# Patient Record
Sex: MALE | Race: BLACK OR AFRICAN AMERICAN | NOT HISPANIC OR LATINO | Employment: OTHER | URBAN - NONMETROPOLITAN AREA
[De-identification: names, ages, dates, MRNs, and addresses within clinical notes are randomized per-mention and may not be internally consistent; named-entity substitution may affect disease eponyms.]

---

## 2017-03-30 ENCOUNTER — OFFICE VISIT (OUTPATIENT)
Dept: ORTHOPEDIC SURGERY | Facility: CLINIC | Age: 77
End: 2017-03-30

## 2017-03-30 DIAGNOSIS — M25.561 RIGHT KNEE PAIN, UNSPECIFIED CHRONICITY: Primary | ICD-10-CM

## 2017-03-30 DIAGNOSIS — M17.11 PRIMARY OSTEOARTHRITIS OF RIGHT KNEE: ICD-10-CM

## 2017-03-30 PROCEDURE — 99204 OFFICE O/P NEW MOD 45 MIN: CPT | Performed by: ORTHOPAEDIC SURGERY

## 2017-03-30 PROCEDURE — 73560 X-RAY EXAM OF KNEE 1 OR 2: CPT | Performed by: ORTHOPAEDIC SURGERY

## 2017-03-30 RX ORDER — LISINOPRIL 10 MG/1
10 TABLET ORAL DAILY
COMMUNITY
End: 2022-02-25

## 2017-03-30 RX ORDER — LORAZEPAM 0.5 MG/1
0.5 TABLET ORAL EVERY 8 HOURS PRN
COMMUNITY
End: 2022-02-25

## 2017-03-30 RX ORDER — MEMANTINE HYDROCHLORIDE 28 MG/1
28 CAPSULE, EXTENDED RELEASE ORAL DAILY
COMMUNITY
End: 2022-02-25

## 2017-03-30 RX ORDER — FUROSEMIDE 80 MG
80 TABLET ORAL 2 TIMES DAILY
COMMUNITY
End: 2022-03-04 | Stop reason: HOSPADM

## 2017-03-30 RX ORDER — METOPROLOL TARTRATE 50 MG/1
50 TABLET, FILM COATED ORAL 2 TIMES DAILY
Status: ON HOLD | COMMUNITY
End: 2022-03-03 | Stop reason: SDUPTHER

## 2017-03-30 RX ORDER — GABAPENTIN 100 MG/1
100 CAPSULE ORAL 3 TIMES DAILY
COMMUNITY
End: 2022-02-25

## 2017-03-30 RX ORDER — OMEPRAZOLE 20 MG/1
20 CAPSULE, DELAYED RELEASE ORAL DAILY
COMMUNITY
End: 2022-02-25

## 2017-03-30 RX ORDER — OXYCODONE AND ACETAMINOPHEN 7.5; 325 MG/1; MG/1
1 TABLET ORAL EVERY 6 HOURS PRN
COMMUNITY
End: 2022-02-25

## 2017-03-30 RX ORDER — LANOLIN ALCOHOL/MO/W.PET/CERES
1000 CREAM (GRAM) TOPICAL DAILY
COMMUNITY
End: 2022-02-25

## 2017-03-30 RX ORDER — POLYETHYLENE GLYCOL 3350 17 G/17G
17 POWDER, FOR SOLUTION ORAL DAILY
COMMUNITY
End: 2022-07-02

## 2017-03-30 RX ORDER — PRAVASTATIN SODIUM 10 MG
10 TABLET ORAL NIGHTLY
COMMUNITY
End: 2022-04-18 | Stop reason: HOSPADM

## 2017-04-07 PROBLEM — M17.11 PRIMARY OSTEOARTHRITIS OF RIGHT KNEE: Status: ACTIVE | Noted: 2017-04-07

## 2017-04-20 ENCOUNTER — TELEPHONE (OUTPATIENT)
Dept: ORTHOPEDIC SURGERY | Facility: CLINIC | Age: 77
End: 2017-04-20

## 2017-04-20 NOTE — TELEPHONE ENCOUNTER
I'VE TRIED TO CALL ADELINA AT THE NURSING HOME TWICE. PATIENT DOES NOT MEET CRITERIA TO SCHEDULE AT [facility]. I'VE HELD OVER 10  MINUTES 2 TIMES TRYING TO REQUEST MEDICAL RECORDS FROM HIS PREVIOUS APPOINTMENTS.  Message left by surgery scheduler.

## 2017-05-17 ENCOUNTER — TELEPHONE (OUTPATIENT)
Dept: ORTHOPEDIC SURGERY | Facility: CLINIC | Age: 77
End: 2017-05-17

## 2017-06-20 ENCOUNTER — TELEPHONE (OUTPATIENT)
Dept: ORTHOPEDIC SURGERY | Facility: CLINIC | Age: 77
End: 2017-06-20

## 2017-06-20 NOTE — TELEPHONE ENCOUNTER
PATIENT IS HAVING NUMEROUS TEETH PULLED 7/12/17.  WE WILL POSTPONE HIS SURGERY 6-8 WEEKS... PATIENT IS AWARE./AW

## 2017-09-15 ENCOUNTER — TELEPHONE (OUTPATIENT)
Dept: ORTHOPEDIC SURGERY | Facility: CLINIC | Age: 77
End: 2017-09-15

## 2017-10-23 ENCOUNTER — OUTSIDE FACILITY SERVICE (OUTPATIENT)
Dept: ORTHOPEDIC SURGERY | Facility: CLINIC | Age: 77
End: 2017-10-23

## 2017-10-23 PROCEDURE — 27447 TOTAL KNEE ARTHROPLASTY: CPT | Performed by: ORTHOPAEDIC SURGERY

## 2017-10-23 PROCEDURE — 20985 CPTR-ASST DIR MS PX: CPT | Performed by: ORTHOPAEDIC SURGERY

## 2017-11-10 ENCOUNTER — OFFICE VISIT (OUTPATIENT)
Dept: ORTHOPEDIC SURGERY | Facility: CLINIC | Age: 77
End: 2017-11-10

## 2017-11-10 DIAGNOSIS — Z96.651 STATUS POST TOTAL RIGHT KNEE REPLACEMENT: Primary | ICD-10-CM

## 2017-11-10 DIAGNOSIS — M17.11 PRIMARY OSTEOARTHRITIS OF RIGHT KNEE: ICD-10-CM

## 2017-11-10 PROCEDURE — 73560 X-RAY EXAM OF KNEE 1 OR 2: CPT | Performed by: ORTHOPAEDIC SURGERY

## 2017-11-10 PROCEDURE — 99024 POSTOP FOLLOW-UP VISIT: CPT | Performed by: ORTHOPAEDIC SURGERY

## 2017-11-10 NOTE — PROGRESS NOTES
Chief Complaint   Patient presents with   • Right Knee - Follow-up   • Wound Check         HPI  Patient is here today for a follow up of his right total knee replacement.  He is doing very well postoperatively.  His range of motion is improving with physical therapy.  He has completed his course of xarelto for DVT prophylaxis.  There are no fevers, rigors or chills.  His pain is declined considerably and he is not using any pain medication at this point.  His mobility is improved after the total knee arthroplasty and the patient is glad that the surgery is over especially given the fact that he did not sustain any complications from the surgical intervention.        There were no vitals filed for this visit.        Joint/Body Part Specific Exam:  right knee.The patient is status post total knee arthroplasty postoperative 4 week(s). Incision is clean. Calf is soft and nontender. Homans sign is negative. There is no clicking, popping or catching. Anterior and posterior drawer signs are negative.  There is no instability of the components. Appropriate amounts of swelling and bruising are noted. Dorsalis pedis and posterior tibial artery pulses are palpable. Common peroneal nerve function is well preserved. Range of motion is from 0- 90° degrees of flexion. Gait is cautious but otherwise fairly normal. There is no evidence of a deep seated joint infection.      X-RAY REPORT:  right Knee X-Ray  Indication: Evaluation of implant position after total knee arthroplasty  AP, Lateral views  Findings: Well placed implants with a good cement mantle without any subsidence  no bony lesion  Soft tissues within normal limits  within normal limits joint spaces  Hardware appropriately positioned yes      yes prior studies available for comparison.    X-RAY was ordered and reviewed by Elias Lara MD    Jordin was seen today for wound check and follow-up.    Diagnoses and all orders for this visit:    Status post total right knee  replacement  -     XR Knee 1 or 2 View Right    Primary osteoarthritis of right knee          Procedures        Instructions:      Plan:Incision care.    DC the staples.    Ace wrap from toes to groin to help decrease the swelling and minimize the possibility of a DVT.    Tablet aspirin 325 mg tab 1 by mouth daily for DVT prophylaxis.    Tablet ibuprofen 600 mg orally twice a day for pain and discomfort.    Continue with aggressive physical therapy on an outpatient basis.    Falls precaution.    Follow-up in my office in 6 weeks for reevaluation.

## 2017-11-16 PROBLEM — Z96.651 STATUS POST TOTAL RIGHT KNEE REPLACEMENT: Status: ACTIVE | Noted: 2017-11-16

## 2017-12-20 ENCOUNTER — OFFICE VISIT (OUTPATIENT)
Dept: ORTHOPEDIC SURGERY | Facility: CLINIC | Age: 77
End: 2017-12-20

## 2017-12-20 DIAGNOSIS — Z96.651 STATUS POST TOTAL RIGHT KNEE REPLACEMENT: Primary | ICD-10-CM

## 2017-12-20 PROCEDURE — 99024 POSTOP FOLLOW-UP VISIT: CPT | Performed by: ORTHOPAEDIC SURGERY

## 2017-12-20 NOTE — PROGRESS NOTES
Chief Complaint   Patient presents with   • Right Knee - Follow-up   • Wound Check         HPI  Patient is here today for a follow up of his right total knee replacement.Patient had a surgical procedure performed by me on 23 October 2017.  He is doing extremely well with his postoperative physical therapy.  He states that his pain has fully settled down and he is not taking any narcotics for control of his pain at this point.  He has completed his course of xarelto.  He is very pleased with the resolution of his pain.  He states that everything is going absolutely great in terms of his mobility and his recovery from a relatively major surgery.        There were no vitals filed for this visit.        Joint/Body Part Specific Exam:  right knee.The patient is status post total knee arthroplasty postoperative 8 week(s). Incision is clean. Calf is soft and nontender. Homans sign is negative. There is no clicking, popping or catching. Anterior and posterior drawer signs are negative.  There is no instability of the components. Appropriate amounts of swelling and bruising are noted. Dorsalis pedis and posterior tibial artery pulses are palpable. Common peroneal nerve function is well preserved. Range of motion is from 0- 125° degrees of flexion. Gait is cautious but otherwise fairly normal. There is no evidence of a deep seated joint infection.      X-RAY REPORT:        Jordin was seen today for wound check and follow-up.    Diagnoses and all orders for this visit:    Status post total right knee replacement          Procedures        Instructions:      Plan:Incision care.    Stretching and strengthening exercises of the quads and the hamstrings.    Falls precautions.    , GI and dental procedure prophylaxis with antibiotics to prevent a metastatic infection to the knee arthroplasty implants.    Tablet ibuprofen 600 mg tab 1 by mouth twice a day for pain and discomfort.    Weightbearing as tolerated in walking with the  assistance of a walker.    Follow-up in my office in 3 months for reevaluation.

## 2018-03-22 ENCOUNTER — OFFICE VISIT CONVERTED (OUTPATIENT)
Dept: CARDIOLOGY | Facility: CLINIC | Age: 78
End: 2018-03-22
Attending: INTERNAL MEDICINE

## 2018-09-21 ENCOUNTER — OFFICE VISIT CONVERTED (OUTPATIENT)
Dept: PODIATRY | Facility: CLINIC | Age: 78
End: 2018-09-21
Attending: PODIATRIST

## 2018-09-27 ENCOUNTER — OFFICE VISIT CONVERTED (OUTPATIENT)
Dept: CARDIOLOGY | Facility: CLINIC | Age: 78
End: 2018-09-27
Attending: INTERNAL MEDICINE

## 2019-03-05 ENCOUNTER — OFFICE VISIT (OUTPATIENT)
Dept: ORTHOPEDIC SURGERY | Facility: CLINIC | Age: 79
End: 2019-03-05

## 2019-03-05 VITALS — WEIGHT: 173 LBS | HEIGHT: 60 IN | TEMPERATURE: 97.1 F | BODY MASS INDEX: 33.96 KG/M2

## 2019-03-05 DIAGNOSIS — Z96.651 STATUS POST TOTAL RIGHT KNEE REPLACEMENT: Primary | ICD-10-CM

## 2019-03-05 PROCEDURE — 73560 X-RAY EXAM OF KNEE 1 OR 2: CPT | Performed by: ORTHOPAEDIC SURGERY

## 2019-03-05 PROCEDURE — 99213 OFFICE O/P EST LOW 20 MIN: CPT | Performed by: ORTHOPAEDIC SURGERY

## 2019-03-05 NOTE — PROGRESS NOTES
NEW/FOLLOW UP VISIT    Jordin Menon:  ?  1940:  ?  Chief Complaint   Patient presents with   • Right Knee - Follow-up      ?  HPI: The patient is following up with me regarding his right total knee arthroplasty.  I performed the surgery on 23 October 2017.  He is now 1.5 years plus out from the knee arthroplasty.  The knee surgery improved his quality of life quite a bit.  He does not take any narcotics for pain control.  Unfortunately his dementia has slowly and progressively gotten worse.  He does walk with the assistance of a walker to prevent falls.  He has a lot of swelling and edema distally over the pretibial region.  He also has a chronic ulcer on the lateral aspect of the right ankle which needs to be treated with a wound clinic.  The patient does not recall if his PCP or the wound clinic is treating this ulcer.  He does not have any fevers, rigors or chills.  There does not appear to be any evidence of an active infection related to this ulcer.  He states that he does have a dull aching sensation on the medial aspect of the tibia.  His knee however does not bother him at all and he does not recall taking any medication for his knee symptoms.      This patient is an established patient.  This problem is not new to this examiner.    Review of Systems   Constitutional: Negative.    HENT: Negative.    Eyes: Negative.    Respiratory: Negative.    Cardiovascular: Negative.    Gastrointestinal: Negative.    Endocrine: Negative.    Genitourinary: Negative.    Musculoskeletal: Positive for gait problem and joint swelling.   Skin: Negative.    Allergic/Immunologic: Negative.    Hematological: Negative.    Psychiatric/Behavioral: Positive for behavioral problems and confusion. Negative for suicidal ideas.           Physical Exam   Constitutional: Patient is oriented to person, place, and time. Appears well-developed and well-nourished.   HENT:   Head: Normocephalic and atraumatic.   Eyes: Conjunctivae and EOM are  normal. Pupils are equal, round, and reactive to light.   Cardiovascular: Normal rate, regular rhythm, normal heart sounds and intact distal pulses.   Pulmonary/Chest: Effort normal and breath sounds normal.   Musculoskeletal:   See detailed exam below   Neurological: Alert and oriented to person, place, and time. No sensory deficit. Coordination normal.   Skin: Skin is warm and dry. Capillary refill takes less than 2 seconds. No rash noted. No erythema.   Psychiatric: Patient has a normal mood and affect. Her behavior is normal. Judgment and thought content normal.   Nursing note and vitals reviewed.    Ortho Exam:     Right knee.The patient is status post total knee arthroplasty postoperative 1.5 year(s). Incision is clean. Calf is soft and nontender. Homans sign is negative. There is no clicking, popping or catching. Anterior and posterior drawer signs are negative.  There is no instability of the components. Appropriate amounts of swelling and bruising are noted. Dorsalis pedis and posterior tibial artery pulses are palpable. Common peroneal nerve function is well preserved. Range of motion is from 0- 130° degrees of flexion. Gait is cautious but otherwise fairly normal. There is no evidence of a deep seated joint infection.      Diagnostics:right Knee X-Ray  Indication: Evaluation of implant position after total knee arthroplasty  AP, Lateral views  Findings: Anatomical position of the implants with a good cement mantle without any subsidence of the implants  no bony lesion  Soft tissues within normal limits  within normal limits joint spaces  Hardware appropriately positioned yes      yes prior studies available for comparison.      X-RAY was ordered and reviewed by Elias Lara MD       Assessment:  Jordin was seen today for follow-up.    Diagnoses and all orders for this visit:    Status post total right knee replacement  -     XR Knee 1 or 2 View Right          Procedures  ?  ?  Plan  stretching and  strengthening exercises of the quads and the hamstrings.    Falls precautions.    Use a walker for assistance with ambulation.    Ace wrap from toes to groin to help decrease the swelling and the pitting pretibial edema.    He has a chronic ulcer on the distal aspect of the right ankle laterally and I have discussed with the patient that he needs to follow-up at the wound clinic because I am not comfortable treating the ulcer on this leg with chronic and long-standing duration.  The possibility of underlying osteomyelitis of the distal fibula cannot be ruled out and I have discussed that with him at length.    , GI and dental procedure prophylaxis with antibiotics to prevent a metastatic infection of the knee arthroplasty implants.    · Compression/elastic brace if applicable  · Rest, ice, compression, and elevation (RICE) therapy  · OTC Tylenol 1000mg by mouth every 4-6 hours as needed for pain   · Follow up in 1 year(s)      Elias Lara MD  3/6/2019

## 2019-06-09 ENCOUNTER — LAB REQUISITION (OUTPATIENT)
Dept: LAB | Facility: HOSPITAL | Age: 79
End: 2019-06-09

## 2019-06-09 DIAGNOSIS — Z00.00 ROUTINE GENERAL MEDICAL EXAMINATION AT A HEALTH CARE FACILITY: ICD-10-CM

## 2019-06-09 LAB
ALBUMIN SERPL-MCNC: 4.6 G/DL (ref 3.5–5.2)
ALBUMIN/GLOB SERPL: 1.4 G/DL
ALP SERPL-CCNC: 80 U/L (ref 39–117)
ALT SERPL W P-5'-P-CCNC: 13 U/L (ref 1–41)
ANION GAP SERPL CALCULATED.3IONS-SCNC: 15.9 MMOL/L
AST SERPL-CCNC: 21 U/L (ref 1–40)
BASOPHILS # BLD AUTO: 0.04 10*3/MM3 (ref 0–0.2)
BASOPHILS NFR BLD AUTO: 0.6 % (ref 0–1.5)
BILIRUB SERPL-MCNC: 0.8 MG/DL (ref 0.2–1.2)
BUN BLD-MCNC: 60 MG/DL (ref 8–23)
BUN/CREAT SERPL: 28 (ref 7–25)
CALCIUM SPEC-SCNC: 9.8 MG/DL (ref 8.6–10.5)
CHLORIDE SERPL-SCNC: 100 MMOL/L (ref 98–107)
CO2 SERPL-SCNC: 22.1 MMOL/L (ref 22–29)
CREAT BLD-MCNC: 2.14 MG/DL (ref 0.76–1.27)
DEPRECATED RDW RBC AUTO: 39.8 FL (ref 37–54)
EOSINOPHIL # BLD AUTO: 0.11 10*3/MM3 (ref 0–0.4)
EOSINOPHIL NFR BLD AUTO: 1.6 % (ref 0.3–6.2)
ERYTHROCYTE [DISTWIDTH] IN BLOOD BY AUTOMATED COUNT: 12.5 % (ref 12.3–15.4)
GFR SERPL CREATININE-BSD FRML MDRD: 36 ML/MIN/1.73
GLOBULIN UR ELPH-MCNC: 3.2 GM/DL
GLUCOSE BLD-MCNC: 99 MG/DL (ref 65–99)
HCT VFR BLD AUTO: 33.9 % (ref 37.5–51)
HGB BLD-MCNC: 11.4 G/DL (ref 13–17.7)
IMM GRANULOCYTES # BLD AUTO: 0.03 10*3/MM3 (ref 0–0.05)
IMM GRANULOCYTES NFR BLD AUTO: 0.4 % (ref 0–0.5)
LYMPHOCYTES # BLD AUTO: 1.73 10*3/MM3 (ref 0.7–3.1)
LYMPHOCYTES NFR BLD AUTO: 24.7 % (ref 19.6–45.3)
MCH RBC QN AUTO: 29.4 PG (ref 26.6–33)
MCHC RBC AUTO-ENTMCNC: 33.6 G/DL (ref 31.5–35.7)
MCV RBC AUTO: 87.4 FL (ref 79–97)
MONOCYTES # BLD AUTO: 0.72 10*3/MM3 (ref 0.1–0.9)
MONOCYTES NFR BLD AUTO: 10.3 % (ref 5–12)
NEUTROPHILS # BLD AUTO: 4.37 10*3/MM3 (ref 1.7–7)
NEUTROPHILS NFR BLD AUTO: 62.4 % (ref 42.7–76)
NRBC BLD AUTO-RTO: 0 /100 WBC (ref 0–0.2)
PLATELET # BLD AUTO: 248 10*3/MM3 (ref 140–450)
PMV BLD AUTO: 10.7 FL (ref 6–12)
POTASSIUM BLD-SCNC: 4.8 MMOL/L (ref 3.5–5.2)
PROT SERPL-MCNC: 7.8 G/DL (ref 6–8.5)
RBC # BLD AUTO: 3.88 10*6/MM3 (ref 4.14–5.8)
SODIUM BLD-SCNC: 138 MMOL/L (ref 136–145)
WBC NRBC COR # BLD: 7 10*3/MM3 (ref 3.4–10.8)

## 2019-06-09 PROCEDURE — 85025 COMPLETE CBC W/AUTO DIFF WBC: CPT

## 2019-06-09 PROCEDURE — 80053 COMPREHEN METABOLIC PANEL: CPT

## 2019-06-10 ENCOUNTER — LAB REQUISITION (OUTPATIENT)
Dept: LAB | Facility: HOSPITAL | Age: 79
End: 2019-06-10

## 2019-06-10 DIAGNOSIS — Z00.00 ROUTINE GENERAL MEDICAL EXAMINATION AT A HEALTH CARE FACILITY: ICD-10-CM

## 2019-06-10 LAB
BILIRUB UR QL STRIP: NEGATIVE
CLARITY UR: CLEAR
COLOR UR: YELLOW
GLUCOSE UR STRIP-MCNC: NEGATIVE MG/DL
HGB UR QL STRIP.AUTO: NEGATIVE
KETONES UR QL STRIP: NEGATIVE
LEUKOCYTE ESTERASE UR QL STRIP.AUTO: NEGATIVE
NITRITE UR QL STRIP: NEGATIVE
PH UR STRIP.AUTO: <=5 [PH] (ref 5–8)
PROT UR QL STRIP: NEGATIVE
SP GR UR STRIP: 1.02 (ref 1–1.03)
UROBILINOGEN UR QL STRIP: NORMAL

## 2019-06-10 PROCEDURE — 81003 URINALYSIS AUTO W/O SCOPE: CPT

## 2019-06-13 ENCOUNTER — LAB REQUISITION (OUTPATIENT)
Dept: LAB | Facility: HOSPITAL | Age: 79
End: 2019-06-13

## 2019-06-13 DIAGNOSIS — Z00.00 ROUTINE GENERAL MEDICAL EXAMINATION AT A HEALTH CARE FACILITY: ICD-10-CM

## 2019-06-13 LAB — NT-PROBNP SERPL-MCNC: 167.9 PG/ML (ref 5–1800)

## 2019-06-13 PROCEDURE — 83880 ASSAY OF NATRIURETIC PEPTIDE: CPT

## 2019-06-14 ENCOUNTER — LAB REQUISITION (OUTPATIENT)
Dept: LAB | Facility: HOSPITAL | Age: 79
End: 2019-06-14

## 2019-06-14 DIAGNOSIS — Z00.00 ROUTINE GENERAL MEDICAL EXAMINATION AT A HEALTH CARE FACILITY: ICD-10-CM

## 2019-06-14 LAB
BILIRUB UR QL STRIP: NEGATIVE
CLARITY UR: CLEAR
COLOR UR: ABNORMAL
GLUCOSE UR STRIP-MCNC: NEGATIVE MG/DL
HGB UR QL STRIP.AUTO: NEGATIVE
KETONES UR QL STRIP: ABNORMAL
LEUKOCYTE ESTERASE UR QL STRIP.AUTO: NEGATIVE
NITRITE UR QL STRIP: NEGATIVE
PH UR STRIP.AUTO: <=5 [PH] (ref 5–8)
PROT UR QL STRIP: ABNORMAL
SP GR UR STRIP: 1.02 (ref 1–1.03)
UROBILINOGEN UR QL STRIP: ABNORMAL

## 2019-06-14 PROCEDURE — 81003 URINALYSIS AUTO W/O SCOPE: CPT

## 2020-03-02 ENCOUNTER — LAB REQUISITION (OUTPATIENT)
Dept: LAB | Facility: HOSPITAL | Age: 80
End: 2020-03-02

## 2020-03-02 DIAGNOSIS — Z00.00 ROUTINE GENERAL MEDICAL EXAMINATION AT A HEALTH CARE FACILITY: ICD-10-CM

## 2020-03-02 LAB
BASOPHILS # BLD AUTO: 0.04 10*3/MM3 (ref 0–0.2)
BASOPHILS NFR BLD AUTO: 0.7 % (ref 0–1.5)
DEPRECATED RDW RBC AUTO: 41.8 FL (ref 37–54)
EOSINOPHIL # BLD AUTO: 0.15 10*3/MM3 (ref 0–0.4)
EOSINOPHIL NFR BLD AUTO: 2.5 % (ref 0.3–6.2)
ERYTHROCYTE [DISTWIDTH] IN BLOOD BY AUTOMATED COUNT: 13 % (ref 12.3–15.4)
HCT VFR BLD AUTO: 34.6 % (ref 37.5–51)
HGB BLD-MCNC: 11.2 G/DL (ref 13–17.7)
IMM GRANULOCYTES # BLD AUTO: 0.02 10*3/MM3 (ref 0–0.05)
IMM GRANULOCYTES NFR BLD AUTO: 0.3 % (ref 0–0.5)
LYMPHOCYTES # BLD AUTO: 1.62 10*3/MM3 (ref 0.7–3.1)
LYMPHOCYTES NFR BLD AUTO: 26.7 % (ref 19.6–45.3)
MCH RBC QN AUTO: 28.8 PG (ref 26.6–33)
MCHC RBC AUTO-ENTMCNC: 32.4 G/DL (ref 31.5–35.7)
MCV RBC AUTO: 88.9 FL (ref 79–97)
MONOCYTES # BLD AUTO: 1.03 10*3/MM3 (ref 0.1–0.9)
MONOCYTES NFR BLD AUTO: 17 % (ref 5–12)
NEUTROPHILS # BLD AUTO: 3.2 10*3/MM3 (ref 1.7–7)
NEUTROPHILS NFR BLD AUTO: 52.8 % (ref 42.7–76)
NRBC BLD AUTO-RTO: 0 /100 WBC (ref 0–0.2)
PLATELET # BLD AUTO: 225 10*3/MM3 (ref 140–450)
PMV BLD AUTO: 10.7 FL (ref 6–12)
RBC # BLD AUTO: 3.89 10*6/MM3 (ref 4.14–5.8)
WBC NRBC COR # BLD: 6.06 10*3/MM3 (ref 3.4–10.8)

## 2020-03-02 PROCEDURE — 85025 COMPLETE CBC W/AUTO DIFF WBC: CPT

## 2020-10-19 ENCOUNTER — LAB REQUISITION (OUTPATIENT)
Dept: LAB | Facility: HOSPITAL | Age: 80
End: 2020-10-19

## 2020-10-19 DIAGNOSIS — Z00.00 ROUTINE GENERAL MEDICAL EXAMINATION AT A HEALTH CARE FACILITY: ICD-10-CM

## 2020-10-19 LAB
ANION GAP SERPL CALCULATED.3IONS-SCNC: 9.5 MMOL/L (ref 5–15)
BUN SERPL-MCNC: 14 MG/DL (ref 8–23)
BUN/CREAT SERPL: 12.5 (ref 7–25)
CALCIUM SPEC-SCNC: 9.9 MG/DL (ref 8.6–10.5)
CHLORIDE SERPL-SCNC: 97 MMOL/L (ref 98–107)
CO2 SERPL-SCNC: 28.5 MMOL/L (ref 22–29)
CREAT SERPL-MCNC: 1.12 MG/DL (ref 0.76–1.27)
GFR SERPL CREATININE-BSD FRML MDRD: 77 ML/MIN/1.73
GLUCOSE SERPL-MCNC: 84 MG/DL (ref 65–99)
POTASSIUM SERPL-SCNC: 5.1 MMOL/L (ref 3.5–5.2)
SODIUM SERPL-SCNC: 135 MMOL/L (ref 136–145)

## 2020-10-19 PROCEDURE — 80048 BASIC METABOLIC PNL TOTAL CA: CPT

## 2021-05-16 VITALS
DIASTOLIC BLOOD PRESSURE: 82 MMHG | BODY MASS INDEX: 35.88 KG/M2 | WEIGHT: 195 LBS | HEIGHT: 62 IN | HEART RATE: 56 BPM | SYSTOLIC BLOOD PRESSURE: 174 MMHG

## 2021-05-16 VITALS
HEART RATE: 52 BPM | WEIGHT: 200 LBS | SYSTOLIC BLOOD PRESSURE: 140 MMHG | DIASTOLIC BLOOD PRESSURE: 60 MMHG | BODY MASS INDEX: 39.27 KG/M2 | HEIGHT: 60 IN | OXYGEN SATURATION: 95 %

## 2021-05-16 VITALS
BODY MASS INDEX: 33.49 KG/M2 | DIASTOLIC BLOOD PRESSURE: 84 MMHG | HEIGHT: 62 IN | WEIGHT: 182 LBS | HEART RATE: 64 BPM | SYSTOLIC BLOOD PRESSURE: 166 MMHG

## 2021-07-28 ENCOUNTER — OFFICE VISIT (OUTPATIENT)
Dept: CARDIOLOGY | Facility: CLINIC | Age: 81
End: 2021-07-28

## 2021-07-28 VITALS
DIASTOLIC BLOOD PRESSURE: 60 MMHG | HEART RATE: 54 BPM | HEIGHT: 62 IN | SYSTOLIC BLOOD PRESSURE: 126 MMHG | BODY MASS INDEX: 34.23 KG/M2 | WEIGHT: 186 LBS

## 2021-07-28 DIAGNOSIS — I10 ESSENTIAL HYPERTENSION: Primary | ICD-10-CM

## 2021-07-28 DIAGNOSIS — R06.02 SHORTNESS OF BREATH: ICD-10-CM

## 2021-07-28 PROCEDURE — 99212 OFFICE O/P EST SF 10 MIN: CPT | Performed by: INTERNAL MEDICINE

## 2021-07-28 RX ORDER — ACETAMINOPHEN 325 MG/1
650 TABLET ORAL EVERY 6 HOURS PRN
COMMUNITY
End: 2022-03-04 | Stop reason: HOSPADM

## 2021-07-28 RX ORDER — FAMOTIDINE 40 MG/1
40 TABLET, FILM COATED ORAL NIGHTLY
COMMUNITY

## 2021-07-28 RX ORDER — AMLODIPINE BESYLATE 10 MG/1
10 TABLET ORAL EVERY EVENING
COMMUNITY
End: 2022-03-09 | Stop reason: HOSPADM

## 2021-07-28 RX ORDER — HYDRALAZINE HYDROCHLORIDE 25 MG/1
25 TABLET, FILM COATED ORAL 3 TIMES DAILY
COMMUNITY
End: 2022-03-04 | Stop reason: HOSPADM

## 2021-07-28 NOTE — PROGRESS NOTES
Chief Complaint  Follow-up (no complaints)    Subjective    Patient currently resides in a nursing home he has had stable symptoms of shortness of breath no significant weight gain problems and no issues with his blood pressure    Past Medical History:   Diagnosis Date   • Alzheimer's dementia with behavioral disturbance (CMS/HCC)    • Anemia, vitamin B12 deficiency    • Anxiety disorder due to known physiological condition    • Arthritis    • Constipation    • Gastroesophageal reflux disease without esophagitis    • High blood pressure    • HTN (hypertension)    • Hyperlipidemia    • Ingrowing toenail 09/21/2018   • Left foot pain 09/21/2018   • Osteoarthritis of right knee 08/22/2016   • Right foot pain 09/21/2018   • Sleep apnea    • Tinea unguium 09/21/2018         Current Outpatient Medications:   •  acetaminophen (TYLENOL) 325 MG tablet, Take 650 mg by mouth Every 6 (Six) Hours As Needed for Mild Pain ., Disp: , Rfl:   •  amLODIPine (NORVASC) 10 MG tablet, Take 10 mg by mouth Daily., Disp: , Rfl:   •  famotidine (PEPCID) 20 MG tablet, Take 20 mg by mouth At Night As Needed for Heartburn., Disp: , Rfl:   •  furosemide (LASIX) 20 MG tablet, Take 20 mg by mouth 2 (Two) Times a Day., Disp: , Rfl:   •  hydrALAZINE (APRESOLINE) 25 MG tablet, Take 25 mg by mouth 3 (Three) Times a Day., Disp: , Rfl:   •  memantine (Namenda XR) 28 MG capsule sustained-release 24 hr extended release capsule, Take 28 mg by mouth Daily., Disp: , Rfl:   •  metoprolol tartrate (LOPRESSOR) 25 MG tablet, Take 50 mg by mouth 2 (Two) Times a Day., Disp: , Rfl:   •  pravastatin (PRAVACHOL) 20 MG tablet, Take 20 mg by mouth Daily., Disp: , Rfl:   •  diclofenac (VOLTAREN) 50 MG EC tablet, Take 50 mg by mouth 2 (Two) Times a Day As Needed., Disp: , Rfl:   •  gabapentin (NEURONTIN) 100 MG capsule, Take 100 mg by mouth 3 (Three) Times a Day., Disp: , Rfl:   •  Linaclotide (LINZESS) 145 MCG capsule, Take  by mouth., Disp: , Rfl:   •  lisinopril  "(PRINIVIL,ZESTRIL) 10 MG tablet, Take 10 mg by mouth Daily., Disp: , Rfl:   •  LORazepam (ATIVAN) 0.5 MG tablet, Take 0.5 mg by mouth Every 8 (Eight) Hours As Needed for Anxiety., Disp: , Rfl:   •  Menthol, Topical Analgesic, (BIOFREEZE) 4 % gel, Apply  topically., Disp: , Rfl:   •  omeprazole (priLOSEC) 20 MG capsule, Take 20 mg by mouth Daily., Disp: , Rfl:   •  oxyCODONE-acetaminophen (PERCOCET) 7.5-325 MG per tablet, Take 1 tablet by mouth Every 6 (Six) Hours As Needed., Disp: , Rfl:   •  polyethylene glycol (MIRALAX) packet, Take 17 g by mouth Daily., Disp: , Rfl:   •  tuberculin (APLISOL) 5 UNIT/0.1ML injection, Inject  into the skin 1 (One) Time., Disp: , Rfl:   •  vitamin B-12 (CYANOCOBALAMIN) 1000 MCG tablet, Take 1,000 mcg by mouth Daily., Disp: , Rfl:     There are no discontinued medications.  No Known Allergies     Social History     Tobacco Use   • Smoking status: Never Smoker   • Smokeless tobacco: Never Used   Vaping Use   • Vaping Use: Never used   Substance Use Topics   • Alcohol use: No   • Drug use: Never       Family History   Family history unknown: Yes        Objective     /60 (Patient Position: Sitting)   Pulse 54   Ht 157.5 cm (62\")   Wt 84.4 kg (186 lb)   BMI 34.02 kg/m²       Physical Exam    General Appearance:   · no acute distress  · Alert and oriented x3  HENT:   · lips not cyanotic  · Atraumatic  Neck:  · No jvd   · supple  Respiratory:  · no respiratory distress  · normal breath sounds  · no rales  Cardiovascular:  · Regular rate and rhythm  · no S3, no S4   · no murmur  · no rub  Extremities  · No cyanosis  · lower extremity edema: Less to bilateral  Skin:   · warm, dry  · No rashes      Result Review :     proBNP   Date Value Ref Range Status   06/13/2019 167.9 5.0-1,800.0 pg/mL Final     CMP    CMP 10/19/20 1/9/21 1/18/21   Glucose 84 94    Glucose   93   BUN 14 14 12   Creatinine 1.12 0.92 1.10   eGFR African Am 77 96    Sodium 135 (A) 140 136   Potassium 5.1 5.1 see " below   Chloride 97 (A) 103 99   Calcium 9.9 9.5 9.3   Albumin  4.40 3.9   Total Bilirubin  0.6 0.42   Alkaline Phosphatase  107 111   AST (SGOT)  18 see below   ALT (SGPT)  6 see below   (A) Abnormal value       Comments are available for some flowsheets but are not being displayed.           CBC w/diff    CBC w/Diff 1/9/21 1/18/21   WBC 9.52 8.08   RBC 4.02 (A) 3.96 (A)   Hemoglobin 11.8 (A) 11.2 (A)   Hematocrit 36.1 (A) 31.9 (A)   MCV 89.8 80.6   MCH 29.4 28.3   MCHC 32.7 35.1   RDW 13.5 13.0   Platelets 192 357   Neutrophil Rel % 68.2 59.9   Immature Granulocyte Rel % 0.3    Lymphocyte Rel % 16.6 (A) 23.0   Monocyte Rel % 12.6 (A) 14.6 (A)   Eosinophil Rel % 1.9 1.7   Basophil Rel % 0.4 0.4   (A) Abnormal value             No results found for: TSH   No results found for: FREET4   No results found for: DDIMERQUANT  Magnesium   Date Value Ref Range Status   01/18/2021 2.02 1.60 - 2.30 mg/dL Final      No results found for: DIGOXIN   Lab Results   Component Value Date    TROPONINT 0.02 07/07/2019             Lab Results   Component Value Date    POCTROP 0.00 01/18/2021                      There are no diagnoses linked to this encounter.        Follow Up     Return if symptoms worsen or fail to improve.    Patient was given instructions and counseling regarding his condition or for health maintenance advice. Please see specific information pulled into the AVS if appropriate.

## 2022-02-01 ENCOUNTER — TRANSCRIBE ORDERS (OUTPATIENT)
Dept: ADMINISTRATIVE | Facility: HOSPITAL | Age: 82
End: 2022-02-01

## 2022-02-01 DIAGNOSIS — E86.0 DEHYDRATION: Primary | ICD-10-CM

## 2022-02-02 ENCOUNTER — HOSPITAL ENCOUNTER (OUTPATIENT)
Dept: INFUSION THERAPY | Facility: HOSPITAL | Age: 82
Discharge: HOME | End: 2022-02-02
Admitting: INTERNAL MEDICINE

## 2022-02-02 DIAGNOSIS — E86.0 DEHYDRATION: ICD-10-CM

## 2022-02-02 PROCEDURE — 36410 VNPNXR 3YR/> PHY/QHP DX/THER: CPT

## 2022-02-02 PROCEDURE — C1751 CATH, INF, PER/CENT/MIDLINE: HCPCS

## 2022-02-02 NOTE — CONSULTS
Midline Line Insertion Procedure Note    Procedure: Insertion of #20G/10CM PowerGlide    Indications:  Poor Access; IV fluids    Procedure Details   Sterile technique was used including antiseptics, cap, gloves, gown, hand hygiene, mask and sheet.    #20G/10CM PowerGlide inserted to the R Basilic vein per hospital protocol.   Blood return:  yes    Findings:  Catheter inserted to 10 cm, with 0 cm. Exposed. There were no changes to vital signs. Catheter was flushed with 20 cc NS. Patient did tolerate procedure well.    LOT: [id number]  Expiration date: 2022-12-31    Recommendations:  Midline Brochure given to patient with teaching instruction.

## 2022-02-02 NOTE — CODE DOCUMENTATION
Patient transfer to  [facility] by ems.  Confused, unable to answer questions.  Report not called by [facility].   Patient kept safe with sitter at bedside by [facility] staff and IV nurse.   Report called after placement of line by RN.  Taken back by EMS.  Bright rn

## 2022-02-04 ENCOUNTER — HOSPITAL ENCOUNTER (EMERGENCY)
Facility: HOSPITAL | Age: 82
Discharge: NURSING, REHAB OR LONG-TERM CARE | End: 2022-02-04
Attending: EMERGENCY MEDICINE | Admitting: EMERGENCY MEDICINE

## 2022-02-04 VITALS
WEIGHT: 162.26 LBS | OXYGEN SATURATION: 93 % | TEMPERATURE: 97.9 F | BODY MASS INDEX: 24.59 KG/M2 | HEIGHT: 68 IN | RESPIRATION RATE: 18 BRPM | DIASTOLIC BLOOD PRESSURE: 74 MMHG | SYSTOLIC BLOOD PRESSURE: 120 MMHG | HEART RATE: 76 BPM

## 2022-02-04 DIAGNOSIS — N28.9 RENAL INSUFFICIENCY: Primary | ICD-10-CM

## 2022-02-04 DIAGNOSIS — E87.6 HYPOKALEMIA: ICD-10-CM

## 2022-02-04 PROBLEM — E87.0 HYPERNATREMIA: Status: ACTIVE | Noted: 2022-02-04

## 2022-02-04 PROBLEM — N17.9 ARF (ACUTE RENAL FAILURE): Status: ACTIVE | Noted: 2022-02-04

## 2022-02-04 LAB
ALBUMIN SERPL-MCNC: 3.6 G/DL (ref 3.5–5.2)
ALBUMIN/GLOB SERPL: 0.8 G/DL
ALP SERPL-CCNC: 132 U/L (ref 39–117)
ALT SERPL W P-5'-P-CCNC: 8 U/L (ref 1–41)
ANION GAP SERPL CALCULATED.3IONS-SCNC: 18.7 MMOL/L (ref 5–15)
AST SERPL-CCNC: 13 U/L (ref 1–40)
BASOPHILS # BLD AUTO: 0.02 10*3/MM3 (ref 0–0.2)
BASOPHILS NFR BLD AUTO: 0.2 % (ref 0–1.5)
BILIRUB SERPL-MCNC: 0.9 MG/DL (ref 0–1.2)
BUN SERPL-MCNC: 95 MG/DL (ref 8–23)
BUN/CREAT SERPL: 45.5 (ref 7–25)
CALCIUM SPEC-SCNC: 10.5 MG/DL (ref 8.6–10.5)
CHLORIDE SERPL-SCNC: 101 MMOL/L (ref 98–107)
CO2 SERPL-SCNC: 29.3 MMOL/L (ref 22–29)
CREAT SERPL-MCNC: 2.09 MG/DL (ref 0.76–1.27)
DEPRECATED RDW RBC AUTO: 39.3 FL (ref 37–54)
EOSINOPHIL # BLD AUTO: 0 10*3/MM3 (ref 0–0.4)
EOSINOPHIL NFR BLD AUTO: 0 % (ref 0.3–6.2)
ERYTHROCYTE [DISTWIDTH] IN BLOOD BY AUTOMATED COUNT: 13.3 % (ref 12.3–15.4)
GFR SERPL CREATININE-BSD FRML MDRD: 37 ML/MIN/1.73
GLOBULIN UR ELPH-MCNC: 4.7 GM/DL
GLUCOSE SERPL-MCNC: 108 MG/DL (ref 65–99)
HCT VFR BLD AUTO: 34.7 % (ref 37.5–51)
HGB BLD-MCNC: 12.2 G/DL (ref 13–17.7)
IMM GRANULOCYTES # BLD AUTO: 0.04 10*3/MM3 (ref 0–0.05)
IMM GRANULOCYTES NFR BLD AUTO: 0.3 % (ref 0–0.5)
LYMPHOCYTES # BLD AUTO: 1.76 10*3/MM3 (ref 0.7–3.1)
LYMPHOCYTES NFR BLD AUTO: 13.5 % (ref 19.6–45.3)
MAGNESIUM SERPL-MCNC: 2.7 MG/DL (ref 1.6–2.4)
MCH RBC QN AUTO: 28.8 PG (ref 26.6–33)
MCHC RBC AUTO-ENTMCNC: 35.2 G/DL (ref 31.5–35.7)
MCV RBC AUTO: 81.8 FL (ref 79–97)
MONOCYTES # BLD AUTO: 1.3 10*3/MM3 (ref 0.1–0.9)
MONOCYTES NFR BLD AUTO: 10 % (ref 5–12)
NEUTROPHILS NFR BLD AUTO: 76 % (ref 42.7–76)
NEUTROPHILS NFR BLD AUTO: 9.93 10*3/MM3 (ref 1.7–7)
NRBC BLD AUTO-RTO: 0.2 /100 WBC (ref 0–0.2)
PLATELET # BLD AUTO: 263 10*3/MM3 (ref 140–450)
PMV BLD AUTO: 9.5 FL (ref 6–12)
POTASSIUM SERPL-SCNC: 2.9 MMOL/L (ref 3.5–5.2)
PROT SERPL-MCNC: 8.3 G/DL (ref 6–8.5)
RBC # BLD AUTO: 4.24 10*6/MM3 (ref 4.14–5.8)
SODIUM SERPL-SCNC: 149 MMOL/L (ref 136–145)
WBC NRBC COR # BLD: 13.05 10*3/MM3 (ref 3.4–10.8)

## 2022-02-04 PROCEDURE — 80053 COMPREHEN METABOLIC PANEL: CPT | Performed by: EMERGENCY MEDICINE

## 2022-02-04 PROCEDURE — 36415 COLL VENOUS BLD VENIPUNCTURE: CPT

## 2022-02-04 PROCEDURE — 85025 COMPLETE CBC W/AUTO DIFF WBC: CPT | Performed by: EMERGENCY MEDICINE

## 2022-02-04 PROCEDURE — 83735 ASSAY OF MAGNESIUM: CPT | Performed by: EMERGENCY MEDICINE

## 2022-02-04 PROCEDURE — 99284 EMERGENCY DEPT VISIT MOD MDM: CPT

## 2022-02-04 RX ORDER — POTASSIUM CHLORIDE 750 MG/1
40 CAPSULE, EXTENDED RELEASE ORAL ONCE
Status: COMPLETED | OUTPATIENT
Start: 2022-02-04 | End: 2022-02-04

## 2022-02-04 RX ORDER — SODIUM CHLORIDE 0.9 % (FLUSH) 0.9 %
10 SYRINGE (ML) INJECTION AS NEEDED
Status: DISCONTINUED | OUTPATIENT
Start: 2022-02-04 | End: 2022-02-05 | Stop reason: HOSPADM

## 2022-02-04 RX ADMIN — SODIUM CHLORIDE 1000 ML: 9 INJECTION, SOLUTION INTRAVENOUS at 22:38

## 2022-02-04 RX ADMIN — POTASSIUM CHLORIDE 40 MEQ: 10 CAPSULE, COATED, EXTENDED RELEASE ORAL at 22:38

## 2022-02-05 NOTE — CONSULTS
[facility]   Consult Note    Patient Name: Jordin Menon  : 1940  MRN: [id number]  Primary Care Physician:  Frank Llanes MD  Referring Physician: No ref. provider found  Date of admission: 2022    Subjective   Subjective     Reason for Consult/ Chief Complaint: ARF    HPI:  Jordin Menon is a 82 y.o. male that presents to the emergency department with PICC LINE COMPLAINT. Per ED nurse, pt was transferred to the ED from nursing home with a PICC line with an end torn off. Labs showed Cr 0f 21 and BUN 95,Na149 and K 2.9 and that is reason for consult  Pt is very confused.  Review of Systems  Constitutional:        Weakness tiredness fatigue  Eyes:                       No blurry vision, eye discharge, eye irritation, eye pain  HEENT:                   No acute hair loss, earache and discharge, nasal congestion or discharge, sore throat, postnasal drip  Respiratory:           No shortness of breath coughing sputum production wheezing hemoptysis pleuritic chest pain  Cardiovascular:     No chest pain, orthopnea, PND, dizziness, palpitation, lower extremity edema  Gastrointestinal:   No nausea vomiting diarrhea abdominal pain constipation  Genitourinary:       No urinary incontinence, hesitancy, frequency, urgency, dysuria  Neurological:        No confusion, headache, focal weakness, numbness, dysphasia  Hematologic:         No bruising, bleeding, pallor, lymphadenopathy  Endocrine:            No coldness, hot flashes, polyuria, abnormal hair growth  Musculoskeletal:  No body pains, aches, arthritic pains, muscle pain ,muscle wasting  Psychiatric:          No low or high mood, anxiety, hallucinations, delusions  Skin.                      No rash, ulcers, bruising, itching    Personal History     Past Medical History:   Diagnosis Date   • Alzheimer's dementia with behavioral disturbance (CMS/HCC)    • Anemia, vitamin B12 deficiency    • Anxiety disorder due to known physiological condition    •  Arthritis    • Constipation    • Gastroesophageal reflux disease without esophagitis    • High blood pressure    • HTN (hypertension)    • Hyperlipidemia    • Ingrowing toenail 09/21/2018   • Left foot pain 09/21/2018   • Osteoarthritis of right knee 08/22/2016   • Right foot pain 09/21/2018   • Sleep apnea    • Tinea unguium 09/21/2018       No past surgical history on file.    Family History: Family history is unknown by patient. Otherwise pertinent FHx was reviewed and not pertinent to current issue.    Social History:  reports that he has never smoked. He has never used smokeless tobacco. He reports that he does not drink alcohol and does not use drugs.    Home Medications:  LORazepam, Menthol (Topical Analgesic), acetaminophen, amLODIPine, diclofenac, famotidine, furosemide, gabapentin, hydrALAZINE, linaclotide, lisinopril, memantine, metoprolol tartrate, omeprazole, oxyCODONE-acetaminophen, polyethylene glycol, pravastatin, tuberculin, and vitamin B-12    Allergies:  No Known Allergies    Objective    Objective     Vitals:   Temp:  [97.9 °F (36.6 °C)] 97.9 °F (36.6 °C)  Heart Rate:  [62-84] 75  Resp:  [18] 18  BP: ()/(58-78) 117/58    Physical Exam:             Constitutional:         Awake, alert responsive, conversant, no obvious distress   Eyes:                       PERRLA, sclerae anicteric, no conjunctival injection   HEENT:                   Moist mucous membranes, no nasal or eye discharge, no throat congestion   Neck:                      Supple, no thyromegaly, no lymphadenopathy, trachea midline, no elevated JVD   Respiratory:           Clear to auscultation bilaterally, nonlabored respirations    Cardiovascular:     RRR, no murmurs, rubs, or gallops, palpable pedal pulses bilaterally, No bilateral ankle edema   Gastrointestinal:   Positive bowel sounds, soft, nontender, non-distended, no organomegaly   Musculoskeletal:  No clubbing or cyanosis to extremities, muscle wasting, joint swelling,  muscle weakness   Psychiatric:              Appropriate affect, cooperative   Neurologic:            Awake alert, oriented x 3, strength symmetric in all extremities, Cranial Nerves grossly intact to confrontation, speech clear   Skin:                      No rashes, bruising, skin ulcers, petechiae or ecchymosis    Result Review    Result Review:  I have personally reviewed the results from the time of this admission to 2/4/2022 22:34 EST and agree with these findings:  []  Laboratory  []  Microbiology  []  Radiology  []  EKG/Telemetry   []  Cardiology/Vascular   []  Pathology  []  Old records  []  Other:      Assessment/Plan   Assessment / Plan     Active Hospital Problems:  Active Hospital Problems    Diagnosis    • ARF (acute renal failure) (HCC)    • Hypokalemia    • Hypernatremia        Plan:   Give 1/2 NS  Replace K    Electronically signed by Nasrin Bunch MD, 02/04/22, 10:29 PM EST.

## 2022-02-05 NOTE — ED PROVIDER NOTES
Time: 8:18 PM EST  Arrived by: ambulance  Chief Complaint: PICC LINE COMPLAINT   History provided by: ED nurse   History is limited by: dementia     History of Present Illness:  Patient is a 82 y.o. male that presents to the emergency department with PICC LINE COMPLAINT. Per ED nurse, pt was transferred to the ED from nursing home with a PICC line with an end torn off. ED nurse adds that pt's kidney function has been elevated for the past 1.5 weeks with a BUN that has trended upwards. Otherwise, pt is unable to give any hx due to his dementia.     History provided by: ED nurse.  History limited by:  Dementia    Recently seen: not recently seen in this ED     Patient Care Team  Primary Care Provider: Frank Llanes MD    Past Medical History:     No Known Allergies  Past Medical History:   Diagnosis Date   • Alzheimer's dementia with behavioral disturbance (CMS/HCC)    • Anemia, vitamin B12 deficiency    • Anxiety disorder due to known physiological condition    • Arthritis    • Constipation    • Gastroesophageal reflux disease without esophagitis    • High blood pressure    • HTN (hypertension)    • Hyperlipidemia    • Ingrowing toenail 09/21/2018   • Left foot pain 09/21/2018   • Osteoarthritis of right knee 08/22/2016   • Right foot pain 09/21/2018   • Sleep apnea    • Tinea unguium 09/21/2018     No past surgical history on file.  Family History   Family history unknown: Yes       Home Medications:  Prior to Admission medications    Medication Sig Start Date End Date Taking? Authorizing Provider   acetaminophen (TYLENOL) 325 MG tablet Take 650 mg by mouth Every 6 (Six) Hours As Needed for Mild Pain .    ProviderNanci MD   amLODIPine (NORVASC) 10 MG tablet Take 10 mg by mouth Daily.    Nanci Shafer MD   diclofenac (VOLTAREN) 50 MG EC tablet Take 50 mg by mouth 2 (Two) Times a Day As Needed.    Nanci Shafer MD   famotidine (PEPCID) 20 MG tablet Take 20 mg by mouth At Night As Needed  for Heartburn.    Nanci Shafer MD   furosemide (LASIX) 20 MG tablet Take 20 mg by mouth 2 (Two) Times a Day.    Nanci Shafer MD   gabapentin (NEURONTIN) 100 MG capsule Take 100 mg by mouth 3 (Three) Times a Day.    Nanci Shafer MD   hydrALAZINE (APRESOLINE) 25 MG tablet Take 25 mg by mouth 3 (Three) Times a Day.    Nanci Shafer MD   Linaclotide (LINZESS) 145 MCG capsule Take  by mouth.    Nanci Shafer MD   lisinopril (PRINIVIL,ZESTRIL) 10 MG tablet Take 10 mg by mouth Daily.    Nanci Shafer MD   LORazepam (ATIVAN) 0.5 MG tablet Take 0.5 mg by mouth Every 8 (Eight) Hours As Needed for Anxiety.    Nanci Shafer MD   memantine (Namenda XR) 28 MG capsule sustained-release 24 hr extended release capsule Take 28 mg by mouth Daily.    Nanci Shafer MD   Menthol, Topical Analgesic, (BIOFREEZE) 4 % gel Apply  topically.    Nanci Shafer MD   metoprolol tartrate (LOPRESSOR) 25 MG tablet Take 50 mg by mouth 2 (Two) Times a Day.    Nanci Shafer MD   omeprazole (priLOSEC) 20 MG capsule Take 20 mg by mouth Daily.    Nanci Shafer MD   oxyCODONE-acetaminophen (PERCOCET) 7.5-325 MG per tablet Take 1 tablet by mouth Every 6 (Six) Hours As Needed.    Nanci Shafer MD   polyethylene glycol (MIRALAX) packet Take 17 g by mouth Daily.    Nanci Shafer MD   pravastatin (PRAVACHOL) 20 MG tablet Take 20 mg by mouth Daily.    Nanci Shafer MD   tuberculin (APLISOL) 5 UNIT/0.1ML injection Inject  into the skin 1 (One) Time.    Nanci Shafer MD   vitamin B-12 (CYANOCOBALAMIN) 1000 MCG tablet Take 1,000 mcg by mouth Daily.    Nanci Shafer MD        Social History:   Social History     Tobacco Use   • Smoking status: Never Smoker   • Smokeless tobacco: Never Used   Vaping Use   • Vaping Use: Never used   Substance Use Topics   • Alcohol use: No   • Drug use: Never       Review of Systems:  Review of Systems  "  Unable to perform ROS: Dementia        Physical Exam:  /74   Pulse 76   Temp 97.9 °F (36.6 °C) (Oral)   Resp 18   Ht 172.7 cm (68\")   Wt 73.6 kg (162 lb 4.1 oz)   SpO2 93%   BMI 24.67 kg/m²     Physical Exam  Vitals and nursing note reviewed.   Constitutional:       General: He is not in acute distress.     Appearance: Normal appearance.   HENT:      Head: Normocephalic and atraumatic.      Nose: Nose normal.   Eyes:      General: No scleral icterus.  Cardiovascular:      Rate and Rhythm: Normal rate and regular rhythm.      Heart sounds: Normal heart sounds.   Pulmonary:      Effort: Pulmonary effort is normal. No respiratory distress.      Breath sounds: Normal breath sounds.   Abdominal:      Palpations: Abdomen is soft.      Tenderness: There is no abdominal tenderness.   Musculoskeletal:         General: Normal range of motion.      Cervical back: Neck supple.      Right lower leg: No edema.      Left lower leg: No edema.      Comments: Right AC PICC line. The end of the line appears to be broke off.    Skin:     General: Skin is warm and dry.   Neurological:      General: No focal deficit present.      Mental Status: He is alert.              Medications in the Emergency Department:  Medications   potassium chloride (MICRO-K) CR capsule 40 mEq (40 mEq Oral Given 2/4/22 2238)   sodium chloride 0.9 % bolus 1,000 mL (0 mL Intravenous Stopped 2/4/22 5569)        Labs  Lab Results (last 24 hours)     ** No results found for the last 24 hours. **           Imaging:  No Radiology Exams Resulted Within Past 24 Hours    Procedures:  Procedures    Progress                            Medical Decision Making:  MDM   82-year-old male patient presented after the hub lock on his PICC line was accidentally pulled off.  Nursing staff was able to replace the hub.  The patient was found to be hypokalemic was given additional potassium.  The patient was discussed with  and the patient can be discharged " back to the nursing home.  Final diagnoses:   Renal insufficiency   Hypokalemia        Disposition:  ED Disposition     ED Disposition Condition Comment    Discharge Stable Pt stable and d/c'ed back to [facility]. No acute distress.           Documentation assistance provided by Romi Reeder acting as scribe for Brodie Herring DO. Information recorded by the scribe was done at my direction and has been verified and validated by me.         Romi Reeder  02/04/22 2025       Brodie Herring DO  02/07/22 1955

## 2022-02-05 NOTE — ED NOTES
Report given to Jocelin KAUFMAN at [facility]     Hernandez Chu RN  02/04/22 1043

## 2022-02-25 ENCOUNTER — APPOINTMENT (OUTPATIENT)
Dept: GENERAL RADIOLOGY | Facility: HOSPITAL | Age: 82
End: 2022-02-25

## 2022-02-25 ENCOUNTER — HOSPITAL ENCOUNTER (INPATIENT)
Facility: HOSPITAL | Age: 82
LOS: 7 days | Discharge: NURSING, REHAB OR LONG-TERM CARE | End: 2022-03-04
Attending: EMERGENCY MEDICINE | Admitting: INTERNAL MEDICINE

## 2022-02-25 DIAGNOSIS — R26.2 DIFFICULTY WALKING: ICD-10-CM

## 2022-02-25 DIAGNOSIS — T68.XXXA HYPOTHERMIA, INITIAL ENCOUNTER: ICD-10-CM

## 2022-02-25 DIAGNOSIS — R00.1 SYMPTOMATIC BRADYCARDIA: Primary | ICD-10-CM

## 2022-02-25 DIAGNOSIS — E87.6 HYPOKALEMIA: ICD-10-CM

## 2022-02-25 DIAGNOSIS — N28.9 ACUTE ON CHRONIC RENAL INSUFFICIENCY: ICD-10-CM

## 2022-02-25 DIAGNOSIS — Z78.9 DECREASED ACTIVITIES OF DAILY LIVING (ADL): ICD-10-CM

## 2022-02-25 DIAGNOSIS — E86.0 SEVERE DEHYDRATION: ICD-10-CM

## 2022-02-25 DIAGNOSIS — N18.9 ACUTE ON CHRONIC RENAL INSUFFICIENCY: ICD-10-CM

## 2022-02-25 LAB
ALBUMIN SERPL-MCNC: 3.2 G/DL (ref 3.5–5.2)
ALBUMIN/GLOB SERPL: 0.7 G/DL
ALP SERPL-CCNC: 142 U/L (ref 39–117)
ALT SERPL W P-5'-P-CCNC: 9 U/L (ref 1–41)
ANION GAP SERPL CALCULATED.3IONS-SCNC: 16.7 MMOL/L (ref 5–15)
APTT PPP: 25.9 SECONDS (ref 22.2–34.2)
AST SERPL-CCNC: 13 U/L (ref 1–40)
BACTERIA UR QL AUTO: ABNORMAL /HPF
BASOPHILS # BLD AUTO: 0.02 10*3/MM3 (ref 0–0.2)
BASOPHILS NFR BLD AUTO: 0.2 % (ref 0–1.5)
BILIRUB SERPL-MCNC: 0.4 MG/DL (ref 0–1.2)
BILIRUB UR QL STRIP: NEGATIVE
BUN SERPL-MCNC: 97 MG/DL (ref 8–23)
BUN/CREAT SERPL: 33.9 (ref 7–25)
CALCIUM SPEC-SCNC: 9.9 MG/DL (ref 8.6–10.5)
CHLORIDE SERPL-SCNC: 92 MMOL/L (ref 98–107)
CK SERPL-CCNC: 34 U/L (ref 20–200)
CLARITY UR: ABNORMAL
CO2 SERPL-SCNC: 31.3 MMOL/L (ref 22–29)
COLOR UR: YELLOW
CREAT SERPL-MCNC: 2.86 MG/DL (ref 0.76–1.27)
D-LACTATE SERPL-SCNC: 1.1 MMOL/L (ref 0.5–2)
DEPRECATED RDW RBC AUTO: 38.2 FL (ref 37–54)
EOSINOPHIL # BLD AUTO: 0.06 10*3/MM3 (ref 0–0.4)
EOSINOPHIL NFR BLD AUTO: 0.5 % (ref 0.3–6.2)
ERYTHROCYTE [DISTWIDTH] IN BLOOD BY AUTOMATED COUNT: 13.4 % (ref 12.3–15.4)
GFR SERPL CREATININE-BSD FRML MDRD: 26 ML/MIN/1.73
GLOBULIN UR ELPH-MCNC: 4.6 GM/DL
GLUCOSE SERPL-MCNC: 111 MG/DL (ref 65–99)
GLUCOSE UR STRIP-MCNC: NEGATIVE MG/DL
HCT VFR BLD AUTO: 33.6 % (ref 37.5–51)
HGB BLD-MCNC: 11.9 G/DL (ref 13–17.7)
HGB UR QL STRIP.AUTO: NEGATIVE
HOLD SPECIMEN: NORMAL
HOLD SPECIMEN: NORMAL
HYALINE CASTS UR QL AUTO: ABNORMAL /LPF
IMM GRANULOCYTES # BLD AUTO: 0.03 10*3/MM3 (ref 0–0.05)
IMM GRANULOCYTES NFR BLD AUTO: 0.3 % (ref 0–0.5)
INR PPP: 1.07 (ref 2–3)
KETONES UR QL STRIP: NEGATIVE
LEUKOCYTE ESTERASE UR QL STRIP.AUTO: ABNORMAL
LYMPHOCYTES # BLD AUTO: 1.43 10*3/MM3 (ref 0.7–3.1)
LYMPHOCYTES NFR BLD AUTO: 13 % (ref 19.6–45.3)
MAGNESIUM SERPL-MCNC: 2.3 MG/DL (ref 1.6–2.4)
MAGNESIUM SERPL-MCNC: 2.8 MG/DL (ref 1.6–2.4)
MCH RBC QN AUTO: 28.4 PG (ref 26.6–33)
MCHC RBC AUTO-ENTMCNC: 35.4 G/DL (ref 31.5–35.7)
MCV RBC AUTO: 80.2 FL (ref 79–97)
MONOCYTES # BLD AUTO: 0.94 10*3/MM3 (ref 0.1–0.9)
MONOCYTES NFR BLD AUTO: 8.5 % (ref 5–12)
NEUTROPHILS NFR BLD AUTO: 77.5 % (ref 42.7–76)
NEUTROPHILS NFR BLD AUTO: 8.52 10*3/MM3 (ref 1.7–7)
NITRITE UR QL STRIP: NEGATIVE
NRBC BLD AUTO-RTO: 0 /100 WBC (ref 0–0.2)
NT-PROBNP SERPL-MCNC: 399.1 PG/ML (ref 0–1800)
PH UR STRIP.AUTO: 5.5 [PH] (ref 5–8)
PHOSPHATE SERPL-MCNC: 6.1 MG/DL (ref 2.5–4.5)
PLATELET # BLD AUTO: 369 10*3/MM3 (ref 140–450)
PMV BLD AUTO: 10.6 FL (ref 6–12)
POTASSIUM SERPL-SCNC: 2.6 MMOL/L (ref 3.5–5.2)
PROCALCITONIN SERPL-MCNC: 0.56 NG/ML (ref 0–0.25)
PROT SERPL-MCNC: 7.8 G/DL (ref 6–8.5)
PROT UR QL STRIP: ABNORMAL
PROTHROMBIN TIME: 11.1 SECONDS (ref 9.4–12)
RBC # BLD AUTO: 4.19 10*6/MM3 (ref 4.14–5.8)
RBC # UR STRIP: ABNORMAL /HPF
REF LAB TEST METHOD: ABNORMAL
SODIUM SERPL-SCNC: 140 MMOL/L (ref 136–145)
SP GR UR STRIP: 1.01 (ref 1–1.03)
SQUAMOUS #/AREA URNS HPF: ABNORMAL /HPF
T4 FREE SERPL-MCNC: 1.71 NG/DL (ref 0.93–1.7)
TROPONIN T SERPL-MCNC: 0.03 NG/ML (ref 0–0.03)
TSH SERPL DL<=0.05 MIU/L-ACNC: 1.22 UIU/ML (ref 0.27–4.2)
UROBILINOGEN UR QL STRIP: ABNORMAL
WBC # UR STRIP: ABNORMAL /HPF
WBC NRBC COR # BLD: 11 10*3/MM3 (ref 3.4–10.8)
WHOLE BLOOD HOLD SPECIMEN: NORMAL
WHOLE BLOOD HOLD SPECIMEN: NORMAL

## 2022-02-25 PROCEDURE — 87040 BLOOD CULTURE FOR BACTERIA: CPT | Performed by: EMERGENCY MEDICINE

## 2022-02-25 PROCEDURE — 99285 EMERGENCY DEPT VISIT HI MDM: CPT

## 2022-02-25 PROCEDURE — 83735 ASSAY OF MAGNESIUM: CPT

## 2022-02-25 PROCEDURE — 85730 THROMBOPLASTIN TIME PARTIAL: CPT | Performed by: EMERGENCY MEDICINE

## 2022-02-25 PROCEDURE — 87899 AGENT NOS ASSAY W/OPTIC: CPT | Performed by: INTERNAL MEDICINE

## 2022-02-25 PROCEDURE — 0 POTASSIUM CHLORIDE 10 MEQ/100ML SOLUTION: Performed by: EMERGENCY MEDICINE

## 2022-02-25 PROCEDURE — 25010000002 CEFTRIAXONE PER 250 MG: Performed by: INTERNAL MEDICINE

## 2022-02-25 PROCEDURE — 81001 URINALYSIS AUTO W/SCOPE: CPT | Performed by: EMERGENCY MEDICINE

## 2022-02-25 PROCEDURE — 87086 URINE CULTURE/COLONY COUNT: CPT | Performed by: EMERGENCY MEDICINE

## 2022-02-25 PROCEDURE — 25010000002 CALCIUM GLUCONATE-NACL 1-0.675 GM/50ML-% SOLUTION: Performed by: EMERGENCY MEDICINE

## 2022-02-25 PROCEDURE — 84484 ASSAY OF TROPONIN QUANT: CPT

## 2022-02-25 PROCEDURE — 93005 ELECTROCARDIOGRAM TRACING: CPT

## 2022-02-25 PROCEDURE — 85025 COMPLETE CBC W/AUTO DIFF WBC: CPT

## 2022-02-25 PROCEDURE — 80053 COMPREHEN METABOLIC PANEL: CPT

## 2022-02-25 PROCEDURE — 93010 ELECTROCARDIOGRAM REPORT: CPT | Performed by: INTERNAL MEDICINE

## 2022-02-25 PROCEDURE — 87040 BLOOD CULTURE FOR BACTERIA: CPT

## 2022-02-25 PROCEDURE — 93005 ELECTROCARDIOGRAM TRACING: CPT | Performed by: EMERGENCY MEDICINE

## 2022-02-25 PROCEDURE — 71045 X-RAY EXAM CHEST 1 VIEW: CPT

## 2022-02-25 PROCEDURE — 83605 ASSAY OF LACTIC ACID: CPT

## 2022-02-25 PROCEDURE — 84443 ASSAY THYROID STIM HORMONE: CPT | Performed by: EMERGENCY MEDICINE

## 2022-02-25 PROCEDURE — 84439 ASSAY OF FREE THYROXINE: CPT | Performed by: EMERGENCY MEDICINE

## 2022-02-25 PROCEDURE — 84100 ASSAY OF PHOSPHORUS: CPT | Performed by: INTERNAL MEDICINE

## 2022-02-25 PROCEDURE — 25010000002 AZITHROMYCIN PER 500 MG: Performed by: INTERNAL MEDICINE

## 2022-02-25 PROCEDURE — 85610 PROTHROMBIN TIME: CPT | Performed by: EMERGENCY MEDICINE

## 2022-02-25 PROCEDURE — 99223 1ST HOSP IP/OBS HIGH 75: CPT | Performed by: INTERNAL MEDICINE

## 2022-02-25 PROCEDURE — 83880 ASSAY OF NATRIURETIC PEPTIDE: CPT | Performed by: EMERGENCY MEDICINE

## 2022-02-25 PROCEDURE — 0 ATROPINE SULFATE: Performed by: EMERGENCY MEDICINE

## 2022-02-25 PROCEDURE — 84145 PROCALCITONIN (PCT): CPT | Performed by: INTERNAL MEDICINE

## 2022-02-25 PROCEDURE — 83735 ASSAY OF MAGNESIUM: CPT | Performed by: INTERNAL MEDICINE

## 2022-02-25 PROCEDURE — 82550 ASSAY OF CK (CPK): CPT | Performed by: INTERNAL MEDICINE

## 2022-02-25 RX ORDER — BUSPIRONE HYDROCHLORIDE 10 MG/1
10 TABLET ORAL 2 TIMES DAILY
COMMUNITY

## 2022-02-25 RX ORDER — HYDROXYCHLOROQUINE SULFATE 200 MG/1
200 TABLET, FILM COATED ORAL 2 TIMES DAILY
COMMUNITY
Start: 2022-02-23 | End: 2022-03-04 | Stop reason: HOSPADM

## 2022-02-25 RX ORDER — AZITHROMYCIN 500 MG/1
500 TABLET, FILM COATED ORAL DAILY
COMMUNITY
Start: 2022-02-22 | End: 2022-03-04 | Stop reason: HOSPADM

## 2022-02-25 RX ORDER — HEPARIN SODIUM 5000 [USP'U]/ML
5000 INJECTION, SOLUTION INTRAVENOUS; SUBCUTANEOUS EVERY 12 HOURS SCHEDULED
Status: DISCONTINUED | OUTPATIENT
Start: 2022-02-25 | End: 2022-03-02

## 2022-02-25 RX ORDER — ASPIRIN 81 MG
200 TABLET, DELAYED RELEASE (ENTERIC COATED) ORAL DAILY
COMMUNITY
End: 2022-07-02

## 2022-02-25 RX ORDER — POTASSIUM CHLORIDE 750 MG/1
40 CAPSULE, EXTENDED RELEASE ORAL ONCE
Status: COMPLETED | OUTPATIENT
Start: 2022-02-25 | End: 2022-02-25

## 2022-02-25 RX ORDER — HYDROXYZINE HYDROCHLORIDE 25 MG/1
25 TABLET, FILM COATED ORAL EVERY 6 HOURS PRN
COMMUNITY
End: 2022-03-07

## 2022-02-25 RX ORDER — CHOLECALCIFEROL (VITAMIN D3) 125 MCG
5 CAPSULE ORAL
COMMUNITY

## 2022-02-25 RX ORDER — SODIUM CHLORIDE 0.9 % (FLUSH) 0.9 %
10 SYRINGE (ML) INJECTION AS NEEDED
Status: DISCONTINUED | OUTPATIENT
Start: 2022-02-25 | End: 2022-03-04 | Stop reason: HOSPADM

## 2022-02-25 RX ORDER — POTASSIUM CHLORIDE 7.45 MG/ML
10 INJECTION INTRAVENOUS
Status: COMPLETED | OUTPATIENT
Start: 2022-02-25 | End: 2022-02-25

## 2022-02-25 RX ORDER — CALCIUM GLUCONATE 20 MG/ML
1 INJECTION, SOLUTION INTRAVENOUS ONCE
Status: COMPLETED | OUTPATIENT
Start: 2022-02-25 | End: 2022-02-25

## 2022-02-25 RX ORDER — ASCORBIC ACID 500 MG
500 TABLET ORAL DAILY
COMMUNITY
Start: 2022-02-21 | End: 2022-03-04 | Stop reason: HOSPADM

## 2022-02-25 RX ORDER — MEMANTINE HYDROCHLORIDE 10 MG/1
10 TABLET ORAL DAILY
COMMUNITY
End: 2022-05-29

## 2022-02-25 RX ORDER — ACETAMINOPHEN 325 MG/1
650 TABLET ORAL EVERY 4 HOURS PRN
Status: DISCONTINUED | OUTPATIENT
Start: 2022-02-25 | End: 2022-03-04 | Stop reason: HOSPADM

## 2022-02-25 RX ORDER — SODIUM CHLORIDE 9 MG/ML
100 INJECTION, SOLUTION INTRAVENOUS CONTINUOUS
Status: DISCONTINUED | OUTPATIENT
Start: 2022-02-25 | End: 2022-02-28

## 2022-02-25 RX ORDER — ZINC GLUCONATE 50 MG
50 TABLET ORAL DAILY
COMMUNITY
Start: 2022-02-21 | End: 2022-03-04 | Stop reason: HOSPADM

## 2022-02-25 RX ORDER — IPRATROPIUM BROMIDE AND ALBUTEROL SULFATE 2.5; .5 MG/3ML; MG/3ML
3 SOLUTION RESPIRATORY (INHALATION) EVERY 4 HOURS PRN
COMMUNITY

## 2022-02-25 RX ORDER — METOLAZONE 2.5 MG/1
2.5 TABLET ORAL TAKE AS DIRECTED
COMMUNITY
End: 2022-03-04 | Stop reason: HOSPADM

## 2022-02-25 RX ORDER — SODIUM CHLORIDE 0.9 % (FLUSH) 0.9 %
10 SYRINGE (ML) INJECTION EVERY 12 HOURS SCHEDULED
Status: DISCONTINUED | OUTPATIENT
Start: 2022-02-25 | End: 2022-03-04 | Stop reason: HOSPADM

## 2022-02-25 RX ADMIN — AZITHROMYCIN 500 MG: 500 INJECTION, POWDER, LYOPHILIZED, FOR SOLUTION INTRAVENOUS at 23:54

## 2022-02-25 RX ADMIN — SODIUM CHLORIDE 100 ML/HR: 9 INJECTION, SOLUTION INTRAVENOUS at 23:28

## 2022-02-25 RX ADMIN — ATROPINE SULFATE 0.5 MG: 0.1 INJECTION, SOLUTION INTRAVENOUS at 18:36

## 2022-02-25 RX ADMIN — Medication 10 ML: at 23:29

## 2022-02-25 RX ADMIN — ATROPINE SULFATE 0.5 MG: 0.1 INJECTION, SOLUTION INTRAVENOUS at 18:07

## 2022-02-25 RX ADMIN — SODIUM CHLORIDE 500 ML: 9 INJECTION, SOLUTION INTRAVENOUS at 21:26

## 2022-02-25 RX ADMIN — POTASSIUM CHLORIDE 10 MEQ: 7.46 INJECTION, SOLUTION INTRAVENOUS at 18:07

## 2022-02-25 RX ADMIN — POTASSIUM CHLORIDE 40 MEQ: 750 CAPSULE, EXTENDED RELEASE ORAL at 18:30

## 2022-02-25 RX ADMIN — SODIUM CHLORIDE 500 ML: 9 INJECTION, SOLUTION INTRAVENOUS at 18:06

## 2022-02-25 RX ADMIN — POTASSIUM CHLORIDE 10 MEQ: 7.46 INJECTION, SOLUTION INTRAVENOUS at 19:42

## 2022-02-25 RX ADMIN — CALCIUM GLUCONATE 1 G: 20 INJECTION, SOLUTION INTRAVENOUS at 18:13

## 2022-02-25 RX ADMIN — SODIUM CHLORIDE 1 G: 900 INJECTION INTRAVENOUS at 21:32

## 2022-02-26 LAB
ALBUMIN SERPL-MCNC: 2.9 G/DL (ref 3.5–5.2)
ALBUMIN SERPL-MCNC: 3 G/DL (ref 3.5–5.2)
ALBUMIN/GLOB SERPL: 0.7 G/DL
ALP SERPL-CCNC: 129 U/L (ref 39–117)
ALT SERPL W P-5'-P-CCNC: 8 U/L (ref 1–41)
ANION GAP SERPL CALCULATED.3IONS-SCNC: 12.8 MMOL/L (ref 5–15)
ANION GAP SERPL CALCULATED.3IONS-SCNC: 14.2 MMOL/L (ref 5–15)
AST SERPL-CCNC: 12 U/L (ref 1–40)
BASOPHILS # BLD AUTO: 0.02 10*3/MM3 (ref 0–0.2)
BASOPHILS NFR BLD AUTO: 0.2 % (ref 0–1.5)
BILIRUB SERPL-MCNC: 0.3 MG/DL (ref 0–1.2)
BUN SERPL-MCNC: 78 MG/DL (ref 8–23)
BUN SERPL-MCNC: 92 MG/DL (ref 8–23)
BUN/CREAT SERPL: 34.7 (ref 7–25)
BUN/CREAT SERPL: 35.7 (ref 7–25)
CALCIUM SPEC-SCNC: 8.9 MG/DL (ref 8.6–10.5)
CALCIUM SPEC-SCNC: 9.2 MG/DL (ref 8.6–10.5)
CHLORIDE SERPL-SCNC: 104 MMOL/L (ref 98–107)
CHLORIDE SERPL-SCNC: 99 MMOL/L (ref 98–107)
CO2 SERPL-SCNC: 24.8 MMOL/L (ref 22–29)
CO2 SERPL-SCNC: 27.2 MMOL/L (ref 22–29)
CREAT SERPL-MCNC: 2.25 MG/DL (ref 0.76–1.27)
CREAT SERPL-MCNC: 2.58 MG/DL (ref 0.76–1.27)
DEPRECATED RDW RBC AUTO: 37.9 FL (ref 37–54)
EOSINOPHIL # BLD AUTO: 0.04 10*3/MM3 (ref 0–0.4)
EOSINOPHIL NFR BLD AUTO: 0.4 % (ref 0.3–6.2)
ERYTHROCYTE [DISTWIDTH] IN BLOOD BY AUTOMATED COUNT: 13.4 % (ref 12.3–15.4)
FLUAV AG NPH QL: NEGATIVE
FLUBV AG NPH QL IA: NEGATIVE
GFR SERPL CREATININE-BSD FRML MDRD: 29 ML/MIN/1.73
GFR SERPL CREATININE-BSD FRML MDRD: 34 ML/MIN/1.73
GLOBULIN UR ELPH-MCNC: 4.4 GM/DL
GLUCOSE SERPL-MCNC: 79 MG/DL (ref 65–99)
GLUCOSE SERPL-MCNC: 89 MG/DL (ref 65–99)
HCT VFR BLD AUTO: 29.5 % (ref 37.5–51)
HGB BLD-MCNC: 10.6 G/DL (ref 13–17.7)
IMM GRANULOCYTES # BLD AUTO: 0.03 10*3/MM3 (ref 0–0.05)
IMM GRANULOCYTES NFR BLD AUTO: 0.3 % (ref 0–0.5)
L PNEUMO1 AG UR QL IA: NEGATIVE
LYMPHOCYTES # BLD AUTO: 1.38 10*3/MM3 (ref 0.7–3.1)
LYMPHOCYTES NFR BLD AUTO: 15.2 % (ref 19.6–45.3)
MAGNESIUM SERPL-MCNC: 2.3 MG/DL (ref 1.6–2.4)
MCH RBC QN AUTO: 28.8 PG (ref 26.6–33)
MCHC RBC AUTO-ENTMCNC: 35.9 G/DL (ref 31.5–35.7)
MCV RBC AUTO: 80.2 FL (ref 79–97)
MONOCYTES # BLD AUTO: 0.83 10*3/MM3 (ref 0.1–0.9)
MONOCYTES NFR BLD AUTO: 9.2 % (ref 5–12)
NEUTROPHILS NFR BLD AUTO: 6.76 10*3/MM3 (ref 1.7–7)
NEUTROPHILS NFR BLD AUTO: 74.7 % (ref 42.7–76)
NRBC BLD AUTO-RTO: 0.3 /100 WBC (ref 0–0.2)
PHOSPHATE SERPL-MCNC: 4.4 MG/DL (ref 2.5–4.5)
PHOSPHATE SERPL-MCNC: 5.5 MG/DL (ref 2.5–4.5)
PLATELET # BLD AUTO: 306 10*3/MM3 (ref 140–450)
PMV BLD AUTO: 9.6 FL (ref 6–12)
POTASSIUM SERPL-SCNC: 3.3 MMOL/L (ref 3.5–5.2)
POTASSIUM SERPL-SCNC: 3.5 MMOL/L (ref 3.5–5.2)
PROT SERPL-MCNC: 7.4 G/DL (ref 6–8.5)
QT INTERVAL: 479 MS
RBC # BLD AUTO: 3.68 10*6/MM3 (ref 4.14–5.8)
S PNEUM AG SPEC QL LA: NEGATIVE
SARS-COV-2 RNA PNL SPEC NAA+PROBE: DETECTED
SODIUM SERPL-SCNC: 139 MMOL/L (ref 136–145)
SODIUM SERPL-SCNC: 143 MMOL/L (ref 136–145)
WBC NRBC COR # BLD: 9.06 10*3/MM3 (ref 3.4–10.8)

## 2022-02-26 PROCEDURE — 25010000002 HEPARIN (PORCINE) PER 1000 UNITS: Performed by: INTERNAL MEDICINE

## 2022-02-26 PROCEDURE — 84100 ASSAY OF PHOSPHORUS: CPT | Performed by: INTERNAL MEDICINE

## 2022-02-26 PROCEDURE — U0004 COV-19 TEST NON-CDC HGH THRU: HCPCS | Performed by: INTERNAL MEDICINE

## 2022-02-26 PROCEDURE — 80053 COMPREHEN METABOLIC PANEL: CPT | Performed by: INTERNAL MEDICINE

## 2022-02-26 PROCEDURE — 36415 COLL VENOUS BLD VENIPUNCTURE: CPT | Performed by: INTERNAL MEDICINE

## 2022-02-26 PROCEDURE — 83735 ASSAY OF MAGNESIUM: CPT | Performed by: INTERNAL MEDICINE

## 2022-02-26 PROCEDURE — 87804 INFLUENZA ASSAY W/OPTIC: CPT | Performed by: INTERNAL MEDICINE

## 2022-02-26 PROCEDURE — 87070 CULTURE OTHR SPECIMN AEROBIC: CPT | Performed by: PHYSICIAN ASSISTANT

## 2022-02-26 PROCEDURE — 25010000002 CEFTRIAXONE PER 250 MG: Performed by: INTERNAL MEDICINE

## 2022-02-26 PROCEDURE — U0005 INFEC AGEN DETEC AMPLI PROBE: HCPCS | Performed by: INTERNAL MEDICINE

## 2022-02-26 PROCEDURE — 85025 COMPLETE CBC W/AUTO DIFF WBC: CPT | Performed by: INTERNAL MEDICINE

## 2022-02-26 PROCEDURE — 99233 SBSQ HOSP IP/OBS HIGH 50: CPT | Performed by: INTERNAL MEDICINE

## 2022-02-26 PROCEDURE — 99291 CRITICAL CARE FIRST HOUR: CPT | Performed by: INTERNAL MEDICINE

## 2022-02-26 PROCEDURE — 25010000002 AZITHROMYCIN PER 500 MG: Performed by: INTERNAL MEDICINE

## 2022-02-26 RX ORDER — NOREPINEPHRINE BIT/0.9 % NACL 16MG/250ML
.02-.3 INFUSION BOTTLE (ML) INTRAVENOUS
Status: DISCONTINUED | OUTPATIENT
Start: 2022-02-26 | End: 2022-02-27

## 2022-02-26 RX ADMIN — HEPARIN SODIUM 5000 UNITS: 5000 INJECTION, SOLUTION INTRAVENOUS; SUBCUTANEOUS at 08:00

## 2022-02-26 RX ADMIN — SODIUM CHLORIDE 100 ML/HR: 9 INJECTION, SOLUTION INTRAVENOUS at 05:00

## 2022-02-26 RX ADMIN — AZITHROMYCIN 500 MG: 500 INJECTION, POWDER, LYOPHILIZED, FOR SOLUTION INTRAVENOUS at 21:12

## 2022-02-26 RX ADMIN — SODIUM CHLORIDE 1 G: 900 INJECTION INTRAVENOUS at 20:31

## 2022-02-26 RX ADMIN — Medication 10 ML: at 08:01

## 2022-02-26 RX ADMIN — Medication 0.02 MCG/KG/MIN: at 03:33

## 2022-02-26 RX ADMIN — Medication 10 ML: at 20:38

## 2022-02-26 RX ADMIN — HEPARIN SODIUM 5000 UNITS: 5000 INJECTION, SOLUTION INTRAVENOUS; SUBCUTANEOUS at 20:31

## 2022-02-26 RX ADMIN — SODIUM CHLORIDE 100 ML/HR: 9 INJECTION, SOLUTION INTRAVENOUS at 15:10

## 2022-02-27 ENCOUNTER — APPOINTMENT (OUTPATIENT)
Dept: GENERAL RADIOLOGY | Facility: HOSPITAL | Age: 82
End: 2022-02-27

## 2022-02-27 LAB
ALBUMIN SERPL-MCNC: 2.7 G/DL (ref 3.5–5.2)
ALBUMIN/GLOB SERPL: 0.7 G/DL
ALP SERPL-CCNC: 112 U/L (ref 39–117)
ALT SERPL W P-5'-P-CCNC: 8 U/L (ref 1–41)
ANION GAP SERPL CALCULATED.3IONS-SCNC: 12.7 MMOL/L (ref 5–15)
AST SERPL-CCNC: 11 U/L (ref 1–40)
BACTERIA SPEC AEROBE CULT: NORMAL
BASOPHILS # BLD AUTO: 0.02 10*3/MM3 (ref 0–0.2)
BASOPHILS NFR BLD AUTO: 0.2 % (ref 0–1.5)
BILIRUB SERPL-MCNC: 0.4 MG/DL (ref 0–1.2)
BUN SERPL-MCNC: 57 MG/DL (ref 8–23)
BUN/CREAT SERPL: 32.2 (ref 7–25)
CALCIUM SPEC-SCNC: 8.9 MG/DL (ref 8.6–10.5)
CHLORIDE SERPL-SCNC: 108 MMOL/L (ref 98–107)
CO2 SERPL-SCNC: 25.3 MMOL/L (ref 22–29)
CREAT SERPL-MCNC: 1.77 MG/DL (ref 0.76–1.27)
DEPRECATED RDW RBC AUTO: 40.2 FL (ref 37–54)
EOSINOPHIL # BLD AUTO: 0.03 10*3/MM3 (ref 0–0.4)
EOSINOPHIL NFR BLD AUTO: 0.4 % (ref 0.3–6.2)
ERYTHROCYTE [DISTWIDTH] IN BLOOD BY AUTOMATED COUNT: 13.7 % (ref 12.3–15.4)
GFR SERPL CREATININE-BSD FRML MDRD: 45 ML/MIN/1.73
GLOBULIN UR ELPH-MCNC: 3.8 GM/DL
GLUCOSE SERPL-MCNC: 87 MG/DL (ref 65–99)
HCT VFR BLD AUTO: 29 % (ref 37.5–51)
HGB BLD-MCNC: 10.2 G/DL (ref 13–17.7)
IMM GRANULOCYTES # BLD AUTO: 0.03 10*3/MM3 (ref 0–0.05)
IMM GRANULOCYTES NFR BLD AUTO: 0.4 % (ref 0–0.5)
LYMPHOCYTES # BLD AUTO: 2.02 10*3/MM3 (ref 0.7–3.1)
LYMPHOCYTES NFR BLD AUTO: 24.2 % (ref 19.6–45.3)
MAGNESIUM SERPL-MCNC: 2.4 MG/DL (ref 1.6–2.4)
MCH RBC QN AUTO: 28.7 PG (ref 26.6–33)
MCHC RBC AUTO-ENTMCNC: 35.2 G/DL (ref 31.5–35.7)
MCV RBC AUTO: 81.5 FL (ref 79–97)
MONOCYTES # BLD AUTO: 1.03 10*3/MM3 (ref 0.1–0.9)
MONOCYTES NFR BLD AUTO: 12.3 % (ref 5–12)
NEUTROPHILS NFR BLD AUTO: 5.22 10*3/MM3 (ref 1.7–7)
NEUTROPHILS NFR BLD AUTO: 62.5 % (ref 42.7–76)
NRBC BLD AUTO-RTO: 0 /100 WBC (ref 0–0.2)
PHOSPHATE SERPL-MCNC: 2.8 MG/DL (ref 2.5–4.5)
PLATELET # BLD AUTO: 280 10*3/MM3 (ref 140–450)
PMV BLD AUTO: 10 FL (ref 6–12)
POTASSIUM SERPL-SCNC: 2.9 MMOL/L (ref 3.5–5.2)
PROT SERPL-MCNC: 6.5 G/DL (ref 6–8.5)
RBC # BLD AUTO: 3.56 10*6/MM3 (ref 4.14–5.8)
SODIUM SERPL-SCNC: 146 MMOL/L (ref 136–145)
WBC NRBC COR # BLD: 8.35 10*3/MM3 (ref 3.4–10.8)

## 2022-02-27 PROCEDURE — 85025 COMPLETE CBC W/AUTO DIFF WBC: CPT | Performed by: PHYSICIAN ASSISTANT

## 2022-02-27 PROCEDURE — 71045 X-RAY EXAM CHEST 1 VIEW: CPT

## 2022-02-27 PROCEDURE — 99233 SBSQ HOSP IP/OBS HIGH 50: CPT | Performed by: INTERNAL MEDICINE

## 2022-02-27 PROCEDURE — 0 POTASSIUM CHLORIDE 10 MEQ/100ML SOLUTION: Performed by: PHYSICIAN ASSISTANT

## 2022-02-27 PROCEDURE — 84100 ASSAY OF PHOSPHORUS: CPT | Performed by: PHYSICIAN ASSISTANT

## 2022-02-27 PROCEDURE — 25010000002 HEPARIN (PORCINE) PER 1000 UNITS: Performed by: INTERNAL MEDICINE

## 2022-02-27 PROCEDURE — 25010000002 CEFTRIAXONE PER 250 MG: Performed by: INTERNAL MEDICINE

## 2022-02-27 PROCEDURE — 83735 ASSAY OF MAGNESIUM: CPT | Performed by: PHYSICIAN ASSISTANT

## 2022-02-27 PROCEDURE — 25010000002 AZITHROMYCIN PER 500 MG: Performed by: INTERNAL MEDICINE

## 2022-02-27 PROCEDURE — 80053 COMPREHEN METABOLIC PANEL: CPT | Performed by: PHYSICIAN ASSISTANT

## 2022-02-27 RX ORDER — POTASSIUM CHLORIDE 750 MG/1
40 CAPSULE, EXTENDED RELEASE ORAL ONCE
Status: COMPLETED | OUTPATIENT
Start: 2022-02-27 | End: 2022-02-27

## 2022-02-27 RX ORDER — POTASSIUM CHLORIDE 7.45 MG/ML
10 INJECTION INTRAVENOUS
Status: COMPLETED | OUTPATIENT
Start: 2022-02-27 | End: 2022-02-27

## 2022-02-27 RX ADMIN — Medication 10 ML: at 08:00

## 2022-02-27 RX ADMIN — SODIUM CHLORIDE 1 G: 900 INJECTION INTRAVENOUS at 20:56

## 2022-02-27 RX ADMIN — POTASSIUM CHLORIDE 40 MEQ: 10 CAPSULE, COATED, EXTENDED RELEASE ORAL at 09:20

## 2022-02-27 RX ADMIN — POTASSIUM CHLORIDE 10 MEQ: 7.46 INJECTION, SOLUTION INTRAVENOUS at 10:16

## 2022-02-27 RX ADMIN — SODIUM CHLORIDE 100 ML/HR: 9 INJECTION, SOLUTION INTRAVENOUS at 00:24

## 2022-02-27 RX ADMIN — SODIUM CHLORIDE 100 ML/HR: 9 INJECTION, SOLUTION INTRAVENOUS at 12:27

## 2022-02-27 RX ADMIN — HEPARIN SODIUM 5000 UNITS: 5000 INJECTION, SOLUTION INTRAVENOUS; SUBCUTANEOUS at 20:08

## 2022-02-27 RX ADMIN — AZITHROMYCIN 500 MG: 500 INJECTION, POWDER, LYOPHILIZED, FOR SOLUTION INTRAVENOUS at 20:56

## 2022-02-27 RX ADMIN — POTASSIUM CHLORIDE 10 MEQ: 7.46 INJECTION, SOLUTION INTRAVENOUS at 09:20

## 2022-02-27 RX ADMIN — POTASSIUM CHLORIDE 10 MEQ: 7.46 INJECTION, SOLUTION INTRAVENOUS at 11:31

## 2022-02-27 RX ADMIN — Medication 10 ML: at 20:08

## 2022-02-27 RX ADMIN — HEPARIN SODIUM 5000 UNITS: 5000 INJECTION, SOLUTION INTRAVENOUS; SUBCUTANEOUS at 08:00

## 2022-02-27 RX ADMIN — POTASSIUM CHLORIDE 10 MEQ: 7.46 INJECTION, SOLUTION INTRAVENOUS at 12:27

## 2022-02-28 LAB
ALBUMIN SERPL-MCNC: 2.5 G/DL (ref 3.5–5.2)
ANION GAP SERPL CALCULATED.3IONS-SCNC: 7.4 MMOL/L (ref 5–15)
BASOPHILS # BLD AUTO: 0.02 10*3/MM3 (ref 0–0.2)
BASOPHILS NFR BLD AUTO: 0.2 % (ref 0–1.5)
BUN SERPL-MCNC: 31 MG/DL (ref 8–23)
BUN/CREAT SERPL: 24.4 (ref 7–25)
CALCIUM SPEC-SCNC: 9 MG/DL (ref 8.6–10.5)
CATHETER CULTURE: NO GROWTH
CHLORIDE SERPL-SCNC: 114 MMOL/L (ref 98–107)
CO2 SERPL-SCNC: 23.6 MMOL/L (ref 22–29)
CREAT SERPL-MCNC: 1.27 MG/DL (ref 0.76–1.27)
DEPRECATED RDW RBC AUTO: 40.7 FL (ref 37–54)
EOSINOPHIL # BLD AUTO: 0.09 10*3/MM3 (ref 0–0.4)
EOSINOPHIL NFR BLD AUTO: 1 % (ref 0.3–6.2)
ERYTHROCYTE [DISTWIDTH] IN BLOOD BY AUTOMATED COUNT: 13.9 % (ref 12.3–15.4)
GFR SERPL CREATININE-BSD FRML MDRD: 66 ML/MIN/1.73
GLUCOSE SERPL-MCNC: 92 MG/DL (ref 65–99)
HCT VFR BLD AUTO: 28.6 % (ref 37.5–51)
HGB BLD-MCNC: 9.8 G/DL (ref 13–17.7)
IMM GRANULOCYTES # BLD AUTO: 0.07 10*3/MM3 (ref 0–0.05)
IMM GRANULOCYTES NFR BLD AUTO: 0.8 % (ref 0–0.5)
LYMPHOCYTES # BLD AUTO: 2.4 10*3/MM3 (ref 0.7–3.1)
LYMPHOCYTES NFR BLD AUTO: 26.2 % (ref 19.6–45.3)
MAGNESIUM SERPL-MCNC: 1.9 MG/DL (ref 1.6–2.4)
MCH RBC QN AUTO: 28 PG (ref 26.6–33)
MCHC RBC AUTO-ENTMCNC: 34.3 G/DL (ref 31.5–35.7)
MCV RBC AUTO: 81.7 FL (ref 79–97)
MONOCYTES # BLD AUTO: 1 10*3/MM3 (ref 0.1–0.9)
MONOCYTES NFR BLD AUTO: 10.9 % (ref 5–12)
NEUTROPHILS NFR BLD AUTO: 5.57 10*3/MM3 (ref 1.7–7)
NEUTROPHILS NFR BLD AUTO: 60.9 % (ref 42.7–76)
NRBC BLD AUTO-RTO: 0 /100 WBC (ref 0–0.2)
PHOSPHATE SERPL-MCNC: 1.7 MG/DL (ref 2.5–4.5)
PLATELET # BLD AUTO: 244 10*3/MM3 (ref 140–450)
PMV BLD AUTO: 10.2 FL (ref 6–12)
POTASSIUM SERPL-SCNC: 4.2 MMOL/L (ref 3.5–5.2)
RBC # BLD AUTO: 3.5 10*6/MM3 (ref 4.14–5.8)
SODIUM SERPL-SCNC: 145 MMOL/L (ref 136–145)
WBC NRBC COR # BLD: 9.15 10*3/MM3 (ref 3.4–10.8)

## 2022-02-28 PROCEDURE — 83735 ASSAY OF MAGNESIUM: CPT | Performed by: PHYSICIAN ASSISTANT

## 2022-02-28 PROCEDURE — 25010000002 CEFTRIAXONE PER 250 MG: Performed by: INTERNAL MEDICINE

## 2022-02-28 PROCEDURE — 25010000002 MAGNESIUM SULFATE 2 GM/50ML SOLUTION: Performed by: PHYSICIAN ASSISTANT

## 2022-02-28 PROCEDURE — 99233 SBSQ HOSP IP/OBS HIGH 50: CPT | Performed by: INTERNAL MEDICINE

## 2022-02-28 PROCEDURE — 99232 SBSQ HOSP IP/OBS MODERATE 35: CPT | Performed by: INTERNAL MEDICINE

## 2022-02-28 PROCEDURE — 85025 COMPLETE CBC W/AUTO DIFF WBC: CPT | Performed by: INTERNAL MEDICINE

## 2022-02-28 PROCEDURE — 80069 RENAL FUNCTION PANEL: CPT | Performed by: INTERNAL MEDICINE

## 2022-02-28 PROCEDURE — 25010000002 HEPARIN (PORCINE) PER 1000 UNITS: Performed by: INTERNAL MEDICINE

## 2022-02-28 RX ORDER — BISACODYL 10 MG
10 SUPPOSITORY, RECTAL RECTAL DAILY PRN
Status: DISCONTINUED | OUTPATIENT
Start: 2022-02-28 | End: 2022-03-04 | Stop reason: HOSPADM

## 2022-02-28 RX ORDER — MAGNESIUM SULFATE HEPTAHYDRATE 40 MG/ML
2 INJECTION, SOLUTION INTRAVENOUS ONCE
Status: COMPLETED | OUTPATIENT
Start: 2022-02-28 | End: 2022-02-28

## 2022-02-28 RX ORDER — POLYETHYLENE GLYCOL 3350 17 G/17G
17 POWDER, FOR SOLUTION ORAL DAILY PRN
Status: DISCONTINUED | OUTPATIENT
Start: 2022-02-28 | End: 2022-03-04 | Stop reason: HOSPADM

## 2022-02-28 RX ORDER — AMOXICILLIN 250 MG
2 CAPSULE ORAL 2 TIMES DAILY
Status: DISCONTINUED | OUTPATIENT
Start: 2022-02-28 | End: 2022-03-04 | Stop reason: HOSPADM

## 2022-02-28 RX ORDER — BISACODYL 5 MG/1
5 TABLET, DELAYED RELEASE ORAL DAILY PRN
Status: DISCONTINUED | OUTPATIENT
Start: 2022-02-28 | End: 2022-03-04 | Stop reason: HOSPADM

## 2022-02-28 RX ORDER — SODIUM PHOSPHATE IN D5W 15MMOL/250
15 PLASTIC BAG, INJECTION (ML) INTRAVENOUS ONCE
Status: COMPLETED | OUTPATIENT
Start: 2022-02-28 | End: 2022-02-28

## 2022-02-28 RX ADMIN — SENNOSIDES AND DOCUSATE SODIUM 2 TABLET: 50; 8.6 TABLET ORAL at 21:23

## 2022-02-28 RX ADMIN — SODIUM CHLORIDE 1 G: 900 INJECTION INTRAVENOUS at 21:23

## 2022-02-28 RX ADMIN — SODIUM PHOSPHATE, MONOBASIC, MONOHYDRATE 15 MMOL: 276; 142 INJECTION, SOLUTION INTRAVENOUS at 08:03

## 2022-02-28 RX ADMIN — Medication 10 ML: at 08:03

## 2022-02-28 RX ADMIN — HEPARIN SODIUM 5000 UNITS: 5000 INJECTION, SOLUTION INTRAVENOUS; SUBCUTANEOUS at 08:02

## 2022-02-28 RX ADMIN — MAGNESIUM SULFATE 2 G: 2 INJECTION INTRAVENOUS at 08:55

## 2022-02-28 RX ADMIN — HEPARIN SODIUM 5000 UNITS: 5000 INJECTION, SOLUTION INTRAVENOUS; SUBCUTANEOUS at 21:23

## 2022-02-28 RX ADMIN — Medication 10 ML: at 21:24

## 2022-03-01 LAB
ALBUMIN SERPL-MCNC: 2.8 G/DL (ref 3.5–5.2)
ANION GAP SERPL CALCULATED.3IONS-SCNC: 11 MMOL/L (ref 5–15)
BASOPHILS # BLD AUTO: 0.02 10*3/MM3 (ref 0–0.2)
BASOPHILS NFR BLD AUTO: 0.2 % (ref 0–1.5)
BUN SERPL-MCNC: 17 MG/DL (ref 8–23)
BUN/CREAT SERPL: 17.3 (ref 7–25)
CALCIUM SPEC-SCNC: 9.8 MG/DL (ref 8.6–10.5)
CHLORIDE SERPL-SCNC: 111 MMOL/L (ref 98–107)
CO2 SERPL-SCNC: 23 MMOL/L (ref 22–29)
CREAT SERPL-MCNC: 0.98 MG/DL (ref 0.76–1.27)
DEPRECATED RDW RBC AUTO: 42.3 FL (ref 37–54)
EGFRCR SERPLBLD CKD-EPI 2021: 77.5 ML/MIN/1.73
EOSINOPHIL # BLD AUTO: 0.11 10*3/MM3 (ref 0–0.4)
EOSINOPHIL NFR BLD AUTO: 0.9 % (ref 0.3–6.2)
ERYTHROCYTE [DISTWIDTH] IN BLOOD BY AUTOMATED COUNT: 14.3 % (ref 12.3–15.4)
GLUCOSE BLDC GLUCOMTR-MCNC: 79 MG/DL (ref 70–99)
GLUCOSE SERPL-MCNC: 86 MG/DL (ref 65–99)
HCT VFR BLD AUTO: 35 % (ref 37.5–51)
HGB BLD-MCNC: 12.2 G/DL (ref 13–17.7)
IMM GRANULOCYTES # BLD AUTO: 0.06 10*3/MM3 (ref 0–0.05)
IMM GRANULOCYTES NFR BLD AUTO: 0.5 % (ref 0–0.5)
LYMPHOCYTES # BLD AUTO: 1.79 10*3/MM3 (ref 0.7–3.1)
LYMPHOCYTES NFR BLD AUTO: 14.9 % (ref 19.6–45.3)
MCH RBC QN AUTO: 28.7 PG (ref 26.6–33)
MCHC RBC AUTO-ENTMCNC: 34.9 G/DL (ref 31.5–35.7)
MCV RBC AUTO: 82.4 FL (ref 79–97)
MONOCYTES # BLD AUTO: 1.04 10*3/MM3 (ref 0.1–0.9)
MONOCYTES NFR BLD AUTO: 8.7 % (ref 5–12)
NEUTROPHILS NFR BLD AUTO: 74.8 % (ref 42.7–76)
NEUTROPHILS NFR BLD AUTO: 8.97 10*3/MM3 (ref 1.7–7)
NRBC BLD AUTO-RTO: 0 /100 WBC (ref 0–0.2)
PHOSPHATE SERPL-MCNC: 2 MG/DL (ref 2.5–4.5)
PLATELET # BLD AUTO: 224 10*3/MM3 (ref 140–450)
PMV BLD AUTO: 9.4 FL (ref 6–12)
POTASSIUM SERPL-SCNC: 3.9 MMOL/L (ref 3.5–5.2)
RBC # BLD AUTO: 4.25 10*6/MM3 (ref 4.14–5.8)
SODIUM SERPL-SCNC: 145 MMOL/L (ref 136–145)
WBC NRBC COR # BLD: 11.99 10*3/MM3 (ref 3.4–10.8)

## 2022-03-01 PROCEDURE — 99233 SBSQ HOSP IP/OBS HIGH 50: CPT | Performed by: INTERNAL MEDICINE

## 2022-03-01 PROCEDURE — 99232 SBSQ HOSP IP/OBS MODERATE 35: CPT | Performed by: INTERNAL MEDICINE

## 2022-03-01 PROCEDURE — 85025 COMPLETE CBC W/AUTO DIFF WBC: CPT | Performed by: INTERNAL MEDICINE

## 2022-03-01 PROCEDURE — 82962 GLUCOSE BLOOD TEST: CPT

## 2022-03-01 PROCEDURE — 25010000002 CEFTRIAXONE PER 250 MG: Performed by: INTERNAL MEDICINE

## 2022-03-01 PROCEDURE — 80069 RENAL FUNCTION PANEL: CPT | Performed by: INTERNAL MEDICINE

## 2022-03-01 PROCEDURE — 25010000002 HEPARIN (PORCINE) PER 1000 UNITS: Performed by: INTERNAL MEDICINE

## 2022-03-01 RX ORDER — DEXTROSE MONOHYDRATE 50 MG/ML
50 INJECTION, SOLUTION INTRAVENOUS CONTINUOUS
Status: DISCONTINUED | OUTPATIENT
Start: 2022-03-02 | End: 2022-03-03

## 2022-03-01 RX ADMIN — Medication 10 ML: at 20:39

## 2022-03-01 RX ADMIN — HEPARIN SODIUM 5000 UNITS: 5000 INJECTION, SOLUTION INTRAVENOUS; SUBCUTANEOUS at 08:05

## 2022-03-01 RX ADMIN — SENNOSIDES AND DOCUSATE SODIUM 2 TABLET: 50; 8.6 TABLET ORAL at 08:05

## 2022-03-01 RX ADMIN — Medication 10 ML: at 08:06

## 2022-03-01 RX ADMIN — SODIUM CHLORIDE 1 G: 900 INJECTION INTRAVENOUS at 20:33

## 2022-03-01 RX ADMIN — SENNOSIDES AND DOCUSATE SODIUM 2 TABLET: 50; 8.6 TABLET ORAL at 20:34

## 2022-03-01 RX ADMIN — DEXTROSE MONOHYDRATE 50 ML/HR: 50 INJECTION, SOLUTION INTRAVENOUS at 23:59

## 2022-03-01 RX ADMIN — HEPARIN SODIUM 5000 UNITS: 5000 INJECTION, SOLUTION INTRAVENOUS; SUBCUTANEOUS at 20:34

## 2022-03-01 NOTE — PROGRESS NOTES
Pulmonary / Critical Care Progress Note      Patient Name: Jordin Menon  : 1940  MRN: [id number]  Primary Care Physician:  Frank Llanes MD  Date of admission: 2022    Subjective   Subjective   Follow-up for 82-year-old male with septic shock, altered mental status, symptomatic bradycardia, COVID-19 infection.    Over last 24 hours,  Continued broad-spectrum antibiotics with IV ceftriaxone.  Remained on heparin 5000 units subcu twice daily.  Transferred out of the ICU.    No acute episodes overnight    This morning,  Patient remains confused, does not know that he is in hospital.    Says he is a nursing home.  Complains of his shoes.  Remains on room air, bedridden.  Has no increased work of breathing.  Remains in enhanced airborne isolation due to Covid.  No fever noted.    Review of Systems  Unable to obtain from patient as he is with baseline dementia        Objective   Objective     Vitals:   Temp:  [98 °F (36.7 °C)-98.6 °F (37 °C)] 98.2 °F (36.8 °C)  Heart Rate:  [] 64  Resp:  [14-18] 18  BP: (107-150)/(60-72) 150/62     Physical Exam  Vital Signs Reviewed   General: Confused elderly male in no acute distress   HEENT:  PERRL, EOMI.  OP, nares clear, MMM  Neck:  Supple, no JVD, no thyromegaly  Chest:  good aeration, rhonchi bilaterally, tympanic to percussion bilaterally, no work of breathing noted  CV: RRR, no MGR, pulses 2+, equal.  Abd:  Soft, NT, ND, + BS, no HSM  EXT:  no clubbing, no cyanosis, no edema  Neuro: Alert but oriented x0, has generalized weakness, cranial nerves II to XII are intact  Skin: No rashes or lesions noted      Result Review    Result Review:  I have personally reviewed the results from the time of this admission to 3/1/2022 07:29 EST and agree with these findings:  [x]  Laboratory  [x]  Microbiology  [x]  Radiology  [x]  EKG/Telemetry   [x]  Cardiology/Vascular   []  Pathology  []  Old records  []  Other:  Most notable findings include:   Serum potassium 4.2.   Serum sodium 145.  Serum Phos 1.7.  Serum mag 1.9.    Blood cultures NGTD  Midline catheter tip culture NGTD  Urine culture with greater than 100,000 mixed normal gilmar    Assessment/Plan   Assessment / Plan     Active Hospital Problems:  Active Hospital Problems    Diagnosis    • Symptomatic bradycardia          Impression:  Septic shock  Bradycardia  Hypothermia  Dementia  Acute kidney injury  Bradycardia  Hypokalemia  Hypophosphatemia  Diastolic heart failure  COVID-19    Plan:    Septic shock has improved.  Patient is off pressors now.    No indication to treat COVID-19 as the patient is on room air  Completed azithromycin.  Finish 7 days of Rocephin.  Can change to Augmentin at discharge to complete therapy  Trend renal function electrolytes.  Replace IV sodium Phos today.  Repeat renal function in the morning.  Encourage activity.  Continue bowel regimen.  Monitor renal function closely.  Heparin SQ twice daily 5000 units.    DVT prophylaxis:  Medical DVT prophylaxis orders are present.    CODE STATUS:   Level Of Support Discussed With: Patient  Code Status (Patient has no pulse and is not breathing): CPR (Attempt to Resuscitate)  Medical Interventions (Patient has pulse or is breathing): Full Support  Comments: per [facility] home records    Labs, imaging, microbiology, notes and medications personally reviewed  Discussed on multidisciplinary critical care rounds.  Discussed with hospitalist.    Electronically signed by David Cantu MD, 03/01/22, 7:29 AM EST.

## 2022-03-01 NOTE — PLAN OF CARE
Goal Outcome Evaluation:  Plan of Care Reviewed With: patient        Progress: no change   Pt is confused but easily reoriented. Vital signs have been stable on room air.

## 2022-03-01 NOTE — SIGNIFICANT NOTE
03/01/22 0751   Plan   Plan Comments Pt is a longterm care resident at [facility]. Pt does not have a bedhold but can return once medically ready. SW will continue to follow.

## 2022-03-02 ENCOUNTER — APPOINTMENT (OUTPATIENT)
Dept: CT IMAGING | Facility: HOSPITAL | Age: 82
End: 2022-03-02

## 2022-03-02 ENCOUNTER — APPOINTMENT (OUTPATIENT)
Dept: CARDIOLOGY | Facility: HOSPITAL | Age: 82
End: 2022-03-02

## 2022-03-02 LAB
ALBUMIN SERPL-MCNC: 3.1 G/DL (ref 3.5–5.2)
ANION GAP SERPL CALCULATED.3IONS-SCNC: 11 MMOL/L (ref 5–15)
BACTERIA SPEC AEROBE CULT: NORMAL
BACTERIA SPEC AEROBE CULT: NORMAL
BASOPHILS # BLD AUTO: 0.02 10*3/MM3 (ref 0–0.2)
BASOPHILS NFR BLD AUTO: 0.2 % (ref 0–1.5)
BUN SERPL-MCNC: 15 MG/DL (ref 8–23)
BUN/CREAT SERPL: 16 (ref 7–25)
CALCIUM SPEC-SCNC: 9.7 MG/DL (ref 8.6–10.5)
CHLORIDE SERPL-SCNC: 102 MMOL/L (ref 98–107)
CO2 SERPL-SCNC: 24 MMOL/L (ref 22–29)
CREAT SERPL-MCNC: 0.94 MG/DL (ref 0.76–1.27)
CRP SERPL-MCNC: 3.67 MG/DL (ref 0–0.5)
D DIMER PPP FEU-MCNC: 4.3 MG/L (FEU) (ref 0–0.59)
DEPRECATED RDW RBC AUTO: 43.1 FL (ref 37–54)
EGFRCR SERPLBLD CKD-EPI 2021: 81.4 ML/MIN/1.73
EOSINOPHIL # BLD AUTO: 0.14 10*3/MM3 (ref 0–0.4)
EOSINOPHIL NFR BLD AUTO: 1.5 % (ref 0.3–6.2)
ERYTHROCYTE [DISTWIDTH] IN BLOOD BY AUTOMATED COUNT: 14.3 % (ref 12.3–15.4)
GLUCOSE SERPL-MCNC: 90 MG/DL (ref 65–99)
HCT VFR BLD AUTO: 33.2 % (ref 37.5–51)
HGB BLD-MCNC: 11.5 G/DL (ref 13–17.7)
IMM GRANULOCYTES # BLD AUTO: 0.05 10*3/MM3 (ref 0–0.05)
IMM GRANULOCYTES NFR BLD AUTO: 0.5 % (ref 0–0.5)
LYMPHOCYTES # BLD AUTO: 2.15 10*3/MM3 (ref 0.7–3.1)
LYMPHOCYTES NFR BLD AUTO: 22.9 % (ref 19.6–45.3)
MAGNESIUM SERPL-MCNC: 2 MG/DL (ref 1.6–2.4)
MCH RBC QN AUTO: 28.7 PG (ref 26.6–33)
MCHC RBC AUTO-ENTMCNC: 34.6 G/DL (ref 31.5–35.7)
MCV RBC AUTO: 82.8 FL (ref 79–97)
MONOCYTES # BLD AUTO: 0.68 10*3/MM3 (ref 0.1–0.9)
MONOCYTES NFR BLD AUTO: 7.2 % (ref 5–12)
NEUTROPHILS NFR BLD AUTO: 6.36 10*3/MM3 (ref 1.7–7)
NEUTROPHILS NFR BLD AUTO: 67.7 % (ref 42.7–76)
NRBC BLD AUTO-RTO: 0 /100 WBC (ref 0–0.2)
PHOSPHATE SERPL-MCNC: 1.9 MG/DL (ref 2.5–4.5)
PLATELET # BLD AUTO: 260 10*3/MM3 (ref 140–450)
PMV BLD AUTO: 9.8 FL (ref 6–12)
POTASSIUM SERPL-SCNC: 3.9 MMOL/L (ref 3.5–5.2)
PROCALCITONIN SERPL-MCNC: 0.11 NG/ML (ref 0–0.25)
RBC # BLD AUTO: 4.01 10*6/MM3 (ref 4.14–5.8)
SODIUM SERPL-SCNC: 137 MMOL/L (ref 136–145)
WBC NRBC COR # BLD: 9.4 10*3/MM3 (ref 3.4–10.8)

## 2022-03-02 PROCEDURE — 25010000002 CEFTRIAXONE PER 250 MG: Performed by: INTERNAL MEDICINE

## 2022-03-02 PROCEDURE — 99233 SBSQ HOSP IP/OBS HIGH 50: CPT | Performed by: INTERNAL MEDICINE

## 2022-03-02 PROCEDURE — 83735 ASSAY OF MAGNESIUM: CPT | Performed by: INTERNAL MEDICINE

## 2022-03-02 PROCEDURE — 85025 COMPLETE CBC W/AUTO DIFF WBC: CPT | Performed by: INTERNAL MEDICINE

## 2022-03-02 PROCEDURE — 0 IOPAMIDOL PER 1 ML: Performed by: INTERNAL MEDICINE

## 2022-03-02 PROCEDURE — 97161 PT EVAL LOW COMPLEX 20 MIN: CPT

## 2022-03-02 PROCEDURE — 85379 FIBRIN DEGRADATION QUANT: CPT | Performed by: INTERNAL MEDICINE

## 2022-03-02 PROCEDURE — 97530 THERAPEUTIC ACTIVITIES: CPT

## 2022-03-02 PROCEDURE — 84145 PROCALCITONIN (PCT): CPT | Performed by: INTERNAL MEDICINE

## 2022-03-02 PROCEDURE — 86140 C-REACTIVE PROTEIN: CPT | Performed by: INTERNAL MEDICINE

## 2022-03-02 PROCEDURE — 93970 EXTREMITY STUDY: CPT

## 2022-03-02 PROCEDURE — 80069 RENAL FUNCTION PANEL: CPT | Performed by: INTERNAL MEDICINE

## 2022-03-02 PROCEDURE — 97165 OT EVAL LOW COMPLEX 30 MIN: CPT

## 2022-03-02 PROCEDURE — 71260 CT THORAX DX C+: CPT

## 2022-03-02 PROCEDURE — 99232 SBSQ HOSP IP/OBS MODERATE 35: CPT | Performed by: INTERNAL MEDICINE

## 2022-03-02 PROCEDURE — 25010000002 HEPARIN (PORCINE) PER 1000 UNITS: Performed by: INTERNAL MEDICINE

## 2022-03-02 PROCEDURE — 93970 EXTREMITY STUDY: CPT | Performed by: SURGERY

## 2022-03-02 RX ORDER — HEPARIN SODIUM 10000 [USP'U]/100ML
18 INJECTION, SOLUTION INTRAVENOUS
Status: DISCONTINUED | OUTPATIENT
Start: 2022-03-02 | End: 2022-03-03 | Stop reason: ALTCHOICE

## 2022-03-02 RX ORDER — SODIUM PHOSPHATE IN D5W 15MMOL/250
15 PLASTIC BAG, INJECTION (ML) INTRAVENOUS
Status: COMPLETED | OUTPATIENT
Start: 2022-03-02 | End: 2022-03-02

## 2022-03-02 RX ORDER — SODIUM PHOSPHATE IN D5W 15MMOL/250
30 PLASTIC BAG, INJECTION (ML) INTRAVENOUS ONCE
Status: DISCONTINUED | OUTPATIENT
Start: 2022-03-02 | End: 2022-03-02

## 2022-03-02 RX ADMIN — SODIUM PHOSPHATE, MONOBASIC, MONOHYDRATE 15 MMOL: 276; 142 INJECTION, SOLUTION INTRAVENOUS at 12:02

## 2022-03-02 RX ADMIN — SODIUM PHOSPHATE, MONOBASIC, MONOHYDRATE 15 MMOL: 276; 142 INJECTION, SOLUTION INTRAVENOUS at 18:29

## 2022-03-02 RX ADMIN — Medication 10 ML: at 10:06

## 2022-03-02 RX ADMIN — HEPARIN SODIUM 5000 UNITS: 5000 INJECTION, SOLUTION INTRAVENOUS; SUBCUTANEOUS at 10:05

## 2022-03-02 RX ADMIN — IOPAMIDOL 100 ML: 755 INJECTION, SOLUTION INTRAVENOUS at 15:52

## 2022-03-02 RX ADMIN — SENNOSIDES AND DOCUSATE SODIUM 2 TABLET: 50; 8.6 TABLET ORAL at 10:06

## 2022-03-02 RX ADMIN — SODIUM PHOSPHATE, MONOBASIC, MONOHYDRATE 15 MMOL: 276; 142 INJECTION, SOLUTION INTRAVENOUS at 15:59

## 2022-03-02 RX ADMIN — HEPARIN SODIUM 5000 UNITS: 5000 INJECTION, SOLUTION INTRAVENOUS; SUBCUTANEOUS at 20:28

## 2022-03-02 RX ADMIN — Medication 10 ML: at 20:31

## 2022-03-02 RX ADMIN — SODIUM CHLORIDE 1 G: 900 INJECTION INTRAVENOUS at 20:31

## 2022-03-02 NOTE — PLAN OF CARE
Goal Outcome Evaluation:   Confusion continues, alert to self. Rectal temperatures were between 94.1-94.6 and throughout the whole day of 3/1, blood glucose was lower, new orders put in for bear warmer blanket and D5 dextrose. Patient temp improved since. Pt did not want to eat or drink throughout shift.       Problem: Skin Injury Risk Increased  Goal: Skin Health and Integrity  Intervention: Optimize Skin Protection  Recent Flowsheet Documentation  Taken 3/1/2022 2005 by Princess Infante RN  Pressure Reduction Techniques:   frequent weight shift encouraged   heels elevated off bed   pressure points protected   weight shift assistance provided  Head of Bed (HOB): HOB elevated  Pressure Reduction Devices: positioning supports utilized  Skin Protection:   adhesive use limited   incontinence pads utilized   transparent dressing maintained     Problem: Adult Inpatient Plan of Care  Goal: Optimal Comfort and Wellbeing  Intervention: Provide Person-Centered Care  Recent Flowsheet Documentation  Taken 3/1/2022 2005 by Princess Infante RN  Trust Relationship/Rapport:   care explained   choices provided   questions answered   questions encouraged   reassurance provided   thoughts/feelings acknowledged

## 2022-03-02 NOTE — PROGRESS NOTES
[facility]   Hospitalist Progress Note  Date: 3/1/2022  Patient Name: Jordin Menon  : 1940  MRN: [id number]  Date of admission: 2022      Subjective   Subjective     Chief Complaint: Weakness, confusion    Summary: 82-year-old male with dementia, hypertension, other medical problems have been feeling poorly for 3 to 5 days, weak, confused.  He was brought to the ED where he was hypothermic, bradycardic, and hypotensive.  He was admitted to the ICU for sepsis.  He was started on IV antibiotics.  He has also tested positive for COVID.    Interval Followup: Seems to be doing well without complaints today    Review of Systems  Unable to get full ROS due to some dementia but he denies complaints    Objective   Objective     Vitals:   Temp:  [94.4 °F (34.7 °C)-98.2 °F (36.8 °C)] 94.4 °F (34.7 °C)  Heart Rate:  [57-90] 57  Resp:  [16-18] 16  BP: (104-150)/(52-81) 106/73  Physical Exam    Constitutional: Awake, alert, no acute distress   Eyes: Pupils equal, sclerae anicteric   HENT: NCAT, dry mm   Neck: Supple, no thyromegaly, no lymphadenopathy, trachea midline   Respiratory: Clear to auscultation bilaterally, nonlabored respirations    Cardiovascular: RRR,no jvd   Gastrointestinal: Positive bowel sounds, soft, nontender, nondistended   Musculoskeletal: No bilateral ankle edema, no clubbing or cyanosis to extremities   Psychiatric: Appropriate affect, cooperative   Neurologic: awake and alert, moves all 4   Skin: No rashes     Result Review    Result Review:  I have personally reviewed the results from the time of this admission to 3/1/2022 20:34 EST and agree with these findings:  [x]  Laboratory  [x]  Microbiology  []  Radiology  []  EKG/Telemetry   []  Cardiology/Vascular   []  Pathology  []  Old records  []  Other:    Assessment/Plan   Assessment / Plan     Assessment:  • Sepsis  • Community-acquired pneumonia  • BRYANT  • COVID-19  • Bradycardia, resolved   • Hypophosphatemia    Plan:  · Admitted to  ICU  · Continue antibiotics, can complete course of Augmentin upon discharge  · Heart rate better with better temperature  · Patient apparently had a midline placed on 2-22, that has been removed and replaced with peripheral IV   · Patient has no hypoxia to warrant steroids, and has renal failure which would be a contraindication to remdesivir  · Replace electrolytes as needed  · PT/OT    Discussed with RN and intesivist  DVT prophylaxis:  Medical DVT prophylaxis orders are present.    CODE STATUS:   Level Of Support Discussed With: Patient  Code Status (Patient has no pulse and is not breathing): CPR (Attempt to Resuscitate)  Medical Interventions (Patient has pulse or is breathing): Full Support  Comments: per [facility]    Electronically signed by Eric Rivas MD, 03/01/22, 8:35 PM EST.

## 2022-03-02 NOTE — THERAPY EVALUATION
Acute Care - Physical Therapy Initial Evaluation   Ryan     Patient Name: Jordin Menon  : 1940  MRN: [id number]  Today's Date: 3/2/2022      Visit Dx:     ICD-10-CM ICD-9-CM   1. Symptomatic bradycardia  R00.1 427.89   2. Hypothermia, initial encounter  T68.XXXA 991.6   3. Severe dehydration  E86.0 276.51   4. Acute on chronic renal insufficiency  N28.9 593.9    N18.9 585.9   5. Hypokalemia  E87.6 276.8   6. Decreased activities of daily living (ADL)  Z78.9 V49.89   7. Difficulty walking  R26.2 719.7     Patient Active Problem List   Diagnosis   • Primary osteoarthritis of right knee   • Status post total right knee replacement   • Essential hypertension   • Shortness of breath   • ARF (acute renal failure) (HCC)   • Hypokalemia   • Hypernatremia   • Symptomatic bradycardia     Past Medical History:   Diagnosis Date   • Alzheimer's dementia with behavioral disturbance (HCC)    • Anemia, vitamin B12 deficiency    • Anxiety disorder due to known physiological condition    • Arthritis    • Constipation    • Gastroesophageal reflux disease without esophagitis    • High blood pressure    • HTN (hypertension)    • Hyperlipidemia    • Ingrowing toenail 2018   • Left foot pain 2018   • Osteoarthritis of right knee 2016   • Right foot pain 2018   • Sleep apnea    • Tinea unguium 2018     History reviewed. No pertinent surgical history.  PT Assessment (last 12 hours)     PT Evaluation and Treatment     Row Name 22 1500          Physical Therapy Time and Intention    Document Type evaluation  -AV     Mode of Treatment individual therapy; physical therapy  -AV     Row Name 22 1500          General Information    Patient Profile Reviewed yes  -AV     Prior Level of Function --  Patient confused during intial evaluation. Reports ambulating with rolling walker. Assist with ADLs.  -AV     Equipment Currently Used at Home walker, rolling  -AV     Existing Precautions/Restrictions  fall  -AV     Row Name 03/02/22 1500          Living Environment    Lives With facility resident  -AV     Row Name 03/02/22 1500          Range of Motion (ROM)    Range of Motion bilateral lower extremities; ROM is WFL  -AV     Row Name 03/02/22 1500          Strength (Manual Muscle Testing)    Strength (Manual Muscle Testing) bilateral lower extremities; strength is [facility]  -AV     Row Name 03/02/22 1500          Bed Mobility    Comment (Bed Mobility) Patient kept BUE crossed during initial evaluation and declined to participate in tfs this date. Per OT, patient completed supine-sit with moderate assistance.  -AV     Row Name 03/02/22 1500          Safety Issues, Functional Mobility    Safety Issues Affecting Function (Mobility) ability to follow commands; insight into deficits/self-awareness; problem-solving  -AV     Impairments Affecting Function (Mobility) balance; endurance/activity tolerance; cognition; strength  -AV     Cognitive Impairments, Mobility Safety/Performance awareness, need for assistance; insight into deficits/self-awareness; judgment; problem-solving/reasoning  -AV     Row Name 03/02/22 1500          Balance    Comment, Balance Not tested as patient declined all transfers.  -AV     Row Name             Wound 02/26/22 2200 Left upper arm Other (comment)    Wound - Properties Group Placement Date: 02/26/22  -CH Placement Time: 2200  -CH Present on Hospital Admission: N  -CH Side: Left  -CH Orientation: upper  -CH Location: arm  -CH Primary Wound Type: Other  -CH, IV infiltration      Retired Wound - Properties Group Date first assessed: 02/26/22  -CH Time first assessed: 2200  -CH Present on Hospital Admission: N  -CH Side: Left  -CH Location: arm  -CH Primary Wound Type: Other  -CH, IV infiltration      Row Name 03/02/22 1500          Plan of Care Review    Plan of Care Reviewed With patient  -AV     Progress no change  -AV     Outcome Summary Patient presents with deficits likely in balance,  transfers, and ambulation. Patient will benefit from skilled PT services to address these mobility deficits and decrease risk of falls.  -AV     Row Name 03/02/22 1500          Physical Therapy Goals    Bed Mobility Goal Selection (PT) bed mobility, PT goal 1  -AV     Transfer Goal Selection (PT) transfer, PT goal 1  -AV     Gait Training Goal Selection (PT) gait training, PT goal 1  -AV     Row Name 03/02/22 1500          Bed Mobility Goal 1 (PT)    Activity/Assistive Device (Bed Mobility Goal 1, PT) bed mobility activities, all  -AV     [place] Level/Cues Needed (Bed Mobility Goal 1, PT) supervision required  -AV     Time Frame (Bed Mobility Goal 1, PT) 10 days  -AV     Row Name 03/02/22 1500          Transfer Goal 1 (PT)    Activity/Assistive Device (Transfer Goal 1, PT) transfers, all; walker, rolling  -AV     [place] Level/Cues Needed (Transfer Goal 1, PT) supervision required  -AV     Time Frame (Transfer Goal 1, PT) 10 days  -AV     Row Name 03/02/22 1500          Gait Training Goal 1 (PT)    Activity/Assistive Device (Gait Training Goal 1, PT) gait (walking locomotion); assistive device use; walker, rolling  -AV     [place] Level (Gait Training Goal 1, PT) supervision required  -AV     Distance (Gait Training Goal 1, PT) 100  -AV     Time Frame (Gait Training Goal 1, PT) 10 days  -AV     Row Name 03/02/22 1500          Therapy Assessment/Plan (PT)    Rehab Potential (PT) good, to achieve stated therapy goals  -AV     Criteria for Skilled Interventions Met (PT) yes; meets criteria  -AV     Problem List (PT) problems related to; balance; mobility; cognition; strength  -AV     Activity Limitations Related to Problem List (PT) unable to ambulate safely; unable to transfer safely  -AV     Row Name 03/02/22 1500          PT Evaluation Complexity    History, PT Evaluation Complexity 1-2 personal factors and/or comorbidities  -AV     Examination of Body Systems (PT Eval Complexity) total of 4 or more  elements  -AV     Clinical Presentation (PT Evaluation Complexity) stable  -AV     Clinical Decision Making (PT Evaluation Complexity) low complexity  -AV     Overall Complexity (PT Evaluation Complexity) low complexity  -AV     Row Name 03/02/22 1500          Therapy Plan Review/Discharge Plan (PT)    Therapy Plan Review (PT) evaluation/treatment results reviewed; patient  -AV           User Key  (r) = Recorded By, (t) = Taken By, (c) = Cosigned By    Initials Name Provider Type     Ana Luna, RN Registered Nurse    AV Guille Rosales, PT Physical Therapist                Physical Therapy Education                 Title: PT OT SLP Therapies (In Progress)     Topic: Physical Therapy (In Progress)     Point: Mobility training (Done)     Learning Progress Summary           Patient Acceptance, E,TB, VU,NR by AV at 3/2/2022 1523                   Point: Home exercise program (Not Started)     Learner Progress:  Not documented in this visit.          Point: Body mechanics (Done)     Learning Progress Summary           Patient Acceptance, E,TB, VU,NR by AV at 3/2/2022 1523                   Point: Precautions (Done)     Learning Progress Summary           Patient Acceptance, E,TB, VU,NR by AV at 3/2/2022 1523                               User Key     Initials Effective Dates Name Provider Type Discipline    AV 06/11/21 -  Guille Rosales, YESSENIA Physical Therapist PT              PT Recommendation and Plan  Anticipated Discharge Disposition (PT): sub acute care setting  Planned Therapy Interventions (PT): balance training, bed mobility training, gait training, neuromuscular re-education, strengthening, transfer training  Therapy Frequency (PT): daily  Plan of Care Reviewed With: patient  Progress: no change  Outcome Summary: Patient presents with deficits likely in balance, transfers, and ambulation. Patient will benefit from skilled PT services to address these mobility deficits and decrease risk of falls.    Outcome Measures     Row Name 03/02/22 1500             How much help from another person do you currently need...    Turning from your back to your side while in flat bed without using bedrails? 2  -AV      Moving from lying on back to sitting on the side of a flat bed without bedrails? 2  -AV      Moving to and from a bed to a chair (including a wheelchair)? 2  -AV      Standing up from a chair using your arms (e.g., wheelchair, bedside chair)? 2  -AV      Climbing 3-5 steps with a railing? 1  -AV      To walk in hospital room? 2  -AV      AM-PAC 6 Clicks Score (PT) 11  -AV              Functional Assessment    Outcome Measure Options AM-PAC 6 Clicks Basic Mobility (PT)  -AV            User Key  (r) = Recorded By, (t) = Taken By, (c) = Cosigned By    Initials Name Provider Type    AV Guille Rosales, PT Physical Therapist                 Time Calculation:    PT Charges     Row Name 03/02/22 1522             Time Calculation    PT Received On 03/02/22  -AV      PT Goal Re-Cert Due Date 03/11/22  -AV              Untimed Charges    PT Eval/Re-eval Minutes 30  -AV              Total Minutes    Untimed Charges Total Minutes 30  -AV       Total Minutes 30  -AV            User Key  (r) = Recorded By, (t) = Taken By, (c) = Cosigned By    Initials Name Provider Type    Guille Hernandez, YESSENIA Physical Therapist              Therapy Charges for Today     Code Description Service Date Service Provider Modifiers Qty    74275945112 HC PT EVAL LOW COMPLEXITY 2 3/2/2022 Guille Rosales, PT GP 1          PT G-Codes  Outcome Measure Options: AM-PAC 6 Clicks Basic Mobility (PT)  AM-PAC 6 Clicks Score (PT): 11  AM-PAC 6 Clicks Score (OT): 12    Guille Rosales PT  3/2/2022

## 2022-03-02 NOTE — PROGRESS NOTES
Pulmonary / Critical Care Progress Note      Patient Name: Jordin Menon  : 1940  MRN: [id number]  Primary Care Physician:  Frank Llanes MD  Date of admission: 2022    Subjective   Subjective   Follow-up for 82-year-old male with septic shock, altered mental status, symptomatic bradycardia, COVID-19 infection.    Over last 24 hours,  Remained on IV ceftriaxone.  Remained on heparin 5000 units subcu twice daily.  Has remained confused.    No acute episodes overnight    This morning,  He is out of bed to chair.  Patient remains confused, does not know that he is in hospital.    Still thinks that he is in nursing home.    Remains on room air.  Has no significant cough or phlegm.  Has no increased work of breathing.  Remains in enhanced airborne isolation due to Covid.  No fever noted.    Review of Systems  Unable to obtain from patient as he is with baseline dementia        Objective   Objective     Vitals:   Temp:  [94.1 °F (34.5 °C)-97.7 °F (36.5 °C)] 97.7 °F (36.5 °C)  Heart Rate:  [57-74] 57  Resp:  [16-18] 16  BP: (104-134)/(52-79) 110/59     Physical Exam  Vital Signs Reviewed   General: Confused elderly male in no acute distress   HEENT:  PERRL, EOMI.  OP, nares clear, MMM  Neck:  Supple, no JVD, no thyromegaly  Chest:  good aeration, no significant rhonchi bilaterally, tympanic to percussion bilaterally, no work of breathing noted  CV: RRR, no MGR, pulses 2+, equal.  Abd:  Soft, NT, ND, + BS, no HSM  EXT:  no clubbing, no cyanosis, no edema  Neuro: Alert but oriented x0, has generalized weakness, cranial nerves II to XII are intact  Skin: No rashes or lesions noted      Result Review    Result Review:  I have personally reviewed the results from the time of this admission to 3/2/2022 07:21 EST and agree with these findings:  [x]  Laboratory  [x]  Microbiology  [x]  Radiology  [x]  EKG/Telemetry   [x]  Cardiology/Vascular   []  Pathology  []  Old records  []  Other:  Most notable findings  include:   D-dimer is 4.3.  Serum potassium 3.9.  Serum sodium 137.  CRP 3.67.  Procalcitonin 0.11.    Blood cultures NGTD  Midline catheter tip culture NGTD  Urine culture with greater than 100,000 mixed normal gilmar    Assessment/Plan   Assessment / Plan     Active Hospital Problems:  Active Hospital Problems    Diagnosis    • Symptomatic bradycardia          Impression:  Septic shock  Bradycardia  Hypothermia  Dementia  Acute kidney injury  Bradycardia  Hypokalemia  Hypophosphatemia  Diastolic heart failure  COVID-19    Plan:    No indication to treat COVID-19 as the patient is on room air  Completed azithromycin.  Finish 7 days of Rocephin.  Can change to Augmentin at discharge to complete therapy  As significantly high D-dimer.  Check CT scan of the chest and lower extremity ultrasound.  Trend renal function electrolytes.  Replace IV sodium Phos today again.  Repeat renal function in the morning.  Encourage activity.  Continue bowel regimen.  Monitor renal function closely.  Heparin SQ twice daily 5000 units.    DVT prophylaxis:  Medical DVT prophylaxis orders are present.    CODE STATUS:   Level Of Support Discussed With: Patient  Code Status (Patient has no pulse and is not breathing): CPR (Attempt to Resuscitate)  Medical Interventions (Patient has pulse or is breathing): Full Support  Comments: per [facility] home records    Labs, imaging, microbiology, notes and medications personally reviewed  Discussed on multidisciplinary critical care rounds.  Discussed with hospitalist.    Electronically signed by David Cantu MD, 03/02/22, 7:21 AM EST.

## 2022-03-02 NOTE — PLAN OF CARE
Goal Outcome Evaluation:  Plan of Care Reviewed With: patient        Progress: no change  Outcome Summary: Patient presents with deficits likely in balance, transfers, and ambulation. Patient will benefit from skilled PT services to address these mobility deficits and decrease risk of falls.

## 2022-03-02 NOTE — PLAN OF CARE
Goal Outcome Evaluation:  Plan of Care Reviewed With: patient, daughter        Progress: no change  Outcome Summary: Patient is alert to self, has not been eating any of his meals. Food was heated before bringing it into the room. Educated patient on importance of nutrition, offered different food. Discussed this with daughter, she will try to have the son come here to visit and encourage patient to eat. Patient VSS, no complaints of discomforts or pain. Remains on room air. No respiratory distress noted.

## 2022-03-02 NOTE — PLAN OF CARE
Goal Outcome Evaluation:  Plan of Care Reviewed With: patient   Pt phosphorus replaced this shift. Continues to be alert to self.      Progress: no change

## 2022-03-02 NOTE — THERAPY EVALUATION
Patient Name: Jordin Menon  : 1940    MRN: [id number]                              Today's Date: 3/2/2022       Admit Date: 2022    Visit Dx:     ICD-10-CM ICD-9-CM   1. Symptomatic bradycardia  R00.1 427.89   2. Hypothermia, initial encounter  T68.XXXA 991.6   3. Severe dehydration  E86.0 276.51   4. Acute on chronic renal insufficiency  N28.9 593.9    N18.9 585.9   5. Hypokalemia  E87.6 276.8   6. Decreased activities of daily living (ADL)  Z78.9 V49.89     Patient Active Problem List   Diagnosis   • Primary osteoarthritis of right knee   • Status post total right knee replacement   • Essential hypertension   • Shortness of breath   • ARF (acute renal failure) (HCC)   • Hypokalemia   • Hypernatremia   • Symptomatic bradycardia     Past Medical History:   Diagnosis Date   • Alzheimer's dementia with behavioral disturbance (HCC)    • Anemia, vitamin B12 deficiency    • Anxiety disorder due to known physiological condition    • Arthritis    • Constipation    • Gastroesophageal reflux disease without esophagitis    • High blood pressure    • HTN (hypertension)    • Hyperlipidemia    • Ingrowing toenail 2018   • Left foot pain 2018   • Osteoarthritis of right knee 2016   • Right foot pain 2018   • Sleep apnea    • Tinea unguium 2018     History reviewed. No pertinent surgical history.   General Information     Row Name 22 0917 22 0910       OT Time and Intention    Document Type therapy note (daily note)  -PG evaluation  -PG    Mode of Treatment individual therapy; occupational therapy  -PG occupational therapy; individual therapy  -PG    Row Name 22 0910          General Information    Patient Profile Reviewed yes  -PG     Prior Level of Function mod assist:; ADL's  Pleasantly confused and poor historian.  Patient does report that he was able to walk with a walker and likes to wear his shoes  -PG     Existing Precautions/Restrictions fall  -PG     Barriers to  Rehab cognitive status  -PG     Row Name 03/02/22 0910          Occupational Profile    Reason for Services/Referral (Occupational Profile) Patient is a pleasantly confused 82-year-old male admitted for Covid.  Previous OT services unknown.  Patient is being evaluated to determine ADL status and discharge needs  -PG     Row Name 03/02/22 0910          Living Environment    Lives With facility resident  -PG     Row Name 03/02/22 0910          Cognition    Orientation Status (Cognition) oriented to; person; verbal cues/prompts needed for orientation  -PG     Row Name 03/02/22 0910          Safety Issues, Functional Mobility    Safety Issues Affecting Function (Mobility) ability to follow commands; problem-solving; insight into deficits/self-awareness  -PG     Impairments Affecting Function (Mobility) balance; cognition; endurance/activity tolerance; strength  -PG     Cognitive Impairments, Mobility Safety/Performance awareness, need for assistance; insight into deficits/self-awareness; judgment; sequencing abilities; problem-solving/reasoning  -PG           User Key  (r) = Recorded By, (t) = Taken By, (c) = Cosigned By    Initials Name Provider Type    PG Lalito Ray, OT Occupational Therapist                 Mobility/ADL's     Row Name 03/02/22 0917 03/02/22 0911       Bed Mobility    Bed Mobility supine-sit  -PG supine-sit  -PG    Supine-Sit [place] (Bed Mobility) moderate assist (50% patient effort)  -PG moderate assist (50% patient effort)  -PG    Row Name 03/02/22 0917 03/02/22 0911       Transfers    Transfers bed-chair transfer  -PG bed-chair transfer  -PG    Bed-Chair [place] (Transfers) minimum assist (75% patient effort); verbal cues  -PG minimum assist (75% patient effort); verbal cues  -PG    Assistive Device (Bed-Chair Transfers) walker, front-wheeled  -PG walker, front-wheeled  -PG    Row Name 03/02/22 0911          Activities of Daily Living    BADL Assessment/Intervention --  Maximal  assist/dependent with all bathing, dressing and toileting activities  -PG           User Key  (r) = Recorded By, (t) = Taken By, (c) = Cosigned By    Initials Name Provider Type    PG Lalito Ray OT Occupational Therapist               Obj/Interventions     Row Name 03/02/22 0912          Sensory Assessment (Somatosensory)    Sensory Assessment (Somatosensory) unable/difficult to assess  -PG     Row Name 03/02/22 0912          Vision Assessment/Intervention    Visual Impairment/Limitations unable/difficult to assess  -PG     Row Name 03/02/22 0912          Range of Motion Comprehensive    General Range of Motion no range of motion deficits identified  -PG     Row Name 03/02/22 0912          Strength Comprehensive (MMT)    Comment, General Manual Muscle Testing (MMT) Assessment Strength grossly 4 -/5  -PG     [facility] Name 03/02/22 0912          Motor Skills    Motor Skills coordination; functional endurance  -PG     Coordination WFL; minimal impairment  -PG     Functional Endurance Fair minus  -PG           User Key  (r) = Recorded By, (t) = Taken By, (c) = Cosigned By    Initials Name Provider Type    PG Lalito Ray OT Occupational Therapist               Goals/Plan     [facility] Name 03/02/22 0914          Transfer Goal 1 (OT)    Activity/Assistive Device (Transfer Goal 1, OT) transfers, all  -PG     [place] Level/Cues Needed (Transfer Goal 1, OT) standby assist  -PG     Time Frame (Transfer Goal 1, OT) long term goal (LTG); 10 days  -PG     [facility] Name 03/02/22 0914          Bathing Goal 1 (OT)    Activity/Device (Bathing Goal 1, OT) bathing skills, all  -PG     [place] Level/Cues Needed (Bathing Goal 1, OT) minimum assist (75% or more patient effort)  -PG     Time Frame (Bathing Goal 1, OT) long term goal (LTG); 10 days  -PG     [facility] Name 03/02/22 0914          Dressing Goal 1 (OT)    Activity/Device (Dressing Goal 1, OT) dressing skills, all  -PG     [place]/Cues Needed (Dressing Goal 1, OT) minimum  assist (75% or more patient effort)  -PG     Time Frame (Dressing Goal 1, OT) long term goal (LTG); 10 days  -PG     Row Name 03/02/22 0914          Toileting Goal 1 (OT)    Activity/Device (Toileting Goal 1, OT) toileting skills, all  -PG     [place] Level/Cues Needed (Toileting Goal 1, OT) minimum assist (75% or more patient effort)  -PG     Time Frame (Toileting Goal 1, OT) long term goal (LTG); 10 days  -PG     Row Name 03/02/22 0914          Grooming Goal 1 (OT)    Activity/Device (Grooming Goal 1, OT) grooming skills, all  -PG     [place] (Grooming Goal 1, OT) minimum assist (75% or more patient effort)  -PG     Time Frame (Grooming Goal 1, OT) long term goal (LTG); 10 days  -PG     Row Name 03/02/22 0914          Strength Goal 1 (OT)    Strength Goal 1 (OT) Patient will improve bilateral upper extremity strength to 4+/5 to support independence and engagement in ADL activities  -PG     Time Frame (Strength Goal 1, OT) long term goal (LTG); 10 days  -PG     Row Name 03/02/22 0914          Therapy Assessment/Plan (OT)    Planned Therapy Interventions (OT) activity tolerance training; BADL retraining; transfer/mobility retraining; strengthening exercise; patient/caregiver education/training; occupation/activity based interventions  -PG           User Key  (r) = Recorded By, (t) = Taken By, (c) = Cosigned By    Initials Name Provider Type    PG Lalito Ray OT Occupational Therapist               Clinical Impression     Row Name 03/02/22 0913          Pain Assessment    Additional Documentation Pain Scale: Numbers Pre/Post-Treatment (Group)  -PG     Row Name 03/02/22 0913          Pain Scale: Numbers Pre/Post-Treatment    Pretreatment Pain Rating 0/10 - no pain  -PG     Posttreatment Pain Rating 0/10 - no pain  -PG     Row Name 03/02/22 0913          Plan of Care Review    Plan of Care Reviewed With patient  -PG     Progress no change  -PG     Outcome Summary Patient presents with limitations affecting  strength, activity tolerance, and balance impacting patient's ability to return home safely and independently.  The skills of a therapist will be required to safely and effectively implement the following treatment plan to restore maximal level of function  -PG     Row Name 03/02/22 0913          Therapy Assessment/Plan (OT)    Patient/Family Therapy Goal Statement (OT) Patient would like to be able to walk better  -PG     Rehab Potential (OT) good, to achieve stated therapy goals  -PG     Criteria for Skilled Therapeutic Interventions Met (OT) yes; meets criteria; skilled treatment is necessary  -PG     Therapy Frequency (OT) 5 times/wk  -PG     Row Name 03/02/22 0913          Therapy Plan Review/Discharge Plan (OT)    Anticipated Discharge Disposition (OT) extended care facility  -PG           User Key  (r) = Recorded By, (t) = Taken By, (c) = Cosigned By    Initials Name Provider Type    PG Llaito Ray, OT Occupational Therapist               Outcome Measures     Row Name 03/02/22 0916          How much help from another is currently needed...    Putting on and taking off regular lower body clothing? 1  -PG     Bathing (including washing, rinsing, and drying) 1  -PG     Toileting (which includes using toilet bed pan or urinal) 2  -PG     Putting on and taking off regular upper body clothing 2  -PG     Taking care of personal grooming (such as brushing teeth) 3  -PG     Eating meals 3  -PG     AM-PAC 6 Clicks Score (OT) 12  -PG     Row Name 03/02/22 0916          Functional Assessment    Outcome Measure Options AM-PAC 6 Clicks Daily Activity (OT); Optimal Instrument  -PG     Row Name 03/02/22 0916          Optimal Instrument    Optimal Instrument Optimal - 3  -PG     Bending/Stooping 4  -PG     Standing 2  -PG     Reaching 2  -PG     From the list, choose the 3 activities you would most like to be able to do without any difficulty Bending/stooping; Standing; Reaching  -PG     Total Score Optimal - 3 8  -PG            User Key  (r) = Recorded By, (t) = Taken By, (c) = Cosigned By    Initials Name Provider Type    PG Lalito Ray OT Occupational Therapist                Occupational Therapy Education                 Title: PT OT SLP Therapies (In Progress)     Topic: Occupational Therapy (In Progress)     Point: ADL training (In Progress)     Description:   Instruct learner(s) on proper safety adaptation and remediation techniques during self care or transfers.   Instruct in proper use of assistive devices.              Learning Progress Summary           Patient Acceptance, E,D, NR by PG at 3/2/2022 0916                   Point: Home exercise program (In Progress)     Description:   Instruct learner(s) on appropriate technique for monitoring, assisting and/or progressing therapeutic exercises/activities.              Learning Progress Summary           Patient Acceptance, E,D, NR by PG at 3/2/2022 0916                   Point: Precautions (In Progress)     Description:   Instruct learner(s) on prescribed precautions during self-care and functional transfers.              Learning Progress Summary           Patient Acceptance, E,D, NR by PG at 3/2/2022 0916                   Point: Body mechanics (In Progress)     Description:   Instruct learner(s) on proper positioning and spine alignment during self-care, functional mobility activities and/or exercises.              Learning Progress Summary           Patient Acceptance, E,D, NR by PG at 3/2/2022 0916                               User Key     Initials Effective Dates Name Provider Type Discipline     06/16/21 -  Lalito Ray OT Occupational Therapist OT              OT Recommendation and Plan  Planned Therapy Interventions (OT): activity tolerance training, BADL retraining, transfer/mobility retraining, strengthening exercise, patient/caregiver education/training, occupation/activity based interventions  Therapy Frequency (OT): 5 times/wk  Plan of Care Review  Plan  of Care Reviewed With: patient  Progress: no change  Outcome Summary: Patient presents with limitations affecting strength, activity tolerance, and balance impacting patient's ability to return home safely and independently.  The skills of a therapist will be required to safely and effectively implement the following treatment plan to restore maximal level of function     Time Calculation:    Time Calculation- OT     Row Name 03/02/22 0918             Time Calculation- OT    OT Received On 03/02/22  -PG      OT Goal Re-Cert Due Date 03/11/22  -PG              Timed Charges    07774 - OT Therapeutic Activity Minutes 10  -PG              Untimed Charges    OT Eval/Re-eval Minutes 35  -PG              Total Minutes    Timed Charges Total Minutes 10  -PG      Untimed Charges Total Minutes 35  -PG       Total Minutes 45  -PG            User Key  (r) = Recorded By, (t) = Taken By, (c) = Cosigned By    Initials Name Provider Type    PG Lalito Ray OT Occupational Therapist              Therapy Charges for Today     Code Description Service Date Service Provider Modifiers Qty    [id number]  OT THERAPEUTIC ACT EA 15 MIN 3/2/2022 Lalito Ray OT GO 1    96306234903 HC OT EVAL LOW COMPLEXITY 3 3/2/2022 Lalito Ray OT GO 1               Lalito Ray OT  3/2/2022   No

## 2022-03-03 LAB
BH CV LOW VAS LEFT COMMON FEMORAL SPONT: 1
BH CV LOW VAS LEFT DISTAL FEMORAL SPONT: 1
BH CV LOW VAS LEFT MID FEMORAL SPONT: 1
BH CV LOW VAS LEFT PERONEAL VESSEL: 1
BH CV LOW VAS LEFT POPLITEAL SPONT: 1
BH CV LOW VAS LEFT PROXIMAL FEMORAL SPONT: 1
BH CV LOWER VASCULAR LEFT COMMON FEMORAL AUGMENT: NORMAL
BH CV LOWER VASCULAR LEFT COMMON FEMORAL COMPETENT: NORMAL
BH CV LOWER VASCULAR LEFT COMMON FEMORAL COMPRESS: NORMAL
BH CV LOWER VASCULAR LEFT COMMON FEMORAL PHASIC: NORMAL
BH CV LOWER VASCULAR LEFT COMMON FEMORAL SPONT: NORMAL
BH CV LOWER VASCULAR LEFT COMMON FEMORAL THROMBUS: NORMAL
BH CV LOWER VASCULAR LEFT DISTAL FEMORAL COMPRESS: NORMAL
BH CV LOWER VASCULAR LEFT DISTAL FEMORAL THROMBUS: NORMAL
BH CV LOWER VASCULAR LEFT GREATER SAPH AK COMPRESS: NORMAL
BH CV LOWER VASCULAR LEFT MID FEMORAL COMPRESS: NORMAL
BH CV LOWER VASCULAR LEFT MID FEMORAL THROMBUS: NORMAL
BH CV LOWER VASCULAR LEFT PERONEAL COMPRESS: NORMAL
BH CV LOWER VASCULAR LEFT PERONEAL THROMBUS: NORMAL
BH CV LOWER VASCULAR LEFT POPLITEAL AUGMENT: NORMAL
BH CV LOWER VASCULAR LEFT POPLITEAL COMPETENT: NORMAL
BH CV LOWER VASCULAR LEFT POPLITEAL COMPRESS: NORMAL
BH CV LOWER VASCULAR LEFT POPLITEAL PHASIC: NORMAL
BH CV LOWER VASCULAR LEFT POPLITEAL SPONT: NORMAL
BH CV LOWER VASCULAR LEFT POPLITEAL THROMBUS: NORMAL
BH CV LOWER VASCULAR LEFT POSTERIOR TIBIAL COMPRESS: NORMAL
BH CV LOWER VASCULAR LEFT PROXIMAL FEMORAL COMPRESS: NORMAL
BH CV LOWER VASCULAR LEFT PROXIMAL FEMORAL THROMBUS: NORMAL
BH CV LOWER VASCULAR RIGHT COMMON FEMORAL AUGMENT: NORMAL
BH CV LOWER VASCULAR RIGHT COMMON FEMORAL COMPRESS: NORMAL
BH CV LOWER VASCULAR RIGHT COMMON FEMORAL PHASIC: NORMAL
BH CV LOWER VASCULAR RIGHT COMMON FEMORAL SPONT: NORMAL
BH CV LOWER VASCULAR RIGHT DISTAL FEMORAL COMPRESS: NORMAL
BH CV LOWER VASCULAR RIGHT GREATER SAPH AK COMPRESS: NORMAL
BH CV LOWER VASCULAR RIGHT MID FEMORAL COMPRESS: NORMAL
BH CV LOWER VASCULAR RIGHT PERONEAL COMPRESS: NORMAL
BH CV LOWER VASCULAR RIGHT POPLITEAL AUGMENT: NORMAL
BH CV LOWER VASCULAR RIGHT POPLITEAL COMPETENT: NORMAL
BH CV LOWER VASCULAR RIGHT POPLITEAL COMPRESS: NORMAL
BH CV LOWER VASCULAR RIGHT POPLITEAL PHASIC: NORMAL
BH CV LOWER VASCULAR RIGHT POPLITEAL SPONT: NORMAL
BH CV LOWER VASCULAR RIGHT POSTERIOR TIBIAL COMPRESS: NORMAL
BH CV LOWER VASCULAR RIGHT PROXIMAL FEMORAL COMPRESS: NORMAL
GLUCOSE BLDC GLUCOMTR-MCNC: 79 MG/DL (ref 70–99)
MAXIMAL PREDICTED HEART RATE: 139 BPM
STRESS TARGET HR: 118 BPM

## 2022-03-03 PROCEDURE — 97530 THERAPEUTIC ACTIVITIES: CPT

## 2022-03-03 PROCEDURE — 99239 HOSP IP/OBS DSCHRG MGMT >30: CPT | Performed by: INTERNAL MEDICINE

## 2022-03-03 PROCEDURE — 82962 GLUCOSE BLOOD TEST: CPT

## 2022-03-03 PROCEDURE — 25010000002 ENOXAPARIN PER 10 MG: Performed by: HOSPITALIST

## 2022-03-03 PROCEDURE — 99233 SBSQ HOSP IP/OBS HIGH 50: CPT | Performed by: INTERNAL MEDICINE

## 2022-03-03 RX ORDER — METOPROLOL TARTRATE 50 MG/1
25 TABLET, FILM COATED ORAL DAILY
Start: 2022-03-03 | End: 2022-03-09 | Stop reason: HOSPADM

## 2022-03-03 RX ORDER — POLYETHYLENE GLYCOL 3350 17 G/17G
17 POWDER, FOR SOLUTION ORAL ONCE
Status: DISCONTINUED | OUTPATIENT
Start: 2022-03-03 | End: 2022-03-04 | Stop reason: HOSPADM

## 2022-03-03 RX ORDER — FUROSEMIDE 20 MG/1
20 TABLET ORAL DAILY
Start: 2022-03-03

## 2022-03-03 RX ADMIN — Medication 10 ML: at 08:31

## 2022-03-03 RX ADMIN — SENNOSIDES AND DOCUSATE SODIUM 2 TABLET: 50; 8.6 TABLET ORAL at 08:31

## 2022-03-03 RX ADMIN — BISACODYL 10 MG: 10 SUPPOSITORY RECTAL at 11:50

## 2022-03-03 RX ADMIN — APIXABAN 10 MG: 5 TABLET, FILM COATED ORAL at 20:55

## 2022-03-03 RX ADMIN — APIXABAN 10 MG: 5 TABLET, FILM COATED ORAL at 11:49

## 2022-03-03 RX ADMIN — ENOXAPARIN SODIUM 70 MG: 100 INJECTION SUBCUTANEOUS at 01:28

## 2022-03-03 NOTE — THERAPY TREATMENT NOTE
Patient Name: Jordin Mneon  : 1940    MRN: [id number]                              Today's Date: 3/3/2022       Admit Date: 2022    Visit Dx:     ICD-10-CM ICD-9-CM   1. Symptomatic bradycardia  R00.1 427.89   2. Hypothermia, initial encounter  T68.XXXA 991.6   3. Severe dehydration  E86.0 276.51   4. Acute on chronic renal insufficiency  N28.9 593.9    N18.9 585.9   5. Hypokalemia  E87.6 276.8   6. Decreased activities of daily living (ADL)  Z78.9 V49.89   7. Difficulty walking  R26.2 719.7     Patient Active Problem List   Diagnosis   • Primary osteoarthritis of right knee   • Status post total right knee replacement   • Essential hypertension   • Shortness of breath   • ARF (acute renal failure) (HCC)   • Hypokalemia   • Hypernatremia   • Symptomatic bradycardia     Past Medical History:   Diagnosis Date   • Alzheimer's dementia with behavioral disturbance (HCC)    • Anemia, vitamin B12 deficiency    • Anxiety disorder due to known physiological condition    • Arthritis    • Constipation    • Gastroesophageal reflux disease without esophagitis    • High blood pressure    • HTN (hypertension)    • Hyperlipidemia    • Ingrowing toenail 2018   • Left foot pain 2018   • Osteoarthritis of right knee 2016   • Right foot pain 2018   • Sleep apnea    • Tinea unguium 2018     History reviewed. No pertinent surgical history.   General Information     Row Name 22 1002          OT Time and Intention    Document Type therapy note (daily note)  -PG     Mode of Treatment individual therapy; occupational therapy  -PG           User Key  (r) = Recorded By, (t) = Taken By, (c) = Cosigned By    Initials Name Provider Type    PG Lalito Ray OT Occupational Therapist                 Mobility/ADL's     Row Name 22 1003          Bed Mobility    Bed Mobility supine-sit  -PG     Supine-Sit Beckham (Bed Mobility) moderate assist (50% patient effort)  -PG     Row Name 22  1003          Transfers    Transfers bed-chair transfer  -PG     Bed-Chair Ford (Transfers) minimum assist (75% patient effort); verbal cues; contact guard  -PG     Assistive Device (Bed-Chair Transfers) walker, front-wheeled  -PG           User Key  (r) = Recorded By, (t) = Taken By, (c) = Cosigned By    Initials Name Provider Type    Lalito Guy OT Occupational Therapist               Obj/Interventions    No documentation.                Goals/Plan    No documentation.                Clinical Impression     Row Name 03/03/22 1004          Plan of Care Review    Progress no change  -PG           User Key  (r) = Recorded By, (t) = Taken By, (c) = Cosigned By    Initials Name Provider Type    PG Lalito Ray OT Occupational Therapist               Outcome Measures     Row Name 03/03/22 1004          How much help from another is currently needed...    Putting on and taking off regular lower body clothing? 1  -PG     Bathing (including washing, rinsing, and drying) 1  -PG     Toileting (which includes using toilet bed pan or urinal) 2  -PG     Putting on and taking off regular upper body clothing 2  -PG     Taking care of personal grooming (such as brushing teeth) 3  -PG     Eating meals 3  -PG     AM-PAC 6 Clicks Score (OT) 12  -PG     Row Name 03/03/22 1004          Functional Assessment    Outcome Measure Options Optimal Instrument; AM-PAC 6 Clicks Daily Activity (OT)  -PG     Row Name 03/03/22 1004          Optimal Instrument    Optimal Instrument Optimal - 3  -PG     Bending/Stooping 4  -PG     Standing 2  -PG     Reaching 2  -PG           User Key  (r) = Recorded By, (t) = Taken By, (c) = Cosigned By    Initials Name Provider Type    Lalito Guy OT Occupational Therapist                Occupational Therapy Education                 Title: PT OT SLP Therapies (In Progress)     Topic: Occupational Therapy (In Progress)     Point: ADL training (In Progress)     Description:   Instruct  learner(s) on proper safety adaptation and remediation techniques during self care or transfers.   Instruct in proper use of assistive devices.              Learning Progress Summary           Patient Acceptance, E,D, NR by PG at 3/2/2022 0916                   Point: Home exercise program (In Progress)     Description:   Instruct learner(s) on appropriate technique for monitoring, assisting and/or progressing therapeutic exercises/activities.              Learning Progress Summary           Patient Acceptance, E,D, NR by PG at 3/2/2022 0916                   Point: Precautions (In Progress)     Description:   Instruct learner(s) on prescribed precautions during self-care and functional transfers.              Learning Progress Summary           Patient Acceptance, E,D, NR by PG at 3/2/2022 0916                   Point: Body mechanics (In Progress)     Description:   Instruct learner(s) on proper positioning and spine alignment during self-care, functional mobility activities and/or exercises.              Learning Progress Summary           Patient Acceptance, E,D, NR by PG at 3/2/2022 0916                               User Key     Initials Effective Dates Name Provider Type Discipline     06/16/21 -  Lalito Ray OT Occupational Therapist OT              OT Recommendation and Plan  Planned Therapy Interventions (OT): activity tolerance training, BADL retraining, transfer/mobility retraining, strengthening exercise, patient/caregiver education/training, occupation/activity based interventions  Therapy Frequency (OT): 5 times/wk  Plan of Care Review  Plan of Care Reviewed With: patient  Progress: no change  Outcome Summary: Patient presents with limitations affecting strength, activity tolerance, and balance impacting patient's ability to return home safely and independently.  The skills of a therapist will be required to safely and effectively implement the following treatment plan to restore maximal level of  function     Time Calculation:    Time Calculation- OT     Row Name 03/03/22 1004             Time Calculation- OT    OT Received On 03/03/22  -PG      OT Goal Re-Cert Due Date 03/11/22  -PG              Timed Charges    89362 - OT Therapeutic Activity Minutes 10  -PG              Total Minutes    Timed Charges Total Minutes 10  -PG       Total Minutes 10  -PG            User Key  (r) = Recorded By, (t) = Taken By, (c) = Cosigned By    Initials Name Provider Type    PG Lalito Ray OT Occupational Therapist              Therapy Charges for Today     Code Description Service Date Service Provider Modifiers Qty    13433494038 HC OT THERAPEUTIC ACT EA 15 MIN 3/2/2022 Lalito Ray OT GO 1    70892833562 HC OT EVAL LOW COMPLEXITY 3 3/2/2022 Lalito Ray OT GO 1    50075782511 HC OT THERAPEUTIC ACT EA 15 MIN 3/3/2022 Lalito Ray OT GO 1               Lalito Ray OT  3/3/2022

## 2022-03-03 NOTE — PROGRESS NOTES
Pulmonary / Critical Care Progress Note      Patient Name: Jordin Menon  : 1940  MRN: [id number]  Primary Care Physician:  Frank Llanes MD  Date of admission: 2022    Subjective   Subjective   Follow-up for 82-year-old male with septic shock, altered mental status, symptomatic bradycardia, COVID-19 infection.    Over last 24 hours,  Remained on IV ceftriaxone.  Has remained confused.  Had CT scan of the chest and lower extremity ultrasound.  Has showed acute DVT in left leg.  Was started on therapeutic Lovenox.    No acute episodes overnight    This morning,  He is on a recliner.  Patient remains confused, does not know that he is in hospital.    He is mumbling.  Not able to answer all questions.  No significant leg swelling.  Remains on room air.  Has no significant cough or phlegm.  Has no increased work of breathing.  Remains in enhanced airborne isolation due to Covid.  No fever noted.    Review of Systems  Unable to obtain from patient as he is with baseline dementia        Objective   Objective     Vitals:   Temp:  [97.5 °F (36.4 °C)-100.1 °F (37.8 °C)] 98 °F (36.7 °C)  Heart Rate:  [83-95] 83  Resp:  [16-20] 18  BP: (111-130)/(64-82) 130/64     Physical Exam  Vital Signs Reviewed   General: Confused elderly male in no acute distress   HEENT:  PERRL, EOMI.  OP, nares clear, MMM  Neck:  Supple, no JVD, no thyromegaly  Chest:  good aeration, no significant rhonchi bilaterally, tympanic to percussion bilaterally, no work of breathing noted  CV: RRR, no MGR, pulses 2+, equal.  Abd:  Soft, NT, ND, + BS, no HSM  EXT:  no clubbing, no cyanosis, no edema  Neuro: Alert but oriented x0, has generalized weakness, cranial nerves II to XII are intact  Skin: No rashes or lesions noted      Result Review    Result Review:  I have personally reviewed the results from the time of this admission to 3/3/2022 10:50 EST and agree with these findings:  [x]  Laboratory  [x]  Microbiology  [x]  Radiology  [x]   EKG/Telemetry   [x]  Cardiology/Vascular   []  Pathology  []  Old records  []  Other:  Most notable findings include:   D-dimer is 4.3.  Serum potassium 3.9.  Serum sodium 137.  CRP 3.67.  Procalcitonin 0.11.    CT chest with contrast did not show any pulmonary embolism, showed enlarged pulmonary arteries and minimal pleural effusion bilaterally.    Lower extremity ultrasound showed DVT in left lower extremity, acute.    Blood cultures NGTD  Midline catheter tip culture NGTD  Urine culture with greater than 100,000 mixed normal gilmar    Assessment/Plan   Assessment / Plan     Active Hospital Problems:  Active Hospital Problems    Diagnosis    • Symptomatic bradycardia          Impression:  Septic shock  Bradycardia  Hypothermia  Dementia  Acute kidney injury  Bradycardia  Hypokalemia  Hypophosphatemia  Diastolic heart failure  COVID-19    Plan:    No indication to treat COVID-19 as the patient is on room air  Completed azithromycin.  Finish 7 days of Rocephin.  Can change to Augmentin at discharge to complete therapy  D-dimer was high.  CT scan of the chest was done.  It did not show pulmonary embolism.  Ultrasound of the legs showed left acute DVT.    Was a started on therapeutic Lovenox.  Switch to Eliquis twice daily.  Encourage activity.  Continue bowel regimen.  Monitor renal function closely.    DVT prophylaxis:  Medical DVT prophylaxis orders are present.    CODE STATUS:   Level Of Support Discussed With: Patient  Code Status (Patient has no pulse and is not breathing): CPR (Attempt to Resuscitate)  Medical Interventions (Patient has pulse or is breathing): Full Support  Comments: per [facility] home records    Labs, imaging, microbiology, notes and medications personally reviewed  Discussed on multidisciplinary critical care rounds.  Discussed with hospitalist.    Electronically signed by David Cantu MD, 03/03/22, 10:50 AM EST.

## 2022-03-03 NOTE — SIGNIFICANT NOTE
03/03/22 1613   Plan   Plan Comments Patient to discharge back to [facility]. Family unable to transport. Pt should go by EMS if not SW did provide nurse with cab voucher just in case needed.   Final Discharge Disposition Code 04 - intermediate care facility

## 2022-03-03 NOTE — PROGRESS NOTES
[facility]   Hospitalist Progress Note  Date: 3/2/2022  Patient Name: Jordin Menon  : 1940  MRN: [id number]  Date of admission: 2022      Subjective   Subjective     Chief Complaint: Weakness, confusion    Summary: 82-year-old male with dementia, hypertension, other medical problems have been feeling poorly for 3 to 5 days, weak, confused.  He was brought to the ED where he was hypothermic, bradycardic, and hypotensive.  He was admitted to the ICU for sepsis.  He was started on IV antibiotics.  He has also tested positive for COVID.    Interval Followup:  C/o constipation    Review of Systems  Unable to get full ROS but does report constipation    Objective   Objective     Vitals:   Temp:  [94.1 °F (34.5 °C)-98.3 °F (36.8 °C)] 97.5 °F (36.4 °C)  Heart Rate:  [91-96] 91  Resp:  [16-20] 16  BP: (110-134)/(59-82) 129/82  Physical Exam    Constitutional: Awake, alert, no acute distress   Eyes: Pupils equal, sclerae anicteric   HENT: NCAT, dry mm   Neck: Supple, no thyromegaly, no lymphadenopathy, trachea midline   Respiratory: Clear to auscultation bilaterally, nonlabored respirations    Cardiovascular: RRR,no jvd   Gastrointestinal: nontender, nondistended   Musculoskeletal: No bilateral ankle edema, no clubbing or cyanosis to extremities   Psychiatric: Appropriate affect, cooperative   Neurologic: awake and alert, moves all 4   Skin: No rashes     Result Review    Result Review:  I have personally reviewed the results from the time of this admission to 3/2/2022 20:55 EST and agree with these findings:  [x]  Laboratory  [x]  Microbiology  []  Radiology  []  EKG/Telemetry   []  Cardiology/Vascular   []  Pathology  []  Old records  []  Other:    Assessment/Plan   Assessment / Plan     Assessment:  • Sepsis  • Community-acquired pneumonia  • BRYANT  • COVID-19  • Bradycardia, resolved   • Hypophosphatemia    Plan:  · Admitted to ICU, transferred to floor  · Continue antibiotics, can complete course of  Augmentin upon discharge  · Bowel regimen  · CT today by pulm for high d-dimer --> negative for PE  · Possibly dc 24-48 hours      Discussed with pulm    DVT prophylaxis:  Medical DVT prophylaxis orders are present.    CODE STATUS:   Level Of Support Discussed With: Patient  Code Status (Patient has no pulse and is not breathing): CPR (Attempt to Resuscitate)  Medical Interventions (Patient has pulse or is breathing): Full Support  Comments: per nuing home records  Electronically signed by Eric Rivas MD, 03/02/22, 8:56 PM EST.

## 2022-03-03 NOTE — DISCHARGE SUMMARY
[facility]         HOSPITALIST  DISCHARGE SUMMARY    Patient Name: Jordin Menon  : 1940  MRN: [id number]    Date of Admission: 2022  Date of Discharge:  3/3/2022  Primary Care Physician: Frank Llanes MD  Admitting Service: Hospital Medicine  Consultants:  Pulm/CC    Final Diagnoses:  Sepsis  Community-acquired pneumonia  BRYANT, resolved  COVID-19  Bradycardia, resolved   Hypophosphatemia  Acute DVT      Hospital Course     Hospital Course:  82-year-old male with dementia, hypertension, other medical problems have been feeling poorly for 3 to 5 days, weak, confused.  He was brought to the ED where he was hypothermic, bradycardic, and hypotensive.  He was admitted to the ICU for sepsis.  He was started on IV antibiotics.  He has also tested positive for COVID.  He was treated with Rocephin/Zihtromax, and has completed an adequate course of antibiotics.  COVID was positive but did not meet treatment criteria, has done well from that standpoint.  His D-dimer was elevated, CT chest neg for PE but was found to have Acute left lower extremity deep vein thrombosis noted in the common femoral, proximal femoral, mid femoral, distal femoral, popliteal and peroneal.  He was started on Eliquis.    He will need to do Eliquis 10mg bid for a week, then change to 5mg bid.    His beta-blocker has been decreased due to bradycardia on admission.  His diuretics have been decreased due to BRYANT on presentation, though that is better.      DISCHARGE Follow Up Recommendations for labs and diagnostics: f/u with pcp in a week      Day of Discharge     Vital Signs:  Temp:  [97.5 °F (36.4 °C)-100.1 °F (37.8 °C)] 97.5 °F (36.4 °C)  Heart Rate:  [] 92  Resp:  [16-20] 18  BP: (111-130)/(64-83) 122/71  Physical Exam: NAD, s1s2, chest clear, abd s/nd/nt      Discharge Details        Discharge Medications      New Medications      Instructions Start Date   apixaban 5 MG tablet tablet  Commonly known as:  ELIQUIS   10 mg, Oral, Every 12 Hours Scheduled      apixaban 5 MG tablet tablet  Commonly known as: ELIQUIS   5 mg, Oral, Every 12 Hours Scheduled   Start Date: March 10, 2022        Changes to Medications      Instructions Start Date   furosemide 20 MG tablet  Commonly known as: Lasix  What changed:   medication strength  how much to take  when to take this   20 mg, Oral, Daily      metoprolol tartrate 50 MG tablet  Commonly known as: LOPRESSOR  What changed:   how much to take  when to take this   25 mg, Oral, Daily         Continue These Medications      Instructions Start Date   amLODIPine 10 MG tablet  Commonly known as: NORVASC   10 mg, Oral, Every Evening      Biofreeze 4 % gel  Generic drug: Menthol (Topical Analgesic)   Apply externally      busPIRone 7.5 MG tablet  Commonly known as: BUSPAR   7.5 mg, Oral, 2 Times Daily      docusate sodium 100 MG capsule  Commonly known as: COLACE   200 mg, Oral, Daily      famotidine 20 MG tablet  Commonly known as: PEPCID   20 mg, Oral, Nightly PRN      hydrOXYzine 25 MG tablet  Commonly known as: ATARAX   25 mg, Oral, Every 6 Hours PRN      ipratropium-albuterol 0.5-2.5 mg/3 ml nebulizer  Commonly known as: DUO-NEB   3 mL, Nebulization, Every 4 Hours PRN      melatonin 5 MG tablet tablet   5 mg, Oral, Nightly      memantine 10 MG tablet  Commonly known as: NAMENDA   10 mg, Oral, Daily      polyethylene glycol packet  Commonly known as: MIRALAX   17 g, Oral, Daily      pravastatin 10 MG tablet  Commonly known as: PRAVACHOL   10 mg, Oral, Nightly         Stop These Medications    hydrALAZINE 25 MG tablet  Commonly known as: APRESOLINE     hydroxychloroquine 200 MG tablet  Commonly known as: PLAQUENIL     metOLazone 2.5 MG tablet  Commonly known as: ZAROXOLYN     thiamine 100 MG tablet  tablet  Commonly known as: VITAMIN B-1     vitamin C 500 MG tablet  Commonly known as: ASCORBIC ACID     Zinc 50 MG tablet     Zithromax 500 MG tablet  Generic drug: azithromycin         ASK your doctor about these medications      Instructions Start Date   acetaminophen 325 MG tablet  Commonly known as: TYLENOL   650 mg, Oral, Every 6 Hours PRN             No Known Allergies    Discharge Disposition:  Skilled Nursing Facility (DC - External)    Diet:  Hospital:  Diet Order   Procedures   • Diet Regular       Discharge Activity:       CODE STATUS:  Code Status and Medical Interventions:   Ordered at: 02/25/22 2119     Level Of Support Discussed With:    Patient     Code Status (Patient has no pulse and is not breathing):    CPR (Attempt to Resuscitate)     Medical Interventions (Patient has pulse or is breathing):    Full Support     Comments:    per [facility] home records         No future appointments.        Pertinent  and/or Most Recent Results     PROCEDURES:   none    LAB RESULTS:      Lab 03/02/22  0743 03/01/22  1221 02/28/22  0853 02/27/22  0335 02/26/22  0327 02/25/22  1605 02/25/22  1604 02/25/22  1604   WBC 9.40 11.99* 9.15 8.35 9.06  --    < > 11.00*   HEMOGLOBIN 11.5* 12.2* 9.8* 10.2* 10.6*  --    < > 11.9*   HEMATOCRIT 33.2* 35.0* 28.6* 29.0* 29.5*  --    < > 33.6*   PLATELETS 260 224 244 280 306  --    < > 369   NEUTROS ABS 6.36 8.97* 5.57 5.22 6.76  --    < > 8.52*   IMMATURE GRANS (ABS) 0.05 0.06* 0.07* 0.03 0.03  --    < > 0.03   LYMPHS ABS 2.15 1.79 2.40 2.02 1.38  --    < > 1.43   MONOS ABS 0.68 1.04* 1.00* 1.03* 0.83  --    < > 0.94*   EOS ABS 0.14 0.11 0.09 0.03 0.04  --    < > 0.06   MCV 82.8 82.4 81.7 81.5 80.2  --    < > 80.2   CRP 3.67*  --   --   --   --   --   --   --    PROCALCITONIN 0.11  --   --   --   --   --   --  0.56*   LACTATE  --   --   --   --   --  1.1  --   --    PROTIME  --   --   --   --   --   --   --  11.1   APTT  --   --   --   --   --   --   --  25.9    < > = values in this interval not displayed.         Lab 03/02/22  0743 03/01/22  1221 02/28/22  0355 02/27/22  0335 02/26/22  1228 02/26/22  0327 02/26/22  0031 02/25/22  2326 02/25/22  1604  02/25/22  1604   SODIUM 137 145 145 146* 143  --    < >  --   --  140   POTASSIUM 3.9 3.9 4.2 2.9* 3.3*  --    < >  --   --  2.6*   CHLORIDE 102 111* 114* 108* 104  --    < >  --   --  92*   CO2 24.0 23.0 23.6 25.3 24.8  --    < >  --   --  31.3*   ANION GAP 11.0 11.0 7.4 12.7 14.2  --    < >  --   --  16.7*   BUN 15 17 31* 57* 78*  --    < >  --   --  97*   CREATININE 0.94 0.98 1.27 1.77* 2.25*  --    < >  --   --  2.86*   EGFR 81.4 77.5  --   --   --   --   --   --   --   --    GLUCOSE 90 86 92 87 79  --    < >  --   --  111*   CALCIUM 9.7 9.8 9.0 8.9 8.9  --    < >  --   --  9.9   MAGNESIUM 2.0  --  1.9 2.4  --  2.3  --  2.3   < > 2.8*   PHOSPHORUS 1.9* 2.0* 1.7* 2.8 4.4 5.5*   < > 6.1*  --   --    TSH  --   --   --   --   --   --   --   --   --  1.220    < > = values in this interval not displayed.         Lab 03/02/22  0743 03/01/22  1221 02/28/22  0355 02/27/22  0335 02/26/22  1228 02/26/22  0031 02/26/22  0031 02/25/22  1604 02/25/22  1604   TOTAL PROTEIN  --   --   --  6.5  --   --  7.4  --  7.8   ALBUMIN 3.10* 2.80* 2.50* 2.70* 2.90*   < > 3.00*   < > 3.20*   GLOBULIN  --   --   --  3.8  --   --  4.4  --  4.6   ALT (SGPT)  --   --   --  8  --   --  8  --  9   AST (SGOT)  --   --   --  11  --   --  12  --  13   BILIRUBIN  --   --   --  0.4  --   --  0.3  --  0.4   ALK PHOS  --   --   --  112  --   --  129*  --  142*    < > = values in this interval not displayed.         Lab 02/25/22  1604   PROBNP 399.1   TROPONIN T 0.028   PROTIME 11.1   INR 1.07*                 Brief Urine Lab Results  (Last result in the past 365 days)      Color   Clarity   Blood   Leuk Est   Nitrite   Protein   CREAT   Urine HCG        02/25/22 2106 Yellow   Cloudy   Negative   Small (1+)   Negative   Trace               Microbiology Results (last 10 days)     Procedure Component Value - Date/Time    Catheter Culture - Cath Tip, Hand, Left [[id number]] Collected: 02/26/22 1303    Lab Status: Final result Specimen: Cath Tip from Hand,  Left Updated: 02/28/22 0735     CATHETER CULTURE No growth    Influenza Antigen, Rapid - Swab, Nasopharynx [[id number]]  (Normal) Collected: 02/26/22 0946    Lab Status: Final result Specimen: Swab from Nasopharynx Updated: 02/26/22 1109     Influenza A Ag, EIA Negative     Influenza B Ag, EIA Negative    COVID-19,APTIMA PANTHER(DWAINE),[facility], NP/OP SWAB IN UTM/VTM/[facility] TRANSPORT MEDIA,24 HR TAT - Swab, Nasopharynx [[id number]]  (Abnormal) Collected: 02/26/22 0946    Lab Status: Final result Specimen: Swab from Nasopharynx Updated: 02/26/22 1855     COVID19 Detected    Narrative:      Fact sheet for providers: https://www.fda.gov/media/428829/download     Fact sheet for patients: https://www.fda.gov/media/968458/download    Test performed by RT PCR.    S. Pneumo Ag Urine or CSF - Urine, Urine, Clean Catch [[id number]]  (Normal) Collected: 02/25/22 2106    Lab Status: Final result Specimen: Urine, Clean Catch Updated: 02/26/22 1158     Strep Pneumo Ag Negative    Legionella Antigen, Urine - Urine, Urine, Clean Catch [[id number]]  (Normal) Collected: 02/25/22 2106    Lab Status: Final result Specimen: Urine, Clean Catch Updated: 02/26/22 1158     LEGIONELLA ANTIGEN, URINE Negative    Urine Culture - Urine, Urine, Clean Catch [[id number]] Collected: 02/25/22 2106    Lab Status: Final result Specimen: Urine, Clean Catch Updated: 02/27/22 1018     Urine Culture >100,000 CFU/mL Mixed Gilmar Isolated    Narrative:      Specimen contains mixed organisms of questionable pathogenicity which indicates contamination with commensal gilmar.  Further identification is unlikely to provide clinically useful information.  Suggest recollection.    Blood Culture - Blood, Arm, Left [[id number]]  (Normal) Collected: 02/25/22 1942    Lab Status: Final result Specimen: Blood from Arm, Left Updated: 03/02/22 2000     Blood Culture No growth at 5 days    Blood Culture - Blood, Arm, Right [[id number]]  (Normal) Collected: 02/25/22 1605    Lab  Status: Final result Specimen: Blood from Arm, Right Updated: 03/02/22 1616     Blood Culture No growth at 5 days          XR Chest 1 View    Result Date: 2/27/2022  Impression:   No acute cardiopulmonary process.  Stable cardiomegaly.         LENA LEWIS MD       Electronically Signed and Approved By: LENA LEWIS MD on 2/27/2022 at 9:03             XR Chest 1 View    Result Date: 2/25/2022  Impression:   1. Mild left basilar opacities which could represent atelectasis, infiltrate, and/or small pleural effusion. 2. Cardiomegaly.       WANDA FUENTES MD       Electronically Signed and Approved By: WANDA FUENTES MD on 2/25/2022 at 17:30               Results for orders placed during the hospital encounter of 02/25/22    Duplex Venous Lower Extremity - Bilateral CV-READ    Interpretation Summary  · Acute left lower extremity deep vein thrombosis noted in the common femoral, proximal femoral, mid femoral, distal femoral, popliteal and peroneal.  · All other veins appeared normal bilaterally.      Results for orders placed during the hospital encounter of 02/25/22    Duplex Venous Lower Extremity - Bilateral CV-READ    Interpretation Summary  · Acute left lower extremity deep vein thrombosis noted in the common femoral, proximal femoral, mid femoral, distal femoral, popliteal and peroneal.  · All other veins appeared normal bilaterally.          Labs Pending at Discharge:    Condition at dc: stable for DC    Time spent on Discharge including face to face service:  40 minutes    Electronically signed by Eric Rivas MD, 03/03/22, 3:08 PM EST.

## 2022-03-03 NOTE — PLAN OF CARE
Goal Outcome Evaluation:  Plan of Care Reviewed With: patient   Pt stable this shift, had BM. Orders to DC back to [facility].      Progress: improving

## 2022-03-04 VITALS
OXYGEN SATURATION: 96 % | WEIGHT: 145.5 LBS | SYSTOLIC BLOOD PRESSURE: 124 MMHG | BODY MASS INDEX: 21.55 KG/M2 | HEIGHT: 69 IN | HEART RATE: 82 BPM | RESPIRATION RATE: 18 BRPM | DIASTOLIC BLOOD PRESSURE: 65 MMHG | TEMPERATURE: 98.1 F

## 2022-03-04 PROCEDURE — 99232 SBSQ HOSP IP/OBS MODERATE 35: CPT | Performed by: INTERNAL MEDICINE

## 2022-03-04 PROCEDURE — 94799 UNLISTED PULMONARY SVC/PX: CPT

## 2022-03-04 RX ADMIN — APIXABAN 10 MG: 5 TABLET, FILM COATED ORAL at 08:00

## 2022-03-04 NOTE — PLAN OF CARE
Goal Outcome Evaluation:            discharge complete awaiting EMS to transport pt to [facility] nursing [facility]. purewick and iv removed.

## 2022-03-06 ENCOUNTER — HOSPITAL ENCOUNTER (INPATIENT)
Facility: HOSPITAL | Age: 82
LOS: 2 days | Discharge: NURSING, REHAB OR LONG-TERM CARE | End: 2022-03-09
Attending: EMERGENCY MEDICINE | Admitting: GENERAL PRACTICE

## 2022-03-06 DIAGNOSIS — R41.82 ALTERED MENTAL STATUS, UNSPECIFIED ALTERED MENTAL STATUS TYPE: ICD-10-CM

## 2022-03-06 DIAGNOSIS — Z78.9 DECREASED ACTIVITIES OF DAILY LIVING (ADL): ICD-10-CM

## 2022-03-06 DIAGNOSIS — R26.2 DIFFICULTY WALKING: ICD-10-CM

## 2022-03-06 DIAGNOSIS — T68.XXXA HYPOTHERMIA, INITIAL ENCOUNTER: Primary | ICD-10-CM

## 2022-03-06 PROCEDURE — P9612 CATHETERIZE FOR URINE SPEC: HCPCS

## 2022-03-06 PROCEDURE — 93005 ELECTROCARDIOGRAM TRACING: CPT | Performed by: EMERGENCY MEDICINE

## 2022-03-06 PROCEDURE — 99285 EMERGENCY DEPT VISIT HI MDM: CPT

## 2022-03-06 RX ORDER — SODIUM CHLORIDE 0.9 % (FLUSH) 0.9 %
10 SYRINGE (ML) INJECTION AS NEEDED
Status: DISCONTINUED | OUTPATIENT
Start: 2022-03-06 | End: 2022-03-09 | Stop reason: HOSPADM

## 2022-03-07 ENCOUNTER — APPOINTMENT (OUTPATIENT)
Dept: CT IMAGING | Facility: HOSPITAL | Age: 82
End: 2022-03-07

## 2022-03-07 ENCOUNTER — APPOINTMENT (OUTPATIENT)
Dept: GENERAL RADIOLOGY | Facility: HOSPITAL | Age: 82
End: 2022-03-07

## 2022-03-07 PROBLEM — T68.XXXA HYPOTHERMIA, INITIAL ENCOUNTER: Status: ACTIVE | Noted: 2022-03-07

## 2022-03-07 LAB
ALBUMIN SERPL-MCNC: 3.2 G/DL (ref 3.5–5.2)
ALBUMIN/GLOB SERPL: 0.8 G/DL
ALP SERPL-CCNC: 108 U/L (ref 39–117)
ALT SERPL W P-5'-P-CCNC: 8 U/L (ref 1–41)
ANION GAP SERPL CALCULATED.3IONS-SCNC: 10.2 MMOL/L (ref 5–15)
ANION GAP SERPL CALCULATED.3IONS-SCNC: 10.3 MMOL/L (ref 5–15)
APTT PPP: 25.6 SECONDS (ref 22.2–34.2)
AST SERPL-CCNC: 13 U/L (ref 1–40)
BACTERIA UR QL AUTO: ABNORMAL /HPF
BASOPHILS # BLD AUTO: 0.03 10*3/MM3 (ref 0–0.2)
BASOPHILS # BLD AUTO: 0.04 10*3/MM3 (ref 0–0.2)
BASOPHILS NFR BLD AUTO: 0.5 % (ref 0–1.5)
BASOPHILS NFR BLD AUTO: 0.5 % (ref 0–1.5)
BILIRUB SERPL-MCNC: 0.4 MG/DL (ref 0–1.2)
BILIRUB UR QL STRIP: NEGATIVE
BUN SERPL-MCNC: 18 MG/DL (ref 8–23)
BUN SERPL-MCNC: 21 MG/DL (ref 8–23)
BUN/CREAT SERPL: 15 (ref 7–25)
BUN/CREAT SERPL: 15.7 (ref 7–25)
CALCIUM SPEC-SCNC: 8.9 MG/DL (ref 8.6–10.5)
CALCIUM SPEC-SCNC: 9.7 MG/DL (ref 8.6–10.5)
CHLORIDE SERPL-SCNC: 106 MMOL/L (ref 98–107)
CHLORIDE SERPL-SCNC: 108 MMOL/L (ref 98–107)
CLARITY UR: CLEAR
CO2 SERPL-SCNC: 22.7 MMOL/L (ref 22–29)
CO2 SERPL-SCNC: 27.8 MMOL/L (ref 22–29)
COLOR UR: YELLOW
CORTIS SERPL-MCNC: 12.44 MCG/DL
CREAT SERPL-MCNC: 1.15 MG/DL (ref 0.76–1.27)
CREAT SERPL-MCNC: 1.4 MG/DL (ref 0.76–1.27)
CRP SERPL-MCNC: 1.11 MG/DL (ref 0–0.5)
D-LACTATE SERPL-SCNC: 1.1 MMOL/L (ref 0.5–2)
DEPRECATED RDW RBC AUTO: 42.6 FL (ref 37–54)
DEPRECATED RDW RBC AUTO: 43.1 FL (ref 37–54)
EGFRCR SERPLBLD CKD-EPI 2021: 50.5 ML/MIN/1.73
EGFRCR SERPLBLD CKD-EPI 2021: 63.9 ML/MIN/1.73
EOSINOPHIL # BLD AUTO: 0.06 10*3/MM3 (ref 0–0.4)
EOSINOPHIL # BLD AUTO: 0.07 10*3/MM3 (ref 0–0.4)
EOSINOPHIL NFR BLD AUTO: 0.7 % (ref 0.3–6.2)
EOSINOPHIL NFR BLD AUTO: 1.1 % (ref 0.3–6.2)
ERYTHROCYTE [DISTWIDTH] IN BLOOD BY AUTOMATED COUNT: 14.7 % (ref 12.3–15.4)
ERYTHROCYTE [DISTWIDTH] IN BLOOD BY AUTOMATED COUNT: 14.7 % (ref 12.3–15.4)
GLOBULIN UR ELPH-MCNC: 3.8 GM/DL
GLUCOSE SERPL-MCNC: 102 MG/DL (ref 65–99)
GLUCOSE SERPL-MCNC: 79 MG/DL (ref 65–99)
GLUCOSE UR STRIP-MCNC: NEGATIVE MG/DL
HCT VFR BLD AUTO: 30 % (ref 37.5–51)
HCT VFR BLD AUTO: 31.2 % (ref 37.5–51)
HGB BLD-MCNC: 10.5 G/DL (ref 13–17.7)
HGB BLD-MCNC: 11 G/DL (ref 13–17.7)
HGB UR QL STRIP.AUTO: NEGATIVE
HOLD SPECIMEN: NORMAL
HOLD SPECIMEN: NORMAL
HYALINE CASTS UR QL AUTO: ABNORMAL /LPF
IMM GRANULOCYTES # BLD AUTO: 0.02 10*3/MM3 (ref 0–0.05)
IMM GRANULOCYTES # BLD AUTO: 0.03 10*3/MM3 (ref 0–0.05)
IMM GRANULOCYTES NFR BLD AUTO: 0.3 % (ref 0–0.5)
IMM GRANULOCYTES NFR BLD AUTO: 0.3 % (ref 0–0.5)
INR PPP: 1.11 (ref 2–3)
INR PPP: 1.42 (ref 2–3)
KETONES UR QL STRIP: NEGATIVE
L PNEUMO1 AG UR QL IA: NEGATIVE
LEUKOCYTE ESTERASE UR QL STRIP.AUTO: NEGATIVE
LYMPHOCYTES # BLD AUTO: 1.56 10*3/MM3 (ref 0.7–3.1)
LYMPHOCYTES # BLD AUTO: 1.79 10*3/MM3 (ref 0.7–3.1)
LYMPHOCYTES NFR BLD AUTO: 17.9 % (ref 19.6–45.3)
LYMPHOCYTES NFR BLD AUTO: 27.8 % (ref 19.6–45.3)
MAGNESIUM SERPL-MCNC: 2 MG/DL (ref 1.6–2.4)
MAGNESIUM SERPL-MCNC: 2.1 MG/DL (ref 1.6–2.4)
MCH RBC QN AUTO: 28.9 PG (ref 26.6–33)
MCH RBC QN AUTO: 29 PG (ref 26.6–33)
MCHC RBC AUTO-ENTMCNC: 35 G/DL (ref 31.5–35.7)
MCHC RBC AUTO-ENTMCNC: 35.3 G/DL (ref 31.5–35.7)
MCV RBC AUTO: 82.3 FL (ref 79–97)
MCV RBC AUTO: 82.6 FL (ref 79–97)
MONOCYTES # BLD AUTO: 0.62 10*3/MM3 (ref 0.1–0.9)
MONOCYTES # BLD AUTO: 0.77 10*3/MM3 (ref 0.1–0.9)
MONOCYTES NFR BLD AUTO: 8.8 % (ref 5–12)
MONOCYTES NFR BLD AUTO: 9.6 % (ref 5–12)
NEUTROPHILS NFR BLD AUTO: 3.9 10*3/MM3 (ref 1.7–7)
NEUTROPHILS NFR BLD AUTO: 6.27 10*3/MM3 (ref 1.7–7)
NEUTROPHILS NFR BLD AUTO: 60.7 % (ref 42.7–76)
NEUTROPHILS NFR BLD AUTO: 71.8 % (ref 42.7–76)
NITRITE UR QL STRIP: NEGATIVE
NRBC BLD AUTO-RTO: 0 /100 WBC (ref 0–0.2)
NRBC BLD AUTO-RTO: 0 /100 WBC (ref 0–0.2)
PH UR STRIP.AUTO: <=5 [PH] (ref 5–8)
PHOSPHATE SERPL-MCNC: 2.5 MG/DL (ref 2.5–4.5)
PHOSPHATE SERPL-MCNC: 3.2 MG/DL (ref 2.5–4.5)
PLATELET # BLD AUTO: 204 10*3/MM3 (ref 140–450)
PLATELET # BLD AUTO: 217 10*3/MM3 (ref 140–450)
PMV BLD AUTO: 9.7 FL (ref 6–12)
PMV BLD AUTO: 9.7 FL (ref 6–12)
POTASSIUM SERPL-SCNC: 3.6 MMOL/L (ref 3.5–5.2)
POTASSIUM SERPL-SCNC: 3.7 MMOL/L (ref 3.5–5.2)
PROCALCITONIN SERPL-MCNC: 0.13 NG/ML (ref 0–0.25)
PROT SERPL-MCNC: 7 G/DL (ref 6–8.5)
PROT UR QL STRIP: ABNORMAL
PROTHROMBIN TIME: 11.4 SECONDS (ref 9.4–12)
PROTHROMBIN TIME: 14.2 SECONDS (ref 9.4–12)
RBC # BLD AUTO: 3.63 10*6/MM3 (ref 4.14–5.8)
RBC # BLD AUTO: 3.79 10*6/MM3 (ref 4.14–5.8)
RBC # UR STRIP: ABNORMAL /HPF
REF LAB TEST METHOD: ABNORMAL
S PNEUM AG SPEC QL LA: NEGATIVE
SODIUM SERPL-SCNC: 141 MMOL/L (ref 136–145)
SODIUM SERPL-SCNC: 144 MMOL/L (ref 136–145)
SP GR UR STRIP: 1.02 (ref 1–1.03)
SQUAMOUS #/AREA URNS HPF: ABNORMAL /HPF
TSH SERPL DL<=0.05 MIU/L-ACNC: 2.07 UIU/ML (ref 0.27–4.2)
UROBILINOGEN UR QL STRIP: ABNORMAL
VIT B12 BLD-MCNC: 637 PG/ML (ref 211–946)
WBC # UR STRIP: ABNORMAL /HPF
WBC NRBC COR # BLD: 6.43 10*3/MM3 (ref 3.4–10.8)
WBC NRBC COR # BLD: 8.73 10*3/MM3 (ref 3.4–10.8)
WHOLE BLOOD HOLD SPECIMEN: NORMAL
WHOLE BLOOD HOLD SPECIMEN: NORMAL

## 2022-03-07 PROCEDURE — 84443 ASSAY THYROID STIM HORMONE: CPT | Performed by: EMERGENCY MEDICINE

## 2022-03-07 PROCEDURE — 83605 ASSAY OF LACTIC ACID: CPT | Performed by: EMERGENCY MEDICINE

## 2022-03-07 PROCEDURE — 80053 COMPREHEN METABOLIC PANEL: CPT | Performed by: EMERGENCY MEDICINE

## 2022-03-07 PROCEDURE — 86140 C-REACTIVE PROTEIN: CPT | Performed by: EMERGENCY MEDICINE

## 2022-03-07 PROCEDURE — 85025 COMPLETE CBC W/AUTO DIFF WBC: CPT | Performed by: GENERAL PRACTICE

## 2022-03-07 PROCEDURE — 87899 AGENT NOS ASSAY W/OPTIC: CPT | Performed by: INTERNAL MEDICINE

## 2022-03-07 PROCEDURE — 70450 CT HEAD/BRAIN W/O DYE: CPT

## 2022-03-07 PROCEDURE — 81001 URINALYSIS AUTO W/SCOPE: CPT | Performed by: EMERGENCY MEDICINE

## 2022-03-07 PROCEDURE — 87040 BLOOD CULTURE FOR BACTERIA: CPT | Performed by: EMERGENCY MEDICINE

## 2022-03-07 PROCEDURE — 71045 X-RAY EXAM CHEST 1 VIEW: CPT

## 2022-03-07 PROCEDURE — 84100 ASSAY OF PHOSPHORUS: CPT | Performed by: EMERGENCY MEDICINE

## 2022-03-07 PROCEDURE — 83735 ASSAY OF MAGNESIUM: CPT | Performed by: GENERAL PRACTICE

## 2022-03-07 PROCEDURE — 84100 ASSAY OF PHOSPHORUS: CPT | Performed by: GENERAL PRACTICE

## 2022-03-07 PROCEDURE — 85610 PROTHROMBIN TIME: CPT | Performed by: GENERAL PRACTICE

## 2022-03-07 PROCEDURE — 36415 COLL VENOUS BLD VENIPUNCTURE: CPT | Performed by: INTERNAL MEDICINE

## 2022-03-07 PROCEDURE — 85025 COMPLETE CBC W/AUTO DIFF WBC: CPT | Performed by: EMERGENCY MEDICINE

## 2022-03-07 PROCEDURE — 83735 ASSAY OF MAGNESIUM: CPT | Performed by: EMERGENCY MEDICINE

## 2022-03-07 PROCEDURE — 84145 PROCALCITONIN (PCT): CPT | Performed by: INTERNAL MEDICINE

## 2022-03-07 PROCEDURE — 82607 VITAMIN B-12: CPT | Performed by: INTERNAL MEDICINE

## 2022-03-07 PROCEDURE — 85730 THROMBOPLASTIN TIME PARTIAL: CPT | Performed by: EMERGENCY MEDICINE

## 2022-03-07 PROCEDURE — 99223 1ST HOSP IP/OBS HIGH 75: CPT | Performed by: GENERAL PRACTICE

## 2022-03-07 PROCEDURE — 85610 PROTHROMBIN TIME: CPT | Performed by: EMERGENCY MEDICINE

## 2022-03-07 PROCEDURE — 82533 TOTAL CORTISOL: CPT | Performed by: INTERNAL MEDICINE

## 2022-03-07 PROCEDURE — 82746 ASSAY OF FOLIC ACID SERUM: CPT | Performed by: INTERNAL MEDICINE

## 2022-03-07 RX ORDER — ONDANSETRON 4 MG/1
4 TABLET, FILM COATED ORAL EVERY 6 HOURS PRN
Status: DISCONTINUED | OUTPATIENT
Start: 2022-03-07 | End: 2022-03-09 | Stop reason: HOSPADM

## 2022-03-07 RX ORDER — FUROSEMIDE 20 MG/1
20 TABLET ORAL DAILY
Status: DISCONTINUED | OUTPATIENT
Start: 2022-03-07 | End: 2022-03-07

## 2022-03-07 RX ORDER — FAMOTIDINE 20 MG/1
20 TABLET, FILM COATED ORAL NIGHTLY PRN
Status: DISCONTINUED | OUTPATIENT
Start: 2022-03-07 | End: 2022-03-09 | Stop reason: HOSPADM

## 2022-03-07 RX ORDER — SODIUM CHLORIDE 0.9 % (FLUSH) 0.9 %
10 SYRINGE (ML) INJECTION EVERY 12 HOURS SCHEDULED
Status: DISCONTINUED | OUTPATIENT
Start: 2022-03-07 | End: 2022-03-09 | Stop reason: HOSPADM

## 2022-03-07 RX ORDER — PRAVASTATIN SODIUM 10 MG
10 TABLET ORAL NIGHTLY
Status: DISCONTINUED | OUTPATIENT
Start: 2022-03-07 | End: 2022-03-09 | Stop reason: HOSPADM

## 2022-03-07 RX ORDER — SODIUM CHLORIDE 0.9 % (FLUSH) 0.9 %
10 SYRINGE (ML) INJECTION AS NEEDED
Status: DISCONTINUED | OUTPATIENT
Start: 2022-03-07 | End: 2022-03-09 | Stop reason: HOSPADM

## 2022-03-07 RX ORDER — BISACODYL 10 MG
10 SUPPOSITORY, RECTAL RECTAL DAILY PRN
Status: DISCONTINUED | OUTPATIENT
Start: 2022-03-07 | End: 2022-03-09 | Stop reason: HOSPADM

## 2022-03-07 RX ORDER — MEMANTINE HYDROCHLORIDE 10 MG/1
10 TABLET ORAL DAILY
Status: DISCONTINUED | OUTPATIENT
Start: 2022-03-07 | End: 2022-03-09 | Stop reason: HOSPADM

## 2022-03-07 RX ORDER — METHION/INOS/CHOL BT/B COM/LIV 110MG-86MG
100 CAPSULE ORAL DAILY
Status: DISCONTINUED | OUTPATIENT
Start: 2022-03-07 | End: 2022-03-09 | Stop reason: HOSPADM

## 2022-03-07 RX ORDER — CHOLECALCIFEROL (VITAMIN D3) 125 MCG
5 CAPSULE ORAL NIGHTLY
Status: DISCONTINUED | OUTPATIENT
Start: 2022-03-07 | End: 2022-03-09 | Stop reason: HOSPADM

## 2022-03-07 RX ORDER — AMOXICILLIN 250 MG
2 CAPSULE ORAL 2 TIMES DAILY
Status: DISCONTINUED | OUTPATIENT
Start: 2022-03-07 | End: 2022-03-09 | Stop reason: HOSPADM

## 2022-03-07 RX ORDER — IPRATROPIUM BROMIDE AND ALBUTEROL SULFATE 2.5; .5 MG/3ML; MG/3ML
3 SOLUTION RESPIRATORY (INHALATION) EVERY 4 HOURS PRN
Status: DISCONTINUED | OUTPATIENT
Start: 2022-03-07 | End: 2022-03-09 | Stop reason: HOSPADM

## 2022-03-07 RX ORDER — POLYETHYLENE GLYCOL 3350 17 G/17G
17 POWDER, FOR SOLUTION ORAL DAILY
Status: DISCONTINUED | OUTPATIENT
Start: 2022-03-07 | End: 2022-03-09 | Stop reason: HOSPADM

## 2022-03-07 RX ORDER — DOCUSATE SODIUM 100 MG/1
200 CAPSULE, LIQUID FILLED ORAL DAILY
Status: DISCONTINUED | OUTPATIENT
Start: 2022-03-07 | End: 2022-03-09 | Stop reason: HOSPADM

## 2022-03-07 RX ORDER — POTASSIUM CHLORIDE 750 MG/1
40 CAPSULE, EXTENDED RELEASE ORAL ONCE
Status: COMPLETED | OUTPATIENT
Start: 2022-03-07 | End: 2022-03-07

## 2022-03-07 RX ORDER — LIDOCAINE HCL 4% 4 G/100G
1 CREAM TOPICAL 2 TIMES DAILY PRN
COMMUNITY
End: 2022-06-23

## 2022-03-07 RX ORDER — HYDROXYZINE HYDROCHLORIDE 25 MG/1
25 TABLET, FILM COATED ORAL EVERY 6 HOURS PRN
COMMUNITY
End: 2022-06-14 | Stop reason: HOSPADM

## 2022-03-07 RX ORDER — BUSPIRONE HYDROCHLORIDE 7.5 MG/1
7.5 TABLET ORAL 2 TIMES DAILY
Status: DISCONTINUED | OUTPATIENT
Start: 2022-03-07 | End: 2022-03-09 | Stop reason: HOSPADM

## 2022-03-07 RX ORDER — BISACODYL 5 MG/1
5 TABLET, DELAYED RELEASE ORAL DAILY PRN
Status: DISCONTINUED | OUTPATIENT
Start: 2022-03-07 | End: 2022-03-09 | Stop reason: HOSPADM

## 2022-03-07 RX ORDER — POLYETHYLENE GLYCOL 3350 17 G/17G
17 POWDER, FOR SOLUTION ORAL DAILY PRN
Status: DISCONTINUED | OUTPATIENT
Start: 2022-03-07 | End: 2022-03-09 | Stop reason: HOSPADM

## 2022-03-07 RX ORDER — LACTULOSE 10 G/15ML
20 SOLUTION ORAL ONCE
Status: COMPLETED | OUTPATIENT
Start: 2022-03-07 | End: 2022-03-07

## 2022-03-07 RX ORDER — ACETAMINOPHEN 500 MG
1000 TABLET ORAL 3 TIMES DAILY
Status: DISCONTINUED | OUTPATIENT
Start: 2022-03-07 | End: 2022-03-09 | Stop reason: HOSPADM

## 2022-03-07 RX ORDER — HYDROXYZINE HYDROCHLORIDE 25 MG/1
25 TABLET, FILM COATED ORAL EVERY 6 HOURS PRN
Status: DISCONTINUED | OUTPATIENT
Start: 2022-03-07 | End: 2022-03-09 | Stop reason: HOSPADM

## 2022-03-07 RX ORDER — ACETAMINOPHEN 325 MG/1
650 TABLET ORAL 3 TIMES DAILY
COMMUNITY

## 2022-03-07 RX ADMIN — BUSPIRONE HYDROCHLORIDE 7.5 MG: 7.5 TABLET ORAL at 20:12

## 2022-03-07 RX ADMIN — MEMANTINE 10 MG: 10 TABLET ORAL at 11:14

## 2022-03-07 RX ADMIN — APIXABAN 10 MG: 5 TABLET, FILM COATED ORAL at 20:13

## 2022-03-07 RX ADMIN — POLYETHYLENE GLYCOL 3350 17 G: 17 POWDER, FOR SOLUTION ORAL at 13:02

## 2022-03-07 RX ADMIN — ACETAMINOPHEN 1000 MG: 500 TABLET ORAL at 16:42

## 2022-03-07 RX ADMIN — Medication 10 ML: at 20:47

## 2022-03-07 RX ADMIN — BISACODYL 10 MG: 10 SUPPOSITORY RECTAL at 12:46

## 2022-03-07 RX ADMIN — ACETAMINOPHEN 1000 MG: 500 TABLET ORAL at 20:13

## 2022-03-07 RX ADMIN — POTASSIUM CHLORIDE 40 MEQ: 750 CAPSULE, EXTENDED RELEASE ORAL at 09:43

## 2022-03-07 RX ADMIN — BUSPIRONE HYDROCHLORIDE 7.5 MG: 7.5 TABLET ORAL at 11:14

## 2022-03-07 RX ADMIN — SODIUM CHLORIDE 1000 ML: 9 INJECTION, SOLUTION INTRAVENOUS at 01:55

## 2022-03-07 RX ADMIN — APIXABAN 10 MG: 5 TABLET, FILM COATED ORAL at 09:44

## 2022-03-07 RX ADMIN — SENNOSIDES AND DOCUSATE SODIUM 2 TABLET: 50; 8.6 TABLET ORAL at 20:13

## 2022-03-07 RX ADMIN — Medication 5 MG: at 20:13

## 2022-03-07 RX ADMIN — DOCUSATE SODIUM 200 MG: 100 CAPSULE, LIQUID FILLED ORAL at 09:44

## 2022-03-07 RX ADMIN — PRAVASTATIN SODIUM 10 MG: 10 TABLET ORAL at 20:13

## 2022-03-07 RX ADMIN — Medication 100 MG: at 11:14

## 2022-03-07 RX ADMIN — LACTULOSE 20 G: 20 SOLUTION ORAL at 13:02

## 2022-03-07 RX ADMIN — Medication 10 ML: at 11:14

## 2022-03-07 RX ADMIN — SODIUM CHLORIDE 1000 ML: 9 INJECTION, SOLUTION INTRAVENOUS at 00:12

## 2022-03-07 RX ADMIN — SENNOSIDES AND DOCUSATE SODIUM 2 TABLET: 50; 8.6 TABLET ORAL at 09:44

## 2022-03-07 NOTE — ED PROVIDER NOTES
Time: 12:44 AM EST  Arrived by: ambulance  Chief Complaint: AMS  History provided by: EMS  History is limited by: N/A     History of Present Illness:  Patient is a 82 y.o. year old male that presents to the emergency department from nursing facility with change in mental status.  Per report they checked his temperature and he is temperature was 94 °F.  Patient was less responsive than baseline.  Patient unable to give much history.  At this time he denies pain.      Altered Mental Status  Presenting symptoms: confusion and partial responsiveness    Severity:  Moderate  Most recent episode:  Today  Episode history:  Unable to specify  Duration: unknown.  Timing:  Constant  Progression:  Worsening  Chronicity:  New  Context: dementia and nursing home resident    Associated symptoms: no abdominal pain, no difficulty breathing, no fever, no headaches, no light-headedness, no palpitations, no slurred speech and no vomiting        Similar Symptoms Previously: yes  Recently seen: no      Patient Care Team  Primary Care Provider: Frank Llanes MD    Past Medical History:   No Known Allergies  Past Medical History:   Diagnosis Date   • Alzheimer's dementia with behavioral disturbance (HCC)    • Anemia, vitamin B12 deficiency    • Anxiety disorder due to known physiological condition    • Arthritis    • Constipation    • Gastroesophageal reflux disease without esophagitis    • High blood pressure    • HTN (hypertension)    • Hyperlipidemia    • Ingrowing toenail 09/21/2018   • Left foot pain 09/21/2018   • Osteoarthritis of right knee 08/22/2016   • Right foot pain 09/21/2018   • Sleep apnea    • Tinea unguium 09/21/2018     History reviewed. No pertinent surgical history.  Family History   Family history unknown: Yes       Home Medications:  Prior to Admission medications    Medication Sig Start Date End Date Taking? Authorizing Provider   amLODIPine (NORVASC) 10 MG tablet Take 10 mg by mouth Every Evening.     Nanci Shafer MD   apixaban (ELIQUIS) 5 MG tablet tablet Take 1 tablet by mouth Every 12 (Twelve) Hours. Indications: DVT/PE (active thrombosis) 3/10/22   Eric Rivas MD   apixaban (ELIQUIS) 5 MG tablet tablet Take 2 tablets by mouth Every 12 (Twelve) Hours for 13 doses. Indications: DVT/PE (active thrombosis) 3/3/22 3/10/22  Eric Rivas MD   busPIRone (BUSPAR) 7.5 MG tablet Take 7.5 mg by mouth 2 (Two) Times a Day.    Nanci Shafer MD   docusate sodium (COLACE) 100 MG capsule Take 200 mg by mouth Daily.    Nanci Shafer MD   famotidine (PEPCID) 20 MG tablet Take 20 mg by mouth At Night As Needed for Heartburn.    Nanci Shafer MD   furosemide (Lasix) 20 MG tablet Take 1 tablet by mouth Daily. 3/3/22   Eric Rivas MD   hydrOXYzine (ATARAX) 25 MG tablet Take 25 mg by mouth Every 6 (Six) Hours As Needed for Itching.    Nanci Shafer MD   ipratropium-albuterol (DUO-NEB) 0.5-2.5 mg/3 ml nebulizer Take 3 mL by nebulization Every 4 (Four) Hours As Needed for Wheezing.    Nanci Shafer MD   melatonin 5 MG tablet tablet Take 5 mg by mouth Every Night.    Nanci Shafer MD   memantine (NAMENDA) 10 MG tablet Take 10 mg by mouth Daily.    Nanci Shafer MD   Menthol, Topical Analgesic, (BIOFREEZE) 4 % gel Apply  topically.    Nanci Shafer MD   metoprolol tartrate (LOPRESSOR) 50 MG tablet Take 0.5 tablets by mouth Daily. 3/3/22   Eric Rivas MD   polyethylene glycol (MIRALAX) packet Take 17 g by mouth Daily.    Nanci Shafer MD   pravastatin (PRAVACHOL) 10 MG tablet Take 10 mg by mouth Every Night.    Nanci Shafer MD        Social History:   Social History     Tobacco Use   • Smoking status: Never Smoker   • Smokeless tobacco: Never Used   Vaping Use   • Vaping Use: Never used   Substance Use Topics   • Alcohol use: No   • Drug use: Never       Review of Systems:  Review of Systems   Unable to perform ROS: Mental status change  "  Constitutional: Negative for fever.   Cardiovascular: Negative for palpitations.   Gastrointestinal: Negative for abdominal pain and vomiting.   Neurological: Negative for light-headedness and headaches.   Psychiatric/Behavioral: Positive for confusion.        Physical Exam:   BP 93/65   Pulse 65   Temp 94 °F (34.4 °C) (Rectal)   Resp 18   Ht 172.7 cm (68\")   Wt 69.2 kg (152 lb 8.9 oz)   SpO2 94%   BMI 23.20 kg/m²     Physical Exam  Constitutional:       General: He is not in acute distress.     Appearance: He is ill-appearing.   HENT:      Head: Normocephalic and atraumatic.      Mouth/Throat:      Mouth: Mucous membranes are moist.   Eyes:      Extraocular Movements: Extraocular movements intact.      Pupils: Pupils are equal, round, and reactive to light.   Cardiovascular:      Rate and Rhythm: Normal rate and regular rhythm.      Heart sounds: No murmur heard.  Pulmonary:      Effort: Pulmonary effort is normal.   Abdominal:      Palpations: Abdomen is soft.   Musculoskeletal:         General: Normal range of motion.      Cervical back: Normal range of motion and neck supple.   Skin:     General: Skin is warm.   Neurological:      Mental Status: He is confused.      Cranial Nerves: No cranial nerve deficit.      Sensory: No sensory deficit.      Comments: Neuro exam limited due to AMS.                 Medications in the Emergency Department:  Medications   sodium chloride 0.9 % flush 10 mL (has no administration in time range)   sodium chloride 0.9 % bolus 1,000 mL (0 mL Intravenous Stopped 3/7/22 0137)   sodium chloride 0.9 % bolus 1,000 mL (0 mL Intravenous Stopped 3/7/22 0319)        Labs  Lab Results (last 24 hours)     Procedure Component Value Units Date/Time    Blood Culture - Blood, Arm, Left [[id number]] Collected: 03/07/22 0018    Specimen: Blood from Arm, Left Updated: 03/07/22 0019    Lactic Acid, Plasma [[id number]]  (Normal) Collected: 03/07/22 0019    Specimen: Blood Updated: 03/07/22 0040 "     Lactate 1.1 mmol/L     Blood Culture - Blood, Arm, Left [[id number]] Collected: 03/07/22 0019    Specimen: Blood from Arm, Left Updated: 03/07/22 0238    CBC & Differential [[id number]]  (Abnormal) Collected: 03/07/22 0022    Specimen: Blood Updated: 03/07/22 0027    Narrative:      The following orders were created for panel order CBC & Differential.  Procedure                               Abnormality         Status                     ---------                               -----------         ------                     CBC Auto Differential[[id number]]        Abnormal            Final result                 Please view results for these tests on the individual orders.    Comprehensive Metabolic Panel [[id number]]  (Abnormal) Collected: 03/07/22 0022    Specimen: Blood Updated: 03/07/22 0044     Glucose 102 mg/dL      BUN 21 mg/dL      Creatinine 1.40 mg/dL      Sodium 144 mmol/L      Potassium 3.6 mmol/L      Chloride 106 mmol/L      CO2 27.8 mmol/L      Calcium 9.7 mg/dL      Total Protein 7.0 g/dL      Albumin 3.20 g/dL      ALT (SGPT) 8 U/L      AST (SGOT) 13 U/L      Alkaline Phosphatase 108 U/L      Total Bilirubin 0.4 mg/dL      Globulin 3.8 gm/dL      A/G Ratio 0.8 g/dL      BUN/Creatinine Ratio 15.0     Anion Gap 10.2 mmol/L      eGFR 50.5 mL/min/1.73      Comment: National Kidney Foundation and American Society of Nephrology (ASN) Task Force recommended calculation based on the Chronic Kidney Disease Epidemiology Collaboration (CKD-EPI) equation refit without adjustment for race.       Narrative:      GFR Normal >60  Chronic Kidney Disease <60  Kidney Failure <15      Magnesium [[id number]]  (Normal) Collected: 03/07/22 0022    Specimen: Blood Updated: 03/07/22 0044     Magnesium 2.1 mg/dL     Phosphorus [[id number]]  (Normal) Collected: 03/07/22 0022    Specimen: Blood Updated: 03/07/22 0045     Phosphorus 3.2 mg/dL     C-reactive Protein [[id number]]  (Abnormal) Collected: 03/07/22 0022    Specimen:  Blood Updated: 03/07/22 0044     C-Reactive Protein 1.11 mg/dL     CBC Auto Differential [[id number]]  (Abnormal) Collected: 03/07/22 0022    Specimen: Blood Updated: 03/07/22 0027     WBC 6.43 10*3/mm3      RBC 3.79 10*6/mm3      Hemoglobin 11.0 g/dL      Hematocrit 31.2 %      MCV 82.3 fL      MCH 29.0 pg      MCHC 35.3 g/dL      RDW 14.7 %      RDW-SD 42.6 fl      MPV 9.7 fL      Platelets 217 10*3/mm3      Neutrophil % 60.7 %      Lymphocyte % 27.8 %      Monocyte % 9.6 %      Eosinophil % 1.1 %      Basophil % 0.5 %      Immature Grans % 0.3 %      Neutrophils, Absolute 3.90 10*3/mm3      Lymphocytes, Absolute 1.79 10*3/mm3      Monocytes, Absolute 0.62 10*3/mm3      Eosinophils, Absolute 0.07 10*3/mm3      Basophils, Absolute 0.03 10*3/mm3      Immature Grans, Absolute 0.02 10*3/mm3      nRBC 0.0 /100 WBC     TSH Rfx On Abnormal To Free T4 [[id number]]  (Normal) Collected: 03/07/22 0022    Specimen: Blood Updated: 03/07/22 0245     TSH 2.070 uIU/mL     Protime-INR [[id number]]  (Abnormal) Collected: 03/07/22 0023    Specimen: Blood Updated: 03/07/22 0037     Protime 11.4 Seconds      INR 1.11    Narrative:      Suggested Therapeutic Ranges For Oral Anticoagulant Therapy:  Level of Therapy                      INR Target Range  Standard Dose                            2.0-3.0  High Dose                                2.5-3.5  Patients not receiving anticoagulant  Therapy Normal Range                     0.6-1.2    aPTT [[id number]]  (Normal) Collected: 03/07/22 0023    Specimen: Blood Updated: 03/07/22 0037     PTT 25.6 seconds     Urinalysis With Microscopic If Indicated (No Culture) - Urine, Catheter [[id number]]  (Abnormal) Collected: 03/07/22 0136    Specimen: Urine, Catheter Updated: 03/07/22 0149     Color, UA Yellow     Appearance, UA Clear     pH, UA <=5.0     Specific Gravity, UA 1.019     Glucose, UA Negative     Ketones, UA Negative     Bilirubin, UA Negative     Blood, UA Negative     Protein, UA 30  mg/dL (1+)     Leuk Esterase, UA Negative     Nitrite, UA Negative     Urobilinogen, UA 0.2 E.U./dL    Urinalysis, Microscopic Only - Urine, Catheter [[id number]]  (Abnormal) Collected: 03/07/22 0136    Specimen: Urine, Catheter Updated: 03/07/22 0149     RBC, UA 0-2 /HPF      WBC, UA 0-2 /HPF      Bacteria, UA None Seen /HPF      Squamous Epithelial Cells, UA 0-2 /HPF      Hyaline Casts, UA 7-12 /LPF      Methodology Automated Microscopy           Imaging:  CT Head Without Contrast    Result Date: 3/7/2022  PROCEDURE: CT HEAD WO CONTRAST  COMPARISON:  [facility], CT, HEAD W/O CONTRAST, 1/02/2015, 13:33. INDICATIONS: ALETERED MENTAL STATUS  PROTOCOL:   Standard imaging protocol performed    RADIATION:   DLP: 955.1 mGy*cm   MA and/or KV was adjusted to minimize radiation dose.     TECHNIQUE: After obtaining the patient's consent, CT images were obtained without non-ionic intravenous contrast material.  FINDINGS:  Superficial soft tissues appear unremarkable.  There is postoperative change in the frontal calvarium.  The remainder of the calvarium appears intact.  The paranasal sinuses and the mastoid air cells appear well-aerated.  There is generalized parenchymal volume loss.  There is encephalomalacia in the bilateral orbital frontal region.  There is patchy periventricular white matter hypoattenuation.  No acute intracranial hemorrhage.  No mass effect or midline shift.  No abnormal extra-axial collections.  The orbits appear unremarkable.        1. No acute intracranial abnormality. 2. 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 3. Generalized parenchymal volume loss and mild chronic small vessel ischemic change.     RAJAN NIETO MD       Electronically Signed and Approved By: RAJAN NIETO MD on 3/07/2022 at 2:35             XR Chest 1 View    Result Date: 3/7/2022  PROCEDURE: XR CHEST 1 VW  COMPARISON: [facility], CR, XR CHEST 1 VW, 2/27/2022,  8:36.  INDICATIONS: SEVERE SEPSIS  FINDINGS:  There are severe degenerative changes of both shoulders.  The thoracic aorta is tortuous.  There is aortic calcification.  Lungs appear clear without focal consolidation.  No pneumothorax or pleural effusion.        1. No acute cardiopulmonary abnormality. 2. Severe degenerative changes of both shoulders.       RAJAN NIETO MD       Electronically Signed and Approved By: RAJAN NIETO MD on 3/07/2022 at 0:26               Procedures:  Procedures    Progress  ED Course as of 03/07/22 0612   Mon Mar 07, 2022   0030 Normal sinus rhythm with rate of 56. QRS normal. ME interval normal. QTc interval is normal. No ST elevation or depression. Non specific T wave abnormalities. This EKG was interpreted by me.  [LD]      ED Course User Index  [LD] Anna Caldera MD                            Medical Decision Making:  MDM  Number of Diagnoses or Management Options  Diagnosis management comments: On arrival patient is a temp of 93.9.  He currently denies any pain.  He does appear somewhat confused.  Patient is also bradycardic.  Labs were obtained that showed no significant abnormality.  Chest x-ray showed no acute finding.  CT of his head showed no acute finding.  You enoxacin with urinary tract infection.  Patient was recently diagnosed with COVID-19 per report.  With hypothermia discussed patient with hospitalist and he will be admitted for further care.       Amount and/or Complexity of Data Reviewed  Clinical lab tests: ordered and reviewed  Tests in the radiology section of CPT®: ordered and reviewed  Review and summarize past medical records: yes  Independent visualization of images, tracings, or specimens: yes    Risk of Complications, Morbidity, and/or Mortality  Presenting problems: moderate  Management options: moderate         Final diagnoses:   Hypothermia, initial encounter   Altered mental status, unspecified altered mental status type        Disposition:  ED  Disposition     ED Disposition   Decision to Admit    Condition   --    Comment   --                      Anna Caldera MD  03/07/22 0612

## 2022-03-07 NOTE — PROGRESS NOTES
Patient seen and examined.  Mentation seems to be improving.  He complains of pain in his hands bilaterally as well as constipation.  Now euthermic, heart rate improved.  Continue to hold beta-blocker and diuretics now and possibly at discharge.  Check a.m. cortisol, TSH, folic acid and B12.  Start oral thiamine replacement.  Agree with holding antibiotics as no evidence of bacterial infection.  Will check procalcitonin, strep and Legionella antigens, sputum culture if patient is able to produce sputum.  Start scheduled Tylenol for osteoarthritic pain and bowel regimen.  Otherwise agree with assessment and plan per HPI dated 3/7.

## 2022-03-07 NOTE — H&P
[facility] HISTORY AND PHYSICAL  Date: 3/7/2022   Patient Name: Jordin Menon  : 1940  MRN: [id number]  Primary Care Physician:  Frank Llanes MD  Date of admission: 3/6/2022      Subjective     Chief Complaint: AMS    HPI: Jordin Menon is a 82 y.o. male. Patient is a 82 y.o. year old male that presents to the emergency department from nursing facility complaining of AMS.  Per report they checked his temperature and he is temperature was 94 °F.  Patient was less responsive than baseline.  During my examination patient is alerted to person and place he denies any major medical problems.  Reviewing records patient was discharged from the hospital on 3/3/2022 during that admission he was brought to the emergency department secondary to hypothermia bradycardia and hypotension.  He is admitted to the ICU for sexes he was treated with IV antibiotics and tested positive for Covid.  He was admitted to our hospital from 2022 and discharged on 3/3/2022.  During that admission patient was found to have acute left lower extremity DVT in the common femoral proximal femoral mild femoral distal femoral popliteal and peroneal.  He was started on Eliquis.  Today patient's presentation is very similar with hypothermia and again bradycardia arrival vital signs were temperature of 92.7 heart rate of 47 blood pressure of 90/56.  Patient also had developing renal failure with a creatinine of 1.40 his CRP was mildly elevated at 1.11 but improved since last admission and discharge at 3.67.  Urinalysis today was negative he was Covid positive on 2022.  X-ray of the chest was negative    Personal History   ROS     Constitutional: No fever, unintentional weight loss, malaise  HEENT: No vision changes, loss of vision, double vision, difficulty swallowing, painful swallowing, or tinnitus  Respiratory: No cough, shortness of breath, sputum production, or hemoptysis  Cardiovascular: No chest pain,  palpitations, orthopnea, or paroxysmal nocturnal dyspnea  Gastrointestinal: No nausea, vomiting, diarrhea, hematochezia, bright red blood per rectum, dark tarry stools, bowel incontinence or constipation  Genitourinary: No dysuria, hematuria, bladder incontinence, frequency, nocturia, or hesitancy  Musculoskeletal: No arthritis, joint swelling, deformities or joint pain  Endocrine: No fatigue, heat or cold intolerance  Hematologic: No excessive bruising or bleeding  Psychiatric: No Anxiety or depression  Neurologic: No Confusion, numbness, or weakness  Skin: No rash or open wounds         PMH  Past Medical History:   Diagnosis Date   • Alzheimer's dementia with behavioral disturbance (HCC)    • Anemia, vitamin B12 deficiency    • Anxiety disorder due to known physiological condition    • Arthritis    • Constipation    • Gastroesophageal reflux disease without esophagitis    • High blood pressure    • HTN (hypertension)    • Hyperlipidemia    • Ingrowing toenail 09/21/2018   • Left foot pain 09/21/2018   • Osteoarthritis of right knee 08/22/2016   • Right foot pain 09/21/2018   • Sleep apnea    • Tinea unguium 09/21/2018         PSH  History reviewed. No pertinent surgical history.    FH  Family History   Family history unknown: Yes       SOCIAL HISTORY   reports that he has never smoked. He has never used smokeless tobacco. He reports that he does not drink alcohol and does not use drugs.     ALLERGIES   No Known Allergies    HOME MEDICINES  Prior to Admission Medications   Prescriptions Last Dose Informant Patient Reported? Taking?   Menthol, Topical Analgesic, (BIOFREEZE) 4 % gel   Yes No   Sig: Apply  topically.   amLODIPine (NORVASC) 10 MG tablet   Yes No   Sig: Take 10 mg by mouth Every Evening.   apixaban (ELIQUIS) 5 MG tablet tablet   No No   Sig: Take 1 tablet by mouth Every 12 (Twelve) Hours. Indications: DVT/PE (active thrombosis)   apixaban (ELIQUIS) 5 MG tablet tablet   No No   Sig: Take 2 tablets by  mouth Every 12 (Twelve) Hours for 13 doses. Indications: DVT/PE (active thrombosis)   busPIRone (BUSPAR) 7.5 MG tablet   Yes No   Sig: Take 7.5 mg by mouth 2 (Two) Times a Day.   docusate sodium (COLACE) 100 MG capsule   Yes No   Sig: Take 200 mg by mouth Daily.   famotidine (PEPCID) 20 MG tablet   Yes No   Sig: Take 20 mg by mouth At Night As Needed for Heartburn.   furosemide (Lasix) 20 MG tablet   No No   Sig: Take 1 tablet by mouth Daily.   hydrOXYzine (ATARAX) 25 MG tablet   Yes No   Sig: Take 25 mg by mouth Every 6 (Six) Hours As Needed for Itching.   ipratropium-albuterol (DUO-NEB) 0.5-2.5 mg/3 ml nebulizer   Yes No   Sig: Take 3 mL by nebulization Every 4 (Four) Hours As Needed for Wheezing.   melatonin 5 MG tablet tablet   Yes No   Sig: Take 5 mg by mouth Every Night.   memantine (NAMENDA) 10 MG tablet   Yes No   Sig: Take 10 mg by mouth Daily.   metoprolol tartrate (LOPRESSOR) 50 MG tablet   No No   Sig: Take 0.5 tablets by mouth Daily.   polyethylene glycol (MIRALAX) packet   Yes No   Sig: Take 17 g by mouth Daily.   pravastatin (PRAVACHOL) 10 MG tablet   Yes No   Sig: Take 10 mg by mouth Every Night.      Facility-Administered Medications: None                     Objective     Vitals:   Temp:  [92.7 °F (33.7 °C)-94 °F (34.4 °C)] 94 °F (34.4 °C)  Heart Rate:  [47-67] 65  Resp:  [18] 18  BP: ()/(49-75) 93/65    Physical Exam  Vital signs were reviewed.  General appearance - Patient appears well-developed and well-nourished.    Head - Normocephalic, atraumatic.  Pupils - Equal, round, reactive to light.  Extraocular muscles are intact.  Conjunctiva is clear  Oral mucosa - Pink and moist without lesions or erythema.  Uvula is midline.  Neck - Supple.  Trachea was midline.  There is no palpable lymphadenopathy or thyromegaly.  There are no meningeal signs  Lungs -Breath sounds equal bilateral.  No crackles no rales.  Heart -Regular rate and rhythm no murmurs no gallops.  Abdomen -     There is no  rebound, guarding, or rigidity.  There are no palpable masses.  There are no pulsatile masses.  Back - Spine is straight and midline.  There is no CVA tenderness.  Extremities - Intact x4 with full range of motion.  There is no palpable edema.  Neurologic - Cranial nerves II through XII are grossly intact.    Integument - There are no rashes.  There are no petechia or purpura lesions noted.  There are no vesicular lesions noted.           Assessment / Plan     Assessment/Plan:   Hypothermia  --Patient was started on a bear hugger.    --No clear source of infection other than Covid.    --Urinalysis obtained and negative  --Chest x-ray negative  --Blood culture ordered and pending.  --Influenza negative.    Acute kidney injury  --We will use very gentle IV hydration with normal saline at 75.    Bradycardia  --Hold beta-blocker    History of diastolic heart failure  --Monitor volume status carefully.    COVID-19  --Not requiring supplemental oxygen.    Dementia  --Complicating all aspects of care.    Sub-Acute DVT  --Patient is supposed to receive Eliquis 5 mg twice daily for the next 3 days until 3/11/2022  --After 3/11/2022 continue 5 mg once a day.      [x]  Laboratory  [x]  Microbiology  [x]  Radiology  [x]  EKG/Telemetry   []  Cardiology/Vascular   []  Pathology  []  Old records  []  Other:    Admission Status:  I believe this patient meets inpatient status.    Electronically signed by Vilma Lezama MD, 03/07/22, 6:29 AM EST.

## 2022-03-07 NOTE — PLAN OF CARE
Goal Outcome Evaluation:              Outcome Evaluation: Patient is confused but is able to verbalize needs with some difficulty as his speech is hard to understand.  Can be agitated at times when he doesn't get his point across fast enough.  Was treated for constipation today with good results. Irby remains patent to bedside bag with cloudy yellow urine to bedside bag.  No hypothermia since arrival to floor.  [facility] and requested a copy of his health care serrogate and living will

## 2022-03-08 LAB
ALBUMIN SERPL-MCNC: 2.8 G/DL (ref 3.5–5.2)
ALBUMIN/GLOB SERPL: 0.8 G/DL
ALP SERPL-CCNC: 103 U/L (ref 39–117)
ALT SERPL W P-5'-P-CCNC: 6 U/L (ref 1–41)
ANION GAP SERPL CALCULATED.3IONS-SCNC: 9.6 MMOL/L (ref 5–15)
AST SERPL-CCNC: 10 U/L (ref 1–40)
BASOPHILS # BLD AUTO: 0.02 10*3/MM3 (ref 0–0.2)
BASOPHILS NFR BLD AUTO: 0.3 % (ref 0–1.5)
BILIRUB SERPL-MCNC: 0.4 MG/DL (ref 0–1.2)
BUN SERPL-MCNC: 14 MG/DL (ref 8–23)
BUN/CREAT SERPL: 13.9 (ref 7–25)
CALCIUM SPEC-SCNC: 9.1 MG/DL (ref 8.6–10.5)
CHLORIDE SERPL-SCNC: 112 MMOL/L (ref 98–107)
CHOLEST SERPL-MCNC: 96 MG/DL (ref 0–200)
CO2 SERPL-SCNC: 22.4 MMOL/L (ref 22–29)
CREAT SERPL-MCNC: 1.01 MG/DL (ref 0.76–1.27)
DEPRECATED RDW RBC AUTO: 43.5 FL (ref 37–54)
EGFRCR SERPLBLD CKD-EPI 2021: 74.7 ML/MIN/1.73
EOSINOPHIL # BLD AUTO: 0.06 10*3/MM3 (ref 0–0.4)
EOSINOPHIL NFR BLD AUTO: 1 % (ref 0.3–6.2)
ERYTHROCYTE [DISTWIDTH] IN BLOOD BY AUTOMATED COUNT: 14.7 % (ref 12.3–15.4)
FOLATE SERPL-MCNC: 3.11 NG/ML (ref 4.78–24.2)
GLOBULIN UR ELPH-MCNC: 3.4 GM/DL
GLUCOSE SERPL-MCNC: 71 MG/DL (ref 65–99)
HBA1C MFR BLD: 5.2 % (ref 4.8–5.6)
HCT VFR BLD AUTO: 29 % (ref 37.5–51)
HDLC SERPL-MCNC: 54 MG/DL (ref 40–60)
HGB BLD-MCNC: 9.9 G/DL (ref 13–17.7)
IMM GRANULOCYTES # BLD AUTO: 0.03 10*3/MM3 (ref 0–0.05)
IMM GRANULOCYTES NFR BLD AUTO: 0.5 % (ref 0–0.5)
INR PPP: 1.15 (ref 2–3)
LDLC SERPL CALC-MCNC: 28 MG/DL (ref 0–100)
LDLC/HDLC SERPL: 0.55 {RATIO}
LYMPHOCYTES # BLD AUTO: 1.41 10*3/MM3 (ref 0.7–3.1)
LYMPHOCYTES NFR BLD AUTO: 24.6 % (ref 19.6–45.3)
MAGNESIUM SERPL-MCNC: 2 MG/DL (ref 1.6–2.4)
MCH RBC QN AUTO: 28 PG (ref 26.6–33)
MCHC RBC AUTO-ENTMCNC: 34.1 G/DL (ref 31.5–35.7)
MCV RBC AUTO: 81.9 FL (ref 79–97)
MONOCYTES # BLD AUTO: 0.59 10*3/MM3 (ref 0.1–0.9)
MONOCYTES NFR BLD AUTO: 10.3 % (ref 5–12)
NEUTROPHILS NFR BLD AUTO: 3.62 10*3/MM3 (ref 1.7–7)
NEUTROPHILS NFR BLD AUTO: 63.3 % (ref 42.7–76)
NRBC BLD AUTO-RTO: 0 /100 WBC (ref 0–0.2)
PHOSPHATE SERPL-MCNC: 2.2 MG/DL (ref 2.5–4.5)
PLATELET # BLD AUTO: 193 10*3/MM3 (ref 140–450)
PMV BLD AUTO: 9.6 FL (ref 6–12)
POTASSIUM SERPL-SCNC: 3.7 MMOL/L (ref 3.5–5.2)
PROT SERPL-MCNC: 6.2 G/DL (ref 6–8.5)
PROTHROMBIN TIME: 11.8 SECONDS (ref 9.4–12)
RBC # BLD AUTO: 3.54 10*6/MM3 (ref 4.14–5.8)
SODIUM SERPL-SCNC: 144 MMOL/L (ref 136–145)
TRIGL SERPL-MCNC: 61 MG/DL (ref 0–150)
VLDLC SERPL-MCNC: 14 MG/DL (ref 5–40)
WBC NRBC COR # BLD: 5.73 10*3/MM3 (ref 3.4–10.8)

## 2022-03-08 PROCEDURE — 83036 HEMOGLOBIN GLYCOSYLATED A1C: CPT | Performed by: GENERAL PRACTICE

## 2022-03-08 PROCEDURE — 97161 PT EVAL LOW COMPLEX 20 MIN: CPT

## 2022-03-08 PROCEDURE — 84100 ASSAY OF PHOSPHORUS: CPT | Performed by: GENERAL PRACTICE

## 2022-03-08 PROCEDURE — 80061 LIPID PANEL: CPT | Performed by: GENERAL PRACTICE

## 2022-03-08 PROCEDURE — 97530 THERAPEUTIC ACTIVITIES: CPT

## 2022-03-08 PROCEDURE — 36415 COLL VENOUS BLD VENIPUNCTURE: CPT | Performed by: GENERAL PRACTICE

## 2022-03-08 PROCEDURE — 99232 SBSQ HOSP IP/OBS MODERATE 35: CPT | Performed by: INTERNAL MEDICINE

## 2022-03-08 PROCEDURE — 83735 ASSAY OF MAGNESIUM: CPT | Performed by: GENERAL PRACTICE

## 2022-03-08 PROCEDURE — 97165 OT EVAL LOW COMPLEX 30 MIN: CPT

## 2022-03-08 PROCEDURE — 80053 COMPREHEN METABOLIC PANEL: CPT | Performed by: GENERAL PRACTICE

## 2022-03-08 PROCEDURE — 85025 COMPLETE CBC W/AUTO DIFF WBC: CPT | Performed by: GENERAL PRACTICE

## 2022-03-08 PROCEDURE — 85610 PROTHROMBIN TIME: CPT | Performed by: GENERAL PRACTICE

## 2022-03-08 RX ORDER — TAMSULOSIN HYDROCHLORIDE 0.4 MG/1
0.4 CAPSULE ORAL DAILY
Status: DISCONTINUED | OUTPATIENT
Start: 2022-03-08 | End: 2022-03-09 | Stop reason: HOSPADM

## 2022-03-08 RX ADMIN — Medication 10 ML: at 20:49

## 2022-03-08 RX ADMIN — ACETAMINOPHEN 1000 MG: 500 TABLET ORAL at 08:17

## 2022-03-08 RX ADMIN — APIXABAN 10 MG: 5 TABLET, FILM COATED ORAL at 08:17

## 2022-03-08 RX ADMIN — Medication 5 MG: at 20:49

## 2022-03-08 RX ADMIN — DOCUSATE SODIUM 200 MG: 100 CAPSULE, LIQUID FILLED ORAL at 08:17

## 2022-03-08 RX ADMIN — ACETAMINOPHEN 1000 MG: 500 TABLET ORAL at 16:03

## 2022-03-08 RX ADMIN — TAMSULOSIN HYDROCHLORIDE 0.4 MG: 0.4 CAPSULE ORAL at 12:30

## 2022-03-08 RX ADMIN — Medication 100 MG: at 08:17

## 2022-03-08 RX ADMIN — BUSPIRONE HYDROCHLORIDE 7.5 MG: 7.5 TABLET ORAL at 20:49

## 2022-03-08 RX ADMIN — SENNOSIDES AND DOCUSATE SODIUM 2 TABLET: 50; 8.6 TABLET ORAL at 08:17

## 2022-03-08 RX ADMIN — SENNOSIDES AND DOCUSATE SODIUM 2 TABLET: 50; 8.6 TABLET ORAL at 20:49

## 2022-03-08 RX ADMIN — POLYETHYLENE GLYCOL 3350 17 G: 17 POWDER, FOR SOLUTION ORAL at 08:16

## 2022-03-08 RX ADMIN — Medication 10 ML: at 08:18

## 2022-03-08 RX ADMIN — PRAVASTATIN SODIUM 10 MG: 10 TABLET ORAL at 20:49

## 2022-03-08 RX ADMIN — APIXABAN 10 MG: 5 TABLET, FILM COATED ORAL at 20:49

## 2022-03-08 RX ADMIN — BUSPIRONE HYDROCHLORIDE 7.5 MG: 7.5 TABLET ORAL at 08:17

## 2022-03-08 RX ADMIN — ACETAMINOPHEN 1000 MG: 500 TABLET ORAL at 20:49

## 2022-03-08 RX ADMIN — MEMANTINE 10 MG: 10 TABLET ORAL at 08:17

## 2022-03-08 NOTE — THERAPY EVALUATION
Patient Name: Jordin Menon  : 1940    MRN: [id number]                              Today's Date: 3/8/2022       Admit Date: 3/6/2022    Visit Dx:     ICD-10-CM ICD-9-CM   1. Hypothermia, initial encounter  T68.XXXA 991.6   2. Altered mental status, unspecified altered mental status type  R41.82 780.97   3. Decreased activities of daily living (ADL)  Z78.9 V49.89     Patient Active Problem List   Diagnosis   • Primary osteoarthritis of right knee   • Status post total right knee replacement   • Essential hypertension   • Shortness of breath   • ARF (acute renal failure) ([facility])   • Hypokalemia   • Hypernatremia   • Symptomatic bradycardia   • Hypothermia, initial encounter     Past Medical History:   Diagnosis Date   • Alzheimer's dementia with behavioral disturbance (HCC)    • Anemia, vitamin B12 deficiency    • Anxiety disorder due to known physiological condition    • Arthritis    • Constipation    • Gastroesophageal reflux disease without esophagitis    • High blood pressure    • HTN (hypertension)    • Hyperlipidemia    • Ingrowing toenail 2018   • Left foot pain 2018   • Osteoarthritis of right knee 2016   • Right foot pain 2018   • Sleep apnea    • Tinea unguium 2018     History reviewed. No pertinent surgical history.   General Information     Row Name 22 0948 22       OT Time and Intention    Document Type therapy note (daily note)  -PG evaluation  -PG    Mode of Treatment occupational therapy;individual therapy  -PG individual therapy;occupational therapy  -PG    Row Name 22          General Information    Patient Profile Reviewed yes  -PG     Prior Level of Function mod assist:;ADL's  -PG     Existing Precautions/Restrictions fall  -PG     Barriers to Rehab none identified  -PG     Row Name 22          Occupational Profile    Reason for Services/Referral (Occupational Profile) Decreased ADL performance  -PG     Row Name 22           Living Environment    People in Home facility resident  -PG     Row Name 03/08/22 0938          Cognition    Orientation Status (Cognition) verbal cues/prompts needed for orientation  -PG     Row Name 03/08/22 0938          Safety Issues, Functional Mobility    Safety Issues Affecting Function (Mobility) ability to follow commands;insight into deficits/self-awareness  -PG     Impairments Affecting Function (Mobility) balance;cognition;endurance/activity tolerance;strength  -PG     Cognitive Impairments, Mobility Safety/Performance judgment;problem-solving/reasoning;sequencing abilities;insight into deficits/self-awareness  -PG           User Key  (r) = Recorded By, (t) = Taken By, (c) = Cosigned By    Initials Name Provider Type    PG Lalito Ray OT Occupational Therapist                 Mobility/ADL's     Row Name 03/08/22 0948 03/08/22 0939       Bed Mobility    Bed Mobility supine-sit  -PG supine-sit  -PG    Supine-Sit War (Bed Mobility) moderate assist (50% patient effort);maximum assist (25% patient effort)  -PG moderate assist (50% patient effort);maximum assist (25% patient effort)  -PG    Row Name 03/08/22 0948 03/08/22 0939       Transfers    Transfers bed-chair transfer  -PG bed-chair transfer  -PG    Bed-Chair War (Transfers) minimum assist (75% patient effort);verbal cues  -PG minimum assist (75% patient effort);verbal cues  -PG    Row Name 03/08/22 0939          Activities of Daily Living    BADL Assessment/Intervention --  Dependent with all upper body and lower body bathing and dressing activities as well as toileting  -PG           User Key  (r) = Recorded By, (t) = Taken By, (c) = Cosigned By    Initials Name Provider Type    PG Lalito Ray OT Occupational Therapist               Obj/Interventions     Row Name 03/08/22 0939          Sensory Assessment (Somatosensory)    Sensory Assessment (Somatosensory) unable/difficult to assess  -PG     Row Name 03/08/22 0939           Vision Assessment/Intervention    Visual Impairment/Limitations unable/difficult to assess  -PG     Row Name 03/08/22 0939          Range of Motion Comprehensive    General Range of Motion upper extremity range of motion deficits identified  -PG     Row Name 03/08/22 0939          Strength Comprehensive (MMT)    Comment, General Manual Muscle Testing (MMT) Assessment Bilateral shoulder strength 3 -/5, elbows 4 -  -PG     Row Name 03/08/22 0939          Motor Skills    Motor Skills coordination;functional endurance  -PG     Coordination WFL  -PG     Functional Endurance Fair minus  -PG           User Key  (r) = Recorded By, (t) = Taken By, (c) = Cosigned By    Initials Name Provider Type    PG Lalito Ray, OT Occupational Therapist               Goals/Plan     Row Name 03/08/22 0941          Transfer Goal 1 (OT)    Activity/Assistive Device (Transfer Goal 1, OT) transfers, all  -PG     [place] Level/Cues Needed (Transfer Goal 1, OT) standby assist  -PG     Time Frame (Transfer Goal 1, OT) long term goal (LTG);10 days  -PG     Row Name 03/08/22 0941          Bathing Goal 1 (OT)    Activity/Device (Bathing Goal 1, OT) bathing skills, all;upper body bathing  -PG     [place] Level/Cues Needed (Bathing Goal 1, OT) minimum assist (75% or more patient effort)  -PG     Time Frame (Bathing Goal 1, OT) 10 days;long term goal (LTG)  -PG     Row Name 03/08/22 0941          Dressing Goal 1 (OT)    Activity/Device (Dressing Goal 1, OT) dressing skills, all;upper body dressing  -PG     [place]/Cues Needed (Dressing Goal 1, OT) minimum assist (75% or more patient effort)  -PG     Time Frame (Dressing Goal 1, OT) 10 days;long term goal (LTG)  -PG     Row Name 03/08/22 0941          Toileting Goal 1 (OT)    Activity/Device (Toileting Goal 1, OT) toileting skills, all  -PG     [place] Level/Cues Needed (Toileting Goal 1, OT) minimum assist (75% or more patient effort)  -PG     Time Frame (Toileting Goal 1,  OT) long term goal (LTG);10 days  -PG     Row Name 03/08/22 0941          Grooming Goal 1 (OT)    Activity/Device (Grooming Goal 1, OT) grooming skills, all  -PG     [place] (Grooming Goal 1, OT) minimum assist (75% or more patient effort)  -PG     Time Frame (Grooming Goal 1, OT) long term goal (LTG);10 days  -PG     Row Name 03/08/22 0941          Therapy Assessment/Plan (OT)    Planned Therapy Interventions (OT) activity tolerance training;BADL retraining;transfer/mobility retraining;strengthening exercise;patient/caregiver education/training;occupation/activity based interventions  -PG           User Key  (r) = Recorded By, (t) = Taken By, (c) = Cosigned By    Initials Name Provider Type    PG Lalito Ray, ANANYA Occupational Therapist               Clinical Impression     Row Name 03/08/22 0940          Pain Assessment    Pretreatment Pain Rating 0/10 - no pain  -PG     Posttreatment Pain Rating 0/10 - no pain  -PG     Row Name 03/08/22 0940          Plan of Care Review    Plan of Care Reviewed With patient  -PG     Progress no change  -PG     Outcome Evaluation Patient presents with limitations affecting strength, activity tolerance, and balance impacting patient's ability to return home safely and independently.  The skills of a therapist will be required to safely and effectively implement the following treatment plan to restore maximal level of function  -PG     Row Name 03/08/22 0940          Therapy Assessment/Plan (OT)    Patient/Family Therapy Goal Statement (OT) No specific goals stated  -PG     Rehab Potential (OT) good, to achieve stated therapy goals  -PG     Criteria for Skilled Therapeutic Interventions Met (OT) yes;meets criteria;skilled treatment is necessary  -PG     Therapy Frequency (OT) 5 times/wk  -PG     Row Name 03/08/22 0940          Therapy Plan Review/Discharge Plan (OT)    Anticipated Discharge Disposition (OT) extended care facility  -PG           User Key  (r) = Recorded By, (t)  = Taken By, (c) = Cosigned By    Initials Name Provider Type    PG Lalito Ray OT Occupational Therapist               Outcome Measures     Row Name 03/08/22 0943          How much help from another is currently needed...    Putting on and taking off regular lower body clothing? 1  -PG     Bathing (including washing, rinsing, and drying) 1  -PG     Toileting (which includes using toilet bed pan or urinal) 1  -PG     Putting on and taking off regular upper body clothing 1  -PG     Taking care of personal grooming (such as brushing teeth) 2  -PG     Eating meals 3  -PG     AM-PAC 6 Clicks Score (OT) 9  -PG     Row Name 03/08/22 0943          Functional Assessment    Outcome Measure Options AM-PAC 6 Clicks Daily Activity (OT);Optimal Instrument  -PG     Row Name 03/08/22 0943          Optimal Instrument    Optimal Instrument Optimal - 3  -PG     Bending/Stooping 4  -PG     Standing 2  -PG     Reaching 2  -PG     From the list, choose the 3 activities you would most like to be able to do without any difficulty Bending/stooping;Standing;Reaching  -PG     Total Score Optimal - 3 8  -PG           User Key  (r) = Recorded By, (t) = Taken By, (c) = Cosigned By    Initials Name Provider Type    PG Lalito Ray OT Occupational Therapist                Occupational Therapy Education                 Title: PT OT SLP Therapies (In Progress)     Topic: Occupational Therapy (In Progress)     Point: ADL training (In Progress)     Description:   Instruct learner(s) on proper safety adaptation and remediation techniques during self care or transfers.   Instruct in proper use of assistive devices.              Learning Progress Summary           Patient Acceptance, D,E, NR by PG at 3/8/2022 0944                   Point: Home exercise program (In Progress)     Description:   Instruct learner(s) on appropriate technique for monitoring, assisting and/or progressing therapeutic exercises/activities.              Learning Progress  Summary           Patient Acceptance, D,E, NR by PG at 3/8/2022 0944                   Point: Precautions (In Progress)     Description:   Instruct learner(s) on prescribed precautions during self-care and functional transfers.              Learning Progress Summary           Patient Acceptance, D,E, NR by PG at 3/8/2022 0944                   Point: Body mechanics (In Progress)     Description:   Instruct learner(s) on proper positioning and spine alignment during self-care, functional mobility activities and/or exercises.              Learning Progress Summary           Patient Acceptance, D,E, NR by PG at 3/8/2022 0944                               User Key     Initials Effective Dates Name Provider Type Discipline    PG 06/16/21 -  Lalito Ray, OT Occupational Therapist OT              OT Recommendation and Plan  Planned Therapy Interventions (OT): activity tolerance training, BADL retraining, transfer/mobility retraining, strengthening exercise, patient/caregiver education/training, occupation/activity based interventions  Therapy Frequency (OT): 5 times/wk  Plan of Care Review  Plan of Care Reviewed With: patient  Progress: no change  Outcome Evaluation: Patient presents with limitations affecting strength, activity tolerance, and balance impacting patient's ability to return home safely and independently.  The skills of a therapist will be required to safely and effectively implement the following treatment plan to restore maximal level of function     Time Calculation:    Time Calculation- OT     Row Name 03/08/22 0949             Time Calculation- OT    OT Received On 03/08/22  -PG      OT Goal Re-Cert Due Date 03/17/22  -PG              Timed Charges    32589 - OT Therapeutic Activity Minutes 10  -PG              Untimed Charges    OT Eval/Re-eval Minutes 35  -PG              Total Minutes    Timed Charges Total Minutes 10  -PG      Untimed Charges Total Minutes 35  -PG       Total Minutes 45  -PG             User Key  (r) = Recorded By, (t) = Taken By, (c) = Cosigned By    Initials Name Provider Type     Lalito Ray OT Occupational Therapist              Therapy Charges for Today     Code Description Service Date Service Provider Modifiers Qty    [id number]  OT THERAPEUTIC ACT EA 15 MIN 3/8/2022 Lalito Ray OT GO 1    [id number]  OT EVAL LOW COMPLEXITY 3 3/8/2022 Lalito Ray OT GO 1               Lalito Ray OT  3/8/2022

## 2022-03-08 NOTE — PLAN OF CARE
Goal Outcome Evaluation:  Plan of Care Reviewed With: patient        Progress: no change  Outcome Evaluation: Patient presents with deficits in balance, transfers, and ambulation. Patient will benefit from skilled PT services to address these mobility deficits and decrease risk of falls.

## 2022-03-08 NOTE — CASE MANAGEMENT/SOCIAL WORK
Discharge Planning Assessment   [facility]     Patient Name: Jordin Menon  MRN: [id number]  Today's Date: 3/8/2022    Admit Date: 3/6/2022     Discharge Needs Assessment     Row Name 03/08/22 0839       Living Environment    People in Home facility resident    Current Living Arrangements residential facility    Primary Care Provided by other (see comments)    Provides Primary Care For no one, unable/limited ability to care for self    Able to Return to Prior Arrangements yes       Resource/Environmental Concerns    Resource/Environmental Concerns none       Transition Planning    Patient/Family Anticipates Transition to long-term care facility    Patient/Family Anticipated Services at Transition none    Transportation Anticipated health plan transportation       Discharge Needs Assessment    Readmission Within the Last 30 Days current reason for admission unrelated to previous admission    Concerns to be Addressed discharge planning    Anticipated Changes Related to Illness inability to care for self    Equipment Needed After Discharge none    Discharge Facility/Level of Care Needs nursing facility, intermediate    Current Discharge Risk dependent with mobility/activities of daily living               Discharge Plan     Row Name 03/08/22 0844       Plan    Plan Updated pharmacy to [facility] due to patient being a LTR at [facility].    Row Name 03/08/22 0841       Plan    Plan Patient is confused at this time, spoke with patient's health surrogate for assessment. Introduced self and explained role of CM RN. Patient is a LTR at [facility]. Plan is for patient to return when medically ready. Daughter denies having any concerns at this time. Wanted an update on patient when primary nurse is able. Made primary nurse aware. Verified emergency contact number and PCP.              Continued Care and Services - Admitted Since 3/6/2022    Coordination has not been started for this encounter.           Demographic Summary     Row Name 03/08/22 0830       General Information    Admission Type inpatient    Arrived From emergency department    Reason for Consult discharge planning    Preferred Language English       Contact Information    Permission Granted to Share Info With ;family/designee    Contact Information Comments Spoke with patient's daughter/health surrogate due to patient confusion at this time.               Functional Status     Row Name 03/08/22 0839       Functional Status    Usual Activity Tolerance moderate    Current Activity Tolerance fair       Functional Status, IADL    Medications assistive person    Meal Preparation completely dependent    Housekeeping completely dependent    Laundry completely dependent    Shopping completely dependent       Mental Status    General Appearance WDL WDL       Mental Status Summary    Recent Changes in Mental Status/Cognitive Functioning mental status               Psychosocial    No documentation.                Abuse/Neglect    No documentation.                Legal    No documentation.                Substance Abuse    No documentation.                Patient Forms    No documentation.                   Abhijeet Rodriguez RN

## 2022-03-08 NOTE — PLAN OF CARE
Goal Outcome Evaluation:  Plan of Care Reviewed With: patient        Progress: no change  Outcome Evaluation: Patient presents with limitations affecting strength, activity tolerance, and balance impacting patient's ability to return home safely and independently.  The skills of a therapist will be required to safely and effectively implement the following treatment plan to restore maximal level of function

## 2022-03-08 NOTE — PLAN OF CARE
Goal Outcome Evaluation:              Outcome Evaluation: Patient confused at times. Difficult to understand at times related to speech being hard to understand. Gets agitated easily with staff if they are unable to understand what he is saying.

## 2022-03-08 NOTE — SIGNIFICANT NOTE
03/08/22 0849   Plan   Plan Comments Pt is a longterm care resident at [facility]. Pt does not have a bedhold but can return once medically ready. SW will continue to follow and provide updates to facility. HENNA updated MD.

## 2022-03-08 NOTE — PLAN OF CARE
Goal Outcome Evaluation:  Plan of Care Reviewed With: patient, son   Pt stable this shift, méndez cath removed this morning. Pt had several loose stools. Pt stating desire to return to his nursing home. Son visited and was updated by RN and MD.      Progress: no change

## 2022-03-08 NOTE — PROGRESS NOTES
[facility]   Hospitalist Progress Note  Date: 3/8/2022  Patient Name: Jordin Menon  : 1940  MRN: [id number]  Date of admission: 3/6/2022      Subjective   Subjective     Chief Complaint:   Altered mental status    Summary:   Jordin Menon is a 82 y.o. male with past medical history of Alzheimer's, vitamin B12 deficiency, anxiety, GERD, hypertension, hyperlipidemia.  Patient presented emergency department from nursing facility with complaints of altered mental status.  Facility checked his temperature finding a core temperature of 94 degrees.  Review of records indicates patient was discharged on 3/3/2022, similar admission for hypothermia, bradycardia and hypotension.  Patient tested positive for Covid during that admission.  Patient discharged to SNF for continued care and management.  Patient found to have acute DVT to left lower extremity on that admission, started on Eliquis.  In the emergency department patient was a very similar presentation of bradycardia core temperature of 92.7, heart rate 47 and a blood pressure of 90/56.  Patient's creatinine was elevated along with CRP.    Interval Followup:   Patient's heart rate appropriate today.  We will discontinue Irby today, starting patient on tamsulosin.  Patient's micro thus far has returned negative, holding antibiotics at this time.  Son was at bedside indicating mentation was at baseline for patient.  Plan to monitor again overnight, hopefully will be able to discharge back to facility tomorrow    Review of Systems   All systems were reviewed and negative except for: Chronic osteoarthritis pain    Objective   Objective     Vitals:   Temp:  [97.2 °F (36.2 °C)-97.4 °F (36.3 °C)] 97.3 °F (36.3 °C)  Heart Rate:  [61-65] 65  Resp:  [18] 18  BP: (106-136)/(53-71) 119/66  Physical Exam    Constitutional: Awake, alert, hard of hearing, pleasantly confused   Eyes: Pupils equal, sclerae anicteric, no conjunctival injection   HENT: NCAT, mucous membranes  moist   Neck: Supple, no thyromegaly, no lymphadenopathy, trachea midline   Respiratory: Clear to auscultation bilaterally, nonlabored respirations    Cardiovascular: RRR, no murmurs, rubs, or gallops, palpable pedal pulses bilaterally   Gastrointestinal: Positive bowel sounds, soft, nontender, nondistended   Musculoskeletal: Left lower extremity edema, trace pitting   Neurologic: Oriented x 2, strength symmetric, however weekend, cranial nerves grossly intact   Skin: No rashes     Result Review    Result Review:  I have personally reviewed the results from the time of this admission to 3/8/2022 15:46 EST and agree with these findings:  [x]  Laboratory  [x]  Microbiology  [x]  Radiology  [x]  EKG/Telemetry   []  Cardiology/Vascular   []  Pathology  [x]  Old records  []  Other:    Assessment/Plan   Assessment / Plan     Assessment/Plan:  Encephalopathy, metabolic  Hypothermia  Recent diagnosis of Covid, asymptomatic  BRYANT  Bradycardia  History of diastolic heart failure  Dementia  Subacute DVT    Plan:  Patient remained in the hospital for further care and management  Continue patient off all antibiotics, following cultures  Patient's BRYANT resolved with IV hydration, holding further IV fluids  History of diastolic heart failure therefore we will watch her oxygenation status given fluids via  Discontinue Irby today, bladder scan and straight cath as needed  Started on tamsulosin today  Continue to hold beta-blocker  Continue patient on Eliquis  PT OT consulted    Discussed plan with RN, , son at bedside    DVT prophylaxis:  Medical and mechanical DVT prophylaxis orders are present.    CODE STATUS:   Level Of Support Discussed With: Patient  Code Status (Patient has no pulse and is not breathing): CPR (Attempt to Resuscitate)  Medical Interventions (Patient has pulse or is breathing): Full Support        Electronically signed by Mj Butler MD, 03/08/22, 3:46 PM EST.        CBC    CBC 3/2/22 3/7/22 3/7/22  3/8/22     0022 1056    WBC 9.40 6.43 8.73 5.73   RBC 4.01 (A) 3.79 (A) 3.63 (A) 3.54 (A)   Hemoglobin 11.5 (A) 11.0 (A) 10.5 (A) 9.9 (A)   Hematocrit 33.2 (A) 31.2 (A) 30.0 (A) 29.0 (A)   MCV 82.8 82.3 82.6 81.9   MCH 28.7 29.0 28.9 28.0   MCHC 34.6 35.3 35.0 34.1   RDW 14.3 14.7 14.7 14.7   Platelets 260 217 204 193   (A) Abnormal value              CMP    CMP 3/2/22 3/7/22 3/7/22 3/8/22     0022 0931    Glucose 90 102 (A) 79 71   BUN 15 21 18 14   Creatinine 0.94 1.40 (A) 1.15 1.01   Sodium 137 144 141 144   Potassium 3.9 3.6 3.7 3.7   Chloride 102 106 108 (A) 112 (A)   Calcium 9.7 9.7 8.9 9.1   Albumin 3.10 (A) 3.20 (A)  2.80 (A)   Total Bilirubin  0.4  0.4   Alkaline Phosphatase  108  103   AST (SGOT)  13  10   ALT (SGPT)  8  6   (A) Abnormal value

## 2022-03-08 NOTE — THERAPY EVALUATION
Acute Care - Physical Therapy Initial Evaluation   Ryan     Patient Name: Jordin Menon  : 1940  MRN: [id number]  Today's Date: 3/8/2022      Visit Dx:     ICD-10-CM ICD-9-CM   1. Hypothermia, initial encounter  T68.XXXA 991.6   2. Altered mental status, unspecified altered mental status type  R41.82 780.97   3. Decreased activities of daily living (ADL)  Z78.9 V49.89   4. Difficulty walking  R26.2 719.7     Patient Active Problem List   Diagnosis   • Primary osteoarthritis of right knee   • Status post total right knee replacement   • Essential hypertension   • Shortness of breath   • ARF (acute renal failure) (HCC)   • Hypokalemia   • Hypernatremia   • Symptomatic bradycardia   • Hypothermia, initial encounter     Past Medical History:   Diagnosis Date   • Alzheimer's dementia with behavioral disturbance (HCC)    • Anemia, vitamin B12 deficiency    • Anxiety disorder due to known physiological condition    • Arthritis    • Constipation    • Gastroesophageal reflux disease without esophagitis    • High blood pressure    • HTN (hypertension)    • Hyperlipidemia    • Ingrowing toenail 2018   • Left foot pain 2018   • Osteoarthritis of right knee 2016   • Right foot pain 2018   • Sleep apnea    • Tinea unguium 2018     History reviewed. No pertinent surgical history.  PT Assessment (last 12 hours)     PT Evaluation and Treatment     Row Name 22 1300          Physical Therapy Time and Intention    Document Type evaluation  -AV     Mode of Treatment individual therapy;physical therapy  -AV     Row Name 22 1300          General Information    Patient Profile Reviewed yes  -AV     Patient Observations alert;cooperative;agree to therapy  -AV     Prior Level of Function --  Patient LTC resident of [facility].  Primarily independent but requiring ocassional assist. Ambulated with rolling walker. No home O2.  -AV     Equipment Currently Used at Home walker,  rolling  -AV     Existing Precautions/Restrictions fall  -AV     Row Name 03/08/22 1300          Living Environment    Current Living Arrangements extended care facility  -AV     People in Home facility resident  -AV     Row Name 03/08/22 1300          Range of Motion (ROM)    Range of Motion bilateral lower extremities;ROM is WFL  -AV     Row Name 03/08/22 1300          Strength (Manual Muscle Testing)    Strength (Manual Muscle Testing) bilateral lower extremities;strength is WFL  -AV     Row Name 03/08/22 1300          Bed Mobility    Comment, (Bed Mobility) Patient seated upright in recliner upon therapist entry.  -AV     Row Name 03/08/22 1300          Transfers    Transfers sit-stand transfer  -AV     Sit-Stand [place] (Transfers) contact guard;minimum assist (75% patient effort)  -AV     Row Name 03/08/22 1300          Sit-Stand Transfer    Assistive Device (Sit-Stand Transfers) walker, front-wheeled  -AV     Row Name 03/08/22 1300          Gait/Stairs (Locomotion)    Gait/Stairs Locomotion gait/ambulation independence;gait/ambulation assistive device;distance ambulated  -AV     [place] Level (Gait) contact guard  Patient requiring minimal assist to keep rolling walker in close proximity during ambulation.  -AV     Assistive Device (Gait) walker, front-wheeled  -AV     Distance in Feet (Gait) 40  -AV     Pattern (Gait) step-to  -AV     Deviations/Abnormal Patterns (Gait) festinating/shuffling  -AV     Bilateral Gait Deviations forward flexed posture  -AV     Row Name 03/08/22 1300          Safety Issues, Functional Mobility    Safety Issues Affecting Function (Mobility) judgment;positioning of assistive device;safety precaution awareness  -AV     Impairments Affecting Function (Mobility) balance;endurance/activity tolerance;cognition  -AV     Cognitive Impairments, Mobility Safety/Performance judgment;safety precaution awareness  -AV     Row Name 03/08/22 1300          Balance    Balance Assessment  standing dynamic balance  -AV     Dynamic Standing Balance contact guard;1-person assist  -AV     Position/Device Used, Standing Balance walker, front-wheeled  -AV     Row Name             [REMOVED] Wound 02/26/22 2200 Left upper arm Other (comment)    Wound - Properties Group Placement Date: 02/26/22  -CH Placement Time: 2200  -CH Present on Hospital Admission: N  -CH Side: Left  -CH Orientation: upper  -CH Location: arm  -CH Primary Wound Type: Other  -CH, IV infiltration  Removal Date: 03/08/22  -SB Removal Time: 0700  -SB Wound Outcome: Healed  -SB     Retired Wound - Properties Group Placement Date: 02/26/22  -CH Placement Time: 2200  -CH Present on Hospital Admission: N  -CH Side: Left  -CH Orientation: upper  -CH Location: arm  -CH Primary Wound Type: Other  -CH, IV infiltration  Removal Date: 03/08/22  -SB Removal Time: 0700  -SB Wound Outcome: Healed  -SB     Retired Wound - Properties Group Date first assessed: 02/26/22  -CH Time first assessed: 2200  -CH Present on Hospital Admission: N  -CH Side: Left  -CH Location: arm  -CH Primary Wound Type: Other  -CH, IV infiltration  Resolution Date: 03/08/22  -SB Resolution Time: 0700  -SB Wound Outcome: Healed  -SB     Row Name 03/08/22 1300          Plan of Care Review    Plan of Care Reviewed With patient  -AV     Progress no change  -AV     Outcome Evaluation Patient presents with deficits in balance, transfers, and ambulation. Patient will benefit from skilled PT services to address these mobility deficits and decrease risk of falls.  -AV     Row Name 03/08/22 1300          Therapy Assessment/Plan (PT)    Rehab Potential (PT) good, to achieve stated therapy goals  -AV     Criteria for Skilled Interventions Met (PT) yes;meets criteria  -AV     Problem List (PT) problems related to;mobility;balance;cognition  -AV     Activity Limitations Related to Problem List (PT) unable to ambulate safely;unable to transfer safely  -AV     Row Name 03/08/22 1300          PT  Evaluation Complexity    History, PT Evaluation Complexity 1-2 personal factors and/or comorbidities  -AV     Examination of Body Systems (PT Eval Complexity) total of 4 or more elements  -AV     Clinical Presentation (PT Evaluation Complexity) stable  -AV     Clinical Decision Making (PT Evaluation Complexity) low complexity  -AV     Overall Complexity (PT Evaluation Complexity) low complexity  -AV     Row Name 03/08/22 1300          Therapy Plan Review/Discharge Plan (PT)    Therapy Plan Review (PT) evaluation/treatment results reviewed;patient  -AV     Row Name 03/08/22 1300          Physical Therapy Goals    Bed Mobility Goal Selection (PT) bed mobility, PT goal 1  -AV     Transfer Goal Selection (PT) transfer, PT goal 1  -AV     Gait Training Goal Selection (PT) gait training, PT goal 1  -AV     Row Name 03/08/22 1300          Bed Mobility Goal 1 (PT)    Activity/Assistive Device (Bed Mobility Goal 1, PT) bed mobility activities, all  -AV     [place] Level/Cues Needed (Bed Mobility Goal 1, PT) supervision required  -AV     Time Frame (Bed Mobility Goal 1, PT) 10 days  -AV     Row Name 03/08/22 1300          Transfer Goal 1 (PT)    Activity/Assistive Device (Transfer Goal 1, PT) transfers, all;walker, rolling  -AV     [place] Level/Cues Needed (Transfer Goal 1, PT) supervision required  -AV     Time Frame (Transfer Goal 1, PT) 10 days  -AV     Row Name 03/08/22 1300          Gait Training Goal 1 (PT)    Activity/Assistive Device (Gait Training Goal 1, PT) gait (walking locomotion);assistive device use;walker, rolling  -AV     [place] Level (Gait Training Goal 1, PT) supervision required  -AV     Distance (Gait Training Goal 1, PT) 200  -AV     Time Frame (Gait Training Goal 1, PT) 10 days  -AV           User Key  (r) = Recorded By, (t) = Taken By, (c) = Cosigned By    Initials Name Provider Type    Ana Fernandez, RN Registered Nurse    Lisa Boyer RNA Registered Nurse    ENDER Rosales,  Guille PT Physical Therapist                Physical Therapy Education                 Title: PT OT SLP Therapies (In Progress)     Topic: Physical Therapy (In Progress)     Point: Mobility training (Done)     Learning Progress Summary           Patient Acceptance, E,TB, VU by AV at 3/8/2022 1334                   Point: Home exercise program (Not Started)     Learner Progress:  Not documented in this visit.          Point: Body mechanics (Done)     Learning Progress Summary           Patient Acceptance, E,TB, VU by AV at 3/8/2022 1334                   Point: Precautions (Done)     Learning Progress Summary           Patient Acceptance, E,TB, VU by AV at 3/8/2022 1334                               User Key     Initials Effective Dates Name Provider Type Discipline    AV 06/11/21 -  Guille Rosales PT Physical Therapist PT              PT Recommendation and Plan  Anticipated Discharge Disposition (PT): sub acute care setting  Planned Therapy Interventions (PT): balance training, bed mobility training, neuromuscular re-education, strengthening, transfer training, gait training  Therapy Frequency (PT): daily  Plan of Care Reviewed With: patient  Progress: no change  Outcome Evaluation: Patient presents with deficits in balance, transfers, and ambulation. Patient will benefit from skilled PT services to address these mobility deficits and decrease risk of falls.   Outcome Measures     Row Name 03/08/22 1300             How much help from another person do you currently need...    Turning from your back to your side while in flat bed without using bedrails? 3  -AV      Moving from lying on back to sitting on the side of a flat bed without bedrails? 3  -AV      Moving to and from a bed to a chair (including a wheelchair)? 3  -AV      Standing up from a chair using your arms (e.g., wheelchair, bedside chair)? 3  -AV      Climbing 3-5 steps with a railing? 2  -AV      To walk in hospital room? 3  -AV      AM-PAC 6  Clicks Score (PT) 17  -AV              Functional Assessment    Outcome Measure Options AM-PAC 6 Clicks Basic Mobility (PT)  -AV            User Key  (r) = Recorded By, (t) = Taken By, (c) = Cosigned By    Initials Name Provider Type    Guille Hernandez, PT Physical Therapist                 Time Calculation:    PT Charges     Row Name 03/08/22 1332             Time Calculation    PT Received On 03/08/22  -AV      PT Goal Re-Cert Due Date 03/17/22  -AV              Untimed Charges    PT Eval/Re-eval Minutes 30  -AV              Total Minutes    Untimed Charges Total Minutes 30  -AV       Total Minutes 30  -AV            User Key  (r) = Recorded By, (t) = Taken By, (c) = Cosigned By    Initials Name Provider Type    Guille Hernandez, PT Physical Therapist              Therapy Charges for Today     Code Description Service Date Service Provider Modifiers Qty    [id number] HC PT EVAL LOW COMPLEXITY 2 3/8/2022 Guille Rosales, PT GP 1          PT G-Codes  Outcome Measure Options: AM-PAC 6 Clicks Basic Mobility (PT)  AM-PAC 6 Clicks Score (PT): 17  AM-PAC 6 Clicks Score (OT): 9    Guille Rosales PT  3/8/2022

## 2022-03-09 VITALS
SYSTOLIC BLOOD PRESSURE: 127 MMHG | TEMPERATURE: 98.2 F | RESPIRATION RATE: 18 BRPM | HEIGHT: 68 IN | OXYGEN SATURATION: 99 % | WEIGHT: 154.1 LBS | HEART RATE: 69 BPM | BODY MASS INDEX: 23.36 KG/M2 | DIASTOLIC BLOOD PRESSURE: 70 MMHG

## 2022-03-09 LAB
ANION GAP SERPL CALCULATED.3IONS-SCNC: 15.1 MMOL/L (ref 5–15)
BASOPHILS # BLD AUTO: 0.02 10*3/MM3 (ref 0–0.2)
BASOPHILS NFR BLD AUTO: 0.4 % (ref 0–1.5)
BUN SERPL-MCNC: 15 MG/DL (ref 8–23)
BUN/CREAT SERPL: 16 (ref 7–25)
CALCIUM SPEC-SCNC: 9.5 MG/DL (ref 8.6–10.5)
CHLORIDE SERPL-SCNC: 108 MMOL/L (ref 98–107)
CO2 SERPL-SCNC: 20.9 MMOL/L (ref 22–29)
CREAT SERPL-MCNC: 0.94 MG/DL (ref 0.76–1.27)
DEPRECATED RDW RBC AUTO: 45.1 FL (ref 37–54)
EGFRCR SERPLBLD CKD-EPI 2021: 81.4 ML/MIN/1.73
EOSINOPHIL # BLD AUTO: 0.07 10*3/MM3 (ref 0–0.4)
EOSINOPHIL NFR BLD AUTO: 1.4 % (ref 0.3–6.2)
ERYTHROCYTE [DISTWIDTH] IN BLOOD BY AUTOMATED COUNT: 15.3 % (ref 12.3–15.4)
GLUCOSE SERPL-MCNC: 60 MG/DL (ref 65–99)
HCT VFR BLD AUTO: 31.8 % (ref 37.5–51)
HGB BLD-MCNC: 10.9 G/DL (ref 13–17.7)
IMM GRANULOCYTES # BLD AUTO: 0.01 10*3/MM3 (ref 0–0.05)
IMM GRANULOCYTES NFR BLD AUTO: 0.2 % (ref 0–0.5)
INR PPP: 1.12 (ref 2–3)
LYMPHOCYTES # BLD AUTO: 1.61 10*3/MM3 (ref 0.7–3.1)
LYMPHOCYTES NFR BLD AUTO: 33.1 % (ref 19.6–45.3)
MAGNESIUM SERPL-MCNC: 2 MG/DL (ref 1.6–2.4)
MCH RBC QN AUTO: 28.1 PG (ref 26.6–33)
MCHC RBC AUTO-ENTMCNC: 34.3 G/DL (ref 31.5–35.7)
MCV RBC AUTO: 82 FL (ref 79–97)
MONOCYTES # BLD AUTO: 0.36 10*3/MM3 (ref 0.1–0.9)
MONOCYTES NFR BLD AUTO: 7.4 % (ref 5–12)
NEUTROPHILS NFR BLD AUTO: 2.8 10*3/MM3 (ref 1.7–7)
NEUTROPHILS NFR BLD AUTO: 57.5 % (ref 42.7–76)
NRBC BLD AUTO-RTO: 0 /100 WBC (ref 0–0.2)
PHOSPHATE SERPL-MCNC: 2 MG/DL (ref 2.5–4.5)
PLATELET # BLD AUTO: 222 10*3/MM3 (ref 140–450)
PMV BLD AUTO: 9.4 FL (ref 6–12)
POTASSIUM SERPL-SCNC: 4 MMOL/L (ref 3.5–5.2)
PROTHROMBIN TIME: 11.6 SECONDS (ref 9.4–12)
RBC # BLD AUTO: 3.88 10*6/MM3 (ref 4.14–5.8)
SODIUM SERPL-SCNC: 144 MMOL/L (ref 136–145)
WBC NRBC COR # BLD: 4.87 10*3/MM3 (ref 3.4–10.8)

## 2022-03-09 PROCEDURE — 36415 COLL VENOUS BLD VENIPUNCTURE: CPT | Performed by: GENERAL PRACTICE

## 2022-03-09 PROCEDURE — 80048 BASIC METABOLIC PNL TOTAL CA: CPT | Performed by: GENERAL PRACTICE

## 2022-03-09 PROCEDURE — 85025 COMPLETE CBC W/AUTO DIFF WBC: CPT | Performed by: GENERAL PRACTICE

## 2022-03-09 PROCEDURE — 85610 PROTHROMBIN TIME: CPT | Performed by: GENERAL PRACTICE

## 2022-03-09 PROCEDURE — 99239 HOSP IP/OBS DSCHRG MGMT >30: CPT | Performed by: INTERNAL MEDICINE

## 2022-03-09 PROCEDURE — 83735 ASSAY OF MAGNESIUM: CPT | Performed by: GENERAL PRACTICE

## 2022-03-09 PROCEDURE — 84100 ASSAY OF PHOSPHORUS: CPT | Performed by: GENERAL PRACTICE

## 2022-03-09 RX ORDER — TAMSULOSIN HYDROCHLORIDE 0.4 MG/1
0.4 CAPSULE ORAL DAILY
Qty: 30 CAPSULE | Refills: 0 | Status: SHIPPED | OUTPATIENT
Start: 2022-03-10 | End: 2022-04-09

## 2022-03-09 RX ORDER — METHION/INOS/CHOL BT/B COM/LIV 110MG-86MG
100 CAPSULE ORAL DAILY
Qty: 30 TABLET | Refills: 0 | Status: SHIPPED | OUTPATIENT
Start: 2022-03-10 | End: 2022-05-29

## 2022-03-09 RX ADMIN — BUSPIRONE HYDROCHLORIDE 7.5 MG: 7.5 TABLET ORAL at 20:30

## 2022-03-09 RX ADMIN — APIXABAN 10 MG: 5 TABLET, FILM COATED ORAL at 20:30

## 2022-03-09 RX ADMIN — POLYETHYLENE GLYCOL 3350 17 G: 17 POWDER, FOR SOLUTION ORAL at 08:40

## 2022-03-09 RX ADMIN — TAMSULOSIN HYDROCHLORIDE 0.4 MG: 0.4 CAPSULE ORAL at 08:40

## 2022-03-09 RX ADMIN — PRAVASTATIN SODIUM 10 MG: 10 TABLET ORAL at 20:30

## 2022-03-09 RX ADMIN — SENNOSIDES AND DOCUSATE SODIUM 2 TABLET: 50; 8.6 TABLET ORAL at 20:30

## 2022-03-09 RX ADMIN — ACETAMINOPHEN 1000 MG: 500 TABLET ORAL at 20:30

## 2022-03-09 RX ADMIN — Medication 5 MG: at 20:30

## 2022-03-09 RX ADMIN — APIXABAN 10 MG: 5 TABLET, FILM COATED ORAL at 08:40

## 2022-03-09 RX ADMIN — DOCUSATE SODIUM 200 MG: 100 CAPSULE, LIQUID FILLED ORAL at 08:40

## 2022-03-09 RX ADMIN — Medication 100 MG: at 08:40

## 2022-03-09 RX ADMIN — BUSPIRONE HYDROCHLORIDE 7.5 MG: 7.5 TABLET ORAL at 08:41

## 2022-03-09 RX ADMIN — Medication 10 ML: at 08:43

## 2022-03-09 RX ADMIN — MEMANTINE 10 MG: 10 TABLET ORAL at 08:41

## 2022-03-09 RX ADMIN — ACETAMINOPHEN 1000 MG: 500 TABLET ORAL at 08:41

## 2022-03-09 NOTE — DISCHARGE SUMMARY
[facility]         HOSPITALIST  DISCHARGE SUMMARY    Patient Name: Jordin Menon  : 1940  MRN: [id number]    Date of Admission: 3/6/2022  Date of Discharge:  3/9/2022  Primary Care Physician: Frank Llanes MD    Consults     Date and Time Order Name Status Description    3/7/2022  4:13 AM Inpatient Hospitalist Consult            Active and Resolved Hospital Problems:  Encephalopathy, metabolic, resolved   Hypothermia, resolved   Recent diagnosis of Covid, asymptomatic  BRYANT, resolved  Bradycardia, resolved   History of diastolic heart failure  Dementia  Subacute DVT    Hospital Course     Hospital Course:  Jordin Meonn is a 82 y.o. male with past medical history of Alzheimer's, vitamin B12 deficiency, anxiety, GERD, hypertension, hyperlipidemia.  Patient presented emergency department from nursing facility with complaints of altered mental status.  Facility checked his temperature finding a core temperature of 94 degrees.  Review of records indicates patient was discharged on 3/3/2022, similar admission for hypothermia, bradycardia and hypotension.  Patient tested positive for Covid during that admission.  Patient discharged to SNF for continued care and management.  Patient found to have acute DVT to left lower extremity on that admission, started on Eliquis.  In the emergency department patient was a very similar presentation of bradycardia core temperature of 92.7, heart rate 47 and a blood pressure of 90/56.  Patient's creatinine was elevated along with CRP.  Patient's home blood pressure medications and beta-blocker were held with resolution of hypotension and bradycardia.  Patient's infectious work-up negative, patient discharging off of antibiotics.  Patient's BRYANT resolved while inpatient.  Patient's phosphate was low while inpatient therefore discharged on oral phosphate replacement, patient should have repeat phosphate within a week.  Patient seen on date of discharge,  clinically and hemodynamically stable.  Patient provided concerning signs and symptoms prompting immediate medical attention, patient understanding and agreeable     DISCHARGE Follow Up Recommendations for labs and diagnostics:   Follow-up with PCP in less than 1 week  Repeat labs including phosphate in less than 1 week  Continue to hold patient's home beta-blocker and amlodipine      Day of Discharge     Vital Signs:  Temp:  [97.5 °F (36.4 °C)-98 °F (36.7 °C)] 97.5 °F (36.4 °C)  Heart Rate:  [52-62] 60  Resp:  [16-18] 18  BP: (117-138)/(58-71) 127/69  Physical Exam:               Constitutional: Awake, alert, hard of hearing, pleasantly confused              Eyes: Pupils equal, sclerae anicteric, no conjunctival injection              HENT: NCAT, mucous membranes moist              Neck: Supple, no thyromegaly, no lymphadenopathy, trachea midline              Respiratory: Clear to auscultation bilaterally, nonlabored respirations               Cardiovascular: RRR, no murmurs, rubs, or gallops, palpable pedal pulses bilaterally              Gastrointestinal: Positive bowel sounds, soft, nontender, nondistended              Musculoskeletal: Left lower extremity edema, trace pitting              Neurologic: Oriented x 2, strength symmetric, however weekend, cranial nerves grossly intact              Skin: No rashes       Discharge Details        Discharge Medications      New Medications      Instructions Start Date   tamsulosin 0.4 MG capsule 24 hr capsule  Commonly known as: FLOMAX   0.4 mg, Oral, Daily   Start Date: March 10, 2022     thiamine 100 MG tablet tablet  Commonly known as: VITAMIN B-1   100 mg, Oral, Daily   Start Date: March 10, 2022        Changes to Medications      Instructions Start Date   docusate sodium 100 MG capsule  Commonly known as: COLACE  What changed: Another medication with the same name was removed. Continue taking this medication, and follow the directions you see here.   200 mg, Oral,  Daily, Pt takes along with 50 mg for a total of 250 mg QD         Continue These Medications      Instructions Start Date   acetaminophen 325 MG tablet  Commonly known as: TYLENOL   650 mg, Oral, 3 Times Daily      apixaban 5 MG tablet tablet  Commonly known as: ELIQUIS   10 mg, Oral, Every 12 Hours Scheduled      apixaban 5 MG tablet tablet  Commonly known as: ELIQUIS   5 mg, Oral, Every 12 Hours Scheduled   Start Date: March 10, 2022     Aspercreme Lidocaine 4 % cream  Generic drug: Lidocaine HCl   1 application, Topical, 2 Times Daily PRN      busPIRone 7.5 MG tablet  Commonly known as: BUSPAR   7.5 mg, Oral, 2 Times Daily      Cepacol Sore Throat Spray 0.1-33 % liquid  Generic drug: Dyclonine-Glycerin   1 application, Mouth/Throat, 4 Times Daily PRN      Diclofenac Sodium 1 % gel gel  Commonly known as: VOLTAREN   4 g, Topical, 2 Times Daily PRN      famotidine 20 MG tablet  Commonly known as: PEPCID   20 mg, Oral, Nightly      furosemide 20 MG tablet  Commonly known as: Lasix   20 mg, Oral, Daily      hydrOXYzine 25 MG tablet  Commonly known as: ATARAX   25 mg, Oral, Every 6 Hours PRN      ipratropium-albuterol 0.5-2.5 mg/3 ml nebulizer  Commonly known as: DUO-NEB   3 mL, Nebulization, Every 4 Hours PRN      magnesium hydroxide 400 MG/5ML suspension  Commonly known as: MILK OF MAGNESIA   30 mL, Oral, Daily PRN      melatonin 5 MG tablet tablet   5 mg, Oral, Nightly      memantine 10 MG tablet  Commonly known as: NAMENDA   10 mg, Oral, Daily      mineral oil-hydrophilic petrolatum ointment   1 application, Topical, 2 Times Daily PRN      polyethylene glycol packet  Commonly known as: MIRALAX   17 g, Oral, Daily      pravastatin 10 MG tablet  Commonly known as: PRAVACHOL   10 mg, Oral, Nightly      witch hazel-glycerin pad  Commonly known as: TUCKS   1 pad, Topical, As Needed         Stop These Medications    amLODIPine 10 MG tablet  Commonly known as: NORVASC     metoprolol tartrate 50 MG tablet  Commonly known  as: LOPRESSOR            No Known Allergies    Discharge Disposition:  Skilled Nursing Facility (DC - External)    Diet:  Hospital:  Diet Order   Procedures   • Diet Regular; Consistent Carbohydrate       Discharge Activity:   Activity Instructions     Activity as Tolerated            CODE STATUS:  Code Status and Medical Interventions:   Ordered at: 03/07/22 0643     Level Of Support Discussed With:    Patient     Code Status (Patient has no pulse and is not breathing):    CPR (Attempt to Resuscitate)     Medical Interventions (Patient has pulse or is breathing):    Full Support         No future appointments.    Additional Instructions for the Follow-ups that You Need to Schedule     Discharge Follow-up with PCP   As directed       Currently Documented PCP:    Frank Llanes MD    PCP Phone Number:    [phone]     Follow Up Details: In less than one week               Pertinent  and/or Most Recent Results     PROCEDURES:       LAB RESULTS:      Lab 03/09/22  0847 03/08/22  0644 03/07/22  1056 03/07/22  0931 03/07/22  0023 03/07/22  0022 03/07/22  0019   WBC 4.87 5.73 8.73  --   --  6.43  --    HEMOGLOBIN 10.9* 9.9* 10.5*  --   --  11.0*  --    HEMATOCRIT 31.8* 29.0* 30.0*  --   --  31.2*  --    PLATELETS 222 193 204  --   --  217  --    NEUTROS ABS 2.80 3.62 6.27  --   --  3.90  --    IMMATURE GRANS (ABS) 0.01 0.03 0.03  --   --  0.02  --    LYMPHS ABS 1.61 1.41 1.56  --   --  1.79  --    MONOS ABS 0.36 0.59 0.77  --   --  0.62  --    EOS ABS 0.07 0.06 0.06  --   --  0.07  --    MCV 82.0 81.9 82.6  --   --  82.3  --    CRP  --   --   --   --   --  1.11*  --    PROCALCITONIN  --   --   --  0.13  --   --   --    LACTATE  --   --   --   --   --   --  1.1   PROTIME 11.6 11.8  --  14.2* 11.4  --   --    APTT  --   --   --   --  25.6  --   --          Lab 03/09/22  0847 03/08/22  0644 03/07/22  0931 03/07/22  0022   SODIUM 144 144 141 144   POTASSIUM 4.0 3.7 3.7 3.6   CHLORIDE 108* 112* 108* 106   CO2 20.9*  22.4 22.7 27.8   ANION GAP 15.1* 9.6 10.3 10.2   BUN 15 14 18 21   CREATININE 0.94 1.01 1.15 1.40*   EGFR 81.4 74.7 63.9 50.5*   GLUCOSE 60* 71 79 102*   CALCIUM 9.5 9.1 8.9 9.7   MAGNESIUM 2.0 2.0 2.0 2.1   PHOSPHORUS 2.0* 2.2* 2.5 3.2   HEMOGLOBIN A1C  --  5.20  --   --    TSH  --   --   --  2.070         Lab 03/08/22  0644 03/07/22  0022   TOTAL PROTEIN 6.2 7.0   ALBUMIN 2.80* 3.20*   GLOBULIN 3.4 3.8   ALT (SGPT) 6 8   AST (SGOT) 10 13   BILIRUBIN 0.4 0.4   ALK PHOS 103 108         Lab 03/09/22  0847 03/08/22  0644 03/07/22  0931 03/07/22  0023   PROTIME 11.6 11.8 14.2* 11.4   INR 1.12* 1.15* 1.42* 1.11*         Lab 03/08/22  0644   CHOLESTEROL 96   LDL CHOL 28   HDL CHOL 54   TRIGLYCERIDES 61         Lab 03/07/22  0931 03/07/22  0022   FOLATE  --  3.11*   VITAMIN B 12 637  --          Brief Urine Lab Results  (Last result in the past 365 days)      Color   Clarity   Blood   Leuk Est   Nitrite   Protein   CREAT   Urine HCG        03/07/22 0136 Yellow   Clear   Negative   Negative   Negative   30 mg/dL (1+)               Microbiology Results (last 10 days)     Procedure Component Value - Date/Time    Legionella Antigen, Urine - Urine, Urine, Clean Catch [[id number]]  (Normal) Collected: 03/07/22 1033    Lab Status: Final result Specimen: Urine, Clean Catch Updated: 03/07/22 1111     LEGIONELLA ANTIGEN, URINE Negative    S. Pneumo Ag Urine or CSF - Urine, Urine, Clean Catch [[id number]]  (Normal) Collected: 03/07/22 1033    Lab Status: Final result Specimen: Urine, Clean Catch Updated: 03/07/22 1111     Strep Pneumo Ag Negative    Blood Culture - Blood, Arm, Left [[id number]]  (Normal) Collected: 03/07/22 0019    Lab Status: Preliminary result Specimen: Blood from Arm, Left Updated: 03/09/22 0245     Blood Culture No growth at 2 days    Blood Culture - Blood, Arm, Left [[id number]]  (Normal) Collected: 03/07/22 0018    Lab Status: Preliminary result Specimen: Blood from Arm, Left Updated: 03/09/22 0030     Blood  Culture No growth at 2 days          CT Head Without Contrast    Result Date: 3/7/2022  Impression:   1. No acute intracranial abnormality. 2. 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 3. Generalized parenchymal volume loss and mild chronic small vessel ischemic change.     RAJAN NIETO MD       Electronically Signed and Approved By: RAJAN NIETO MD on 3/07/2022 at 2:35             XR Chest 1 View    Result Date: 3/7/2022  Impression:   1. No acute cardiopulmonary abnormality. 2. Severe degenerative changes of both shoulders.       RAJAN NIETO MD       Electronically Signed and Approved By: RAJAN NIETO MD on 3/07/2022 at 0:26             XR Chest 1 View    Result Date: 2/27/2022  Impression:   No acute cardiopulmonary process.  Stable cardiomegaly.         LENA LEWIS MD       Electronically Signed and Approved By: LENA LEWIS MD on 2/27/2022 at 9:03             XR Chest 1 View    Result Date: 2/25/2022  Impression:   1. Mild left basilar opacities which could represent atelectasis, infiltrate, and/or small pleural effusion. 2. Cardiomegaly.       WANDA FUENTES MD       Electronically Signed and Approved By: WANDA FUENTES MD on 2/25/2022 at 17:30               Results for orders placed during the hospital encounter of 02/25/22    Duplex Venous Lower Extremity - Bilateral CV-READ    Interpretation Summary  · Acute left lower extremity deep vein thrombosis noted in the common femoral, proximal femoral, mid femoral, distal femoral, popliteal and peroneal.  · All other veins appeared normal bilaterally.      Results for orders placed during the hospital encounter of 02/25/22    Duplex Venous Lower Extremity - Bilateral CV-READ    Interpretation Summary  · Acute left lower extremity deep vein thrombosis noted in the common femoral, proximal femoral, mid femoral, distal femoral, popliteal and peroneal.  · All other veins appeared normal bilaterally.          Labs Pending at  Discharge:  Pending Labs     Order Current Status    Blood Culture - Blood, Arm, Left Preliminary result    Blood Culture - Blood, Arm, Left Preliminary result            Time spent on Discharge including face to face service:  35 minutes    Electronically signed by Mj Butler MD, 03/09/22, 10:51 AM EST.

## 2022-03-09 NOTE — SIGNIFICANT NOTE
03/09/22 1049   Plan   Plan Comments Per MD patient will return to [facility] today. SW notified facility liaison.   Final Discharge Disposition Code 04 - intermediate care facility

## 2022-03-09 NOTE — PLAN OF CARE
Goal Outcome Evaluation:              Outcome Evaluation: Pt being discharged back to nursing home today

## 2022-03-12 LAB
BACTERIA SPEC AEROBE CULT: NORMAL
BACTERIA SPEC AEROBE CULT: NORMAL
QT INTERVAL: 484 MS

## 2022-04-14 ENCOUNTER — APPOINTMENT (OUTPATIENT)
Dept: CT IMAGING | Facility: HOSPITAL | Age: 82
End: 2022-04-14

## 2022-04-14 ENCOUNTER — APPOINTMENT (OUTPATIENT)
Dept: GENERAL RADIOLOGY | Facility: HOSPITAL | Age: 82
End: 2022-04-14

## 2022-04-14 ENCOUNTER — HOSPITAL ENCOUNTER (INPATIENT)
Facility: HOSPITAL | Age: 82
LOS: 4 days | Discharge: NURSING, REHAB OR LONG-TERM CARE | End: 2022-04-18
Attending: EMERGENCY MEDICINE | Admitting: INTERNAL MEDICINE

## 2022-04-14 ENCOUNTER — APPOINTMENT (OUTPATIENT)
Dept: MRI IMAGING | Facility: HOSPITAL | Age: 82
End: 2022-04-14

## 2022-04-14 DIAGNOSIS — R13.12 OROPHARYNGEAL DYSPHAGIA: ICD-10-CM

## 2022-04-14 DIAGNOSIS — R26.2 DIFFICULTY WALKING: ICD-10-CM

## 2022-04-14 DIAGNOSIS — E87.6 HYPOKALEMIA: ICD-10-CM

## 2022-04-14 DIAGNOSIS — I63.9 ACUTE CVA (CEREBROVASCULAR ACCIDENT): Primary | ICD-10-CM

## 2022-04-14 DIAGNOSIS — Z78.9 DECREASED ACTIVITIES OF DAILY LIVING (ADL): ICD-10-CM

## 2022-04-14 LAB
ABO GROUP BLD: NORMAL
ABO GROUP BLD: NORMAL
ALBUMIN SERPL-MCNC: 3.2 G/DL (ref 3.5–5.2)
ALBUMIN SERPL-MCNC: 3.8 G/DL (ref 3.5–5.2)
ALBUMIN/GLOB SERPL: 1.1 G/DL
ALBUMIN/GLOB SERPL: 1.3 G/DL
ALP SERPL-CCNC: 103 U/L (ref 39–117)
ALP SERPL-CCNC: 88 U/L (ref 39–117)
ALT SERPL W P-5'-P-CCNC: 10 U/L (ref 1–41)
ALT SERPL W P-5'-P-CCNC: 11 U/L (ref 1–41)
ANION GAP SERPL CALCULATED.3IONS-SCNC: 11.3 MMOL/L (ref 5–15)
ANION GAP SERPL CALCULATED.3IONS-SCNC: 13.5 MMOL/L (ref 5–15)
APTT PPP: 25.2 SECONDS (ref 22.2–34.2)
AST SERPL-CCNC: 12 U/L (ref 1–40)
AST SERPL-CCNC: 14 U/L (ref 1–40)
BASOPHILS # BLD AUTO: 0.02 10*3/MM3 (ref 0–0.2)
BASOPHILS NFR BLD AUTO: 0.3 % (ref 0–1.5)
BILIRUB SERPL-MCNC: 1.4 MG/DL (ref 0–1.2)
BILIRUB SERPL-MCNC: 1.8 MG/DL (ref 0–1.2)
BLD GP AB SCN SERPL QL: NEGATIVE
BUN SERPL-MCNC: 19 MG/DL (ref 8–23)
BUN SERPL-MCNC: 21 MG/DL (ref 8–23)
BUN/CREAT SERPL: 14.5 (ref 7–25)
BUN/CREAT SERPL: 15.4 (ref 7–25)
CALCIUM SPEC-SCNC: 8.9 MG/DL (ref 8.6–10.5)
CALCIUM SPEC-SCNC: 9.6 MG/DL (ref 8.6–10.5)
CHLORIDE SERPL-SCNC: 100 MMOL/L (ref 98–107)
CHLORIDE SERPL-SCNC: 98 MMOL/L (ref 98–107)
CO2 SERPL-SCNC: 24.5 MMOL/L (ref 22–29)
CO2 SERPL-SCNC: 24.7 MMOL/L (ref 22–29)
CREAT BLDA-MCNC: 1.4 MG/DL
CREAT SERPL-MCNC: 1.23 MG/DL (ref 0.76–1.27)
CREAT SERPL-MCNC: 1.45 MG/DL (ref 0.76–1.27)
DEPRECATED RDW RBC AUTO: 43.2 FL (ref 37–54)
EGFRCR SERPLBLD CKD-EPI 2021: 48.4 ML/MIN/1.73
EGFRCR SERPLBLD CKD-EPI 2021: 50.5 ML/MIN/1.73
EGFRCR SERPLBLD CKD-EPI 2021: 59 ML/MIN/1.73
EOSINOPHIL # BLD AUTO: 0.04 10*3/MM3 (ref 0–0.4)
EOSINOPHIL NFR BLD AUTO: 0.7 % (ref 0.3–6.2)
ERYTHROCYTE [DISTWIDTH] IN BLOOD BY AUTOMATED COUNT: 14.6 % (ref 12.3–15.4)
GLOBULIN UR ELPH-MCNC: 2.8 GM/DL
GLOBULIN UR ELPH-MCNC: 3 GM/DL
GLUCOSE BLDC GLUCOMTR-MCNC: 115 MG/DL (ref 70–99)
GLUCOSE BLDC GLUCOMTR-MCNC: 76 MG/DL (ref 70–99)
GLUCOSE BLDC GLUCOMTR-MCNC: 87 MG/DL (ref 70–99)
GLUCOSE SERPL-MCNC: 88 MG/DL (ref 65–99)
GLUCOSE SERPL-MCNC: 95 MG/DL (ref 65–99)
HCT VFR BLD AUTO: 33.2 % (ref 37.5–51)
HGB BLD-MCNC: 12 G/DL (ref 13–17.7)
HOLD SPECIMEN: NORMAL
IMM GRANULOCYTES # BLD AUTO: 0.01 10*3/MM3 (ref 0–0.05)
IMM GRANULOCYTES NFR BLD AUTO: 0.2 % (ref 0–0.5)
INR PPP: 1.05 (ref 2–3)
LYMPHOCYTES # BLD AUTO: 1.85 10*3/MM3 (ref 0.7–3.1)
LYMPHOCYTES NFR BLD AUTO: 30.5 % (ref 19.6–45.3)
MAGNESIUM SERPL-MCNC: 1.8 MG/DL (ref 1.6–2.4)
MCH RBC QN AUTO: 29.6 PG (ref 26.6–33)
MCHC RBC AUTO-ENTMCNC: 36.1 G/DL (ref 31.5–35.7)
MCV RBC AUTO: 81.8 FL (ref 79–97)
MONOCYTES # BLD AUTO: 0.73 10*3/MM3 (ref 0.1–0.9)
MONOCYTES NFR BLD AUTO: 12 % (ref 5–12)
NEUTROPHILS NFR BLD AUTO: 3.41 10*3/MM3 (ref 1.7–7)
NEUTROPHILS NFR BLD AUTO: 56.3 % (ref 42.7–76)
NRBC BLD AUTO-RTO: 0 /100 WBC (ref 0–0.2)
PLATELET # BLD AUTO: 207 10*3/MM3 (ref 140–450)
PMV BLD AUTO: 9.9 FL (ref 6–12)
POTASSIUM SERPL-SCNC: 3 MMOL/L (ref 3.5–5.2)
POTASSIUM SERPL-SCNC: 3.2 MMOL/L (ref 3.5–5.2)
PROT SERPL-MCNC: 6 G/DL (ref 6–8.5)
PROT SERPL-MCNC: 6.8 G/DL (ref 6–8.5)
PROTHROMBIN TIME: 10.9 SECONDS (ref 9.4–12)
RBC # BLD AUTO: 4.06 10*6/MM3 (ref 4.14–5.8)
RH BLD: POSITIVE
RH BLD: POSITIVE
SODIUM SERPL-SCNC: 136 MMOL/L (ref 136–145)
SODIUM SERPL-SCNC: 136 MMOL/L (ref 136–145)
T&S EXPIRATION DATE: NORMAL
TROPONIN T SERPL-MCNC: 0.03 NG/ML (ref 0–0.03)
TROPONIN T SERPL-MCNC: 0.04 NG/ML (ref 0–0.03)
WBC NRBC COR # BLD: 6.06 10*3/MM3 (ref 3.4–10.8)
WHOLE BLOOD HOLD SPECIMEN: NORMAL
WHOLE BLOOD HOLD SPECIMEN: NORMAL

## 2022-04-14 PROCEDURE — 82565 ASSAY OF CREATININE: CPT

## 2022-04-14 PROCEDURE — 85610 PROTHROMBIN TIME: CPT | Performed by: EMERGENCY MEDICINE

## 2022-04-14 PROCEDURE — 84484 ASSAY OF TROPONIN QUANT: CPT | Performed by: INTERNAL MEDICINE

## 2022-04-14 PROCEDURE — 82962 GLUCOSE BLOOD TEST: CPT

## 2022-04-14 PROCEDURE — 80053 COMPREHEN METABOLIC PANEL: CPT | Performed by: INTERNAL MEDICINE

## 2022-04-14 PROCEDURE — 86900 BLOOD TYPING SEROLOGIC ABO: CPT

## 2022-04-14 PROCEDURE — 71045 X-RAY EXAM CHEST 1 VIEW: CPT

## 2022-04-14 PROCEDURE — 85025 COMPLETE CBC W/AUTO DIFF WBC: CPT | Performed by: EMERGENCY MEDICINE

## 2022-04-14 PROCEDURE — 86901 BLOOD TYPING SEROLOGIC RH(D): CPT | Performed by: EMERGENCY MEDICINE

## 2022-04-14 PROCEDURE — 86900 BLOOD TYPING SEROLOGIC ABO: CPT | Performed by: EMERGENCY MEDICINE

## 2022-04-14 PROCEDURE — 70496 CT ANGIOGRAPHY HEAD: CPT

## 2022-04-14 PROCEDURE — 99223 1ST HOSP IP/OBS HIGH 75: CPT | Performed by: INTERNAL MEDICINE

## 2022-04-14 PROCEDURE — 0042T HC CT CEREBRAL PERFUSION W/WO CONTRAST: CPT

## 2022-04-14 PROCEDURE — 99285 EMERGENCY DEPT VISIT HI MDM: CPT

## 2022-04-14 PROCEDURE — 86850 RBC ANTIBODY SCREEN: CPT | Performed by: EMERGENCY MEDICINE

## 2022-04-14 PROCEDURE — 84484 ASSAY OF TROPONIN QUANT: CPT | Performed by: EMERGENCY MEDICINE

## 2022-04-14 PROCEDURE — 25010000002 HALOPERIDOL LACTATE PER 5 MG: Performed by: PHYSICIAN ASSISTANT

## 2022-04-14 PROCEDURE — 83735 ASSAY OF MAGNESIUM: CPT | Performed by: EMERGENCY MEDICINE

## 2022-04-14 PROCEDURE — 74018 RADEX ABDOMEN 1 VIEW: CPT

## 2022-04-14 PROCEDURE — 70450 CT HEAD/BRAIN W/O DYE: CPT

## 2022-04-14 PROCEDURE — 70498 CT ANGIOGRAPHY NECK: CPT

## 2022-04-14 PROCEDURE — 80053 COMPREHEN METABOLIC PANEL: CPT | Performed by: EMERGENCY MEDICINE

## 2022-04-14 PROCEDURE — 85730 THROMBOPLASTIN TIME PARTIAL: CPT | Performed by: EMERGENCY MEDICINE

## 2022-04-14 PROCEDURE — 0 IOPAMIDOL PER 1 ML: Performed by: EMERGENCY MEDICINE

## 2022-04-14 PROCEDURE — 93005 ELECTROCARDIOGRAM TRACING: CPT | Performed by: EMERGENCY MEDICINE

## 2022-04-14 PROCEDURE — 0 POTASSIUM CHLORIDE 10 MEQ/100ML SOLUTION: Performed by: EMERGENCY MEDICINE

## 2022-04-14 PROCEDURE — 4A03X5D MEASUREMENT OF ARTERIAL FLOW, INTRACRANIAL, EXTERNAL APPROACH: ICD-10-PCS | Performed by: RADIOLOGY

## 2022-04-14 PROCEDURE — 86901 BLOOD TYPING SEROLOGIC RH(D): CPT

## 2022-04-14 PROCEDURE — 70551 MRI BRAIN STEM W/O DYE: CPT

## 2022-04-14 RX ORDER — SODIUM CHLORIDE 0.9 % (FLUSH) 0.9 %
10 SYRINGE (ML) INJECTION EVERY 12 HOURS SCHEDULED
Status: DISCONTINUED | OUTPATIENT
Start: 2022-04-14 | End: 2022-04-18 | Stop reason: HOSPADM

## 2022-04-14 RX ORDER — POTASSIUM CHLORIDE 7.45 MG/ML
10 INJECTION INTRAVENOUS ONCE
Status: COMPLETED | OUTPATIENT
Start: 2022-04-14 | End: 2022-04-14

## 2022-04-14 RX ORDER — ASPIRIN 81 MG/1
81 TABLET, CHEWABLE ORAL DAILY
Status: DISCONTINUED | OUTPATIENT
Start: 2022-04-15 | End: 2022-04-18 | Stop reason: HOSPADM

## 2022-04-14 RX ORDER — METHION/INOS/CHOL BT/B COM/LIV 110MG-86MG
100 CAPSULE ORAL DAILY
Status: DISCONTINUED | OUTPATIENT
Start: 2022-04-14 | End: 2022-04-18 | Stop reason: HOSPADM

## 2022-04-14 RX ORDER — HALOPERIDOL 5 MG/ML
2 INJECTION INTRAMUSCULAR ONCE
Status: COMPLETED | OUTPATIENT
Start: 2022-04-14 | End: 2022-04-14

## 2022-04-14 RX ORDER — FAMOTIDINE 20 MG/1
20 TABLET, FILM COATED ORAL NIGHTLY
Status: DISCONTINUED | OUTPATIENT
Start: 2022-04-14 | End: 2022-04-18 | Stop reason: HOSPADM

## 2022-04-14 RX ORDER — ASPIRIN 300 MG/1
300 SUPPOSITORY RECTAL ONCE
Status: COMPLETED | OUTPATIENT
Start: 2022-04-14 | End: 2022-04-14

## 2022-04-14 RX ORDER — POTASSIUM CHLORIDE 7.45 MG/ML
10 INJECTION INTRAVENOUS
Status: DISCONTINUED | OUTPATIENT
Start: 2022-04-14 | End: 2022-04-14

## 2022-04-14 RX ORDER — FUROSEMIDE 20 MG/1
20 TABLET ORAL DAILY
Status: DISCONTINUED | OUTPATIENT
Start: 2022-04-14 | End: 2022-04-18 | Stop reason: HOSPADM

## 2022-04-14 RX ORDER — POLYETHYLENE GLYCOL 3350 17 G/17G
17 POWDER, FOR SOLUTION ORAL DAILY
Status: DISCONTINUED | OUTPATIENT
Start: 2022-04-14 | End: 2022-04-18 | Stop reason: HOSPADM

## 2022-04-14 RX ORDER — SODIUM CHLORIDE 0.9 % (FLUSH) 0.9 %
10 SYRINGE (ML) INJECTION AS NEEDED
Status: DISCONTINUED | OUTPATIENT
Start: 2022-04-14 | End: 2022-04-18 | Stop reason: HOSPADM

## 2022-04-14 RX ORDER — MEMANTINE HYDROCHLORIDE 10 MG/1
10 TABLET ORAL DAILY
Status: DISCONTINUED | OUTPATIENT
Start: 2022-04-14 | End: 2022-04-18 | Stop reason: HOSPADM

## 2022-04-14 RX ORDER — BUSPIRONE HYDROCHLORIDE 7.5 MG/1
7.5 TABLET ORAL 2 TIMES DAILY
Status: DISCONTINUED | OUTPATIENT
Start: 2022-04-14 | End: 2022-04-15

## 2022-04-14 RX ORDER — ATORVASTATIN CALCIUM 40 MG/1
80 TABLET, FILM COATED ORAL NIGHTLY
Status: DISCONTINUED | OUTPATIENT
Start: 2022-04-14 | End: 2022-04-18 | Stop reason: HOSPADM

## 2022-04-14 RX ORDER — ACETAMINOPHEN 325 MG/1
650 TABLET ORAL EVERY 4 HOURS PRN
Status: DISCONTINUED | OUTPATIENT
Start: 2022-04-14 | End: 2022-04-18 | Stop reason: HOSPADM

## 2022-04-14 RX ORDER — ACETAMINOPHEN 650 MG/1
650 SUPPOSITORY RECTAL EVERY 4 HOURS PRN
Status: DISCONTINUED | OUTPATIENT
Start: 2022-04-14 | End: 2022-04-18 | Stop reason: HOSPADM

## 2022-04-14 RX ORDER — ASPIRIN 325 MG
325 TABLET ORAL ONCE
Status: DISCONTINUED | OUTPATIENT
Start: 2022-04-14 | End: 2022-04-14

## 2022-04-14 RX ADMIN — POTASSIUM CHLORIDE 10 MEQ: 7.46 INJECTION, SOLUTION INTRAVENOUS at 17:58

## 2022-04-14 RX ADMIN — POTASSIUM CHLORIDE 10 MEQ: 7.46 INJECTION, SOLUTION INTRAVENOUS at 16:09

## 2022-04-14 RX ADMIN — HALOPERIDOL LACTATE 2 MG: 5 INJECTION, SOLUTION INTRAMUSCULAR at 22:06

## 2022-04-14 RX ADMIN — ASPIRIN 300 MG: 300 SUPPOSITORY RECTAL at 19:27

## 2022-04-14 RX ADMIN — IOPAMIDOL 70 ML: 755 INJECTION, SOLUTION INTRAVENOUS at 14:45

## 2022-04-14 RX ADMIN — IOPAMIDOL 80 ML: 755 INJECTION, SOLUTION INTRAVENOUS at 14:36

## 2022-04-14 RX ADMIN — POTASSIUM CHLORIDE 10 MEQ: 7.46 INJECTION, SOLUTION INTRAVENOUS at 19:26

## 2022-04-14 NOTE — ED PROVIDER NOTES
Subjective   Jordin Menon is a 82 y.o. male that presents to the ED via EMS from nursing home with complaints of acute onset of moderate WEAKNESS to the RUE that started about 40 minutes PTA. Nursing home staff reported pt's last known well as 40 minutes PTA. Per ED nurse, pt has arthritis in the RUE but was able to move it before symptoms started.      Pt c/o blurry vision.     Pt is on Eliquis. He was started on it at the beginning of March due to DVT.       History provided by:  Patient  History limited by:  Dementia      Review of Systems   Unable to perform ROS: Dementia       Past Medical History:   Diagnosis Date   • Alzheimer's dementia with behavioral disturbance (HCC)    • Anemia, vitamin B12 deficiency    • Anxiety disorder due to known physiological condition    • Arthritis    • Constipation    • Gastroesophageal reflux disease without esophagitis    • High blood pressure    • HTN (hypertension)    • Hyperlipidemia    • Ingrowing toenail 09/21/2018   • Left foot pain 09/21/2018   • Osteoarthritis of right knee 08/22/2016   • Right foot pain 09/21/2018   • Sleep apnea    • Tinea unguium 09/21/2018       No Known Allergies    History reviewed. No pertinent surgical history.    Family History   Family history unknown: Yes       Social History     Socioeconomic History   • Marital status:    Tobacco Use   • Smoking status: Never Smoker   • Smokeless tobacco: Never Used   Vaping Use   • Vaping Use: Never used   Substance and Sexual Activity   • Alcohol use: No   • Drug use: Never   • Sexual activity: Defer         Objective   Physical Exam  Vitals and nursing note reviewed.   Constitutional:       General: He is not in acute distress.     Appearance: Normal appearance. He is well-developed.      Comments: Patient appears well-nourished.     HENT:      Head: Normocephalic and atraumatic.      Right Ear: Tympanic membrane normal. Tympanic membrane is not erythematous.      Left Ear: Tympanic membrane  normal. Tympanic membrane is not erythematous.      Nose: Nose normal.      Right Nostril: No epistaxis.      Left Nostril: No epistaxis.      Comments: No evidence of trauma.      Mouth/Throat:      Mouth: Mucous membranes are moist.      Pharynx: Oropharynx is clear. Uvula midline. No posterior oropharyngeal erythema.   Eyes:      Extraocular Movements: Extraocular movements intact.      Conjunctiva/sclera: Conjunctivae normal.      Pupils: Pupils are equal, round, and reactive to light.   Neck:      Comments: Trachea midline. No meningeal signs.   Cardiovascular:      Rate and Rhythm: Normal rate and regular rhythm.      Pulses: Normal pulses.      Heart sounds: Normal heart sounds. No murmur heard.    No friction rub. No gallop.   Pulmonary:      Effort: Pulmonary effort is normal. No respiratory distress.      Breath sounds: Normal breath sounds.   Abdominal:      General: Bowel sounds are normal.      Palpations: Abdomen is soft. There is no mass.      Tenderness: There is no abdominal tenderness. There is no right CVA tenderness, left CVA tenderness, guarding or rebound.      Comments: There is no rigidity. There are no pulsatile masses.     Musculoskeletal:         General: Normal range of motion.      Cervical back: Neck supple.      Comments: Back - Spine is straight and midline.     Lymphadenopathy:      Cervical: No cervical adenopathy.   Skin:     General: Skin is warm and dry.      Findings: No petechiae or rash.      Comments: There are no purpura lesions noted.  There are no vesicular lesions noted.   Neurological:      General: No focal deficit present.      Mental Status: He is alert.      Cranial Nerves: Cranial nerves are intact.      Sensory: No sensory deficit.      Comments: Right arm ataxia. When testing visual acuity, pt is able to identify shapes. When covered each eye individually pt could not report the number of fingers being held up with either eye.  strength on right weaker than  left. Equal movement to the BLE, but pt could not lift either leg off of the bed.          Procedures         ED Course  ED Course as of 04/14/22 1615   u Apr 14, 2022   1320 Pt comes from [facility]. Medication list provided reports that pt is prescribed Eliquis. There is no notation, however, of the last dose of any medications, including Eliquis. Will attempt to identify when the pt last received a dose of Eliquis to determine if pt is a possible candidate for TPA treatment.  [KJ]   1334 Pt's last dose of Eliquis was at 0900 today, thus excluding the pt from thrombotics.  [KJ]   1417 I have discussed the patient's clinical situation and diagnostic results with Dr. Malone with neurology. Leo agrees with the current ED course and will see the pt upstairs for consultation.     [KJ]      ED Course User Index  [KJ] Romi Reeder                              The patient was seen and evaluated the ED by me.  The above history and physical examination was performed as document.  Patient was excluded from thrombolysis due to a dose of Eliquis this a.m.  Stroke work-up was performed in the ED.  I did consult Dr. Malone.  Dr. Malone agreed with admission for further evaluation and treatment.  He will see the patient in consultation.  Hospital service has been consult for primary admission.  Patient was found to be hypokalemic and was given IV potassium chloride since I do not have a dysphagia screen at this time.              MDM    Final diagnoses:   Acute CVA (cerebrovascular accident) (HCC)   Hypokalemia       Documentation assistance provided by jordon Reeder.  Information recorded by the scribe was done at my direction and has been verified and validated by me.        Romi Reeder  04/14/22 1324       Venu Oconnor DO  04/14/22 1615

## 2022-04-14 NOTE — H&P
[facility]  Date: 4/14/2022   Patient Name: Jordin Menon  Primary Care Physician:  Frank Llanes MD  Date of admission: 4/14/2022    Subjective   Subjective     Chief Complaint: Weakness    HPI:    Jordin Menon is a 82 y.o. male with history of Alzheimer's dementia, hypertension hyperlipidemia presents to the hospital from [facility] re[facility] with acute onset right arm weakness and possible left facial droop.  Symptom onset was approximately 40 minutes prior to arrival.  Patient received Eliquis this morning and therefore is not a tPA candidate.  In the emergency department difficult to assess that the patient has any neglect due to the patient's poor baseline vision.  CT and CTA obtained in the emergency department without signs of occlusion or intervenable lesion patient is admitted for further work-up of possible stroke.  During my exam the patient is oriented, is unclear if his right hand weakness is chronic or new, states several times that he usually has trouble with his hand but also was easily distracted by wanting his back scratched by the nurse and requesting a pair of pants.    Personal History     Past Medical History:  Elkin's dementia  Anemia B12 deficiency  Anxiety  GERD  Hypertension  Hyperlipidemia and osteoarthritis  Obstructive sleep apnea    Past Surgical History:  Eye surgery  Scalp surgery    Family History:   CAD    Social History:   No alcohol tobacco or illicit drug use  Lives at the nursing home    Home Medications:  Diclofenac Sodium, Dyclonine-Glycerin, Lidocaine HCl, acetaminophen, apixaban, busPIRone, docusate sodium, famotidine, furosemide, hydrOXYzine, ipratropium-albuterol, magnesium hydroxide, melatonin, memantine, mineral oil-hydrophilic petrolatum, polyethylene glycol, potassium & sodium phosphates, pravastatin, thiamine, and witch hazel-glycerin    Allergies:  No Known Allergies    Review of Systems   14 point review  of systems obtained and negative except as noted in HPI    Objective   Objective     Vitals:   Temp:  [97.3 °F (36.3 °C)-97.9 °F (36.6 °C)] 97.3 °F (36.3 °C)  Heart Rate:  [56-77] 56  Resp:  [16-18] 16  BP: ()/(55-73) 114/67    Physical Exam   General: NAD, WDWN elderly male sitting up on the side of the bed  Eyes: Clear conjunctiva, PERRL EOMI  ENMT: mmm, normal oropharynx  Resp: CTAB, no wrr, good effort no dyspnea  CV: RRR no mrg, 1+ pitting LE edema  GI: abdomen is soft, NT, ND, +bs  Neuro: Moves all extremities spontaneously, sensation intact but difficult to evaluate, no facial droop.  Right hand weaker but difficult to assess due to cooperation and dementia  Psych: AOx3, normal mood and affect      Result Review    Result Review:  I have personally reviewed the results from the time of this admission to 4/14/2022 19:29 EDT and agree with these findings:  [x]  Laboratory  Troponin 0.04  Potassium 3  Creatinine 1.4  Bilirubin 1.8  And hemoglobin 12  []  Microbiology  [x]  Radiology  CTA head and neck with no occlusion  Chest x-ray pulmonary vascular congestion   CT head: Stable encephalomalacia, no acute intracranial process  [x]  EKG/Telemetry sinus rhythm  []  Cardiology/Vascular   []  Pathology  []  Old records  []  Other:      Assessment/Plan   Assessment / Plan     Assessment:   Possible acute ischemic stroke  Possible new right hand weakness  Chronic kidney disease with possible worsening  Elevated bilirubin  Moderately elevated troponin  Hypokalemia    Plan:  -Admit to monitored bed PCU, NIH stroke scale per protocol  -Speech, PT, OT evals pending, failed bedside swallow  -TSH, A1c, Lipid panel pending   Continue telemetry.   -Asa 81 (300mg load, rectal) +atorvastatin  --resume Eliquis tomorrow--  -Permissive hypertension, allow up to 220/120 unless patient has active HF or chest pain. Resume antihypertensives in 1-2 days depending on stability of neuro symptoms  -MRI Brain, pending  Echo  pending  -Blood pressure goal <140/90 or <130/80 in pt has DM or Small Vessel Disease  -LDL <70 if atherosclerosis [ ]  -A1C goal <7% [ ]  Recheck bilirubin, troponin and electrolytes, laboratory values not consistent with prior, will investigate further if these remain abnormal   May need IV Lasix, await repeat renal function and electrolytes  Oral potassium phosphorus per home med list  Holding off on cpap at night for now, readdress tomorrow    DVT ppx: Eliquis  Diet: N.p.o. for now until after speech therapy evaluation, then heart healthy diet  Discussed with bedside RN and ER physician    Admission Status:  I believe this patient meets inpatient status.    Electronically signed by Lizzy Hermosillo MD, 04/14/22, 7:29 PM EDT.

## 2022-04-15 ENCOUNTER — APPOINTMENT (OUTPATIENT)
Dept: CARDIOLOGY | Facility: HOSPITAL | Age: 82
End: 2022-04-15

## 2022-04-15 LAB
ALBUMIN SERPL-MCNC: 2.8 G/DL (ref 3.5–5.2)
ALBUMIN/GLOB SERPL: 1.2 G/DL
ALP SERPL-CCNC: 76 U/L (ref 39–117)
ALT SERPL W P-5'-P-CCNC: 8 U/L (ref 1–41)
ANION GAP SERPL CALCULATED.3IONS-SCNC: 9.8 MMOL/L (ref 5–15)
AST SERPL-CCNC: 11 U/L (ref 1–40)
BACTERIA UR QL AUTO: ABNORMAL /HPF
BH CV ECHO MEAS - AO ROOT DIAM: 3 CM
BH CV ECHO MEAS - EDV(MOD-SP2): 48 ML
BH CV ECHO MEAS - EDV(MOD-SP4): 49 ML
BH CV ECHO MEAS - EF(MOD-BP): 54.8 %
BH CV ECHO MEAS - ESV(MOD-SP2): 23 ML
BH CV ECHO MEAS - ESV(MOD-SP4): 22 ML
BH CV ECHO MEAS - IVSD: 0.8 CM
BH CV ECHO MEAS - LA DIMENSION(2D): 3.4 CM
BH CV ECHO MEAS - LAT PEAK E' VEL: 9 CM/SEC
BH CV ECHO MEAS - LVIDD: 4.8 CM
BH CV ECHO MEAS - LVIDS: 2.4 CM
BH CV ECHO MEAS - LVOT DIAM: 2 CM
BH CV ECHO MEAS - LVPWD: 0.8 CM
BH CV ECHO MEAS - MED PEAK E' VEL: 4.57 CM/SEC
BH CV ECHO MEAS - MV A MAX VEL: 77 CM/SEC
BH CV ECHO MEAS - MV DEC TIME: 175 MSEC
BH CV ECHO MEAS - MV E MAX VEL: 54 CM/SEC
BH CV ECHO MEAS - MV E/A: 0.7
BH CV ECHO MEAS - RVDD: 2.5 CM
BH CV ECHO MEAS - TAPSE (>1.6): 2.22 CM
BH CV ECHO MEASUREMENTS AVERAGE E/E' RATIO: 7.96
BILIRUB SERPL-MCNC: 1.1 MG/DL (ref 0–1.2)
BILIRUB UR QL STRIP: NEGATIVE
BUN SERPL-MCNC: 17 MG/DL (ref 8–23)
BUN/CREAT SERPL: 16.3 (ref 7–25)
CALCIUM SPEC-SCNC: 8.3 MG/DL (ref 8.6–10.5)
CHLORIDE SERPL-SCNC: 105 MMOL/L (ref 98–107)
CHOLEST SERPL-MCNC: 127 MG/DL (ref 0–200)
CLARITY UR: CLEAR
CO2 SERPL-SCNC: 23.2 MMOL/L (ref 22–29)
COLOR UR: YELLOW
CREAT SERPL-MCNC: 1.04 MG/DL (ref 0.76–1.27)
D-LACTATE SERPL-SCNC: 0.9 MMOL/L (ref 0.5–2)
DEPRECATED RDW RBC AUTO: 42.4 FL (ref 37–54)
EGFRCR SERPLBLD CKD-EPI 2021: 72.1 ML/MIN/1.73
ERYTHROCYTE [DISTWIDTH] IN BLOOD BY AUTOMATED COUNT: 14.6 % (ref 12.3–15.4)
GLOBULIN UR ELPH-MCNC: 2.3 GM/DL
GLUCOSE BLDC GLUCOMTR-MCNC: 81 MG/DL (ref 70–99)
GLUCOSE SERPL-MCNC: 76 MG/DL (ref 65–99)
GLUCOSE UR STRIP-MCNC: NEGATIVE MG/DL
HBA1C MFR BLD: 4.3 % (ref 4.8–5.6)
HCT VFR BLD AUTO: 27 % (ref 37.5–51)
HDLC SERPL-MCNC: 55 MG/DL (ref 40–60)
HGB BLD-MCNC: 9.9 G/DL (ref 13–17.7)
HGB UR QL STRIP.AUTO: ABNORMAL
HYALINE CASTS UR QL AUTO: ABNORMAL /LPF
IVRT: 63 MSEC
KETONES UR QL STRIP: NEGATIVE
LDLC SERPL CALC-MCNC: 59 MG/DL (ref 0–100)
LDLC/HDLC SERPL: 1.09 {RATIO}
LEFT ATRIUM VOLUME INDEX: 10.5 ML/M2
LEUKOCYTE ESTERASE UR QL STRIP.AUTO: NEGATIVE
MAXIMAL PREDICTED HEART RATE: 139 BPM
MCH RBC QN AUTO: 29.4 PG (ref 26.6–33)
MCHC RBC AUTO-ENTMCNC: 36.7 G/DL (ref 31.5–35.7)
MCV RBC AUTO: 80.1 FL (ref 79–97)
NITRITE UR QL STRIP: NEGATIVE
PH UR STRIP.AUTO: <=5 [PH] (ref 5–8)
PLATELET # BLD AUTO: 183 10*3/MM3 (ref 140–450)
PMV BLD AUTO: 9.8 FL (ref 6–12)
POTASSIUM SERPL-SCNC: 3.4 MMOL/L (ref 3.5–5.2)
PROT SERPL-MCNC: 5.1 G/DL (ref 6–8.5)
PROT UR QL STRIP: NEGATIVE
QT INTERVAL: 383 MS
RBC # BLD AUTO: 3.37 10*6/MM3 (ref 4.14–5.8)
RBC # UR STRIP: ABNORMAL /HPF
REF LAB TEST METHOD: ABNORMAL
SODIUM SERPL-SCNC: 138 MMOL/L (ref 136–145)
SP GR UR STRIP: >1.03 (ref 1–1.03)
SQUAMOUS #/AREA URNS HPF: ABNORMAL /HPF
STRESS TARGET HR: 118 BPM
TRIGL SERPL-MCNC: 59 MG/DL (ref 0–150)
TSH SERPL DL<=0.05 MIU/L-ACNC: 1.3 UIU/ML (ref 0.27–4.2)
UROBILINOGEN UR QL STRIP: ABNORMAL
VLDLC SERPL-MCNC: 13 MG/DL (ref 5–40)
WBC # UR STRIP: ABNORMAL /HPF
WBC NRBC COR # BLD: 6.17 10*3/MM3 (ref 3.4–10.8)
YEAST URNS QL MICRO: ABNORMAL /HPF

## 2022-04-15 PROCEDURE — 36415 COLL VENOUS BLD VENIPUNCTURE: CPT | Performed by: INTERNAL MEDICINE

## 2022-04-15 PROCEDURE — 85027 COMPLETE CBC AUTOMATED: CPT | Performed by: INTERNAL MEDICINE

## 2022-04-15 PROCEDURE — 83605 ASSAY OF LACTIC ACID: CPT | Performed by: PHYSICIAN ASSISTANT

## 2022-04-15 PROCEDURE — 92610 EVALUATE SWALLOWING FUNCTION: CPT

## 2022-04-15 PROCEDURE — 99233 SBSQ HOSP IP/OBS HIGH 50: CPT | Performed by: INTERNAL MEDICINE

## 2022-04-15 PROCEDURE — 83036 HEMOGLOBIN GLYCOSYLATED A1C: CPT | Performed by: INTERNAL MEDICINE

## 2022-04-15 PROCEDURE — 93306 TTE W/DOPPLER COMPLETE: CPT

## 2022-04-15 PROCEDURE — 25010000002 ALBUMIN HUMAN 25% PER 50 ML: Performed by: PHYSICIAN ASSISTANT

## 2022-04-15 PROCEDURE — 81001 URINALYSIS AUTO W/SCOPE: CPT | Performed by: PHYSICIAN ASSISTANT

## 2022-04-15 PROCEDURE — P9047 ALBUMIN (HUMAN), 25%, 50ML: HCPCS | Performed by: PHYSICIAN ASSISTANT

## 2022-04-15 PROCEDURE — 82962 GLUCOSE BLOOD TEST: CPT

## 2022-04-15 PROCEDURE — 80061 LIPID PANEL: CPT | Performed by: INTERNAL MEDICINE

## 2022-04-15 PROCEDURE — 84443 ASSAY THYROID STIM HORMONE: CPT | Performed by: INTERNAL MEDICINE

## 2022-04-15 PROCEDURE — 80053 COMPREHEN METABOLIC PANEL: CPT | Performed by: INTERNAL MEDICINE

## 2022-04-15 RX ORDER — MIRTAZAPINE 15 MG/1
7.5 TABLET, FILM COATED ORAL NIGHTLY
Status: DISCONTINUED | OUTPATIENT
Start: 2022-04-15 | End: 2022-04-18 | Stop reason: HOSPADM

## 2022-04-15 RX ORDER — MIRTAZAPINE 15 MG/1
7.5 TABLET, FILM COATED ORAL NIGHTLY
COMMUNITY
End: 2022-05-21 | Stop reason: HOSPADM

## 2022-04-15 RX ORDER — ALBUMIN (HUMAN) 12.5 G/50ML
25 SOLUTION INTRAVENOUS ONCE
Status: COMPLETED | OUTPATIENT
Start: 2022-04-15 | End: 2022-04-15

## 2022-04-15 RX ORDER — TAMSULOSIN HYDROCHLORIDE 0.4 MG/1
0.4 CAPSULE ORAL DAILY
Status: DISCONTINUED | OUTPATIENT
Start: 2022-04-15 | End: 2022-04-18 | Stop reason: HOSPADM

## 2022-04-15 RX ORDER — TAMSULOSIN HYDROCHLORIDE 0.4 MG/1
1 CAPSULE ORAL DAILY
COMMUNITY
End: 2022-05-29

## 2022-04-15 RX ORDER — BUSPIRONE HYDROCHLORIDE 10 MG/1
10 TABLET ORAL 2 TIMES DAILY
Status: DISCONTINUED | OUTPATIENT
Start: 2022-04-15 | End: 2022-04-18 | Stop reason: HOSPADM

## 2022-04-15 RX ORDER — CHOLECALCIFEROL (VITAMIN D3) 125 MCG
5 CAPSULE ORAL NIGHTLY
Status: DISCONTINUED | OUTPATIENT
Start: 2022-04-15 | End: 2022-04-18 | Stop reason: HOSPADM

## 2022-04-15 RX ORDER — DOCUSATE SODIUM 250 MG
250 CAPSULE ORAL DAILY
Status: ON HOLD | COMMUNITY
End: 2022-10-23

## 2022-04-15 RX ADMIN — ALBUMIN HUMAN 25 G: 0.25 SOLUTION INTRAVENOUS at 02:45

## 2022-04-15 RX ADMIN — Medication 100 MG: at 11:02

## 2022-04-15 RX ADMIN — POTASSIUM & SODIUM PHOSPHATES POWDER PACK 280-160-250 MG 1 PACKET: 280-160-250 PACK at 11:01

## 2022-04-15 RX ADMIN — BUSPIRONE HYDROCHLORIDE 7.5 MG: 7.5 TABLET ORAL at 11:01

## 2022-04-15 RX ADMIN — APIXABAN 5 MG: 5 TABLET, FILM COATED ORAL at 11:02

## 2022-04-15 RX ADMIN — APIXABAN 5 MG: 5 TABLET, FILM COATED ORAL at 20:25

## 2022-04-15 RX ADMIN — MIRTAZAPINE 7.5 MG: 15 TABLET, FILM COATED ORAL at 20:24

## 2022-04-15 RX ADMIN — ASPIRIN 81 MG CHEWABLE TABLET 81 MG: 81 TABLET CHEWABLE at 11:01

## 2022-04-15 RX ADMIN — FAMOTIDINE 20 MG: 20 TABLET ORAL at 20:24

## 2022-04-15 RX ADMIN — BUSPIRONE HYDROCHLORIDE 10 MG: 10 TABLET ORAL at 20:24

## 2022-04-15 RX ADMIN — FUROSEMIDE 20 MG: 20 TABLET ORAL at 11:01

## 2022-04-15 RX ADMIN — TAMSULOSIN HYDROCHLORIDE 0.4 MG: 0.4 CAPSULE ORAL at 20:25

## 2022-04-15 RX ADMIN — MEMANTINE 10 MG: 10 TABLET ORAL at 11:02

## 2022-04-15 RX ADMIN — Medication 10 ML: at 20:26

## 2022-04-15 RX ADMIN — POLYETHYLENE GLYCOL 3350 17 G: 17 POWDER, FOR SOLUTION ORAL at 10:59

## 2022-04-15 RX ADMIN — Medication 10 ML: at 11:03

## 2022-04-15 RX ADMIN — SODIUM CHLORIDE 1000 ML: 9 INJECTION, SOLUTION INTRAVENOUS at 01:29

## 2022-04-15 RX ADMIN — ATORVASTATIN CALCIUM 80 MG: 40 TABLET, FILM COATED ORAL at 20:24

## 2022-04-15 RX ADMIN — Medication 5 MG: at 20:25

## 2022-04-15 NOTE — PLAN OF CARE
Goal Outcome Evaluation:      NIH 16. Pt progressively getting more confused as night went on. At 2000 rectal temp low 95.2, warming blanket ordered. At 0230 pt became normothermic at 98.6, blanket warmer turned off, continuous rectal temp probe in place and intact. Pt hypotensive 80/40; 1000mL bolus given with no change in BP. 25g albumin given, BP now soft 110/50. Pt HR bradycardiac low 50s-low 60s. Pt became more combative & verbally abusive to staff and was pulling off equipment/lines; 4 point soft wrist restraints applied at 0420. Family notified and aware stating pt has became more combative with staff at nursing home as well. Denies pain/discomfort at this time.

## 2022-04-15 NOTE — CONSULTS
Consult received per Stroke Protocol. Patient without a noted DM diagnosis, with an HbA1c of 5.2%, and an estimated average glucose of 103 mg/dL as of 3/8/2022.

## 2022-04-15 NOTE — PLAN OF CARE
Goal Outcome Evaluation:  Plan of Care Reviewed With: patient      Dysphagia evaluation completed at bedside.  Patient required consistent encouragement for participation.  Unable to assess tolerance of soft regular solids due to patient refusal.  At this time, can recommend diet of thin liquids and puréed solids.  Speech therapy to follow for risk of aspiration as well as further assessment of tolerance of solids.

## 2022-04-15 NOTE — CONSULTS
"Nutrition Services    Patient Name: Jordin Menon  YOB: 1940  MRN: [id number]  Admission date: 4/14/2022      CLINICAL NUTRITION ASSESSMENT      Reason for Assessment  MST score 2+, Physician consult     H&P:    Past Medical History:   Diagnosis Date   • Alzheimer's dementia with behavioral disturbance (HCC)    • Anemia, vitamin B12 deficiency    • Anxiety disorder due to known physiological condition    • Arthritis    • Constipation    • Gastroesophageal reflux disease without esophagitis    • High blood pressure    • HTN (hypertension)    • Hyperlipidemia    • Ingrowing toenail 09/21/2018   • Left foot pain 09/21/2018   • Osteoarthritis of right knee 08/22/2016   • Right foot pain 09/21/2018   • Sleep apnea    • Tinea unguium 09/21/2018        Current Problems:   Active Hospital Problems    Diagnosis    • Acute CVA (cerebrovascular accident) (HCC)         Nutrition/Diet History         Narrative     Consult triggered by MST of 2 for unsure of appetite. Chart review shows 10% wt loss in 2 months. SLP evaluated pt and diet advanced to Pureed. While visiting with patient, he repeatedly asked for food. When shown lunch tray, pt stated \"he wants real food\". Pt not accepting pureed diet. Does not want ensure or boost. Is ok with ice cream but would prefer a sandwich. Pt refused NFPE. Will order Magic cup tid (870kcal, 27gPro). Will continue to monitor and follow per protocol.     Anthropometrics        Current Height, Weight Height: 172.7 cm (68\")  Weight: 66.5 kg (146 lb 9.7 oz)   Current BMI Body mass index is 22.29 kg/m².       Weight Hx  Wt Readings from Last 30 Encounters:   04/14/22 1751 66.5 kg (146 lb 9.7 oz)   04/14/22 1307 66.5 kg (146 lb 9.7 oz)   03/07/22 0900 69.9 kg (154 lb 1.6 oz)   03/06/22 2348 69.2 kg (152 lb 8.9 oz)   03/04/22 0353 66 kg (145 lb 8.1 oz)   02/28/22 0608 69 kg (152 lb 1.9 oz)   02/27/22 0400 68.6 kg (151 lb 3.8 oz)   02/25/22 1540 73.6 kg (162 lb 4.1 oz)   02/04/22 1854 73.6 " kg (162 lb 4.1 oz)   07/28/21 1307 84.4 kg (186 lb)   03/05/19 1516 78.5 kg (173 lb)   09/27/18 0000 88.5 kg (195 lb)   09/21/18 0000 90.7 kg (200 lb)   03/22/18 0000 82.6 kg (182 lb)            Wt Change Observation 7.1kg wt loss (10%) in 2 months     Estimated/Assessed Needs       Energy Requirements    EST Needs (kcal/day) 1663       Protein Requirements    EST Daily Needs (g/day) 67       Fluid Requirements     Estimated Needs (mL/day) 1663     Labs/Medications         Pertinent Labs Reviewed.   Results from last 7 days   Lab Units 04/15/22  0223 04/14/22  2011 04/14/22  1431   SODIUM mmol/L 138 136 136   POTASSIUM mmol/L 3.4* 3.2* 3.0*   CHLORIDE mmol/L 105 100 98   CO2 mmol/L 23.2 24.7 24.5   BUN mg/dL 17 19 21   CREATININE mg/dL 1.04 1.23 1.45*   CALCIUM mg/dL 8.3* 8.9 9.6   BILIRUBIN mg/dL 1.1 1.4* 1.8*   ALK PHOS U/L 76 88 103   ALT (SGPT) U/L 8 10 11   AST (SGOT) U/L 11 12 14   GLUCOSE mg/dL 76 88 95     Results from last 7 days   Lab Units 04/15/22  0223 04/14/22  1431   MAGNESIUM mg/dL  --  1.8   HEMOGLOBIN g/dL 9.9* 12.0*   HEMATOCRIT % 27.0* 33.2*   TRIGLYCERIDES mg/dL 59  --      COVID19   Date Value Ref Range Status   02/26/2022 Detected (C) Not Detected - Ref. Range Final     Lab Results   Component Value Date    HGBA1C 4.30 (L) 04/15/2022         Pertinent Medications Reviewed.     Current Nutrition Orders & Evaluation of Intake       Oral Nutrition     Current PO Diet Diet Pureed   Supplement No active supplement orders       Malnutrition Severity Assessment                Nutrition Diagnosis         Nutrition Dx Problem 1 Inadequate energy Intake related to limited food acceptance as evidenced by unintended wt change. and pt report.       Nutrition Intervention         Magic Cup TID     Medical Nutrition Therapy/Nutrition Education          Learner     Readiness Patient  Non-acceptance     Method     Response Other  Other     Monitor/Evaluation        Monitor PO intake, Supplement intake, Weight,  Skin status       Nutrition Discharge Plan         To be determined       Electronically signed by:  Haritha Clarke RD  04/15/22 15:00 EDT

## 2022-04-15 NOTE — SIGNIFICANT NOTE
04/15/22 1325   Coping/Psychosocial   Observed Emotional State agitated;frustrated   Verbalized Emotional State frustration   Trust Relationship/Rapport care explained;emotional support provided   Spiritual Care   Spiritual Care Visit Type initial   Spiritual Care Source nurse referral   Receptivity to Spiritual Care visit welcomed   Spiritual Care Request mood/anxiety support   Spiritual Care Interventions supportive conversation provided   Response to Spiritual Care emotion expressed   Use of Spiritual Resources non-Alevism use of spiritual care   Spiritual Care Follow-Up follow-up, none required as presently assessed   Pt was agitated when visiting. Pt didn't want to talk. Informed pt we're here if needed.

## 2022-04-15 NOTE — CONSULTS
[facility]   Consult Note      Patient Name: Jordin Menon  : 1940  MRN: [id number]  Primary Care Physician:  Frank Llanes MD  Date of admission: 2022    Subjective   Subjective     This 82 years old gentleman was seen upon the request of the emergency room physician for evaluation    Chief Complaint:   Stroke    HPI:  The information was obtained mainly from the chart.  He could not give a history but is preoccupied with his bed being wet.  He wanted change.  The technician is getting ready to take him to the MRI facility.  He was restless and wanted to get up.  He was confused.    Patient discharged, she was noted to be weak on the right side sometime in the morning of 2022.  At the emergency room, she had a CAT scan of the brain which showed no acute changes.  He also had a CT angiogram of the neck residual vessels which showed no significant narrowing of the carotid and vertebral arterial systems.  He was not given tPA because been receiving Eliquis.    Review of Systems  There is no report of any headache, dizziness, nausea or vomiting.  No report of any chest pains or abdominal pains.      Past Medical History:   Diagnosis Date   • Alzheimer's dementia with behavioral disturbance (HCC)    • Anemia, vitamin B12 deficiency    • Anxiety disorder due to known physiological condition    • Arthritis    • Constipation    • Gastroesophageal reflux disease without esophagitis    • High blood pressure    • HTN (hypertension)    • Hyperlipidemia    • Ingrowing toenail 2018   • Left foot pain 2018   • Osteoarthritis of right knee 2016   • Right foot pain 2018   • Sleep apnea    • Tinea unguium 2018       History reviewed. No pertinent surgical history.    Family History: Family history is unknown by patient. Otherwise pertinent FHx was reviewed and not pertinent to current issue.    Social History:  reports that he has never smoked. He has never used  smokeless tobacco. He reports that he does not drink alcohol and does not use drugs.    Psychosocial History: He has anxiety    Home Medications:  Diclofenac Sodium, Dyclonine-Glycerin, Lidocaine HCl, acetaminophen, apixaban, busPIRone, docusate sodium, famotidine, furosemide, hydrOXYzine, ipratropium-albuterol, magnesium hydroxide, melatonin, memantine, mineral oil-hydrophilic petrolatum, polyethylene glycol, potassium & sodium phosphates, pravastatin, thiamine, and witch hazel-glycerin      Allergies:  No Known Allergies    Vitals:   Temp:  [95.2 °F (35.1 °C)-97.9 °F (36.6 °C)] 95.2 °F (35.1 °C)  Heart Rate:  [56-77] 56  Resp:  [16-18] 16  BP: ()/(55-73) 114/67  Physical Exam   He was awake and alert was standing from distress.  He was fully developed and fairly nourished.    His heart was regular heart rate was 60/min.  There were no murmurs.  The lungs were clear.    The carotid pulses were 1+ and equal.  There were no bruits on either side.    Neurological Examination:  's responses were not coherent and were not relevant.  He is preoccupied with his wet bed.  He was not able to understand and follow verbal commands.    I did not look into the fundus on either side because of the COVID-19 pandemic social distancing.    He was uncooperative.  I was not able to see the pupils on the right side.    Was unable to appreciate the presence of facial weakness or asymmetry because she was wearing a mask all the way just below his eyes.  Was able to hear normal conversational speech.    Is weak in both upper and lower extremities and weaker on the right side.  He has spasticity and flexion contraction of the right upper extremity.    The strength in both upper and lower extremities was normal and equal.    The deep tendon reflexes were absent on both sides.    He was not able to do finger-to-nose test on both sides.      Result Review    Result Review:  I have personally reviewed the results from the time of this  admission to 4/14/2022 20:01 EDT and agree with these findings:  []  Laboratory  []  Microbiology  []  Radiology  []  EKG/Telemetry   []  Cardiology/Vascular   []  Pathology  []  Old records  []  Other:  Most notable findings include:   I reviewed the computer images of the CAT scan of his brain was done on April 14, 2022.  There were no acute changes noted.  The study showed areas of encephalomalacia in the medial portions of the frontal lobes on both sides.  There is marked cortical atrophy which is more marked in the frontal regions on both sides, and ventricular dilatation.    I reviewed the computer images of the CT angiogram of the neck and cerebral vessels done on April 14, 2022.  The study was unremarkable.  There were no evidence of any stenosis noted in the carotid and vertebral arterial system and in the neck and there are branches in the brain.    We have the report of the cerebral perfusion study done on April 14, 2022.  This was reported to show no convincing evidence to suggest acute perfusion abnormalities.      Impression:    Altered Mental State  Left Brain Infarct, April 14, 2022  Receptive Aphasia  Dementia  Hyperlipidemia  Hypertension      Plan:   Continue Eliquis and Aspirin therapy.  We will see what the MRI of the brain show  Thank you very much for letting me see this patient                  Electronically signed by William Malone Jr., MD, 04/14/22, 8:01 PM EDT.

## 2022-04-15 NOTE — PLAN OF CARE
Goal Outcome Evaluation:  Plan of Care Reviewed With: patient        Progress: no change         New admission from the ED. NIH 6. Failed dysphagia screen, orders in. SCDs placed. Potassium IV x1 given. VSS. Continue to monitor

## 2022-04-15 NOTE — PROGRESS NOTES
[facility]   Hospitalist Progress Note  Date: 4/15/2022  Patient Name: Jordin Menon    Subjective   Subjective     Chief Complaint:   Weakness    Summary:   82 y.o. male with history of Alzheimer's dementia, hypertension hyperlipidemia presents to the hospital from [facility] and rehab with acute onset right arm weakness and possible left facial droop.    Not a tPA candidate due to Eliquis, CTA head and neck with widely patent vessels.  On exam patient has some weakness of his right hand and mild facial droop on the left but he tells me that his right hand symptoms are chronic.  On conversation with his daughter on the phone she indicates this is a new finding.    Interval Followup: Overnight the patient became confused and combative and was placed in four-point restraints.  Temperature dipped down to 95 degrees and patient had hypotension, received IV fluid boluses and albumin.  This morning the patient request to be put out of restraints and allowed to sit up on the side of the bed    Review of Systems   Patient denies nausea vomiting or chest pain    Objective   Objective     Vitals:   Temp:  [94.4 °F (34.7 °C)-98.6 °F (37 °C)] 95.6 °F (35.3 °C)  Heart Rate:  [43-87] 60  Resp:  [17-19] 19  BP: ()/(41-91) 117/58  Physical Exam   General: NAD, WDWN elderly male lying in bed in four-point restraints  Eyes: Clear conjunctiva, PERRL EOMI  ENMT: mmm, normal oropharynx  Resp: CTAB, no wrr, good effort no dyspnea  CV: RRR no mrg, 1+ pitting LE edema  GI: abdomen is soft, NT, ND, +bs  Neuro: Moves all extremities spontaneously, sensation intact but difficult to evaluate, no facial droop.  Right hand weaker but difficult to assess due to cooperation and dementia  Psych: AOx3, normal mood and affect    Result Review    Result Review:  I have personally reviewed the results from the time of this admission to 4/15/2022 18:25 EDT and agree with these findings:   [x]?  Laboratory  Troponin 0.04 down  trended  Potassium 3.4  Creatinine 1.04  Bilirubin 1.8 now 1.1  A1c 4.3%  Hemoglobin 12 down to 9.9 likely due to fluids  []?  Microbiology  [x]?  Radiology  MRI brain: No acute infarction, limited back motion artifact  [x]?  EKG/Telemetry  personally reviewed telemetry: Sinus rhythm 71  [x]?  Cardiology/Vascular  echocardiogram: Ejection fraction 55%  []?  Pathology  []?  Old records  []?  Other:      Assessment / Plan      Assessment:   · Possible acute ischemic stroke  · Possible new right hand weakness  · Chronic kidney disease stage III  · Elevated bilirubin resolved  · Moderately elevated troponin  · Hypokalemia  · Hypothermia     Plan:  · Hypothermia: Unclear source, TSH within normal limits, may be the patient's baseline  · MRI negative but exam consistent with CVA, await neurology evaluation by Dr. Malone  · PT OT speech therapy  · Puréed diet per speech therapy  · -Asa 81 (300mg load, rectal) +atorvastatin  --resume Eliquis today--  · -LDL <70 if atherosclerosis [at goal 59]  · -A1C goal <7% [at goal 4.3%]   · May need IV Lasix, await repeat renal function and electrolytes in a.m.  · Holding off on cpap at night for now, seems to irritate him at the nursing home  · Resume home HS remeron, hopefully this will help with his sundowner symptoms    DVT ppx: Eliquis  Diet:  Puréed diet  Discussed with bedside RN and neurology

## 2022-04-15 NOTE — THERAPY EVALUATION
Acute Care - Speech Language Pathology   Swallow Initial Evaluation KASSI Davis     Patient Name: Jordin Menon  : 1940  MRN: [id number]  Today's Date: 4/15/2022               Admit Date: 2022    Visit Dx:     ICD-10-CM ICD-9-CM   1. Acute CVA (cerebrovascular accident) ([facility])  I63.9 434.91   2. Hypokalemia  E87.6 276.8   3. Oropharyngeal dysphagia  R13.12 787.22     Patient Active Problem List   Diagnosis   • Primary osteoarthritis of right knee   • Status post total right knee replacement   • Essential hypertension   • Shortness of breath   • ARF (acute renal failure) ([facility])   • Hypokalemia   • Hypernatremia   • Symptomatic bradycardia   • Hypothermia, initial encounter   • Acute CVA (cerebrovascular accident) ([facility])     Past Medical History:   Diagnosis Date   • Alzheimer's dementia with behavioral disturbance ([facility])    • Anemia, vitamin B12 deficiency    • Anxiety disorder due to known physiological condition    • Arthritis    • Constipation    • Gastroesophageal reflux disease without esophagitis    • High blood pressure    • HTN (hypertension)    • Hyperlipidemia    • Ingrowing toenail 2018   • Left foot pain 2018   • Osteoarthritis of right knee 2016   • Right foot pain 2018   • Sleep apnea    • Tinea unguium 2018     History reviewed. No pertinent surgical history.      Inpatient Speech Pathology Dysphagia Evaluation        PAIN SCALE: None indicated    PRECAUTIONS/CONTRAINDICATIONS: Standard, fall    SUSPECTED ABUSE/NEGLECT/EXPLOITATION: None identified    SOCIAL/PSYCHOLOGICAL NEEDS/BARRIERS: None identified    PAST SOCIAL HISTORY: 82-year-old male, nursing home resident    PRIOR FUNCTION: On a diet at the nursing home however consistency not known at this time    PATIENT GOALS/EXPECTATIONS: Patient wishes to get dressed    HISTORY: 82-year-old male referred for speech pathology dysphagia evaluation due to suspicion of CVA as well as failing nursing dysphagia screening.   Patient with history of CVA and craniotomy.  MRI revealed bilateral frontal lobe encephalomalacia consistent with prior craniotomy.  Radiologist reports no acute infarct observed on MRI.  Chest x-ray revealed pulmonary congestion.  Patient has underwent prior speech pathology services however none currently this admit.    CURRENT DIET LEVEL: N.p.o.    OBJECTIVE:    TEST ADMINISTERED: Clinical dysphagia evaluation    COGNITION/SAFETY AWARENESS: Likely impaired    BEHAVIORAL OBSERVATIONS: Patient required significant amount of encouragement for participation in evaluation.  Initially refused.  Patient reluctant to take off his mask.  Did not follow any directions for oral motor examination.    ORAL MOTOR EXAM: Informal observation only due to patient refusing to participate in structured examination.  Patient is edentulous.  Lingual protrusion without any observed deviation.    VOICE QUALITY: Clear    REFLEX EXAM: Patient declined    POSTURE: Sitting fully upright on edge of bed    FEEDING/SWALLOWING FUNCTION: Assessed with nectar thick liquids, thin liquids and purée solids.  Patient is adamantly refusing soft and regular solids.    CLINICAL OBSERVATIONS: Nectar thick liquids with patient agreeing to cup drink only.  Swallows completed with mild delay.  Vocal quality and laryngeal sounds clear.  Thin liquids by cup with patient agreeing to cup drink only.  Swallows completed with mild delay.  Vocal quality laryngeal sounds clear.  Purée solids by spoon with moderate lingual pumping.  Swallow completed with mild delay.  Laryngeal elevation noted palpation.  No overt signs or symptoms aspiration observed with consistencies administered at bedside, cannot rule out silent aspiration at bedside.    DYSPHAGIA CRITERIA: Swallow delay, risk of aspiration    FUNCTIONAL ASSESSMENT INSTRUMENT: Patient currently scored a level 5 of 7 on Functional Communication Measures for swallowing indicating a 20-39% limitation in  function.    ASSESSMENT/ PLAN OF CARE:  Pt presents with limitations, noted below, that impede patient's ability to tolerate least restrictive diet safely and independently. The skills of a therapist will be required to safely and effectively implement the following treatment plan to restore maximal level of function.    PROBLEMS:  1.  Risk of aspiration, swallow delay, generalized decreased oral motor strength/range of motion   LTG 1: 30 days.  Patient will increase functional communication measures for swallowing to level 6 of 7, indicating 1-19% limitation in function.   STG 1a: 14 days.  Patient will tolerate least restrictive diet of puréed solids, thin liquids with minimal to no signs or symptoms of aspiration.   STG 1b: 14 days.  Patient will agree to and tolerate trials of mechanical soft solids with minimal to no signs or symptoms of aspiration.   TREATMENT: Speech therapy for dysphagia, education of strategies and tolerance of least restrictive diet.      FREQUENCY/DURATION: Daily, 5 days a week    REHAB POTENTIAL:  Pt has good/fair rehab potential.  The following limitations may influence improvement/ length of tx medical status, patient participation.    RECOMMENDATIONS:   1.   DIET: Puréed solids, thin liquids    2.  POSITION: Fully upright for all p.o. intake, 30 minutes following      Pt/responsible party agrees with plan of care and has been informed of all alternatives, risks and benefits.  SLP Recommendation and Plan                          Anticipated Discharge Disposition (SLP): skilled nursing facility (04/15/22 1101)                                           Plan of Care Reviewed With: patient          EDUCATION  The patient has been educated in the following areas:   Dysphagia (Swallowing Impairment).              Time Calculation:    Time Calculation- SLP     Row Name 04/15/22 1129             Time Calculation- SLP    SLP Start Time 0900  -SN      SLP Stop Time 1000  -SN      SLP Time  Calculation (min) 60 min  -SN      SLP Received On 04/15/22  -SN              Untimed Charges    13670-VD Eval Oral Pharyng Swallow Minutes 60  -SN              Total Minutes    Untimed Charges Total Minutes 60  -SN       Total Minutes 60  -SN            User Key  (r) = Recorded By, (t) = Taken By, (c) = Cosigned By    Initials Name Provider Type    Marleny Gonzales SLP Speech and Language Pathologist                Therapy Charges for Today     Code Description Service Date Service Provider Modifiers Qty    34011316177  ST EVAL ORAL PHARYNG SWALLOW 4 4/15/2022 Marleny Wolfe SLP GN 1               HAI Webber  4/15/2022

## 2022-04-16 ENCOUNTER — APPOINTMENT (OUTPATIENT)
Dept: CT IMAGING | Facility: HOSPITAL | Age: 82
End: 2022-04-16

## 2022-04-16 ENCOUNTER — APPOINTMENT (OUTPATIENT)
Dept: GENERAL RADIOLOGY | Facility: HOSPITAL | Age: 82
End: 2022-04-16

## 2022-04-16 LAB
ANION GAP SERPL CALCULATED.3IONS-SCNC: 10.7 MMOL/L (ref 5–15)
BUN SERPL-MCNC: 14 MG/DL (ref 8–23)
BUN/CREAT SERPL: 13.3 (ref 7–25)
CALCIUM SPEC-SCNC: 8.7 MG/DL (ref 8.6–10.5)
CHLORIDE SERPL-SCNC: 105 MMOL/L (ref 98–107)
CO2 SERPL-SCNC: 24.3 MMOL/L (ref 22–29)
CREAT SERPL-MCNC: 1.05 MG/DL (ref 0.76–1.27)
DEPRECATED RDW RBC AUTO: 42.5 FL (ref 37–54)
EGFRCR SERPLBLD CKD-EPI 2021: 71.3 ML/MIN/1.73
ERYTHROCYTE [DISTWIDTH] IN BLOOD BY AUTOMATED COUNT: 14.5 % (ref 12.3–15.4)
GLUCOSE SERPL-MCNC: 78 MG/DL (ref 65–99)
HCT VFR BLD AUTO: 28.8 % (ref 37.5–51)
HGB BLD-MCNC: 10.5 G/DL (ref 13–17.7)
MAGNESIUM SERPL-MCNC: 1.5 MG/DL (ref 1.6–2.4)
MCH RBC QN AUTO: 29.3 PG (ref 26.6–33)
MCHC RBC AUTO-ENTMCNC: 36.5 G/DL (ref 31.5–35.7)
MCV RBC AUTO: 80.4 FL (ref 79–97)
PLATELET # BLD AUTO: 208 10*3/MM3 (ref 140–450)
PMV BLD AUTO: 10.1 FL (ref 6–12)
POTASSIUM SERPL-SCNC: 3.3 MMOL/L (ref 3.5–5.2)
RBC # BLD AUTO: 3.58 10*6/MM3 (ref 4.14–5.8)
SODIUM SERPL-SCNC: 140 MMOL/L (ref 136–145)
WBC NRBC COR # BLD: 5.68 10*3/MM3 (ref 3.4–10.8)

## 2022-04-16 PROCEDURE — 85027 COMPLETE CBC AUTOMATED: CPT | Performed by: INTERNAL MEDICINE

## 2022-04-16 PROCEDURE — 80048 BASIC METABOLIC PNL TOTAL CA: CPT | Performed by: INTERNAL MEDICINE

## 2022-04-16 PROCEDURE — 97165 OT EVAL LOW COMPLEX 30 MIN: CPT

## 2022-04-16 PROCEDURE — 72125 CT NECK SPINE W/O DYE: CPT

## 2022-04-16 PROCEDURE — 83735 ASSAY OF MAGNESIUM: CPT | Performed by: INTERNAL MEDICINE

## 2022-04-16 PROCEDURE — 73030 X-RAY EXAM OF SHOULDER: CPT

## 2022-04-16 PROCEDURE — 99233 SBSQ HOSP IP/OBS HIGH 50: CPT | Performed by: FAMILY MEDICINE

## 2022-04-16 PROCEDURE — 97161 PT EVAL LOW COMPLEX 20 MIN: CPT

## 2022-04-16 RX ORDER — POTASSIUM CHLORIDE 750 MG/1
20 CAPSULE, EXTENDED RELEASE ORAL 2 TIMES DAILY WITH MEALS
Status: DISPENSED | OUTPATIENT
Start: 2022-04-16 | End: 2022-04-17

## 2022-04-16 RX ADMIN — FAMOTIDINE 20 MG: 20 TABLET ORAL at 20:19

## 2022-04-16 RX ADMIN — MAGNESIUM OXIDE TAB 400 MG (241.3 MG ELEMENTAL MG) 400 MG: 400 (241.3 MG) TAB at 16:59

## 2022-04-16 RX ADMIN — POLYETHYLENE GLYCOL 3350 17 G: 17 POWDER, FOR SOLUTION ORAL at 08:57

## 2022-04-16 RX ADMIN — Medication 10 ML: at 08:58

## 2022-04-16 RX ADMIN — ATORVASTATIN CALCIUM 80 MG: 40 TABLET, FILM COATED ORAL at 20:19

## 2022-04-16 RX ADMIN — MAGNESIUM OXIDE TAB 400 MG (241.3 MG ELEMENTAL MG) 400 MG: 400 (241.3 MG) TAB at 20:19

## 2022-04-16 RX ADMIN — ASPIRIN 81 MG CHEWABLE TABLET 81 MG: 81 TABLET CHEWABLE at 08:58

## 2022-04-16 RX ADMIN — MIRTAZAPINE 7.5 MG: 15 TABLET, FILM COATED ORAL at 20:19

## 2022-04-16 RX ADMIN — BUSPIRONE HYDROCHLORIDE 10 MG: 10 TABLET ORAL at 20:19

## 2022-04-16 RX ADMIN — BUSPIRONE HYDROCHLORIDE 10 MG: 10 TABLET ORAL at 08:57

## 2022-04-16 RX ADMIN — FUROSEMIDE 20 MG: 20 TABLET ORAL at 08:57

## 2022-04-16 RX ADMIN — Medication 10 ML: at 20:19

## 2022-04-16 RX ADMIN — APIXABAN 5 MG: 5 TABLET, FILM COATED ORAL at 20:19

## 2022-04-16 RX ADMIN — POTASSIUM CHLORIDE 20 MEQ: 750 CAPSULE, EXTENDED RELEASE ORAL at 17:01

## 2022-04-16 RX ADMIN — APIXABAN 5 MG: 5 TABLET, FILM COATED ORAL at 08:58

## 2022-04-16 RX ADMIN — MEMANTINE 10 MG: 10 TABLET ORAL at 08:57

## 2022-04-16 RX ADMIN — Medication 5 MG: at 20:19

## 2022-04-16 RX ADMIN — Medication 100 MG: at 08:58

## 2022-04-16 NOTE — THERAPY EVALUATION
Acute Care - Physical Therapy Initial Evaluation   Ryan     Patient Name: Jordin Menon  : 1940  MRN: [id number]  Today's Date: 2022      Visit Dx:     ICD-10-CM ICD-9-CM   1. Acute CVA (cerebrovascular accident) ([facility])  I63.9 434.91   2. Hypokalemia  E87.6 276.8   3. Oropharyngeal dysphagia  R13.12 787.22   4. Decreased activities of daily living (ADL)  Z78.9 V49.89   5. Difficulty walking  R26.2 719.7     Patient Active Problem List   Diagnosis   • Primary osteoarthritis of right knee   • Status post total right knee replacement   • Essential hypertension   • Shortness of breath   • ARF (acute renal failure) ([facility])   • Hypokalemia   • Hypernatremia   • Symptomatic bradycardia   • Hypothermia, initial encounter   • Acute CVA (cerebrovascular accident) ([facility])     Past Medical History:   Diagnosis Date   • Alzheimer's dementia with behavioral disturbance ([facility])    • Anemia, vitamin B12 deficiency    • Anxiety disorder due to known physiological condition    • Arthritis    • Constipation    • Gastroesophageal reflux disease without esophagitis    • High blood pressure    • HTN (hypertension)    • Hyperlipidemia    • Ingrowing toenail 2018   • Left foot pain 2018   • Osteoarthritis of right knee 2016   • Right foot pain 2018   • Sleep apnea    • Tinea unguium 2018     History reviewed. No pertinent surgical history.  PT Assessment (last 12 hours)     PT Evaluation and Treatment     Row Name 22 1200          Physical Therapy Time and Intention    Document Type evaluation  -AV     Mode of Treatment individual therapy;physical therapy  -AV     Row Name 22 1200          General Information    Patient Profile Reviewed yes  -AV     Prior Level of Function --  Assist with ADLs. Ambulated with rolling walker primarily, also used wheelchair. No home O2.  -AV     Equipment Currently Used at Home walker, rolling  -AV     Existing Precautions/Restrictions fall  -AV     Row Name  04/16/22 1200          Living Environment    Current Living Arrangements extended care facility  -AV     People in Home facility resident  -AV     Row Name 04/16/22 1200          Range of Motion (ROM)    Range of Motion bilateral lower extremities;ROM is WFL  -AV     [facility] Name 04/16/22 1200          Strength (Manual Muscle Testing)    Strength (Manual Muscle Testing) bilateral lower extremities;strength is WFL  -AV     [facility] Name 04/16/22 1200          Bed Mobility    Comment, (Bed Mobility) Patient seated upright in recliner upon therapist entry.  -AV     Row Name 04/16/22 1200          Transfers    Transfers sit-stand transfer  -AV     Sit-Stand [place] (Transfers) minimum assist (75% patient effort)  -AV     Row Name 04/16/22 1200          Sit-Stand Transfer    Assistive Device (Sit-Stand Transfers) walker, front-wheeled  -AV     Row Name 04/16/22 1200          Gait/Stairs (Locomotion)    [place] Level (Gait) contact guard  -AV     Assistive Device (Gait) walker, front-wheeled  -AV     Distance in Feet (Gait) Patient completed alternating standing marches in place x5 repetitions before sitting impulsively secondary to increased fatigue.  -AV     Row Name 04/16/22 1200          Safety Issues, Functional Mobility    Safety Issues Affecting Function (Mobility) awareness of need for assistance;insight into deficits/self-awareness;judgment;safety precaution awareness  -AV     Impairments Affecting Function (Mobility) balance;cognition;endurance/activity tolerance;strength  -AV     Row Name 04/16/22 1200          Balance    Balance Assessment standing dynamic balance  -AV     Dynamic Standing Balance minimal assist  -AV     Position/Device Used, Standing Balance supported;walker, front-wheeled  -AV     Row Name 04/16/22 1200          Plan of Care Review    Plan of Care Reviewed With patient  -AV     Progress no change  -AV     Outcome Evaluation Patient presents with deficits in balance, endurance, transfers,  and ambulation. Patient will benefit from skilled PT services to address these mobility deficits and decrease risk of falls.  -AV     Row Name 04/16/22 1200          Therapy Assessment/Plan (PT)    Rehab Potential (PT) good, to achieve stated therapy goals  -AV     Criteria for Skilled Interventions Met (PT) yes;meets criteria  -AV     Therapy Frequency (PT) daily  -AV     Problem List (PT) problems related to;balance;mobility;cognition  -AV     Activity Limitations Related to Problem List (PT) unable to transfer safely;unable to ambulate safely  -AV     Row Name 04/16/22 1200          PT Evaluation Complexity    History, PT Evaluation Complexity 1-2 personal factors and/or comorbidities  -AV     Examination of Body Systems (PT Eval Complexity) total of 4 or more elements  -AV     Clinical Presentation (PT Evaluation Complexity) stable  -AV     Clinical Decision Making (PT Evaluation Complexity) low complexity  -AV     Overall Complexity (PT Evaluation Complexity) low complexity  -AV     Row Name 04/16/22 1200          Therapy Plan Review/Discharge Plan (PT)    Therapy Plan Review (PT) evaluation/treatment results reviewed;patient  -AV     Row Name 04/16/22 1200          Physical Therapy Goals    Bed Mobility Goal Selection (PT) bed mobility, PT goal 1  -AV     Transfer Goal Selection (PT) transfer, PT goal 1  -AV     Gait Training Goal Selection (PT) gait training, PT goal 1  -AV     Row Name 04/16/22 1200          Bed Mobility Goal 1 (PT)    Activity/Assistive Device (Bed Mobility Goal 1, PT) bed mobility activities, all  -AV     [place] Level/Cues Needed (Bed Mobility Goal 1, PT) supervision required  -AV     Time Frame (Bed Mobility Goal 1, PT) 10 days  -AV     Row Name 04/16/22 1200          Transfer Goal 1 (PT)    Activity/Assistive Device (Transfer Goal 1, PT) transfers, all;walker, rolling  -AV     [place] Level/Cues Needed (Transfer Goal 1, PT) supervision required  -AV     Time Frame (Transfer  Goal 1, PT) 10 days  -AV     Row Name 04/16/22 1200          Gait Training Goal 1 (PT)    Activity/Assistive Device (Gait Training Goal 1, PT) gait (walking locomotion);assistive device use;walker, rolling  -AV     [place] Level (Gait Training Goal 1, PT) supervision required  -AV     Distance (Gait Training Goal 1, PT) 200  -AV     Time Frame (Gait Training Goal 1, PT) 10 days  -AV           User Key  (r) = Recorded By, (t) = Taken By, (c) = Cosigned By    Initials Name Provider Type    AV Guille Rosales, PT Physical Therapist                Physical Therapy Education                 Title: PT OT SLP Therapies (In Progress)     Topic: Physical Therapy (In Progress)     Point: Mobility training (Done)     Learning Progress Summary           Patient Acceptance, E,TB, VU,NR by AV at 4/16/2022 1212                   Point: Home exercise program (Not Started)     Learner Progress:  Not documented in this visit.          Point: Body mechanics (Done)     Learning Progress Summary           Patient Acceptance, E,TB, VU,NR by AV at 4/16/2022 1212                   Point: Precautions (Done)     Learning Progress Summary           Patient Acceptance, E,TB, VU,NR by AV at 4/16/2022 1212                               User Key     Initials Effective Dates Name Provider Type Discipline    AV 06/11/21 -  Guille Rosales, PT Physical Therapist PT              PT Recommendation and Plan  Anticipated Discharge Disposition (PT): sub acute care setting  Planned Therapy Interventions (PT): balance training, bed mobility training, gait training, neuromuscular re-education, strengthening, transfer training  Therapy Frequency (PT): daily  Plan of Care Reviewed With: patient  Progress: no change  Outcome Evaluation: Patient presents with deficits in balance, endurance, transfers, and ambulation. Patient will benefit from skilled PT services to address these mobility deficits and decrease risk of falls.   Outcome Measures     Row  Name 04/16/22 1200             How much help from another person do you currently need...    Turning from your back to your side while in flat bed without using bedrails? 3  -AV      Moving from lying on back to sitting on the side of a flat bed without bedrails? 2  -AV      Moving to and from a bed to a chair (including a wheelchair)? 2  -AV      Standing up from a chair using your arms (e.g., wheelchair, bedside chair)? 3  -AV      Climbing 3-5 steps with a railing? 2  -AV      To walk in hospital room? 3  -AV      AM-PAC 6 Clicks Score (PT) 15  -AV              Functional Assessment    Outcome Measure Options AM-PAC 6 Clicks Basic Mobility (PT)  -AV            User Key  (r) = Recorded By, (t) = Taken By, (c) = Cosigned By    Initials Name Provider Type    Guille Hernandez, PT Physical Therapist                 Time Calculation:    PT Charges     Row Name 04/16/22 1211             Time Calculation    PT Received On 04/16/22  -AV      PT Goal Re-Cert Due Date 04/25/22  -AV              Untimed Charges    PT Eval/Re-eval Minutes 35  -AV              Total Minutes    Untimed Charges Total Minutes 35  -AV       Total Minutes 35  -AV            User Key  (r) = Recorded By, (t) = Taken By, (c) = Cosigned By    Initials Name Provider Type    Guille Hernandez, PT Physical Therapist              Therapy Charges for Today     Code Description Service Date Service Provider Modifiers Qty    32072394543 HC PT EVAL LOW COMPLEXITY 3 4/16/2022 Guille Rosales, PT GP 1          PT G-Codes  Outcome Measure Options: AM-PAC 6 Clicks Basic Mobility (PT)  AM-PAC 6 Clicks Score (PT): 15  AM-PAC 6 Clicks Score (OT): 15    Guille Rosales PT  4/16/2022

## 2022-04-16 NOTE — PROGRESS NOTES
[facility]   Hospitalist Progress Note  Date: 2022  Patient Name: Jordin Menon  : 1940  MRN: [id number]  Date of admission: 2022      Subjective   Subjective     Chief Complaint:   Weakness     Summary:   82 y.o. male with history of Alzheimer's dementia, hypertension hyperlipidemia presents to the hospital from [facility] and rehab with acute onset right arm weakness and possible left facial droop.    Not a tPA candidate due to Eliquis, CTA head and neck with widely patent vessels.  On exam patient has some weakness of his right hand and mild facial droop on the left but he tells me that his right hand symptoms are chronic.  On conversation with his daughter on the phone she indicates this is a new finding.     Interval Followup: Patient seen and examined this morning, no acute distress, no acute major overnight events, patient out of restraints this morning sitting at the bedside upright, denies fevers, chills, sweats.  Still has right-sided weakness, left-sided facial droop, notes most of his right arm weakness is due to his chronic shoulder pain.  Has difficulty with range of motion of his right arm.  Refusing meals, refusing interventions taking medications, MRI brain reviewed, motion artifact but no signs of major stroke.  Echo reviewed, mild mitral regurgitation, EF 54.8%.  Telemetry reviewed, no acute major arrhythmic events, sinus rhythm in the 60s.  Denies chest pain or palpitations.  Confused but responds to questions.  Temperature regulation still remains an ongoing issue, urinalysis reviewed, normal white blood cell count, no signs or symptoms such as C septic.  Magnesium and potassium both low, replacement ordered orally.    Review of Systems:  All systems reviewed and negative except for generalized fatigue, refusing interventions, right shoulder pain     Objective   Objective     Vitals:   Temp:  [96.2 °F (35.7 °C)-98.5 °F (36.9 °C)] 96.2 °F (35.7 °C)  Heart Rate:   [68-92] 92  Resp:  [16-20] 16  BP: ()/(51-88) 99/61  Physical Exam    Constitutional: Awake, alert, no acute distress   Eyes: Pupils equal, sclerae anicteric, no conjunctival injection   HENT: NCAT, mucous membranes moist   Neck: Supple, no masses palpated   Respiratory: Clear to auscultation bilaterally, nonlabored respirations    Cardiovascular: RRR, no murmurs, rubs, or gallops, palpable pedal pulses bilaterally   Gastrointestinal: Positive bowel sounds, soft, nontender, nondistended   Musculoskeletal: +1 bilateral ankle edema, no clubbing or cyanosis to extremities, right shoulder range of motion limited   Psychiatric: Appropriate affect, cooperative   Neurologic: Oriented x 3, strength symmetric in all extremities except right upper extremity, weak, unable to perform range of motion or test strength due to lack of cooperation, unable to test cranial nerves due to lack of cooperation, speech clear   Skin: No rashes     Result Review    Result Review:  I have personally reviewed the results from the time of this admission to 4/16/2022 19:30 EDT and agree with these findings:  [x]  Laboratory   CBC    CBC 4/14/22 4/15/22 4/16/22   WBC 6.06 6.17 5.68   RBC 4.06 (A) 3.37 (A) 3.58 (A)   Hemoglobin 12.0 (A) 9.9 (A) 10.5 (A)   Hematocrit 33.2 (A) 27.0 (A) 28.8 (A)   MCV 81.8 80.1 80.4   MCH 29.6 29.4 29.3   MCHC 36.1 (A) 36.7 (A) 36.5 (A)   RDW 14.6 14.6 14.5   Platelets 207 183 208   (A) Abnormal value            BMP    BMP 4/14/22 4/14/22 4/14/22 4/15/22 4/16/22    1428 1431 2011     BUN  21 19 17 14   Creatinine 1.40 1.45 (A) 1.23 1.04 1.05   Sodium  136 136 138 140   Potassium  3.0 (A) 3.2 (A) 3.4 (A) 3.3 (A)   Chloride  98 100 105 105   CO2  24.5 24.7 23.2 24.3   Calcium  9.6 8.9 8.3 (A) 8.7   (A) Abnormal value       Comments are available for some flowsheets but are not being displayed.           LIVER FUNCTION TESTS:      Lab 04/15/22  0223 04/14/22 2011 04/14/22  1431   TOTAL PROTEIN 5.1* 6.0 6.8    ALBUMIN 2.80* 3.20* 3.80   GLOBULIN 2.3 2.8 3.0   ALT (SGPT) 8 10 11   AST (SGOT) 11 12 14   BILIRUBIN 1.1 1.4* 1.8*   ALK PHOS 76 88 103       [x]  Microbiology No results found for: ACANTHNAEG, AFBCX, BPERTUSSISCX, BLOODCX  No results found for: BCIDPCR, CXREFLEX, CSFCX, CULTURETIS  No results found for: CULTURES, HSVCX, URCX  No results found for: EYECULTURE, GCCX, HSVCULTURE, LABHSV  No results found for: LEGIONELLA, MRSACX, MUMPSCX, MYCOPLASCX  No results found for: NOCARDIACX, STOOLCX  No results found for: THROATCX, UNSTIMCULT, URINECX, CULTURE, VZVCULTUR  No results found for: VIRALCULTU, WOUNDCX    [x]  Radiology Adult Transthoracic Echo Complete W/ Cont if Necessary Per Protocol    Result Date: 4/15/2022  · Calculated left ventricular EF = 54.8% Estimated left ventricular EF was in agreement with the calculated left ventricular EF. Left ventricular systolic function is normal. · Mild mitral valve regurgitation is present.      CT Angiogram Neck    Result Date: 4/14/2022  PROCEDURE: CT ANGIOGRAM NECK  COMPARISON: [facility], CT, CTA NECK W/WO CONT, POST PROC, 6/23/2011, 12:13.  INDICATIONS: cva  PROTOCOL:   Standard imaging protocol performed    RADIATION:      Automated exposure control was utilized to minimize radiation dose.  TECHNIQUE: After obtaining the patient's consent, CT images of the neck were obtained without and with non-ionic intravenous contrast material. Multi-planar reformatted/3-D images were created to optimize visualization of vascular anatomy. Unless otherwise stated in this report, all vascular stenoses involving the internal carotid arteries reported for this examination are derived by dividing the lesion diameter by the diameter of the normal internal carotid artery more distally.  FINDINGS:  LEFT INTERNAL CAROTID: No hemodynamically significant stenosis or dissection.  EXTERNAL CAROTID: No hemodynamically significant stenosis or dissection.  COMMON CAROTID: No  hemodynamically significant stenosis or dissection.  VERTEBRAL: No hemodynamically significant stenosis or dissection.   RIGHT INTERNAL CAROTID: No hemodynamically significant stenosis or dissection.  EXTERNAL CAROTID: No hemodynamically significant stenosis or dissection.  COMMON CAROTID: No hemodynamically significant stenosis or dissection.  VERTEBRAL: No hemodynamically significant stenosis or dissection.  The right vertebral artery is dominant.   OTHER: The visualized soft tissues of the neck are also unremarkable.        Major arterial vasculature within neck appears widely patent, with no evidence of hemodynamically significant stenosis or dissection.     AZAM DOUGLAS MD       Electronically Signed and Approved By: AZAM DOUGLAS MD on 4/14/2022 at 16:00             MRI Brain Without Contrast    Result Date: 4/14/2022  PROCEDURE: MRI BRAIN WO CONTRAST  COMPARISONS: [facility], CT, HEAD W/WO CONTRAST, 6/23/2011, 11:53.   [facility], MR, MRI BRAIN & ORBITS W/O AND W/ CONTRAST, 9/04/2008, 11:51.    [facility], MR, BRAIN W/O CONTRAST, 1/03/2015, 15:03.   [facility], CT, CT ANGIOGRAM NECK, 4/14/2022, 14:44.   [facility], CT, CT ANGIO. HEAD W AI ANALYSIS OF LVO, 4/14/2022, 14:44.   [facility], CT, CT CEREBRAL PERFUSION W WO CONTRAST, 4/14/2022, 13:56.   [facility], CT, CT HEAD WO CONTRAST STROKE PROTOCOL, 4/14/2022, 13:23.   [facility], CT, CT HEAD WO CONTRAST, 3/07/2022, 2:15.  INDICATIONS: Possible acute ischemic infarct(s)/stroke/CVA versus TIA.  Imaging follow-up.  Altered mental status (AMS).  Unspecified weakness.  TECHNIQUE: A variety of imaging planes and parameters were utilized for visualization of suspected pathology within the brain.  Three hundred thirty-three (333) magnetic resonance (MR) images were obtained without contrast.  There is motion artifact on the exam.   FINDINGS: Chronic posttraumatic and/or postoperative changes involve the bilateral frontal regions with encephalomalacia, seen previously.  There is susceptibility artifact in these regions, as well, probably associated with prior craniotomy and/or chronic blood products, such as hemorrhagic contusions, also seen previously.  There is diffuse prominence of the extra-axial spaces and the ventricular system, suggesting central atrophy.  No restricted diffusion is appreciated to suggest an acute infarct(s).  No midline shift or acute intracranial herniation syndrome.  There is suspected moderate chronic small vessel ischemia/infarction, as well.  Similar findings were seen previously.  The hippocampi are symmetric in size, signal intensities, and internal architecture.       No acute infarct is seen.  No acute brain abnormality is appreciated.  The study is limited by motion artifact.     COMMENT:  Part of this note is an electronic transcription of spoken language to printed text. The electronic translation/transcription may permit erroneous, or at times, nonsensical (or even sensical) words or phrases to be inadvertently transcribed or omitted; this  has reviewed the note for such errors (as well as additional errors); however, some may still exist.  ADONIS OLVERA JR, MD       Electronically Signed and Approved By: ADONIS OLVERA JR, MD on 4/14/2022 at 23:06              XR Chest 1 View    Result Date: 4/14/2022  PROCEDURE: XR CHEST 1 VW  COMPARISON: [facility], , XR CHEST 1 VW, 3/07/2022, 0:20.  INDICATIONS: Acute Stroke Protocol (Onset < 12 hrs)/WEAKNESS  FINDINGS:  The lungs are clear.  The heart and mediastinal contours appear stable.  There is pulmonary vascular congestion.  There is dextroscoliosis at the thoracolumbar junction.  There are degenerative changes along the shoulders.       Pulmonary vascular congestion.       AZAM DOUGLAS MD       Electronically Signed and Approved  By: AZAM DOUGLAS MD on 4/14/2022 at 14:03             CT Head Without Contrast Stroke Protocol    Result Date: 4/14/2022  PROCEDURE: CT HEAD WO CONTRAST STROKE PROTOCOL  COMPARISON:  [facility], CT, CT HEAD WO CONTRAST, 3/07/2022, 2:15. INDICATIONS: STROKE ALERT WITH AMS CHANGES  PROTOCOL:   Standard imaging protocol performed    RADIATION:   DLP: 955.1mGy*cm   MA and/or KV was adjusted to minimize radiation dose.     TECHNIQUE: After obtaining the patient's consent, CT images were obtained without non-ionic intravenous contrast material.  FINDINGS:  No acute intracranial hemorrhage or extra-axial collection is identified.  The ventricles are stable in caliber, with no evidence of mass effect or midline shift.  The basal cisterns appear patent.  Scattered foci of periventricular and subcortical white matter hypodensities are nonspecific, but likely the sequela of mild chronic small vessel ischemic disease.  There is stable encephalomalacia within the bilateral medial inferior frontal lobes, with postsurgical changes along the anterior calvaria.  No acute calvarial fracture is identified.  There is mild mucosal disease within the right frontal sinus.  The mastoid air cells are well-aerated.        1. No acute intracranial process identified. 2. Stable encephalomalacia along bilateral inferior medial frontal lobes. 3. Findings suggestive of mild chronic small vessel ischemic disease. 4. Mild mucosal disease within right frontal sinus.     AZAM DOUGLAS MD       Electronically Signed and Approved By: AZAM DOUGLAS MD on 4/14/2022 at 14:02             XR Abdomen KUB    Result Date: 4/14/2022  PROCEDURE: XR ABDOMEN KUB  COMPARISON: [facility], CR, ABDOMEN FLAT & UPRIGHT, 6/16/2019, 10:36.  INDICATIONS: MRI clearance.  FINDINGS: Two views reveal no retained radiopaque foreign body that would be a contraindication to MRI examination.  A urinary bladder catheter is thought to be in  place with residual intravenous contrast secreted into a mildly distended urinary bladder.  There may be a generalized adynamic ileus.  A mechanical bowel obstruction is thought to be less likely.  External artifacts obscure detail.  There may be mild cardiomegaly.  Dextroscoliosis of the thoracolumbar spine is suggested.  Degenerative changes involve the imaged spine and the bilateral hip joints.       No retained radiopaque foreign body is appreciated that would be a contraindication to MRI examination.     Pertinent findings were discussed with the [facility] MRI Department at approximately 2135 hours on 4/14/2022.   COMMENT:  Part of this note is an electronic transcription of spoken language to printed text. The electronic translation/transcription may permit erroneous, or at times, nonsensical (or even sensical) words or phrases to be inadvertently transcribed or omitted; this  has reviewed the note for such errors (as well as additional errors); however, some may still exist.  ADONIS OLVERA JR, MD       Electronically Signed and Approved By: ADONIS OLVERA JR, MD on 4/14/2022 at 21:41              CT Angiogram Head w AI Analysis of LVO    Result Date: 4/14/2022  PROCEDURE: CT ANGIOGRAM HEAD W AI ANALYSIS OF LVO  COMPARISON: [facility], CT, CT HEAD WO CONTRAST STROKE PROTOCOL, 4/14/2022, 13:23.  INDICATIONS: Acute Stroke  PROTOCOL:   Standard imaging protocol performed    RADIATION:      Automated exposure control was utilized to minimize radiation dose. RAPID: CTA imaging was analyzed using RAPID AI to enable computer assisted triage notification to rapidly detect a large vessel occlusion (LVO) and shorten notification time  TECHNIQUE: After obtaining the patient's consent, CT images of the head were obtained without and with non-ionic contrast, and multi-planar/3-D imaging were created and interpreted to optimize visualization of vascular anatomy.   FINDINGS:  VASCULATURE: Normal.  No  significant stenosis.  No visible aneurysm or vascular malformation.  VENTRICLES: Unchanged from recent prior CT head. CEREBRUM: Unchanged from recent prior CT head. CEREBELLUM: Unchanged from recent prior CT head. BRAINSTEM: Unchanged from recent prior CT head. BASAL CISTERNS: Normal.  No subarachnoid hemorrhage or effacement.  SKULL: Negative.        Major intracranial arterial vasculature appears widely patent, with no evidence of thrombus or aneurysm.     AZAM DOUGLAS MD       Electronically Signed and Approved By: AZAM DOUGLAS MD on 4/14/2022 at 16:04             CT CEREBRAL PERFUSION WITH & WITHOUT CONTRAST    Result Date: 4/14/2022  PROCEDURE: CT CEREBRAL PERFUSION W WO CONTRAST  COMPARISON: None  INDICATIONS: cva  PROTOCOL:   Standard imaging protocol performed    RADIATION:   DLP: 1299.6mGy*cm   Automated exposure control was utilized to minimize radiation dose. CONTRAST: 80cc Isovue 370 I.V. LABS:   eGFR: >60ml/min/1.73m2   FINDINGS: There is no evidence to suggest core infarct on the cerebral blood flow images.  On the T-max there is some asymmetric perfusion involving the left frontal lobe and the right cerebellum, felt to be artifactual given they are in different vascular territories.       No convincing evidence to suggest acute perfusion abnormality.      AZAM DOUGLAS MD       Electronically Signed and Approved By: AZAM DOUGLAS MD on 4/14/2022 at 15:03               [x]  EKG/Telemetry   [x]  Cardiology/Vascular   []  Pathology  [x]  Old records  []  Other:    Assessment/Plan   Assessment / Plan     Assessment/Plan:  Assessment:   Possible acute ischemic stroke versus TIA  Possible new right hand weakness stemming from shoulder degeneration  Chronic kidney disease stage III  Elevated bilirubin resolved  Moderately elevated troponin  Hypokalemia  Hypothermia  Dementia  Impaired thermoregulation    Plan:  Labs and imaging reviewed  Check CT scan of neck C-spine for degenerative changes,  causing nerve impingement  X-ray right shoulder  Neurology recommendations appreciated  PT/OT/speech  Replacement potassium magnesium by oral route  Telemetry monitor  Modified diet puréed  Continue aspirin and statin  Continued on Eliquis 5 mg twice a day  Continue Namenda, Remeron, Lasix, Pepcid, BuSpar, Flomax  Continue replacement B1  LDL <70 if atherosclerosis [at goal 59]  A1C goal <7% [at goal 4.3%]   Holding off on cpap at night for now, irritates him at the nursing home  DVT ppx: Eliquis  Discussed with bedside RN and neurology      DVT prophylaxis:  Medical and mechanical DVT prophylaxis orders are present.    CODE STATUS:   Code Status (Patient has no pulse and is not breathing): CPR (Attempt to Resuscitate)  Medical Interventions (Patient has pulse or is breathing): Full Support        Electronically signed by Aranza Herzog MD, 04/16/22, 7:30 PM EDT.

## 2022-04-16 NOTE — THERAPY EVALUATION
Patient Name: Jordin Menon  : 1940    MRN: [id number]                              Today's Date: 2022       Admit Date: 2022    Visit Dx:     ICD-10-CM ICD-9-CM   1. Acute CVA (cerebrovascular accident) ([facility])  I63.9 434.91   2. Hypokalemia  E87.6 276.8   3. Oropharyngeal dysphagia  R13.12 787.22   4. Decreased activities of daily living (ADL)  Z78.9 V49.89     Patient Active Problem List   Diagnosis   • Primary osteoarthritis of right knee   • Status post total right knee replacement   • Essential hypertension   • Shortness of breath   • ARF (acute renal failure) ([facility])   • Hypokalemia   • Hypernatremia   • Symptomatic bradycardia   • Hypothermia, initial encounter   • Acute CVA (cerebrovascular accident) ([facility])     Past Medical History:   Diagnosis Date   • Alzheimer's dementia with behavioral disturbance ([facility])    • Anemia, vitamin B12 deficiency    • Anxiety disorder due to known physiological condition    • Arthritis    • Constipation    • Gastroesophageal reflux disease without esophagitis    • High blood pressure    • HTN (hypertension)    • Hyperlipidemia    • Ingrowing toenail 2018   • Left foot pain 2018   • Osteoarthritis of right knee 2016   • Right foot pain 2018   • Sleep apnea    • Tinea unguium 2018     History reviewed. No pertinent surgical history.   General Information     Row Name 22 0950          OT Time and Intention    Document Type evaluation  -AC     Mode of Treatment individual therapy;occupational therapy  -AC     Row Name 22 0950          General Information    Patient Profile Reviewed yes  -AC     Prior Level of Function --  patient is a long term care resident of local nursing home. States he used a walker and w/c with assist. Assist for adls.  -AC     Existing Precautions/Restrictions fall  -AC     Barriers to Rehab cognitive status  -AC     Row Name 22 0950          Occupational Profile    Reason for Services/Referral  (Occupational Profile) Pt. is a 82year old male admitted for the above diagnosis. Pt. referred to OT services to assess independence with ADLs and adl transfers/fx'l mobility. No previous OT services for current condition.  -     Row Name 04/16/22 0950          Living Environment    People in Home facility resident  -     Row Name 04/16/22 0950          Cognition    Orientation Status (Cognition) oriented to;person  -     Row Name 04/16/22 0950          Safety Issues, Functional Mobility    Safety Issues Affecting Function (Mobility) judgment;insight into deficits/self-awareness  -     Impairments Affecting Function (Mobility) balance;cognition;coordination;endurance/activity tolerance;strength;visual/perceptual  -           User Key  (r) = Recorded By, (t) = Taken By, (c) = Cosigned By    Initials Name Provider Type     Katy Gold, OT Occupational Therapist                 Mobility/ADL's     Row Name 04/16/22 0952          Bed Mobility    Bed Mobility supine-sit  -     Supine-Sit [place] (Bed Mobility) moderate assist (50% patient effort);1 person assist  -     Bed Mobility, Safety Issues cognitive deficits limit understanding;decreased use of arms for pushing/pulling;decreased use of legs for bridging/pushing;impaired trunk control for bed mobility  -     Assistive Device (Bed Mobility) draw sheet;head of bed elevated  -     Row Name 04/16/22 0952          Transfers    Transfers bed-chair transfer  -     Bed-Chair [place] (Transfers) minimum assist (75% patient effort);moderate assist (50% patient effort);1 person assist  -     Row Name 04/16/22 0952          Bed-Chair Transfer    Comment, (Bed-Chair Transfer) stand pivot transfer  -     Row Name 04/16/22 0952          Activities of Daily Living    BADL Assessment/Intervention --  patient is mod A upper body bathing/dressing, min A for grooming, mod/max A for lower body bathing/dressing, max toileting.  -           User Key   (r) = Recorded By, (t) = Taken By, (c) = Cosigned By    Initials Name Provider Type     Katy Gold OT Occupational Therapist               Obj/Interventions     Row Name 04/16/22 0954          Sensory Assessment (Somatosensory)    Sensory Assessment (Somatosensory) sensation intact  -AC     Row Name 04/16/22 0954          Vision Assessment/Intervention    Visual Impairment/Limitations --  impaired previously  -[facility] Name 04/16/22 0954          Range of Motion Comprehensive    General Range of Motion bilateral upper extremity ROM WFL  -AC     Row Name 04/16/22 0954          Strength Comprehensive (MMT)    Comment, General Manual Muscle Testing (MMT) Assessment RUE 3/5, LUE WNL  -[facility] Name 04/16/22 0954          Motor Skills    Motor Skills coordination;functional endurance  -AC     Coordination minimal impairment;upper extremity;right  -AC     Functional Endurance fair minus  -AC     Row Name 04/16/22 0954          Balance    Balance Assessment sitting static balance;standing dynamic balance  -AC     Static Sitting Balance standby assist  -AC     Position, Sitting Balance unsupported  -     Dynamic Standing Balance moderate assist;verbal cues;1-person assist  -AC     Position/Device Used, Standing Balance supported  -           User Key  (r) = Recorded By, (t) = Taken By, (c) = Cosigned By    Initials Name Provider Type    AC Katy Gold OT Occupational Therapist               Goals/Plan     Row Name 04/16/22 0956          Bed Mobility Goal 1 (OT)    Activity/Assistive Device (Bed Mobility Goal 1, OT) bed mobility activities, all  -AC     [place] Level/Cues Needed (Bed Mobility Goal 1, OT) minimum assist (75% or more patient effort)  -     Time Frame (Bed Mobility Goal 1, OT) long term goal (LTG);10 days  -[facility] Name 04/16/22 0956          Transfer Goal 1 (OT)    Activity/Assistive Device (Transfer Goal 1, OT) transfers, all  -AC     [place] Level/Cues Needed (Transfer Goal  1, OT) minimum assist (75% or more patient effort)  -AC     Time Frame (Transfer Goal 1, OT) long term goal (LTG);10 days  -AC     Row Name 04/16/22 0956          Bathing Goal 1 (OT)    Activity/Device (Bathing Goal 1, OT) bathing skills, all  -AC     [place] Level/Cues Needed (Bathing Goal 1, OT) minimum assist (75% or more patient effort)  -AC     Time Frame (Bathing Goal 1, OT) long term goal (LTG);10 days  -AC     Row Name 04/16/22 0956          Dressing Goal 1 (OT)    Activity/Device (Dressing Goal 1, OT) dressing skills, all  -AC     [place]/Cues Needed (Dressing Goal 1, OT) minimum assist (75% or more patient effort)  -AC     Time Frame (Dressing Goal 1, OT) long term goal (LTG);10 days  -AC     Row Name 04/16/22 0956          Toileting Goal 1 (OT)    Activity/Device (Toileting Goal 1, OT) toileting skills, all  -AC     [place] Level/Cues Needed (Toileting Goal 1, OT) minimum assist (75% or more patient effort)  -AC     Time Frame (Toileting Goal 1, OT) long term goal (LTG);10 days  -AC     Row Name 04/16/22 0956          Grooming Goal 1 (OT)    Activity/Device (Grooming Goal 1, OT) grooming skills, all  -AC     [place] (Grooming Goal 1, OT) minimum assist (75% or more patient effort)  -AC     Time Frame (Grooming Goal 1, OT) long term goal (LTG);10 days  -AC     Row Name 04/16/22 0956          Strength Goal 1 (OT)    Strength Goal 1 (OT) patient will improve RUE strength to 4-/5  -AC     Time Frame (Strength Goal 1, OT) long term goal (LTG);10 days  -AC     Row Name 04/16/22 0956          Problem Specific Goal 1 (OT)    Problem Specific Goal 1 (OT) patient will improve endurance to fair plus  -AC     Time Frame (Problem Specific Goal 1, OT) long term goal (LTG);10 days  -AC     Row Name 04/16/22 0956          Therapy Assessment/Plan (OT)    Planned Therapy Interventions (OT) activity tolerance training;functional balance retraining;neuromuscular control/coordination  retraining;occupation/activity based interventions;patient/caregiver education/training;transfer/mobility retraining;ROM/therapeutic exercise;BADL retraining  -           User Key  (r) = Recorded By, (t) = Taken By, (c) = Cosigned By    Initials Name Provider Type    Katy Sousa OT Occupational Therapist               Clinical Impression     Row Name 04/16/22 0955          Pain Assessment    Additional Documentation Pain Scale: FACES Pre/Post-Treatment (Group)  -     Row Name 04/16/22 0955          Pain Scale: FACES Pre/Post-Treatment    Pain: FACES Scale, Pretreatment 0-->no hurt  -     Posttreatment Pain Rating 0-->no hurt  -     Row Name 04/16/22 0955          Plan of Care Review    Plan of Care Reviewed With patient  -     Progress no change  -     Outcome Evaluation Patient presents with endurance, balance, right upper extremity strength limitations that impede his/her ability to perform ADLS. The skills of a therapist are necessary to maximize independence with ADLs.  -     Row Name 04/16/22 0955          Therapy Assessment/Plan (OT)    Patient/Family Therapy Goal Statement (OT) Patient would like to maximize independence with adls.  -     Rehab Potential (OT) good, to achieve stated therapy goals  -     Criteria for Skilled Therapeutic Interventions Met (OT) yes;meets criteria;skilled treatment is necessary  -     Therapy Frequency (OT) 5 times/wk  -     Row Name 04/16/22 0955          Therapy Plan Review/Discharge Plan (OT)    Anticipated Discharge Disposition (OT) extended care facility  -     Row Name 04/16/22 0955          Positioning and Restraints    Pre-Treatment Position in bed  -     Post Treatment Position chair  -     In Chair sitting;call light within reach;encouraged to call for assist;exit alarm on  -           User Key  (r) = Recorded By, (t) = Taken By, (c) = Cosigned By    Initials Name Provider Type    Katy Sousa OT Occupational Therapist                Outcome Measures     Row Name 04/16/22 0957          How much help from another is currently needed...    Putting on and taking off regular lower body clothing? 2  -AC     Bathing (including washing, rinsing, and drying) 2  -AC     Toileting (which includes using toilet bed pan or urinal) 2  -AC     Putting on and taking off regular upper body clothing 3  -AC     Taking care of personal grooming (such as brushing teeth) 3  -AC     Eating meals 3  -AC     AM-PAC 6 Clicks Score (OT) 15  -AC     Row Name 04/16/22 0957          Functional Assessment    Outcome Measure Options AM-PAC 6 Clicks Daily Activity (OT);Optimal Instrument  -AC     Row Name 04/16/22 0957          Optimal Instrument    Optimal Instrument Optimal - 3  -AC     Bending/Stooping 3  -AC     Standing 3  -AC     Reaching 2  -AC     From the list, choose the 3 activities you would most like to be able to do without any difficulty Bending/stooping;Standing;Reaching  -AC     Total Score Optimal - 3 8  -AC           User Key  (r) = Recorded By, (t) = Taken By, (c) = Cosigned By    Initials Name Provider Type    AC Katy Gold, OT Occupational Therapist                  OT Recommendation and Plan  Planned Therapy Interventions (OT): activity tolerance training, functional balance retraining, neuromuscular control/coordination retraining, occupation/activity based interventions, patient/caregiver education/training, transfer/mobility retraining, ROM/therapeutic exercise, BADL retraining  Therapy Frequency (OT): 5 times/wk  Plan of Care Review  Plan of Care Reviewed With: patient  Progress: no change  Outcome Evaluation: Patient presents with endurance, balance, right upper extremity strength limitations that impede his/her ability to perform ADLS. The skills of a therapist are necessary to maximize independence with ADLs.     Time Calculation:    Time Calculation- OT     Row Name 04/16/22 1009             Time Calculation- OT    OT Received On 04/16/22   -AC      OT Goal Re-Cert Due Date 04/25/22  -AC              Untimed Charges    OT Eval/Re-eval Minutes 31  -AC              Total Minutes    Untimed Charges Total Minutes 31  -AC       Total Minutes 31  -AC            User Key  (r) = Recorded By, (t) = Taken By, (c) = Cosigned By    Initials Name Provider Type    Katy Sousa OT Occupational Therapist              Therapy Charges for Today     Code Description Service Date Service Provider Modifiers Qty    74139280949 HC OT EVAL LOW COMPLEXITY 3 4/16/2022 Katy Gold OT GO 1               Katy Gold OT  4/16/2022

## 2022-04-16 NOTE — PLAN OF CARE
Goal Outcome Evaluation:  Plan of Care Reviewed With: patient        Progress: no change  Outcome Evaluation: Patient presents with endurance, balance, right upper extremity strength limitations that impede his/her ability to perform ADLS. The skills of a therapist are necessary to maximize independence with ADLs.

## 2022-04-16 NOTE — PLAN OF CARE
Goal Outcome Evaluation:   Temp consistently low, MD aware. Patient refused lunch and breakfast but did eat well for daughter when she brought him food. NIH 12. No other changes.

## 2022-04-17 LAB
ALBUMIN SERPL-MCNC: 3.2 G/DL (ref 3.5–5.2)
ALP SERPL-CCNC: 82 U/L (ref 39–117)
ALT SERPL W P-5'-P-CCNC: 8 U/L (ref 1–41)
ANION GAP SERPL CALCULATED.3IONS-SCNC: 13.1 MMOL/L (ref 5–15)
AST SERPL-CCNC: 12 U/L (ref 1–40)
BASOPHILS # BLD AUTO: 0.03 10*3/MM3 (ref 0–0.2)
BASOPHILS NFR BLD AUTO: 0.5 % (ref 0–1.5)
BILIRUB CONJ SERPL-MCNC: 0.3 MG/DL (ref 0–0.3)
BILIRUB INDIRECT SERPL-MCNC: 1 MG/DL
BILIRUB SERPL-MCNC: 1.3 MG/DL (ref 0–1.2)
BUN SERPL-MCNC: 12 MG/DL (ref 8–23)
BUN/CREAT SERPL: 12.4 (ref 7–25)
CALCIUM SPEC-SCNC: 8.8 MG/DL (ref 8.6–10.5)
CHLORIDE SERPL-SCNC: 104 MMOL/L (ref 98–107)
CO2 SERPL-SCNC: 22.9 MMOL/L (ref 22–29)
CREAT SERPL-MCNC: 0.97 MG/DL (ref 0.76–1.27)
DEPRECATED RDW RBC AUTO: 42.1 FL (ref 37–54)
EGFRCR SERPLBLD CKD-EPI 2021: 78.4 ML/MIN/1.73
EOSINOPHIL # BLD AUTO: 0.08 10*3/MM3 (ref 0–0.4)
EOSINOPHIL NFR BLD AUTO: 1.4 % (ref 0.3–6.2)
ERYTHROCYTE [DISTWIDTH] IN BLOOD BY AUTOMATED COUNT: 14.6 % (ref 12.3–15.4)
GLUCOSE SERPL-MCNC: 68 MG/DL (ref 65–99)
HCT VFR BLD AUTO: 29.7 % (ref 37.5–51)
HGB BLD-MCNC: 11 G/DL (ref 13–17.7)
IMM GRANULOCYTES # BLD AUTO: 0.02 10*3/MM3 (ref 0–0.05)
IMM GRANULOCYTES NFR BLD AUTO: 0.3 % (ref 0–0.5)
LYMPHOCYTES # BLD AUTO: 1.93 10*3/MM3 (ref 0.7–3.1)
LYMPHOCYTES NFR BLD AUTO: 32.6 % (ref 19.6–45.3)
MAGNESIUM SERPL-MCNC: 1.6 MG/DL (ref 1.6–2.4)
MCH RBC QN AUTO: 29.6 PG (ref 26.6–33)
MCHC RBC AUTO-ENTMCNC: 37 G/DL (ref 31.5–35.7)
MCV RBC AUTO: 80.1 FL (ref 79–97)
MONOCYTES # BLD AUTO: 0.59 10*3/MM3 (ref 0.1–0.9)
MONOCYTES NFR BLD AUTO: 10 % (ref 5–12)
NEUTROPHILS NFR BLD AUTO: 3.27 10*3/MM3 (ref 1.7–7)
NEUTROPHILS NFR BLD AUTO: 55.2 % (ref 42.7–76)
NRBC BLD AUTO-RTO: 0 /100 WBC (ref 0–0.2)
PHOSPHATE SERPL-MCNC: 2 MG/DL (ref 2.5–4.5)
PLATELET # BLD AUTO: 203 10*3/MM3 (ref 140–450)
PMV BLD AUTO: 10.3 FL (ref 6–12)
POTASSIUM SERPL-SCNC: 3.7 MMOL/L (ref 3.5–5.2)
PROT SERPL-MCNC: 5.8 G/DL (ref 6–8.5)
RBC # BLD AUTO: 3.71 10*6/MM3 (ref 4.14–5.8)
SODIUM SERPL-SCNC: 140 MMOL/L (ref 136–145)
WBC NRBC COR # BLD: 5.92 10*3/MM3 (ref 3.4–10.8)

## 2022-04-17 PROCEDURE — 99232 SBSQ HOSP IP/OBS MODERATE 35: CPT | Performed by: FAMILY MEDICINE

## 2022-04-17 PROCEDURE — 83735 ASSAY OF MAGNESIUM: CPT | Performed by: FAMILY MEDICINE

## 2022-04-17 PROCEDURE — 80048 BASIC METABOLIC PNL TOTAL CA: CPT | Performed by: FAMILY MEDICINE

## 2022-04-17 PROCEDURE — 97530 THERAPEUTIC ACTIVITIES: CPT

## 2022-04-17 PROCEDURE — 85025 COMPLETE CBC W/AUTO DIFF WBC: CPT | Performed by: FAMILY MEDICINE

## 2022-04-17 PROCEDURE — 80076 HEPATIC FUNCTION PANEL: CPT | Performed by: FAMILY MEDICINE

## 2022-04-17 PROCEDURE — 84100 ASSAY OF PHOSPHORUS: CPT | Performed by: FAMILY MEDICINE

## 2022-04-17 RX ADMIN — FAMOTIDINE 20 MG: 20 TABLET ORAL at 20:17

## 2022-04-17 RX ADMIN — Medication 10 ML: at 09:00

## 2022-04-17 RX ADMIN — Medication 100 MG: at 09:00

## 2022-04-17 RX ADMIN — BUSPIRONE HYDROCHLORIDE 10 MG: 10 TABLET ORAL at 20:17

## 2022-04-17 RX ADMIN — Medication 5 MG: at 20:17

## 2022-04-17 RX ADMIN — BUSPIRONE HYDROCHLORIDE 10 MG: 10 TABLET ORAL at 09:00

## 2022-04-17 RX ADMIN — POLYETHYLENE GLYCOL 3350 17 G: 17 POWDER, FOR SOLUTION ORAL at 09:00

## 2022-04-17 RX ADMIN — MIRTAZAPINE 7.5 MG: 15 TABLET, FILM COATED ORAL at 20:17

## 2022-04-17 RX ADMIN — TAMSULOSIN HYDROCHLORIDE 0.4 MG: 0.4 CAPSULE ORAL at 09:00

## 2022-04-17 RX ADMIN — FUROSEMIDE 20 MG: 20 TABLET ORAL at 09:00

## 2022-04-17 RX ADMIN — APIXABAN 5 MG: 5 TABLET, FILM COATED ORAL at 20:17

## 2022-04-17 RX ADMIN — APIXABAN 5 MG: 5 TABLET, FILM COATED ORAL at 09:00

## 2022-04-17 RX ADMIN — Medication 10 ML: at 20:18

## 2022-04-17 RX ADMIN — ASPIRIN 81 MG CHEWABLE TABLET 81 MG: 81 TABLET CHEWABLE at 09:00

## 2022-04-17 RX ADMIN — ATORVASTATIN CALCIUM 80 MG: 40 TABLET, FILM COATED ORAL at 20:17

## 2022-04-17 RX ADMIN — MEMANTINE 10 MG: 10 TABLET ORAL at 09:00

## 2022-04-17 NOTE — SIGNIFICANT NOTE
04/17/22 1258   Plan   Plan Comments Pt has a bedhold at [facility] rehab and can return once medically ready.

## 2022-04-17 NOTE — PROGRESS NOTES
[facility]   Hospitalist Progress Note  Date: 2022  Patient Name: Jordin Menon  : 1940  MRN: [id number]  Date of admission: 2022      Subjective   Subjective     Chief Complaint:   Weakness     Summary:   82 y.o. male with history of Alzheimer's dementia, hypertension hyperlipidemia presents to the hospital from [facility] and rehab with acute onset right arm weakness and possible left facial droop.    Not a tPA candidate due to Eliquis, CTA head and neck with widely patent vessels.  On exam patient has some weakness of his right hand and mild facial droop on the left but he tells me that his right hand symptoms are chronic.  On conversation with his daughter on the phone she indicates this is a new finding.     Interval Followup: Patient seen and examined this morning, no acute distress, no acute major overnight events, patient doing well, no significant focal neurological weakness, denies fevers, chills, sweats.  He states that he attend nursing rehab, despite this he states he wants to go home.  Denies chest pain or palpitations.  He is more interactive and talkative, he is also following nursing instructions and recommendations and is amenable to take his medications.  Denies issues with swallowing.  Still has right-sided weakness but moving his right arm more than previous day, imaging does reveal some C-spine chronic degenerative joint changes, there are no acute cervical spine fractures.  Right shoulder has degenerative changes described as severe.  Denies chest pain or palpitations.  Telemetry reviewed, no acute major arrhythmic events, few PVCs, sinus rhythm in the 70s.  No new focal neurological changes     Review of Systems:  All systems reviewed and negative except for generalized fatigue, right shoulder pain     Objective   Objective     Vitals:   Temp:  [95.3 °F (35.2 °C)-98.3 °F (36.8 °C)] 98.1 °F (36.7 °C)  Heart Rate:  [56-83] 83  Resp:  [12-15] 12  BP:  ()/(55-77) 102/68  Physical Exam    Constitutional: Awake, alert, no acute distress              Eyes: Pupils equal, sclerae anicteric, no conjunctival injection              HENT: NCAT, mucous membranes moist              Neck: Supple, no masses palpated              Respiratory: Clear to auscultation bilaterally, nonlabored respirations               Cardiovascular: RRR, no murmurs, rubs, or gallops, palpable pedal pulses bilaterally              Gastrointestinal: Positive bowel sounds, soft, nontender, nondistended              Musculoskeletal: Trace bilateral ankle edema, no clubbing or cyanosis to extremities, right shoulder range of motion limited              Psychiatric: Appropriate affect, cooperative              Neurologic: Oriented x 3, strength symmetric in all extremities except right upper extremity, weak, unable to test cranial nerves due to lack of cooperation, speech clear              Skin: No rashes       Result Review    Result Review:  I have personally reviewed the results from the time of this admission to 4/17/2022 16:51 EDT and agree with these findings:  [x]  Laboratory   CBC    CBC 4/15/22 4/16/22 4/17/22   WBC 6.17 5.68 5.92   RBC 3.37 (A) 3.58 (A) 3.71 (A)   Hemoglobin 9.9 (A) 10.5 (A) 11.0 (A)   Hematocrit 27.0 (A) 28.8 (A) 29.7 (A)   MCV 80.1 80.4 80.1   MCH 29.4 29.3 29.6   MCHC 36.7 (A) 36.5 (A) 37.0 (A)   RDW 14.6 14.5 14.6   Platelets 183 208 203   (A) Abnormal value            BMP    BMP 4/15/22 4/16/22 4/17/22   BUN 17 14 12   Creatinine 1.04 1.05 0.97   Sodium 138 140 140   Potassium 3.4 (A) 3.3 (A) 3.7   Chloride 105 105 104   CO2 23.2 24.3 22.9   Calcium 8.3 (A) 8.7 8.8   (A) Abnormal value            LIVER FUNCTION TESTS:      Lab 04/17/22  0435 04/15/22  0223 04/14/22 2011 04/14/22  1431   TOTAL PROTEIN 5.8* 5.1* 6.0 6.8   ALBUMIN 3.20* 2.80* 3.20* 3.80   GLOBULIN  --  2.3 2.8 3.0   ALT (SGPT) 8 8 10 11   AST (SGOT) 12 11 12 14   BILIRUBIN 1.3* 1.1 1.4* 1.8*    INDIRECT BILIRUBIN 1.0  --   --   --    BILIRUBIN DIRECT 0.3  --   --   --    ALK PHOS 82 76 88 103       [x]  Microbiology No results found for: ACANTHNAEG, AFBCX, BPERTUSSISCX, BLOODCX  No results found for: BCIDPCR, CXREFLEX, CSFCX, CULTURETIS  No results found for: CULTURES, HSVCX, URCX  No results found for: EYECULTURE, GCCX, HSVCULTURE, LABHSV  No results found for: LEGIONELLA, MRSACX, MUMPSCX, MYCOPLASCX  No results found for: NOCARDIACX, STOOLCX  No results found for: THROATCX, UNSTIMCULT, URINECX, CULTURE, VZVCULTUR  No results found for: VIRALCULTU, WOUNDCX    [x]  Radiology Adult Transthoracic Echo Complete W/ Cont if Necessary Per Protocol    Result Date: 4/15/2022  · Calculated left ventricular EF = 54.8% Estimated left ventricular EF was in agreement with the calculated left ventricular EF. Left ventricular systolic function is normal. · Mild mitral valve regurgitation is present.      XR Shoulder 2+ View Right    Result Date: 4/16/2022  PROCEDURE: XR SHOULDER 2+ VW RIGHT  COMPARISON: [facility], CR, SHOULDER >OR= 2V RT, 5/05/2016, 10:58.  INDICATIONS: RIGHT SHOULDER PAIN WITH LIMITED ROM.  FINDINGS: Four views were obtained.  Interval progression of degenerative changes involving the right shoulder is noted since the 5/5/2016 study.  The degenerative changes include the right acromioclavicular (AC) joint and the right glenohumeral joint.  There is narrowing of the right acromial humeral distance.  No definite acute fracture or acute malalignment is identified.  External artifacts obscure detail on the study.  If symptoms or clinical concerns persist, consider imaging follow-up.       No acute fracture or acute malalignment is seen.  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  They have progressed considerably since 5/5/2016.     COMMENT:  Part of this note is an electronic transcription of spoken language to printed text. The electronic translation/transcription may permit  erroneous, or at times, nonsensical (or even sensical) words or phrases to be inadvertently transcribed or omitted; this  has reviewed the note for such errors (as well as additional errors); however, some may still exist.  ADONIS OLVERA JR, MD       Electronically Signed and Approved By: ADONIS OLVERA JR, MD on 4/16/2022 at 20:42              CT Angiogram Neck    Result Date: 4/14/2022  PROCEDURE: CT ANGIOGRAM NECK  COMPARISON: [facility], CT, CTA NECK W/WO CONT, POST PROC, 6/23/2011, 12:13.  INDICATIONS: cva  PROTOCOL:   Standard imaging protocol performed    RADIATION:      Automated exposure control was utilized to minimize radiation dose.  TECHNIQUE: After obtaining the patient's consent, CT images of the neck were obtained without and with non-ionic intravenous contrast material. Multi-planar reformatted/3-D images were created to optimize visualization of vascular anatomy. Unless otherwise stated in this report, all vascular stenoses involving the internal carotid arteries reported for this examination are derived by dividing the lesion diameter by the diameter of the normal internal carotid artery more distally.  FINDINGS:  LEFT INTERNAL CAROTID: No hemodynamically significant stenosis or dissection.  EXTERNAL CAROTID: No hemodynamically significant stenosis or dissection.  COMMON CAROTID: No hemodynamically significant stenosis or dissection.  VERTEBRAL: No hemodynamically significant stenosis or dissection.   RIGHT INTERNAL CAROTID: No hemodynamically significant stenosis or dissection.  EXTERNAL CAROTID: No hemodynamically significant stenosis or dissection.  COMMON CAROTID: No hemodynamically significant stenosis or dissection.  VERTEBRAL: No hemodynamically significant stenosis or dissection.  The right vertebral artery is dominant.   OTHER: The visualized soft tissues of the neck are also unremarkable.        Major arterial vasculature within neck appears widely patent, with  no evidence of hemodynamically significant stenosis or dissection.     AZAM DOUGLAS MD       Electronically Signed and Approved By: AZAM DOUGLAS MD on 4/14/2022 at 16:00             CT Cervical Spine Without Contrast    Result Date: 4/16/2022  PROCEDURE: CT CERVICAL SPINE WO CONTRAST  COMPARISONS: [facility], CT, CTA NECK W/WO CONT, POST PROC, 6/23/2011, 12:13.   [facility], CT, CT ANGIOGRAM NECK, 4/14/2022, 14:44.  INDICATIONS: Osteoarthritis, cervical.  PROTOCOL:   Standard CT imaging protocol performed.    RADIATION:   MA and/or KV were/was adjusted to minimize radiation dose.    TECHNIQUE: After obtaining the patient's consent, multi-planar CT images (438 CT images) were created without contrast material.   EXAM FINDINGS: A routine nonenhanced cervical spine CT was performed. Sagittal and coronal two-dimensional reformations are provided for review. No acute cervical spine fracture or acute malalignment is identified. Small nonspecific bilateral cervical lymph nodes are seen.  Severe degenerative changes are seen throughout the cervical spine and are not significantly changed since the 4/14/2022 study.  Disc and endplate degenerative changes are seen at multiple levels, probably greatest at C4-5, C5-6, and C6-7.  The degenerative changes include the atlantoaxial joint.  There is reversal the cervical lordosis.  There is chronic anterolisthesis at C4-5, estimated at 6 mm.  Chronic anterolisthesis is seen C5-6, estimated at 5 mm.  Multiple bowl level moderate to severe neural foraminal narrowing is seen, especially bilaterally at C2-3, on the left at C3-4, , left greater than right at C4-5, bilaterally at C5-6, bilaterally at C6-7, on the left at C7-T1 and bilaterally at T1-2.  Intracranially, the extra-axial spaces are prominent.  Moderate spinal canal narrowing is seen at multiple levels.  Consider cervical spine MRI examination for further assessment if clinically warranted  and if not contraindicated.        No acute cervical spine fracture is seen.  No significant interval change is seen since the 4/14/2022 neck CTA exam or the prior neck CTA exam from 6/23/2011.     Electronically Signed and Approved By: ADONIS OLVERA JR, MD on 4/16/2022 at 21:38  COMMENT:  Part of this note is an electronic transcription of spoken language to printed text. The electronic translation/transcription may permit erroneous, or at times, nonsensical (or even sensical) words or phrases to be inadvertently transcribed or omitted; this  has reviewed the note for such errors (as well as additional errors); however, some may still exist.  ADONIS OLVERA JR, MD                      MRI Brain Without Contrast    Result Date: 4/14/2022  PROCEDURE: MRI BRAIN WO CONTRAST  COMPARISONS: [facility], CT, HEAD W/WO CONTRAST, 6/23/2011, 11:53.   [facility], MR, MRI BRAIN & ORBITS W/O AND W/ CONTRAST, 9/04/2008, 11:51.    [facility], MR, BRAIN W/O CONTRAST, 1/03/2015, 15:03.   [facility], CT, CT ANGIOGRAM NECK, 4/14/2022, 14:44.   [facility], CT, CT ANGIO. HEAD W AI ANALYSIS OF LVO, 4/14/2022, 14:44.   [facility], CT, CT CEREBRAL PERFUSION W WO CONTRAST, 4/14/2022, 13:56.   [facility], CT, CT HEAD WO CONTRAST STROKE PROTOCOL, 4/14/2022, 13:23.   [facility], CT, CT HEAD WO CONTRAST, 3/07/2022, 2:15.  INDICATIONS: Possible acute ischemic infarct(s)/stroke/CVA versus TIA.  Imaging follow-up.  Altered mental status (AMS).  Unspecified weakness.  TECHNIQUE: A variety of imaging planes and parameters were utilized for visualization of suspected pathology within the brain.  Three hundred thirty-three (333) magnetic resonance (MR) images were obtained without contrast.  There is motion artifact on the exam.  FINDINGS: Chronic posttraumatic and/or postoperative changes involve the bilateral frontal  regions with encephalomalacia, seen previously.  There is susceptibility artifact in these regions, as well, probably associated with prior craniotomy and/or chronic blood products, such as hemorrhagic contusions, also seen previously.  There is diffuse prominence of the extra-axial spaces and the ventricular system, suggesting central atrophy.  No restricted diffusion is appreciated to suggest an acute infarct(s).  No midline shift or acute intracranial herniation syndrome.  There is suspected moderate chronic small vessel ischemia/infarction, as well.  Similar findings were seen previously.  The hippocampi are symmetric in size, signal intensities, and internal architecture.       No acute infarct is seen.  No acute brain abnormality is appreciated.  The study is limited by motion artifact.     COMMENT:  Part of this note is an electronic transcription of spoken language to printed text. The electronic translation/transcription may permit erroneous, or at times, nonsensical (or even sensical) words or phrases to be inadvertently transcribed or omitted; this  has reviewed the note for such errors (as well as additional errors); however, some may still exist.  ADONIS OLVERA JR, MD       Electronically Signed and Approved By: ADONIS OLVERA JR, MD on 4/14/2022 at 23:06              XR Chest 1 View    Result Date: 4/14/2022  PROCEDURE: XR CHEST 1 VW  COMPARISON: [facility], , XR CHEST 1 VW, 3/07/2022, 0:20.  INDICATIONS: Acute Stroke Protocol (Onset < 12 hrs)/WEAKNESS  FINDINGS:  The lungs are clear.  The heart and mediastinal contours appear stable.  There is pulmonary vascular congestion.  There is dextroscoliosis at the thoracolumbar junction.  There are degenerative changes along the shoulders.       Pulmonary vascular congestion.       AZAM DOUGLAS MD       Electronically Signed and Approved By: AZAM DOUGLAS MD on 4/14/2022 at 14:03             CT Head Without Contrast Stroke  Protocol    Result Date: 4/14/2022  PROCEDURE: CT HEAD WO CONTRAST STROKE PROTOCOL  COMPARISON:  [facility], CT, CT HEAD WO CONTRAST, 3/07/2022, 2:15. INDICATIONS: STROKE ALERT WITH AMS CHANGES  PROTOCOL:   Standard imaging protocol performed    RADIATION:   DLP: 955.1mGy*cm   MA and/or KV was adjusted to minimize radiation dose.     TECHNIQUE: After obtaining the patient's consent, CT images were obtained without non-ionic intravenous contrast material.  FINDINGS:  No acute intracranial hemorrhage or extra-axial collection is identified.  The ventricles are stable in caliber, with no evidence of mass effect or midline shift.  The basal cisterns appear patent.  Scattered foci of periventricular and subcortical white matter hypodensities are nonspecific, but likely the sequela of mild chronic small vessel ischemic disease.  There is stable encephalomalacia within the bilateral medial inferior frontal lobes, with postsurgical changes along the anterior calvaria.  No acute calvarial fracture is identified.  There is mild mucosal disease within the right frontal sinus.  The mastoid air cells are well-aerated.        1. No acute intracranial process identified. 2. Stable encephalomalacia along bilateral inferior medial frontal lobes. 3. Findings suggestive of mild chronic small vessel ischemic disease. 4. Mild mucosal disease within right frontal sinus.     AZAM DOUGLAS MD       Electronically Signed and Approved By: AZAM DOUGLAS MD on 4/14/2022 at 14:02             XR Abdomen KUB    Result Date: 4/14/2022  PROCEDURE: XR ABDOMEN KUB  COMPARISON: [facility], CR, ABDOMEN FLAT & UPRIGHT, 6/16/2019, 10:36.  INDICATIONS: MRI clearance.  FINDINGS: Two views reveal no retained radiopaque foreign body that would be a contraindication to MRI examination.  A urinary bladder catheter is thought to be in place with residual intravenous contrast secreted into a mildly distended urinary bladder.   There may be a generalized adynamic ileus.  A mechanical bowel obstruction is thought to be less likely.  External artifacts obscure detail.  There may be mild cardiomegaly.  Dextroscoliosis of the thoracolumbar spine is suggested.  Degenerative changes involve the imaged spine and the bilateral hip joints.       No retained radiopaque foreign body is appreciated that would be a contraindication to MRI examination.     Pertinent findings were discussed with the [facility] MRI Department at approximately 2135 hours on 4/14/2022.   COMMENT:  Part of this note is an electronic transcription of spoken language to printed text. The electronic translation/transcription may permit erroneous, or at times, nonsensical (or even sensical) words or phrases to be inadvertently transcribed or omitted; this  has reviewed the note for such errors (as well as additional errors); however, some may still exist.  ADONIS OLVERA JR, MD       Electronically Signed and Approved By: ADONIS OLVERA JR, MD on 4/14/2022 at 21:41              CT Angiogram Head w AI Analysis of LVO    Result Date: 4/14/2022  PROCEDURE: CT ANGIOGRAM HEAD W AI ANALYSIS OF LVO  COMPARISON: [facility], CT, CT HEAD WO CONTRAST STROKE PROTOCOL, 4/14/2022, 13:23.  INDICATIONS: Acute Stroke  PROTOCOL:   Standard imaging protocol performed    RADIATION:      Automated exposure control was utilized to minimize radiation dose. RAPID: CTA imaging was analyzed using RAPID AI to enable computer assisted triage notification to rapidly detect a large vessel occlusion (LVO) and shorten notification time  TECHNIQUE: After obtaining the patient's consent, CT images of the head were obtained without and with non-ionic contrast, and multi-planar/3-D imaging were created and interpreted to optimize visualization of vascular anatomy.   FINDINGS:  VASCULATURE: Normal.  No significant stenosis.  No visible aneurysm or vascular malformation.  VENTRICLES: Unchanged from  recent prior CT head. CEREBRUM: Unchanged from recent prior CT head. CEREBELLUM: Unchanged from recent prior CT head. BRAINSTEM: Unchanged from recent prior CT head. BASAL CISTERNS: Normal.  No subarachnoid hemorrhage or effacement.  SKULL: Negative.        Major intracranial arterial vasculature appears widely patent, with no evidence of thrombus or aneurysm.     AZAM DOUGLAS MD       Electronically Signed and Approved By: AZAM DOUGLAS MD on 4/14/2022 at 16:04             CT CEREBRAL PERFUSION WITH & WITHOUT CONTRAST    Result Date: 4/14/2022  PROCEDURE: CT CEREBRAL PERFUSION W WO CONTRAST  COMPARISON: None  INDICATIONS: cva  PROTOCOL:   Standard imaging protocol performed    RADIATION:   DLP: 1299.6mGy*cm   Automated exposure control was utilized to minimize radiation dose. CONTRAST: 80cc Isovue 370 I.V. LABS:   eGFR: >60ml/min/1.73m2   FINDINGS: There is no evidence to suggest core infarct on the cerebral blood flow images.  On the T-max there is some asymmetric perfusion involving the left frontal lobe and the right cerebellum, felt to be artifactual given they are in different vascular territories.       No convincing evidence to suggest acute perfusion abnormality.      AZAM DOUGLAS MD       Electronically Signed and Approved By: AZAM DOUGLAS MD on 4/14/2022 at 15:03               [x]  EKG/Telemetry   []  Cardiology/Vascular   []  Pathology  [x]  Old records  []  Other:    Assessment/Plan   Assessment / Plan     Assessment/Plan:  Assessment:   Possible acute ischemic stroke versus TIA  Possible new right hand weakness stemming from shoulder degeneration  Chronic kidney disease stage III  Elevated bilirubin resolved  Moderately elevated troponin  Hypokalemia  Hypothermia  Dementia  Impaired thermoregulation     Plan:  Labs and imaging reviewed  Out of bed to chair  Neurology recommendations appreciated  PT/OT/speech  Telemetry monitor  Modified diet puréed  Continue aspirin and statin  Continued  on Eliquis 5 mg twice a day  Continue Namenda, Remeron, Lasix, Pepcid, BuSpar, Flomax  Continue replacement B1  LDL <70 if atherosclerosis [at goal 59]  A1C goal <7% [at goal 4.3%]   Holding off on cpap at night for now, irritates him at the nursing home  DVT ppx: Eliquis  Discussed with bedside RN and neurology  Possible disposition back to the nursing facility tomorrow if no new neurological changes occur  DVT prophylaxis:  Medical and mechanical DVT prophylaxis orders are present.    CODE STATUS:   Code Status (Patient has no pulse and is not breathing): CPR (Attempt to Resuscitate)  Medical Interventions (Patient has pulse or is breathing): Full Support        Electronically signed by Aranza Herzog MD, 04/17/22, 4:51 PM EDT.

## 2022-04-17 NOTE — THERAPY TREATMENT NOTE
Acute Care - Physical Therapy Treatment Note  KASSI Davis     Patient Name: Jordin Menon  : 1940  MRN: [id number]  Today's Date: 2022      Visit Dx:     ICD-10-CM ICD-9-CM   1. Acute CVA (cerebrovascular accident) ([facility])  I63.9 434.91   2. Hypokalemia  E87.6 276.8   3. Oropharyngeal dysphagia  R13.12 787.22   4. Decreased activities of daily living (ADL)  Z78.9 V49.89   5. Difficulty walking  R26.2 719.7     Patient Active Problem List   Diagnosis   • Primary osteoarthritis of right knee   • Status post total right knee replacement   • Essential hypertension   • Shortness of breath   • ARF (acute renal failure) ([facility])   • Hypokalemia   • Hypernatremia   • Symptomatic bradycardia   • Hypothermia, initial encounter   • Acute CVA (cerebrovascular accident) ([facility])     Past Medical History:   Diagnosis Date   • Alzheimer's dementia with behavioral disturbance ([facility])    • Anemia, vitamin B12 deficiency    • Anxiety disorder due to known physiological condition    • Arthritis    • Constipation    • Gastroesophageal reflux disease without esophagitis    • High blood pressure    • HTN (hypertension)    • Hyperlipidemia    • Ingrowing toenail 2018   • Left foot pain 2018   • Osteoarthritis of right knee 2016   • Right foot pain 2018   • Sleep apnea    • Tinea unguium 2018     History reviewed. No pertinent surgical history.  PT Assessment (last 12 hours)     PT Evaluation and Treatment     Row Name 22 1341          Physical Therapy Time and Intention    Subjective Information complains of;weakness  -RH     Document Type therapy note (daily note)  -     Mode of Treatment physical therapy;individual therapy  -RH     Patient Effort poor  -RH     Comment --  Pt exhibits confusion about his actual ability to amb.  -RH     Row Name 22 4953          Pain Scale: FACES Pre/Post-Treatment    Pain: FACES Scale, Pretreatment 0-->no hurt  -RH     Posttreatment Pain Rating 0-->no hurt  -RH      Row Name 04/17/22 1345          Cognition    Affect/Mental Status (Cognition) confused  -     Follows Commands (Cognition) follows one-step commands;25-49% accuracy  -     Row Name 04/17/22 1345          Transfers    Transfers sit-stand transfer;stand-sit transfer  -     Sit-Stand [facility] (Transfers) moderate assist (50% patient effort)  -     Stand-Sit [facility] (Transfers) moderate assist (50% patient effort)  -     Row Name 04/17/22 1345          Sit-Stand Transfer    Assistive Device (Sit-Stand Transfers) walker, front-wheeled  -     Comment, (Sit-Stand Transfer) --  Pt able to maintain standing with min assist.  -     Row Name 04/17/22 1345          Stand-Sit Transfer    Assistive Device (Stand-Sit Transfers) walker, front-wheeled  -     Comment, (Stand-Sit Transfer) --  Pt today attempted to transfer stand to sit without being properly positioned.  -     Row Name 04/17/22 1345          Gait/Stairs (Locomotion)    Gait/Stairs Locomotion gait/ambulation independence;gait/ambulation assistive device;distance ambulated;gait pattern;gait deviations  -     [facility] Level (Gait) moderate assist (50% patient effort)  -     Assistive Device (Gait) walker, front-wheeled  -     Distance in Feet (Gait) 4  -     Pattern (Gait) 3-point;step-to  -RH     Deviations/Abnormal Patterns (Gait) base of support, narrow;miguel decreased;gait speed decreased;stride length decreased  -     Gait Assessment/Intervention --  Pt initially amb forward 3 to 4 steps but then became anxious, taking steps backward while attempting to sit.  -     Row Name 04/17/22 1345          Progress Summary (PT)    Progress Toward Functional Goals (PT) progress toward functional goals is gradual  -           User Key  (r) = Recorded By, (t) = Taken By, (c) = Cosigned By    Initials Name Provider Type     Himanshu Cheema PTA Physical Therapist Assistant                Physical Therapy Education                  Title: PT OT SLP Therapies (In Progress)     Topic: Physical Therapy (In Progress)     Point: Mobility training (Done)     Learning Progress Summary           Patient Acceptance, E,TB, VU,NR by AV at 4/16/2022 1212                   Point: Home exercise program (Not Started)     Learner Progress:  Not documented in this visit.          Point: Body mechanics (Done)     Learning Progress Summary           Patient Acceptance, E,TB, VU,NR by AV at 4/16/2022 1212                   Point: Precautions (Done)     Learning Progress Summary           Patient Acceptance, E,TB, VU,NR by AV at 4/16/2022 1212                               User Key     Initials Effective Dates Name Provider Type Discipline     06/11/21 -  Guille Rosales, PT Physical Therapist PT              PT Recommendation and Plan     Progress Summary (PT)  Progress Toward Functional Goals (PT): progress toward functional goals is gradual   Outcome Measures     Row Name 04/17/22 1300 04/16/22 1200          How much help from another person do you currently need...    Turning from your back to your side while in flat bed without using bedrails? 3  - 3  -AV     Moving from lying on back to sitting on the side of a flat bed without bedrails? 2  - 2  -AV     Moving to and from a bed to a chair (including a wheelchair)? 2  - 2  -AV     Standing up from a chair using your arms (e.g., wheelchair, bedside chair)? 2  - 3  -AV     Climbing 3-5 steps with a railing? 1  - 2  -AV     To walk in hospital room? 1  - 3  -AV     AM-PAC 6 Clicks Score (PT) 11  - 15  -AV            Functional Assessment    Outcome Measure Options -- AM-PAC 6 Clicks Basic Mobility (PT)  -AV           User Key  (r) = Recorded By, (t) = Taken By, (c) = Cosigned By    Initials Name Provider Type     Himanshu Cheema PTA Physical Therapist Assistant     Guille Rosales, PT Physical Therapist                 Time Calculation:    PT Charges     Row Name 04/17/22 7062              Time Calculation    PT Received On 04/17/22  -RH              Timed Charges    62392 - Gait Training Minutes  3  -RH      04200 - PT Therapeutic Activity Minutes 8  -RH              Total Minutes    Timed Charges Total Minutes 11  -RH       Total Minutes 11  -RH            User Key  (r) = Recorded By, (t) = Taken By, (c) = Cosigned By    Initials Name Provider Type     Himanshu Cheema PTA Physical Therapist Assistant              Therapy Charges for Today     Code Description Service Date Service Provider Modifiers Qty    37751355496 HC PT THERAPEUTIC ACT EA 15 MIN 4/17/2022 Himanshu Cheema PTA GP 1          PT G-Codes  Outcome Measure Options: AM-PAC 6 Clicks Basic Mobility (PT)  AM-PAC 6 Clicks Score (PT): 11  AM-PAC 6 Clicks Score (OT): 15    Himanshu Cheema PTA  4/17/2022

## 2022-04-17 NOTE — PLAN OF CARE
Problem: Adult Inpatient Plan of Care  Goal: Plan of Care Review  Outcome: Ongoing, Progressing  Goal: Patient-Specific Goal (Individualized)  Outcome: Ongoing, Progressing  Goal: Absence of Hospital-Acquired Illness or Injury  Outcome: Ongoing, Progressing  Intervention: Identify and Manage Fall Risk  Goal: Optimal Comfort and Wellbeing  Outcome: Ongoing, Progressing  Intervention: Provide Person-Centered Care  Goal: Readiness for Transition of Care  Outcome: Ongoing, Progressing   Goal Outcome Evaluation:  Plan of Care Reviewed With: patient        Progress: no change  Outcome Evaluation: Patient has rested a majority of the day, has had family visit and bring him food. Patient has been up in chair several times during shift. Patient wanting to go back home, no other issues at this time. Will continue to monitor, call light in reach.

## 2022-04-18 ENCOUNTER — READMISSION MANAGEMENT (OUTPATIENT)
Dept: CALL CENTER | Facility: HOSPITAL | Age: 82
End: 2022-04-18

## 2022-04-18 VITALS
HEIGHT: 68 IN | DIASTOLIC BLOOD PRESSURE: 64 MMHG | WEIGHT: 146.61 LBS | HEART RATE: 92 BPM | SYSTOLIC BLOOD PRESSURE: 91 MMHG | BODY MASS INDEX: 22.22 KG/M2 | TEMPERATURE: 94.3 F | OXYGEN SATURATION: 100 % | RESPIRATION RATE: 16 BRPM

## 2022-04-18 LAB
ALBUMIN SERPL-MCNC: 3.3 G/DL (ref 3.5–5.2)
ALP SERPL-CCNC: 96 U/L (ref 39–117)
ALT SERPL W P-5'-P-CCNC: 9 U/L (ref 1–41)
ANION GAP SERPL CALCULATED.3IONS-SCNC: 13.5 MMOL/L (ref 5–15)
ANISOCYTOSIS BLD QL: NORMAL
AST SERPL-CCNC: 17 U/L (ref 1–40)
BASOPHILS # BLD AUTO: 0.03 10*3/MM3 (ref 0–0.2)
BASOPHILS NFR BLD AUTO: 0.5 % (ref 0–1.5)
BILIRUB CONJ SERPL-MCNC: 0.2 MG/DL (ref 0–0.3)
BILIRUB INDIRECT SERPL-MCNC: 1 MG/DL
BILIRUB SERPL-MCNC: 1.2 MG/DL (ref 0–1.2)
BUN SERPL-MCNC: 11 MG/DL (ref 8–23)
BUN/CREAT SERPL: 12.8 (ref 7–25)
CALCIUM SPEC-SCNC: 9.2 MG/DL (ref 8.6–10.5)
CHLORIDE SERPL-SCNC: 105 MMOL/L (ref 98–107)
CO2 SERPL-SCNC: 22.5 MMOL/L (ref 22–29)
CREAT SERPL-MCNC: 0.86 MG/DL (ref 0.76–1.27)
DEPRECATED RDW RBC AUTO: 45 FL (ref 37–54)
EGFRCR SERPLBLD CKD-EPI 2021: 87 ML/MIN/1.73
EOSINOPHIL # BLD AUTO: 0.08 10*3/MM3 (ref 0–0.4)
EOSINOPHIL NFR BLD AUTO: 1.4 % (ref 0.3–6.2)
ERYTHROCYTE [DISTWIDTH] IN BLOOD BY AUTOMATED COUNT: 15.2 % (ref 12.3–15.4)
GLUCOSE SERPL-MCNC: 80 MG/DL (ref 65–99)
HCT VFR BLD AUTO: 37.9 % (ref 37.5–51)
HGB BLD-MCNC: 13.4 G/DL (ref 13–17.7)
IMM GRANULOCYTES # BLD AUTO: 0.02 10*3/MM3 (ref 0–0.05)
IMM GRANULOCYTES NFR BLD AUTO: 0.4 % (ref 0–0.5)
LYMPHOCYTES # BLD AUTO: 1.31 10*3/MM3 (ref 0.7–3.1)
LYMPHOCYTES NFR BLD AUTO: 23.3 % (ref 19.6–45.3)
MAGNESIUM SERPL-MCNC: 1.8 MG/DL (ref 1.6–2.4)
MCH RBC QN AUTO: 29.2 PG (ref 26.6–33)
MCHC RBC AUTO-ENTMCNC: 35.4 G/DL (ref 31.5–35.7)
MCV RBC AUTO: 82.6 FL (ref 79–97)
MICROCYTES BLD QL: NORMAL
MONOCYTES # BLD AUTO: 0.44 10*3/MM3 (ref 0.1–0.9)
MONOCYTES NFR BLD AUTO: 7.8 % (ref 5–12)
NEUTROPHILS NFR BLD AUTO: 3.75 10*3/MM3 (ref 1.7–7)
NEUTROPHILS NFR BLD AUTO: 66.6 % (ref 42.7–76)
NRBC BLD AUTO-RTO: 0 /100 WBC (ref 0–0.2)
PHOSPHATE SERPL-MCNC: 2.2 MG/DL (ref 2.5–4.5)
PLATELET # BLD AUTO: 163 10*3/MM3 (ref 140–450)
PMV BLD AUTO: 11.6 FL (ref 6–12)
POTASSIUM SERPL-SCNC: 4.1 MMOL/L (ref 3.5–5.2)
PROT SERPL-MCNC: 6.4 G/DL (ref 6–8.5)
RBC # BLD AUTO: 4.59 10*6/MM3 (ref 4.14–5.8)
SMALL PLATELETS BLD QL SMEAR: ADEQUATE
SODIUM SERPL-SCNC: 141 MMOL/L (ref 136–145)
WBC MORPH BLD: NORMAL
WBC NRBC COR # BLD: 5.63 10*3/MM3 (ref 3.4–10.8)

## 2022-04-18 PROCEDURE — 99239 HOSP IP/OBS DSCHRG MGMT >30: CPT | Performed by: FAMILY MEDICINE

## 2022-04-18 PROCEDURE — 83735 ASSAY OF MAGNESIUM: CPT | Performed by: FAMILY MEDICINE

## 2022-04-18 PROCEDURE — 80076 HEPATIC FUNCTION PANEL: CPT | Performed by: FAMILY MEDICINE

## 2022-04-18 PROCEDURE — 85025 COMPLETE CBC W/AUTO DIFF WBC: CPT | Performed by: FAMILY MEDICINE

## 2022-04-18 PROCEDURE — 84100 ASSAY OF PHOSPHORUS: CPT | Performed by: FAMILY MEDICINE

## 2022-04-18 PROCEDURE — 97530 THERAPEUTIC ACTIVITIES: CPT

## 2022-04-18 PROCEDURE — 80048 BASIC METABOLIC PNL TOTAL CA: CPT | Performed by: FAMILY MEDICINE

## 2022-04-18 PROCEDURE — 85007 BL SMEAR W/DIFF WBC COUNT: CPT | Performed by: FAMILY MEDICINE

## 2022-04-18 RX ORDER — ATORVASTATIN CALCIUM 80 MG/1
80 TABLET, FILM COATED ORAL NIGHTLY
Start: 2022-04-18 | End: 2022-05-29

## 2022-04-18 RX ORDER — ASPIRIN 81 MG/1
81 TABLET, CHEWABLE ORAL DAILY
Start: 2022-04-19

## 2022-04-18 RX ADMIN — Medication 100 MG: at 08:33

## 2022-04-18 RX ADMIN — POLYETHYLENE GLYCOL 3350 17 G: 17 POWDER, FOR SOLUTION ORAL at 08:33

## 2022-04-18 RX ADMIN — BUSPIRONE HYDROCHLORIDE 10 MG: 10 TABLET ORAL at 08:33

## 2022-04-18 RX ADMIN — FUROSEMIDE 20 MG: 20 TABLET ORAL at 08:33

## 2022-04-18 RX ADMIN — APIXABAN 5 MG: 5 TABLET, FILM COATED ORAL at 08:33

## 2022-04-18 RX ADMIN — ASPIRIN 81 MG CHEWABLE TABLET 81 MG: 81 TABLET CHEWABLE at 08:33

## 2022-04-18 RX ADMIN — Medication 10 ML: at 08:33

## 2022-04-18 RX ADMIN — MEMANTINE 10 MG: 10 TABLET ORAL at 08:33

## 2022-04-18 RX ADMIN — TAMSULOSIN HYDROCHLORIDE 0.4 MG: 0.4 CAPSULE ORAL at 08:33

## 2022-04-18 NOTE — PLAN OF CARE
Goal Outcome Evaluation:           Progress: improving   VSS. Discharge to [facility].

## 2022-04-18 NOTE — PLAN OF CARE
Goal Outcome Evaluation:      NIH score 11, no change. Has had some agitation and confusion but easily redirected. He is anxious to get back to his nursing facility hopefully today. Aurelia Skinner RN

## 2022-04-18 NOTE — CASE MANAGEMENT/SOCIAL WORK
[facility]   Social Work Discharge Plan    Patient Name: Jordin Menon  : 1940  MRN: [id number]  Attending Physician:  Aranza Herzog MD  Date of admission: 2022      /Case Management Discharge Plan     Inpatient Rehab:    Pt will discharge to [facility].    [facility]  [address] [place], [place]  [phone]

## 2022-04-18 NOTE — DISCHARGE SUMMARY
[facility]         HOSPITALIST  DISCHARGE SUMMARY    Patient Name: Jordin Menon  : 1940  MRN: [id number]    Date of Admission: 2022  Date of Discharge:  2022    Primary Care Physician: Frank Llanes MD    Consults     Date and Time Order Name Status Description    2022  6:00 PM Inpatient Neurology Consult Stroke      2022  3:16 PM Hospitalist (on-call MD unless specified)            Active and Resolved Hospital Problems:  Acute CVA  Degenerative changes right shoulder  C-spine degenerative changes  Chronic kidney disease stage III  Elevated bilirubin resolved  Moderately elevated troponin  Hypokalemia  Hypothermia  Dementia  Impaired thermoregulation  Active Hospital Problems    Diagnosis POA   • Acute CVA (cerebrovascular accident) (HCC) [I63.9] Yes      Resolved Hospital Problems   No resolved problems to display.       Hospital Course     Hospital Course:   82 y.o. male with history of Alzheimer's dementia, hypertension, hyperlipidemia presents to the hospital from [facility] and rehab with acute onset right arm weakness and possible left facial droop.    Not a tPA candidate due to Eliquis, CTA head and neck with widely patent vessels.  On exam patient has some weakness of his right hand and mild facial droop on the left but he tells me that his right hand symptoms are chronic.  On conversation with his daughter on the phone she indicated this was a new finding.  Left facial droop resolved, right hand symptoms were deemed to be chronic, right shoulder with degenerative changes, CTA and CT neck revealing degenerative changes of C-spine, probably did have a CVA as described by neurology, superimposed with chronic changes, he did well with monitoring, TIA work-up completed, discharged in hemodynamically stable condition on 2022 to return to the nursing facility where he resides, with advanced Alzheimer's dementia, recommend family discussed with  nursing facility palliative care measures.  To follow-up with PCP within 1 week.  To continue aspirin and high intensity statin.      Day of Discharge     Vital Signs:  Temp:  [96.9 °F (36.1 °C)-98.3 °F (36.8 °C)] 96.9 °F (36.1 °C)  Heart Rate:  [53-83] 82  Resp:  [12-17] 17  BP: (102-132)/(51-68) 108/51  Review of Systems:  All systems reviewed and negative except for generalized fatigue, right shoulder pain      Physical Exam                         Constitutional: Awake, alert, no acute distress              Eyes: Pupils equal, sclerae anicteric, no conjunctival injection              HENT: NCAT, mucous membranes moist              Neck: Supple, no masses palpated              Respiratory: Clear to auscultation bilaterally, nonlabored respirations               Cardiovascular: RRR, no murmurs, rubs, or gallops, palpable pedal pulses bilaterally              Gastrointestinal: Positive bowel sounds, soft, nontender, nondistended              Musculoskeletal: Trace bilateral ankle edema, no clubbing or cyanosis to extremities, right shoulder range of motion limited              Psychiatric: Appropriate affect, cooperative              Neurologic: Oriented x 2, strength symmetric in all extremities except right upper extremity, weak, unable to test cranial nerves due to lack of cooperation, speech clear              Skin: No rashes         Discharge Details        Discharge Medications      New Medications      Instructions Start Date   aspirin 81 MG chewable tablet   81 mg, Oral, Daily   Start Date: April 19, 2022     atorvastatin 80 MG tablet  Commonly known as: LIPITOR   80 mg, Oral, Nightly         Continue These Medications      Instructions Start Date   acetaminophen 325 MG tablet  Commonly known as: TYLENOL   650 mg, Oral, 3 Times Daily      apixaban 5 MG tablet tablet  Commonly known as: ELIQUIS   5 mg, Oral, Every 12 Hours Scheduled      busPIRone 10 MG tablet  Commonly known as: BUSPAR   10 mg, Oral, 2  Times Daily      Cepacol Sore Throat Spray 0.1-33 % liquid  Generic drug: Dyclonine-Glycerin   2 sprays, Mouth/Throat, 4 Times Daily PRN      Diclofenac Sodium 1 % gel gel  Commonly known as: VOLTAREN   4 g, Topical, 2 Times Daily PRN      docusate sodium 100 MG capsule  Commonly known as: COLACE   200 mg, Oral, Daily, Pt takes along with 50 mg for a total of 250 mg QD      docusate sodium 50 MG capsule  Commonly known as: COLACE   Oral, Daily, Pt takes along with 2- 100 mg Capsules for a total of 250 mg      famotidine 20 MG tablet  Commonly known as: PEPCID   20 mg, Oral, Nightly      furosemide 20 MG tablet  Commonly known as: Lasix   20 mg, Oral, Daily      hydrOXYzine 25 MG tablet  Commonly known as: ATARAX   25 mg, Oral, Every 6 Hours PRN      ipratropium-albuterol 0.5-2.5 mg/3 ml nebulizer  Commonly known as: DUO-NEB   3 mL, Nebulization, Every 4 Hours PRN      Lidocaine HCl 4 % cream  Commonly known as: LMX   1 application, Topical, 2 Times Daily PRN      magnesium hydroxide 400 MG/5ML suspension  Commonly known as: MILK OF MAGNESIA   30 mL, Oral, Daily PRN      melatonin 5 MG tablet tablet   5 mg, Oral, Nightly      memantine 10 MG tablet  Commonly known as: NAMENDA   10 mg, Oral, Daily      mineral oil-hydrophilic petrolatum ointment   1 application, Topical, 2 Times Daily PRN      mirtazapine 7.5 MG half tablet  Commonly known as: REMERON   7.5 mg, Oral, Nightly      polyethylene glycol packet  Commonly known as: MIRALAX   17 g, Oral, Daily      tamsulosin 0.4 MG capsule 24 hr capsule  Commonly known as: FLOMAX   1 capsule, Oral, Daily      thiamine 100 MG tablet tablet  Commonly known as: VITAMIN B-1   100 mg, Oral, Daily      witch hazel-glycerin pad  Commonly known as: TUCKS   1 pad, Topical, As Needed         Stop These Medications    pravastatin 10 MG tablet  Commonly known as: PRAVACHOL            No Known Allergies    Discharge Disposition:  Home-Health Care [facility]    Diet:  Hospital:  Diet Order    Procedures   • Diet Pureed       Discharge Activity:       CODE STATUS:  Code Status and Medical Interventions:   Ordered at: 04/14/22 5375     Code Status (Patient has no pulse and is not breathing):    CPR (Attempt to Resuscitate)     Medical Interventions (Patient has pulse or is breathing):    Full Support         No future appointments.    Additional Instructions for the Follow-ups that You Need to Schedule     Discharge Follow-up with PCP   As directed       Currently Documented PCP:    Frank Llanes MD    PCP Phone Number:    [phone]     Follow Up Details: 3 to 7 days               Pertinent  and/or Most Recent Results     PROCEDURES:   Adult Transthoracic Echo Complete W/ Cont if Necessary Per Protocol    Result Date: 4/15/2022  · Calculated left ventricular EF = 54.8% Estimated left ventricular EF was in agreement with the calculated left ventricular EF. Left ventricular systolic function is normal. · Mild mitral valve regurgitation is present.      XR Shoulder 2+ View Right    Result Date: 4/16/2022  PROCEDURE: XR SHOULDER 2+ VW RIGHT  COMPARISON: [facility], , SHOULDER >OR= 2V RT, 5/05/2016, 10:58.  INDICATIONS: RIGHT SHOULDER PAIN WITH LIMITED ROM.  FINDINGS: Four views were obtained.  Interval progression of degenerative changes involving the right shoulder is noted since the 5/5/2016 study.  The degenerative changes include the right acromioclavicular (AC) joint and the right glenohumeral joint.  There is narrowing of the right acromial humeral distance.  No definite acute fracture or acute malalignment is identified.  External artifacts obscure detail on the study.  If symptoms or clinical concerns persist, consider imaging follow-up.       No acute fracture or acute malalignment is seen.  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  They have progressed considerably since 5/5/2016.     COMMENT:  Part of this note is an electronic transcription of spoken  language to printed text. The electronic translation/transcription may permit erroneous, or at times, nonsensical (or even sensical) words or phrases to be inadvertently transcribed or omitted; this  has reviewed the note for such errors (as well as additional errors); however, some may still exist.  ADONIS OLVERA JR, MD       Electronically Signed and Approved By: ADONIS OLVERA JR, MD on 4/16/2022 at 20:42              CT Angiogram Neck    Result Date: 4/14/2022  PROCEDURE: CT ANGIOGRAM NECK  COMPARISON: [facility], CT, CTA NECK W/WO CONT, POST PROC, 6/23/2011, 12:13.  INDICATIONS: cva  PROTOCOL:   Standard imaging protocol performed    RADIATION:      Automated exposure control was utilized to minimize radiation dose.  TECHNIQUE: After obtaining the patient's consent, CT images of the neck were obtained without and with non-ionic intravenous contrast material. Multi-planar reformatted/3-D images were created to optimize visualization of vascular anatomy. Unless otherwise stated in this report, all vascular stenoses involving the internal carotid arteries reported for this examination are derived by dividing the lesion diameter by the diameter of the normal internal carotid artery more distally.  FINDINGS:  LEFT INTERNAL CAROTID: No hemodynamically significant stenosis or dissection.  EXTERNAL CAROTID: No hemodynamically significant stenosis or dissection.  COMMON CAROTID: No hemodynamically significant stenosis or dissection.  VERTEBRAL: No hemodynamically significant stenosis or dissection.   RIGHT INTERNAL CAROTID: No hemodynamically significant stenosis or dissection.  EXTERNAL CAROTID: No hemodynamically significant stenosis or dissection.  COMMON CAROTID: No hemodynamically significant stenosis or dissection.  VERTEBRAL: No hemodynamically significant stenosis or dissection.  The right vertebral artery is dominant.   OTHER: The visualized soft tissues of the neck are also  unremarkable.        Major arterial vasculature within neck appears widely patent, with no evidence of hemodynamically significant stenosis or dissection.     AZAM DOUGLAS MD       Electronically Signed and Approved By: AZAM DOUGLAS MD on 4/14/2022 at 16:00             CT Cervical Spine Without Contrast    Result Date: 4/16/2022  PROCEDURE: CT CERVICAL SPINE WO CONTRAST  COMPARISONS: [facility], CT, CTA NECK W/WO CONT, POST PROC, 6/23/2011, 12:13.   [facility], CT, CT ANGIOGRAM NECK, 4/14/2022, 14:44.  INDICATIONS: Osteoarthritis, cervical.  PROTOCOL:   Standard CT imaging protocol performed.    RADIATION:   MA and/or KV were/was adjusted to minimize radiation dose.    TECHNIQUE: After obtaining the patient's consent, multi-planar CT images (438 CT images) were created without contrast material.   EXAM FINDINGS: A routine nonenhanced cervical spine CT was performed. Sagittal and coronal two-dimensional reformations are provided for review. No acute cervical spine fracture or acute malalignment is identified. Small nonspecific bilateral cervical lymph nodes are seen.  Severe degenerative changes are seen throughout the cervical spine and are not significantly changed since the 4/14/2022 study.  Disc and endplate degenerative changes are seen at multiple levels, probably greatest at C4-5, C5-6, and C6-7.  The degenerative changes include the atlantoaxial joint.  There is reversal the cervical lordosis.  There is chronic anterolisthesis at C4-5, estimated at 6 mm.  Chronic anterolisthesis is seen C5-6, estimated at 5 mm.  Multiple bowl level moderate to severe neural foraminal narrowing is seen, especially bilaterally at C2-3, on the left at C3-4, , left greater than right at C4-5, bilaterally at C5-6, bilaterally at C6-7, on the left at C7-T1 and bilaterally at T1-2.  Intracranially, the extra-axial spaces are prominent.  Moderate spinal canal narrowing is seen at multiple levels.   Consider cervical spine MRI examination for further assessment if clinically warranted and if not contraindicated.        No acute cervical spine fracture is seen.  No significant interval change is seen since the 4/14/2022 neck CTA exam or the prior neck CTA exam from 6/23/2011.     Electronically Signed and Approved By: ADONIS OLVERA JR, MD on 4/16/2022 at 21:38  COMMENT:  Part of this note is an electronic transcription of spoken language to printed text. The electronic translation/transcription may permit erroneous, or at times, nonsensical (or even sensical) words or phrases to be inadvertently transcribed or omitted; this  has reviewed the note for such errors (as well as additional errors); however, some may still exist.  ADONIS OLVERA JR, MD                      MRI Brain Without Contrast    Result Date: 4/14/2022  PROCEDURE: MRI BRAIN WO CONTRAST  COMPARISONS: [facility], CT, HEAD W/WO CONTRAST, 6/23/2011, 11:53.   [facility], MR, MRI BRAIN & ORBITS W/O AND W/ CONTRAST, 9/04/2008, 11:51.    [facility], MR, BRAIN W/O CONTRAST, 1/03/2015, 15:03.   [facility], CT, CT ANGIOGRAM NECK, 4/14/2022, 14:44.   [facility], CT, CT ANGIO. HEAD W AI ANALYSIS OF LVO, 4/14/2022, 14:44.   [facility], CT, CT CEREBRAL PERFUSION W WO CONTRAST, 4/14/2022, 13:56.   [facility], CT, CT HEAD WO CONTRAST STROKE PROTOCOL, 4/14/2022, 13:23.   [facility], CT, CT HEAD WO CONTRAST, 3/07/2022, 2:15.  INDICATIONS: Possible acute ischemic infarct(s)/stroke/CVA versus TIA.  Imaging follow-up.  Altered mental status (AMS).  Unspecified weakness.  TECHNIQUE: A variety of imaging planes and parameters were utilized for visualization of suspected pathology within the brain.  Three hundred thirty-three (333) magnetic resonance (MR) images were obtained without contrast.  There is motion artifact on the exam.   FINDINGS: Chronic posttraumatic and/or postoperative changes involve the bilateral frontal regions with encephalomalacia, seen previously.  There is susceptibility artifact in these regions, as well, probably associated with prior craniotomy and/or chronic blood products, such as hemorrhagic contusions, also seen previously.  There is diffuse prominence of the extra-axial spaces and the ventricular system, suggesting central atrophy.  No restricted diffusion is appreciated to suggest an acute infarct(s).  No midline shift or acute intracranial herniation syndrome.  There is suspected moderate chronic small vessel ischemia/infarction, as well.  Similar findings were seen previously.  The hippocampi are symmetric in size, signal intensities, and internal architecture.       No acute infarct is seen.  No acute brain abnormality is appreciated.  The study is limited by motion artifact.     COMMENT:  Part of this note is an electronic transcription of spoken language to printed text. The electronic translation/transcription may permit erroneous, or at times, nonsensical (or even sensical) words or phrases to be inadvertently transcribed or omitted; this  has reviewed the note for such errors (as well as additional errors); however, some may still exist.  ADONIS OLVERA JR, MD       Electronically Signed and Approved By: ADONIS OLVERA JR, MD on 4/14/2022 at 23:06              XR Chest 1 View    Result Date: 4/14/2022  PROCEDURE: XR CHEST 1 VW  COMPARISON: [facility], , XR CHEST 1 VW, 3/07/2022, 0:20.  INDICATIONS: Acute Stroke Protocol (Onset < 12 hrs)/WEAKNESS  FINDINGS:  The lungs are clear.  The heart and mediastinal contours appear stable.  There is pulmonary vascular congestion.  There is dextroscoliosis at the thoracolumbar junction.  There are degenerative changes along the shoulders.       Pulmonary vascular congestion.       AZAM DOUGLAS MD       Electronically Signed and Approved  By: AZAM DOUGLAS MD on 4/14/2022 at 14:03             CT Head Without Contrast Stroke Protocol    Result Date: 4/14/2022  PROCEDURE: CT HEAD WO CONTRAST STROKE PROTOCOL  COMPARISON:  [facility], CT, CT HEAD WO CONTRAST, 3/07/2022, 2:15. INDICATIONS: STROKE ALERT WITH AMS CHANGES  PROTOCOL:   Standard imaging protocol performed    RADIATION:   DLP: 955.1mGy*cm   MA and/or KV was adjusted to minimize radiation dose.     TECHNIQUE: After obtaining the patient's consent, CT images were obtained without non-ionic intravenous contrast material.  FINDINGS:  No acute intracranial hemorrhage or extra-axial collection is identified.  The ventricles are stable in caliber, with no evidence of mass effect or midline shift.  The basal cisterns appear patent.  Scattered foci of periventricular and subcortical white matter hypodensities are nonspecific, but likely the sequela of mild chronic small vessel ischemic disease.  There is stable encephalomalacia within the bilateral medial inferior frontal lobes, with postsurgical changes along the anterior calvaria.  No acute calvarial fracture is identified.  There is mild mucosal disease within the right frontal sinus.  The mastoid air cells are well-aerated.        1. No acute intracranial process identified. 2. Stable encephalomalacia along bilateral inferior medial frontal lobes. 3. Findings suggestive of mild chronic small vessel ischemic disease. 4. Mild mucosal disease within right frontal sinus.     AZAM DOUGLAS MD       Electronically Signed and Approved By: AZAM DOUGLAS MD on 4/14/2022 at 14:02             XR Abdomen KUB    Result Date: 4/14/2022  PROCEDURE: XR ABDOMEN KUB  COMPARISON: [facility], CR, ABDOMEN FLAT & UPRIGHT, 6/16/2019, 10:36.  INDICATIONS: MRI clearance.  FINDINGS: Two views reveal no retained radiopaque foreign body that would be a contraindication to MRI examination.  A urinary bladder catheter is thought to be in  place with residual intravenous contrast secreted into a mildly distended urinary bladder.  There may be a generalized adynamic ileus.  A mechanical bowel obstruction is thought to be less likely.  External artifacts obscure detail.  There may be mild cardiomegaly.  Dextroscoliosis of the thoracolumbar spine is suggested.  Degenerative changes involve the imaged spine and the bilateral hip joints.       No retained radiopaque foreign body is appreciated that would be a contraindication to MRI examination.     Pertinent findings were discussed with the [facility] MRI Department at approximately 2135 hours on 4/14/2022.   COMMENT:  Part of this note is an electronic transcription of spoken language to printed text. The electronic translation/transcription may permit erroneous, or at times, nonsensical (or even sensical) words or phrases to be inadvertently transcribed or omitted; this  has reviewed the note for such errors (as well as additional errors); however, some may still exist.  ADONIS OLVERA JR, MD       Electronically Signed and Approved By: ADONIS OLVERA JR, MD on 4/14/2022 at 21:41              CT Angiogram Head w AI Analysis of LVO    Result Date: 4/14/2022  PROCEDURE: CT ANGIOGRAM HEAD W AI ANALYSIS OF LVO  COMPARISON: [facility], CT, CT HEAD WO CONTRAST STROKE PROTOCOL, 4/14/2022, 13:23.  INDICATIONS: Acute Stroke  PROTOCOL:   Standard imaging protocol performed    RADIATION:      Automated exposure control was utilized to minimize radiation dose. RAPID: CTA imaging was analyzed using RAPID AI to enable computer assisted triage notification to rapidly detect a large vessel occlusion (LVO) and shorten notification time  TECHNIQUE: After obtaining the patient's consent, CT images of the head were obtained without and with non-ionic contrast, and multi-planar/3-D imaging were created and interpreted to optimize visualization of vascular anatomy.   FINDINGS:  VASCULATURE: Normal.  No  significant stenosis.  No visible aneurysm or vascular malformation.  VENTRICLES: Unchanged from recent prior CT head. CEREBRUM: Unchanged from recent prior CT head. CEREBELLUM: Unchanged from recent prior CT head. BRAINSTEM: Unchanged from recent prior CT head. BASAL CISTERNS: Normal.  No subarachnoid hemorrhage or effacement.  SKULL: Negative.        Major intracranial arterial vasculature appears widely patent, with no evidence of thrombus or aneurysm.     AZAM DOUGLAS MD       Electronically Signed and Approved By: AZAM DOUGLAS MD on 4/14/2022 at 16:04             CT CEREBRAL PERFUSION WITH & WITHOUT CONTRAST    Result Date: 4/14/2022  PROCEDURE: CT CEREBRAL PERFUSION W WO CONTRAST  COMPARISON: None  INDICATIONS: cva  PROTOCOL:   Standard imaging protocol performed    RADIATION:   DLP: 1299.6mGy*cm   Automated exposure control was utilized to minimize radiation dose. CONTRAST: 80cc Isovue 370 I.V. LABS:   eGFR: >60ml/min/1.73m2   FINDINGS: There is no evidence to suggest core infarct on the cerebral blood flow images.  On the T-max there is some asymmetric perfusion involving the left frontal lobe and the right cerebellum, felt to be artifactual given they are in different vascular territories.       No convincing evidence to suggest acute perfusion abnormality.      AZAM DOUGLAS MD       Electronically Signed and Approved By: AZAM DOUGLAS MD on 4/14/2022 at 15:03               LAB RESULTS:      Lab 04/18/22  1039 04/17/22  0435 04/16/22  0428 04/15/22  0223 04/14/22  1431   WBC 5.63 5.92 5.68 6.17 6.06   HEMOGLOBIN 13.4 11.0* 10.5* 9.9* 12.0*   HEMATOCRIT 37.9 29.7* 28.8* 27.0* 33.2*   PLATELETS 163 203 208 183 207   NEUTROS ABS 3.75 3.27  --   --  3.41   IMMATURE GRANS (ABS) 0.02 0.02  --   --  0.01   LYMPHS ABS 1.31 1.93  --   --  1.85   MONOS ABS 0.44 0.59  --   --  0.73   EOS ABS 0.08 0.08  --   --  0.04   MCV 82.6 80.1 80.4 80.1 81.8   LACTATE  --   --   --  0.9  --    PROTIME  --   --   --    --  10.9   APTT  --   --   --   --  25.2         Lab 04/18/22  1039 04/17/22  0435 04/16/22 0428 04/15/22  0223 04/14/22  2011 04/14/22  1431   SODIUM 141 140 140 138 136 136   POTASSIUM 4.1 3.7 3.3* 3.4* 3.2* 3.0*   CHLORIDE 105 104 105 105 100 98   CO2 22.5 22.9 24.3 23.2 24.7 24.5   ANION GAP 13.5 13.1 10.7 9.8 11.3 13.5   BUN 11 12 14 17 19 21   CREATININE 0.86 0.97 1.05 1.04 1.23 1.45*   EGFR 87.0 78.4 71.3 72.1 59.0* 48.4*   GLUCOSE 80 68 78 76 88 95   CALCIUM 9.2 8.8 8.7 8.3* 8.9 9.6   MAGNESIUM 1.8 1.6 1.5*  --   --  1.8   PHOSPHORUS 2.2* 2.0*  --   --   --   --    HEMOGLOBIN A1C  --   --   --  4.30*  --   --    TSH  --   --   --  1.300  --   --          Lab 04/18/22  1039 04/17/22  0435 04/15/22  0223 04/14/22  2011 04/14/22  1431   TOTAL PROTEIN 6.4 5.8* 5.1* 6.0 6.8   ALBUMIN 3.30* 3.20* 2.80* 3.20* 3.80   GLOBULIN  --   --  2.3 2.8 3.0   ALT (SGPT) 9 8 8 10 11   AST (SGOT) 17 12 11 12 14   BILIRUBIN 1.2 1.3* 1.1 1.4* 1.8*   INDIRECT BILIRUBIN 1.0 1.0  --   --   --    BILIRUBIN DIRECT 0.2 0.3  --   --   --    ALK PHOS 96 82 76 88 103         Lab 04/14/22 2011 04/14/22  1431   TROPONIN T 0.034* 0.040*   PROTIME  --  10.9   INR  --  1.05*         Lab 04/15/22  0223   CHOLESTEROL 127   LDL CHOL 59   HDL CHOL 55   TRIGLYCERIDES 59         Lab 04/14/22  1442   ABO TYPING A   RH TYPING Positive   ANTIBODY SCREEN Negative         Brief Urine Lab Results  (Last result in the past 365 days)      Color   Clarity   Blood   Leuk Est   Nitrite   Protein   CREAT   Urine HCG        04/15/22 0445 Yellow   Clear   Trace   Negative   Negative   Negative               Microbiology Results (last 10 days)     ** No results found for the last 240 hours. **          XR Shoulder 2+ View Right    Result Date: 4/16/2022  Impression:  No acute fracture or acute malalignment is seen.  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  They have progressed considerably since 5/5/2016.     COMMENT:  Part of this note is an  electronic transcription of spoken language to printed text. The electronic translation/transcription may permit erroneous, or at times, nonsensical (or even sensical) words or phrases to be inadvertently transcribed or omitted; this  has reviewed the note for such errors (as well as additional errors); however, some may still exist.  ADONIS OLVERA JR, MD       Electronically Signed and Approved By: ADONIS OLVERA JR, MD on 4/16/2022 at 20:42              CT Angiogram Neck    Result Date: 4/14/2022  Impression:  Major arterial vasculature within neck appears widely patent, with no evidence of hemodynamically significant stenosis or dissection.     AZAM DOUGLAS MD       Electronically Signed and Approved By: AAZM DOUGLAS MD on 4/14/2022 at 16:00             CT Cervical Spine Without Contrast    Result Date: 4/16/2022  Impression:   No acute cervical spine fracture is seen.  No significant interval change is seen since the 4/14/2022 neck CTA exam or the prior neck CTA exam from 6/23/2011.     Electronically Signed and Approved By: ADONIS OLVERA JR, MD on 4/16/2022 at 21:38  COMMENT:  Part of this note is an electronic transcription of spoken language to printed text. The electronic translation/transcription may permit erroneous, or at times, nonsensical (or even sensical) words or phrases to be inadvertently transcribed or omitted; this  has reviewed the note for such errors (as well as additional errors); however, some may still exist.  ADONIS OLVERA JR, MD                      MRI Brain Without Contrast    Result Date: 4/14/2022  Impression:  No acute infarct is seen.  No acute brain abnormality is appreciated.  The study is limited by motion artifact.     COMMENT:  Part of this note is an electronic transcription of spoken language to printed text. The electronic translation/transcription may permit erroneous, or at times, nonsensical (or even sensical) words or phrases to be  inadvertently transcribed or omitted; this  has reviewed the note for such errors (as well as additional errors); however, some may still exist.  ADONIS OLVERA JR, MD       Electronically Signed and Approved By: ADONIS OLVERA JR, MD on 4/14/2022 at 23:06              XR Chest 1 View    Result Date: 4/14/2022  Impression:  Pulmonary vascular congestion.       AZAM DOUGLAS MD       Electronically Signed and Approved By: AZAM DOUGLAS MD on 4/14/2022 at 14:03             CT Head Without Contrast Stroke Protocol    Result Date: 4/14/2022  Impression:   1. No acute intracranial process identified. 2. Stable encephalomalacia along bilateral inferior medial frontal lobes. 3. Findings suggestive of mild chronic small vessel ischemic disease. 4. Mild mucosal disease within right frontal sinus.     AZAM DOUGLAS MD       Electronically Signed and Approved By: AZAM DOUGLAS MD on 4/14/2022 at 14:02             XR Abdomen KUB    Result Date: 4/14/2022  Impression:  No retained radiopaque foreign body is appreciated that would be a contraindication to MRI examination.     Pertinent findings were discussed with the [facility] MRI Department at approximately 2135 hours on 4/14/2022.   COMMENT:  Part of this note is an electronic transcription of spoken language to printed text. The electronic translation/transcription may permit erroneous, or at times, nonsensical (or even sensical) words or phrases to be inadvertently transcribed or omitted; this  has reviewed the note for such errors (as well as additional errors); however, some may still exist.  ADONIS OLVERA JR, MD       Electronically Signed and Approved By: ADONIS OLVERA JR, MD on 4/14/2022 at 21:41              CT Angiogram Head w AI Analysis of LVO    Result Date: 4/14/2022  Impression:  Major intracranial arterial vasculature appears widely patent, with no evidence of thrombus or aneurysm.     AZAM DOUGLAS MD       Electronically Signed  and Approved By: AZAM DOUGLAS MD on 4/14/2022 at 16:04             CT CEREBRAL PERFUSION WITH & WITHOUT CONTRAST    Result Date: 4/14/2022  Impression:  No convincing evidence to suggest acute perfusion abnormality.      AZAM DOUGLAS MD       Electronically Signed and Approved By: AZAM DOUGLAS MD on 4/14/2022 at 15:03               Results for orders placed during the hospital encounter of 02/25/22    Duplex Venous Lower Extremity - Bilateral CV-READ    Interpretation Summary  · Acute left lower extremity deep vein thrombosis noted in the common femoral, proximal femoral, mid femoral, distal femoral, popliteal and peroneal.  · All other veins appeared normal bilaterally.      Results for orders placed during the hospital encounter of 02/25/22    Duplex Venous Lower Extremity - Bilateral CV-READ    Interpretation Summary  · Acute left lower extremity deep vein thrombosis noted in the common femoral, proximal femoral, mid femoral, distal femoral, popliteal and peroneal.  · All other veins appeared normal bilaterally.      Results for orders placed during the hospital encounter of 04/14/22    Adult Transthoracic Echo Complete W/ Cont if Necessary Per Protocol    Interpretation Summary  · Calculated left ventricular EF = 54.8% Estimated left ventricular EF was in agreement with the calculated left ventricular EF. Left ventricular systolic function is normal.  · Mild mitral valve regurgitation is present.      Labs Pending at Discharge: None      Time spent on Discharge including face to face service:  40 minutes    Electronically signed by Aranza Herzog MD, 04/18/22, 3:10 PM EDT.     diabetes

## 2022-04-18 NOTE — OUTREACH NOTE
Prep Survey    Flowsheet Row Responses   [facility] facility patient discharged from? [facility]   Is LACE score < 7 ? No   Emergency Room discharge w/ pulse ox? No   Eligibility Not Eligible   What are the reasons patient is not eligible? [facility]   Does the patient have one of the following disease processes/diagnoses(primary or secondary)? Other   Prep survey completed? Yes          JASON IBARRA - Registered Nurse

## 2022-04-18 NOTE — THERAPY TREATMENT NOTE
Acute Care - Physical Therapy Treatment Note   [facility]     Patient Name: Jordin Menon  : 1940  MRN: [id number]  Today's Date: 2022      Visit Dx:     ICD-10-CM ICD-9-CM   1. Acute CVA (cerebrovascular accident) (Formerly [facility])  I63.9 434.91   2. Hypokalemia  E87.6 276.8   3. Oropharyngeal dysphagia  R13.12 787.22   4. Decreased activities of daily living (ADL)  Z78.9 V49.89   5. Difficulty walking  R26.2 719.7     Patient Active Problem List   Diagnosis   • Primary osteoarthritis of right knee   • Status post total right knee replacement   • Essential hypertension   • Shortness of breath   • ARF (acute renal failure) (Formerly [facility])   • Hypokalemia   • Hypernatremia   • Symptomatic bradycardia   • Hypothermia, initial encounter   • Acute CVA (cerebrovascular accident) (Formerly [facility])     Past Medical History:   Diagnosis Date   • Alzheimer's dementia with behavioral disturbance (Formerly [facility])    • Anemia, vitamin B12 deficiency    • Anxiety disorder due to known physiological condition    • Arthritis    • Constipation    • Gastroesophageal reflux disease without esophagitis    • High blood pressure    • HTN (hypertension)    • Hyperlipidemia    • Ingrowing toenail 2018   • Left foot pain 2018   • Osteoarthritis of right knee 2016   • Right foot pain 2018   • Sleep apnea    • Tinea unguium 2018     History reviewed. No pertinent surgical history.  PT Assessment (last 12 hours)     PT Evaluation and Treatment     Row Name 22 1222          Physical Therapy Time and Intention    Subjective Information --  Pt concerned because he cannot find clothes he feels were in his room previously.  -RH     Document Type therapy note (daily note)  -RH     Mode of Treatment physical therapy;individual therapy  -RH     Patient Effort poor  -RH     Row Name 22 1222          Pain Scale: FACES Pre/Post-Treatment    Pain: FACES Scale, Pretreatment 0-->no hurt  -RH     Posttreatment Pain Rating 0-->no hurt  -RH     Row Name  04/18/22 1222          Cognition    Affect/Mental Status (Cognition) agitated  -     Behavioral Issues (Cognition) uncooperative  -     Row Name 04/18/22 1222          Bed Mobility    Bed Mobility supine-sit  -     Supine-Sit Kleberg (Bed Mobility) moderate assist (50% patient effort)  -     Assistive Device (Bed Mobility) bed rails  -     Comment, (Bed Mobility) --  Pt maintains sitting at side of bed with CGA.  -     Row Name 04/18/22 1222          Transfers    Transfers stand pivot/stand step transfer  -     Comment, (Transfers) --  Pt with minimal shuffling steps.  Pt does not weight shift.  -     Row Name 04/18/22 1222          Stand Pivot/Stand Step Transfer    Stand Pivot/Stand Step Kleberg (Transfers) moderate assist (50% patient effort);maximum assist (25% patient effort)  -     Assistive Device (Stand Pivot Stand Step Transfer) --  Pt transfers without AD.  -     Row Name 04/18/22 1222          Progress Summary (PT)    Progress Toward Functional Goals (PT) progress toward functional goals is gradual  -           User Key  (r) = Recorded By, (t) = Taken By, (c) = Cosigned By    Initials Name Provider Type     Himanshu Cheema PTA Physical Therapist Assistant                Physical Therapy Education                 Title: PT OT SLP Therapies (In Progress)     Topic: Physical Therapy (In Progress)     Point: Mobility training (Done)     Learning Progress Summary           Patient Acceptance, E,TB, VU,NR by AV at 4/16/2022 1212                   Point: Home exercise program (Not Started)     Learner Progress:  Not documented in this visit.          Point: Body mechanics (Done)     Learning Progress Summary           Patient Acceptance, E,TB, VU,NR by AV at 4/16/2022 1212                   Point: Precautions (Done)     Learning Progress Summary           Patient Acceptance, E,TB, VU,NR by AV at 4/16/2022 1212                               User Key     Initials Effective Dates  Name Provider Type Discipline     06/11/21 -  Guille Rosales, PT Physical Therapist PT              PT Recommendation and Plan     Progress Summary (PT)  Progress Toward Functional Goals (PT): progress toward functional goals is gradual   Outcome Measures     Row Name 04/18/22 1200 04/17/22 1300 04/16/22 1200       How much help from another person do you currently need...    Turning from your back to your side while in flat bed without using bedrails? 3  -RH 3  -RH 3  -AV    Moving from lying on back to sitting on the side of a flat bed without bedrails? 2  -RH 2  -RH 2  -AV    Moving to and from a bed to a chair (including a wheelchair)? 2  -RH 2  -RH 2  -AV    Standing up from a chair using your arms (e.g., wheelchair, bedside chair)? 2  -RH 2  -RH 3  -AV    Climbing 3-5 steps with a railing? 1  -RH 1  -RH 2  -AV    To walk in hospital room? 2  -RH 1  -RH 3  -AV    AM-PAC 6 Clicks Score (PT) 12  -RH 11  -RH 15  -AV       Functional Assessment    Outcome Measure Options -- -- AM-PAC 6 Clicks Basic Mobility (PT)  -AV          User Key  (r) = Recorded By, (t) = Taken By, (c) = Cosigned By    Initials Name Provider Type     Himanshu Cheema PTA Physical Therapist Assistant     Guille Rosales, PT Physical Therapist                 Time Calculation:    PT Charges     Row Name 04/18/22 1221             Time Calculation    PT Received On 04/18/22  -RH              Timed Charges    51143 - PT Therapeutic Activity Minutes 25  -RH              Total Minutes    Timed Charges Total Minutes 25  -RH       Total Minutes 25  -RH            User Key  (r) = Recorded By, (t) = Taken By, (c) = Cosigned By    Initials Name Provider Type     Himanshu Cheema PTA Physical Therapist Assistant              Therapy Charges for Today     Code Description Service Date Service Provider Modifiers Qty    70773885247 HC PT THERAPEUTIC ACT EA 15 MIN 4/17/2022 Himanshu Cheema PTA GP 1    40040265670 HC PT THERAPEUTIC ACT EA 15 MIN 4/18/2022  Himanshu Cheema, PTA GP 2          PT G-Codes  Outcome Measure Options: AM-PAC 6 Clicks Basic Mobility (PT)  AM-PAC 6 Clicks Score (PT): 12  AM-PAC 6 Clicks Score (OT): 15    Himanshu Cheema PTA  4/18/2022

## 2022-04-18 NOTE — NURSING NOTE
"Consulted with Dr. Herzog at discharge concerning oral temp of 94.3 and axillary temp of 93.3. Dr. Herzog states \"pt has thermodysregulation related to dementia and is ok to discharge to [facility] and rehab.  "

## 2022-05-01 ENCOUNTER — APPOINTMENT (OUTPATIENT)
Dept: CT IMAGING | Facility: HOSPITAL | Age: 82
End: 2022-05-01

## 2022-05-01 ENCOUNTER — HOSPITAL ENCOUNTER (EMERGENCY)
Facility: HOSPITAL | Age: 82
Discharge: NURSING, REHAB OR LONG-TERM CARE | End: 2022-05-01
Attending: EMERGENCY MEDICINE | Admitting: EMERGENCY MEDICINE

## 2022-05-01 VITALS
OXYGEN SATURATION: 99 % | TEMPERATURE: 98.1 F | BODY MASS INDEX: 20.47 KG/M2 | HEIGHT: 69 IN | HEART RATE: 49 BPM | SYSTOLIC BLOOD PRESSURE: 112 MMHG | DIASTOLIC BLOOD PRESSURE: 60 MMHG | RESPIRATION RATE: 18 BRPM | WEIGHT: 138.23 LBS

## 2022-05-01 DIAGNOSIS — S09.90XA INJURY OF HEAD, INITIAL ENCOUNTER: Primary | ICD-10-CM

## 2022-05-01 DIAGNOSIS — S01.01XA LACERATION OF SCALP WITHOUT FOREIGN BODY, INITIAL ENCOUNTER: ICD-10-CM

## 2022-05-01 PROCEDURE — 70450 CT HEAD/BRAIN W/O DYE: CPT

## 2022-05-01 PROCEDURE — 99283 EMERGENCY DEPT VISIT LOW MDM: CPT

## 2022-05-01 PROCEDURE — 99284 EMERGENCY DEPT VISIT MOD MDM: CPT

## 2022-05-01 RX ORDER — LIDOCAINE HYDROCHLORIDE AND EPINEPHRINE 10; 10 MG/ML; UG/ML
10 INJECTION, SOLUTION INFILTRATION; PERINEURAL ONCE
Status: DISCONTINUED | OUTPATIENT
Start: 2022-05-01 | End: 2022-05-01 | Stop reason: HOSPADM

## 2022-05-01 NOTE — DISCHARGE INSTRUCTIONS
Your wound was closed with skin glue.  Keep wound clean and dry.    Monitor for signs of worsening head injury such as confusion, vomiting, blurry vision, mental status changes.

## 2022-05-01 NOTE — ED PROVIDER NOTES
Time: 4:03 PM EDT  Arrived by: Ambulance  Chief Complaint: Head injury  History provided by: Patient, nursing home staff    History of Present Illness:  Patient is a 82 y.o. year old male that was brought to the emergency department by ambulance from the nursing home after a fall when trying to get out of bed.  Patient denies losing consciousness.  Patient denies any pain at this time.      History limited by:  Dementia   used: No    Fall  Mechanism of injury: fall    Injury location:  Head/neck  Head/neck injury location:  Head  Incident location:  Home  Time since incident:  1 hour  Arrived directly from scene: yes    Associated symptoms: no abdominal pain, no chest pain, no headaches, no nausea, no seizures and no vomiting    Laceration  Location:  Head/neck  Head/neck laceration location:  Head  Length:  1  Depth:  Cutaneous  Quality: jagged    Bleeding: controlled    Time since incident:  1 hour  Laceration mechanism:  Fall  Relieved by:  Nothing  Worsened by:  Nothing  Ineffective treatments:  None tried  Associated symptoms: no fever            Similar Symptoms Previously: No  Recently seen: No      Patient Care Team  Primary Care Provider: Dr. Llanes    Past Medical History:     No Known Allergies  Past Medical History:   Diagnosis Date   • Alzheimer's dementia with behavioral disturbance (HCC)    • Anemia, vitamin B12 deficiency    • Anxiety disorder due to known physiological condition    • Arthritis    • Constipation    • Gastroesophageal reflux disease without esophagitis    • High blood pressure    • HTN (hypertension)    • Hyperlipidemia    • Ingrowing toenail 09/21/2018   • Left foot pain 09/21/2018   • Osteoarthritis of right knee 08/22/2016   • Right foot pain 09/21/2018   • Sleep apnea    • Tinea unguium 09/21/2018     History reviewed. No pertinent surgical history.  Family History   Family history unknown: Yes       Home Medications:  Prior to Admission medications     Medication Sig Start Date End Date Taking? Authorizing Provider   acetaminophen (TYLENOL) 325 MG tablet Take 650 mg by mouth 3 (Three) Times a Day.    Nanci Shafer MD   apixaban (ELIQUIS) 5 MG tablet tablet Take 1 tablet by mouth Every 12 (Twelve) Hours. Indications: DVT/PE (active thrombosis) 3/10/22   Mj Butler MD   aspirin 81 MG chewable tablet Chew 1 tablet Daily. 4/19/22   Aranza Herzog MD   atorvastatin (LIPITOR) 80 MG tablet Take 1 tablet by mouth Every Night. 4/18/22   Aranza Herzog MD   busPIRone (BUSPAR) 10 MG tablet Take 10 mg by mouth 2 (Two) Times a Day.    Nanci Shafer MD   Diclofenac Sodium (VOLTAREN) 1 % gel gel Apply 4 g topically to the appropriate area as directed 2 (Two) Times a Day As Needed (Bilateral hands and shoulders).    Nanci Shafer MD   docusate sodium (COLACE) 100 MG capsule Take 200 mg by mouth Daily. Pt takes along with 50 mg for a total of 250 mg QD    Nanci Shafer MD   docusate sodium (COLACE) 50 MG capsule Take  by mouth Daily. Pt takes along with 2- 100 mg Capsules for a total of 250 mg    Nanci Shafer MD   Dyclonine-Glycerin (Cepacol Sore Throat Spray) 0.1-33 % liquid Apply 2 sprays to the mouth or throat 4 (Four) Times a Day As Needed (cold symptoms).    Nanci Shafer MD   famotidine (PEPCID) 20 MG tablet Take 20 mg by mouth Every Night.    Nanci Shafer MD   furosemide (Lasix) 20 MG tablet Take 1 tablet by mouth Daily. 3/3/22   Eric Rivas MD   hydrOXYzine (ATARAX) 25 MG tablet Take 25 mg by mouth Every 6 (Six) Hours As Needed for Itching.    Nanci Shafer MD   ipratropium-albuterol (DUO-NEB) 0.5-2.5 mg/3 ml nebulizer Take 3 mL by nebulization Every 4 (Four) Hours As Needed for Wheezing.    Nanci Shafer MD   Lidocaine HCl (LMX) 4 % cream Apply 1 application topically to the appropriate area as directed 2 (Two) Times a Day As Needed for Mild Pain  (Bilateral hands and shoulders).     Nanci Shafer MD   magnesium hydroxide (MILK OF MAGNESIA) 400 MG/5ML suspension Take 30 mL by mouth Daily As Needed for Constipation.    Nanci Shafer MD   melatonin 5 MG tablet tablet Take 5 mg by mouth Every Night.    Nanci Shafer MD   memantine (NAMENDA) 10 MG tablet Take 10 mg by mouth Daily.    Nanci Shafer MD   mineral oil-hydrophilic petrolatum (AQUAPHOR) ointment Apply 1 application topically to the appropriate area as directed 2 (Two) Times a Day As Needed for Dry Skin (bilateral hands and shoulders).    Nanci Shafer MD   mirtazapine (REMERON) 7.5 MG half tablet Take 7.5 mg by mouth Every Night.    Nanci Shafer MD   polyethylene glycol (MIRALAX) packet Take 17 g by mouth Daily.    Nanci Shafer MD   tamsulosin (FLOMAX) 0.4 MG capsule 24 hr capsule Take 1 capsule by mouth Daily.    Nanci Shafer MD   thiamine (VITAMIN B-1) 100 MG tablet tablet Take 1 tablet by mouth Daily. 3/10/22   Mj Butler MD   witch hazel-glycerin (TUCKS) pad Apply 1 pad topically to the appropriate area as directed As Needed for Irritation or Hemorrhoids.    Nanci Shafer MD        Social History:   PT  reports that he has never smoked. He has never used smokeless tobacco. He reports that he does not drink alcohol and does not use drugs.    Record Review:  I have reviewed the patient's records in [facility].     Review of Systems  Review of Systems   Constitutional: Negative for chills and fever.   HENT: Negative for congestion, ear pain and sore throat.    Eyes: Negative for pain.   Respiratory: Negative for cough, chest tightness and shortness of breath.    Cardiovascular: Negative for chest pain.   Gastrointestinal: Negative for abdominal pain, diarrhea, nausea and vomiting.   Genitourinary: Negative for flank pain and hematuria.   Musculoskeletal: Negative for joint swelling.   Skin: Positive for wound. Negative for pallor.   Neurological: Negative for  "seizures and headaches.   All other systems reviewed and are negative.       Physical Exam    /60 (BP Location: Right arm, Patient Position: Lying)   Pulse (!) 49   Temp 98.1 °F (36.7 °C) (Oral)   Resp 18   Ht 175.3 cm (69\")   Wt 62.7 kg (138 lb 3.7 oz)   SpO2 99%   BMI 20.41 kg/m²     Physical Exam  Vitals and nursing note reviewed.   Constitutional:       General: He is awake. He is not in acute distress.     Appearance: Normal appearance. He is not toxic-appearing.   HENT:      Head: Normocephalic and atraumatic.        Nose: Nose normal.      Mouth/Throat:      Lips: Pink.      Mouth: Mucous membranes are moist.   Eyes:      General: No scleral icterus.     Pupils: Pupils are equal, round, and reactive to light.   Cardiovascular:      Rate and Rhythm: Normal rate and regular rhythm.      Pulses:           Radial pulses are 2+ on the right side and 2+ on the left side.      Heart sounds: Normal heart sounds.   Pulmonary:      Effort: Pulmonary effort is normal. No respiratory distress.      Breath sounds: Normal breath sounds. No wheezing or rhonchi.   Abdominal:      General: Abdomen is flat. Bowel sounds are normal.      Palpations: Abdomen is soft.      Tenderness: There is no abdominal tenderness.   Musculoskeletal:         General: Normal range of motion.      Cervical back: Normal range of motion and neck supple. No pain with movement.   Skin:     General: Skin is warm and dry.      Findings: Laceration present.   Neurological:      Mental Status: He is alert and oriented to person, place, and time. Mental status is at baseline.   Psychiatric:         Behavior: Behavior is cooperative.                  ED Course  /60 (BP Location: Right arm, Patient Position: Lying)   Pulse (!) 49   Temp 98.1 °F (36.7 °C) (Oral)   Resp 18   Ht 175.3 cm (69\")   Wt 62.7 kg (138 lb 3.7 oz)   SpO2 99%   BMI 20.41 kg/m²   Results for orders placed or performed during the hospital encounter of 04/14/22 "   Comprehensive Metabolic Panel    Specimen: Blood   Result Value Ref Range    Glucose 95 65 - 99 mg/dL    BUN 21 8 - 23 mg/dL    Creatinine 1.45 (H) 0.76 - 1.27 mg/dL    Sodium 136 136 - 145 mmol/L    Potassium 3.0 (L) 3.5 - 5.2 mmol/L    Chloride 98 98 - 107 mmol/L    CO2 24.5 22.0 - 29.0 mmol/L    Calcium 9.6 8.6 - 10.5 mg/dL    Total Protein 6.8 6.0 - 8.5 g/dL    Albumin 3.80 3.50 - 5.20 g/dL    ALT (SGPT) 11 1 - 41 U/L    AST (SGOT) 14 1 - 40 U/L    Alkaline Phosphatase 103 39 - 117 U/L    Total Bilirubin 1.8 (H) 0.0 - 1.2 mg/dL    Globulin 3.0 gm/dL    A/G Ratio 1.3 g/dL    BUN/Creatinine Ratio 14.5 7.0 - 25.0    Anion Gap 13.5 5.0 - 15.0 mmol/L    eGFR 48.4 (L) >60.0 mL/min/1.73   Protime-INR    Specimen: Blood   Result Value Ref Range    Protime 10.9 9.4 - 12.0 Seconds    INR 1.05 (L) 2.00 - 3.00   aPTT    Specimen: Blood   Result Value Ref Range    PTT 25.2 22.2 - 34.2 seconds   Troponin    Specimen: Blood   Result Value Ref Range    Troponin T 0.040 (C) 0.000 - 0.030 ng/mL   CBC Auto Differential    Specimen: Blood   Result Value Ref Range    WBC 6.06 3.40 - 10.80 10*3/mm3    RBC 4.06 (L) 4.14 - 5.80 10*6/mm3    Hemoglobin 12.0 (L) 13.0 - 17.7 g/dL    Hematocrit 33.2 (L) 37.5 - 51.0 %    MCV 81.8 79.0 - 97.0 fL    MCH 29.6 26.6 - 33.0 pg    MCHC 36.1 (H) 31.5 - 35.7 g/dL    RDW 14.6 12.3 - 15.4 %    RDW-SD 43.2 37.0 - 54.0 fl    MPV 9.9 6.0 - 12.0 fL    Platelets 207 140 - 450 10*3/mm3    Neutrophil % 56.3 42.7 - 76.0 %    Lymphocyte % 30.5 19.6 - 45.3 %    Monocyte % 12.0 5.0 - 12.0 %    Eosinophil % 0.7 0.3 - 6.2 %    Basophil % 0.3 0.0 - 1.5 %    Immature Grans % 0.2 0.0 - 0.5 %    Neutrophils, Absolute 3.41 1.70 - 7.00 10*3/mm3    Lymphocytes, Absolute 1.85 0.70 - 3.10 10*3/mm3    Monocytes, Absolute 0.73 0.10 - 0.90 10*3/mm3    Eosinophils, Absolute 0.04 0.00 - 0.40 10*3/mm3    Basophils, Absolute 0.02 0.00 - 0.20 10*3/mm3    Immature Grans, Absolute 0.01 0.00 - 0.05 10*3/mm3    nRBC 0.0 0.0 - 0.2  /100 WBC   Magnesium    Specimen: Blood   Result Value Ref Range    Magnesium 1.8 1.6 - 2.4 mg/dL   Troponin    Specimen: Arm, Right; Blood   Result Value Ref Range    Troponin T 0.034 (C) 0.000 - 0.030 ng/mL   Comprehensive Metabolic Panel    Specimen: Arm, Right; Blood   Result Value Ref Range    Glucose 88 65 - 99 mg/dL    BUN 19 8 - 23 mg/dL    Creatinine 1.23 0.76 - 1.27 mg/dL    Sodium 136 136 - 145 mmol/L    Potassium 3.2 (L) 3.5 - 5.2 mmol/L    Chloride 100 98 - 107 mmol/L    CO2 24.7 22.0 - 29.0 mmol/L    Calcium 8.9 8.6 - 10.5 mg/dL    Total Protein 6.0 6.0 - 8.5 g/dL    Albumin 3.20 (L) 3.50 - 5.20 g/dL    ALT (SGPT) 10 1 - 41 U/L    AST (SGOT) 12 1 - 40 U/L    Alkaline Phosphatase 88 39 - 117 U/L    Total Bilirubin 1.4 (H) 0.0 - 1.2 mg/dL    Globulin 2.8 gm/dL    A/G Ratio 1.1 g/dL    BUN/Creatinine Ratio 15.4 7.0 - 25.0    Anion Gap 11.3 5.0 - 15.0 mmol/L    eGFR 59.0 (L) >60.0 mL/min/1.73   Hemoglobin A1c    Specimen: Blood   Result Value Ref Range    Hemoglobin A1C 4.30 (L) 4.80 - 5.60 %   Lipid Panel    Specimen: Blood   Result Value Ref Range    Total Cholesterol 127 0 - 200 mg/dL    Triglycerides 59 0 - 150 mg/dL    HDL Cholesterol 55 40 - 60 mg/dL    LDL Cholesterol  59 0 - 100 mg/dL    VLDL Cholesterol 13 5 - 40 mg/dL    LDL/HDL Ratio 1.09    CBC (No Diff)    Specimen: Blood   Result Value Ref Range    WBC 6.17 3.40 - 10.80 10*3/mm3    RBC 3.37 (L) 4.14 - 5.80 10*6/mm3    Hemoglobin 9.9 (L) 13.0 - 17.7 g/dL    Hematocrit 27.0 (L) 37.5 - 51.0 %    MCV 80.1 79.0 - 97.0 fL    MCH 29.4 26.6 - 33.0 pg    MCHC 36.7 (H) 31.5 - 35.7 g/dL    RDW 14.6 12.3 - 15.4 %    RDW-SD 42.4 37.0 - 54.0 fl    MPV 9.8 6.0 - 12.0 fL    Platelets 183 140 - 450 10*3/mm3   Comprehensive Metabolic Panel    Specimen: Blood   Result Value Ref Range    Glucose 76 65 - 99 mg/dL    BUN 17 8 - 23 mg/dL    Creatinine 1.04 0.76 - 1.27 mg/dL    Sodium 138 136 - 145 mmol/L    Potassium 3.4 (L) 3.5 - 5.2 mmol/L    Chloride 105 98 -  107 mmol/L    CO2 23.2 22.0 - 29.0 mmol/L    Calcium 8.3 (L) 8.6 - 10.5 mg/dL    Total Protein 5.1 (L) 6.0 - 8.5 g/dL    Albumin 2.80 (L) 3.50 - 5.20 g/dL    ALT (SGPT) 8 1 - 41 U/L    AST (SGOT) 11 1 - 40 U/L    Alkaline Phosphatase 76 39 - 117 U/L    Total Bilirubin 1.1 0.0 - 1.2 mg/dL    Globulin 2.3 gm/dL    A/G Ratio 1.2 g/dL    BUN/Creatinine Ratio 16.3 7.0 - 25.0    Anion Gap 9.8 5.0 - 15.0 mmol/L    eGFR 72.1 >60.0 mL/min/1.73   TSH    Specimen: Blood   Result Value Ref Range    TSH 1.300 0.270 - 4.200 uIU/mL   Lactic Acid, Plasma    Specimen: Blood   Result Value Ref Range    Lactate 0.9 0.5 - 2.0 mmol/L   Urinalysis With Culture If Indicated - Urine, Clean Catch    Specimen: Urine, Clean Catch   Result Value Ref Range    Color, UA Yellow Yellow, Straw    Appearance, UA Clear Clear    pH, UA <=5.0 5.0 - 8.0    Specific Gravity, UA >1.030 (H) 1.005 - 1.030    Glucose, UA Negative Negative    Ketones, UA Negative Negative    Bilirubin, UA Negative Negative    Blood, UA Trace (A) Negative    Protein, UA Negative Negative    Leuk Esterase, UA Negative Negative    Nitrite, UA Negative Negative    Urobilinogen, UA 0.2 E.U./dL 0.2 - 1.0 E.U./dL   Urinalysis, Microscopic Only - Urine, Clean Catch    Specimen: Urine, Clean Catch   Result Value Ref Range    RBC, UA 3-5 (A) None Seen /HPF    WBC, UA None Seen None Seen /HPF    Bacteria, UA None Seen None Seen /HPF    Squamous Epithelial Cells, UA None Seen None Seen, 0-2 /HPF    Yeast, UA Small/1+ Yeast None Seen /HPF    Hyaline Casts, UA 0-2 None Seen /LPF    Methodology Automated Microscopy    CBC (No Diff)    Specimen: Arm, Right; Blood   Result Value Ref Range    WBC 5.68 3.40 - 10.80 10*3/mm3    RBC 3.58 (L) 4.14 - 5.80 10*6/mm3    Hemoglobin 10.5 (L) 13.0 - 17.7 g/dL    Hematocrit 28.8 (L) 37.5 - 51.0 %    MCV 80.4 79.0 - 97.0 fL    MCH 29.3 26.6 - 33.0 pg    MCHC 36.5 (H) 31.5 - 35.7 g/dL    RDW 14.5 12.3 - 15.4 %    RDW-SD 42.5 37.0 - 54.0 fl    MPV 10.1 6.0 -  12.0 fL    Platelets 208 140 - 450 10*3/mm3   Basic Metabolic Panel    Specimen: Arm, Right; Blood   Result Value Ref Range    Glucose 78 65 - 99 mg/dL    BUN 14 8 - 23 mg/dL    Creatinine 1.05 0.76 - 1.27 mg/dL    Sodium 140 136 - 145 mmol/L    Potassium 3.3 (L) 3.5 - 5.2 mmol/L    Chloride 105 98 - 107 mmol/L    CO2 24.3 22.0 - 29.0 mmol/L    Calcium 8.7 8.6 - 10.5 mg/dL    BUN/Creatinine Ratio 13.3 7.0 - 25.0    Anion Gap 10.7 5.0 - 15.0 mmol/L    eGFR 71.3 >60.0 mL/min/1.73   Magnesium    Specimen: Arm, Right; Blood   Result Value Ref Range    Magnesium 1.5 (L) 1.6 - 2.4 mg/dL   Basic Metabolic Panel    Specimen: Blood   Result Value Ref Range    Glucose 68 65 - 99 mg/dL    BUN 12 8 - 23 mg/dL    Creatinine 0.97 0.76 - 1.27 mg/dL    Sodium 140 136 - 145 mmol/L    Potassium 3.7 3.5 - 5.2 mmol/L    Chloride 104 98 - 107 mmol/L    CO2 22.9 22.0 - 29.0 mmol/L    Calcium 8.8 8.6 - 10.5 mg/dL    BUN/Creatinine Ratio 12.4 7.0 - 25.0    Anion Gap 13.1 5.0 - 15.0 mmol/L    eGFR 78.4 >60.0 mL/min/1.73   Magnesium    Specimen: Blood   Result Value Ref Range    Magnesium 1.6 1.6 - 2.4 mg/dL   Phosphorus    Specimen: Blood   Result Value Ref Range    Phosphorus 2.0 (L) 2.5 - 4.5 mg/dL   Hepatic Function Panel    Specimen: Blood   Result Value Ref Range    Total Protein 5.8 (L) 6.0 - 8.5 g/dL    Albumin 3.20 (L) 3.50 - 5.20 g/dL    ALT (SGPT) 8 1 - 41 U/L    AST (SGOT) 12 1 - 40 U/L    Alkaline Phosphatase 82 39 - 117 U/L    Total Bilirubin 1.3 (H) 0.0 - 1.2 mg/dL    Bilirubin, Direct 0.3 0.0 - 0.3 mg/dL    Bilirubin, Indirect 1.0 mg/dL   CBC Auto Differential    Specimen: Blood   Result Value Ref Range    WBC 5.92 3.40 - 10.80 10*3/mm3    RBC 3.71 (L) 4.14 - 5.80 10*6/mm3    Hemoglobin 11.0 (L) 13.0 - 17.7 g/dL    Hematocrit 29.7 (L) 37.5 - 51.0 %    MCV 80.1 79.0 - 97.0 fL    MCH 29.6 26.6 - 33.0 pg    MCHC 37.0 (H) 31.5 - 35.7 g/dL    RDW 14.6 12.3 - 15.4 %    RDW-SD 42.1 37.0 - 54.0 fl    MPV 10.3 6.0 - 12.0 fL     Platelets 203 140 - 450 10*3/mm3    Neutrophil % 55.2 42.7 - 76.0 %    Lymphocyte % 32.6 19.6 - 45.3 %    Monocyte % 10.0 5.0 - 12.0 %    Eosinophil % 1.4 0.3 - 6.2 %    Basophil % 0.5 0.0 - 1.5 %    Immature Grans % 0.3 0.0 - 0.5 %    Neutrophils, Absolute 3.27 1.70 - 7.00 10*3/mm3    Lymphocytes, Absolute 1.93 0.70 - 3.10 10*3/mm3    Monocytes, Absolute 0.59 0.10 - 0.90 10*3/mm3    Eosinophils, Absolute 0.08 0.00 - 0.40 10*3/mm3    Basophils, Absolute 0.03 0.00 - 0.20 10*3/mm3    Immature Grans, Absolute 0.02 0.00 - 0.05 10*3/mm3    nRBC 0.0 0.0 - 0.2 /100 WBC   Basic Metabolic Panel    Specimen: Blood   Result Value Ref Range    Glucose 80 65 - 99 mg/dL    BUN 11 8 - 23 mg/dL    Creatinine 0.86 0.76 - 1.27 mg/dL    Sodium 141 136 - 145 mmol/L    Potassium 4.1 3.5 - 5.2 mmol/L    Chloride 105 98 - 107 mmol/L    CO2 22.5 22.0 - 29.0 mmol/L    Calcium 9.2 8.6 - 10.5 mg/dL    BUN/Creatinine Ratio 12.8 7.0 - 25.0    Anion Gap 13.5 5.0 - 15.0 mmol/L    eGFR 87.0 >60.0 mL/min/1.73   Magnesium    Specimen: Blood   Result Value Ref Range    Magnesium 1.8 1.6 - 2.4 mg/dL   Phosphorus    Specimen: Blood   Result Value Ref Range    Phosphorus 2.2 (L) 2.5 - 4.5 mg/dL   Hepatic Function Panel    Specimen: Blood   Result Value Ref Range    Total Protein 6.4 6.0 - 8.5 g/dL    Albumin 3.30 (L) 3.50 - 5.20 g/dL    ALT (SGPT) 9 1 - 41 U/L    AST (SGOT) 17 1 - 40 U/L    Alkaline Phosphatase 96 39 - 117 U/L    Total Bilirubin 1.2 0.0 - 1.2 mg/dL    Bilirubin, Direct 0.2 0.0 - 0.3 mg/dL    Bilirubin, Indirect 1.0 mg/dL   CBC Auto Differential    Specimen: Blood   Result Value Ref Range    WBC 5.63 3.40 - 10.80 10*3/mm3    RBC 4.59 4.14 - 5.80 10*6/mm3    Hemoglobin 13.4 13.0 - 17.7 g/dL    Hematocrit 37.9 37.5 - 51.0 %    MCV 82.6 79.0 - 97.0 fL    MCH 29.2 26.6 - 33.0 pg    MCHC 35.4 31.5 - 35.7 g/dL    RDW 15.2 12.3 - 15.4 %    RDW-SD 45.0 37.0 - 54.0 fl    MPV 11.6 6.0 - 12.0 fL    Platelets 163 140 - 450 10*3/mm3    Neutrophil %  66.6 42.7 - 76.0 %    Lymphocyte % 23.3 19.6 - 45.3 %    Monocyte % 7.8 5.0 - 12.0 %    Eosinophil % 1.4 0.3 - 6.2 %    Basophil % 0.5 0.0 - 1.5 %    Immature Grans % 0.4 0.0 - 0.5 %    Neutrophils, Absolute 3.75 1.70 - 7.00 10*3/mm3    Lymphocytes, Absolute 1.31 0.70 - 3.10 10*3/mm3    Monocytes, Absolute 0.44 0.10 - 0.90 10*3/mm3    Eosinophils, Absolute 0.08 0.00 - 0.40 10*3/mm3    Basophils, Absolute 0.03 0.00 - 0.20 10*3/mm3    Immature Grans, Absolute 0.02 0.00 - 0.05 10*3/mm3    nRBC 0.0 0.0 - 0.2 /100 WBC   Scan Slide    Specimen: Blood   Result Value Ref Range    Anisocytosis Slight/1+ None Seen    Microcytes Slight/1+ None Seen    WBC Morphology Normal Normal    Platelet Estimate Adequate Normal   POC Glucose Once    Specimen: Blood   Result Value Ref Range    Glucose 115 (H) 70 - 99 mg/dL   POC Creatinine    Specimen: Blood   Result Value Ref Range    Creatinine 1.40 mg/dL    eGFR 50.5 (L) >60.0 mL/min/1.73   POC Glucose Once    Specimen: Blood   Result Value Ref Range    Glucose 76 70 - 99 mg/dL   POC Glucose Once    Specimen: Blood   Result Value Ref Range    Glucose 87 70 - 99 mg/dL   POC Glucose Once    Specimen: Blood   Result Value Ref Range    Glucose 81 70 - 99 mg/dL   ECG 12 Lead   Result Value Ref Range    QT Interval 383 ms   Adult Transthoracic Echo Complete W/ Cont if Necessary Per Protocol   Result Value Ref Range    Target HR (85%) 118 bpm    Max. Pred. HR (100%) 139 bpm    IVRT 63.0 msec    LA Volume Index 10.5 mL/m2    Avg E/e' ratio 7.96     Ao root diam 3.0 cm    EDV(MOD-sp2) 48.0 ml    EDV(MOD-sp4) 49.0 ml    EF(MOD-bp) 54.8 %    ESV(MOD-sp2) 23.0 ml    ESV(MOD-sp4) 22.0 ml    Lat Peak E' Andrew 9.0 cm/sec    LVPWd 0.8 cm    Med Peak E' Andrew 4.57 cm/sec    MV dec time 175 msec    RVIDd 2.50 cm    IVSd 0.8 cm    LA dimension(2D) 3.4 cm    LVIDd 4.8 cm    LVIDs 2.4 cm    LVOT diam 2.0 cm    MV E/A 0.7     MV A max andrew 77.0 cm/sec    MV E max andrew 54.0 cm/sec    TAPSE (>1.6) 2.22 cm   Type &  Screen    Specimen: Blood   Result Value Ref Range    ABO Type A     RH type Positive     Antibody Screen Negative     T&S Expiration Date 4/17/2022 11:59:59 PM    ABO RH Specimen Verification    Specimen: Blood   Result Value Ref Range    ABO Type A     RH type Positive    Green Top (Gel)   Result Value Ref Range    Extra Tube Hold for add-ons.    Lavender Top   Result Value Ref Range    Extra Tube hold for add-on    Gold Top - SST   Result Value Ref Range    Extra Tube Hold for add-ons.    Light Blue Top   Result Value Ref Range    Extra Tube hold for add-on    Hold Creatinine Tube   Result Value Ref Range    Extra Tube Hold for add-ons.      Medications - No data to display  Adult Transthoracic Echo Complete W/ Cont if Necessary Per Protocol    Result Date: 4/15/2022  Narrative: · Calculated left ventricular EF = 54.8% Estimated left ventricular EF was in agreement with the calculated left ventricular EF. Left ventricular systolic function is normal. · Mild mitral valve regurgitation is present.      XR Shoulder 2+ View Right    Result Date: 4/16/2022  Narrative: PROCEDURE: XR SHOULDER 2+ VW RIGHT  COMPARISON: [facility], CR, SHOULDER >OR= 2V RT, 5/05/2016, 10:58.  INDICATIONS: RIGHT SHOULDER PAIN WITH LIMITED ROM.  FINDINGS: Four views were obtained.  Interval progression of degenerative changes involving the right shoulder is noted since the 5/5/2016 study.  The degenerative changes include the right acromioclavicular (AC) joint and the right glenohumeral joint.  There is narrowing of the right acromial humeral distance.  No definite acute fracture or acute malalignment is identified.  External artifacts obscure detail on the study.  If symptoms or clinical concerns persist, consider imaging follow-up.      Impression:  No acute fracture or acute malalignment is seen.  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  They have progressed considerably since 5/5/2016.     COMMENT:  Part of  this note is an electronic transcription of spoken language to printed text. The electronic translation/transcription may permit erroneous, or at times, nonsensical (or even sensical) words or phrases to be inadvertently transcribed or omitted; this  has reviewed the note for such errors (as well as additional errors); however, some may still exist.  ADONIS OLVERA JR, MD       Electronically Signed and Approved By: ADONIS OLVERA JR, MD on 4/16/2022 at 20:42              CT Head Without Contrast    Result Date: 5/1/2022  Narrative: PROCEDURE: CT HEAD WO CONTRAST  COMPARISON:  [facility], CT, CT HEAD WO CONTRAST, 3/07/2022, 2:15. INDICATIONS: fall, head injury, on eliquis  PROTOCOL:   Standard imaging protocol performed    RADIATION:   DLP: 890.4 mGy*cm   MA and/or KV was adjusted to minimize radiation dose.     TECHNIQUE: After obtaining the patient's consent, CT images were obtained without non-ionic intravenous contrast material.  FINDINGS:  Encephalomalacia changes are noted in the inferior frontal lobes bilaterally, possibly related to old infarcts versus previous trauma.  Moderate low density in the cerebral white matter is consistent with small vessel ischemic disease.  No intracranial hemorrhage is seen.  There is been a previous frontal craniotomy.  Metallic density presumably post surgical is noted along the inner table of the craniotomy.  No acute fracture is seen.      Impression:   1. No acute fracture or intracranial hemorrhage 2. Previous bilateral frontal craniotomy 3. Moderate small vessel ischemic changes in the white matter 4. Chronic appearing encephalomalacia in the inferior frontal lobes bilaterally, unchanged     Chandra Blackmon M.D.       Electronically Signed and Approved By: Chandra Blackmon M.D. on 5/01/2022 at 17:23             CT Angiogram Neck    Result Date: 4/14/2022  Narrative: PROCEDURE: CT ANGIOGRAM NECK  COMPARISON: [facility], CT, CTA NECK W/WO  CONT, POST PROC, 6/23/2011, 12:13.  INDICATIONS: cva  PROTOCOL:   Standard imaging protocol performed    RADIATION:      Automated exposure control was utilized to minimize radiation dose.  TECHNIQUE: After obtaining the patient's consent, CT images of the neck were obtained without and with non-ionic intravenous contrast material. Multi-planar reformatted/3-D images were created to optimize visualization of vascular anatomy. Unless otherwise stated in this report, all vascular stenoses involving the internal carotid arteries reported for this examination are derived by dividing the lesion diameter by the diameter of the normal internal carotid artery more distally.  FINDINGS:  LEFT INTERNAL CAROTID: No hemodynamically significant stenosis or dissection.  EXTERNAL CAROTID: No hemodynamically significant stenosis or dissection.  COMMON CAROTID: No hemodynamically significant stenosis or dissection.  VERTEBRAL: No hemodynamically significant stenosis or dissection.   RIGHT INTERNAL CAROTID: No hemodynamically significant stenosis or dissection.  EXTERNAL CAROTID: No hemodynamically significant stenosis or dissection.  COMMON CAROTID: No hemodynamically significant stenosis or dissection.  VERTEBRAL: No hemodynamically significant stenosis or dissection.  The right vertebral artery is dominant.   OTHER: The visualized soft tissues of the neck are also unremarkable.       Impression:  Major arterial vasculature within neck appears widely patent, with no evidence of hemodynamically significant stenosis or dissection.     AZAM DOUGLAS MD       Electronically Signed and Approved By: AZAM DOUGLAS MD on 4/14/2022 at 16:00             CT Cervical Spine Without Contrast    Result Date: 4/16/2022  Narrative: PROCEDURE: CT CERVICAL SPINE WO CONTRAST  COMPARISONS: [facility], CT, CTA NECK W/WO CONT, POST PROC, 6/23/2011, 12:13.   [facility], CT, CT ANGIOGRAM NECK, 4/14/2022, 14:44.   INDICATIONS: Osteoarthritis, cervical.  PROTOCOL:   Standard CT imaging protocol performed.    RADIATION:   MA and/or KV were/was adjusted to minimize radiation dose.    TECHNIQUE: After obtaining the patient's consent, multi-planar CT images (438 CT images) were created without contrast material.   EXAM FINDINGS: A routine nonenhanced cervical spine CT was performed. Sagittal and coronal two-dimensional reformations are provided for review. No acute cervical spine fracture or acute malalignment is identified. Small nonspecific bilateral cervical lymph nodes are seen.  Severe degenerative changes are seen throughout the cervical spine and are not significantly changed since the 4/14/2022 study.  Disc and endplate degenerative changes are seen at multiple levels, probably greatest at C4-5, C5-6, and C6-7.  The degenerative changes include the atlantoaxial joint.  There is reversal the cervical lordosis.  There is chronic anterolisthesis at C4-5, estimated at 6 mm.  Chronic anterolisthesis is seen C5-6, estimated at 5 mm.  Multiple bowl level moderate to severe neural foraminal narrowing is seen, especially bilaterally at C2-3, on the left at C3-4, , left greater than right at C4-5, bilaterally at C5-6, bilaterally at C6-7, on the left at C7-T1 and bilaterally at T1-2.  Intracranially, the extra-axial spaces are prominent.  Moderate spinal canal narrowing is seen at multiple levels.  Consider cervical spine MRI examination for further assessment if clinically warranted and if not contraindicated.      Impression:   No acute cervical spine fracture is seen.  No significant interval change is seen since the 4/14/2022 neck CTA exam or the prior neck CTA exam from 6/23/2011.     Electronically Signed and Approved By: ADONIS OLVERA JR, MD on 4/16/2022 at 21:38  COMMENT:  Part of this note is an electronic transcription of spoken language to printed text. The electronic translation/transcription may permit erroneous, or at  times, nonsensical (or even sensical) words or phrases to be inadvertently transcribed or omitted; this  has reviewed the note for such errors (as well as additional errors); however, some may still exist.  ADONIS OLVERA JR, MD                      MRI Brain Without Contrast    Result Date: 4/14/2022  Narrative: PROCEDURE: MRI BRAIN WO CONTRAST  COMPARISONS: [facility], CT, HEAD W/WO CONTRAST, 6/23/2011, 11:53.   [facility], MR, MRI BRAIN & ORBITS W/O AND W/ CONTRAST, 9/04/2008, 11:51.    [facility], MR, BRAIN W/O CONTRAST, 1/03/2015, 15:03.   [facility], CT, CT ANGIOGRAM NECK, 4/14/2022, 14:44.   [facility], CT, CT ANGIO. HEAD W AI ANALYSIS OF LVO, 4/14/2022, 14:44.   [facility], CT, CT CEREBRAL PERFUSION W WO CONTRAST, 4/14/2022, 13:56.   [facility], CT, CT HEAD WO CONTRAST STROKE PROTOCOL, 4/14/2022, 13:23.   [facility], CT, CT HEAD WO CONTRAST, 3/07/2022, 2:15.  INDICATIONS: Possible acute ischemic infarct(s)/stroke/CVA versus TIA.  Imaging follow-up.  Altered mental status (AMS).  Unspecified weakness.  TECHNIQUE: A variety of imaging planes and parameters were utilized for visualization of suspected pathology within the brain.  Three hundred thirty-three (333) magnetic resonance (MR) images were obtained without contrast.  There is motion artifact on the exam.  FINDINGS: Chronic posttraumatic and/or postoperative changes involve the bilateral frontal regions with encephalomalacia, seen previously.  There is susceptibility artifact in these regions, as well, probably associated with prior craniotomy and/or chronic blood products, such as hemorrhagic contusions, also seen previously.  There is diffuse prominence of the extra-axial spaces and the ventricular system, suggesting central atrophy.  No restricted diffusion is appreciated to suggest an acute infarct(s).  No midline shift  or acute intracranial herniation syndrome.  There is suspected moderate chronic small vessel ischemia/infarction, as well.  Similar findings were seen previously.  The hippocampi are symmetric in size, signal intensities, and internal architecture.      Impression:  No acute infarct is seen.  No acute brain abnormality is appreciated.  The study is limited by motion artifact.     COMMENT:  Part of this note is an electronic transcription of spoken language to printed text. The electronic translation/transcription may permit erroneous, or at times, nonsensical (or even sensical) words or phrases to be inadvertently transcribed or omitted; this  has reviewed the note for such errors (as well as additional errors); however, some may still exist.  ADONIS OLVERA JR, MD       Electronically Signed and Approved By: ADONIS OLVERA JR, MD on 4/14/2022 at 23:06              XR Chest 1 View    Result Date: 4/14/2022  Narrative: PROCEDURE: XR CHEST 1 VW  COMPARISON: [facility], CR, XR CHEST 1 VW, 3/07/2022, 0:20.  INDICATIONS: Acute Stroke Protocol (Onset < 12 hrs)/WEAKNESS  FINDINGS:  The lungs are clear.  The heart and mediastinal contours appear stable.  There is pulmonary vascular congestion.  There is dextroscoliosis at the thoracolumbar junction.  There are degenerative changes along the shoulders.      Impression:  Pulmonary vascular congestion.       AZAM DOUGLAS MD       Electronically Signed and Approved By: AZAM DOUGLAS MD on 4/14/2022 at 14:03             CT Head Without Contrast Stroke Protocol    Result Date: 4/14/2022  Narrative: PROCEDURE: CT HEAD WO CONTRAST STROKE PROTOCOL  COMPARISON:  [facility], CT, CT HEAD WO CONTRAST, 3/07/2022, 2:15. INDICATIONS: STROKE ALERT WITH AMS CHANGES  PROTOCOL:   Standard imaging protocol performed    RADIATION:   DLP: 955.1mGy*cm   MA and/or KV was adjusted to minimize radiation dose.     TECHNIQUE: After obtaining the patient's  consent, CT images were obtained without non-ionic intravenous contrast material.  FINDINGS:  No acute intracranial hemorrhage or extra-axial collection is identified.  The ventricles are stable in caliber, with no evidence of mass effect or midline shift.  The basal cisterns appear patent.  Scattered foci of periventricular and subcortical white matter hypodensities are nonspecific, but likely the sequela of mild chronic small vessel ischemic disease.  There is stable encephalomalacia within the bilateral medial inferior frontal lobes, with postsurgical changes along the anterior calvaria.  No acute calvarial fracture is identified.  There is mild mucosal disease within the right frontal sinus.  The mastoid air cells are well-aerated.      Impression:   1. No acute intracranial process identified. 2. Stable encephalomalacia along bilateral inferior medial frontal lobes. 3. Findings suggestive of mild chronic small vessel ischemic disease. 4. Mild mucosal disease within right frontal sinus.     AZAM DOUGLAS MD       Electronically Signed and Approved By: AZAM DOUGLAS MD on 4/14/2022 at 14:02             XR Abdomen KUB    Result Date: 4/14/2022  Narrative: PROCEDURE: XR ABDOMEN KUB  COMPARISON: [facility], CR, ABDOMEN FLAT & UPRIGHT, 6/16/2019, 10:36.  INDICATIONS: MRI clearance.  FINDINGS: Two views reveal no retained radiopaque foreign body that would be a contraindication to MRI examination.  A urinary bladder catheter is thought to be in place with residual intravenous contrast secreted into a mildly distended urinary bladder.  There may be a generalized adynamic ileus.  A mechanical bowel obstruction is thought to be less likely.  External artifacts obscure detail.  There may be mild cardiomegaly.  Dextroscoliosis of the thoracolumbar spine is suggested.  Degenerative changes involve the imaged spine and the bilateral hip joints.      Impression:  No retained radiopaque foreign body is  appreciated that would be a contraindication to MRI examination.     Pertinent findings were discussed with the [facility] MRI Department at approximately 2135 hours on 4/14/2022.   COMMENT:  Part of this note is an electronic transcription of spoken language to printed text. The electronic translation/transcription may permit erroneous, or at times, nonsensical (or even sensical) words or phrases to be inadvertently transcribed or omitted; this  has reviewed the note for such errors (as well as additional errors); however, some may still exist.  ADONIS OLVERA JR, MD       Electronically Signed and Approved By: ADONIS OLVERA JR, MD on 4/14/2022 at 21:41              CT Angiogram Head w AI Analysis of LVO    Result Date: 4/14/2022  Narrative: PROCEDURE: CT ANGIOGRAM HEAD W AI ANALYSIS OF LVO  COMPARISON: [facility], CT, CT HEAD WO CONTRAST STROKE PROTOCOL, 4/14/2022, 13:23.  INDICATIONS: Acute Stroke  PROTOCOL:   Standard imaging protocol performed    RADIATION:      Automated exposure control was utilized to minimize radiation dose. RAPID: CTA imaging was analyzed using RAPID AI to enable computer assisted triage notification to rapidly detect a large vessel occlusion (LVO) and shorten notification time  TECHNIQUE: After obtaining the patient's consent, CT images of the head were obtained without and with non-ionic contrast, and multi-planar/3-D imaging were created and interpreted to optimize visualization of vascular anatomy.   FINDINGS:  VASCULATURE: Normal.  No significant stenosis.  No visible aneurysm or vascular malformation.  VENTRICLES: Unchanged from recent prior CT head. CEREBRUM: Unchanged from recent prior CT head. CEREBELLUM: Unchanged from recent prior CT head. BRAINSTEM: Unchanged from recent prior CT head. BASAL CISTERNS: Normal.  No subarachnoid hemorrhage or effacement.  SKULL: Negative.       Impression:  Major intracranial arterial vasculature appears widely patent, with no  evidence of thrombus or aneurysm.     AZAM ODUGLAS MD       Electronically Signed and Approved By: AZAM DOUGLAS MD on 4/14/2022 at 16:04             CT CEREBRAL PERFUSION WITH & WITHOUT CONTRAST    Result Date: 4/14/2022  Narrative: PROCEDURE: CT CEREBRAL PERFUSION W WO CONTRAST  COMPARISON: None  INDICATIONS: cva  PROTOCOL:   Standard imaging protocol performed    RADIATION:   DLP: 1299.6mGy*cm   Automated exposure control was utilized to minimize radiation dose. CONTRAST: 80cc Isovue 370 I.V. LABS:   eGFR: >60ml/min/1.73m2   FINDINGS: There is no evidence to suggest core infarct on the cerebral blood flow images.  On the T-max there is some asymmetric perfusion involving the left frontal lobe and the right cerebellum, felt to be artifactual given they are in different vascular territories.      Impression:  No convincing evidence to suggest acute perfusion abnormality.      AZAM DOUGLAS MD       Electronically Signed and Approved By: AZAM DOUGLAS MD on 4/14/2022 at 15:03               Procedures/EKGs:  Laceration Repair    Date/Time: 5/2/2022 1:04 AM  Performed by: Kaylee Perez APRN  Authorized by: Fawad Junior MD     Consent:     Consent obtained:  Verbal    Consent given by:  Patient    Risks, benefits, and alternatives were discussed: yes      Risks discussed:  Infection, pain, need for additional repair, poor cosmetic result and poor wound healing    Alternatives discussed:  No treatment and delayed treatment  Universal protocol:     Patient identity confirmed:  Verbally with patient  Anesthesia:     Anesthesia method:  None  Laceration details:     Location:  Scalp    Scalp location:  Frontal    Length (cm):  0.8    Depth (mm):  0.5  Exploration:     Contaminated: no    Treatment:     Area cleansed with:  Povidone-iodine    Amount of cleaning:  Standard    Irrigation solution:  Sterile saline    Irrigation method:  Syringe  Skin repair:     Repair method:  Tissue  adhesive  Approximation:     Approximation:  Close  Repair type:     Repair type:  Simple  Post-procedure details:     Dressing:  Open (no dressing)    Procedure completion:  Tolerated well, no immediate complications        Medical Decision Making:                     MDM  Number of Diagnoses or Management Options  Injury of head, initial encounter  Laceration of scalp without foreign body, initial encounter  Diagnosis management comments: The patient presents with an acute closed head injury. [place] Criteria for CT scan was followed. CT of the head is required for patients with minor head injury and any one of the following (including a GCS of 15):     1.                     Headache   2.                     Vomiting   3.                     Older than 60 years   4.                     Drug or Alcohol intoxication   5.                     Persistent anterograde amnesia   6.                     Visible trauma above the clavicle   7.                     Seizure.      The CT scan of the head shows no acute intracranial abnormalities. This includes subarachnoid hemorrhage, subdural hematoma, epidural hematoma, or calvarial fractures. The patient is neurologically intact, has a normal mental status and is ambulatory in the ED. The patient has had no seizure like activity in the ED. The vital signs have been stable. The patient's condition is stable and appropriate for discharge. The patient made aware of the symptoms of post-concussive syndrome. The patient was counseled to return to the ED for motor or sensory deficit, altered mental status, uncontrollable headache, visual changes, seizures, or for any re-examination of new symptoms. The patient will pursue further outpatient evaluation with the primary care physician or other designated or consulting position as indicated in the discharge instructions as a follow up.    The patient presented with a laceration in need of repair. See laceration repair note for  details. The wound was irrigated with copious normal saline irrigation.  Tissue adhesive was used to approximate the wound edges. Tetanus was not given. The patient tolerated the procedure well. Acute bleeding has ceased and the wound was approximated in the emergency department. Patient was counseled to keep the wound clean, dry, and out of the sun. Patient was counseled to change dressings daily. Patient was advised to return to the ED for worsening erythema, pain, swelling, fever, excessive drainage or signs of infection. Patient verbalizes understanding and agrees to follow up as instructed.       Amount and/or Complexity of Data Reviewed  Tests in the radiology section of CPT®: reviewed and ordered    Risk of Complications, Morbidity, and/or Mortality  Presenting problems: low  Diagnostic procedures: minimal  Management options: minimal    Patient Progress  Patient progress: stable       Final diagnoses:   Injury of head, initial encounter   Laceration of scalp without foreign body, initial encounter        Disposition:  ED Disposition     ED Disposition   Discharge    Condition   Stable    Comment   --                 A portion or all of this chart was created using voice recognition software.  Therefore errors and/or omissions may exist.  The chart was screened for these.     Kaylee Perez, APRN  05/02/22 [id number]

## 2022-05-05 ENCOUNTER — HOSPITAL ENCOUNTER (EMERGENCY)
Facility: HOSPITAL | Age: 82
Discharge: NURSING, REHAB OR LONG-TERM CARE | End: 2022-05-05
Attending: EMERGENCY MEDICINE | Admitting: EMERGENCY MEDICINE

## 2022-05-05 ENCOUNTER — APPOINTMENT (OUTPATIENT)
Dept: CT IMAGING | Facility: HOSPITAL | Age: 82
End: 2022-05-05

## 2022-05-05 VITALS
DIASTOLIC BLOOD PRESSURE: 68 MMHG | WEIGHT: 132.72 LBS | HEIGHT: 68 IN | BODY MASS INDEX: 20.11 KG/M2 | OXYGEN SATURATION: 99 % | RESPIRATION RATE: 22 BRPM | HEART RATE: 52 BPM | SYSTOLIC BLOOD PRESSURE: 120 MMHG | TEMPERATURE: 98 F
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
## 2022-05-05 DIAGNOSIS — S09.90XA MINOR CLOSED HEAD INJURY: Primary | ICD-10-CM

## 2022-05-05 PROCEDURE — 70450 CT HEAD/BRAIN W/O DYE: CPT

## 2022-05-05 PROCEDURE — 99283 EMERGENCY DEPT VISIT LOW MDM: CPT

## 2022-05-05 RX ORDER — DIAPER,BRIEF,INFANT-TODD,DISP
1 EACH MISCELLANEOUS 2 TIMES DAILY
COMMUNITY
End: 2022-06-23

## 2022-05-05 RX ORDER — ACETAMINOPHEN 325 MG/1
650 TABLET ORAL EVERY 6 HOURS PRN
COMMUNITY
End: 2022-06-23

## 2022-05-05 RX ORDER — HYDROXYZINE HYDROCHLORIDE 25 MG/1
25 TABLET, FILM COATED ORAL 3 TIMES DAILY PRN
COMMUNITY
End: 2022-06-23

## 2022-05-05 RX ORDER — TAMSULOSIN HYDROCHLORIDE 0.4 MG/1
CAPSULE ORAL DAILY
COMMUNITY
End: 2022-06-23

## 2022-05-05 RX ORDER — IPRATROPIUM BROMIDE AND ALBUTEROL SULFATE 2.5; .5 MG/3ML; MG/3ML
3 SOLUTION RESPIRATORY (INHALATION) EVERY 4 HOURS PRN
COMMUNITY
End: 2022-06-23

## 2022-05-05 RX ORDER — MIRTAZAPINE 15 MG/1
7.5 TABLET, FILM COATED ORAL NIGHTLY
COMMUNITY
End: 2022-06-23

## 2022-05-05 RX ORDER — ASPIRIN 81 MG/1
81 TABLET, CHEWABLE ORAL DAILY
COMMUNITY
End: 2022-06-23

## 2022-05-05 RX ORDER — FAMOTIDINE 20 MG/1
20 TABLET, FILM COATED ORAL 2 TIMES DAILY
COMMUNITY
End: 2022-06-23

## 2022-05-05 RX ORDER — POLYETHYLENE GLYCOL 1000
POWDER (GRAM) MISCELLANEOUS
COMMUNITY
End: 2022-06-23

## 2022-05-05 RX ORDER — DOCUSATE SODIUM 100 MG/1
100 CAPSULE, LIQUID FILLED ORAL 2 TIMES DAILY
COMMUNITY
End: 2022-06-23

## 2022-05-05 RX ORDER — CHOLECALCIFEROL (VITAMIN D3) 125 MCG
5 CAPSULE ORAL
COMMUNITY
End: 2022-06-23

## 2022-05-05 RX ORDER — BUSPIRONE HYDROCHLORIDE 10 MG/1
10 TABLET ORAL 3 TIMES DAILY
COMMUNITY
End: 2022-06-23

## 2022-05-05 RX ORDER — MEMANTINE HYDROCHLORIDE 10 MG/1
10 TABLET ORAL 2 TIMES DAILY
COMMUNITY
End: 2022-06-23

## 2022-05-05 RX ORDER — TROLAMINE SALICYLATE 10 G/100G
1 CREAM TOPICAL 2 TIMES DAILY PRN
COMMUNITY

## 2022-05-05 RX ORDER — FUROSEMIDE 20 MG/1
20 TABLET ORAL 2 TIMES DAILY
COMMUNITY
End: 2022-06-23

## 2022-05-05 RX ORDER — ATORVASTATIN CALCIUM 80 MG/1
80 TABLET, FILM COATED ORAL DAILY
COMMUNITY
End: 2022-06-23

## 2022-05-05 NOTE — DISCHARGE INSTRUCTIONS
There is no evidence of any acute intracranial injury.  You may resume prior nursing home medications and orders.  Follow-up with your primary care provider as needed.  Return to the ER for any change in patient's mental status, intractable vomiting, or any other concerns issues that may arise.

## 2022-05-05 NOTE — ED PROVIDER NOTES
Time: 7:50 PM EDT  Arrived by: EMS  Chief Complaint: Possible head injury  History provided by: EMS report and nursing home notes  History is limited by: Dementia    History of Present Illness:  Patient is a 82 y.o.  male that presents to the emergency department after reported fall and hitting his head at the nursing home.  Patient is unable to provide any information.  It is unknown if he lost consciousness.  The patient is reported to be on Eliquis.  EMS reports that the nursing home denies any change in the patient's baseline mental state.  Patient does not appear to be in any distress.    HPI    Patient Care Team  Primary Care Provider: Frank Llanes MD    Past Medical History:     No Known Allergies  Past Medical History:   Diagnosis Date   • Acute renal failure with pathological lesion in kidney ([facility])    • Anxiety    • Benign neoplasm of prostate    • Cannot walk    • CHF (congestive heart failure) ([facility])    • Coronary atherosclerosis of native coronary artery    • CVA (cerebral vascular accident) ([facility])    • 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 (CVA)    • Edema    • Essential hypertension, benign    • GERD (gastroesophageal reflux disease)    • Hirsutism    • Hypercalcemia    • Hyperkalemia    • Hyperlipemia    • Hypokalemia    • Nutritional deficiency    • Pneumonia    • Presence of right artificial knee joint    • Primary localized osteoarthrosis    • PVD (peripheral vascular disease) ([facility])    • 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 No pertinent surgical history.  History reviewed. No pertinent family history.    Home Medications:  Prior to Admission medications    Medication Sig Start Date End Date Taking? Authorizing Provider   acetaminophen (TYLENOL) 325  MG tablet Take 650 mg by mouth Every 6 (Six) Hours As Needed for Mild Pain .    Nanci Shafer MD   apixaban (ELIQUIS) 5 MG tablet tablet Take 5 mg by mouth 2 (Two) Times a Day.    Nanci Shafer MD   aspirin 81 MG chewable tablet Chew 81 mg Daily.    Nanci Shafer MD   atorvastatin (LIPITOR) 80 MG tablet Take 80 mg by mouth Daily.    Nanci Shafer MD   benzocaine-menthol (CEPACOL) 15-3.6 MG lozenge lozenge Dissolve 1 lozenge in the mouth Every 2 (Two) Hours As Needed.    Nanci Shafer MD   busPIRone (BUSPAR) 10 MG tablet Take 10 mg by mouth 3 (Three) Times a Day.    Nanci Shafer MD   Diclofenac Sodium (VOLTAREN) 1 % gel gel Apply 4 g topically to the appropriate area as directed 4 (Four) Times a Day As Needed.    Nanci Shafer MD   docusate sodium (COLACE) 100 MG capsule Take 100 mg by mouth 2 (Two) Times a Day.    Nanci Shafer MD   famotidine (PEPCID) 20 MG tablet Take 20 mg by mouth 2 (Two) Times a Day.    Nanci Shafer MD   furosemide (LASIX) 20 MG tablet Take 20 mg by mouth 2 (Two) Times a Day.    Nanci Shafer MD   hydrocortisone 1 % ointment Apply 1 application topically to the appropriate area as directed 2 (Two) Times a Day.    Nanci Shafer MD   hydrOXYzine (ATARAX) 25 MG tablet Take 25 mg by mouth 3 (Three) Times a Day As Needed for Itching.    Nanci Shafer MD   ipratropium-albuterol (DUO-NEB) 0.5-2.5 mg/3 ml nebulizer Take 3 mL by nebulization Every 4 (Four) Hours As Needed for Wheezing.    Nanci Shafer MD   magnesium hydroxide (MILK OF MAGNESIA) 400 MG/5ML suspension Take  by mouth Daily As Needed for Constipation.    Nanci Shafer MD   melatonin 5 MG tablet tablet Take 5 mg by mouth.    Nanci Shafer MD   memantine (NAMENDA) 10 MG tablet Take 10 mg by mouth 2 (Two) Times a Day.    Nanci Shafer MD   mirtazapine (REMERON) 15 MG tablet Take 7.5 mg by mouth Every Night.     "Nanci Shafer MD   Polyethylene Glycol 1000 powder     Nanci Shafer MD   tamsulosin (FLOMAX) 0.4 MG capsule 24 hr capsule Take  by mouth Daily.    Nanci Shafer MD   thiamine (VITAMIN B-1) 100 MG tablet  tablet Take 100 mg by mouth Daily.    Nnaci Shafer MD   trolamine salicylate (ASPERCREME) 10 % cream Apply 1 application topically to the appropriate area as directed As Needed for Muscle / Joint Pain.    Nanci Shafer MD        Social History:   Social History     Vaping Use   • Vaping Use: Unknown   Substance Use Topics   • Alcohol use: Yes   • Drug use: Not Currently       Review of Systems:  Review of Systems   Unable to perform ROS: Dementia          Physical Exam:  /65   Pulse 54   Temp 98 °F (36.7 °C) (Oral)   Resp 22   Ht 172.7 cm (68\")   Wt 60.2 kg (132 lb 11.5 oz)   SpO2 98%   BMI 20.18 kg/m²     Physical Exam Vital signs were reviewed under triage note.  General appearance - Patient appears well-developed and well-nourished.  Patient is in no acute distress.  Head - Normocephalic.  There is a small abrasion to his mid upper forehead.  There is no tenderness with palpation.  Pupils - Equal, round, reactive to light.  Extraocular muscles are intact.  Conjunctive is clear.  Nasal - Normal inspection.  No evidence of trauma or epistaxis.  Tympanic membranes - Gray, intact without erythema or retractions.  Oral mucosa - Pink and moist without lesions or erythema.  Uvula is midline.  Chest wall - Atraumatic.  Chest wall is nontender.  There is no vesicular rashes noted.  Neck - Supple.  Trachea was midline.  There is no palpable lymphadenopathy or thyromegaly.  There are no meningeal signs  Lungs - Clear to auscultation and percussion bilaterally.  Heart - Regular rate and rhythm without any murmurs, clicks, or gallops.  Abdomen - Soft.  Bowel sounds are present.  There is no palpable tenderness.  There is no rebound, guarding, or rigidity.  There are no " palpable masses.  There are no pulsatile masses.  Back - Spine is straight and midline.  There is no CVA tenderness.  Extremities - Intact x4 with full range of motion.  There is no palpable edema.  Pulses are intact x4 and equal.  Neurologic - Patient is awake and alert.  Cranial nerves II through XII are grossly intact.  There are no obvious focal neurodeficits noted.   Integument - There are no rashes.  There are no petechia or purpura lesions noted.  There are no vesicular lesions noted.            Medications in the Emergency Department:  Medications - No data to display     Labs  Lab Results (last 24 hours)     ** No results found for the last 24 hours. **           Imaging:  CT Head Without Contrast    Result Date: 5/5/2022  PROCEDURE: CT HEAD WO CONTRAST  COMPARISON:  [facility], CT, CT HEAD WO CONTRAST, 5/01/2022, 16:33. INDICATIONS: Fall, on blood thinners, from nursing home  PROTOCOL:   Standard imaging protocol performed    RADIATION:   DLP: 1082.2mGy*cm   MA and/or KV was adjusted to minimize radiation dose.     TECHNIQUE: Axial images of the head without intravenous contrast.  FINDINGS:  Stable frontal craniotomy defects.  Stable ventriculomegaly.  There is generalized atrophy.  No evidence of acute intracranial hemorrhage, mass or midline shift.  No extra-axial fluid collections are identified.  There are chronic changes in the periventricular white matter.  Stable encephalomalacia in the left frontal lobe.  IMPRESSION:  No acute intracranial abnormalities are identified.  WHITLEY NUÑEZ MD       Electronically Signed and Approved By: WHITLEY NUÑEZ MD on 5/05/2022 at 18:36                EKG:      Procedures:  Procedures    Progress                      The patient was seen and evaluated the ED by me.  The above history and physical examination was performed as documented.  Diagnostic data was obtained.  Results reviewed.  Patient is stable for discharge back to nursing facility.   No evidence of any acute intracranial injury.      Medical Decision Making:  MDM     Final diagnoses:   Minor closed head injury   Fall, initial encounter        Disposition:  ED Disposition     ED Disposition   Discharge    Condition   Stable    Comment   --              Venu Oconnor DO  05/07/22 1018

## 2022-05-17 ENCOUNTER — APPOINTMENT (OUTPATIENT)
Dept: GENERAL RADIOLOGY | Facility: HOSPITAL | Age: 82
End: 2022-05-17

## 2022-05-17 ENCOUNTER — TELEPHONE (OUTPATIENT)
Dept: ORTHOPEDIC SURGERY | Facility: CLINIC | Age: 82
End: 2022-05-17

## 2022-05-17 ENCOUNTER — HOSPITAL ENCOUNTER (INPATIENT)
Facility: HOSPITAL | Age: 82
LOS: 4 days | Discharge: NURSING, REHAB OR LONG-TERM CARE | End: 2022-05-21
Attending: EMERGENCY MEDICINE | Admitting: INTERNAL MEDICINE

## 2022-05-17 DIAGNOSIS — R13.12 OROPHARYNGEAL DYSPHAGIA: ICD-10-CM

## 2022-05-17 DIAGNOSIS — Z91.199 NONCOMPLIANCE: ICD-10-CM

## 2022-05-17 DIAGNOSIS — R26.2 DIFFICULTY WALKING: ICD-10-CM

## 2022-05-17 DIAGNOSIS — T68.XXXA HYPOTHERMIA, INITIAL ENCOUNTER: ICD-10-CM

## 2022-05-17 DIAGNOSIS — E86.0 DEHYDRATION: ICD-10-CM

## 2022-05-17 DIAGNOSIS — N17.9 AKI (ACUTE KIDNEY INJURY): ICD-10-CM

## 2022-05-17 DIAGNOSIS — F03.91 DEMENTIA WITH BEHAVIORAL DISTURBANCE, UNSPECIFIED DEMENTIA TYPE: Primary | ICD-10-CM

## 2022-05-17 PROBLEM — A41.9 SEPSIS (HCC): Status: ACTIVE | Noted: 2022-05-17

## 2022-05-17 LAB
ALBUMIN SERPL-MCNC: 3.2 G/DL (ref 3.5–5.2)
ALBUMIN/GLOB SERPL: 1.1 G/DL
ALP SERPL-CCNC: 127 U/L (ref 39–117)
ALT SERPL W P-5'-P-CCNC: 17 U/L (ref 1–41)
ANION GAP SERPL CALCULATED.3IONS-SCNC: 12.9 MMOL/L (ref 5–15)
AST SERPL-CCNC: 16 U/L (ref 1–40)
BACTERIA UR QL AUTO: ABNORMAL /HPF
BASOPHILS # BLD AUTO: 0 10*3/MM3 (ref 0–0.2)
BASOPHILS NFR BLD AUTO: 0 % (ref 0–1.5)
BILIRUB SERPL-MCNC: 0.9 MG/DL (ref 0–1.2)
BILIRUB UR QL STRIP: ABNORMAL
BUN SERPL-MCNC: 41 MG/DL (ref 8–23)
BUN/CREAT SERPL: 19.3 (ref 7–25)
CALCIUM SPEC-SCNC: 9.7 MG/DL (ref 8.6–10.5)
CHLORIDE SERPL-SCNC: 109 MMOL/L (ref 98–107)
CLARITY UR: CLEAR
CO2 SERPL-SCNC: 24.1 MMOL/L (ref 22–29)
COLOR UR: ABNORMAL
CREAT SERPL-MCNC: 2.12 MG/DL (ref 0.76–1.27)
D-LACTATE SERPL-SCNC: 1 MMOL/L (ref 0.5–2)
DEPRECATED RDW RBC AUTO: 47.1 FL (ref 37–54)
EGFRCR SERPLBLD CKD-EPI 2021: 30.7 ML/MIN/1.73
EOSINOPHIL # BLD AUTO: 0 10*3/MM3 (ref 0–0.4)
EOSINOPHIL NFR BLD AUTO: 0 % (ref 0.3–6.2)
ERYTHROCYTE [DISTWIDTH] IN BLOOD BY AUTOMATED COUNT: 15.9 % (ref 12.3–15.4)
GLOBULIN UR ELPH-MCNC: 3 GM/DL
GLUCOSE SERPL-MCNC: 108 MG/DL (ref 65–99)
GLUCOSE UR STRIP-MCNC: NEGATIVE MG/DL
HCT VFR BLD AUTO: 35.1 % (ref 37.5–51)
HGB BLD-MCNC: 12.2 G/DL (ref 13–17.7)
HGB UR QL STRIP.AUTO: ABNORMAL
HYALINE CASTS UR QL AUTO: ABNORMAL /LPF
IMM GRANULOCYTES # BLD AUTO: 0.02 10*3/MM3 (ref 0–0.05)
IMM GRANULOCYTES NFR BLD AUTO: 0.2 % (ref 0–0.5)
KETONES UR QL STRIP: ABNORMAL
LEUKOCYTE ESTERASE UR QL STRIP.AUTO: ABNORMAL
LYMPHOCYTES # BLD AUTO: 1 10*3/MM3 (ref 0.7–3.1)
LYMPHOCYTES NFR BLD AUTO: 11.9 % (ref 19.6–45.3)
MAGNESIUM SERPL-MCNC: 2 MG/DL (ref 1.6–2.4)
MCH RBC QN AUTO: 28.8 PG (ref 26.6–33)
MCHC RBC AUTO-ENTMCNC: 34.8 G/DL (ref 31.5–35.7)
MCV RBC AUTO: 83 FL (ref 79–97)
MONOCYTES # BLD AUTO: 0.57 10*3/MM3 (ref 0.1–0.9)
MONOCYTES NFR BLD AUTO: 6.8 % (ref 5–12)
NEUTROPHILS NFR BLD AUTO: 6.79 10*3/MM3 (ref 1.7–7)
NEUTROPHILS NFR BLD AUTO: 81.1 % (ref 42.7–76)
NITRITE UR QL STRIP: NEGATIVE
NRBC BLD AUTO-RTO: 0.4 /100 WBC (ref 0–0.2)
PH UR STRIP.AUTO: <=5 [PH] (ref 5–8)
PLATELET # BLD AUTO: 186 10*3/MM3 (ref 140–450)
PMV BLD AUTO: 10.6 FL (ref 6–12)
POTASSIUM SERPL-SCNC: 3 MMOL/L (ref 3.5–5.2)
PROCALCITONIN SERPL-MCNC: 0.07 NG/ML (ref 0–0.25)
PROT SERPL-MCNC: 6.2 G/DL (ref 6–8.5)
PROT UR QL STRIP: ABNORMAL
RBC # BLD AUTO: 4.23 10*6/MM3 (ref 4.14–5.8)
RBC # UR STRIP: ABNORMAL /HPF
REF LAB TEST METHOD: ABNORMAL
SODIUM SERPL-SCNC: 146 MMOL/L (ref 136–145)
SP GR UR STRIP: 1.02 (ref 1–1.03)
SQUAMOUS #/AREA URNS HPF: ABNORMAL /HPF
UROBILINOGEN UR QL STRIP: ABNORMAL
WBC # UR STRIP: ABNORMAL /HPF
WBC NRBC COR # BLD: 8.38 10*3/MM3 (ref 3.4–10.8)

## 2022-05-17 PROCEDURE — 80053 COMPREHEN METABOLIC PANEL: CPT | Performed by: EMERGENCY MEDICINE

## 2022-05-17 PROCEDURE — 0 CEFTRIAXONE PER 250 MG: Performed by: EMERGENCY MEDICINE

## 2022-05-17 PROCEDURE — 99284 EMERGENCY DEPT VISIT MOD MDM: CPT

## 2022-05-17 PROCEDURE — 87899 AGENT NOS ASSAY W/OPTIC: CPT | Performed by: INTERNAL MEDICINE

## 2022-05-17 PROCEDURE — 84145 PROCALCITONIN (PCT): CPT | Performed by: INTERNAL MEDICINE

## 2022-05-17 PROCEDURE — 87040 BLOOD CULTURE FOR BACTERIA: CPT | Performed by: EMERGENCY MEDICINE

## 2022-05-17 PROCEDURE — 81001 URINALYSIS AUTO W/SCOPE: CPT | Performed by: EMERGENCY MEDICINE

## 2022-05-17 PROCEDURE — P9612 CATHETERIZE FOR URINE SPEC: HCPCS

## 2022-05-17 PROCEDURE — 83605 ASSAY OF LACTIC ACID: CPT | Performed by: EMERGENCY MEDICINE

## 2022-05-17 PROCEDURE — 99223 1ST HOSP IP/OBS HIGH 75: CPT | Performed by: INTERNAL MEDICINE

## 2022-05-17 PROCEDURE — 36415 COLL VENOUS BLD VENIPUNCTURE: CPT

## 2022-05-17 PROCEDURE — 83735 ASSAY OF MAGNESIUM: CPT | Performed by: EMERGENCY MEDICINE

## 2022-05-17 PROCEDURE — 71045 X-RAY EXAM CHEST 1 VIEW: CPT

## 2022-05-17 PROCEDURE — 85025 COMPLETE CBC W/AUTO DIFF WBC: CPT | Performed by: EMERGENCY MEDICINE

## 2022-05-17 PROCEDURE — 36415 COLL VENOUS BLD VENIPUNCTURE: CPT | Performed by: EMERGENCY MEDICINE

## 2022-05-17 RX ORDER — ACETAMINOPHEN 650 MG/1
650 SUPPOSITORY RECTAL EVERY 4 HOURS PRN
Status: DISCONTINUED | OUTPATIENT
Start: 2022-05-17 | End: 2022-05-21 | Stop reason: HOSPADM

## 2022-05-17 RX ORDER — CEFEPIME 1 G/50ML
2 INJECTION, SOLUTION INTRAVENOUS ONCE
Status: COMPLETED | OUTPATIENT
Start: 2022-05-18 | End: 2022-05-18

## 2022-05-17 RX ORDER — SODIUM CHLORIDE, SODIUM LACTATE, POTASSIUM CHLORIDE, CALCIUM CHLORIDE 600; 310; 30; 20 MG/100ML; MG/100ML; MG/100ML; MG/100ML
75 INJECTION, SOLUTION INTRAVENOUS CONTINUOUS
Status: DISCONTINUED | OUTPATIENT
Start: 2022-05-17 | End: 2022-05-21 | Stop reason: HOSPADM

## 2022-05-17 RX ORDER — CEFEPIME 1 G/50ML
2 INJECTION, SOLUTION INTRAVENOUS EVERY 24 HOURS
Status: DISCONTINUED | OUTPATIENT
Start: 2022-05-19 | End: 2022-05-20

## 2022-05-17 RX ORDER — POTASSIUM CHLORIDE 7.45 MG/ML
10 INJECTION INTRAVENOUS
Status: COMPLETED | OUTPATIENT
Start: 2022-05-18 | End: 2022-05-18

## 2022-05-17 RX ORDER — ACETAMINOPHEN 160 MG/5ML
650 SOLUTION ORAL EVERY 4 HOURS PRN
Status: DISCONTINUED | OUTPATIENT
Start: 2022-05-17 | End: 2022-05-21 | Stop reason: HOSPADM

## 2022-05-17 RX ORDER — ACETAMINOPHEN 325 MG/1
650 TABLET ORAL EVERY 4 HOURS PRN
Status: DISCONTINUED | OUTPATIENT
Start: 2022-05-17 | End: 2022-05-21 | Stop reason: HOSPADM

## 2022-05-17 RX ORDER — CEFTRIAXONE SODIUM 2 G/50ML
2 INJECTION, SOLUTION INTRAVENOUS ONCE
Status: COMPLETED | OUTPATIENT
Start: 2022-05-17 | End: 2022-05-17

## 2022-05-17 RX ORDER — POTASSIUM CHLORIDE 750 MG/1
40 CAPSULE, EXTENDED RELEASE ORAL ONCE
Status: DISCONTINUED | OUTPATIENT
Start: 2022-05-17 | End: 2022-05-21 | Stop reason: HOSPADM

## 2022-05-17 RX ORDER — SODIUM CHLORIDE 0.9 % (FLUSH) 0.9 %
10 SYRINGE (ML) INJECTION EVERY 12 HOURS SCHEDULED
Status: DISCONTINUED | OUTPATIENT
Start: 2022-05-17 | End: 2022-05-21 | Stop reason: HOSPADM

## 2022-05-17 RX ORDER — ONDANSETRON 2 MG/ML
4 INJECTION INTRAMUSCULAR; INTRAVENOUS EVERY 6 HOURS PRN
Status: DISCONTINUED | OUTPATIENT
Start: 2022-05-17 | End: 2022-05-21 | Stop reason: HOSPADM

## 2022-05-17 RX ORDER — SODIUM CHLORIDE 0.9 % (FLUSH) 0.9 %
10 SYRINGE (ML) INJECTION AS NEEDED
Status: DISCONTINUED | OUTPATIENT
Start: 2022-05-17 | End: 2022-05-21 | Stop reason: HOSPADM

## 2022-05-17 RX ADMIN — CEFTRIAXONE SODIUM 2 G: 2 INJECTION, SOLUTION INTRAVENOUS at 22:27

## 2022-05-17 RX ADMIN — SODIUM CHLORIDE, POTASSIUM CHLORIDE, SODIUM LACTATE AND CALCIUM CHLORIDE 100 ML/HR: 600; 310; 30; 20 INJECTION, SOLUTION INTRAVENOUS at 23:44

## 2022-05-17 RX ADMIN — SODIUM CHLORIDE 1000 ML: 9 INJECTION, SOLUTION INTRAVENOUS at 20:30

## 2022-05-17 RX ADMIN — SODIUM CHLORIDE 1000 ML: 9 INJECTION, SOLUTION INTRAVENOUS at 22:27

## 2022-05-17 NOTE — TELEPHONE ENCOUNTER
Patient SELF REFERAL CRONIC DISLOCATION OF BOTH SHOULDER NO HISTORY, NO SURGERIES, NO IMAGING. STROKE THAT EFFECTS HIS RIGHT ARM.

## 2022-05-18 ENCOUNTER — APPOINTMENT (OUTPATIENT)
Dept: CT IMAGING | Facility: HOSPITAL | Age: 82
End: 2022-05-18

## 2022-05-18 LAB
ALBUMIN SERPL-MCNC: 2.3 G/DL (ref 3.5–5.2)
ALBUMIN/GLOB SERPL: 1.1 G/DL
ALP SERPL-CCNC: 95 U/L (ref 39–117)
ALT SERPL W P-5'-P-CCNC: 12 U/L (ref 1–41)
ANION GAP SERPL CALCULATED.3IONS-SCNC: 11.6 MMOL/L (ref 5–15)
AST SERPL-CCNC: 11 U/L (ref 1–40)
BILIRUB SERPL-MCNC: 0.5 MG/DL (ref 0–1.2)
BUN SERPL-MCNC: 37 MG/DL (ref 8–23)
BUN/CREAT SERPL: 20.3 (ref 7–25)
CALCIUM SPEC-SCNC: 8.3 MG/DL (ref 8.6–10.5)
CHLORIDE SERPL-SCNC: 112 MMOL/L (ref 98–107)
CO2 SERPL-SCNC: 22.4 MMOL/L (ref 22–29)
CREAT SERPL-MCNC: 1.82 MG/DL (ref 0.76–1.27)
DEPRECATED RDW RBC AUTO: 40.4 FL (ref 37–54)
EGFRCR SERPLBLD CKD-EPI 2021: 36.9 ML/MIN/1.73
ERYTHROCYTE [DISTWIDTH] IN BLOOD BY AUTOMATED COUNT: 14.2 % (ref 12.3–15.4)
GLOBULIN UR ELPH-MCNC: 2.1 GM/DL
GLUCOSE SERPL-MCNC: 70 MG/DL (ref 65–99)
HCT VFR BLD AUTO: 25.5 % (ref 37.5–51)
HGB BLD-MCNC: 9.2 G/DL (ref 13–17.7)
HYPOCHROMIA BLD QL: ABNORMAL
L PNEUMO1 AG UR QL IA: NEGATIVE
LYMPHOCYTES # BLD MANUAL: 0.07 10*3/MM3 (ref 0.7–3.1)
LYMPHOCYTES NFR BLD MANUAL: 4 % (ref 5–12)
MAGNESIUM SERPL-MCNC: 1.6 MG/DL (ref 1.6–2.4)
MCH RBC QN AUTO: 28.3 PG (ref 26.6–33)
MCHC RBC AUTO-ENTMCNC: 36.1 G/DL (ref 31.5–35.7)
MCV RBC AUTO: 78.5 FL (ref 79–97)
MONOCYTES # BLD: 0.27 10*3/MM3 (ref 0.1–0.9)
MRSA DNA SPEC QL NAA+PROBE: ABNORMAL
NEUTROPHILS # BLD AUTO: 6.37 10*3/MM3 (ref 1.7–7)
NEUTROPHILS NFR BLD MANUAL: 95 % (ref 42.7–76)
PLAT MORPH BLD: NORMAL
PLATELET # BLD AUTO: 165 10*3/MM3 (ref 140–450)
PMV BLD AUTO: 10.8 FL (ref 6–12)
POIKILOCYTOSIS BLD QL SMEAR: ABNORMAL
POTASSIUM SERPL-SCNC: 3.3 MMOL/L (ref 3.5–5.2)
PROCALCITONIN SERPL-MCNC: 0.07 NG/ML (ref 0–0.25)
PROT SERPL-MCNC: 4.4 G/DL (ref 6–8.5)
RBC # BLD AUTO: 3.25 10*6/MM3 (ref 4.14–5.8)
S PNEUM AG SPEC QL LA: NEGATIVE
SODIUM SERPL-SCNC: 146 MMOL/L (ref 136–145)
TARGETS BLD QL SMEAR: ABNORMAL
VARIANT LYMPHS NFR BLD MANUAL: 1 % (ref 19.6–45.3)
WBC MORPH BLD: NORMAL
WBC NRBC COR # BLD: 6.71 10*3/MM3 (ref 3.4–10.8)

## 2022-05-18 PROCEDURE — 25010000002 CEFEPIME PER 500 MG: Performed by: INTERNAL MEDICINE

## 2022-05-18 PROCEDURE — 25010000002 VANCOMYCIN 5 G RECONSTITUTED SOLUTION: Performed by: INTERNAL MEDICINE

## 2022-05-18 PROCEDURE — 0 POTASSIUM CHLORIDE 10 MEQ/100ML SOLUTION: Performed by: INTERNAL MEDICINE

## 2022-05-18 PROCEDURE — 85007 BL SMEAR W/DIFF WBC COUNT: CPT | Performed by: INTERNAL MEDICINE

## 2022-05-18 PROCEDURE — 92610 EVALUATE SWALLOWING FUNCTION: CPT

## 2022-05-18 PROCEDURE — 71250 CT THORAX DX C-: CPT

## 2022-05-18 PROCEDURE — 80053 COMPREHEN METABOLIC PANEL: CPT | Performed by: INTERNAL MEDICINE

## 2022-05-18 PROCEDURE — 84145 PROCALCITONIN (PCT): CPT | Performed by: INTERNAL MEDICINE

## 2022-05-18 PROCEDURE — 85027 COMPLETE CBC AUTOMATED: CPT | Performed by: INTERNAL MEDICINE

## 2022-05-18 PROCEDURE — 83735 ASSAY OF MAGNESIUM: CPT | Performed by: INTERNAL MEDICINE

## 2022-05-18 PROCEDURE — 87641 MR-STAPH DNA AMP PROBE: CPT | Performed by: INTERNAL MEDICINE

## 2022-05-18 PROCEDURE — 99233 SBSQ HOSP IP/OBS HIGH 50: CPT | Performed by: FAMILY MEDICINE

## 2022-05-18 RX ADMIN — SODIUM CHLORIDE, POTASSIUM CHLORIDE, SODIUM LACTATE AND CALCIUM CHLORIDE 1500 ML: 600; 310; 30; 20 INJECTION, SOLUTION INTRAVENOUS at 00:17

## 2022-05-18 RX ADMIN — Medication 10 ML: at 00:14

## 2022-05-18 RX ADMIN — Medication 10 ML: at 21:25

## 2022-05-18 RX ADMIN — Medication 10 ML: at 10:33

## 2022-05-18 RX ADMIN — CEFEPIME 2 G: 1 INJECTION, SOLUTION INTRAVENOUS at 06:14

## 2022-05-18 RX ADMIN — SODIUM CHLORIDE, POTASSIUM CHLORIDE, SODIUM LACTATE AND CALCIUM CHLORIDE 75 ML/HR: 600; 310; 30; 20 INJECTION, SOLUTION INTRAVENOUS at 23:31

## 2022-05-18 RX ADMIN — VANCOMYCIN HYDROCHLORIDE 1000 MG: 5 INJECTION, POWDER, LYOPHILIZED, FOR SOLUTION INTRAVENOUS at 04:26

## 2022-05-18 RX ADMIN — POTASSIUM CHLORIDE 10 MEQ: 7.46 INJECTION, SOLUTION INTRAVENOUS at 01:17

## 2022-05-18 RX ADMIN — POTASSIUM CHLORIDE 10 MEQ: 7.46 INJECTION, SOLUTION INTRAVENOUS at 00:13

## 2022-05-18 RX ADMIN — SODIUM CHLORIDE, POTASSIUM CHLORIDE, SODIUM LACTATE AND CALCIUM CHLORIDE 75 ML/HR: 600; 310; 30; 20 INJECTION, SOLUTION INTRAVENOUS at 05:32

## 2022-05-18 RX ADMIN — POTASSIUM CHLORIDE 10 MEQ: 7.46 INJECTION, SOLUTION INTRAVENOUS at 00:33

## 2022-05-19 PROBLEM — E43 SEVERE MALNUTRITION (HCC): Status: ACTIVE | Noted: 2022-05-19

## 2022-05-19 LAB
ALBUMIN SERPL-MCNC: 2.4 G/DL (ref 3.5–5.2)
ALP SERPL-CCNC: 99 U/L (ref 39–117)
ALT SERPL W P-5'-P-CCNC: 12 U/L (ref 1–41)
ANION GAP SERPL CALCULATED.3IONS-SCNC: 12.5 MMOL/L (ref 5–15)
AST SERPL-CCNC: 12 U/L (ref 1–40)
BASOPHILS # BLD AUTO: 0.01 10*3/MM3 (ref 0–0.2)
BASOPHILS NFR BLD AUTO: 0.2 % (ref 0–1.5)
BILIRUB CONJ SERPL-MCNC: 0.2 MG/DL (ref 0–0.3)
BILIRUB INDIRECT SERPL-MCNC: 0.4 MG/DL
BILIRUB SERPL-MCNC: 0.6 MG/DL (ref 0–1.2)
BUN SERPL-MCNC: 30 MG/DL (ref 8–23)
BUN/CREAT SERPL: 20.1 (ref 7–25)
CALCIUM SPEC-SCNC: 8.4 MG/DL (ref 8.6–10.5)
CHLORIDE SERPL-SCNC: 111 MMOL/L (ref 98–107)
CO2 SERPL-SCNC: 21.5 MMOL/L (ref 22–29)
CREAT SERPL-MCNC: 1.49 MG/DL (ref 0.76–1.27)
DEPRECATED RDW RBC AUTO: 41.2 FL (ref 37–54)
EGFRCR SERPLBLD CKD-EPI 2021: 46.9 ML/MIN/1.73
EOSINOPHIL # BLD AUTO: 0.03 10*3/MM3 (ref 0–0.4)
EOSINOPHIL NFR BLD AUTO: 0.5 % (ref 0.3–6.2)
ERYTHROCYTE [DISTWIDTH] IN BLOOD BY AUTOMATED COUNT: 14.6 % (ref 12.3–15.4)
GLUCOSE SERPL-MCNC: 74 MG/DL (ref 65–99)
HCT VFR BLD AUTO: 28.5 % (ref 37.5–51)
HGB BLD-MCNC: 10.5 G/DL (ref 13–17.7)
IMM GRANULOCYTES # BLD AUTO: 0.02 10*3/MM3 (ref 0–0.05)
IMM GRANULOCYTES NFR BLD AUTO: 0.3 % (ref 0–0.5)
LYMPHOCYTES # BLD AUTO: 0.98 10*3/MM3 (ref 0.7–3.1)
LYMPHOCYTES NFR BLD AUTO: 16.3 % (ref 19.6–45.3)
MAGNESIUM SERPL-MCNC: 1.5 MG/DL (ref 1.6–2.4)
MCH RBC QN AUTO: 28.8 PG (ref 26.6–33)
MCHC RBC AUTO-ENTMCNC: 36.8 G/DL (ref 31.5–35.7)
MCV RBC AUTO: 78.3 FL (ref 79–97)
MONOCYTES # BLD AUTO: 0.47 10*3/MM3 (ref 0.1–0.9)
MONOCYTES NFR BLD AUTO: 7.8 % (ref 5–12)
NEUTROPHILS NFR BLD AUTO: 4.5 10*3/MM3 (ref 1.7–7)
NEUTROPHILS NFR BLD AUTO: 74.9 % (ref 42.7–76)
NRBC BLD AUTO-RTO: 0.5 /100 WBC (ref 0–0.2)
PHOSPHATE SERPL-MCNC: 1.8 MG/DL (ref 2.5–4.5)
PLATELET # BLD AUTO: 149 10*3/MM3 (ref 140–450)
PMV BLD AUTO: 9.9 FL (ref 6–12)
POTASSIUM SERPL-SCNC: 3.1 MMOL/L (ref 3.5–5.2)
PROT SERPL-MCNC: 4.7 G/DL (ref 6–8.5)
RBC # BLD AUTO: 3.64 10*6/MM3 (ref 4.14–5.8)
SODIUM SERPL-SCNC: 145 MMOL/L (ref 136–145)
VANCOMYCIN SERPL-MCNC: 9.29 MCG/ML (ref 5–40)
WBC NRBC COR # BLD: 6.01 10*3/MM3 (ref 3.4–10.8)

## 2022-05-19 PROCEDURE — 25010000002 MAGNESIUM SULFATE IN D5W 1G/100ML (PREMIX) 1-5 GM/100ML-% SOLUTION: Performed by: FAMILY MEDICINE

## 2022-05-19 PROCEDURE — 25010000002 CEFEPIME PER 500 MG: Performed by: INTERNAL MEDICINE

## 2022-05-19 PROCEDURE — 83735 ASSAY OF MAGNESIUM: CPT | Performed by: FAMILY MEDICINE

## 2022-05-19 PROCEDURE — 80202 ASSAY OF VANCOMYCIN: CPT | Performed by: INTERNAL MEDICINE

## 2022-05-19 PROCEDURE — 80076 HEPATIC FUNCTION PANEL: CPT | Performed by: FAMILY MEDICINE

## 2022-05-19 PROCEDURE — 84100 ASSAY OF PHOSPHORUS: CPT | Performed by: FAMILY MEDICINE

## 2022-05-19 PROCEDURE — 99232 SBSQ HOSP IP/OBS MODERATE 35: CPT | Performed by: FAMILY MEDICINE

## 2022-05-19 PROCEDURE — 0 POTASSIUM CHLORIDE 10 MEQ/100ML SOLUTION: Performed by: FAMILY MEDICINE

## 2022-05-19 PROCEDURE — 80048 BASIC METABOLIC PNL TOTAL CA: CPT | Performed by: FAMILY MEDICINE

## 2022-05-19 PROCEDURE — 92526 ORAL FUNCTION THERAPY: CPT

## 2022-05-19 PROCEDURE — 25010000002 VANCOMYCIN 5 G RECONSTITUTED SOLUTION: Performed by: INTERNAL MEDICINE

## 2022-05-19 PROCEDURE — 85025 COMPLETE CBC W/AUTO DIFF WBC: CPT | Performed by: FAMILY MEDICINE

## 2022-05-19 RX ORDER — POTASSIUM CHLORIDE 7.45 MG/ML
10 INJECTION INTRAVENOUS
Status: COMPLETED | OUTPATIENT
Start: 2022-05-19 | End: 2022-05-19

## 2022-05-19 RX ORDER — MAGNESIUM SULFATE 1 G/100ML
1 INJECTION INTRAVENOUS
Status: COMPLETED | OUTPATIENT
Start: 2022-05-19 | End: 2022-05-19

## 2022-05-19 RX ORDER — FENTANYL/ROPIVACAINE/NS/PF 2-625MCG/1
15 PLASTIC BAG, INJECTION (ML) EPIDURAL
Status: COMPLETED | OUTPATIENT
Start: 2022-05-19 | End: 2022-05-19

## 2022-05-19 RX ADMIN — Medication 15 MMOL: at 09:58

## 2022-05-19 RX ADMIN — MAGNESIUM SULFATE HEPTAHYDRATE 1 G: 1 INJECTION, SOLUTION INTRAVENOUS at 13:33

## 2022-05-19 RX ADMIN — MAGNESIUM SULFATE HEPTAHYDRATE 1 G: 1 INJECTION, SOLUTION INTRAVENOUS at 09:47

## 2022-05-19 RX ADMIN — CEFEPIME 2 G: 1 INJECTION, SOLUTION INTRAVENOUS at 05:44

## 2022-05-19 RX ADMIN — Medication 15 MMOL: at 11:15

## 2022-05-19 RX ADMIN — Medication 15 MMOL: at 13:15

## 2022-05-19 RX ADMIN — MAGNESIUM SULFATE HEPTAHYDRATE 1 G: 1 INJECTION, SOLUTION INTRAVENOUS at 12:36

## 2022-05-19 RX ADMIN — VANCOMYCIN HYDROCHLORIDE 750 MG: 5 INJECTION, POWDER, LYOPHILIZED, FOR SOLUTION INTRAVENOUS at 09:59

## 2022-05-19 RX ADMIN — POTASSIUM CHLORIDE 10 MEQ: 7.46 INJECTION, SOLUTION INTRAVENOUS at 16:27

## 2022-05-19 RX ADMIN — POTASSIUM CHLORIDE 10 MEQ: 7.46 INJECTION, SOLUTION INTRAVENOUS at 14:49

## 2022-05-19 RX ADMIN — Medication 10 ML: at 09:47

## 2022-05-19 RX ADMIN — POTASSIUM CHLORIDE 10 MEQ: 7.46 INJECTION, SOLUTION INTRAVENOUS at 17:01

## 2022-05-19 RX ADMIN — SODIUM CHLORIDE, POTASSIUM CHLORIDE, SODIUM LACTATE AND CALCIUM CHLORIDE 75 ML/HR: 600; 310; 30; 20 INJECTION, SOLUTION INTRAVENOUS at 23:53

## 2022-05-19 RX ADMIN — MAGNESIUM SULFATE HEPTAHYDRATE 1 G: 1 INJECTION, SOLUTION INTRAVENOUS at 11:13

## 2022-05-20 LAB
ALBUMIN SERPL-MCNC: 2.2 G/DL (ref 3.5–5.2)
ALP SERPL-CCNC: 100 U/L (ref 39–117)
ALT SERPL W P-5'-P-CCNC: 12 U/L (ref 1–41)
ANION GAP SERPL CALCULATED.3IONS-SCNC: 11.9 MMOL/L (ref 5–15)
AST SERPL-CCNC: 13 U/L (ref 1–40)
BASOPHILS # BLD AUTO: 0.01 10*3/MM3 (ref 0–0.2)
BASOPHILS NFR BLD AUTO: 0.1 % (ref 0–1.5)
BILIRUB CONJ SERPL-MCNC: <0.2 MG/DL (ref 0–0.3)
BILIRUB INDIRECT SERPL-MCNC: ABNORMAL MG/DL
BILIRUB SERPL-MCNC: 0.6 MG/DL (ref 0–1.2)
BUN SERPL-MCNC: 24 MG/DL (ref 8–23)
BUN/CREAT SERPL: 19 (ref 7–25)
CALCIUM SPEC-SCNC: 8.3 MG/DL (ref 8.6–10.5)
CHLORIDE SERPL-SCNC: 110 MMOL/L (ref 98–107)
CO2 SERPL-SCNC: 21.1 MMOL/L (ref 22–29)
CREAT SERPL-MCNC: 1.26 MG/DL (ref 0.76–1.27)
DEPRECATED RDW RBC AUTO: 41.9 FL (ref 37–54)
EGFRCR SERPLBLD CKD-EPI 2021: 57.3 ML/MIN/1.73
EOSINOPHIL # BLD AUTO: 0.03 10*3/MM3 (ref 0–0.4)
EOSINOPHIL NFR BLD AUTO: 0.4 % (ref 0.3–6.2)
ERYTHROCYTE [DISTWIDTH] IN BLOOD BY AUTOMATED COUNT: 14.8 % (ref 12.3–15.4)
GLUCOSE SERPL-MCNC: 74 MG/DL (ref 65–99)
HCT VFR BLD AUTO: 29.1 % (ref 37.5–51)
HGB BLD-MCNC: 10.5 G/DL (ref 13–17.7)
IMM GRANULOCYTES # BLD AUTO: 0.03 10*3/MM3 (ref 0–0.05)
IMM GRANULOCYTES NFR BLD AUTO: 0.4 % (ref 0–0.5)
LYMPHOCYTES # BLD AUTO: 1.15 10*3/MM3 (ref 0.7–3.1)
LYMPHOCYTES NFR BLD AUTO: 16.6 % (ref 19.6–45.3)
MAGNESIUM SERPL-MCNC: 2.3 MG/DL (ref 1.6–2.4)
MCH RBC QN AUTO: 28.7 PG (ref 26.6–33)
MCHC RBC AUTO-ENTMCNC: 36.1 G/DL (ref 31.5–35.7)
MCV RBC AUTO: 79.5 FL (ref 79–97)
MONOCYTES # BLD AUTO: 0.7 10*3/MM3 (ref 0.1–0.9)
MONOCYTES NFR BLD AUTO: 10.1 % (ref 5–12)
NEUTROPHILS NFR BLD AUTO: 5.01 10*3/MM3 (ref 1.7–7)
NEUTROPHILS NFR BLD AUTO: 72.4 % (ref 42.7–76)
NRBC BLD AUTO-RTO: 0.3 /100 WBC (ref 0–0.2)
PHOSPHATE SERPL-MCNC: 2.7 MG/DL (ref 2.5–4.5)
PLATELET # BLD AUTO: 138 10*3/MM3 (ref 140–450)
PMV BLD AUTO: 11.6 FL (ref 6–12)
POTASSIUM SERPL-SCNC: 4 MMOL/L (ref 3.5–5.2)
PROT SERPL-MCNC: 4.7 G/DL (ref 6–8.5)
RBC # BLD AUTO: 3.66 10*6/MM3 (ref 4.14–5.8)
SODIUM SERPL-SCNC: 143 MMOL/L (ref 136–145)
WBC NRBC COR # BLD: 6.93 10*3/MM3 (ref 3.4–10.8)

## 2022-05-20 PROCEDURE — 83735 ASSAY OF MAGNESIUM: CPT | Performed by: FAMILY MEDICINE

## 2022-05-20 PROCEDURE — 25010000002 CEFEPIME PER 500 MG: Performed by: INTERNAL MEDICINE

## 2022-05-20 PROCEDURE — 84100 ASSAY OF PHOSPHORUS: CPT | Performed by: FAMILY MEDICINE

## 2022-05-20 PROCEDURE — 97161 PT EVAL LOW COMPLEX 20 MIN: CPT

## 2022-05-20 PROCEDURE — 80048 BASIC METABOLIC PNL TOTAL CA: CPT | Performed by: FAMILY MEDICINE

## 2022-05-20 PROCEDURE — 80076 HEPATIC FUNCTION PANEL: CPT | Performed by: FAMILY MEDICINE

## 2022-05-20 PROCEDURE — 25010000002 VANCOMYCIN 5 G RECONSTITUTED SOLUTION: Performed by: INTERNAL MEDICINE

## 2022-05-20 PROCEDURE — 25010000002 CEFEPIME PER 500 MG: Performed by: FAMILY MEDICINE

## 2022-05-20 PROCEDURE — 99232 SBSQ HOSP IP/OBS MODERATE 35: CPT | Performed by: FAMILY MEDICINE

## 2022-05-20 PROCEDURE — 85025 COMPLETE CBC W/AUTO DIFF WBC: CPT | Performed by: FAMILY MEDICINE

## 2022-05-20 PROCEDURE — 25010000002 CALCIUM GLUCONATE-NACL 1-0.675 GM/50ML-% SOLUTION: Performed by: FAMILY MEDICINE

## 2022-05-20 RX ORDER — CALCIUM GLUCONATE 20 MG/ML
1 INJECTION, SOLUTION INTRAVENOUS ONCE
Status: COMPLETED | OUTPATIENT
Start: 2022-05-20 | End: 2022-05-21

## 2022-05-20 RX ORDER — CEFEPIME 1 G/50ML
2 INJECTION, SOLUTION INTRAVENOUS EVERY 12 HOURS
Status: DISCONTINUED | OUTPATIENT
Start: 2022-05-20 | End: 2022-05-21 | Stop reason: HOSPADM

## 2022-05-20 RX ADMIN — CEFEPIME 2 G: 1 INJECTION, SOLUTION INTRAVENOUS at 18:36

## 2022-05-20 RX ADMIN — CALCIUM GLUCONATE 1 G: 20 INJECTION, SOLUTION INTRAVENOUS at 10:00

## 2022-05-20 RX ADMIN — VANCOMYCIN HYDROCHLORIDE 750 MG: 5 INJECTION, POWDER, LYOPHILIZED, FOR SOLUTION INTRAVENOUS at 11:08

## 2022-05-20 RX ADMIN — APIXABAN 5 MG: 5 TABLET, FILM COATED ORAL at 20:45

## 2022-05-20 RX ADMIN — Medication 10 ML: at 20:25

## 2022-05-20 RX ADMIN — Medication 10 ML: at 09:41

## 2022-05-20 RX ADMIN — CEFEPIME 2 G: 1 INJECTION, SOLUTION INTRAVENOUS at 05:48

## 2022-05-20 RX ADMIN — APIXABAN 5 MG: 5 TABLET, FILM COATED ORAL at 14:47

## 2022-05-21 VITALS
HEART RATE: 75 BPM | TEMPERATURE: 97.2 F | BODY MASS INDEX: 22.72 KG/M2 | RESPIRATION RATE: 18 BRPM | OXYGEN SATURATION: 100 % | HEIGHT: 72 IN | SYSTOLIC BLOOD PRESSURE: 113 MMHG | WEIGHT: 167.77 LBS | DIASTOLIC BLOOD PRESSURE: 72 MMHG

## 2022-05-21 LAB
ALBUMIN SERPL-MCNC: 2.5 G/DL (ref 3.5–5.2)
ALP SERPL-CCNC: 123 U/L (ref 39–117)
ALT SERPL W P-5'-P-CCNC: 12 U/L (ref 1–41)
ANION GAP SERPL CALCULATED.3IONS-SCNC: 9.6 MMOL/L (ref 5–15)
AST SERPL-CCNC: 14 U/L (ref 1–40)
BASOPHILS # BLD AUTO: 0.01 10*3/MM3 (ref 0–0.2)
BASOPHILS NFR BLD AUTO: 0.1 % (ref 0–1.5)
BILIRUB CONJ SERPL-MCNC: 0.2 MG/DL (ref 0–0.3)
BILIRUB INDIRECT SERPL-MCNC: 0.5 MG/DL
BILIRUB SERPL-MCNC: 0.7 MG/DL (ref 0–1.2)
BUN SERPL-MCNC: 18 MG/DL (ref 8–23)
BUN/CREAT SERPL: 16.1 (ref 7–25)
CALCIUM SPEC-SCNC: 8.6 MG/DL (ref 8.6–10.5)
CHLORIDE SERPL-SCNC: 112 MMOL/L (ref 98–107)
CO2 SERPL-SCNC: 21.4 MMOL/L (ref 22–29)
CREAT SERPL-MCNC: 1.12 MG/DL (ref 0.76–1.27)
DEPRECATED RDW RBC AUTO: 42 FL (ref 37–54)
EGFRCR SERPLBLD CKD-EPI 2021: 66 ML/MIN/1.73
EOSINOPHIL # BLD AUTO: 0.07 10*3/MM3 (ref 0–0.4)
EOSINOPHIL NFR BLD AUTO: 1 % (ref 0.3–6.2)
ERYTHROCYTE [DISTWIDTH] IN BLOOD BY AUTOMATED COUNT: 14.8 % (ref 12.3–15.4)
GLUCOSE SERPL-MCNC: 99 MG/DL (ref 65–99)
HCT VFR BLD AUTO: 31.9 % (ref 37.5–51)
HGB BLD-MCNC: 11.8 G/DL (ref 13–17.7)
IMM GRANULOCYTES # BLD AUTO: 0.02 10*3/MM3 (ref 0–0.05)
IMM GRANULOCYTES NFR BLD AUTO: 0.3 % (ref 0–0.5)
LYMPHOCYTES # BLD AUTO: 0.92 10*3/MM3 (ref 0.7–3.1)
LYMPHOCYTES NFR BLD AUTO: 12.6 % (ref 19.6–45.3)
MAGNESIUM SERPL-MCNC: 1.9 MG/DL (ref 1.6–2.4)
MCH RBC QN AUTO: 29.3 PG (ref 26.6–33)
MCHC RBC AUTO-ENTMCNC: 37 G/DL (ref 31.5–35.7)
MCV RBC AUTO: 79.2 FL (ref 79–97)
MONOCYTES # BLD AUTO: 0.48 10*3/MM3 (ref 0.1–0.9)
MONOCYTES NFR BLD AUTO: 6.6 % (ref 5–12)
NEUTROPHILS NFR BLD AUTO: 5.78 10*3/MM3 (ref 1.7–7)
NEUTROPHILS NFR BLD AUTO: 79.4 % (ref 42.7–76)
NRBC BLD AUTO-RTO: 0.3 /100 WBC (ref 0–0.2)
PHOSPHATE SERPL-MCNC: 1.7 MG/DL (ref 2.5–4.5)
PLATELET # BLD AUTO: 143 10*3/MM3 (ref 140–450)
PMV BLD AUTO: 10.3 FL (ref 6–12)
POTASSIUM SERPL-SCNC: 3.8 MMOL/L (ref 3.5–5.2)
PROT SERPL-MCNC: 5.3 G/DL (ref 6–8.5)
RBC # BLD AUTO: 4.03 10*6/MM3 (ref 4.14–5.8)
SODIUM SERPL-SCNC: 143 MMOL/L (ref 136–145)
VANCOMYCIN SERPL-MCNC: 25.3 MCG/ML (ref 5–40)
WBC NRBC COR # BLD: 7.28 10*3/MM3 (ref 3.4–10.8)

## 2022-05-21 PROCEDURE — 25010000002 CEFEPIME PER 500 MG: Performed by: FAMILY MEDICINE

## 2022-05-21 PROCEDURE — 25010000002 VANCOMYCIN 5 G RECONSTITUTED SOLUTION: Performed by: INTERNAL MEDICINE

## 2022-05-21 PROCEDURE — 85025 COMPLETE CBC W/AUTO DIFF WBC: CPT | Performed by: FAMILY MEDICINE

## 2022-05-21 PROCEDURE — 80202 ASSAY OF VANCOMYCIN: CPT | Performed by: FAMILY MEDICINE

## 2022-05-21 PROCEDURE — 80076 HEPATIC FUNCTION PANEL: CPT | Performed by: FAMILY MEDICINE

## 2022-05-21 PROCEDURE — 80048 BASIC METABOLIC PNL TOTAL CA: CPT | Performed by: FAMILY MEDICINE

## 2022-05-21 PROCEDURE — 83735 ASSAY OF MAGNESIUM: CPT | Performed by: FAMILY MEDICINE

## 2022-05-21 PROCEDURE — 99239 HOSP IP/OBS DSCHRG MGMT >30: CPT | Performed by: FAMILY MEDICINE

## 2022-05-21 PROCEDURE — 84100 ASSAY OF PHOSPHORUS: CPT | Performed by: FAMILY MEDICINE

## 2022-05-21 RX ORDER — LINEZOLID 600 MG/1
600 TABLET, FILM COATED ORAL 2 TIMES DAILY
Qty: 20 TABLET | Refills: 0
Start: 2022-05-21 | End: 2022-06-14 | Stop reason: HOSPADM

## 2022-05-21 RX ORDER — FENTANYL/ROPIVACAINE/NS/PF 2-625MCG/1
15 PLASTIC BAG, INJECTION (ML) EPIDURAL
Status: COMPLETED | OUTPATIENT
Start: 2022-05-21 | End: 2022-05-21

## 2022-05-21 RX ADMIN — CEFEPIME 2 G: 1 INJECTION, SOLUTION INTRAVENOUS at 17:56

## 2022-05-21 RX ADMIN — POTASSIUM PHOSPHATE, MONOBASIC AND POTASSIUM PHOSPHATE, DIBASIC 15 MMOL: 224; 236 INJECTION, SOLUTION, CONCENTRATE INTRAVENOUS at 15:54

## 2022-05-21 RX ADMIN — Medication 10 ML: at 09:08

## 2022-05-21 RX ADMIN — POTASSIUM PHOSPHATE, MONOBASIC AND POTASSIUM PHOSPHATE, DIBASIC 15 MMOL: 224; 236 INJECTION, SOLUTION, CONCENTRATE INTRAVENOUS at 11:30

## 2022-05-21 RX ADMIN — VANCOMYCIN HYDROCHLORIDE 750 MG: 5 INJECTION, POWDER, LYOPHILIZED, FOR SOLUTION INTRAVENOUS at 09:09

## 2022-05-21 RX ADMIN — POTASSIUM PHOSPHATE, MONOBASIC AND POTASSIUM PHOSPHATE, DIBASIC 15 MMOL: 224; 236 INJECTION, SOLUTION, CONCENTRATE INTRAVENOUS at 13:39

## 2022-05-21 RX ADMIN — CEFEPIME 2 G: 1 INJECTION, SOLUTION INTRAVENOUS at 06:10

## 2022-05-21 RX ADMIN — APIXABAN 5 MG: 5 TABLET, FILM COATED ORAL at 09:08

## 2022-05-21 RX ADMIN — SODIUM CHLORIDE, POTASSIUM CHLORIDE, SODIUM LACTATE AND CALCIUM CHLORIDE 75 ML/HR: 600; 310; 30; 20 INJECTION, SOLUTION INTRAVENOUS at 02:47

## 2022-05-22 LAB
BACTERIA SPEC AEROBE CULT: NORMAL
BACTERIA SPEC AEROBE CULT: NORMAL

## 2022-05-29 ENCOUNTER — APPOINTMENT (OUTPATIENT)
Dept: GENERAL RADIOLOGY | Facility: HOSPITAL | Age: 82
End: 2022-05-29

## 2022-05-29 ENCOUNTER — HOSPITAL ENCOUNTER (INPATIENT)
Facility: HOSPITAL | Age: 82
LOS: 16 days | Discharge: NURSING, REHAB OR LONG-TERM CARE | End: 2022-06-14
Attending: EMERGENCY MEDICINE | Admitting: INTERNAL MEDICINE

## 2022-05-29 DIAGNOSIS — R53.83 LETHARGIC: ICD-10-CM

## 2022-05-29 DIAGNOSIS — E86.0 DEHYDRATION: ICD-10-CM

## 2022-05-29 DIAGNOSIS — E87.0 HYPERNATREMIA: ICD-10-CM

## 2022-05-29 DIAGNOSIS — Z78.9 DECREASED ACTIVITIES OF DAILY LIVING (ADL): ICD-10-CM

## 2022-05-29 DIAGNOSIS — T68.XXXA HYPOTHERMIA, INITIAL ENCOUNTER: Primary | ICD-10-CM

## 2022-05-29 DIAGNOSIS — R26.2 DIFFICULTY WALKING: ICD-10-CM

## 2022-05-29 DIAGNOSIS — E43 SEVERE MALNUTRITION: ICD-10-CM

## 2022-05-29 DIAGNOSIS — R13.12 OROPHARYNGEAL DYSPHAGIA: ICD-10-CM

## 2022-05-29 PROBLEM — R65.20 SEVERE SEPSIS (HCC): Status: ACTIVE | Noted: 2022-05-17

## 2022-05-29 LAB
ALBUMIN SERPL-MCNC: 2.2 G/DL (ref 3.5–5.2)
ALBUMIN/GLOB SERPL: 0.7 G/DL
ALP SERPL-CCNC: 123 U/L (ref 39–117)
ALT SERPL W P-5'-P-CCNC: 15 U/L (ref 1–41)
ANION GAP SERPL CALCULATED.3IONS-SCNC: 12.9 MMOL/L (ref 5–15)
ANION GAP SERPL CALCULATED.3IONS-SCNC: 6.4 MMOL/L (ref 5–15)
AST SERPL-CCNC: 11 U/L (ref 1–40)
BACTERIA UR QL AUTO: ABNORMAL /HPF
BASOPHILS # BLD AUTO: 0.01 10*3/MM3 (ref 0–0.2)
BASOPHILS NFR BLD AUTO: 0.1 % (ref 0–1.5)
BILIRUB SERPL-MCNC: 0.4 MG/DL (ref 0–1.2)
BILIRUB UR QL STRIP: ABNORMAL
BUN SERPL-MCNC: 26 MG/DL (ref 8–23)
BUN SERPL-MCNC: 29 MG/DL (ref 8–23)
BUN/CREAT SERPL: 19.5 (ref 7–25)
BUN/CREAT SERPL: 20.6 (ref 7–25)
CALCIUM SPEC-SCNC: 7.7 MG/DL (ref 8.6–10.5)
CALCIUM SPEC-SCNC: 8.9 MG/DL (ref 8.6–10.5)
CHLORIDE SERPL-SCNC: 112 MMOL/L (ref 98–107)
CHLORIDE SERPL-SCNC: 120 MMOL/L (ref 98–107)
CLARITY UR: ABNORMAL
CO2 SERPL-SCNC: 23.1 MMOL/L (ref 22–29)
CO2 SERPL-SCNC: 23.6 MMOL/L (ref 22–29)
COLOR UR: ABNORMAL
CREAT SERPL-MCNC: 1.26 MG/DL (ref 0.76–1.27)
CREAT SERPL-MCNC: 1.49 MG/DL (ref 0.76–1.27)
D-LACTATE SERPL-SCNC: 1.2 MMOL/L (ref 0.5–2)
D-LACTATE SERPL-SCNC: 1.2 MMOL/L (ref 0.5–2)
DEPRECATED RDW RBC AUTO: 41.5 FL (ref 37–54)
DEPRECATED RDW RBC AUTO: 41.7 FL (ref 37–54)
EGFRCR SERPLBLD CKD-EPI 2021: 46.9 ML/MIN/1.73
EGFRCR SERPLBLD CKD-EPI 2021: 57.3 ML/MIN/1.73
EOSINOPHIL # BLD AUTO: 0 10*3/MM3 (ref 0–0.4)
EOSINOPHIL # BLD MANUAL: 0.08 10*3/MM3 (ref 0–0.4)
EOSINOPHIL NFR BLD AUTO: 0 % (ref 0.3–6.2)
EOSINOPHIL NFR BLD MANUAL: 1 % (ref 0.3–6.2)
ERYTHROCYTE [DISTWIDTH] IN BLOOD BY AUTOMATED COUNT: 15 % (ref 12.3–15.4)
ERYTHROCYTE [DISTWIDTH] IN BLOOD BY AUTOMATED COUNT: 15 % (ref 12.3–15.4)
GLOBULIN UR ELPH-MCNC: 3.1 GM/DL
GLUCOSE BLDC GLUCOMTR-MCNC: 68 MG/DL (ref 70–99)
GLUCOSE SERPL-MCNC: 87 MG/DL (ref 65–99)
GLUCOSE SERPL-MCNC: 97 MG/DL (ref 65–99)
GLUCOSE UR STRIP-MCNC: ABNORMAL MG/DL
HCT VFR BLD AUTO: 30.1 % (ref 37.5–51)
HCT VFR BLD AUTO: 33 % (ref 37.5–51)
HGB BLD-MCNC: 10.9 G/DL (ref 13–17.7)
HGB BLD-MCNC: 12.1 G/DL (ref 13–17.7)
HGB UR QL STRIP.AUTO: ABNORMAL
HOLD SPECIMEN: NORMAL
HOLD SPECIMEN: NORMAL
HYALINE CASTS UR QL AUTO: ABNORMAL /LPF
HYPOCHROMIA BLD QL: ABNORMAL
IMM GRANULOCYTES # BLD AUTO: 0.04 10*3/MM3 (ref 0–0.05)
IMM GRANULOCYTES NFR BLD AUTO: 0.4 % (ref 0–0.5)
INR PPP: 1.56 (ref 0.86–1.15)
KETONES UR QL STRIP: NEGATIVE
LEUKOCYTE ESTERASE UR QL STRIP.AUTO: ABNORMAL
LYMPHOCYTES # BLD AUTO: 0.32 10*3/MM3 (ref 0.7–3.1)
LYMPHOCYTES # BLD MANUAL: 0.16 10*3/MM3 (ref 0.7–3.1)
LYMPHOCYTES NFR BLD AUTO: 3.4 % (ref 19.6–45.3)
LYMPHOCYTES NFR BLD MANUAL: 1 % (ref 5–12)
MAGNESIUM SERPL-MCNC: 1.5 MG/DL (ref 1.6–2.4)
MAGNESIUM SERPL-MCNC: 1.8 MG/DL (ref 1.6–2.4)
MCH RBC QN AUTO: 28.3 PG (ref 26.6–33)
MCH RBC QN AUTO: 28.7 PG (ref 26.6–33)
MCHC RBC AUTO-ENTMCNC: 36.2 G/DL (ref 31.5–35.7)
MCHC RBC AUTO-ENTMCNC: 36.7 G/DL (ref 31.5–35.7)
MCV RBC AUTO: 78.2 FL (ref 79–97)
MCV RBC AUTO: 78.2 FL (ref 79–97)
MONOCYTES # BLD AUTO: 0.17 10*3/MM3 (ref 0.1–0.9)
MONOCYTES # BLD: 0.08 10*3/MM3 (ref 0.1–0.9)
MONOCYTES NFR BLD AUTO: 1.8 % (ref 5–12)
NEUTROPHILS # BLD AUTO: 7.89 10*3/MM3 (ref 1.7–7)
NEUTROPHILS NFR BLD AUTO: 8.74 10*3/MM3 (ref 1.7–7)
NEUTROPHILS NFR BLD AUTO: 94.3 % (ref 42.7–76)
NEUTROPHILS NFR BLD MANUAL: 94 % (ref 42.7–76)
NEUTS BAND NFR BLD MANUAL: 2 % (ref 0–5)
NEUTS HYPERSEG # BLD: ABNORMAL 10*3/UL
NITRITE UR QL STRIP: NEGATIVE
NRBC BLD AUTO-RTO: 1.3 /100 WBC (ref 0–0.2)
PH UR STRIP.AUTO: <=5 [PH] (ref 5–8)
PHOSPHATE SERPL-MCNC: 2.5 MG/DL (ref 2.5–4.5)
PLATELET # BLD AUTO: 107 10*3/MM3 (ref 140–450)
PLATELET # BLD AUTO: 97 10*3/MM3 (ref 140–450)
PMV BLD AUTO: 10.6 FL (ref 6–12)
PMV BLD AUTO: 10.7 FL (ref 6–12)
POTASSIUM SERPL-SCNC: 3.8 MMOL/L (ref 3.5–5.2)
POTASSIUM SERPL-SCNC: 4.1 MMOL/L (ref 3.5–5.2)
PROT SERPL-MCNC: 5.3 G/DL (ref 6–8.5)
PROT UR QL STRIP: ABNORMAL
PROTHROMBIN TIME: 18.9 SECONDS (ref 11.8–14.9)
RBC # BLD AUTO: 3.85 10*6/MM3 (ref 4.14–5.8)
RBC # BLD AUTO: 4.22 10*6/MM3 (ref 4.14–5.8)
RBC # UR STRIP: ABNORMAL /HPF
REF LAB TEST METHOD: ABNORMAL
SCAN SLIDE: NORMAL
SMALL PLATELETS BLD QL SMEAR: ABNORMAL
SODIUM SERPL-SCNC: 148 MMOL/L (ref 136–145)
SODIUM SERPL-SCNC: 150 MMOL/L (ref 136–145)
SP GR UR STRIP: 1.02 (ref 1–1.03)
SQUAMOUS #/AREA URNS HPF: ABNORMAL /HPF
T4 FREE SERPL-MCNC: 1.12 NG/DL (ref 0.93–1.7)
TARGETS BLD QL SMEAR: ABNORMAL
TROPONIN T SERPL-MCNC: 0.02 NG/ML (ref 0–0.03)
TROPONIN T SERPL-MCNC: 0.04 NG/ML (ref 0–0.03)
TSH SERPL DL<=0.05 MIU/L-ACNC: 2.46 UIU/ML (ref 0.27–4.2)
TSH SERPL DL<=0.05 MIU/L-ACNC: 3 UIU/ML (ref 0.27–4.2)
UROBILINOGEN UR QL STRIP: ABNORMAL
VARIANT LYMPHS NFR BLD MANUAL: 2 % (ref 19.6–45.3)
WBC # UR STRIP: ABNORMAL /HPF
WBC NRBC COR # BLD: 8.22 10*3/MM3 (ref 3.4–10.8)
WBC NRBC COR # BLD: 9.28 10*3/MM3 (ref 3.4–10.8)
WHOLE BLOOD HOLD COAG: NORMAL
WHOLE BLOOD HOLD SPECIMEN: NORMAL

## 2022-05-29 PROCEDURE — 93005 ELECTROCARDIOGRAM TRACING: CPT | Performed by: EMERGENCY MEDICINE

## 2022-05-29 PROCEDURE — 93005 ELECTROCARDIOGRAM TRACING: CPT

## 2022-05-29 PROCEDURE — 05HY33Z INSERTION OF INFUSION DEVICE INTO UPPER VEIN, PERCUTANEOUS APPROACH: ICD-10-PCS | Performed by: EMERGENCY MEDICINE

## 2022-05-29 PROCEDURE — 99291 CRITICAL CARE FIRST HOUR: CPT | Performed by: INTERNAL MEDICINE

## 2022-05-29 PROCEDURE — 71045 X-RAY EXAM CHEST 1 VIEW: CPT

## 2022-05-29 PROCEDURE — 84484 ASSAY OF TROPONIN QUANT: CPT | Performed by: GENERAL PRACTICE

## 2022-05-29 PROCEDURE — 25010000002 MAGNESIUM SULFATE IN D5W 1G/100ML (PREMIX) 1-5 GM/100ML-% SOLUTION: Performed by: INTERNAL MEDICINE

## 2022-05-29 PROCEDURE — 84100 ASSAY OF PHOSPHORUS: CPT | Performed by: GENERAL PRACTICE

## 2022-05-29 PROCEDURE — 85007 BL SMEAR W/DIFF WBC COUNT: CPT | Performed by: GENERAL PRACTICE

## 2022-05-29 PROCEDURE — 84439 ASSAY OF FREE THYROXINE: CPT | Performed by: EMERGENCY MEDICINE

## 2022-05-29 PROCEDURE — 81001 URINALYSIS AUTO W/SCOPE: CPT | Performed by: EMERGENCY MEDICINE

## 2022-05-29 PROCEDURE — 25010000002 CEFEPIME PER 500 MG: Performed by: EMERGENCY MEDICINE

## 2022-05-29 PROCEDURE — 83735 ASSAY OF MAGNESIUM: CPT | Performed by: GENERAL PRACTICE

## 2022-05-29 PROCEDURE — 0 POTASSIUM CHLORIDE PER 2 MEQ: Performed by: INTERNAL MEDICINE

## 2022-05-29 PROCEDURE — 85025 COMPLETE CBC W/AUTO DIFF WBC: CPT | Performed by: EMERGENCY MEDICINE

## 2022-05-29 PROCEDURE — 99223 1ST HOSP IP/OBS HIGH 75: CPT | Performed by: GENERAL PRACTICE

## 2022-05-29 PROCEDURE — 87804 INFLUENZA ASSAY W/OPTIC: CPT | Performed by: GENERAL PRACTICE

## 2022-05-29 PROCEDURE — C1751 CATH, INF, PER/CENT/MIDLINE: HCPCS

## 2022-05-29 PROCEDURE — 83605 ASSAY OF LACTIC ACID: CPT | Performed by: EMERGENCY MEDICINE

## 2022-05-29 PROCEDURE — 85610 PROTHROMBIN TIME: CPT | Performed by: GENERAL PRACTICE

## 2022-05-29 PROCEDURE — 85025 COMPLETE CBC W/AUTO DIFF WBC: CPT | Performed by: GENERAL PRACTICE

## 2022-05-29 PROCEDURE — 99285 EMERGENCY DEPT VISIT HI MDM: CPT

## 2022-05-29 PROCEDURE — 83605 ASSAY OF LACTIC ACID: CPT | Performed by: GENERAL PRACTICE

## 2022-05-29 PROCEDURE — 84443 ASSAY THYROID STIM HORMONE: CPT | Performed by: GENERAL PRACTICE

## 2022-05-29 PROCEDURE — U0004 COV-19 TEST NON-CDC HGH THRU: HCPCS | Performed by: GENERAL PRACTICE

## 2022-05-29 PROCEDURE — U0005 INFEC AGEN DETEC AMPLI PROBE: HCPCS | Performed by: GENERAL PRACTICE

## 2022-05-29 PROCEDURE — 82962 GLUCOSE BLOOD TEST: CPT

## 2022-05-29 PROCEDURE — 80053 COMPREHEN METABOLIC PANEL: CPT | Performed by: EMERGENCY MEDICINE

## 2022-05-29 PROCEDURE — 84484 ASSAY OF TROPONIN QUANT: CPT | Performed by: EMERGENCY MEDICINE

## 2022-05-29 PROCEDURE — 87040 BLOOD CULTURE FOR BACTERIA: CPT | Performed by: EMERGENCY MEDICINE

## 2022-05-29 PROCEDURE — 83735 ASSAY OF MAGNESIUM: CPT | Performed by: EMERGENCY MEDICINE

## 2022-05-29 PROCEDURE — 84443 ASSAY THYROID STIM HORMONE: CPT | Performed by: EMERGENCY MEDICINE

## 2022-05-29 PROCEDURE — 80061 LIPID PANEL: CPT | Performed by: GENERAL PRACTICE

## 2022-05-29 PROCEDURE — 25010000002 VANCOMYCIN 5 G RECONSTITUTED SOLUTION: Performed by: GENERAL PRACTICE

## 2022-05-29 PROCEDURE — 25010000002 MAGNESIUM SULFATE IN D5W 1G/100ML (PREMIX) 1-5 GM/100ML-% SOLUTION: Performed by: GENERAL PRACTICE

## 2022-05-29 RX ORDER — NOREPINEPHRINE BIT/0.9 % NACL 8 MG/250ML
.02-.3 INFUSION BOTTLE (ML) INTRAVENOUS
Status: DISCONTINUED | OUTPATIENT
Start: 2022-05-29 | End: 2022-05-31

## 2022-05-29 RX ORDER — CEFEPIME 1 G/50ML
2 INJECTION, SOLUTION INTRAVENOUS EVERY 12 HOURS
Status: DISCONTINUED | OUTPATIENT
Start: 2022-05-30 | End: 2022-05-30

## 2022-05-29 RX ORDER — MIRTAZAPINE 15 MG/1
7.5 TABLET, FILM COATED ORAL NIGHTLY
COMMUNITY
End: 2022-06-14 | Stop reason: HOSPADM

## 2022-05-29 RX ORDER — SODIUM CHLORIDE 0.9 % (FLUSH) 0.9 %
10 SYRINGE (ML) INJECTION EVERY 12 HOURS SCHEDULED
Status: DISCONTINUED | OUTPATIENT
Start: 2022-05-29 | End: 2022-06-14 | Stop reason: HOSPADM

## 2022-05-29 RX ORDER — NOREPINEPHRINE BIT/0.9 % NACL 8 MG/250ML
.02-.3 INFUSION BOTTLE (ML) INTRAVENOUS
Status: DISCONTINUED | OUTPATIENT
Start: 2022-05-29 | End: 2022-05-29 | Stop reason: SDUPTHER

## 2022-05-29 RX ORDER — MAGNESIUM SULFATE 1 G/100ML
1 INJECTION INTRAVENOUS ONCE
Status: COMPLETED | OUTPATIENT
Start: 2022-05-29 | End: 2022-05-29

## 2022-05-29 RX ORDER — BISACODYL 5 MG/1
5 TABLET, DELAYED RELEASE ORAL DAILY PRN
Status: DISCONTINUED | OUTPATIENT
Start: 2022-05-29 | End: 2022-05-30

## 2022-05-29 RX ORDER — POTASSIUM CHLORIDE 29.8 MG/ML
20 INJECTION INTRAVENOUS ONCE
Status: COMPLETED | OUTPATIENT
Start: 2022-05-29 | End: 2022-05-29

## 2022-05-29 RX ORDER — BISACODYL 10 MG
10 SUPPOSITORY, RECTAL RECTAL DAILY PRN
Status: DISCONTINUED | OUTPATIENT
Start: 2022-05-29 | End: 2022-05-30

## 2022-05-29 RX ORDER — MIRTAZAPINE 15 MG/1
15 TABLET, FILM COATED ORAL NIGHTLY
COMMUNITY
End: 2022-06-14 | Stop reason: HOSPADM

## 2022-05-29 RX ORDER — SODIUM CHLORIDE 0.9 % (FLUSH) 0.9 %
10 SYRINGE (ML) INJECTION AS NEEDED
Status: DISCONTINUED | OUTPATIENT
Start: 2022-05-29 | End: 2022-06-14 | Stop reason: HOSPADM

## 2022-05-29 RX ORDER — ONDANSETRON 4 MG/1
4 TABLET, FILM COATED ORAL EVERY 6 HOURS PRN
Status: DISCONTINUED | OUTPATIENT
Start: 2022-05-29 | End: 2022-05-30

## 2022-05-29 RX ORDER — POLYETHYLENE GLYCOL 3350 17 G/17G
17 POWDER, FOR SOLUTION ORAL DAILY PRN
Status: DISCONTINUED | OUTPATIENT
Start: 2022-05-29 | End: 2022-05-30

## 2022-05-29 RX ORDER — CEFEPIME 1 G/50ML
2 INJECTION, SOLUTION INTRAVENOUS ONCE
Status: COMPLETED | OUTPATIENT
Start: 2022-05-29 | End: 2022-05-29

## 2022-05-29 RX ORDER — SODIUM CHLORIDE, SODIUM LACTATE, POTASSIUM CHLORIDE, CALCIUM CHLORIDE 600; 310; 30; 20 MG/100ML; MG/100ML; MG/100ML; MG/100ML
75 INJECTION, SOLUTION INTRAVENOUS CONTINUOUS
Status: DISCONTINUED | OUTPATIENT
Start: 2022-05-29 | End: 2022-05-30

## 2022-05-29 RX ORDER — DEXTROSE MONOHYDRATE 25 G/50ML
25 INJECTION, SOLUTION INTRAVENOUS ONCE
Status: COMPLETED | OUTPATIENT
Start: 2022-05-29 | End: 2022-05-29

## 2022-05-29 RX ORDER — LIDOCAINE 40 MG/G
1 CREAM TOPICAL 2 TIMES DAILY PRN
COMMUNITY
End: 2022-06-23

## 2022-05-29 RX ORDER — AMOXICILLIN 250 MG
2 CAPSULE ORAL 2 TIMES DAILY
Status: DISCONTINUED | OUTPATIENT
Start: 2022-05-29 | End: 2022-05-30

## 2022-05-29 RX ORDER — MAGNESIUM SULFATE 1 G/100ML
1 INJECTION INTRAVENOUS
Status: DISPENSED | OUTPATIENT
Start: 2022-05-29 | End: 2022-05-29

## 2022-05-29 RX ADMIN — POTASSIUM CHLORIDE 20 MEQ: 400 INJECTION, SOLUTION INTRAVENOUS at 18:45

## 2022-05-29 RX ADMIN — DEXTROSE MONOHYDRATE 25 G: 500 INJECTION PARENTERAL at 14:37

## 2022-05-29 RX ADMIN — Medication 10 ML: at 21:03

## 2022-05-29 RX ADMIN — SODIUM CHLORIDE 2286 ML: 9 INJECTION, SOLUTION INTRAVENOUS at 16:03

## 2022-05-29 RX ADMIN — MAGNESIUM SULFATE 1 G: 1 INJECTION INTRAVENOUS at 19:44

## 2022-05-29 RX ADMIN — CEFEPIME 2 G: 1 INJECTION, SOLUTION INTRAVENOUS at 14:36

## 2022-05-29 RX ADMIN — Medication 0.03 MCG/KG/MIN: at 21:02

## 2022-05-29 RX ADMIN — SODIUM CHLORIDE 1000 ML: 9 INJECTION, SOLUTION INTRAVENOUS at 14:37

## 2022-05-29 RX ADMIN — VANCOMYCIN HYDROCHLORIDE 1500 MG: 5 INJECTION, POWDER, LYOPHILIZED, FOR SOLUTION INTRAVENOUS at 18:43

## 2022-05-29 RX ADMIN — SODIUM CHLORIDE, POTASSIUM CHLORIDE, SODIUM LACTATE AND CALCIUM CHLORIDE 75 ML/HR: 600; 310; 30; 20 INJECTION, SOLUTION INTRAVENOUS at 18:44

## 2022-05-29 RX ADMIN — MAGNESIUM SULFATE 1 G: 1 INJECTION INTRAVENOUS at 21:03

## 2022-05-30 ENCOUNTER — APPOINTMENT (OUTPATIENT)
Dept: CT IMAGING | Facility: HOSPITAL | Age: 82
End: 2022-05-30

## 2022-05-30 ENCOUNTER — APPOINTMENT (OUTPATIENT)
Dept: GENERAL RADIOLOGY | Facility: HOSPITAL | Age: 82
End: 2022-05-30

## 2022-05-30 LAB
ALBUMIN SERPL-MCNC: 2.1 G/DL (ref 3.5–5.2)
ALBUMIN SERPL-MCNC: 2.2 G/DL (ref 3.5–5.2)
ALBUMIN/GLOB SERPL: 0.8 G/DL
ALBUMIN/GLOB SERPL: 0.8 G/DL
ALP SERPL-CCNC: 110 U/L (ref 39–117)
ALP SERPL-CCNC: 117 U/L (ref 39–117)
ALT SERPL W P-5'-P-CCNC: 13 U/L (ref 1–41)
ALT SERPL W P-5'-P-CCNC: 15 U/L (ref 1–41)
ANION GAP SERPL CALCULATED.3IONS-SCNC: 10.1 MMOL/L (ref 5–15)
ANION GAP SERPL CALCULATED.3IONS-SCNC: 7.1 MMOL/L (ref 5–15)
ANISOCYTOSIS BLD QL: ABNORMAL
AST SERPL-CCNC: 12 U/L (ref 1–40)
AST SERPL-CCNC: 14 U/L (ref 1–40)
BACTERIA UR QL AUTO: ABNORMAL /HPF
BILIRUB SERPL-MCNC: 0.4 MG/DL (ref 0–1.2)
BILIRUB SERPL-MCNC: 0.4 MG/DL (ref 0–1.2)
BILIRUB UR QL STRIP: ABNORMAL
BUN SERPL-MCNC: 26 MG/DL (ref 8–23)
BUN SERPL-MCNC: 27 MG/DL (ref 8–23)
BUN/CREAT SERPL: 17.6 (ref 7–25)
BUN/CREAT SERPL: 19.5 (ref 7–25)
BURR CELLS BLD QL SMEAR: ABNORMAL
CALCIUM SPEC-SCNC: 8.1 MG/DL (ref 8.6–10.5)
CALCIUM SPEC-SCNC: 8.1 MG/DL (ref 8.6–10.5)
CHLORIDE SERPL-SCNC: 117 MMOL/L (ref 98–107)
CHLORIDE SERPL-SCNC: 118 MMOL/L (ref 98–107)
CHOLEST SERPL-MCNC: 110 MG/DL (ref 0–200)
CLARITY UR: ABNORMAL
CO2 SERPL-SCNC: 19.9 MMOL/L (ref 22–29)
CO2 SERPL-SCNC: 22.9 MMOL/L (ref 22–29)
COLOR UR: ABNORMAL
CREAT SERPL-MCNC: 1.33 MG/DL (ref 0.76–1.27)
CREAT SERPL-MCNC: 1.53 MG/DL (ref 0.76–1.27)
D-LACTATE SERPL-SCNC: 1.5 MMOL/L (ref 0.5–2)
DEPRECATED RDW RBC AUTO: 40.6 FL (ref 37–54)
EGFRCR SERPLBLD CKD-EPI 2021: 45.4 ML/MIN/1.73
EGFRCR SERPLBLD CKD-EPI 2021: 53.7 ML/MIN/1.73
EOSINOPHIL # BLD MANUAL: 0.09 10*3/MM3 (ref 0–0.4)
EOSINOPHIL NFR BLD MANUAL: 1 % (ref 0.3–6.2)
ERYTHROCYTE [DISTWIDTH] IN BLOOD BY AUTOMATED COUNT: 15 % (ref 12.3–15.4)
FLUAV AG NPH QL: NEGATIVE
FLUBV AG NPH QL IA: NEGATIVE
GLOBULIN UR ELPH-MCNC: 2.7 GM/DL
GLOBULIN UR ELPH-MCNC: 2.8 GM/DL
GLUCOSE SERPL-MCNC: 102 MG/DL (ref 65–99)
GLUCOSE SERPL-MCNC: 93 MG/DL (ref 65–99)
GLUCOSE UR STRIP-MCNC: NEGATIVE MG/DL
HBA1C MFR BLD: 4.9 % (ref 4.8–5.6)
HCT VFR BLD AUTO: 30.2 % (ref 37.5–51)
HDLC SERPL-MCNC: 65 MG/DL (ref 40–60)
HGB BLD-MCNC: 11.3 G/DL (ref 13–17.7)
HGB UR QL STRIP.AUTO: ABNORMAL
HYALINE CASTS UR QL AUTO: ABNORMAL /LPF
HYPOCHROMIA BLD QL: ABNORMAL
INR PPP: 1.48 (ref 0.86–1.15)
KETONES UR QL STRIP: ABNORMAL
LDLC SERPL CALC-MCNC: 33 MG/DL (ref 0–100)
LDLC/HDLC SERPL: 0.53 {RATIO}
LEUKOCYTE ESTERASE UR QL STRIP.AUTO: ABNORMAL
LYMPHOCYTES # BLD MANUAL: 0.47 10*3/MM3 (ref 0.7–3.1)
LYMPHOCYTES NFR BLD MANUAL: 1 % (ref 5–12)
MAGNESIUM SERPL-MCNC: 2 MG/DL (ref 1.6–2.4)
MAGNESIUM SERPL-MCNC: 2.1 MG/DL (ref 1.6–2.4)
MCH RBC QN AUTO: 28.8 PG (ref 26.6–33)
MCHC RBC AUTO-ENTMCNC: 37.4 G/DL (ref 31.5–35.7)
MCV RBC AUTO: 77 FL (ref 79–97)
MICROCYTES BLD QL: ABNORMAL
MONOCYTES # BLD: 0.09 10*3/MM3 (ref 0.1–0.9)
NEUTROPHILS # BLD AUTO: 8.71 10*3/MM3 (ref 1.7–7)
NEUTROPHILS NFR BLD MANUAL: 93 % (ref 42.7–76)
NITRITE UR QL STRIP: POSITIVE
NRBC SPEC MANUAL: 21 /100 WBC (ref 0–0.2)
NT-PROBNP SERPL-MCNC: 1270 PG/ML (ref 0–1800)
PH UR STRIP.AUTO: 6.5 [PH] (ref 5–8)
PHOSPHATE SERPL-MCNC: 2 MG/DL (ref 2.5–4.5)
PHOSPHATE SERPL-MCNC: 2 MG/DL (ref 2.5–4.5)
PLATELET # BLD AUTO: 100 10*3/MM3 (ref 140–450)
PMV BLD AUTO: 10.3 FL (ref 6–12)
POTASSIUM SERPL-SCNC: 3.7 MMOL/L (ref 3.5–5.2)
POTASSIUM SERPL-SCNC: 4 MMOL/L (ref 3.5–5.2)
PROCALCITONIN SERPL-MCNC: 0.16 NG/ML (ref 0–0.25)
PROT SERPL-MCNC: 4.8 G/DL (ref 6–8.5)
PROT SERPL-MCNC: 5 G/DL (ref 6–8.5)
PROT UR QL STRIP: ABNORMAL
PROTHROMBIN TIME: 18.2 SECONDS (ref 11.8–14.9)
QT INTERVAL: 344 MS
QT INTERVAL: 345 MS
RBC # BLD AUTO: 3.92 10*6/MM3 (ref 4.14–5.8)
RBC # UR STRIP: ABNORMAL /HPF
REF LAB TEST METHOD: ABNORMAL
SARS-COV-2 RNA PNL SPEC NAA+PROBE: NOT DETECTED
SMALL PLATELETS BLD QL SMEAR: ABNORMAL
SODIUM SERPL-SCNC: 147 MMOL/L (ref 136–145)
SODIUM SERPL-SCNC: 148 MMOL/L (ref 136–145)
SP GR UR STRIP: 1.02 (ref 1–1.03)
SQUAMOUS #/AREA URNS HPF: ABNORMAL /HPF
TARGETS BLD QL SMEAR: ABNORMAL
TRIGL SERPL-MCNC: 52 MG/DL (ref 0–150)
TROPONIN T SERPL-MCNC: 0.06 NG/ML (ref 0–0.03)
TROPONIN T SERPL-MCNC: 0.07 NG/ML (ref 0–0.03)
TROPONIN T SERPL-MCNC: 0.08 NG/ML (ref 0–0.03)
UROBILINOGEN UR QL STRIP: ABNORMAL
VARIANT LYMPHS NFR BLD MANUAL: 5 % (ref 19.6–45.3)
VLDLC SERPL-MCNC: 12 MG/DL (ref 5–40)
WBC # UR STRIP: ABNORMAL /HPF
WBC MORPH BLD: NORMAL
WBC NRBC COR # BLD: 9.37 10*3/MM3 (ref 3.4–10.8)

## 2022-05-30 PROCEDURE — 87086 URINE CULTURE/COLONY COUNT: CPT | Performed by: INTERNAL MEDICINE

## 2022-05-30 PROCEDURE — 74018 RADEX ABDOMEN 1 VIEW: CPT

## 2022-05-30 PROCEDURE — 25010000002 ENOXAPARIN PER 10 MG: Performed by: PHYSICIAN ASSISTANT

## 2022-05-30 PROCEDURE — 83880 ASSAY OF NATRIURETIC PEPTIDE: CPT | Performed by: INTERNAL MEDICINE

## 2022-05-30 PROCEDURE — 85025 COMPLETE CBC W/AUTO DIFF WBC: CPT | Performed by: GENERAL PRACTICE

## 2022-05-30 PROCEDURE — 83735 ASSAY OF MAGNESIUM: CPT | Performed by: PHYSICIAN ASSISTANT

## 2022-05-30 PROCEDURE — 80053 COMPREHEN METABOLIC PANEL: CPT | Performed by: PHYSICIAN ASSISTANT

## 2022-05-30 PROCEDURE — 81001 URINALYSIS AUTO W/SCOPE: CPT | Performed by: INTERNAL MEDICINE

## 2022-05-30 PROCEDURE — 0DH67UZ INSERTION OF FEEDING DEVICE INTO STOMACH, VIA NATURAL OR ARTIFICIAL OPENING: ICD-10-PCS | Performed by: INTERNAL MEDICINE

## 2022-05-30 PROCEDURE — 99291 CRITICAL CARE FIRST HOUR: CPT | Performed by: INTERNAL MEDICINE

## 2022-05-30 PROCEDURE — 85610 PROTHROMBIN TIME: CPT | Performed by: GENERAL PRACTICE

## 2022-05-30 PROCEDURE — 70450 CT HEAD/BRAIN W/O DYE: CPT

## 2022-05-30 PROCEDURE — 83036 HEMOGLOBIN GLYCOSYLATED A1C: CPT | Performed by: GENERAL PRACTICE

## 2022-05-30 PROCEDURE — 0T9B70Z DRAINAGE OF BLADDER WITH DRAINAGE DEVICE, VIA NATURAL OR ARTIFICIAL OPENING: ICD-10-PCS | Performed by: INTERNAL MEDICINE

## 2022-05-30 PROCEDURE — 93005 ELECTROCARDIOGRAM TRACING: CPT | Performed by: INTERNAL MEDICINE

## 2022-05-30 PROCEDURE — 25010000002 MEROPENEM PER 100 MG: Performed by: INTERNAL MEDICINE

## 2022-05-30 PROCEDURE — 84145 PROCALCITONIN (PCT): CPT | Performed by: INTERNAL MEDICINE

## 2022-05-30 PROCEDURE — 84100 ASSAY OF PHOSPHORUS: CPT | Performed by: PHYSICIAN ASSISTANT

## 2022-05-30 PROCEDURE — 85007 BL SMEAR W/DIFF WBC COUNT: CPT | Performed by: GENERAL PRACTICE

## 2022-05-30 PROCEDURE — 93005 ELECTROCARDIOGRAM TRACING: CPT | Performed by: GENERAL PRACTICE

## 2022-05-30 PROCEDURE — 3E0G76Z INTRODUCTION OF NUTRITIONAL SUBSTANCE INTO UPPER GI, VIA NATURAL OR ARTIFICIAL OPENING: ICD-10-PCS | Performed by: INTERNAL MEDICINE

## 2022-05-30 PROCEDURE — 82962 GLUCOSE BLOOD TEST: CPT

## 2022-05-30 PROCEDURE — 83605 ASSAY OF LACTIC ACID: CPT | Performed by: INTERNAL MEDICINE

## 2022-05-30 PROCEDURE — 25010000002 CEFEPIME PER 500 MG: Performed by: GENERAL PRACTICE

## 2022-05-30 PROCEDURE — 84484 ASSAY OF TROPONIN QUANT: CPT | Performed by: GENERAL PRACTICE

## 2022-05-30 PROCEDURE — 25010000002 VANCOMYCIN 5 G RECONSTITUTED SOLUTION: Performed by: GENERAL PRACTICE

## 2022-05-30 RX ORDER — IPRATROPIUM BROMIDE AND ALBUTEROL SULFATE 2.5; .5 MG/3ML; MG/3ML
3 SOLUTION RESPIRATORY (INHALATION) EVERY 4 HOURS PRN
Status: DISCONTINUED | OUTPATIENT
Start: 2022-05-30 | End: 2022-06-14 | Stop reason: HOSPADM

## 2022-05-30 RX ORDER — BISACODYL 10 MG
10 SUPPOSITORY, RECTAL RECTAL DAILY PRN
Status: DISCONTINUED | OUTPATIENT
Start: 2022-05-30 | End: 2022-06-01

## 2022-05-30 RX ORDER — MIDODRINE HYDROCHLORIDE 10 MG/1
10 TABLET ORAL EVERY 8 HOURS
Status: DISCONTINUED | OUTPATIENT
Start: 2022-05-30 | End: 2022-05-31

## 2022-05-30 RX ORDER — AMOXICILLIN 250 MG
2 CAPSULE ORAL 2 TIMES DAILY
Status: DISCONTINUED | OUTPATIENT
Start: 2022-05-30 | End: 2022-06-01

## 2022-05-30 RX ORDER — ONDANSETRON 4 MG/1
4 TABLET, FILM COATED ORAL EVERY 6 HOURS PRN
Status: DISCONTINUED | OUTPATIENT
Start: 2022-05-30 | End: 2022-06-01

## 2022-05-30 RX ORDER — FAMOTIDINE 10 MG/ML
20 INJECTION, SOLUTION INTRAVENOUS DAILY
Status: DISCONTINUED | OUTPATIENT
Start: 2022-05-30 | End: 2022-06-02

## 2022-05-30 RX ORDER — FENTANYL/ROPIVACAINE/NS/PF 2-625MCG/1
15 PLASTIC BAG, INJECTION (ML) EPIDURAL ONCE
Status: COMPLETED | OUTPATIENT
Start: 2022-05-30 | End: 2022-05-30

## 2022-05-30 RX ORDER — ENOXAPARIN SODIUM 100 MG/ML
80 INJECTION SUBCUTANEOUS EVERY 12 HOURS
Status: DISCONTINUED | OUTPATIENT
Start: 2022-05-30 | End: 2022-05-31

## 2022-05-30 RX ORDER — BISACODYL 5 MG/1
5 TABLET, DELAYED RELEASE ORAL DAILY PRN
Status: DISCONTINUED | OUTPATIENT
Start: 2022-05-30 | End: 2022-05-30

## 2022-05-30 RX ORDER — POLYETHYLENE GLYCOL 3350 17 G/17G
17 POWDER, FOR SOLUTION ORAL DAILY PRN
Status: DISCONTINUED | OUTPATIENT
Start: 2022-05-30 | End: 2022-06-01

## 2022-05-30 RX ORDER — FAMOTIDINE 10 MG/ML
20 INJECTION, SOLUTION INTRAVENOUS EVERY 12 HOURS SCHEDULED
Status: DISCONTINUED | OUTPATIENT
Start: 2022-05-30 | End: 2022-05-30

## 2022-05-30 RX ORDER — DEXTROSE AND SODIUM CHLORIDE 5; .45 G/100ML; G/100ML
100 INJECTION, SOLUTION INTRAVENOUS CONTINUOUS
Status: DISCONTINUED | OUTPATIENT
Start: 2022-05-30 | End: 2022-05-31

## 2022-05-30 RX ADMIN — MIDODRINE HYDROCHLORIDE 10 MG: 10 TABLET ORAL at 21:36

## 2022-05-30 RX ADMIN — POTASSIUM PHOSPHATE, MONOBASIC AND POTASSIUM PHOSPHATE, DIBASIC 15 MMOL: 224; 236 INJECTION, SOLUTION, CONCENTRATE INTRAVENOUS at 16:53

## 2022-05-30 RX ADMIN — POTASSIUM PHOSPHATE, MONOBASIC AND POTASSIUM PHOSPHATE, DIBASIC 15 MMOL: 224; 236 INJECTION, SOLUTION, CONCENTRATE INTRAVENOUS at 14:08

## 2022-05-30 RX ADMIN — Medication 10 ML: at 20:59

## 2022-05-30 RX ADMIN — ENOXAPARIN SODIUM 80 MG: 100 INJECTION SUBCUTANEOUS at 21:36

## 2022-05-30 RX ADMIN — SENNOSIDES AND DOCUSATE SODIUM 2 TABLET: 50; 8.6 TABLET ORAL at 20:59

## 2022-05-30 RX ADMIN — VANCOMYCIN HYDROCHLORIDE 1000 MG: 5 INJECTION, POWDER, LYOPHILIZED, FOR SOLUTION INTRAVENOUS at 17:02

## 2022-05-30 RX ADMIN — CEFEPIME 2 G: 1 INJECTION, SOLUTION INTRAVENOUS at 13:34

## 2022-05-30 RX ADMIN — FAMOTIDINE 20 MG: 10 INJECTION INTRAVENOUS at 10:05

## 2022-05-30 RX ADMIN — CEFEPIME 2 G: 1 INJECTION, SOLUTION INTRAVENOUS at 02:51

## 2022-05-30 RX ADMIN — MEROPENEM 1 G: 1 INJECTION, POWDER, FOR SOLUTION INTRAVENOUS at 13:55

## 2022-05-30 RX ADMIN — MIDODRINE HYDROCHLORIDE 10 MG: 10 TABLET ORAL at 14:08

## 2022-05-30 RX ADMIN — ENOXAPARIN SODIUM 80 MG: 100 INJECTION SUBCUTANEOUS at 10:05

## 2022-05-30 RX ADMIN — DEXTROSE AND SODIUM CHLORIDE 100 ML/HR: 5; 450 INJECTION, SOLUTION INTRAVENOUS at 13:46

## 2022-05-31 LAB
ALBUMIN SERPL-MCNC: 1.8 G/DL (ref 3.5–5.2)
ALBUMIN/GLOB SERPL: 0.7 G/DL
ALP SERPL-CCNC: 98 U/L (ref 39–117)
ALT SERPL W P-5'-P-CCNC: 13 U/L (ref 1–41)
ANION GAP SERPL CALCULATED.3IONS-SCNC: 10.6 MMOL/L (ref 5–15)
ANISOCYTOSIS BLD QL: ABNORMAL
AST SERPL-CCNC: 10 U/L (ref 1–40)
BACTERIA SPEC AEROBE CULT: ABNORMAL
BILIRUB SERPL-MCNC: 0.4 MG/DL (ref 0–1.2)
BUN SERPL-MCNC: 28 MG/DL (ref 8–23)
BUN/CREAT SERPL: 16.3 (ref 7–25)
BURR CELLS BLD QL SMEAR: ABNORMAL
CALCIUM SPEC-SCNC: 7.7 MG/DL (ref 8.6–10.5)
CHLORIDE SERPL-SCNC: 115 MMOL/L (ref 98–107)
CO2 SERPL-SCNC: 18.4 MMOL/L (ref 22–29)
CREAT SERPL-MCNC: 1.72 MG/DL (ref 0.76–1.27)
DEPRECATED RDW RBC AUTO: 41.6 FL (ref 37–54)
EGFRCR SERPLBLD CKD-EPI 2021: 39.4 ML/MIN/1.73
ERYTHROCYTE [DISTWIDTH] IN BLOOD BY AUTOMATED COUNT: 15.1 % (ref 12.3–15.4)
GLOBULIN UR ELPH-MCNC: 2.6 GM/DL
GLUCOSE BLDC GLUCOMTR-MCNC: 81 MG/DL (ref 70–99)
GLUCOSE SERPL-MCNC: 137 MG/DL (ref 65–99)
HCT VFR BLD AUTO: 26 % (ref 37.5–51)
HGB BLD-MCNC: 9.4 G/DL (ref 13–17.7)
HYPOCHROMIA BLD QL: ABNORMAL
INR PPP: 1.87 (ref 0.86–1.15)
LYMPHOCYTES # BLD MANUAL: 0.3 10*3/MM3 (ref 0.7–3.1)
MAGNESIUM SERPL-MCNC: 1.7 MG/DL (ref 1.6–2.4)
MCH RBC QN AUTO: 28.2 PG (ref 26.6–33)
MCHC RBC AUTO-ENTMCNC: 36.2 G/DL (ref 31.5–35.7)
MCV RBC AUTO: 78.1 FL (ref 79–97)
MICROCYTES BLD QL: ABNORMAL
NEUTROPHILS # BLD AUTO: 7.16 10*3/MM3 (ref 1.7–7)
NEUTROPHILS NFR BLD MANUAL: 92 % (ref 42.7–76)
NEUTS BAND NFR BLD MANUAL: 4 % (ref 0–5)
NRBC SPEC MANUAL: 3 /100 WBC (ref 0–0.2)
PHOSPHATE SERPL-MCNC: 3.2 MG/DL (ref 2.5–4.5)
PHOSPHATE SERPL-MCNC: 3.5 MG/DL (ref 2.5–4.5)
PLATELET # BLD AUTO: 65 10*3/MM3 (ref 140–450)
PMV BLD AUTO: 10 FL (ref 6–12)
POTASSIUM SERPL-SCNC: 4 MMOL/L (ref 3.5–5.2)
PROT SERPL-MCNC: 4.4 G/DL (ref 6–8.5)
PROTHROMBIN TIME: 21.9 SECONDS (ref 11.8–14.9)
QT INTERVAL: 504 MS
RBC # BLD AUTO: 3.33 10*6/MM3 (ref 4.14–5.8)
SCAN SLIDE: NORMAL
SMALL PLATELETS BLD QL SMEAR: ABNORMAL
SODIUM SERPL-SCNC: 144 MMOL/L (ref 136–145)
TARGETS BLD QL SMEAR: ABNORMAL
TROPONIN T SERPL-MCNC: 0.05 NG/ML (ref 0–0.03)
TROPONIN T SERPL-MCNC: 0.06 NG/ML (ref 0–0.03)
VANCOMYCIN SERPL-MCNC: 27.95 MCG/ML (ref 5–40)
VARIANT LYMPHS NFR BLD MANUAL: 4 % (ref 19.6–45.3)
WBC MORPH BLD: NORMAL
WBC NRBC COR # BLD: 7.46 10*3/MM3 (ref 3.4–10.8)

## 2022-05-31 PROCEDURE — 83735 ASSAY OF MAGNESIUM: CPT | Performed by: GENERAL PRACTICE

## 2022-05-31 PROCEDURE — 87305 ASPERGILLUS AG IA: CPT | Performed by: INTERNAL MEDICINE

## 2022-05-31 PROCEDURE — 84100 ASSAY OF PHOSPHORUS: CPT | Performed by: GENERAL PRACTICE

## 2022-05-31 PROCEDURE — 80202 ASSAY OF VANCOMYCIN: CPT | Performed by: INTERNAL MEDICINE

## 2022-05-31 PROCEDURE — 84100 ASSAY OF PHOSPHORUS: CPT | Performed by: INTERNAL MEDICINE

## 2022-05-31 PROCEDURE — 80053 COMPREHEN METABOLIC PANEL: CPT | Performed by: INTERNAL MEDICINE

## 2022-05-31 PROCEDURE — 25010000002 MEROPENEM PER 100 MG: Performed by: INTERNAL MEDICINE

## 2022-05-31 PROCEDURE — 84484 ASSAY OF TROPONIN QUANT: CPT | Performed by: PHYSICIAN ASSISTANT

## 2022-05-31 PROCEDURE — 85610 PROTHROMBIN TIME: CPT | Performed by: GENERAL PRACTICE

## 2022-05-31 PROCEDURE — 36415 COLL VENOUS BLD VENIPUNCTURE: CPT | Performed by: GENERAL PRACTICE

## 2022-05-31 PROCEDURE — 99232 SBSQ HOSP IP/OBS MODERATE 35: CPT | Performed by: INTERNAL MEDICINE

## 2022-05-31 PROCEDURE — 87449 NOS EACH ORGANISM AG IA: CPT | Performed by: INTERNAL MEDICINE

## 2022-05-31 PROCEDURE — 85025 COMPLETE CBC W/AUTO DIFF WBC: CPT | Performed by: GENERAL PRACTICE

## 2022-05-31 PROCEDURE — 99233 SBSQ HOSP IP/OBS HIGH 50: CPT | Performed by: INTERNAL MEDICINE

## 2022-05-31 PROCEDURE — 94799 UNLISTED PULMONARY SVC/PX: CPT

## 2022-05-31 PROCEDURE — 85007 BL SMEAR W/DIFF WBC COUNT: CPT | Performed by: GENERAL PRACTICE

## 2022-05-31 RX ORDER — LORAZEPAM 2 MG/ML
2 CONCENTRATE ORAL
Status: DISCONTINUED | OUTPATIENT
Start: 2022-05-31 | End: 2022-05-31

## 2022-05-31 RX ORDER — LORAZEPAM 2 MG/ML
0.5 CONCENTRATE ORAL
Status: DISCONTINUED | OUTPATIENT
Start: 2022-05-31 | End: 2022-06-01

## 2022-05-31 RX ORDER — DEXTROSE MONOHYDRATE 25 G/50ML
25 INJECTION, SOLUTION INTRAVENOUS
Status: DISCONTINUED | OUTPATIENT
Start: 2022-05-31 | End: 2022-05-31

## 2022-05-31 RX ORDER — ACETAMINOPHEN 650 MG/1
650 SUPPOSITORY RECTAL EVERY 4 HOURS PRN
Status: DISCONTINUED | OUTPATIENT
Start: 2022-05-31 | End: 2022-06-14 | Stop reason: HOSPADM

## 2022-05-31 RX ORDER — ENOXAPARIN SODIUM 100 MG/ML
80 INJECTION SUBCUTANEOUS EVERY 24 HOURS
Status: DISCONTINUED | OUTPATIENT
Start: 2022-05-31 | End: 2022-05-31

## 2022-05-31 RX ORDER — SODIUM CHLORIDE 9 MG/ML
50 INJECTION, SOLUTION INTRAVENOUS CONTINUOUS
Status: DISCONTINUED | OUTPATIENT
Start: 2022-06-01 | End: 2022-06-01

## 2022-05-31 RX ORDER — MORPHINE SULFATE 2 MG/ML
2 INJECTION, SOLUTION INTRAMUSCULAR; INTRAVENOUS
Status: DISCONTINUED | OUTPATIENT
Start: 2022-05-31 | End: 2022-06-01

## 2022-05-31 RX ORDER — LORAZEPAM 0.5 MG/1
0.5 TABLET ORAL
Status: DISCONTINUED | OUTPATIENT
Start: 2022-05-31 | End: 2022-05-31

## 2022-05-31 RX ORDER — LORAZEPAM 0.5 MG/1
1 TABLET ORAL
Status: DISCONTINUED | OUTPATIENT
Start: 2022-05-31 | End: 2022-05-31

## 2022-05-31 RX ORDER — PROCHLORPERAZINE EDISYLATE 5 MG/ML
5 INJECTION INTRAMUSCULAR; INTRAVENOUS EVERY 6 HOURS PRN
Status: DISCONTINUED | OUTPATIENT
Start: 2022-05-31 | End: 2022-05-31

## 2022-05-31 RX ORDER — LORAZEPAM 2 MG/ML
0.5 INJECTION INTRAMUSCULAR
Status: DISCONTINUED | OUTPATIENT
Start: 2022-05-31 | End: 2022-05-31

## 2022-05-31 RX ORDER — HALOPERIDOL 2 MG/ML
0.5 SOLUTION ORAL
Status: DISCONTINUED | OUTPATIENT
Start: 2022-05-31 | End: 2022-06-01

## 2022-05-31 RX ORDER — LORAZEPAM 2 MG/ML
2 INJECTION INTRAMUSCULAR
Status: DISCONTINUED | OUTPATIENT
Start: 2022-05-31 | End: 2022-05-31

## 2022-05-31 RX ORDER — LORAZEPAM 2 MG/ML
1 CONCENTRATE ORAL
Status: DISCONTINUED | OUTPATIENT
Start: 2022-05-31 | End: 2022-05-31

## 2022-05-31 RX ORDER — MORPHINE SULFATE 20 MG/ML
20 SOLUTION ORAL
Status: DISCONTINUED | OUTPATIENT
Start: 2022-05-31 | End: 2022-05-31

## 2022-05-31 RX ORDER — PROCHLORPERAZINE 25 MG
25 SUPPOSITORY, RECTAL RECTAL EVERY 12 HOURS PRN
Status: DISCONTINUED | OUTPATIENT
Start: 2022-05-31 | End: 2022-05-31

## 2022-05-31 RX ORDER — HALOPERIDOL 2 MG/1
2 TABLET ORAL EVERY 4 HOURS PRN
Status: DISCONTINUED | OUTPATIENT
Start: 2022-05-31 | End: 2022-05-31

## 2022-05-31 RX ORDER — HALOPERIDOL 2 MG/ML
2 SOLUTION ORAL EVERY 4 HOURS PRN
Status: DISCONTINUED | OUTPATIENT
Start: 2022-05-31 | End: 2022-05-31

## 2022-05-31 RX ORDER — NICOTINE POLACRILEX 4 MG
24 LOZENGE BUCCAL
Status: DISCONTINUED | OUTPATIENT
Start: 2022-05-31 | End: 2022-05-31

## 2022-05-31 RX ORDER — ENOXAPARIN SODIUM 100 MG/ML
60 INJECTION SUBCUTANEOUS EVERY 24 HOURS
Status: DISCONTINUED | OUTPATIENT
Start: 2022-05-31 | End: 2022-05-31

## 2022-05-31 RX ORDER — LORAZEPAM 2 MG/ML
0.5 CONCENTRATE ORAL
Status: DISCONTINUED | OUTPATIENT
Start: 2022-05-31 | End: 2022-05-31

## 2022-05-31 RX ORDER — LORAZEPAM 2 MG/ML
0.5 INJECTION INTRAMUSCULAR
Status: DISCONTINUED | OUTPATIENT
Start: 2022-05-31 | End: 2022-06-01

## 2022-05-31 RX ORDER — HALOPERIDOL 5 MG/ML
2 INJECTION INTRAMUSCULAR EVERY 4 HOURS PRN
Status: DISCONTINUED | OUTPATIENT
Start: 2022-05-31 | End: 2022-05-31

## 2022-05-31 RX ORDER — MORPHINE SULFATE 20 MG/ML
5 SOLUTION ORAL
Status: DISCONTINUED | OUTPATIENT
Start: 2022-05-31 | End: 2022-06-01

## 2022-05-31 RX ORDER — ACETAMINOPHEN 325 MG/1
650 TABLET ORAL EVERY 4 HOURS PRN
Status: DISCONTINUED | OUTPATIENT
Start: 2022-05-31 | End: 2022-06-14 | Stop reason: HOSPADM

## 2022-05-31 RX ORDER — HALOPERIDOL 5 MG/ML
0.5 INJECTION INTRAMUSCULAR
Status: DISCONTINUED | OUTPATIENT
Start: 2022-05-31 | End: 2022-06-01

## 2022-05-31 RX ORDER — ATROPINE SULFATE 10 MG/ML
2 SOLUTION/ DROPS OPHTHALMIC
Status: DISCONTINUED | OUTPATIENT
Start: 2022-05-31 | End: 2022-06-01

## 2022-05-31 RX ORDER — MAGNESIUM SULFATE 1 G/100ML
1 INJECTION INTRAVENOUS
Status: DISCONTINUED | OUTPATIENT
Start: 2022-05-31 | End: 2022-05-31

## 2022-05-31 RX ORDER — LORAZEPAM 2 MG/1
2 TABLET ORAL
Status: DISCONTINUED | OUTPATIENT
Start: 2022-05-31 | End: 2022-05-31

## 2022-05-31 RX ORDER — LORAZEPAM 0.5 MG/1
0.5 TABLET ORAL
Status: DISCONTINUED | OUTPATIENT
Start: 2022-05-31 | End: 2022-06-01

## 2022-05-31 RX ORDER — ACETAMINOPHEN 160 MG/5ML
650 SOLUTION ORAL EVERY 4 HOURS PRN
Status: DISCONTINUED | OUTPATIENT
Start: 2022-05-31 | End: 2022-06-14 | Stop reason: HOSPADM

## 2022-05-31 RX ORDER — LORAZEPAM 2 MG/ML
1 INJECTION INTRAMUSCULAR
Status: DISCONTINUED | OUTPATIENT
Start: 2022-05-31 | End: 2022-05-31

## 2022-05-31 RX ORDER — DIPHENOXYLATE HYDROCHLORIDE AND ATROPINE SULFATE 2.5; .025 MG/1; MG/1
1 TABLET ORAL
Status: DISCONTINUED | OUTPATIENT
Start: 2022-05-31 | End: 2022-06-10

## 2022-05-31 RX ORDER — PROCHLORPERAZINE MALEATE 5 MG/1
5 TABLET ORAL EVERY 6 HOURS PRN
Status: DISCONTINUED | OUTPATIENT
Start: 2022-05-31 | End: 2022-05-31

## 2022-05-31 RX ORDER — HALOPERIDOL 0.5 MG/1
0.5 TABLET ORAL
Status: DISCONTINUED | OUTPATIENT
Start: 2022-05-31 | End: 2022-06-01

## 2022-05-31 RX ADMIN — MIDODRINE HYDROCHLORIDE 10 MG: 10 TABLET ORAL at 06:49

## 2022-05-31 RX ADMIN — DEXTROSE AND SODIUM CHLORIDE 100 ML/HR: 5; 450 INJECTION, SOLUTION INTRAVENOUS at 04:46

## 2022-05-31 RX ADMIN — Medication 10 ML: at 20:04

## 2022-05-31 RX ADMIN — MEROPENEM 1 G: 1 INJECTION, POWDER, FOR SOLUTION INTRAVENOUS at 00:27

## 2022-06-01 PROCEDURE — 94799 UNLISTED PULMONARY SVC/PX: CPT

## 2022-06-01 PROCEDURE — 99233 SBSQ HOSP IP/OBS HIGH 50: CPT | Performed by: INTERNAL MEDICINE

## 2022-06-01 RX ORDER — BISACODYL 10 MG
10 SUPPOSITORY, RECTAL RECTAL DAILY PRN
Status: DISCONTINUED | OUTPATIENT
Start: 2022-06-01 | End: 2022-06-01

## 2022-06-01 RX ORDER — POLYETHYLENE GLYCOL 3350 17 G/17G
17 POWDER, FOR SOLUTION ORAL DAILY PRN
Status: DISCONTINUED | OUTPATIENT
Start: 2022-06-01 | End: 2022-06-14 | Stop reason: HOSPADM

## 2022-06-01 RX ORDER — ONDANSETRON 4 MG/1
4 TABLET, FILM COATED ORAL EVERY 6 HOURS PRN
Status: DISCONTINUED | OUTPATIENT
Start: 2022-06-01 | End: 2022-06-02

## 2022-06-01 RX ORDER — AMOXICILLIN 250 MG
2 CAPSULE ORAL 2 TIMES DAILY PRN
Status: DISCONTINUED | OUTPATIENT
Start: 2022-06-01 | End: 2022-06-14 | Stop reason: HOSPADM

## 2022-06-01 RX ORDER — POLYETHYLENE GLYCOL 3350 17 G/17G
17 POWDER, FOR SOLUTION ORAL DAILY PRN
Status: DISCONTINUED | OUTPATIENT
Start: 2022-06-01 | End: 2022-06-01

## 2022-06-01 RX ORDER — BISACODYL 10 MG
10 SUPPOSITORY, RECTAL RECTAL DAILY PRN
Status: DISCONTINUED | OUTPATIENT
Start: 2022-06-01 | End: 2022-06-14 | Stop reason: HOSPADM

## 2022-06-01 RX ORDER — AMOXICILLIN 250 MG
2 CAPSULE ORAL 2 TIMES DAILY
Status: DISCONTINUED | OUTPATIENT
Start: 2022-06-01 | End: 2022-06-01

## 2022-06-01 RX ADMIN — Medication 10 ML: at 20:31

## 2022-06-01 NOTE — PROGRESS NOTES
[facility]   Hospitalist Progress Note  Date: 2022  Patient Name: Jordin Menon  : 1940  MRN: [id number]  Date of admission: 2022  Consultants:   -Pulmonology/Critical Care: Dr. Kobe Sheets, Dr. Robert Carbajal    Subjective   Subjective     Chief Complaint: Altered mental status, hypothermia, hypoglycemia and bradycardia    Summary:   Jordin Menon is a 82 y.o. male with frequent hospitalizations for sepsis, pneumonia and renal failure who has Alzheimer's dementia who presented to ED from his nursing home due to worsening mental status and hypothermia. Evaluation in ED significant for patient being hypoglycemic, bradycardic and hypothermic. Patient also noted to be lethargic only grimacing to pain but not following commands. Hospitalist service contacted for further evaluation management. Pulmonology/Critical Care consulted. Patient started on empiric antibiotics and levophed. NG tube initially inserted and tube feeds started.  Palliative care consulted.  Discussions were had between Pulmonology/Critical Care service and patient's family and decision has been made to discontinue invasive care measures and pursue comfort care measures only.  However, family at a later time decided that they did not wish to pursue comfort care measures at this time and invasive care measures were resumed.     Interval Followup:   Decision made to discontinue comfort measures and pursue invasive care measures was made after extensive discussion with family.  Family noted that they did not feel like they comfort care was congruent with what they believe their father's wishes would be for his care.  Patient hypothermic and had to be placed on Wing hugger overnight.  Remains confused disoriented and not meaningfully responsive on exam.  Is alert but not responsive to questioning or commands.  Nursing with no additional acute issues to report.    Review of Systems   Unable to obtain secondary to altered mental  status    Objective   Objective     Vitals:   Temp:  [93.8 °F (34.3 °C)-97.6 °F (36.4 °C)] 94.3 °F (34.6 °C)  Heart Rate:  [54-57] 56  Resp:  [12-18] 16  BP: ()/(63-76) 131/64  Physical Exam   Gen: Chronically ill-appearing elderly male, lying in bed, minimally responsive  HEENT: MMM, Atraumatic  Neck: Supple, Trachea midline  Resp: CTAB, diminished breath sounds bilaterally, no w/r/r, normal respiratory effort, equal chest rise bilaterally  Card: RRR, No m/r/g  Abd: Soft, Nontender, Nondistended, + bowel sounds  Ext: No cyanosis, No clubbing  Neuro: CN II-XII grossly intact, No focal deficits appreciated  Psych: AAO x 0, lethargic    Result Review    Result Review:  I have personally reviewed the results from the time of this admission to 6/1/2022 14:55 EDT and agree with these findings:  []  Laboratory:   [x]  Microbiology: Blood culture (05/29/2022): No growth to date.  Urine culture (05/30/2022): Yeast isolated  []  Radiology:   [x]  EKG/Telemetry: Sinus rhythm.  Bradycardia.  No acute events.  []  Cardiology/Vascular:    []  Pathology:  []  Old records:  []  Other:    Assessment & Plan   Assessment / Plan      Assessment:  Septic shock due to unknown source  HCAP due to unspecified organism versus aspiration pneumonia  Therapeutic drug level monitoring, vancomycin  Toxic/metabolic encephalopathy  Acute renal failure  Hypothermia  Hypernatremia  Hypokalemia  Hypomagnesemia  Hypoglycemia  History of Alzheimer's dementia  GERD  DVT  Hypoalbuminemia  Hypophosphatemia    Plan:  -As noted above, extensive discussion had with patient's family regarding goals of care.  Family stated that they were agreeable to DNR/DNI status but at this time were not comfortable pursuing comfort care measures only.  After this discussion will start Wing hugger, start patient on vancomycin and start tube feeds.  Plan to monitor patient for improvement, if no improvement in patient's condition despite these measures plan to have  additional discussions with patient's family regarding goals of care and possibility of comfort care measures only at that time  -Discontinue as needed Haldol, Ativan and morphine for comfort  -Monitor blood pressure closely, at this time patient does not require pressor support.  -Wing hugger ordered given hypothermia  -Cortrack to be placed and tube feeds to be started.  If patient's condition improves we will continue tube feeds  -Monitor renal function and electrolytes with BMP, phosphorus level and magnesium level to be obtained in the AM  -Monitor WBC, hemoglobin and platelet level with CBC to be collected in the AM  -Clinical course will dictate further management     DVT Prophylaxis: SCDs   Diet: Tube feeds  Dispo: Remain in PCU  Code Status: DNR/Comfort Care     Personally reviewed patients labs and imaging, discussed with patient and nurse at bedside.      Part of this note may be an electronic transcription/translation of spoken language to printed text using the Dragon dictation system.    DVT prophylaxis:  Mechanical DVT prophylaxis orders are present.    CODE STATUS:   Code Status (Patient has no pulse and is not breathing): No CPR (Do Not Attempt to Resuscitate)  Medical Interventions (Patient has pulse or is breathing): Comfort Measures      Electronically signed by Ravinder Fox MD, 06/01/22, 2:55 PM EDT.

## 2022-06-01 NOTE — SIGNIFICANT NOTE
"   06/01/22 1100   SOCIAL WORK ASSESSMENT   Referral Source physician   Reason for Consult palliative care   Visit Type ongoing     Pt was moved from 4NT to PCU overnight after family asked to discontinue comfort measures. Palliative SW connected with HCS (Shandra) and pt's sons (Jordin and Aristides) via conference call to discuss change. Family explained they didn't truly understand comfort measures until visiting pt last night and realized treatment and nutrition were stopped. Family thought pt was being changed to DNR/DNI (not comfort measures) after speaking with intensivist about guidance set forth in the living will. SW met with risk management and pt's living will was determined to be a legally binding document. Risk Management stated the provider can deem pt is at the point where the living will needs to be enacted and follow the pt's wishes accordingly. Pt's living will states he authorizes \"the withholding or withdrawal of artificially provided food, water, or other artificially provider nourishment or fluids\". Pt also stated \"direct treatment be withheld or withdrawn, and that I be permitted to die naturally with only the administration of medication or the performance of any medical treatment deemed necessary to alleviate pain\". SW updated family on the validity of the living will and pt's current condition and prognosis per chart records.    Family feels these wishes do not reflect what the pt would want and that he was not competent enough to make these decisions based on his low education level when the living will was signed in 2004. Pt is now diagnosed with severe alzheimer's per chart and unable to make these decisions.   Palliative SW explained the end-of-life process and how forcing food and fluids on him could cause other issues. SW also discussed that pt's decrease in appetite, increase in sleep, decreased communication, changes in breathing, change in body temperature, and multiple " hospitalizations are indicative of the end-of-life. SW educated family that any treatment at this time may prolong pt's suffering. SW educated the family that pt was actively dying when brought to ED as evidenced by HR of 44, temp of 88.6, and blood glucose of 41. Pt struggles to maintain body temperature even with treatment over the past month. Pt's temp was 94 this morning.   Family requested time to discuss how they would like to proceed and would call palliative back.   Update: Family stated they would like to continue treatment and replace the NG tube. Provider was notified of family's request. Provider does not feel this aligns with living will. Provider is going to contact family and discuss his recommendations based on pt's history, overall prognosis, and living will.   Provider spoke with family and they decided to place a NG for the next 24 hours and determine clinical course based on results.   Palliative will continue to monitor and provide support.   RN updated.   CATHY Dixon

## 2022-06-01 NOTE — SIGNIFICANT NOTE
Explained to family in depth exactly what comfort measures were. Patient's POA decided she wanted to remove comfort measures and resume treatment. Shandra stated she did not fully understand what comfort measures meant and did not want the IV fluids and tube feeds stopped. Notified Dr. Lezama via phone call.

## 2022-06-01 NOTE — CONSULTS
"Nutrition Services    Patient Name: Jordin Menon  YOB: 1940  MRN: [id number]  Admission date: 5/29/2022      Clinical Nutrition Assessment      Reason for Assessment: Physician consult, EN       Clinical Course/Narrative: Pt transferred to PCU yesterday & family made decision to make patient comfort care, however per palliative care family didn't understand that comfort care meant the removal of nutrition & hydration.  Risk management consulted 2' to patients living will stated he wished no artificial means of nourishment or fluids.  However daughter is POA & wants NG tube replaced for nutrition & hydration.  Order for Cortrak placement & once placed/secured can resume tube feedings that were previously ordered.            Past Medical History:   Diagnosis Date   • Alzheimer's dementia with behavioral disturbance (HCC)    • Anemia, vitamin B12 deficiency    • Anxiety disorder due to known physiological condition    • Arthritis    • Constipation    • Gastroesophageal reflux disease without esophagitis    • High blood pressure    • HTN (hypertension)    • Hyperlipidemia    • Ingrowing toenail 09/21/2018   • Left foot pain 09/21/2018   • Osteoarthritis of right knee 08/22/2016   • Right foot pain 09/21/2018   • Sleep apnea    • Tinea unguium 09/21/2018       History reviewed. No pertinent surgical history.       Nutrition Intervention:        PO Diet/Supplements: Diet Regular   Orders Placed This Encounter      Place Feeding Tube Per Cortrak System         Intake/Output:   Intake/Output Summary (Last 24 hours) at 6/1/2022 1507  Last data filed at 6/1/2022 0556  Gross per 24 hour   Intake --   Output 450 ml   Net -450 ml            Height: Height: 182.9 cm (72\")   Weight: Weight: 74.2 kg (163 lb 9.3 oz) (06/01/22 0600)   BMI: Body mass index is 22.19 kg/m².     Estimated/Assessed Needs       Energy Requirements    EST Needs (kcal/day) 1782       Protein Requirements    EST Daily Needs (g/day) 74-89 (1-1.2) "       Fluid Requirements     Estimated Needs (mL/day) 1782     Results from last 7 days   Lab Units 05/31/22  0553 05/30/22  0755 05/29/22  2326   SODIUM mmol/L 144 147* 148*   POTASSIUM mmol/L 4.0 4.0 3.7   CHLORIDE mmol/L 115* 117* 118*   CO2 mmol/L 18.4* 19.9* 22.9   BUN mg/dL 28* 27* 26*   CREATININE mg/dL 1.72* 1.53* 1.33*   CALCIUM mg/dL 7.7* 8.1* 8.1*   BILIRUBIN mg/dL 0.4 0.4 0.4   ALK PHOS U/L 98 110 117   ALT (SGPT) U/L 13 15 13   AST (SGOT) U/L 10 14 12   GLUCOSE mg/dL 137* 93 102*     Results from last 7 days   Lab Units 05/31/22  0553 05/31/22  0039 05/30/22  0755 05/30/22  0514 05/29/22  2326   MAGNESIUM mg/dL 1.7  --  2.0  --  2.1   PHOSPHORUS mg/dL 3.2   < > 2.0*  --  2.0*   HEMOGLOBIN g/dL 9.4*  --   --    < >  --    HEMATOCRIT % 26.0*  --   --    < >  --    TRIGLYCERIDES mg/dL  --   --   --   --  52    < > = values in this interval not displayed.     Medical Nutrition Therapy/Nutrition Education          Learner     Readiness Patient  Education not appropriate at this time     Nutrition Diagnosis         Nutrition Dx Problem 1 Inadequate oral Intake related to inadequate energy Intake as evidenced by unintended wt change. and report of minimal PO intake.     Nutrition Intervention         Place [facility] goal rate 62 ml/lq=5181 kcal, 80 gm protein, & 1205 ml H2O.   Free water flushes 95 ml Q 4 hours providing an additional 570 ml totaling daily 1775 ml     Monitor/Evaluation        Monitor Per protocol, I&O, Pertinent labs, EN delivery/tolerance, Weight, RFS     Nutrition Discharge Plan         To be determined     RD to follow up per protocol.    Electronically signed by:  Marilee Christopher RD  06/01/22 15:07 EDT

## 2022-06-01 NOTE — SIGNIFICANT NOTE
06/01/22 1554   Coping/Psychosocial   Observed Emotional State withdrawn   Additional Documentation Spiritual Care (Group)   Spiritual Care   Spiritual Care Source physician referral   Spiritual Care Follow-Up will follow closely   Response to Spiritual Care engagement, unsatisfactory   Spiritual Care Visit Type follow-up   Receptivity to Spiritual Care unresponsive   Pt no longer comfort measures only. Visit made with pt on PCU, introductions made and role explained. Attempted to assess pt's needs, pt not responsive to name. No family currently at bedside at this time.

## 2022-06-01 NOTE — PLAN OF CARE
Goal Outcome Evaluation:  Plan of Care Reviewed With: family   Family had extensive phone conversations with palliative care, social work, and physician.  Pt. Rested quietly throughout shift.  Nonverbal.  Non-responsive.      Progress: declining

## 2022-06-01 NOTE — NURSING NOTE
Communicated to family (Shandra healthcare surrogate and Jordin, patient's son) that artificial feeding would not be provided in accordance with the patient's living will.  Family verbalized they were not happy with decision but acknowledged.

## 2022-06-02 LAB
ANION GAP SERPL CALCULATED.3IONS-SCNC: 8.2 MMOL/L (ref 5–15)
BUN SERPL-MCNC: 34 MG/DL (ref 8–23)
BUN/CREAT SERPL: 20.7 (ref 7–25)
CALCIUM SPEC-SCNC: 8.3 MG/DL (ref 8.6–10.5)
CHLORIDE SERPL-SCNC: 118 MMOL/L (ref 98–107)
CO2 SERPL-SCNC: 22.8 MMOL/L (ref 22–29)
CREAT SERPL-MCNC: 1.64 MG/DL (ref 0.76–1.27)
DEPRECATED RDW RBC AUTO: 40.2 FL (ref 37–54)
EGFRCR SERPLBLD CKD-EPI 2021: 41.8 ML/MIN/1.73
ERYTHROCYTE [DISTWIDTH] IN BLOOD BY AUTOMATED COUNT: 14.7 % (ref 12.3–15.4)
GLUCOSE SERPL-MCNC: 89 MG/DL (ref 65–99)
HCT VFR BLD AUTO: 22.3 % (ref 37.5–51)
HGB BLD-MCNC: 8.2 G/DL (ref 13–17.7)
MAGNESIUM SERPL-MCNC: 1.7 MG/DL (ref 1.6–2.4)
MCH RBC QN AUTO: 28 PG (ref 26.6–33)
MCHC RBC AUTO-ENTMCNC: 36.8 G/DL (ref 31.5–35.7)
MCV RBC AUTO: 76.1 FL (ref 79–97)
PHOSPHATE SERPL-MCNC: 2.8 MG/DL (ref 2.5–4.5)
PLATELET # BLD AUTO: 46 10*3/MM3 (ref 140–450)
PMV BLD AUTO: 10.3 FL (ref 6–12)
POTASSIUM SERPL-SCNC: 3.5 MMOL/L (ref 3.5–5.2)
RBC # BLD AUTO: 2.93 10*6/MM3 (ref 4.14–5.8)
SODIUM SERPL-SCNC: 149 MMOL/L (ref 136–145)
VANCOMYCIN SERPL-MCNC: 21.2 MCG/ML (ref 5–40)
WBC NRBC COR # BLD: 4.1 10*3/MM3 (ref 3.4–10.8)

## 2022-06-02 PROCEDURE — 85027 COMPLETE CBC AUTOMATED: CPT | Performed by: INTERNAL MEDICINE

## 2022-06-02 PROCEDURE — 83735 ASSAY OF MAGNESIUM: CPT | Performed by: INTERNAL MEDICINE

## 2022-06-02 PROCEDURE — 84100 ASSAY OF PHOSPHORUS: CPT | Performed by: INTERNAL MEDICINE

## 2022-06-02 PROCEDURE — 80202 ASSAY OF VANCOMYCIN: CPT | Performed by: INTERNAL MEDICINE

## 2022-06-02 PROCEDURE — 94799 UNLISTED PULMONARY SVC/PX: CPT

## 2022-06-02 PROCEDURE — 80048 BASIC METABOLIC PNL TOTAL CA: CPT | Performed by: INTERNAL MEDICINE

## 2022-06-02 PROCEDURE — 99233 SBSQ HOSP IP/OBS HIGH 50: CPT | Performed by: INTERNAL MEDICINE

## 2022-06-02 RX ORDER — LORAZEPAM 2 MG/ML
0.5 CONCENTRATE ORAL
Status: DISCONTINUED | OUTPATIENT
Start: 2022-06-02 | End: 2022-06-07

## 2022-06-02 RX ORDER — HALOPERIDOL 5 MG/ML
0.5 INJECTION INTRAMUSCULAR
Status: DISCONTINUED | OUTPATIENT
Start: 2022-06-02 | End: 2022-06-07

## 2022-06-02 RX ORDER — LORAZEPAM 2 MG/ML
0.5 INJECTION INTRAMUSCULAR
Status: DISCONTINUED | OUTPATIENT
Start: 2022-06-02 | End: 2022-06-07

## 2022-06-02 RX ORDER — MORPHINE SULFATE 20 MG/ML
5 SOLUTION ORAL
Status: DISCONTINUED | OUTPATIENT
Start: 2022-06-02 | End: 2022-06-07

## 2022-06-02 RX ORDER — HALOPERIDOL 2 MG/ML
0.5 SOLUTION ORAL
Status: DISCONTINUED | OUTPATIENT
Start: 2022-06-02 | End: 2022-06-07

## 2022-06-02 RX ORDER — HALOPERIDOL 0.5 MG/1
0.5 TABLET ORAL
Status: DISCONTINUED | OUTPATIENT
Start: 2022-06-02 | End: 2022-06-07

## 2022-06-02 RX ORDER — MORPHINE SULFATE 2 MG/ML
2 INJECTION, SOLUTION INTRAMUSCULAR; INTRAVENOUS
Status: DISCONTINUED | OUTPATIENT
Start: 2022-06-02 | End: 2022-06-07

## 2022-06-02 RX ORDER — LORAZEPAM 0.5 MG/1
0.5 TABLET ORAL
Status: DISCONTINUED | OUTPATIENT
Start: 2022-06-02 | End: 2022-06-07

## 2022-06-02 RX ORDER — ATROPINE SULFATE 10 MG/ML
2 SOLUTION/ DROPS OPHTHALMIC
Status: DISCONTINUED | OUTPATIENT
Start: 2022-06-02 | End: 2022-06-07

## 2022-06-02 RX ADMIN — Medication 10 ML: at 08:18

## 2022-06-02 RX ADMIN — FAMOTIDINE 20 MG: 10 INJECTION INTRAVENOUS at 08:18

## 2022-06-02 NOTE — PROGRESS NOTES
Spoke with Daughter POA at length, went over Advance directive with her in detail. She agreed that it seemed to tie her hands and express that he would have wanted to go comfort measures. Spoke to one of the brothers on the phone that did not like the conclusion but said he would go along with whatever Shandra decided. We discussed options and the decision was made to keep him comfort measures.     Kobe Sheets MD  14:44 EDT  6/2/2022

## 2022-06-02 NOTE — PLAN OF CARE
Goal Outcome Evaluation:               Patient nonverbal. Family at bedside. No other issues noted. Will continue to monitor.

## 2022-06-02 NOTE — PROGRESS NOTES
[facility]   Hospitalist Progress Note  Date: 2022  Patient Name: Jordin Menon  : 1940  MRN: [id number]  Date of admission: 2022  Consultants:   -Pulmonology/Critical Care: Dr. Kobe Sheets, Dr. Robert Carbajal    Subjective   Subjective     Chief Complaint: Altered mental status, hypothermia, hypoglycemia and bradycardia    Summary:   Jordin Menon is a 82 y.o. male with frequent hospitalizations for sepsis, pneumonia and renal failure who has Alzheimer's dementia who presented to ED from his nursing home due to worsening mental status and hypothermia. Evaluation in ED significant for patient being hypoglycemic, bradycardic and hypothermic. Patient also noted to be lethargic only grimacing to pain but not following commands. Hospitalist service contacted for further evaluation management. Pulmonology/Critical Care consulted. Patient started on empiric antibiotics and levophed. NG tube initially inserted and tube feeds started.  Palliative care consulted.  Discussions were had between Pulmonology/Critical Care service and patient's family and decision has been made to discontinue invasive care measures and pursue comfort care measures only.  However, family at a later time decided that they did not wish to pursue comfort care measures at this time and invasive care measures were resumed.  Based on patient's living will feeding tube was not inserted.  Patient's condition did not improve after use of Wing hugger and resumption of IV antibiotics.  Critical care service contacted and had additional discussion with patient's family, decision then made to again pursue comfort care measures only.     Interval Followup:   Patient continues to have minimal responsiveness.  Will open eyes to name.  Noncommunicative.  Continues to have low body temperature.  Unable to tolerate p.o. intake safely.  Daughter present at bedside.  Nursing with no additional acute issues to report.    Review of Systems   Unable to  obtain secondary to altered mental status    Objective   Objective     Vitals:   Temp:  [94.4 °F (34.7 °C)-97.7 °F (36.5 °C)] 96.7 °F (35.9 °C)  Heart Rate:  [44-65] 56  Resp:  [12-20] 12  BP: (112-139)/(60-76) 112/60  Physical Exam   Gen: Chronically ill-appearing elderly male, lying in bed, minimally responsive  HEENT: MMM, Atraumatic  Neck: Supple, Trachea midline  Resp: CTAB, diminished breath sounds bilaterally, equal chest rise bilaterally, no increase in work of breathing  Card: RRR, No m/r/g  Abd: Soft, Nontender, Nondistended, + bowel sounds  Ext: No cyanosis, No clubbing  Neuro: CN II-XII grossly intact, No focal deficits appreciated  Psych: AAO x 0, lethargic    Result Review    Result Review:  I have personally reviewed the results from the time of this admission to 6/2/2022 15:38 EDT and agree with these findings:  [x]  Laboratory: Platelets downtrending.  Creatinine and electrolytes stable.  WBC stable.  Hemoglobin also downtrending.  [x]  Microbiology: Blood culture (05/29/2022): No growth to date.  Urine culture (05/30/2022): Yeast isolated  []  Radiology:   [x]  EKG/Telemetry: PVCs.  No acute events.  Sinus rhythm.  []  Cardiology/Vascular:    []  Pathology:  []  Old records:  []  Other:    Assessment & Plan   Assessment / Plan      Assessment:  Septic shock due to unknown source  HCAP due to unspecified organism versus aspiration pneumonia  Therapeutic drug level monitoring, vancomycin  Toxic/metabolic encephalopathy  Acute renal failure  Hypothermia  Hypernatremia  Hypokalemia  Hypomagnesemia  Hypoglycemia  History of Alzheimer's dementia  GERD  DVT  Hypoalbuminemia  Hypophosphatemia    Plan:  -Extensive discussion had again with patient's family.  Palliative care physician, Dr. Kobe Sheets had additional discussion with family as well regarding pt's living will. After this discussion decision was made that based on patient's responses on the living will he would have wanted to pursue comfort care  measures only.  After this discussion decision was made to discontinue invasive care measures and pursue comfort care measures only  -As needed Haldol, Ativan and morphine for comfort ordered  -Clinical course will dictate further management     DVT Prophylaxis: SCDs   Diet: Pleasure feeds  Dispo: Transfer to regular bed  Code Status: DNR/Comfort Care     Personally reviewed patients labs and imaging, discussed with patient and nurse at bedside.  Personally discussed patient's care with the following consultants: Pulmonology/Critical Care.     Part of this note may be an electronic transcription/translation of spoken language to printed text using the Dragon dictation system.    DVT prophylaxis:  Mechanical DVT prophylaxis orders are present.    CODE STATUS:   Code Status (Patient has no pulse and is not breathing): No CPR (Do Not Attempt to Resuscitate)  Medical Interventions (Patient has pulse or is breathing): Comfort Measures      Electronically signed by Ravinder Fox MD, 06/02/22, 3:38 PM EDT.

## 2022-06-02 NOTE — CONSULTS
Nutrition Services    Patient Name: Jordin Menon  YOB: 1940  MRN: [id number]  Admission date: 5/29/2022      Clinical Nutrition Assessment      Reason for Assessment: Physician consult, EN       Clinical Course/Narrative: Pt transferred to PCU yesterday & family made decision to make patient comfort care, however per palliative care family didn't understand that comfort care meant the removal of nutrition & hydration.  Risk management consulted 2' to patients living will stated he wished no artificial means of nourishment or fluids.  However daughter is POA & wants NG tube replaced for nutrition & hydration.  Order for Cortrak placement & once placed/secured can resume tube feedings that were previously ordered.    6/2: Cortrak not placed yesterday to align with patients living will.  Therefore will discontinue tube feeding & Cortrak orders.            Past Medical History:   Diagnosis Date   • Alzheimer's dementia with behavioral disturbance (HCC)    • Anemia, vitamin B12 deficiency    • Anxiety disorder due to known physiological condition    • Arthritis    • Constipation    • Gastroesophageal reflux disease without esophagitis    • High blood pressure    • HTN (hypertension)    • Hyperlipidemia    • Ingrowing toenail 09/21/2018   • Left foot pain 09/21/2018   • Osteoarthritis of right knee 08/22/2016   • Right foot pain 09/21/2018   • Sleep apnea    • Tinea unguium 09/21/2018       History reviewed. No pertinent surgical history.       Nutrition Intervention:        PO Diet/Supplements: Diet Regular   Orders Placed This Encounter      Place Feeding Tube Per Cortrak System      Diet, Tube Feeding Tube Feeding Formula: Fibersource HN; Tube Feeding Type: Continuous; Continuous Tube Feeding Start Rate (mL/hr): 25; Then Advance Rate By (mL/hr): 20; Every __ Hours: 12; To Goal Rate of (mL/hr): 62         Intake/Output:   Intake/Output Summary (Last 24 hours) at 6/2/2022 0939  Last data filed at 6/2/2022  "0935  Gross per 24 hour   Intake --   Output 675 ml   Net -675 ml            Height: Height: 182.9 cm (72\")   Weight: Weight: 78.4 kg (172 lb 13.5 oz) (06/02/22 0415)   BMI: Body mass index is 23.44 kg/m².     Estimated/Assessed Needs       Energy Requirements    EST Needs (kcal/day) 1782       Protein Requirements    EST Daily Needs (g/day) 74-89 (1-1.2)       Fluid Requirements     Estimated Needs (mL/day) 1782     Results from last 7 days   Lab Units 06/02/22  0614 05/31/22  0553 05/30/22  0755 05/29/22  2326   SODIUM mmol/L 149* 144 147* 148*   POTASSIUM mmol/L 3.5 4.0 4.0 3.7   CHLORIDE mmol/L 118* 115* 117* 118*   CO2 mmol/L 22.8 18.4* 19.9* 22.9   BUN mg/dL 34* 28* 27* 26*   CREATININE mg/dL 1.64* 1.72* 1.53* 1.33*   CALCIUM mg/dL 8.3* 7.7* 8.1* 8.1*   BILIRUBIN mg/dL  --  0.4 0.4 0.4   ALK PHOS U/L  --  98 110 117   ALT (SGPT) U/L  --  13 15 13   AST (SGOT) U/L  --  10 14 12   GLUCOSE mg/dL 89 137* 93 102*     Results from last 7 days   Lab Units 06/02/22  0614 05/31/22  0553 05/31/22  0039 05/30/22  0755 05/30/22  0514 05/29/22  2326   MAGNESIUM mg/dL 1.7 1.7  --  2.0  --  2.1   PHOSPHORUS mg/dL 2.8 3.2   < > 2.0*  --  2.0*   HEMOGLOBIN g/dL 8.2* 9.4*  --   --    < >  --    HEMATOCRIT % 22.3* 26.0*  --   --    < >  --    TRIGLYCERIDES mg/dL  --   --   --   --   --  52    < > = values in this interval not displayed.     Medical Nutrition Therapy/Nutrition Education          Learner     Readiness Patient  Education not appropriate at this time     Nutrition Diagnosis         Nutrition Dx Problem 1 Inadequate oral Intake related to inadequate energy Intake as evidenced by unintended wt change. and report of minimal PO intake.     Nutrition Intervention         Regular diet     Monitor/Evaluation        Monitor Pertinent labs, Weight     Nutrition Discharge Plan         No nutrition discharge needs identified at this time     RD to follow up per protocol.    Electronically signed by:  Marilee Christopher, " RD  06/02/22 09:39 EDT

## 2022-06-02 NOTE — SIGNIFICANT NOTE
06/02/22 1400   SOCIAL WORK ASSESSMENT   Referral Source physician   Reason for Consult palliative care   Visit Type ongoing   Coping/Psychosocial   Additional Documentation Spiritual Care (Group)     Pt is resting comfortably with eyes closed. Hospitalist and Intensivist met with daughter and discussed pt's status and living will. Daughter agreed to transition to comfort measures.   Palliative met daughter in room, but she was on phone. Palliative offered support as needed.   CATHY Dixon

## 2022-06-02 NOTE — CONSULTS
Visit made with pt and his daughter at bedside in PCU. At time of visit, pt was not responsive enough to engage in visit. Introductions made with pt's daughter/POA, Shandra, and role explained. 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 Space given for her to share her understanding about how her father is doing and feels that he is comfortable at this time. Dr. Fox visited bedside during visit and updated her on her father's current status. She asked to continue to treat what we can at this time until her brothers are able to visit as well. One of her brothers is local, the other is traveling from [facility]. While she is the POA, she wants to keep her brothers a part of the conversation in regards to the pt's care. Prayed with pt and his daughter at the end of visit according to pt's spiritual tradition.  will continue to support pt and his children as decisions are made in regards to his care.

## 2022-06-03 LAB
BACTERIA SPEC AEROBE CULT: NORMAL
BACTERIA SPEC AEROBE CULT: NORMAL
GALACTOMANNAN AG SPEC IA-ACNC: 0.1 INDEX (ref 0–0.49)

## 2022-06-03 PROCEDURE — 99232 SBSQ HOSP IP/OBS MODERATE 35: CPT | Performed by: INTERNAL MEDICINE

## 2022-06-03 PROCEDURE — 25010000002 LORAZEPAM PER 2 MG: Performed by: INTERNAL MEDICINE

## 2022-06-03 PROCEDURE — 25010000002 MORPHINE PER 10 MG: Performed by: INTERNAL MEDICINE

## 2022-06-03 RX ADMIN — Medication 10 ML: at 12:02

## 2022-06-03 RX ADMIN — MORPHINE SULFATE 2 MG: 2 INJECTION, SOLUTION INTRAMUSCULAR; INTRAVENOUS at 14:00

## 2022-06-03 RX ADMIN — LORAZEPAM 0.5 MG: 2 INJECTION INTRAMUSCULAR; INTRAVENOUS at 22:13

## 2022-06-03 RX ADMIN — Medication 10 ML: at 22:13

## 2022-06-03 NOTE — PLAN OF CARE
Goal Outcome Evaluation:  Plan of Care Reviewed With: patient, family        Progress: no change  Outcome Evaluation: Patient still on comfort measures. Patient was pretty unresponsive to voice and touch today up until 1400. Then when I went into patient's room he was awake and calm. I asked the patient if he was hurting and he said he was a little bit so I gave him some morphine. Family came in around that time as well and patient has kept his eyes open listening to music with them. Patient does not seem anxious or in any distress.

## 2022-06-03 NOTE — PLAN OF CARE
Goal Outcome Evaluation:      Patient rested through the night with no distress noted. Family at bedside to provide comfort.

## 2022-06-03 NOTE — SIGNIFICANT NOTE
06/03/22 0950   Coping/Psychosocial   Additional Documentation Spiritual Care (Group)   Spiritual Care   Spiritual Care Source physician referral   Spiritual Care Visit Type follow-up   Receptivity to Spiritual Care unresponsive   At time of visit pt is on 3NT in CMO.  He appears comfortable.  No family at this time.   will attempt to follow up at a later time.

## 2022-06-03 NOTE — SIGNIFICANT NOTE
"   06/03/22 1600   SOCIAL WORK ASSESSMENT   Referral Source physician   Reason for Consult palliative care   Visit Type ongoing     Pt A & O to self only and following commands. Pt stated \"no ma'am\" when asked if he was in pain. Pt indicated he wanted a drink and daughter provided water via mouth swab. Daughter contacted pt's sons and visited via Facetime. Family would like treatment to resume now that pt is awake. Family is confused about the interpretation of the living will and wants treatment reinitiated.  Provider updated. Risk Management notified. Ethics consult placed.   SW provided copy of living will to sonJordin, and provided patient advocacy information to daughter. Family is concerned pt didn't understand the living will due to intellectual disability and didn't anticipate advancements in technology, since the document is dated 2004.   Family wants to \"advocate for the best care possible\".      Palliative will continue to monitor and provide support.   CATHY Dixon  "

## 2022-06-03 NOTE — PROGRESS NOTES
[facility]   Hospitalist Progress Note  Date: 6/3/2022  Patient Name: Jordin Menon  : 1940  MRN: [id number]  Date of admission: 2022  Consultants:   -Pulmonology/Critical Care: Dr. Kobe Sheets, Dr. Robert Carbajal    Subjective   Subjective     Chief Complaint: Altered mental status, hypothermia, hypoglycemia and bradycardia    Summary:   Jordin Menon is a 82 y.o. male with frequent hospitalizations for sepsis, pneumonia and renal failure who has Alzheimer's dementia who presented to ED from his nursing home due to worsening mental status and hypothermia. Evaluation in ED significant for patient being hypoglycemic, bradycardic and hypothermic. Patient also noted to be lethargic only grimacing to pain but not following commands. Hospitalist service contacted for further evaluation management. Pulmonology/Critical Care consulted. Patient started on empiric antibiotics and levophed. NG tube initially inserted and tube feeds started.  Palliative care consulted.  Discussions were had between Pulmonology/Critical Care service and patient's family and decision has been made to discontinue invasive care measures and pursue comfort care measures only.  However, family at a later time decided that they did not wish to pursue comfort care measures at this time and invasive care measures were resumed.  Based on patient's living will feeding tube was not inserted.  Patient's condition did not improve after use of Wing hugger and resumption of IV antibiotics.  Critical care service contacted and had additional discussion with patient's family, decision then made to again pursue comfort care measures only.     Interval Followup:   No acute events overnight.  Patient appears comfortable.  No as needed medications required over the last 24 hours (2340-1940).  Patient noncommunicative.  Resting comfortably in bed.  Nursing with no additional acute issues to report.    Review of Systems   Unable to obtain secondary to  altered mental status    Objective   Objective     Vitals:      Physical Exam   Gen: Chronically ill-appearing elderly male, lying in bed, appears comfortable, minimally responsive  HEENT: MMM, Atraumatic  Neck: Supple, Trachea midline  Resp: CTAB, diminished breath sounds bilaterally, normal respiratory effort, equal chest rise bilaterally  Card: RRR, No m/r/g  Abd: Soft, Nontender, Nondistended, + bowel sounds  Ext: No cyanosis, No clubbing  Neuro: CN II-XII grossly intact, No focal deficits appreciated  Psych: AAO x 0, lethargic    Result Review    Result Review:  I have personally reviewed the results from the time of this admission to 6/3/2022 13:57 EDT and agree with these findings:  []  Laboratory:   [x]  Microbiology: Blood culture (05/29/2022): No growth to date.  Urine culture (05/30/2022): Yeast isolated  []  Radiology:   []  EKG/Telemetry:   []  Cardiology/Vascular:    []  Pathology:  []  Old records:  []  Other:    Assessment & Plan   Assessment / Plan      Assessment:  Septic shock due to unknown source  HCAP due to unspecified organism versus aspiration pneumonia  Therapeutic drug level monitoring, vancomycin  Toxic/metabolic encephalopathy  Acute renal failure  Hypothermia  Hypernatremia  Hypokalemia  Hypomagnesemia  Hypoglycemia  History of Alzheimer's dementia  GERD  DVT  Hypoalbuminemia  Hypophosphatemia    Plan:  -Continue as needed Haldol, Ativan and morphine for comfort ordered  -Palliative care team following and assisting in care  -Clinical course will dictate further management     DVT Prophylaxis: SCDs   Diet: Pleasure feeds  Dispo: Transfer to regular bed  Code Status: DNR/Comfort Care     Personally reviewed patients labs and imaging, discussed with patient and nurse at bedside.       Part of this note may be an electronic transcription/translation of spoken language to printed text using the Dragon dictation system.    DVT prophylaxis:  Mechanical DVT prophylaxis orders are  present.    CODE STATUS:   Code Status (Patient has no pulse and is not breathing): No CPR (Do Not Attempt to Resuscitate)  Medical Interventions (Patient has pulse or is breathing): Comfort Measures      Electronically signed by Ravinder Fox MD, 06/03/22, 1:57 PM EDT.

## 2022-06-04 LAB — 1,3 BETA GLUCAN SER-MCNC: NORMAL PG/ML

## 2022-06-04 PROCEDURE — 99232 SBSQ HOSP IP/OBS MODERATE 35: CPT | Performed by: INTERNAL MEDICINE

## 2022-06-04 RX ADMIN — Medication 10 ML: at 09:40

## 2022-06-04 NOTE — PROGRESS NOTES
[facility]   Hospitalist Progress Note  Date: 2022  Patient Name: Jordin Menon  : 1940  MRN: [id number]  Date of admission: 2022  Consultants:   -Pulmonology/Critical Care: Dr. Kobe Sheets, Dr. Robert Carbajal    Subjective   Subjective     Chief Complaint: Altered mental status, hypothermia, hypoglycemia and bradycardia    Summary:   Jordin Menon is a 82 y.o. male with frequent hospitalizations for sepsis, pneumonia and renal failure who has Alzheimer's dementia who presented to ED from his nursing home due to worsening mental status and hypothermia. Evaluation in ED significant for patient being hypoglycemic, bradycardic and hypothermic. Patient also noted to be lethargic only grimacing to pain but not following commands. Hospitalist service contacted for further evaluation management. Pulmonology/Critical Care consulted. Patient started on empiric antibiotics and levophed. NG tube initially inserted and tube feeds started.  Palliative care consulted.  Discussions were had between Pulmonology/Critical Care service and patient's family and decision has been made to discontinue invasive care measures and pursue comfort care measures only.  However, family at a later time decided that they did not wish to pursue comfort care measures at this time and invasive care measures were resumed.  Based on patient's living will feeding tube was not inserted.  Patient's condition did not improve after use of Wing hugger and resumption of IV antibiotics.  Critical care service contacted and had additional discussion with patient's family, decision then made to again pursue comfort care measures only.     Interval Followup:   No acute events overnight.  Patient resting currently in bed.  Received as needed IV morphine x1 over the last 24 hours ([id number]).  Nursing with no additional acute issues to report.    Review of Systems   Unable to obtain secondary to altered mental status    Objective   Objective      Vitals:   Temp:  [99.2 °F (37.3 °C)] 99.2 °F (37.3 °C)  Physical Exam   Gen: Chronically ill-appearing elderly male, lying in bed, appears comfortable, minimally responsive  HEENT: MMM, Atraumatic  Neck: Supple, Trachea midline  Resp: CTAB, diminished breath sounds bilaterally, equal chest is bilaterally, no increased work of breathing  Card: RRR, No m/r/g  Abd: Soft, Nontender, Nondistended, + bowel sounds  Ext: No cyanosis, No clubbing  Neuro: CN II-XII grossly intact, No focal deficits appreciated  Psych: AAO x 1 (self), lethargic    Result Review    Result Review:  I have personally reviewed the results from the time of this admission to 6/4/2022 14:36 EDT and agree with these findings:  []  Laboratory:   []  Microbiology:   []  Radiology:   []  EKG/Telemetry:   []  Cardiology/Vascular:    []  Pathology:  []  Old records:  []  Other:    Assessment & Plan   Assessment / Plan      Assessment:  Septic shock due to unknown source  HCAP due to unspecified organism versus aspiration pneumonia  Therapeutic drug level monitoring, vancomycin  Toxic/metabolic encephalopathy  Acute renal failure  Hypothermia  Hypernatremia  Hypokalemia  Hypomagnesemia  Hypoglycemia  History of Alzheimer's dementia  GERD  DVT  Hypoalbuminemia  Hypophosphatemia    Plan:  -Continue as needed Haldol, Ativan and morphine for comfort ordered  -Palliative care team following and assisting in care  -Clinical course will dictate further management     DVT Prophylaxis: SCDs   Diet: Pleasure feeds  Dispo: Possible hospice at nursing facility  Code Status: DNR/Comfort Care     Personally reviewed patients labs and imaging, discussed with patient and nurse at bedside.       Part of this note may be an electronic transcription/translation of spoken language to printed text using the Dragon dictation system.    DVT prophylaxis:  Mechanical DVT prophylaxis orders are present.    CODE STATUS:   Code Status (Patient has no pulse and is not breathing):  No CPR (Do Not Attempt to Resuscitate)  Medical Interventions (Patient has pulse or is breathing): Comfort Measures    Electronically signed by Ravinder Fox MD, 06/04/22, 2:37 PM EDT.

## 2022-06-04 NOTE — PLAN OF CARE
Goal Outcome Evaluation:  Plan of Care Reviewed With: family        Progress: no change   On comfort care. Alert to self only, denies any pain. Gave ativan once this shift.

## 2022-06-05 PROCEDURE — 99231 SBSQ HOSP IP/OBS SF/LOW 25: CPT | Performed by: INTERNAL MEDICINE

## 2022-06-05 NOTE — PLAN OF CARE
Goal Outcome Evaluation:  Plan of Care Reviewed With: family        Progress: no change   Pt resting well this shift with family at bedside. Pt denies any pain and looks to be comfortable. Not had to medicate this shift.

## 2022-06-05 NOTE — PROGRESS NOTES
[facility]   Hospitalist Progress Note  Date: 2022  Patient Name: Jordin Menon  : 1940  MRN: [id number]  Date of admission: 2022  Consultants:   -Pulmonology/Critical Care: Dr. Kobe Sheets, Dr. Robert Carbajal    Subjective   Subjective     Chief Complaint: Altered mental status, hypothermia, hypoglycemia and bradycardia    Summary:   Jordin Menon is a 82 y.o. male with frequent hospitalizations for sepsis, pneumonia and renal failure who has Alzheimer's dementia who presented to ED from his nursing home due to worsening mental status and hypothermia. Evaluation in ED significant for patient being hypoglycemic, bradycardic and hypothermic. Patient also noted to be lethargic only grimacing to pain but not following commands. Hospitalist service contacted for further evaluation management. Pulmonology/Critical Care consulted. Patient started on empiric antibiotics and levophed. NG tube initially inserted and tube feeds started.  Palliative care consulted.  Discussions were had between Pulmonology/Critical Care service and patient's family and decision has been made to discontinue invasive care measures and pursue comfort care measures only.  However, family at a later time decided that they did not wish to pursue comfort care measures at this time and invasive care measures were resumed.  Based on patient's living will feeding tube was not inserted.  Patient's condition did not improve after use of Wing hugger and resumption of IV antibiotics.  Critical care service contacted and had additional discussion with patient's family, decision then made to again pursue comfort care measures only.     Interval Followup:   No acute events overnight.  Patient appears comfortable in bed.  Patient rested well overnight per staff.  Nursing with no additional acute issues to report.    Review of Systems   Unable to obtain secondary to altered mental status    Objective   Objective     Vitals:      Physical  Exam   Gen: Chronically ill-appearing elderly male, lying in bed, appears comfortable, minimally responsive  HEENT: MMM, Atraumatic  Neck: Supple, Trachea midline  Resp: CTAB, diminished breath sounds bilaterally, normal respiratory effort, equal chest rise bilaterally  Card: RRR, No m/r/g  Abd: Soft, Nontender, Nondistended, + bowel sounds  Ext: No cyanosis, No clubbing  Neuro: CN II-XII grossly intact, No focal deficits appreciated  Psych: AAO x 1 (self), lethargic    Result Review    Result Review:  I have personally reviewed the results from the time of this admission to 6/5/2022 13:22 EDT and agree with these findings:  []  Laboratory:   []  Microbiology:   []  Radiology:   []  EKG/Telemetry:   []  Cardiology/Vascular:    []  Pathology:  []  Old records:  []  Other:    Assessment & Plan   Assessment / Plan      Assessment:  Septic shock due to unknown source  HCAP due to unspecified organism versus aspiration pneumonia  Therapeutic drug level monitoring, vancomycin  Toxic/metabolic encephalopathy  Acute renal failure  Hypothermia  Hypernatremia  Hypokalemia  Hypomagnesemia  Hypoglycemia  History of Alzheimer's dementia  GERD  DVT  Hypoalbuminemia  Hypophosphatemia    Plan:  -Continue current as needed comfort medications Haldol, Ativan and morphine  -Palliative care team following and assisting in care  -Clinical course will dictate further management     DVT Prophylaxis: SCDs   Diet: Pleasure feeds  Dispo: Possible hospice at nursing facility  Code Status: DNR/Comfort Care     Personally reviewed patients labs and imaging, discussed with patient and nurse at bedside.       Part of this note may be an electronic transcription/translation of spoken language to printed text using the Dragon dictation system.    DVT prophylaxis:  Mechanical DVT prophylaxis orders are present.    CODE STATUS:   Code Status (Patient has no pulse and is not breathing): No CPR (Do Not Attempt to Resuscitate)  Medical Interventions  (Patient has pulse or is breathing): Comfort Measures    Electronically signed by Ravinder Fox MD, 06/05/22, 1:23 PM EDT.

## 2022-06-05 NOTE — PLAN OF CARE
Goal Outcome Evaluation:  Plan of Care Reviewed With: family         Pt continues with comfort care. He has been mostly unresponsive this shift. No indication of pain or anxiety this shift. Pt did open eyes during bed bath but no communication or response to questions. Currently resting peacefully.

## 2022-06-06 LAB
ANION GAP SERPL CALCULATED.3IONS-SCNC: 14.3 MMOL/L (ref 5–15)
ANISOCYTOSIS BLD QL: NORMAL
BASOPHILS # BLD AUTO: 0.03 10*3/MM3 (ref 0–0.2)
BASOPHILS NFR BLD AUTO: 0.5 % (ref 0–1.5)
BUN SERPL-MCNC: 43 MG/DL (ref 8–23)
BUN/CREAT SERPL: 19.2 (ref 7–25)
CALCIUM SPEC-SCNC: 8.9 MG/DL (ref 8.6–10.5)
CHLORIDE SERPL-SCNC: 122 MMOL/L (ref 98–107)
CO2 SERPL-SCNC: 17.7 MMOL/L (ref 22–29)
CREAT SERPL-MCNC: 2.24 MG/DL (ref 0.76–1.27)
DEPRECATED RDW RBC AUTO: 41.8 FL (ref 37–54)
EGFRCR SERPLBLD CKD-EPI 2021: 28.7 ML/MIN/1.73
EOSINOPHIL # BLD AUTO: 0.04 10*3/MM3 (ref 0–0.4)
EOSINOPHIL NFR BLD AUTO: 0.6 % (ref 0.3–6.2)
ERYTHROCYTE [DISTWIDTH] IN BLOOD BY AUTOMATED COUNT: 14.9 % (ref 12.3–15.4)
GLUCOSE SERPL-MCNC: 81 MG/DL (ref 65–99)
HCT VFR BLD AUTO: 23.5 % (ref 37.5–51)
HGB BLD-MCNC: 8.5 G/DL (ref 13–17.7)
HYPOCHROMIA BLD QL: NORMAL
IMM GRANULOCYTES # BLD AUTO: 0.32 10*3/MM3 (ref 0–0.05)
IMM GRANULOCYTES NFR BLD AUTO: 5 % (ref 0–0.5)
LYMPHOCYTES # BLD AUTO: 1.52 10*3/MM3 (ref 0.7–3.1)
LYMPHOCYTES NFR BLD AUTO: 23.6 % (ref 19.6–45.3)
MAGNESIUM SERPL-MCNC: 1.9 MG/DL (ref 1.6–2.4)
MCH RBC QN AUTO: 28.4 PG (ref 26.6–33)
MCHC RBC AUTO-ENTMCNC: 36.2 G/DL (ref 31.5–35.7)
MCV RBC AUTO: 78.6 FL (ref 79–97)
MICROCYTES BLD QL: NORMAL
MONOCYTES # BLD AUTO: 0.53 10*3/MM3 (ref 0.1–0.9)
MONOCYTES NFR BLD AUTO: 8.2 % (ref 5–12)
NEUTROPHILS NFR BLD AUTO: 4 10*3/MM3 (ref 1.7–7)
NEUTROPHILS NFR BLD AUTO: 62.1 % (ref 42.7–76)
NRBC BLD AUTO-RTO: 0.5 /100 WBC (ref 0–0.2)
PHOSPHATE SERPL-MCNC: 2.8 MG/DL (ref 2.5–4.5)
PLATELET # BLD AUTO: 48 10*3/MM3 (ref 140–450)
PMV BLD AUTO: 9.7 FL (ref 6–12)
POTASSIUM SERPL-SCNC: 4.7 MMOL/L (ref 3.5–5.2)
RBC # BLD AUTO: 2.99 10*6/MM3 (ref 4.14–5.8)
SMALL PLATELETS BLD QL SMEAR: NORMAL
SODIUM SERPL-SCNC: 154 MMOL/L (ref 136–145)
TARGETS BLD QL SMEAR: NORMAL
WBC MORPH BLD: NORMAL
WBC NRBC COR # BLD: 6.44 10*3/MM3 (ref 3.4–10.8)

## 2022-06-06 PROCEDURE — 85007 BL SMEAR W/DIFF WBC COUNT: CPT | Performed by: INTERNAL MEDICINE

## 2022-06-06 PROCEDURE — 99233 SBSQ HOSP IP/OBS HIGH 50: CPT | Performed by: INTERNAL MEDICINE

## 2022-06-06 PROCEDURE — 84100 ASSAY OF PHOSPHORUS: CPT | Performed by: INTERNAL MEDICINE

## 2022-06-06 PROCEDURE — 85025 COMPLETE CBC W/AUTO DIFF WBC: CPT | Performed by: INTERNAL MEDICINE

## 2022-06-06 PROCEDURE — 83735 ASSAY OF MAGNESIUM: CPT | Performed by: INTERNAL MEDICINE

## 2022-06-06 PROCEDURE — 80048 BASIC METABOLIC PNL TOTAL CA: CPT | Performed by: INTERNAL MEDICINE

## 2022-06-06 RX ORDER — SODIUM CHLORIDE, SODIUM LACTATE, POTASSIUM CHLORIDE, CALCIUM CHLORIDE 600; 310; 30; 20 MG/100ML; MG/100ML; MG/100ML; MG/100ML
50 INJECTION, SOLUTION INTRAVENOUS CONTINUOUS
Status: ACTIVE | OUTPATIENT
Start: 2022-06-06 | End: 2022-06-07

## 2022-06-06 RX ADMIN — Medication 10 ML: at 08:30

## 2022-06-06 RX ADMIN — Medication 10 ML: at 21:40

## 2022-06-06 RX ADMIN — SODIUM CHLORIDE, POTASSIUM CHLORIDE, SODIUM LACTATE AND CALCIUM CHLORIDE 50 ML/HR: 600; 310; 30; 20 INJECTION, SOLUTION INTRAVENOUS at 22:29

## 2022-06-06 NOTE — SIGNIFICANT NOTE
06/06/22 1054   Coping/Psychosocial   Additional Documentation Spiritual Care (Group)   Spiritual Care   Spiritual Care Source  initiative  (daily rounding)   Spiritual Care Follow-Up will follow closely   Spiritual Care Visit Type follow-up   Spiritual Care Request family support;spiritual/moral support   Receptivity to Spiritual Care other (see comments)  (pt sitting up in bed with eyes open. he is alert and oriented to self)   At time of visit pt is on 3NT.

## 2022-06-06 NOTE — PLAN OF CARE
Goal Outcome Evaluation:         Patient was more responsive overnight, he had some coffee to drink and tried to have BM but was unable to. He didn't complain of pain or discomfort.

## 2022-06-06 NOTE — CONSULTS
Nutrition Services    Patient Name:  Jordin Menon  YOB: 1940  MRN: [id number]  Admit Date:  5/29/2022    Cortrak placement unsuccessful. Pt refused placement multiple times. Recommend PEG placement if pt and family wish to pursue long term nutrition support.     Electronically signed by:  Haritha Clarke RD  06/06/22 15:55 EDT

## 2022-06-06 NOTE — NURSING NOTE
PCA had attempted several time to assist pt with dinner meal. Pt repeatedly refuses. Has not made attempt to feed self.

## 2022-06-06 NOTE — SIGNIFICANT NOTE
"   06/06/22 1000   SOCIAL WORK ASSESSMENT   Referral Source physician   Reason for Consult palliative care   Visit Type ongoing       Assessment: Pt presented as awake and O x1. Pt asked for a coffee, which SW located at bedside table in front of pt. SW offered to assist pt in drinking coffee and he declined. Pt reported \"Yuni' got it\". Pt did not make an attempt to drink coffee. Pt denied pain. RN reported no issues.     [facility] emailed a more recent living will (2014) to palliative this morning. This living will allows HCS to make decisions regarding pt's treatment. [facility], along with brother Aristides, requested a cortrak be placed in the hopes it will improve pt's appetite. Provider and RN notified. Comfort orders discontinued.   Nutrition consult placed.      Tasks Completed: Sent new living will to medical records.    Other Comments: Palliative will continue to monitor and provide support.     Update: Copy of living will also placed in pt's chart on floor.     CATHY Dixon      "

## 2022-06-06 NOTE — PLAN OF CARE
Goal Outcome Evaluation:  Plan of Care Reviewed With: daughter        Progress: no change    VSS. Pt remains alert to self only. No complaints of pain or discomfort this shift when asked.  Pt was changed to from comfort measures to DNR per new living will provided by family. Family wanted [facility] place. However, upon the placement patient adamantly refused placement and stated that he can eat on his own. However,  he has made no attempt to do so and he also refuses staff to feed him stating the same reason he can do it on his own. I have contacted the POA for an update on this and she stated that she would like to speak with her brother. I had mentioned that a PEG tube maybe a better fit for the patient but she declined that idea at this time. No other concerns or issues for patient at this time. He is resting in bed comfortably.

## 2022-06-06 NOTE — PLAN OF CARE
Goal Outcome Evaluation:  Pt is now comfort measures only. RD will sign off at this time. Consult if nutrition services are required.

## 2022-06-06 NOTE — CONSULTS
"Nutrition Services    Patient Name: Jordin Menon  YOB: 1940  MRN: [id number]  Admission date: 5/29/2022      Clinical Nutrition Assessment      Reason for Assessment: Physician consult, EN       Clinical Course/Narrative: Pt family requesting cortrak placement. RD consulted to manage enteral nutrition. Recommendations below.             Past Medical History:   Diagnosis Date   • Alzheimer's dementia with behavioral disturbance (HCC)    • Anemia, vitamin B12 deficiency    • Anxiety disorder due to known physiological condition    • Arthritis    • Constipation    • Gastroesophageal reflux disease without esophagitis    • High blood pressure    • HTN (hypertension)    • Hyperlipidemia    • Ingrowing toenail 09/21/2018   • Left foot pain 09/21/2018   • Osteoarthritis of right knee 08/22/2016   • Right foot pain 09/21/2018   • Sleep apnea    • Tinea unguium 09/21/2018       History reviewed. No pertinent surgical history.       Nutrition Intervention:        PO Diet/Supplements: Diet Regular   Orders Placed This Encounter      Place Feeding Tube Per Cortrak System         Intake/Output:   Intake/Output Summary (Last 24 hours) at 6/6/2022 1444  Last data filed at 6/6/2022 0700  Gross per 24 hour   Intake --   Output 250 ml   Net -250 ml            Height: Height: 182.9 cm (72\")   Weight: Weight: 78.4 kg (172 lb 13.5 oz) (06/02/22 9365)   BMI: Body mass index is 23.44 kg/m².     Estimated/Assessed Needs       Energy Requirements ~30 kcal/kg   EST Needs (kcal/day) 2022       Protein Requirements 1.2-1.5 g/kg   EST Daily Needs (g/day)        Fluid Requirements 1 ml/kcal    Estimated Needs (mL/day) 2022     Results from last 7 days   Lab Units 06/02/22  0614 05/31/22  0553   SODIUM mmol/L 149* 144   POTASSIUM mmol/L 3.5 4.0   CHLORIDE mmol/L 118* 115*   CO2 mmol/L 22.8 18.4*   BUN mg/dL 34* 28*   CREATININE mg/dL 1.64* 1.72*   CALCIUM mg/dL 8.3* 7.7*   BILIRUBIN mg/dL  --  0.4   ALK PHOS U/L  --  98   ALT " (SGPT) U/L  --  13   AST (SGOT) U/L  --  10   GLUCOSE mg/dL 89 137*     Results from last 7 days   Lab Units 06/02/22  0614 05/31/22  0553   MAGNESIUM mg/dL 1.7 1.7   PHOSPHORUS mg/dL 2.8 3.2   HEMOGLOBIN g/dL 8.2* 9.4*   HEMATOCRIT % 22.3* 26.0*     Lab Results   Component Value Date    HGBA1C 4.90 05/30/2022       Malnutrition Severity Assessment      Patient meets criteria for : Severe Malnutrition         Medical Nutrition Therapy/Nutrition Education          Learner     Readiness Patient  Education not appropriate at this time     Method     Response Other  Other     Nutrition Diagnosis         Nutrition Dx Problem 1 Severe malnutrition related to inadequate energy Intake as evidenced by unintended wt change. and report of minimal PO intake.       Nutrition Intervention         Fibersource HN @ 70 ml/hr  Free water flushes  100 ml Q3H  Provides 2016 kcal, 91 g pro, 2161 ml fluids     Monitor/Evaluation        Monitor Per protocol, Pertinent labs, EN delivery/tolerance, Symptoms     Nutrition Discharge Plan         To be determined       RD to follow up per protocol.    Electronically signed by:  Suzette Albrecht RD  06/06/22 14:44 EDT

## 2022-06-06 NOTE — PROGRESS NOTES
[facility]   Hospitalist Progress Note  Date: 2022  Patient Name: Jordin Menon  : 1940  MRN: [id number]  Date of admission: 2022  Consultants:   -Pulmonology/Critical Care: Dr. Kobe Sheets, Dr. Robert Carbajal    Subjective   Subjective     Chief Complaint: Altered mental status, hypothermia, hypoglycemia and bradycardia    Summary:   Jordin Menon is a 82 y.o. male with frequent hospitalizations for sepsis, pneumonia and renal failure who has Alzheimer's dementia who presented to ED from his nursing home due to worsening mental status and hypothermia. Evaluation in ED significant for patient being hypoglycemic, bradycardic and hypothermic. Patient also noted to be lethargic only grimacing to pain but not following commands. Hospitalist service contacted for further evaluation management. Pulmonology/Critical Care consulted. Patient started on empiric antibiotics and levophed. NG tube initially inserted and tube feeds started.  Palliative care consulted.  Discussions were had between Pulmonology/Critical Care service and patient's family and decision has been made to discontinue invasive care measures and pursue comfort care measures only.  However, family at a later time decided that they did not wish to pursue comfort care measures at this time and invasive care measures were resumed.  Based on patient's living will feeding tube was not inserted.  Patient's condition did not improve after use of Wing hugger and resumption of IV antibiotics.  Critical care service contacted and had additional discussion with patient's family, decision then made to again pursue comfort care measures only.     Interval Followup:   No acute events overnight.  Nursing noted that overnight patient was more responsive and did have some coffee and attempted a bowel movement but was unable to.  Patient alert this morning lying in bed remains confused.  Denies any pain.  Nursing with no additional acute issues to  report.    Review of Systems   Unable to obtain secondary to altered mental status    Objective   Objective     Vitals:   Temp:  [97.2 °F (36.2 °C)-98.8 °F (37.1 °C)] 97.2 °F (36.2 °C)  Heart Rate:  [] 64  Resp:  [16] 16  BP: (102-128)/(49-59) 128/59  Physical Exam   Gen: Chronically ill-appearing elderly male, lying in bed, appears comfortable, alert  HEENT: MMM, Atraumatic  Neck: Supple, Trachea midline  Resp: CTAB, diminished breath sounds bilaterally, equal chest rise bilaterally, no increased work of breathing  Card: RRR, No m/r/g  Abd: Soft, Nontender, Nondistended, + bowel sounds  Ext: No cyanosis, No clubbing  Neuro: CN II-XII grossly intact, No focal deficits appreciated  Psych: AAO x 1 (self), lethargic    Result Review    Result Review:  I have personally reviewed the results from the time of this admission to 6/6/2022 09:46 EDT and agree with these findings:  []  Laboratory:   []  Microbiology:   []  Radiology:   []  EKG/Telemetry:   []  Cardiology/Vascular:    []  Pathology:  []  Old records:  []  Other:    Assessment & Plan   Assessment / Plan      Assessment:  Septic shock due to unknown source  HCAP due to unspecified organism versus aspiration pneumonia  Therapeutic drug level monitoring, vancomycin  Toxic/metabolic encephalopathy  Acute renal failure  Hypothermia  Hypernatremia  Hypokalemia  Hypomagnesemia  Hypoglycemia  History of Alzheimer's dementia  GERD  DVT  Hypoalbuminemia  Hypophosphatemia    Plan:  -No changes to current as needed comfort medications Haldol, Ativan and morphine  -Palliative care team following and assisting in care  -Clinical course will dictate further management     DVT Prophylaxis: SCDs   Diet: Pleasure feeds  Dispo: Possible hospice at nursing facility  Code Status: DNR/Comfort Care     Personally reviewed patients labs and imaging, discussed with patient and nurse at bedside.       Part of this note may be an electronic transcription/translation of spoken  language to printed text using the Dragon dictation system.    DVT prophylaxis:  Mechanical DVT prophylaxis orders are present.    CODE STATUS:   Code Status (Patient has no pulse and is not breathing): No CPR (Do Not Attempt to Resuscitate)  Medical Interventions (Patient has pulse or is breathing): Comfort Measures    Electronically signed by Ravinder Fox MD, 06/06/22, 9:46 AM EDT.          Addendum:  -After completing rounds and evaluating patient for the day spoke with palliative care service who produced a new living will document that the family provided today (06/06/2022).  After review of this document it appears that healthcare decisions were given to patient's healthcare surrogate, Shandra Menon.  At this time patient will no longer be comfort care measures only, CODE STATUS will be changed.  Patient's vital signs have been stable.  Labs ordered to evaluate if any abnormalities noted. Will discuss with family regarding plan of care and possibility of having patient evaluated for placement of feeding tube.    Electronically signed by Raivnder Fox MD, 06/06/22, 10:10 AM EDT.

## 2022-06-07 LAB
ANION GAP SERPL CALCULATED.3IONS-SCNC: 15.5 MMOL/L (ref 5–15)
BUN SERPL-MCNC: 41 MG/DL (ref 8–23)
BUN/CREAT SERPL: 19.7 (ref 7–25)
CALCIUM SPEC-SCNC: 8.9 MG/DL (ref 8.6–10.5)
CHLORIDE SERPL-SCNC: 121 MMOL/L (ref 98–107)
CO2 SERPL-SCNC: 15.5 MMOL/L (ref 22–29)
CREAT SERPL-MCNC: 2.08 MG/DL (ref 0.76–1.27)
DEPRECATED RDW RBC AUTO: 51.8 FL (ref 37–54)
EGFRCR SERPLBLD CKD-EPI 2021: 31.4 ML/MIN/1.73
ERYTHROCYTE [DISTWIDTH] IN BLOOD BY AUTOMATED COUNT: 17.2 % (ref 12.3–15.4)
GLUCOSE SERPL-MCNC: 90 MG/DL (ref 65–99)
HCT VFR BLD AUTO: 27.4 % (ref 37.5–51)
HGB BLD-MCNC: 8.8 G/DL (ref 13–17.7)
MAGNESIUM SERPL-MCNC: 1.8 MG/DL (ref 1.6–2.4)
MCH RBC QN AUTO: 28.2 PG (ref 26.6–33)
MCHC RBC AUTO-ENTMCNC: 32.1 G/DL (ref 31.5–35.7)
MCV RBC AUTO: 87.8 FL (ref 79–97)
PHOSPHATE SERPL-MCNC: 2.8 MG/DL (ref 2.5–4.5)
PLATELET # BLD AUTO: 86 10*3/MM3 (ref 140–450)
PMV BLD AUTO: 12 FL (ref 6–12)
POTASSIUM SERPL-SCNC: 4.1 MMOL/L (ref 3.5–5.2)
RBC # BLD AUTO: 3.12 10*6/MM3 (ref 4.14–5.8)
SODIUM SERPL-SCNC: 152 MMOL/L (ref 136–145)
WBC NRBC COR # BLD: 5.29 10*3/MM3 (ref 3.4–10.8)

## 2022-06-07 PROCEDURE — 84100 ASSAY OF PHOSPHORUS: CPT | Performed by: INTERNAL MEDICINE

## 2022-06-07 PROCEDURE — 80048 BASIC METABOLIC PNL TOTAL CA: CPT | Performed by: INTERNAL MEDICINE

## 2022-06-07 PROCEDURE — 99233 SBSQ HOSP IP/OBS HIGH 50: CPT | Performed by: INTERNAL MEDICINE

## 2022-06-07 PROCEDURE — 83735 ASSAY OF MAGNESIUM: CPT | Performed by: INTERNAL MEDICINE

## 2022-06-07 PROCEDURE — 92610 EVALUATE SWALLOWING FUNCTION: CPT

## 2022-06-07 PROCEDURE — 97161 PT EVAL LOW COMPLEX 20 MIN: CPT

## 2022-06-07 PROCEDURE — 97165 OT EVAL LOW COMPLEX 30 MIN: CPT

## 2022-06-07 PROCEDURE — 85027 COMPLETE CBC AUTOMATED: CPT | Performed by: INTERNAL MEDICINE

## 2022-06-07 RX ORDER — DEXTROSE AND SODIUM CHLORIDE 5; .45 G/100ML; G/100ML
125 INJECTION, SOLUTION INTRAVENOUS CONTINUOUS
Status: DISCONTINUED | OUTPATIENT
Start: 2022-06-07 | End: 2022-06-08

## 2022-06-07 RX ADMIN — Medication 10 ML: at 08:00

## 2022-06-07 RX ADMIN — DEXTROSE AND SODIUM CHLORIDE 125 ML/HR: 5; 450 INJECTION, SOLUTION INTRAVENOUS at 16:59

## 2022-06-07 NOTE — PROGRESS NOTES
[facility]   Hospitalist Progress Note  Date: 2022  Patient Name: Jordin Menon  : 1940  MRN: [id number]  Date of admission: 2022  Consultants:   -Pulmonology/Critical Care: Dr. Kobe Sheets, Dr. Robert Carbajal    Subjective   Subjective     Chief Complaint: Altered mental status, hypothermia, hypoglycemia and bradycardia    Summary:   Jordin Menon is a 82 y.o. male with frequent hospitalizations for sepsis, pneumonia and renal failure who has Alzheimer's dementia who presented to ED from his nursing home due to worsening mental status and hypothermia. Evaluation in ED significant for patient being hypoglycemic, bradycardic and hypothermic. Patient also noted to be lethargic only grimacing to pain but not following commands. Hospitalist service contacted for further evaluation management. Pulmonology/Critical Care consulted. Patient started on empiric antibiotics and levophed. NG tube initially inserted and tube feeds started.  Palliative care consulted.  Discussions were had between Pulmonology/Critical Care service and patient's family and decision has been made to discontinue invasive care measures and pursue comfort care measures only.  However, family at a later time decided that they did not wish to pursue comfort care measures at this time and invasive care measures were resumed.  Based on patient's living will feeding tube was not inserted.  Patient's condition did not improve after use of Wing hugger and resumption of IV antibiotics.  Critical care service contacted and had additional discussion with patient's family, decision then made to again pursue comfort care measures only.  On the evening of  family changed goals to full measures again by continue DNR.     Interval Followup:   No acute events overnight.  Core track attempted but patient declined this intervention, patient also refused morning labs however later they were able to obtain these.  I had a long discussion with the  "patient's son Jordin over the phone today in regards to the overall picture of his father's health and to determine family's goals.  Major focus currently is on nutrition.  The patient does not desire intake however family desires for him to have artificial nutrition.  Discussed risks and benefits of NG tube versus PEG tube placement, discussed possibility of PEG tube being dislodged, risks of aspiration and poor data supporting feeding tube in his patient population.  Son request evaluation by GI for PEG tube placement.    Review of Systems   Unable to obtain secondary to altered mental status, to anything asked the patient responds \"I will be all right\"    Objective   Objective     Vitals:   Temp:  [97.3 °F (36.3 °C)-97.8 °F (36.6 °C)] 97.4 °F (36.3 °C)  Heart Rate:  [65-91] 65  Resp:  [16-18] 16  BP: (120-148)/(65-86) 120/71  Physical Exam   Gen: NAD, WDWN elderly male lying in bed comfortably  HEENT: NCAT, mmm  Resp: CTAB no wrr, no dyspnea  CV: RRR no mrg, trace LE edema  GI: Abdomen soft NT, ND +bs  Psych: AOx 1, self, normal mood and affect    Result Review    Result Review:  I have personally reviewed the results from the time of this admission to 6/7/2022 14:13 EDT and agree with these findings:  [x]  Laboratory:    Sodium 152  Bicarb 15  Creatinine 2  White count 5  Hemoglobin 8.8  Platelets 86  [x]  Microbiology: Positive MRSA nare  [x]  Radiology: CT head without contrast: No acute intracranial abnormality  []  EKG/Telemetry:   []  Cardiology/Vascular:    []  Pathology:  []  Old records:  []  Other:    Assessment & Plan   Assessment / Plan      Assessment:  Septic shock due to unknown source  HCAP due to unspecified organism versus aspiration pneumonia  Therapeutic drug level monitoring, vancomycin  Toxic/metabolic encephalopathy  Acute renal failure  Hypothermia  Hypernatremia  Hypokalemia  Hypomagnesemia  Hypoglycemia  History of Alzheimer's " dementia  GERD  DVT  Hypoalbuminemia  Hypophosphatemia    Plan:  GI consulted for evaluation of possible PEG tube placement  Reinitiating IV fluids given goal change, monitor sodium and renal function as well as bicarb with return of IV fluids: D5 half-normal  Thrombocytopenia improving  Discontinue comfort medications including morphine Ativan and Haldol  Reevaluate medication list tomorrow, start resuming meds when access is obtained versus when patient agreeable to swallowing meds  Holding Eliquis and aspirin at this time    DVT Prophylaxis: SCDs   Diet: Puréed nectar thick, n.p.o. after midnight  Plan discussed with bedside RN, palliative care RN and gastroenterologist

## 2022-06-07 NOTE — SIGNIFICANT NOTE
06/07/22 1000   SOCIAL WORK ASSESSMENT   Referral Source physician   Reason for Consult palliative care   Visit Type ongoing     Contacted HCS about goals of care. Daughter wants to exhaust all attempts to provide pt a diet before considering a PEG. Daughter acknowledges pt's dietary issues may not be curable, but wants to try everything temporary to fix the issue first. Daughter is awaiting speech and hospitalist's evaluation before making any decisions. RN and provider updated.   CATHY Dixon

## 2022-06-07 NOTE — THERAPY EVALUATION
Acute Care - Speech Language Pathology   Swallow Initial Evaluation KASSI Davis     Patient Name: Jordin Menon  : 1940  MRN: [id number]  Today's Date: 2022               Admit Date: 2022    Visit Dx:     ICD-10-CM ICD-9-CM   1. Hypothermia, initial encounter  T68.XXXA 991.6   2. Dehydration  E86.0 276.51   3. Hypernatremia  E87.0 276.0   4. Lethargic  R53.83 780.79   5. Decreased activities of daily living (ADL)  Z78.9 V49.89   6. Oropharyngeal dysphagia  R13.12 787.22     Patient Active Problem List   Diagnosis   • Primary osteoarthritis of right knee   • Status post total right knee replacement   • Essential hypertension   • Shortness of breath   • ARF (acute renal failure) (Formerly [facility])   • Hypokalemia   • Hypernatremia   • Symptomatic bradycardia   • Hypothermia, initial encounter   • Acute CVA (cerebrovascular accident) (Formerly [facility])   • Severe sepsis (Formerly [facility])   • Severe malnutrition (Formerly [facility])     Past Medical History:   Diagnosis Date   • Alzheimer's dementia with behavioral disturbance (Formerly [facility])    • Anemia, vitamin B12 deficiency    • Anxiety disorder due to known physiological condition    • Arthritis    • Constipation    • Gastroesophageal reflux disease without esophagitis    • High blood pressure    • HTN (hypertension)    • Hyperlipidemia    • Ingrowing toenail 2018   • Left foot pain 2018   • Osteoarthritis of right knee 2016   • Right foot pain 2018   • Sleep apnea    • Tinea unguium 2018     History reviewed. No pertinent surgical history.      Inpatient Speech Pathology Dysphagia Evaluation        PAIN SCALE: None indicated    PRECAUTIONS/CONTRAINDICATIONS: Standard, fall    SUSPECTED ABUSE/NEGLECT/EXPLOITATION: None identified    SOCIAL/PSYCHOLOGICAL NEEDS/BARRIERS: None identified    PAST SOCIAL HISTORY: 82-year-old male, nursing home resident    PRIOR FUNCTION: Poor p.o. intake    PATIENT GOALS/EXPECTATIONS: Did not state or indicate    HISTORY: Patient is an 82-year-old male  referred for speech pathology dysphagia evaluation for assessment of swallow function and determine least restrictive diet.  Patient initially admitted on 5/29/2022.  Family initially made him comfort measures however then switched to full support.  He has had little to no p.o. intake since admit.  Initially having NG tube however this was removed when family decided on comfort measures.  Attempted cortrak placement however unsuccessful.  Palliative care services are involved however at this time report family wishes to continue full support interventions.  Speech therapy has evaluated and treated this patient for dysphagia during several hospital admissions.  Previous diet recommendations have been purée solids with nectar thickened liquids.    CURRENT DIET LEVEL: Regular    OBJECTIVE:    TEST ADMINISTERED: Clinical dysphagia evaluation    COGNITION/SAFETY AWARENESS: Impaired    BEHAVIORAL OBSERVATIONS: Patient is awake, requires significant amount of cues and encouragement for participation    ORAL MOTOR EXAM: Left-sided facial droop, dry oral mucosa, edentulous    VOICE QUALITY: Clear vocal quality however garbled speech    REFLEX EXAM: Deferred    POSTURE: Full assistance    FEEDING/SWALLOWING FUNCTION: Assessed with thin liquids, nectar thick liquids and purée solids    CLINICAL OBSERVATIONS: Patient requiring significant encouragement and max cues for participation.  Initially refusing all attempts at p.o. intake despite verbally agreeing.  Patient eventually agreeing for single sips of thin liquid via straw only.  Anterior spillage observed with delayed swallow with wet vocal quality.  Nectar thick liquid by spoon with max encouragement/cueing.  Patient eventually taking small amount of nectar thick liquid via spoon.  Holding in mouth with swallow completed greater than 10 seconds.  Laryngeal sounds and vocal quality were clear.  Purée solids with patient initially refusing.  Eventually agreeing to very small  amount of purée applesauce via spoon.  Holding in mouth.  Swallow was completed however greater than 20 seconds.  Double swallow observed.  Laryngeal sounds clear to auscultation.  Patient did exhibit signs of aspiration with thin liquid trials.  Did not demonstrate any overt signs of aspiration with nectar thick by spoon or purée however is considered at risk of aspiration due to delayed swallow.  Silent aspiration cannot be ruled out at bedside.    DYSPHAGIA CRITERIA: Risk of aspiration, history of dysphagia    FUNCTIONAL ASSESSMENT INSTRUMENT: Patient currently scored a level 3 of 7 on Functional Communication Measures for swallowing indicating a 60-79% limitation in function.    ASSESSMENT/ PLAN OF CARE:  Patient is not appropriate for further skilled speech pathology services.  He is refusing most p.o. intake, exhibiting swallow function at his baseline level.    PROBLEMS:  1.  na   LTG 1: na   STG 1a: na     RECOMMENDATIONS:   1.   DIET: Puréed solids, nectar thick liquids by spoon.  Will likely need long-term nutritional support (if family continues to want full support).    2.  POSITION: Fully upright for any p.o. intake, 30 minutes following    3.  COMPENSATORY STRATEGIES: Small bites and small sips, liquids via spoon, check swallow each bite    Pt/responsible party agrees with plan of care and has been informed of all alternatives, risks and benefits.  SLP Recommendation and Plan                          Anticipated Discharge Disposition (SLP): other (see comments) (appropriate for hospice care/comfort care) (06/07/22 1031)                                                      EDUCATION  The patient has been educated in the following areas:   Dysphagia (Swallowing Impairment).              Time Calculation:    Time Calculation- SLP     Row Name 06/07/22 1038             Time Calculation- SLP    SLP Start Time 0930  -SN      SLP Stop Time 1030  -SN      SLP Time Calculation (min) 60 min  -SN      SLP  Received On 06/07/22  -SN              Untimed Charges    67567-MD Eval Oral Pharyng Swallow Minutes 60  -SN              Total Minutes    Untimed Charges Total Minutes 60  -SN       Total Minutes 60  -SN            User Key  (r) = Recorded By, (t) = Taken By, (c) = Cosigned By    Initials Name Provider Type    SN Marleny Wolfe SLP Speech and Language Pathologist                Therapy Charges for Today     Code Description Service Date Service Provider Modifiers Qty    [id number]  ST EVAL ORAL PHARYNG SWALLOW 4 6/7/2022 Marleny Wolfe SLP GN 1               HAI Webber  6/7/2022

## 2022-06-07 NOTE — THERAPY EVALUATION
Patient Name: Jordin Menon  : 1940    MRN: [id number]                              Today's Date: 2022       Admit Date: 2022    Visit Dx:     ICD-10-CM ICD-9-CM   1. Hypothermia, initial encounter  T68.XXXA 991.6   2. Dehydration  E86.0 276.51   3. Hypernatremia  E87.0 276.0   4. Lethargic  R53.83 780.79   5. Decreased activities of daily living (ADL)  Z78.9 V49.89     Patient Active Problem List   Diagnosis   • Primary osteoarthritis of right knee   • Status post total right knee replacement   • Essential hypertension   • Shortness of breath   • ARF (acute renal failure) (HCC)   • Hypokalemia   • Hypernatremia   • Symptomatic bradycardia   • Hypothermia, initial encounter   • Acute CVA (cerebrovascular accident) (HCC)   • Severe sepsis ([facility])   • Severe malnutrition ([facility])     Past Medical History:   Diagnosis Date   • Alzheimer's dementia with behavioral disturbance (HCC)    • Anemia, vitamin B12 deficiency    • Anxiety disorder due to known physiological condition    • Arthritis    • Constipation    • Gastroesophageal reflux disease without esophagitis    • High blood pressure    • HTN (hypertension)    • Hyperlipidemia    • Ingrowing toenail 2018   • Left foot pain 2018   • Osteoarthritis of right knee 2016   • Right foot pain 2018   • Sleep apnea    • Tinea unguium 2018     History reviewed. No pertinent surgical history.   General Information     Row Name 22 0919          OT Time and Intention    Document Type evaluation  -PG     Mode of Treatment individual therapy;occupational therapy  -PG     Row Name 22 0919          General Information    Patient Profile Reviewed yes  -PG     Prior Level of Function dependent:;ADL's  -PG     Existing Precautions/Restrictions fall  -PG     Barriers to Rehab medically complex  -PG     Row Name 22 0919          Occupational Profile    Reason for Services/Referral (Occupational Profile) Patient is an 82-year-old  male admitted from [facility] rehab with dehydration and hypernatremia.  No previous OT services reported.  Patient is being evaluated to assess ADL status and determine discharge needs  -     Row Name 06/07/22 0919          Living Environment    People in Home facility resident  -[facility] Name 06/07/22 0919          Cognition    Orientation Status (Cognition) oriented to;person  -[facility] Name 06/07/22 0919          Safety Issues, Functional Mobility    Safety Issues Affecting Function (Mobility) ability to follow commands  -     Impairments Affecting Function (Mobility) balance;endurance/activity tolerance;strength;range of motion (ROM)  -           User Key  (r) = Recorded By, (t) = Taken By, (c) = Cosigned By    Initials Name Provider Type     Lalito Ray OT Occupational Therapist                 Mobility/ADL's     [facility] Name 06/07/22 0922          Bed Mobility    Bed Mobility bed mobility (all) activities  -     All Activities, [place] (Bed Mobility) dependent (less than 25% patient effort)  -[facility] Name 06/07/22 0922          Activities of Daily Living    BADL Assessment/Intervention upper body dressing;bathing;lower body dressing;grooming;toileting  -[facility] Name 06/07/22 0922          Upper Body Dressing Assessment/Training    [place] Level (Upper Body Dressing) upper body dressing skills;dependent (less than 25% patient effort)  -[facility] Name 06/07/22 0922          Bathing Assessment/Intervention    [place] Level (Bathing) bathing skills;dependent (less than 25% patient effort)  -[facility] Name 06/07/22 0922          Lower Body Dressing Assessment/Training    [place] Level (Lower Body Dressing) lower body dressing skills;dependent (less than 25% patient effort)  -[facility] Name 06/07/22 0922          Grooming Assessment/Training    [place] Level (Grooming) grooming skills;dependent (less than 25% patient effort)  -[facility] Name 06/07/22 0922           Toileting Assessment/Training    [place] Level (Toileting) toileting skills;dependent (less than 25% patient effort)  -PG           User Key  (r) = Recorded By, (t) = Taken By, (c) = Cosigned By    Initials Name Provider Type    PG Lalito Ray OT Occupational Therapist               Obj/Interventions     Row Name 06/07/22 0923          Sensory Assessment (Somatosensory)    Sensory Assessment (Somatosensory) unable/difficult to assess  -PG     Row Name 06/07/22 0923          Vision Assessment/Intervention    Visual Impairment/Limitations unable/difficult to assess  -PG     Row Name 06/07/22 0923          Range of Motion Comprehensive    General Range of Motion upper extremity range of motion deficits identified  -PG     Row Name 06/07/22 0923          Strength Comprehensive (MMT)    General Manual Muscle Testing (MMT) Assessment upper extremity strength deficits identified  -PG     [facility] Name 06/07/22 0923          Motor Skills    Motor Skills coordination;functional endurance  -PG     Coordination fine motor deficit;moderate impairment  -PG     Functional Endurance poor  -PG           User Key  (r) = Recorded By, (t) = Taken By, (c) = Cosigned By    Initials Name Provider Type    PG Lalito Ray OT Occupational Therapist               Goals/Plan    No documentation.                Clinical Impression     Row Name 06/07/22 0923          Pain Assessment    Pretreatment Pain Rating 0/10 - no pain  -PG     Posttreatment Pain Rating 0/10 - no pain  -PG     [facility] Name 06/07/22 0923          Plan of Care Review    Plan of Care Reviewed With patient  -PG     Progress no change  -PG     Outcome Evaluation Patient not appropriate for occupational therapy at this time due to decreased medical condition and order for palliative care.  -PG     Row Name 06/07/22 0923          Therapy Assessment/Plan (OT)    Patient/Family Therapy Goal Statement (OT) unknown  -PG     Criteria for Skilled Therapeutic Interventions Met (OT)  no;does not meet criteria for skilled intervention  -PG     Therapy Frequency (OT) evaluation only  -PG     Row Name 06/07/22 0923          Therapy Plan Review/Discharge Plan (OT)    Anticipated Discharge Disposition (OT) inpatient hospice  -PG           User Key  (r) = Recorded By, (t) = Taken By, (c) = Cosigned By    Initials Name Provider Type    PG Lalito Ray, OT Occupational Therapist               Outcome Measures     Row Name 06/07/22 0925          How much help from another is currently needed...    Putting on and taking off regular lower body clothing? 1  -PG     Bathing (including washing, rinsing, and drying) 1  -PG     Toileting (which includes using toilet bed pan or urinal) 1  -PG     Putting on and taking off regular upper body clothing 1  -PG     Taking care of personal grooming (such as brushing teeth) 1  -PG     Eating meals 1  -PG     AM-PAC 6 Clicks Score (OT) 6  -PG     Row Name 06/07/22 0925          Functional Assessment    Outcome Measure Options AM-PAC 6 Clicks Daily Activity (OT);Optimal Instrument  -PG     Row Name 06/07/22 0925          Optimal Instrument    Optimal Instrument Optimal - 3  -PG     Bending/Stooping 5  -PG     Standing 5  -PG     Reaching 3  -PG     From the list, choose the 3 activities you would most like to be able to do without any difficulty Bending/stooping;Reaching;Standing  -PG     Total Score Optimal - 3 13  -PG           User Key  (r) = Recorded By, (t) = Taken By, (c) = Cosigned By    Initials Name Provider Type    PG Lalito Ray, OT Occupational Therapist                  OT Recommendation and Plan  Therapy Frequency (OT): evaluation only  Plan of Care Review  Plan of Care Reviewed With: patient  Progress: no change  Outcome Evaluation: Patient not appropriate for occupational therapy at this time due to decreased medical condition and order for palliative care.     Time Calculation:    Time Calculation- OT     Row Name 06/07/22 0927             Time  Calculation- OT    OT Received On 06/07/22  -PG              Untimed Charges    OT Eval/Re-eval Minutes 30  -PG              Total Minutes    Untimed Charges Total Minutes 30  -PG       Total Minutes 30  -PG            User Key  (r) = Recorded By, (t) = Taken By, (c) = Cosigned By    Initials Name Provider Type    PG Lalito Ray OT Occupational Therapist              Therapy Charges for Today     Code Description Service Date Service Provider Modifiers Qty    [id number] HC OT EVAL LOW COMPLEXITY 2 6/7/2022 Lalito Ray OT GO 1               Lalito Ray OT  6/7/2022

## 2022-06-07 NOTE — SIGNIFICANT NOTE
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 Our team was asked by Dr. Azevedo of [facility] clinic to care for this infant born at [facility].     Due to prematurity, RDS, concerns for sepsis (meconium stained fluid) and gastroschisis, we were contacted to transport this infant to [facility] NICU for further evaluation and therapy. Karen was intubated prior to transport. Transport was uneventful.    Patient Active Problem List   Diagnosis     Gastroschisis     Prematurity     Respiratory failure in      Need for observation and evaluation of  for sepsis     Other feeding problems of      On total parenteral nutrition        Interval History   Doing well.      Assessment & Plan   Overall Status:  24 day old  female infant, now at 39w3d PMA with gastroschisis s/p repair starting enteral feeds.  This patient whose weight is < 5000 grams is no longer critically ill, but requires cardiac/respiratory monitoring, vital sign monitoring, temperature maintenance, enteral feeding adjustments, lab and/or oxygen monitoring and constant observation by the health care team under direct physician supervision.    Access:  PIV   PICC  - placed on  -, confirmed position by XR (); will repeat XR for PICC placement .     FEN:    Vitals:    18 2100 02/15/18 2100 02/16/18 2100   Weight: 3.11 kg (6 lb 13.7 oz) 3.12 kg (6 lb 14.1 oz) 3.12 kg (6 lb 14.1 oz)     I: ~140 ml/kg/d; ~91 kcal/kg/d  O: adequate     06/07/22 1500   SOCIAL WORK ASSESSMENT   Referral Source physician   Reason for Consult palliative care   Visit Type ongoing     GI consult placed to inquire about PEG. Family continuing to pursue treatment at this time. Palliative will sign off. Please initiate new consult if new issues/needs arise.   CATHY Dixon   UOP; stooling.  Malnutrition. Normovolemic. Normoglycemic.     - TF goal to 140 ml/kg/day  - Surgery following   -- Continue q12h glycerin; qDay rectal irrigation with 25ml saline.     -- LGI obtained 2/12 with distal small bowel plugs.    -- UGI 2/13, non-obstructive.    -- Advance feeds to 5ml Q3h (advanced 2/16). Very slow advance in conjunction with surgery. Likely not advancing for another 2 days.    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 Surgeon Aly. 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 Contrast enema on 2/1 with mildly narrowed transverse colon (unused appearance), and cecum in the mid right abdomen, otherwise normal contrast enema.  Clamp OG and monitor tolerance.   - LGI with stool plugs in distal small bowel/prox colon.   - Per surgery, resume rectal irrigations with 25ml saline Qday.   - Upper GI 2/13 non-obstructive.     Respiratory:  Respiratory failure requiring mechanical ventilation 21% supplemental oxygen. Blood gas on admission is acceptable. Remain intubated while gastroschisis is being reduced in silo. Extubated 1/27 and weaned off CPAP on 1/29.   - Currently stable on RA.   - Monitor respiratory status closely     Cardiovascular:    Stable - good perfusion and BP.  No murmur present.    ID:  Potential for sepsis due to hypoglycemia. No intra-partum prophylaxis administered. CBC on admission not concerning. Cefazolin 24 hours after closure. Clinically follow.     Hematology:   > Risk for anemia of prematurity/phlebotomy.  Monitor intermittently.     Jaundice:  At risk for hyperbilirubinemia due prematurity and NPO status. Maternal blood type O+ and pt blood type B+. ELIZA negative.Initial physiologic resolved. Monitor direct bili while on TPN.     Recent Labs  Lab 02/16/18  0539 02/12/18  0340   BILITOTAL 2.9 2.2     Mild direct hyperbilirubiemia.   "Direct 1.6 ().  Increasing slowly. Now on SMOF lipids. Will monitor closely.    CNS:  Exam wnl. Initial OFC at ~30%ile.    - Monitor clinical status.    Toxicology: Mother with no risk factors for substance abuse.  - No need to send urine and meconium toxicology screen at this time.    Sedation/ Pain Control:  - Tylenol prn    Thermoregulation:   - Monitor temperature and provide thermal support as indicated.    [place]:  [place] 24 hours [place]  - Obtain hearing (normal)/CCHD (passed)/carseat screens PTD.  - Input from OT.  - Continue standard NICU cares and family education plan.    Immunizations     Immunization History   Administered Date(s) Administered     Hep B, Peds or Adolescent 2018        Medications   Current Facility-Administered Medications   Medication     lipids 4 oil (SMOFLIPID) 20% for neonates (Daily dose divided into 2 doses - each infused over 10 hours)     parenteral nutrition -  compounded formula     sodium chloride 0.9% (bottle) irrigation 25 mL     sucrose (SWEET-EASE) solution 0.1-2 mL     glycerin (PEDI-LAX) Suppository 0.25 suppository     sodium chloride (PF) 0.9% PF flush 1 mL     breast milk for bar code scanning verification 1 Bottle          Physical Exam    BP 81/50  Temp 98.4  F (36.9  C) (Axillary)  Resp 66  Ht 0.465 m (1' 6.31\")  Wt 3.12 kg (6 lb 14.1 oz)  HC 33.8 cm (13.31\")  SpO2 98%  BMI 14.43 kg/m2   Gen:  Active and CHATMAN   HEENT:  AFOSF    CV:  Heart regular in rate and rhythm, no murmur heard. Cap refill 2 sec.    Chest:  Good aeration bilaterally, in no distress.    Abd:  Rounded and soft; incision cdi   Skin:  Well perfused, pink.   Neuro:  Tone appropriate for age.       Communications   Parents:  Updated on rounds    PCPs:   Infant PCP: Discuss with parents  Maternal OB PCP: Maria Del Rosario CANDELARIO: Ying Adame  Delivering Provider: Arpita Merchant  Updated by [facility] 18    Health Care Team:  Patient discussed with the " care team. A/P, imaging studies, laboratory data, medications and family situation reviewed.    Physician Attestation     Attending Neonatologist:  This patient has been seen and evaluated by me, Karlo Toussaint MD

## 2022-06-07 NOTE — PLAN OF CARE
Goal Outcome Evaluation:    Patient is not appropriate for further skilled speech pathology services.  Previous diet recommendations by speech pathology align with current diet recommendations of puréed solids and nectar thickened liquids.  Patient however refusing most p.o. or only eating bites and taking sips of liquids.  If family continues to pursue full support, will need PEG for long-term nutritional support.    Patient however is appropriate for hospice/comfort care.

## 2022-06-07 NOTE — PLAN OF CARE
Goal Outcome Evaluation:           Progress: no change  Outcome Evaluation: Spoke with patient's daughter. They want to continue with coretrak placement. Concerned about patient not getting any nutrition. Lactated Ringers started at 50 ml/hr per Michelle YANCEY until day shift hospitalist speaks with family regarding wishes.

## 2022-06-07 NOTE — PLAN OF CARE
Goal Outcome Evaluation:  Plan of Care Reviewed With: patient, family        Progress: declining    VSS. Pt continues to refuse meals. NPO after Midnight. Bath was completed this shift. No complaint of pain or discomfort.

## 2022-06-07 NOTE — PLAN OF CARE
Goal Outcome Evaluation:  Plan of Care Reviewed With: patient        Progress: no change  Outcome Evaluation: Patient presents with limitations in overall strength and mobility that impede his ability to safely and independently transfer and ambulate however patient seems to be at baseline/prior level of function and is not appropriate for skilled physical therapy services at this time.  He demonstrates no initiative for movement or participates in purposeful functional movements.

## 2022-06-07 NOTE — THERAPY EVALUATION
Acute Care - Physical Therapy Initial Evaluation   Ryan     Patient Name: Jordin Menon  : 1940  MRN: [id number]  Today's Date: 2022      Visit Dx:     ICD-10-CM ICD-9-CM   1. Hypothermia, initial encounter  T68.XXXA 991.6   2. Dehydration  E86.0 276.51   3. Hypernatremia  E87.0 276.0   4. Lethargic  R53.83 780.79   5. Decreased activities of daily living (ADL)  Z78.9 V49.89   6. Oropharyngeal dysphagia  R13.12 787.22   7. Difficulty walking  R26.2 719.7     Patient Active Problem List   Diagnosis   • Primary osteoarthritis of right knee   • Status post total right knee replacement   • Essential hypertension   • Shortness of breath   • ARF (acute renal failure) ([facility])   • Hypokalemia   • Hypernatremia   • Symptomatic bradycardia   • Hypothermia, initial encounter   • Acute CVA (cerebrovascular accident) ([facility])   • Severe sepsis ([facility])   • Severe malnutrition ([facility])     Past Medical History:   Diagnosis Date   • Alzheimer's dementia with behavioral disturbance ([facility])    • Anemia, vitamin B12 deficiency    • Anxiety disorder due to known physiological condition    • Arthritis    • Constipation    • Gastroesophageal reflux disease without esophagitis    • High blood pressure    • HTN (hypertension)    • Hyperlipidemia    • Ingrowing toenail 2018   • Left foot pain 2018   • Osteoarthritis of right knee 2016   • Right foot pain 2018   • Sleep apnea    • Tinea unguium 2018     History reviewed. No pertinent surgical history.  PT Assessment (last 12 hours)     PT Evaluation and Treatment     Row Name 22 1600          Physical Therapy Time and Intention    Subjective Information no complaints  -CS     Document Type evaluation  -CS     Mode of Treatment individual therapy;physical therapy  -CS     Patient Effort poor  -CS     Row Name 22 1600          General Information    Patient Profile Reviewed yes  -CS     Patient Observations lethargic  -CS     Prior Level of Function  dependent:;transfer;bed mobility;ADL's  Patient had difficulty answering subjective information and no family present to verify; unsure if information provided is accurate  -CS     Equipment Currently Used at Home wheelchair;walker, rolling  -CS     Existing Precautions/Restrictions fall  -CS     Barriers to Rehab previous functional deficit;medically complex  -CS     Row Name 06/07/22 1600          Living Environment    Current Living Arrangements residential facility  -CS     People in Home facility resident  -CS     Row Name 06/07/22 1600          Cognition    Orientation Status (Cognition) oriented to;person  -CS     Follows Commands (Cognition) does not follow one-step commands  -CS     Row Name 06/07/22 1600          Range of Motion Comprehensive    General Range of Motion lower extremity range of motion deficits identified  -CS     Comment, General Range of Motion Patient with difficulty performing AROM but able to perform WFL for PROM; he did demonstrate discomfort with movement in joints  -CS     Row Name 06/07/22 1600          Strength Comprehensive (MMT)    General Manual Muscle Testing (MMT) Assessment lower extremity strength deficits identified  -CS     Comment, General Manual Muscle Testing (MMT) Assessment Bilateral lower extremities assessed at 2+/5  -CS     Row Name 06/07/22 1600          Bed Mobility    Bed Mobility bed mobility (all) activities  -CS     All Activities, [place] (Bed Mobility) dependent (less than 25% patient effort)  -CS     Bed Mobility, Safety Issues decreased use of arms for pushing/pulling;decreased use of legs for bridging/pushing;cognitive deficits limit understanding  -CS     Assistive Device (Bed Mobility) bed rails;draw sheet  -CS     Comment, (Bed Mobility) Patient demonstrates difficulty initiating movement and attempting movement once in agreement to perform.  -CS     Row Name 06/07/22 1600          Transfers    Comment, (Transfers) No further transfers or out of  bed activity due to patient showing inability to complete bed mobility  -CS     Row Name 06/07/22 1600          Safety Issues, Functional Mobility    Safety Issues Affecting Function (Mobility) awareness of need for assistance;problem-solving;insight into deficits/self-awareness;safety precaution awareness  -CS     Impairments Affecting Function (Mobility) balance;endurance/activity tolerance;strength;range of motion (ROM)  -CS     Row Name 06/07/22 1600          Plan of Care Review    Plan of Care Reviewed With patient  -CS     Progress no change  -CS     Outcome Evaluation Patient presents with limitations in overall strength and mobility that impede his ability to safely and independently transfer and ambulate however patient seems to be at baseline/prior level of function and is not appropriate for skilled physical therapy services at this time.  He demonstrates no initiative for movement or participates in purposeful functional movements.  -CS     Row Name 06/07/22 1600          Therapy Assessment/Plan (PT)    Criteria for Skilled Interventions Met (PT) does not meet criteria for skilled intervention  -CS     Therapy Frequency (PT) evaluation only  -CS     Row Name 06/07/22 1600          PT Evaluation Complexity    History, PT Evaluation Complexity 1-2 personal factors and/or comorbidities  -CS     Examination of Body Systems (PT Eval Complexity) total of 4 or more elements  -CS     Clinical Presentation (PT Evaluation Complexity) stable  -CS     Clinical Decision Making (PT Evaluation Complexity) low complexity  -CS     Overall Complexity (PT Evaluation Complexity) low complexity  -CS     Row Name 06/07/22 1600          Therapy Plan Review/Discharge Plan (PT)    Therapy Plan Review (PT) patient  -CS     Referral Needed to Another Service (PT) --  palliative care/comfort care  -CS           User Key  (r) = Recorded By, (t) = Taken By, (c) = Cosigned By    Initials Name Provider Type    CS Daphne Wise, PT  Physical Therapist                  PT Recommendation and Plan  Anticipated Discharge Disposition (PT): extended care facility  Therapy Frequency (PT): evaluation only  Plan of Care Reviewed With: patient  Progress: no change  Outcome Evaluation: Patient presents with limitations in overall strength and mobility that impede his ability to safely and independently transfer and ambulate however patient seems to be at baseline/prior level of function and is not appropriate for skilled physical therapy services at this time.  He demonstrates no initiative for movement or participates in purposeful functional movements.   Outcome Measures     Row Name 06/07/22 1600             How much help from another person do you currently need...    Turning from your back to your side while in flat bed without using bedrails? 1  -CS      Moving from lying on back to sitting on the side of a flat bed without bedrails? 1  -CS      Moving to and from a bed to a chair (including a wheelchair)? 1  -CS      Standing up from a chair using your arms (e.g., wheelchair, bedside chair)? 1  -CS      Climbing 3-5 steps with a railing? 1  -CS      To walk in hospital room? 1  -CS      AM-PAC 6 Clicks Score (PT) 6  -CS              Functional Assessment    Outcome Measure Options AM-PAC 6 Clicks Basic Mobility (PT)  -CS            User Key  (r) = Recorded By, (t) = Taken By, (c) = Cosigned By    Initials Name Provider Type    Daphne Mo PT Physical Therapist                 Time Calculation:    PT Charges     Row Name 06/07/22 1627             Time Calculation    PT Received On 06/07/22  -CS              Untimed Charges    PT Eval/Re-eval Minutes 32  -CS              Total Minutes    Untimed Charges Total Minutes 32  -CS       Total Minutes 32  -CS            User Key  (r) = Recorded By, (t) = Taken By, (c) = Cosigned By    Initials Name Provider Type    Daphne Mo PT Physical Therapist              Therapy Charges for Today      Code Description Service Date Service Provider Modifiers Qty    33415099960 HC PT EVAL LOW COMPLEXITY 3 6/7/2022 Daphne Wise, PT GP 1          PT G-Codes  Outcome Measure Options: AM-PAC 6 Clicks Basic Mobility (PT)  AM-PAC 6 Clicks Score (PT): 6  AM-PAC 6 Clicks Score (OT): 6    Daphne Wise PT  6/7/2022

## 2022-06-07 NOTE — PLAN OF CARE
Goal Outcome Evaluation:  Plan of Care Reviewed With: patient        Progress: no change  Outcome Evaluation: Patient not appropriate for occupational therapy at this time due to decreased medical condition and order for palliative care.

## 2022-06-08 ENCOUNTER — ANESTHESIA EVENT (OUTPATIENT)
Dept: GASTROENTEROLOGY | Facility: HOSPITAL | Age: 82
End: 2022-06-08

## 2022-06-08 ENCOUNTER — ANESTHESIA (OUTPATIENT)
Dept: GASTROENTEROLOGY | Facility: HOSPITAL | Age: 82
End: 2022-06-08

## 2022-06-08 LAB
ALBUMIN SERPL-MCNC: 1.8 G/DL (ref 3.5–5.2)
ALBUMIN/GLOB SERPL: 0.6 G/DL
ALP SERPL-CCNC: 91 U/L (ref 39–117)
ALT SERPL W P-5'-P-CCNC: 10 U/L (ref 1–41)
ANION GAP SERPL CALCULATED.3IONS-SCNC: 7.3 MMOL/L (ref 5–15)
AST SERPL-CCNC: 8 U/L (ref 1–40)
BILIRUB SERPL-MCNC: 0.5 MG/DL (ref 0–1.2)
BUN SERPL-MCNC: 38 MG/DL (ref 8–23)
BUN/CREAT SERPL: 20.7 (ref 7–25)
CALCIUM SPEC-SCNC: 8.6 MG/DL (ref 8.6–10.5)
CHLORIDE SERPL-SCNC: 119 MMOL/L (ref 98–107)
CO2 SERPL-SCNC: 21.7 MMOL/L (ref 22–29)
CREAT SERPL-MCNC: 1.84 MG/DL (ref 0.76–1.27)
D-LACTATE SERPL-SCNC: 1 MMOL/L (ref 0.5–2)
DEPRECATED RDW RBC AUTO: 42.2 FL (ref 37–54)
EGFRCR SERPLBLD CKD-EPI 2021: 36.4 ML/MIN/1.73
ERYTHROCYTE [DISTWIDTH] IN BLOOD BY AUTOMATED COUNT: 15.1 % (ref 12.3–15.4)
GLOBULIN UR ELPH-MCNC: 2.9 GM/DL
GLUCOSE SERPL-MCNC: 175 MG/DL (ref 65–99)
HCT VFR BLD AUTO: 21.7 % (ref 37.5–51)
HGB BLD-MCNC: 7.8 G/DL (ref 13–17.7)
MAGNESIUM SERPL-MCNC: 1.7 MG/DL (ref 1.6–2.4)
MCH RBC QN AUTO: 28.1 PG (ref 26.6–33)
MCHC RBC AUTO-ENTMCNC: 35.9 G/DL (ref 31.5–35.7)
MCV RBC AUTO: 78.1 FL (ref 79–97)
PLATELET # BLD AUTO: 108 10*3/MM3 (ref 140–450)
PMV BLD AUTO: 10.5 FL (ref 6–12)
POTASSIUM SERPL-SCNC: 3.2 MMOL/L (ref 3.5–5.2)
PROT SERPL-MCNC: 4.7 G/DL (ref 6–8.5)
RBC # BLD AUTO: 2.78 10*6/MM3 (ref 4.14–5.8)
SODIUM SERPL-SCNC: 148 MMOL/L (ref 136–145)
WBC NRBC COR # BLD: 5.57 10*3/MM3 (ref 3.4–10.8)

## 2022-06-08 PROCEDURE — 43246 EGD PLACE GASTROSTOMY TUBE: CPT | Performed by: INTERNAL MEDICINE

## 2022-06-08 PROCEDURE — 3E0G76Z INTRODUCTION OF NUTRITIONAL SUBSTANCE INTO UPPER GI, VIA NATURAL OR ARTIFICIAL OPENING: ICD-10-PCS | Performed by: INTERNAL MEDICINE

## 2022-06-08 PROCEDURE — 80053 COMPREHEN METABOLIC PANEL: CPT | Performed by: INTERNAL MEDICINE

## 2022-06-08 PROCEDURE — 99222 1ST HOSP IP/OBS MODERATE 55: CPT | Performed by: INTERNAL MEDICINE

## 2022-06-08 PROCEDURE — 0DH63UZ INSERTION OF FEEDING DEVICE INTO STOMACH, PERCUTANEOUS APPROACH: ICD-10-PCS | Performed by: INTERNAL MEDICINE

## 2022-06-08 PROCEDURE — 25010000002 CEFAZOLIN PER 500 MG: Performed by: NURSE ANESTHETIST, CERTIFIED REGISTERED

## 2022-06-08 PROCEDURE — 85027 COMPLETE CBC AUTOMATED: CPT | Performed by: INTERNAL MEDICINE

## 2022-06-08 PROCEDURE — 83605 ASSAY OF LACTIC ACID: CPT | Performed by: INTERNAL MEDICINE

## 2022-06-08 PROCEDURE — 25010000002 CEFAZOLIN IN DEXTROSE 2-4 GM/100ML-% SOLUTION: Performed by: INTERNAL MEDICINE

## 2022-06-08 PROCEDURE — 83735 ASSAY OF MAGNESIUM: CPT | Performed by: INTERNAL MEDICINE

## 2022-06-08 PROCEDURE — 25010000002 PROPOFOL 10 MG/ML EMULSION: Performed by: NURSE ANESTHETIST, CERTIFIED REGISTERED

## 2022-06-08 PROCEDURE — C1769 GUIDE WIRE: HCPCS | Performed by: INTERNAL MEDICINE

## 2022-06-08 PROCEDURE — 99233 SBSQ HOSP IP/OBS HIGH 50: CPT | Performed by: INTERNAL MEDICINE

## 2022-06-08 RX ORDER — CEFAZOLIN SODIUM 1 G/3ML
INJECTION, POWDER, FOR SOLUTION INTRAMUSCULAR; INTRAVENOUS AS NEEDED
Status: DISCONTINUED | OUTPATIENT
Start: 2022-06-08 | End: 2022-06-08 | Stop reason: SURG

## 2022-06-08 RX ORDER — DEXTROSE, SODIUM CHLORIDE, AND POTASSIUM CHLORIDE 5; .45; .15 G/100ML; G/100ML; G/100ML
100 INJECTION INTRAVENOUS CONTINUOUS
Status: DISCONTINUED | OUTPATIENT
Start: 2022-06-08 | End: 2022-06-09

## 2022-06-08 RX ORDER — PHENYLEPHRINE HCL IN 0.9% NACL 1 MG/10 ML
SYRINGE (ML) INTRAVENOUS AS NEEDED
Status: DISCONTINUED | OUTPATIENT
Start: 2022-06-08 | End: 2022-06-08 | Stop reason: SURG

## 2022-06-08 RX ORDER — CLONIDINE HYDROCHLORIDE 0.1 MG/1
0.1 TABLET ORAL ONCE
Status: COMPLETED | OUTPATIENT
Start: 2022-06-08 | End: 2022-06-08

## 2022-06-08 RX ORDER — CEFAZOLIN SODIUM 2 G/100ML
2 INJECTION, SOLUTION INTRAVENOUS
Status: COMPLETED | OUTPATIENT
Start: 2022-06-08 | End: 2022-06-08

## 2022-06-08 RX ORDER — SODIUM CHLORIDE, SODIUM LACTATE, POTASSIUM CHLORIDE, CALCIUM CHLORIDE 600; 310; 30; 20 MG/100ML; MG/100ML; MG/100ML; MG/100ML
INJECTION, SOLUTION INTRAVENOUS CONTINUOUS PRN
Status: DISCONTINUED | OUTPATIENT
Start: 2022-06-08 | End: 2022-06-08 | Stop reason: SURG

## 2022-06-08 RX ORDER — SODIUM CHLORIDE 9 MG/ML
9 INJECTION, SOLUTION INTRAVENOUS CONTINUOUS
Status: CANCELLED | OUTPATIENT
Start: 2022-06-08

## 2022-06-08 RX ORDER — LIDOCAINE HYDROCHLORIDE 20 MG/ML
INJECTION, SOLUTION EPIDURAL; INFILTRATION; INTRACAUDAL; PERINEURAL AS NEEDED
Status: DISCONTINUED | OUTPATIENT
Start: 2022-06-08 | End: 2022-06-08 | Stop reason: SURG

## 2022-06-08 RX ORDER — PROPOFOL 10 MG/ML
VIAL (ML) INTRAVENOUS AS NEEDED
Status: DISCONTINUED | OUTPATIENT
Start: 2022-06-08 | End: 2022-06-08 | Stop reason: SURG

## 2022-06-08 RX ADMIN — PROPOFOL 50 MCG/KG/MIN: 10 INJECTION, EMULSION INTRAVENOUS at 15:15

## 2022-06-08 RX ADMIN — CEFAZOLIN SODIUM 2 G: 2 INJECTION, SOLUTION INTRAVENOUS at 15:06

## 2022-06-08 RX ADMIN — LIDOCAINE HYDROCHLORIDE 100 MG: 20 INJECTION, SOLUTION EPIDURAL; INFILTRATION; INTRACAUDAL; PERINEURAL at 15:15

## 2022-06-08 RX ADMIN — CEFAZOLIN SODIUM 2 G: 1 INJECTION, POWDER, FOR SOLUTION INTRAMUSCULAR; INTRAVENOUS at 15:14

## 2022-06-08 RX ADMIN — CLONIDINE HYDROCHLORIDE 0.1 MG: 0.1 TABLET ORAL at 21:09

## 2022-06-08 RX ADMIN — POTASSIUM CHLORIDE, DEXTROSE MONOHYDRATE AND SODIUM CHLORIDE 100 ML/HR: 150; 5; 450 INJECTION, SOLUTION INTRAVENOUS at 22:21

## 2022-06-08 RX ADMIN — SODIUM CHLORIDE, POTASSIUM CHLORIDE, SODIUM LACTATE AND CALCIUM CHLORIDE: 600; 310; 30; 20 INJECTION, SOLUTION INTRAVENOUS at 15:13

## 2022-06-08 RX ADMIN — DEXTROSE AND SODIUM CHLORIDE 125 ML/HR: 5; 450 INJECTION, SOLUTION INTRAVENOUS at 01:28

## 2022-06-08 RX ADMIN — Medication 10 ML: at 21:09

## 2022-06-08 RX ADMIN — POTASSIUM CHLORIDE, DEXTROSE MONOHYDRATE AND SODIUM CHLORIDE 100 ML/HR: 150; 5; 450 INJECTION, SOLUTION INTRAVENOUS at 09:25

## 2022-06-08 RX ADMIN — Medication 100 MCG: at 15:21

## 2022-06-08 RX ADMIN — Medication 10 ML: at 09:24

## 2022-06-08 RX ADMIN — PROPOFOL 50 MG: 10 INJECTION, EMULSION INTRAVENOUS at 15:15

## 2022-06-08 NOTE — PLAN OF CARE
Goal Outcome Evaluation:  Plan of Care Reviewed With: patient         Pt s/p GT.  NPO until after 730pm then can flush water.  Formula nutrition via GT held until after doc assess tomorrow.

## 2022-06-08 NOTE — CONSULTS
[facility]  Initial Inpatient Consult Note    Referring Provider: Dr. Hermosillo    Reason for Consultation: malnutrition    Subjective     History of present illness:    82 y.o. male with history of Alzheimer's dementia, GERD, hypertension, hyperlipidemia, DVT, and obstructive sleep apnea who initially presented with altered mental status.  During this hospitalization, patient has been treated for septic shock, HCAP, and acute renal failure.  GI consulted for feeding tube placement given severe malnutrition and poor oral intake.  Patient has been off anticoagulation.    Past Medical History:  Past Medical History:   Diagnosis Date   • Alzheimer's dementia with behavioral disturbance (HCC)    • Anemia, vitamin B12 deficiency    • Anxiety disorder due to known physiological condition    • Arthritis    • Constipation    • Gastroesophageal reflux disease without esophagitis    • High blood pressure    • HTN (hypertension)    • Hyperlipidemia    • Ingrowing toenail 09/21/2018   • Left foot pain 09/21/2018   • Osteoarthritis of right knee 08/22/2016   • Right foot pain 09/21/2018   • Sleep apnea    • Tinea unguium 09/21/2018     Past Surgical History:  History reviewed. No pertinent surgical history.   Social History:   Social History     Tobacco Use   • Smoking status: Never Smoker   • Smokeless tobacco: Never Used   Substance Use Topics   • Alcohol use: No      Family History:  Family History   Family history unknown: Yes       Home Meds:  Medications Prior to Admission   Medication Sig Dispense Refill Last Dose   • acetaminophen (TYLENOL) 325 MG tablet Take 650 mg by mouth 3 (Three) Times a Day.   Unknown at Unknown time   • apixaban (ELIQUIS) 5 MG tablet tablet Take 1 tablet by mouth Every 12 (Twelve) Hours. Indications: DVT/PE (active thrombosis) 60 tablet  Unknown at Unknown time   • aspirin 81 MG chewable tablet Chew 1 tablet Daily.   Unknown at Unknown time   • busPIRone (BUSPAR) 10 MG tablet  Take 10 mg by mouth 2 (Two) Times a Day.   Unknown at Unknown time   • Diclofenac Sodium (VOLTAREN) 1 % gel gel Apply 4 g topically to the appropriate area as directed 2 (Two) Times a Day As Needed (Bilateral hands and shoulders).   Unknown at Unknown time   • docusate sodium (COLACE) 100 MG capsule Take 200 mg by mouth Daily. Pt takes along with 50 mg for a total of 250 mg QD   Unknown at Unknown time   • docusate sodium (COLACE) 50 MG capsule Take 50 mg by mouth Daily. Pt takes along with 2- 100 mg Capsules for a total of 250 mg   Unknown at Unknown time   • Dyclonine-Glycerin (Cepacol Sore Throat Spray) 0.1-33 % liquid Apply 2 sprays to the mouth or throat 4 (Four) Times a Day As Needed (cold symptoms).   Unknown at Unknown time   • famotidine (PEPCID) 20 MG tablet Take 20 mg by mouth Every Night.   Unknown at Unknown time   • furosemide (Lasix) 20 MG tablet Take 1 tablet by mouth Daily.   Unknown at Unknown time   • hydrOXYzine (ATARAX) 25 MG tablet Take 25 mg by mouth Every 6 (Six) Hours As Needed for Itching.   Unknown at Unknown time   • ipratropium-albuterol (DUO-NEB) 0.5-2.5 mg/3 ml nebulizer Take 3 mL by nebulization Every 4 (Four) Hours As Needed for Wheezing.   Unknown at Unknown time   • lidocaine (LMX) 4 % cream Apply 1 application topically to the appropriate area as directed 2 (Two) Times a Day As Needed for Mild Pain .   Unknown at Unknown time   • Lidocaine HCl (LMX) 4 % cream Apply 1 application topically to the appropriate area as directed 2 (Two) Times a Day As Needed for Mild Pain  (Bilateral hands and shoulders).   Unknown at Unknown time   • [] linezolid (Zyvox) 600 MG tablet Take 1 tablet by mouth 2 (Two) Times a Day for 10 days. 20 tablet 0 Unknown at Unknown time   • magnesium hydroxide (MILK OF MAGNESIA) 400 MG/5ML suspension Take 30 mL by mouth Daily As Needed for Constipation.   Unknown at Unknown time   • melatonin 5 MG tablet tablet Take 5 mg by mouth Every Night.   Unknown at  Unknown time   • mineral oil-hydrophilic petrolatum (AQUAPHOR) ointment Apply 1 application topically to the appropriate area as directed 2 (Two) Times a Day As Needed for Dry Skin (bilateral hands and shoulders).   Unknown at Unknown time   • mirtazapine (REMERON) 15 MG tablet Take 15 mg by mouth Every Night.   Unknown at Unknown time   • mirtazapine (REMERON) 7.5 MG half tablet Take 7.5 mg by mouth Every Night.   Unknown at Unknown time   • polyethylene glycol (MIRALAX) packet Take 17 g by mouth Daily.   Unknown at Unknown time   • witch hazel-glycerin (TUCKS) pad Apply 1 pad topically to the appropriate area as directed As Needed for Irritation or Hemorrhoids.   Unknown at Unknown time     Current Meds:   sodium chloride, 10 mL, Intravenous, Q12H      Allergies:  No Known Allergies  Review of Systems  Review of systems could not be obtained due to   patient confusion.     Objective     Vital Signs  Temp:  [97.4 °F (36.3 °C)-98.9 °F (37.2 °C)] 98.9 °F (37.2 °C)  Heart Rate:  [55-78] 67  Resp:  [15-16] 15  BP: (127-154)/(51-75) 143/68  Physical Exam:  General Appearance:    Alert, cooperative, in no acute distress   Head:    Normocephalic, without obvious abnormality, atraumatic   Eyes:          conjunctivae and sclerae normal, no icterus   Throat:   no thrush, oral mucosa moist   Neck:   Supple, no adenopathy   Lungs:     Breathing unlabored    Heart:    No chest tenderness    Chest Wall:    No abnormalities observed   Abdomen:     Soft, nondistended, nontender       Skin:   No bruising or rash   Psychiatric:  normal mood     Results Review:   I reviewed the patient's new clinical results.    Results from last 7 days   Lab Units 06/08/22  0612 06/07/22  1124 06/06/22  1414   WBC 10*3/mm3 5.57 5.29 6.44   HEMOGLOBIN g/dL 7.8* 8.8* 8.5*   HEMATOCRIT % 21.7* 27.4* 23.5*   PLATELETS 10*3/mm3 108* 86* 48*     Results from last 7 days   Lab Units 06/08/22 0612 06/07/22  1124 06/06/22  1414   SODIUM mmol/L 148* 152*  154*   POTASSIUM mmol/L 3.2* 4.1 4.7   CHLORIDE mmol/L 119* 121* 122*   CO2 mmol/L 21.7* 15.5* 17.7*   BUN mg/dL 38* 41* 43*   CREATININE mg/dL 1.84* 2.08* 2.24*   CALCIUM mg/dL 8.6 8.9 8.9   BILIRUBIN mg/dL 0.5  --   --    ALK PHOS U/L 91  --   --    ALT (SGPT) U/L 10  --   --    AST (SGOT) U/L 8  --   --    GLUCOSE mg/dL 175* 90 81         Lab Results   Lab Value Date/Time    LIPASE 17 07/06/2019 1926       Radiology:  CT Head Without Contrast   Final Result       CT scan of the head without IV contrast demonstrating no acute intracranial abnormality.                NAM HUGHES MD          Electronically Signed and Approved By: NAM HUGHES MD on 5/30/2022 at 17:14                           XR Abdomen KUB   Final Result       NG tube is in the stomach, the tip is 16 cm beyond the GE junction.                NAM HUGHES MD          Electronically Signed and Approved By: NAM HUGHES MD on 5/30/2022 at 14:42                           XR Chest 1 View   Final Result       Right jugular central venous catheter tip overlies the heart, and may be in the right ventricle.     No pneumothorax is seen.       No other significant change in comparison to 1125 today.                        NAM HUGHES MD          Electronically Signed and Approved By: NAM HUGHES MD on 5/29/2022 at 15:07                           XR Chest 1 View   Final Result       Findings suggest atelectasis at the left lung base, as above.                        NAM HUGHES MD          Electronically Signed and Approved By: NAM HUGHES MD on 5/29/2022 at 11:53                               Assessment & Plan   Patient Active Problem List   Diagnosis   • Primary osteoarthritis of right knee   • Status post total right knee replacement   • Essential hypertension   • Shortness of breath   • ARF (acute renal failure) ([facility])   • Hypokalemia   • Hypernatremia   • Symptomatic bradycardia   • Hypothermia, initial encounter   • Acute CVA  (cerebrovascular accident) ([facility])   • Severe sepsis ([facility])   • Severe malnutrition ([facility])       Assessment:  1. Severe malnutrition    Plan:  · Will proceed with PEG tube placement  · Benefits versus risks of procedure were discussed with patient's daughter/POA; risks include but are not limited to bleeding, infection, perforation, injury to other organ, and risk of sedation  · She expressed understanding and agrees to proceed  · Discussed with daughter that patient would be full code during procedure and immediately after and would subsequently return to previous code status after      I discussed the patients findings and my recommendations with patient, family and primary care team.    Yanelis Mistry MD

## 2022-06-08 NOTE — PROGRESS NOTES
[facility]   Hospitalist Progress Note  Date: 2022  Patient Name: Jordin Menon  : 1940  MRN: [id number]  Date of admission: 2022  Consultants:   -Pulmonology/Critical Care: Dr. Kobe Sheest, Dr. Robert Carbajal    Subjective   Subjective     Chief Complaint: Altered mental status, hypothermia, hypoglycemia and bradycardia    Summary:   Jordin Menon is a 82 y.o. male with frequent hospitalizations for sepsis, pneumonia and renal failure who has Alzheimer's dementia who presented to ED from his nursing home due to worsening mental status and hypothermia. Evaluation in ED significant for patient being hypoglycemic, bradycardic and hypothermic. Patient also noted to be lethargic only grimacing to pain but not following commands. Hospitalist service contacted for further evaluation management. Pulmonology/Critical Care consulted. Patient started on empiric antibiotics and levophed. NG tube initially inserted and tube feeds started.  Palliative care consulted.  Discussions were had between Pulmonology/Critical Care service and patient's family and decision has been made to discontinue invasive care measures and pursue comfort care measures only.  However, family at a later time decided that they did not wish to pursue comfort care measures at this time and invasive care measures were resumed.  Based on patient's living will feeding tube was not inserted.  Patient's condition did not improve after use of Wing hugger and resumption of IV antibiotics.  Critical care service contacted and had additional discussion with patient's family, decision then made to again pursue comfort care measures only.  On the evening of  family changed goals to full measures again but continue DNR.     Interval Followup:   Patient is resting comfortably today, no events overnight.  Plan for PEG tube placement today.  Patient is alert to self only    Review of Systems   Unable to obtain secondary to altered mental status, to  "anything asked the patient responds \"I will be all right\"    Objective   Objective     Vitals:   Temp:  [97.4 °F (36.3 °C)-98.9 °F (37.2 °C)] 98.9 °F (37.2 °C)  Heart Rate:  [55-78] 67  Resp:  [15-16] 15  BP: (127-154)/(51-75) 143/68  Physical Exam   Gen: NAD, WDWN elderly male lying in bed comfortably asleep easily awakened by voice  HEENT: NCAT, mmm  Resp: CTAB no wrr, no dyspnea  CV: RRR no mrg, trace LE edema  GI: Abdomen soft NT, ND +bs  Psych: AOx 1, self, normal mood and affect    Result Review    Result Review:  I have personally reviewed the results from the time of this admission to 6/8/2022 13:21 EDT and agree with these findings:  [x]  Laboratory:    Sodium 148  Bicarb 21  Creatinine 2-1.8  White count 5  Hemoglobin 7.8  Platelets 108  [x]  Microbiology: Negative  []  Radiology:   []  EKG/Telemetry:   []  Cardiology/Vascular:    []  Pathology:  []  Old records:  []  Other:    Assessment & Plan   Assessment / Plan      Assessment:  Septic shock due to unknown source  HCAP due to unspecified organism versus aspiration pneumonia  Therapeutic drug level monitoring, vancomycin  Toxic/metabolic encephalopathy  Acute renal failure  Hypothermia  Hypernatremia  Hypokalemia  Hypomagnesemia  Hypoglycemia  History of Alzheimer's dementia  GERD  DVT  Hypoalbuminemia  Hypophosphatemia    Plan:  GI consulted for evaluation of possible PEG tube placement today  Adjusting IV fluids to D5 half-normal saline with 20 of K to achieve electrolyte replacement  Thrombocytopenia improving  Reevaluate medication list tomorrow, start resuming meds when access is obtained versus when patient agreeable to swallowing meds  Holding Eliquis and aspirin at this time preprocedurally    DVT Prophylaxis: SCDs   Diet: Puréed nectar thick, n.p.o. after midnight  Plan discussed with bedside RN, palliative care RN and gastroenterologist     "

## 2022-06-08 NOTE — ANESTHESIA PREPROCEDURE EVALUATION
Anesthesia Evaluation     Patient summary reviewed and Nursing notes reviewed   no history of anesthetic complications:  NPO Solid Status: > 8 hours  NPO Liquid Status: > 2 hours           Airway   Mallampati: II  TM distance: >3 FB  Small opening  Dental    (+) edentulous    Pulmonary - normal exam   (+) pneumonia , shortness of breath, sleep apnea,   Cardiovascular - normal exam  Exercise tolerance: unable to assess    ECG reviewed    (+) hypertension, valvular problems/murmurs MR, dysrhythmias Bradycardia, DVT, hyperlipidemia,     ROS comment: Echo:  Interpretation Summary    · Calculated left ventricular EF = 54.8% Estimated left ventricular EF was in agreement with the calculated left ventricular EF. Left ventricular systolic function is normal.  · Mild mitral valve regurgitation is present.        Neuro/Psych  (+) CVA, psychiatric history Anxiety, dementia,      ROS Comment: Toxic/metabolic encephalopathy  GI/Hepatic/Renal/Endo    (+)  GERD,  renal disease ARF,     ROS Comment: Hypernatremia  Hypokalemia  Hypomagnesemia    Musculoskeletal     Abdominal  - normal exam   Substance History - negative use     OB/GYN negative ob/gyn ROS         Other   arthritis,        Other Comment: malnutrition  ROS/Med Hx Other: Currently admitted for Septic shock                               Anesthesia Plan    ASA 4     general     (Total IV Anesthesia    )  intravenous induction     Anesthetic plan, risks, benefits, and alternatives have been provided, discussed and informed consent has been obtained with: patient and healthcare power of .    Plan discussed with CRNA.        CODE STATUS:    Code Status (Patient has no pulse and is not breathing): No CPR (Do Not Attempt to Resuscitate)  Medical Interventions (Patient has pulse or is breathing): Full Support

## 2022-06-08 NOTE — PLAN OF CARE
Goal Outcome Evaluation:  Plan of Care Reviewed With: patient        Progress: no change   VSS on RA. Has been NPO since midnight for peg tube placement today.

## 2022-06-09 LAB
ANION GAP SERPL CALCULATED.3IONS-SCNC: 6 MMOL/L (ref 5–15)
BUN SERPL-MCNC: 30 MG/DL (ref 8–23)
BUN/CREAT SERPL: 16.8 (ref 7–25)
CALCIUM SPEC-SCNC: 8.2 MG/DL (ref 8.6–10.5)
CHLORIDE SERPL-SCNC: 120 MMOL/L (ref 98–107)
CO2 SERPL-SCNC: 24 MMOL/L (ref 22–29)
CREAT SERPL-MCNC: 1.79 MG/DL (ref 0.76–1.27)
DEPRECATED RDW RBC AUTO: 41.3 FL (ref 37–54)
EGFRCR SERPLBLD CKD-EPI 2021: 37.6 ML/MIN/1.73
ERYTHROCYTE [DISTWIDTH] IN BLOOD BY AUTOMATED COUNT: 15 % (ref 12.3–15.4)
GLUCOSE SERPL-MCNC: 112 MG/DL (ref 65–99)
HCT VFR BLD AUTO: 21.7 % (ref 37.5–51)
HGB BLD-MCNC: 8 G/DL (ref 13–17.7)
MCH RBC QN AUTO: 28.5 PG (ref 26.6–33)
MCHC RBC AUTO-ENTMCNC: 36.9 G/DL (ref 31.5–35.7)
MCV RBC AUTO: 77.2 FL (ref 79–97)
PLATELET # BLD AUTO: 160 10*3/MM3 (ref 140–450)
PMV BLD AUTO: 10.8 FL (ref 6–12)
POTASSIUM SERPL-SCNC: 3.5 MMOL/L (ref 3.5–5.2)
RBC # BLD AUTO: 2.81 10*6/MM3 (ref 4.14–5.8)
SODIUM SERPL-SCNC: 150 MMOL/L (ref 136–145)
WBC NRBC COR # BLD: 5.87 10*3/MM3 (ref 3.4–10.8)

## 2022-06-09 PROCEDURE — 99231 SBSQ HOSP IP/OBS SF/LOW 25: CPT | Performed by: INTERNAL MEDICINE

## 2022-06-09 PROCEDURE — 80048 BASIC METABOLIC PNL TOTAL CA: CPT | Performed by: INTERNAL MEDICINE

## 2022-06-09 PROCEDURE — 99232 SBSQ HOSP IP/OBS MODERATE 35: CPT | Performed by: INTERNAL MEDICINE

## 2022-06-09 PROCEDURE — 85027 COMPLETE CBC AUTOMATED: CPT | Performed by: INTERNAL MEDICINE

## 2022-06-09 RX ADMIN — POTASSIUM CHLORIDE, DEXTROSE MONOHYDRATE AND SODIUM CHLORIDE 100 ML/HR: 150; 5; 450 INJECTION, SOLUTION INTRAVENOUS at 10:52

## 2022-06-09 NOTE — SIGNIFICANT NOTE
06/09/22 1029   Coping/Psychosocial   Additional Documentation Spiritual Care (Group)   Spiritual Care   Spiritual Care Source  initiative  (daily rounding)   Spiritual Care Visit Type follow-up   Receptivity to Spiritual Care other (see comments)  (pt alone in dark room. opens eyes to name and speaks. alert only to self)

## 2022-06-09 NOTE — PROGRESS NOTES
[facility]  Inpatient Progress Note    Reason for Follow Up:  Malnutrition, PEG placement    Subjective     Interval History:   No issues over night.  PEG flushed without issues.    Current Facility-Administered Medications:   •  acetaminophen (TYLENOL) tablet 650 mg, 650 mg, Oral, Q4H PRN **OR** acetaminophen (TYLENOL) 160 MG/5ML solution 650 mg, 650 mg, Oral, Q4H PRN **OR** acetaminophen (TYLENOL) suppository 650 mg, 650 mg, Rectal, Q4H PRN, Kobe Sheets MD  •  sennosides-docusate (PERICOLACE) 8.6-50 MG per tablet 2 tablet, 2 tablet, Oral, BID PRN **AND** polyethylene glycol (MIRALAX) packet 17 g, 17 g, Oral, Daily PRN **AND** [DISCONTINUED] bisacodyl (DULCOLAX) EC tablet 5 mg, 5 mg, Oral, Daily PRN **AND** bisacodyl (DULCOLAX) suppository 10 mg, 10 mg, Rectal, Daily PRN, Ravinder Fox MD  •  dextrose 5 % and sodium chloride 0.45 % with KCl 20 mEq/L infusion, 100 mL/hr, Intravenous, Continuous, Lizzy Hermosillo MD, Last Rate: 100 mL/hr at 06/08/22 2221, 100 mL/hr at 06/08/22 2221  •  diphenoxylate-atropine (LOMOTIL) 2.5-0.025 MG per tablet 1 tablet, 1 tablet, Oral, Q2H PRN, Kobe Sheets MD  •  ipratropium-albuterol (DUO-NEB) nebulizer solution 3 mL, 3 mL, Nebulization, Q4H PRN, Flako Orantes MD  •  polyethyl glycol-propyl glycol (SYSTANE) 0.4-0.3 % ophthalmic solution (artificial tears) 1 drop, 1 drop, Both Eyes, Q30 Min PRN, Kobe Sheets MD  •  sodium chloride 0.9 % flush 10 mL, 10 mL, Intravenous, PRN, Eric Naranjo DO  •  sodium chloride 0.9 % flush 10 mL, 10 mL, Intravenous, Q12H, Vilma Lezama MD, 10 mL at 06/08/22 2109  •  sodium chloride 0.9 % flush 10 mL, 10 mL, Intravenous, PRN, Vilma Lezama MD  Review of Systems:    Review of systems could not be obtained due to  patient confusion.    Objective     Vital Signs  Temp:  [93.9 °F (34.4 °C)-98.4 °F (36.9 °C)] 97.9 °F (36.6 °C)  Heart Rate:  [53-92] 92  Resp:  [14-20] 18  BP: ()/() 109/76  Body mass  index is 23.44 kg/m².    Intake/Output Summary (Last 24 hours) at 6/9/2022 1033  Last data filed at 6/9/2022 0331  Gross per 24 hour   Intake 69.21 ml   Output 400 ml   Net -330.79 ml     No intake/output data recorded.     Physical Exam:   General: awake, alert and in no acute distress   Eyes: eyes move symmetrical in all directions, no scleral icterus   Neck: supple, trachea is midline   Skin: warm and dry, not jaundiced   Cardiovascular: no chest tenderness   Pulm: breathing unlabored   Abdomen: soft, nontender, nondistended; external bumper of G-tube at 4.5 cm; G-tube site with no drainage   Rectal: deferred        Results Review:     I reviewed the patient's new clinical results.    Results from last 7 days   Lab Units 06/08/22  0612 06/07/22  1124 06/06/22  1414   WBC 10*3/mm3 5.57 5.29 6.44   HEMOGLOBIN g/dL 7.8* 8.8* 8.5*   HEMATOCRIT % 21.7* 27.4* 23.5*   PLATELETS 10*3/mm3 108* 86* 48*     Results from last 7 days   Lab Units 06/08/22  0612 06/07/22  1124 06/06/22  1414   SODIUM mmol/L 148* 152* 154*   POTASSIUM mmol/L 3.2* 4.1 4.7   CHLORIDE mmol/L 119* 121* 122*   CO2 mmol/L 21.7* 15.5* 17.7*   BUN mg/dL 38* 41* 43*   CREATININE mg/dL 1.84* 2.08* 2.24*   CALCIUM mg/dL 8.6 8.9 8.9   BILIRUBIN mg/dL 0.5  --   --    ALK PHOS U/L 91  --   --    ALT (SGPT) U/L 10  --   --    AST (SGOT) U/L 8  --   --    GLUCOSE mg/dL 175* 90 81         Lab Results   Lab Value Date/Time    LIPASE 17 07/06/2019 1926       Radiology:  [unfilled]      Assessment & Plan   Assessment:     Malnutrition    Plan:     - s/p PEG placement 6/8  - ok to start tube feeds and resume po intake  - see endoscopy report regarding care of tube  - please call if questions or concerns    I discussed the patients findings and my recommendations with nursing staff.         Yanelis Mistry M.D.  [place] [address]  [place]  [id number]  Office: [phone]

## 2022-06-09 NOTE — PLAN OF CARE
Goal Outcome Evaluation:  Plan of Care Reviewed With: patient        Progress: declining   BP high at beginning of shift. Gave one time dose of clonidine 0.1 mg which lowered BP. Temp dropped to 94 F rectally. Received order for lety eliciaer which was initiated at 2200 and just D/C at 0521 when rectal temp reached 96.8. Flushed peg tube this shift. Site is clean and dry at 6 cm.

## 2022-06-10 LAB
ANION GAP SERPL CALCULATED.3IONS-SCNC: 11.3 MMOL/L (ref 5–15)
BUN SERPL-MCNC: 32 MG/DL (ref 8–23)
BUN/CREAT SERPL: 19.8 (ref 7–25)
CALCIUM SPEC-SCNC: 8.2 MG/DL (ref 8.6–10.5)
CHLORIDE SERPL-SCNC: 119 MMOL/L (ref 98–107)
CO2 SERPL-SCNC: 16.7 MMOL/L (ref 22–29)
CREAT SERPL-MCNC: 1.62 MG/DL (ref 0.76–1.27)
EGFRCR SERPLBLD CKD-EPI 2021: 42.4 ML/MIN/1.73
GLUCOSE SERPL-MCNC: 128 MG/DL (ref 65–99)
POTASSIUM SERPL-SCNC: 5.3 MMOL/L (ref 3.5–5.2)
SODIUM SERPL-SCNC: 147 MMOL/L (ref 136–145)

## 2022-06-10 PROCEDURE — 99232 SBSQ HOSP IP/OBS MODERATE 35: CPT | Performed by: INTERNAL MEDICINE

## 2022-06-10 PROCEDURE — 80048 BASIC METABOLIC PNL TOTAL CA: CPT | Performed by: INTERNAL MEDICINE

## 2022-06-10 RX ORDER — ASPIRIN 81 MG/1
81 TABLET, CHEWABLE ORAL DAILY
Status: DISCONTINUED | OUTPATIENT
Start: 2022-06-10 | End: 2022-06-14 | Stop reason: HOSPADM

## 2022-06-10 RX ORDER — BUSPIRONE HYDROCHLORIDE 10 MG/1
10 TABLET ORAL 2 TIMES DAILY
Status: DISCONTINUED | OUTPATIENT
Start: 2022-06-10 | End: 2022-06-14 | Stop reason: HOSPADM

## 2022-06-10 RX ORDER — FUROSEMIDE 20 MG/1
20 TABLET ORAL DAILY
Status: DISCONTINUED | OUTPATIENT
Start: 2022-06-10 | End: 2022-06-11

## 2022-06-10 RX ORDER — FAMOTIDINE 20 MG/1
20 TABLET, FILM COATED ORAL NIGHTLY
Status: DISCONTINUED | OUTPATIENT
Start: 2022-06-10 | End: 2022-06-14 | Stop reason: HOSPADM

## 2022-06-10 RX ADMIN — FUROSEMIDE 20 MG: 20 TABLET ORAL at 19:02

## 2022-06-10 RX ADMIN — Medication 10 ML: at 09:00

## 2022-06-10 RX ADMIN — BUSPIRONE HYDROCHLORIDE 10 MG: 10 TABLET ORAL at 22:50

## 2022-06-10 RX ADMIN — ASPIRIN 81 MG CHEWABLE TABLET 81 MG: 81 TABLET CHEWABLE at 19:02

## 2022-06-10 RX ADMIN — Medication 10 ML: at 22:50

## 2022-06-10 RX ADMIN — FAMOTIDINE 20 MG: 20 TABLET ORAL at 22:50

## 2022-06-10 NOTE — PLAN OF CARE
Goal Outcome Evaluation:           Progress: declining  Outcome Evaluation: Patient in bed lethargic throughout the shift. Patient started Fibersource feeding continously today. Tolerated well. No non-verbal indicators of pain or discomfort. Currently resting with family bedside.

## 2022-06-10 NOTE — PROGRESS NOTES
[facility]   Hospitalist Progress Note  Date: 6/10/2022  Patient Name: Jordin Menon  : 1940  MRN: [id number]  Date of admission: 2022  Consultants:   -Pulmonology/Critical Care: Dr. Kobe Sheets, Dr. Robert Carbajal    Subjective   Subjective     Chief Complaint: Altered mental status, hypothermia, hypoglycemia and bradycardia    Summary:   Jordin Menon is a 82 y.o. male with frequent hospitalizations for sepsis, pneumonia and renal failure who has Alzheimer's dementia who presented to ED from his nursing home due to worsening mental status and hypothermia. Evaluation in ED significant for patient being hypoglycemic, bradycardic and hypothermic. Patient also noted to be lethargic only grimacing to pain but not following commands. Hospitalist service contacted for further evaluation management. Pulmonology/Critical Care consulted. Patient started on empiric antibiotics and levophed. NG tube initially inserted and tube feeds started.  Palliative care consulted.  Discussions were had between Pulmonology/Critical Care service and patient's family and decision has been made to discontinue invasive care measures and pursue comfort care measures only.  However, family at a later time decided that they did not wish to pursue comfort care measures at this time and invasive care measures were resumed.  Based on patient's living will feeding tube was not inserted.  Patient's condition did not improve after use of Wing hugger and resumption of IV antibiotics.  Critical care service contacted and had additional discussion with patient's family, decision then made to again pursue comfort care measures only.  On the evening of  family changed goals to full measures again but continue DNR.  PEG tube placed      Interval Followup:   Patient is sleepy but wakes up and tells me that he has no pain and no complaints today.  No events overnight.  Irby removed today and no current signs of urinary  "retention    Review of Systems   Unable to obtain secondary to altered mental status, patient responds \"I will be all right\" to any questions asked    Objective   Objective     Vitals:   Temp:  [97.4 °F (36.3 °C)-97.8 °F (36.6 °C)] 97.8 °F (36.6 °C)  Heart Rate:  [82-90] 85  Resp:  [16-18] 17  BP: (110-121)/(60-92) 118/92  Physical Exam   Gen: NAD, WDWN elderly male lying in bed comfortably   HEENT: NCAT, mmm  Resp: CTAB no wrr, no dyspnea  CV: RRR no mrg, trace LE edema  GI: Abdomen soft NT, ND +bs, PEG tube in place with tube feeds infusing  Psych: AOx 1, self, normal mood and affect    Result Review    Result Review:  I have personally reviewed the results from the time of this admission to 6/10/2022 15:15 EDT and agree with these findings:  [x]  Laboratory: Labs pending today  Sodium 150  Bicarb 21  Creatinine 2-1.7  White count 5  Hemoglobin 7.8  Platelets 108  [x]  Microbiology: Negative  []  Radiology:   []  EKG/Telemetry:   []  Cardiology/Vascular:    []  Pathology:  []  Old records:  []  Other:    Assessment & Plan   Assessment / Plan      Assessment:  Septic shock due to unknown source  HCAP due to unspecified organism versus aspiration pneumonia  Therapeutic drug level monitoring, vancomycin  Toxic/metabolic encephalopathy  Acute renal failure   Hypothermia  Hypernatremia  Hypokalemia  Hypomagnesemia  Hypoglycemia  History of Alzheimer's dementia  GERD  DVT  Hypoalbuminemia  Hypophosphatemia    Plan:  Tube feeds initiated per PEG tube  Thrombocytopenia improving  DC Irby catheter to see if he is able to urinate independently--Irby catheter removed today  Holding Eliquis Anne-procedurally  Reinitiating aspirin, Lasix, BuSpar and Pepcid today    DVT Prophylaxis: SCDs , resume eliquis tomorrow  Diet: Puréed nectar thick, tube feeds  Plan discussed with bedside RN     "

## 2022-06-10 NOTE — CONSULTS
"Nutrition Services    Patient Name: Jordin Menon  YOB: 1940  MRN: [id number]  Admission date: 5/29/2022      CLINICAL NUTRITION ASSESSMENT      Reason for Assessment  EN     H&P:    Past Medical History:   Diagnosis Date   • Alzheimer's dementia with behavioral disturbance (HCC)    • Anemia, vitamin B12 deficiency    • Anxiety disorder due to known physiological condition    • Arthritis    • Constipation    • Gastroesophageal reflux disease without esophagitis    • High blood pressure    • HTN (hypertension)    • Hyperlipidemia    • Ingrowing toenail 09/21/2018   • Left foot pain 09/21/2018   • Osteoarthritis of right knee 08/22/2016   • Right foot pain 09/21/2018   • Sleep apnea    • Tinea unguium 09/21/2018        Current Problems:   Active Hospital Problems    Diagnosis    • **Severe malnutrition (HCC)    • Severe sepsis (HCC)    • Hypothermia, initial encounter         Nutrition/Diet History         Narrative     Pt started on EN yesterday 6/9. TF currently running at 60 mL/hr with free water flushes 100 mL q3hr. Pt currently hypernatremic with 3.36 L water deficit, will increase free water flush. Pt opened eyes while RD was in the room, able to communicate that stomach \"feels fine\" and requested lights be shut off, RD obliged. Weight records show sig gain of 30 lbs x 1 month, pt has been NPO or with minimal intake since admission, questionable accuracy on recent weights. Will continue current TF rate at this time until a new weight is obtained.        Anthropometrics        Current Height, Weight Height: 182.9 cm (72\")  Weight: 78.4 kg (172 lb 13.5 oz)   Current BMI Body mass index is 23.44 kg/m².       Weight Hx  Wt Readings from Last 30 Encounters:   06/02/22 0415 78.4 kg (172 lb 13.5 oz)   06/01/22 0600 74.2 kg (163 lb 9.3 oz)   05/29/22 2100 67.4 kg (148 lb 9.4 oz)   05/29/22 1148 76.2 kg (167 lb 15.9 oz)   05/21/22 0500 76.1 kg (167 lb 12.3 oz)   05/19/22 0600 55.3 kg (121 lb 14.6 oz) "   05/18/22 0500 56.5 kg (124 lb 9 oz)   05/17/22 1834 54.1 kg (119 lb 4.3 oz)   05/01/22 1534 62.7 kg (138 lb 3.7 oz)   04/14/22 1751 66.5 kg (146 lb 9.7 oz)   04/14/22 1307 66.5 kg (146 lb 9.7 oz)   03/07/22 0900 69.9 kg (154 lb 1.6 oz)   03/06/22 2348 69.2 kg (152 lb 8.9 oz)   03/04/22 0353 66 kg (145 lb 8.1 oz)   02/28/22 0608 69 kg (152 lb 1.9 oz)   02/27/22 0400 68.6 kg (151 lb 3.8 oz)   02/25/22 1540 73.6 kg (162 lb 4.1 oz)   02/04/22 1854 73.6 kg (162 lb 4.1 oz)   07/28/21 1307 84.4 kg (186 lb)   03/05/19 1516 78.5 kg (173 lb)   09/27/18 0000 88.5 kg (195 lb)   09/21/18 0000 90.7 kg (200 lb)   03/22/18 0000 82.6 kg (182 lb)            Wt Change Observation Questionable accuracy, unable to assess     Estimated/Assessed Needs       Energy Requirements 30-35 kcal/kg IBW   EST Needs (kcal/day) 4582-0290       Protein Requirements 1-1.2 g/kg   EST Daily Needs (g/day) 78-93       Fluid Requirements     Estimated Needs (mL/day) 2332 mL/d     Labs/Medications         Pertinent Labs Reviewed.   Results from last 7 days   Lab Units 06/09/22  1150 06/08/22  0612 06/07/22  1124   SODIUM mmol/L 150* 148* 152*   POTASSIUM mmol/L 3.5 3.2* 4.1   CHLORIDE mmol/L 120* 119* 121*   CO2 mmol/L 24.0 21.7* 15.5*   BUN mg/dL 30* 38* 41*   CREATININE mg/dL 1.79* 1.84* 2.08*   CALCIUM mg/dL 8.2* 8.6 8.9   BILIRUBIN mg/dL  --  0.5  --    ALK PHOS U/L  --  91  --    ALT (SGPT) U/L  --  10  --    AST (SGOT) U/L  --  8  --    GLUCOSE mg/dL 112* 175* 90     Results from last 7 days   Lab Units 06/09/22  1150 06/08/22  0612 06/07/22  1124 06/06/22  1414   MAGNESIUM mg/dL  --  1.7 1.8 1.9   PHOSPHORUS mg/dL  --   --  2.8 2.8   HEMOGLOBIN g/dL 8.0* 7.8* 8.8* 8.5*   HEMATOCRIT % 21.7* 21.7* 27.4* 23.5*     COVID19   Date Value Ref Range Status   05/29/2022 Not Detected Not Detected - Ref. Range Final     Lab Results   Component Value Date    HGBA1C 4.90 05/30/2022         Pertinent Medications Reviewed.     Current Nutrition Orders &  Evaluation of Intake       Oral Nutrition     Current PO Diet Diet Pureed; [place] / Syrup Thick   Supplement Orders Placed This Encounter      Diet, Tube Feeding Tube Feeding Formula: Fibersource HN (Jevity 1.2); Tube Feeding Type: Continuous; Continuous Tube Feeding Start Rate (mL/hr): 20; Then Advance Rate By (mL/hr): 20; Every __ Hours: 4; To Goal Rate of (mL/hr): 70       Malnutrition Severity Assessment      Patient meets criteria for : Severe Malnutrition         Nutrition Diagnosis         Nutrition Dx Problem 1 Severe malnutrition related to inadequate energy Intake as evidenced by unintended wt change. and report of minimal PO intake.       Nutrition Intervention         Fibersource HN @ 70 ml/hr  Free water flushes 125 ml Q3H  Provides 2016 kcal, 91 g pro, 2361 ml fluids     Medical Nutrition Therapy/Nutrition Education          Learner     Readiness Other  Education not appropriate at this time     Method     Response Other  Other     Monitor/Evaluation        Monitor Per protocol, PO intake, Pertinent labs, EN delivery/tolerance       Nutrition Discharge Plan         To be determined       Electronically signed by:  MERLYN SHEFFIELD RD  06/10/22 14:37 EDT

## 2022-06-11 LAB
ANION GAP SERPL CALCULATED.3IONS-SCNC: 12 MMOL/L (ref 5–15)
BUN SERPL-MCNC: 32 MG/DL (ref 8–23)
BUN/CREAT SERPL: 19.6 (ref 7–25)
CALCIUM SPEC-SCNC: 8 MG/DL (ref 8.6–10.5)
CHLORIDE SERPL-SCNC: 117 MMOL/L (ref 98–107)
CO2 SERPL-SCNC: 17 MMOL/L (ref 22–29)
CREAT SERPL-MCNC: 1.63 MG/DL (ref 0.76–1.27)
EGFRCR SERPLBLD CKD-EPI 2021: 42.1 ML/MIN/1.73
GLUCOSE SERPL-MCNC: 141 MG/DL (ref 65–99)
POTASSIUM SERPL-SCNC: 4.8 MMOL/L (ref 3.5–5.2)
SODIUM SERPL-SCNC: 146 MMOL/L (ref 136–145)

## 2022-06-11 PROCEDURE — 99239 HOSP IP/OBS DSCHRG MGMT >30: CPT | Performed by: INTERNAL MEDICINE

## 2022-06-11 PROCEDURE — 94799 UNLISTED PULMONARY SVC/PX: CPT

## 2022-06-11 PROCEDURE — 25010000002 FUROSEMIDE PER 20 MG: Performed by: INTERNAL MEDICINE

## 2022-06-11 PROCEDURE — 80048 BASIC METABOLIC PNL TOTAL CA: CPT | Performed by: INTERNAL MEDICINE

## 2022-06-11 PROCEDURE — 94660 CPAP INITIATION&MGMT: CPT

## 2022-06-11 RX ORDER — POLYETHYLENE GLYCOL 3350 17 G/17G
17 POWDER, FOR SOLUTION ORAL 2 TIMES DAILY
Status: DISCONTINUED | OUTPATIENT
Start: 2022-06-11 | End: 2022-06-13

## 2022-06-11 RX ORDER — BISACODYL 10 MG
10 SUPPOSITORY, RECTAL RECTAL DAILY
Status: DISCONTINUED | OUTPATIENT
Start: 2022-06-11 | End: 2022-06-13

## 2022-06-11 RX ORDER — FUROSEMIDE 10 MG/ML
40 INJECTION INTRAMUSCULAR; INTRAVENOUS ONCE
Status: COMPLETED | OUTPATIENT
Start: 2022-06-11 | End: 2022-06-11

## 2022-06-11 RX ADMIN — ASPIRIN 81 MG CHEWABLE TABLET 81 MG: 81 TABLET CHEWABLE at 09:51

## 2022-06-11 RX ADMIN — BUSPIRONE HYDROCHLORIDE 10 MG: 10 TABLET ORAL at 09:52

## 2022-06-11 RX ADMIN — Medication 10 ML: at 22:05

## 2022-06-11 RX ADMIN — BUSPIRONE HYDROCHLORIDE 10 MG: 10 TABLET ORAL at 22:04

## 2022-06-11 RX ADMIN — FUROSEMIDE 40 MG: 10 INJECTION, SOLUTION INTRAMUSCULAR; INTRAVENOUS at 09:52

## 2022-06-11 RX ADMIN — Medication 10 ML: at 09:52

## 2022-06-11 RX ADMIN — FAMOTIDINE 20 MG: 20 TABLET ORAL at 22:04

## 2022-06-11 NOTE — DISCHARGE SUMMARY
[facility]         HOSPITALIST  DISCHARGE SUMMARY    Patient Name: Jordin Menon  : 1940  MRN: [id number]    Date of Admission: 2022  Date of Discharge:  22  Primary Care Physician: Frank Llanes MD    Consultants:  Gastroenterology Dr. Mistry  Pulmonolog    Discharge Diagnosis:  Septic shock due to unknown source  HCAP due to unspecified organism versus aspiration pneumonia  Therapeutic drug level monitoring, vancomycin  Toxic/metabolic encephalopathy  Acute renal failure   Hypothermia  Hypernatremia  Hypokalemia  Hypomagnesemia  Hypoglycemia  History of Alzheimer's dementia  GERD  DVT  Hypoalbuminemia  Hypophosphatemia    Hospital Course     Hospital Course:  Jordin Menon is a 82 y.o. male with frequent hospitalizations for sepsis, pneumonia and renal failure who has Alzheimer's dementia who presented to ED from his nursing home due to worsening mental status and hypothermia. Evaluation in ED significant for patient being hypoglycemic, bradycardic and hypothermic. Patient also noted to be lethargic only grimacing to pain but not following commands.  Patient admitted to ICU, family transition the patient to comfort measures only however at a later time decided to resume intensive care measures.  Based on the patient's living well feeding tube was not inserted, patient's condition did not improve after aggressive measures and again family transitioned him to comfort care.  On the evening of  family changed goals to full measures again but continued DNR status.  PEG tube placed  per family wishes.  After PEG tube placement tube feeds were initiated, electrolyte abnormalities improved, patient is appropriate for discharge back to his nursing home today to continue tube feeds.  Home medications to be resumed with exception of Remeron which may have been contributing to his sedation.    Patient intermittently have temperatures down to 93 °F which would  spontaneously improve, patient appears to have a low resting body temperature.    DISCHARGE Follow Up Recommendations:   Transfer to nursing home  Continue tube feeds  Check BMP in 5 days      Day of Discharge     Vital Signs:  Temp:  [93.7 °F (34.3 °C)-97.4 °F (36.3 °C)] 94.1 °F (34.5 °C)  Heart Rate:  [53-88] 84  Resp:  [15-16] 16  BP: (108-134)/(56-93) 132/93  Physical Exam:   Gen: NAD, WDWN elderly male lying in bed comfortably   HEENT: NCAT, mmm  Resp: CTAB no wrr, no dyspnea  CV: RRR no mrg, trace upper and LE edema  GI: Abdomen soft NT, ND +bs, PEG tube in place with tube feeds infusing  Psych: AOx 1, self, normal mood and affect    Discharge Details        Discharge Medications      Continue These Medications      Instructions Start Date   acetaminophen 325 MG tablet  Commonly known as: TYLENOL   650 mg, Oral, 3 Times Daily      apixaban 5 MG tablet tablet  Commonly known as: ELIQUIS   5 mg, Oral, Every 12 Hours Scheduled      aspirin 81 MG chewable tablet   81 mg, Oral, Daily      busPIRone 10 MG tablet  Commonly known as: BUSPAR   10 mg, Oral, 2 Times Daily      Cepacol Sore Throat Spray 0.1-33 % liquid  Generic drug: Dyclonine-Glycerin   2 sprays, Mouth/Throat, 4 Times Daily PRN      Diclofenac Sodium 1 % gel gel  Commonly known as: VOLTAREN   4 g, Topical, 2 Times Daily PRN      docusate sodium 100 MG capsule  Commonly known as: COLACE   200 mg, Oral, Daily, Pt takes along with 50 mg for a total of 250 mg QD      docusate sodium 50 MG capsule  Commonly known as: COLACE   50 mg, Oral, Daily, Pt takes along with 2- 100 mg Capsules for a total of 250 mg      famotidine 20 MG tablet  Commonly known as: PEPCID   20 mg, Oral, Nightly      furosemide 20 MG tablet  Commonly known as: Lasix   20 mg, Oral, Daily      ipratropium-albuterol 0.5-2.5 mg/3 ml nebulizer  Commonly known as: DUO-NEB   3 mL, Nebulization, Every 4 Hours PRN      lidocaine 4 % cream  Commonly known as: LMX   1 application, Topical, 2 Times  Daily PRN      Lidocaine HCl 4 % cream  Commonly known as: LMX   1 application, Topical, 2 Times Daily PRN      magnesium hydroxide 400 MG/5ML suspension  Commonly known as: MILK OF MAGNESIA   30 mL, Oral, Daily PRN      melatonin 5 MG tablet tablet   5 mg, Oral, Nightly      mineral oil-hydrophilic petrolatum ointment   1 application, Topical, 2 Times Daily PRN      polyethylene glycol packet  Commonly known as: MIRALAX   17 g, Oral, Daily      witch hazel-glycerin pad  Commonly known as: TUCKS   1 pad, Topical, As Needed         Stop These Medications    hydrOXYzine 25 MG tablet  Commonly known as: ATARAX     linezolid 600 MG tablet  Commonly known as: Zyvox     mirtazapine 15 MG tablet  Commonly known as: REMERON     mirtazapine 7.5 MG half tablet  Commonly known as: REMERON            Discharge Disposition:   Skilled Nursing Facility (DC - External)    Discharge Condition: Stable    Diet:  Hospital:  Diet Order   Procedures   • Diet Pureed; Nectar / Syrup Thick       Discharge Activity: As tolerated      No future appointments.    Additional Instructions for the Follow-ups that You Need to Schedule     Discharge Follow-up with PCP   As directed       Currently Documented PCP:    Frank Llanes MD    PCP Phone Number:    [phone]     Follow Up Details: 1 week               Pertinent  and/or Most Recent Results     PROCEDURES:   PEG tube placement    LAB and IMAGING RESULTS:      Lab 06/09/22  1150 06/08/22 0612 06/07/22  1124 06/06/22  1414   WBC 5.87 5.57 5.29 6.44   HEMOGLOBIN 8.0* 7.8* 8.8* 8.5*   HEMATOCRIT 21.7* 21.7* 27.4* 23.5*   PLATELETS 160 108* 86* 48*   NEUTROS ABS  --   --   --  4.00   IMMATURE GRANS (ABS)  --   --   --  0.32*   LYMPHS ABS  --   --   --  1.52   MONOS ABS  --   --   --  0.53   EOS ABS  --   --   --  0.04   MCV 77.2* 78.1* 87.8 78.6*   LACTATE  --  1.0  --   --          Lab 06/11/22  1318 06/10/22  2134 06/09/22  1150 06/08/22  0612 06/07/22  1124 06/06/22  1414   SODIUM  146* 147* 150* 148* 152* 154*   POTASSIUM 4.8 5.3* 3.5 3.2* 4.1 4.7   CHLORIDE 117* 119* 120* 119* 121* 122*   CO2 17.0* 16.7* 24.0 21.7* 15.5* 17.7*   ANION GAP 12.0 11.3 6.0 7.3 15.5* 14.3   BUN 32* 32* 30* 38* 41* 43*   CREATININE 1.63* 1.62* 1.79* 1.84* 2.08* 2.24*   EGFR 42.1* 42.4* 37.6* 36.4* 31.4* 28.7*   GLUCOSE 141* 128* 112* 175* 90 81   CALCIUM 8.0* 8.2* 8.2* 8.6 8.9 8.9   MAGNESIUM  --   --   --  1.7 1.8 1.9   PHOSPHORUS  --   --   --   --  2.8 2.8     Time spent on Discharge including face to face service: Greater than 35 minutes    Electronically signed by Lizzy Hermosillo MD, 06/11/22, 2:07 PM EDT.      Addendum:    Patient is discharging to SNF today, 6/14/2022.  Discussed with Rose Mary Khan, Director of case management, who has received clearance from legal for discharge today.  Patient seen on date of discharge, clinically and hemodynamically stable.  Patient provided concerning signs and symptoms prompting immediate medical attention, patient understanding and agreeable.  Instructions on tube feeds will be sent with patient.      Electronically signed by Mj Butler MD, 06/14/22, 2:12 PM EDT.

## 2022-06-11 NOTE — NURSING NOTE
Patient continue to be confused. Tube feedings continuous and patient refusing to eat. Family refused transfer to nursing facility and plan to file an appeal. Patient is currently resting in bed and daughter is on her way to be at bedside.

## 2022-06-11 NOTE — PLAN OF CARE
Goal Outcome Evaluation:           Progress: no change  Outcome Evaluation: Patient continues on tube feeding, tolerating well. Possible discharge tomorrow to [facility] and rehab. Currently resting in bed with family bedside.

## 2022-06-11 NOTE — PROGRESS NOTES
[facility]   Hospitalist Progress Note  Date: 2022  Patient Name: Jordin Menon  : 1940  MRN: [id number]  Date of admission: 2022  Consultants:   -Pulmonology/Critical Care: Dr. Kobe Sheets, Dr. Robert Carbajal    Subjective   Subjective     Chief Complaint: Altered mental status, hypothermia, hypoglycemia and bradycardia    Summary:   Jordin Menon is a 82 y.o. male with frequent hospitalizations for sepsis, pneumonia and renal failure who has Alzheimer's dementia who presented to ED from his nursing home due to worsening mental status and hypothermia. Evaluation in ED significant for patient being hypoglycemic, bradycardic and hypothermic. Patient also noted to be lethargic only grimacing to pain but not following commands. Hospitalist service contacted for further evaluation management. Pulmonology/Critical Care consulted. Patient started on empiric antibiotics and levophed. NG tube initially inserted and tube feeds started.  Palliative care consulted.  Discussions were had between Pulmonology/Critical Care service and patient's family and decision has been made to discontinue invasive care measures and pursue comfort care measures only.  However, family at a later time decided that they did not wish to pursue comfort care measures at this time and invasive care measures were resumed.  Based on patient's living will feeding tube was not inserted.  Patient's condition did not improve after use of Wing hugger and resumption of IV antibiotics.  Critical care service contacted and had additional discussion with patient's family, decision then made to again pursue comfort care measures only.  On the evening of  family changed goals to full measures again but continue DNR.  PEG tube placed .  Irby removed and patient is urinating without signs of retention     Interval Followup:   Low temperature today but is improving without intervention.  Patient is confused but answers basic questions  however is incorrect with his answers.  Lab having difficulty sticking him for blood today.    Review of Systems   Unable to obtain secondary to altered mental status, unreliable historian due to his dementia    Objective   Objective     Vitals:   Temp:  [93.7 °F (34.3 °C)-97.4 °F (36.3 °C)] 94.1 °F (34.5 °C)  Heart Rate:  [53-88] 84  Resp:  [15-16] 16  BP: (108-134)/(56-93) 132/93  Physical Exam   Gen: NAD, WDWN elderly male lying in bed comfortably   HEENT: NCAT, mmm  Resp: CTAB no wrr, no dyspnea  CV: RRR no mrg, trace upper and LE edema  GI: Abdomen soft NT, ND +bs, PEG tube in place with tube feeds infusing  Psych: AOx 1, self, normal mood and affect    Result Review    Result Review:  I have personally reviewed the results from the time of this admission to 6/11/2022 14:01 EDT and agree with these findings:  [x]  Laboratory:   Sodium 146  Potassium improved  Creatinine stable  [x]  Microbiology: Negative  []  Radiology:   []  EKG/Telemetry:   []  Cardiology/Vascular:    []  Pathology:  []  Old records:  []  Other:    Assessment & Plan   Assessment / Plan      Assessment:  Septic shock due to unknown source  HCAP due to unspecified organism versus aspiration pneumonia  Therapeutic drug level monitoring, vancomycin  Toxic/metabolic encephalopathy  Acute renal failure   Hypothermia  Hypernatremia  Hypokalemia  Hypomagnesemia  Hypoglycemia  History of Alzheimer's dementia  GERD  DVT  Hypoalbuminemia  Hypophosphatemia    Plan:  Tube feeds initiated per PEG tube  Thrombocytopenia improving  Resume Eliquis tomorrow morning  Continue aspirin, Lasix, BuSpar and Pepcid  IV Lasix x1  Occasionally MiraLAX and Dulcolax initiated  Planning for discharge to nursing home today    DVT Prophylaxis: SCDs , resume eliquis tomorrow  Diet: Puréed nectar thick, tube feeds  Plan discussed with bedside RN

## 2022-06-12 PROCEDURE — 99232 SBSQ HOSP IP/OBS MODERATE 35: CPT | Performed by: INTERNAL MEDICINE

## 2022-06-12 RX ORDER — FUROSEMIDE 40 MG/1
40 TABLET ORAL DAILY
Status: DISCONTINUED | OUTPATIENT
Start: 2022-06-12 | End: 2022-06-14 | Stop reason: HOSPADM

## 2022-06-12 RX ADMIN — POLYETHYLENE GLYCOL 3350 17 G: 17 POWDER, FOR SOLUTION ORAL at 08:33

## 2022-06-12 RX ADMIN — FAMOTIDINE 20 MG: 20 TABLET ORAL at 21:13

## 2022-06-12 RX ADMIN — Medication 10 ML: at 08:33

## 2022-06-12 RX ADMIN — BUSPIRONE HYDROCHLORIDE 10 MG: 10 TABLET ORAL at 21:12

## 2022-06-12 RX ADMIN — POLYETHYLENE GLYCOL 3350 17 G: 17 POWDER, FOR SOLUTION ORAL at 21:12

## 2022-06-12 RX ADMIN — Medication 10 ML: at 21:12

## 2022-06-12 RX ADMIN — APIXABAN 5 MG: 5 TABLET, FILM COATED ORAL at 13:09

## 2022-06-12 RX ADMIN — APIXABAN 5 MG: 5 TABLET, FILM COATED ORAL at 21:12

## 2022-06-12 RX ADMIN — BUSPIRONE HYDROCHLORIDE 10 MG: 10 TABLET ORAL at 08:33

## 2022-06-12 RX ADMIN — ASPIRIN 81 MG CHEWABLE TABLET 81 MG: 81 TABLET CHEWABLE at 08:33

## 2022-06-12 RX ADMIN — FUROSEMIDE 40 MG: 40 TABLET ORAL at 08:33

## 2022-06-12 NOTE — PLAN OF CARE
Goal Outcome Evaluation:           Progress: no change  Outcome Evaluation: Pt continues to tolerate tube feed well with 12mL being the most residual. Pt possible dc tomorrow to signature.

## 2022-06-12 NOTE — PROGRESS NOTES
[facility]   Hospitalist Progress Note  Date: 2022  Patient Name: Jordin Menon  : 1940  MRN: [id number]  Date of admission: 2022  Consultants:   -Pulmonology/Critical Care: Dr. Kobe Sheets, Dr. Robert Carbajal    Subjective   Subjective     Chief Complaint: Altered mental status, hypothermia, hypoglycemia and bradycardia    Summary:   Jordin Menon is a 82 y.o. male with frequent hospitalizations for sepsis, pneumonia and renal failure who has Alzheimer's dementia who presented to ED from his nursing home due to worsening mental status and hypothermia. Evaluation in ED significant for patient being hypoglycemic, bradycardic and hypothermic. Patient also noted to be lethargic only grimacing to pain but not following commands. Hospitalist service contacted for further evaluation management. Pulmonology/Critical Care consulted. Patient started on empiric antibiotics and levophed. NG tube initially inserted and tube feeds started.  Palliative care consulted.  Discussions were had between Pulmonology/Critical Care service and patient's family and decision has been made to discontinue invasive care measures and pursue comfort care measures only.  However, family at a later time decided that they did not wish to pursue comfort care measures at this time and invasive care measures were resumed.  Based on patient's living will feeding tube was not inserted.  Patient's condition did not improve after use of Wing hugger and resumption of IV antibiotics.  Critical care service contacted and had additional discussion with patient's family, decision then made to again pursue comfort care measures only.  On the evening of  family changed goals to full measures again but continue DNR.  PEG tube placed .  Irby removed and patient is urinating without signs of retention     Interval Followup:   Patient was discharged to nursing home yesterday however discharge is being appealed.  I was able to speak with  "the patient's son Mr. Jordin Menon yesterday afternoon.  He was under the impression that by placing the PEG tube the patient would not be able to return to the nursing home and would stay in the acute care hospital for the remainder of his life.  We discussed that the patient had stabilized and was tolerating his tube feeds and is clinically ready to go back to the nursing home.  Family desires for the patient to stay in the hospital because they are concerned he will deteriorate when he goes to the nursing home.  They wish for him to have permanent placement at [facility].  I explained that this was not an option in the patient's care plan and that he is to be discharged to the nursing home.  Appeal ongoing.    Review of Systems   Unable to obtain secondary to altered mental status, unreliable historian due to his dementia    Objective   Objective     Vitals:   Temp:  [93.5 °F (34.2 °C)-94.5 °F (34.7 °C)] 94.5 °F (34.7 °C)  Heart Rate:  [72-80] 78  Resp:  [16] 16  BP: (107-149)/(66-87) 147/75  Physical Exam   Gen: NAD, WDWN elderly male lying in bed comfortably, opens eyes to voice  HEENT: NCAT, mmm  Resp: CTAB no wrr, no dyspnea  CV: RRR no mrg, 1+ pitting upper and lower extremity edema  GI: Abdomen soft NT, ND +bs, PEG tube in place with tube feeds infusing  Psych: Sleepy, opens eyes to voice and mumbles \"I am okay\" but does not answer pointed questions    Result Review    Result Review:  I have personally reviewed the results from the time of this admission to 6/12/2022 12:00 EDT and agree with these findings:  []  Laboratory:   None today  []  Microbiology: Negative  []  Radiology:   []  EKG/Telemetry:   []  Cardiology/Vascular:    []  Pathology:  []  Old records:  []  Other:    Assessment & Plan   Assessment / Plan      Assessment:  Septic shock due to unknown source  HCAP due to unspecified organism versus aspiration pneumonia  Therapeutic drug level monitoring, vancomycin  Toxic/metabolic " encephalopathy  Acute renal failure   Hypothermia  Hypernatremia  Hypokalemia  Hypomagnesemia  Hypoglycemia  History of Alzheimer's dementia  GERD  DVT  Hypoalbuminemia  Hypophosphatemia    Plan:  Tube feeds initiated per PEG tube  Thrombocytopenia improving  Resume Eliquis today  Continue aspirin, Lasix, BuSpar and Pepcid  Resume oral Lasix  Continue stool softeners   planning for discharge to nursing home, appeal pending    DVT Prophylaxis: Eliquis  Diet: Puréed nectar thick, tube feeds  Plan discussed with bedside RN

## 2022-06-12 NOTE — CASE MANAGEMENT/SOCIAL WORK
Spoke with Shandra Menon patient's daughter on phone. She states they called MARY to start discharge appeal on 6/11/22 a little after 1630. I also delivered IM and Detailed Notice of Discharge to her also. Will mail copy of both to her.  We have not received any request for records at this time. I called MARY and spoke with someone and they said they would check answering machine and send request for records.

## 2022-06-12 NOTE — PLAN OF CARE
Goal Outcome Evaluation:  Pt continues to tolerate enteral nutrition well at goal rate. Sodium trending down, receiving 2.3L fluid to cover free water deficit of 2L. Will CTM and adjust flushes as needed.

## 2022-06-12 NOTE — PLAN OF CARE
Goal Outcome Evaluation:  Plan of Care Reviewed With: patient        Progress: no change  Outcome Evaluation: PT. CONT. ON TUBE FEEDING WITH NO RESIDUAL, TOLERATING WELL. DAUGHTER AT BEDSIDE.

## 2022-06-13 LAB
ANION GAP SERPL CALCULATED.3IONS-SCNC: 5 MMOL/L (ref 5–15)
BUN SERPL-MCNC: 38 MG/DL (ref 8–23)
BUN/CREAT SERPL: 24.5 (ref 7–25)
CALCIUM SPEC-SCNC: 7.9 MG/DL (ref 8.6–10.5)
CHLORIDE SERPL-SCNC: 110 MMOL/L (ref 98–107)
CO2 SERPL-SCNC: 26 MMOL/L (ref 22–29)
CREAT SERPL-MCNC: 1.55 MG/DL (ref 0.76–1.27)
EGFRCR SERPLBLD CKD-EPI 2021: 44.7 ML/MIN/1.73
GLUCOSE SERPL-MCNC: 105 MG/DL (ref 65–99)
POTASSIUM SERPL-SCNC: 4.1 MMOL/L (ref 3.5–5.2)
SODIUM SERPL-SCNC: 141 MMOL/L (ref 136–145)

## 2022-06-13 PROCEDURE — 80048 BASIC METABOLIC PNL TOTAL CA: CPT | Performed by: INTERNAL MEDICINE

## 2022-06-13 PROCEDURE — 99232 SBSQ HOSP IP/OBS MODERATE 35: CPT | Performed by: INTERNAL MEDICINE

## 2022-06-13 RX ADMIN — FAMOTIDINE 20 MG: 20 TABLET ORAL at 21:22

## 2022-06-13 RX ADMIN — ASPIRIN 81 MG CHEWABLE TABLET 81 MG: 81 TABLET CHEWABLE at 10:22

## 2022-06-13 RX ADMIN — APIXABAN 5 MG: 5 TABLET, FILM COATED ORAL at 21:22

## 2022-06-13 RX ADMIN — Medication 10 ML: at 21:22

## 2022-06-13 RX ADMIN — APIXABAN 5 MG: 5 TABLET, FILM COATED ORAL at 10:22

## 2022-06-13 RX ADMIN — BUSPIRONE HYDROCHLORIDE 10 MG: 10 TABLET ORAL at 21:22

## 2022-06-13 RX ADMIN — FUROSEMIDE 40 MG: 40 TABLET ORAL at 10:22

## 2022-06-13 RX ADMIN — BUSPIRONE HYDROCHLORIDE 10 MG: 10 TABLET ORAL at 11:53

## 2022-06-13 RX ADMIN — Medication 10 ML: at 10:22

## 2022-06-13 NOTE — CASE MANAGEMENT/SOCIAL WORK
Spoke with Shandra Menon daughter around 1200 today. Explained that MARY has not received a call to intiate discharge appeal. I told her to please call them and make sure she speaks to a person and not leave a message to initiate the appeal. She said she would call.

## 2022-06-13 NOTE — PLAN OF CARE
Goal Outcome Evaluation:  Plan of Care Reviewed With: patient        Progress: no change       PATIENT RECEIVING TUBE FEEDS, ALERT TO SELF WITH SLURRED/GARBLED SPEECH, NO ACUTE EVENTS

## 2022-06-13 NOTE — SIGNIFICANT NOTE
06/13/22 0818   Plan   Plan SW followed up with [facility] on 6/12/22 and facility states patients room is ready for transfer. HENNA and MARYBEL RN both contacted patients health care surrogate, Shandra, and Shandra states she has filed an appeal with [facility]. CM RN will continue to follow for discharge needs.

## 2022-06-13 NOTE — CASE MANAGEMENT/SOCIAL WORK
Attempted to contact, Shandra Barlow, Renan Menon and Jordin Menon regarding clarification of discharge appeal.  Message left for all to return call to Utilization review. [facility] and at this point, they have no record of call from Shandra Menon on 6/11/22 to initiate appeal.Daughter states she left message on Voicemail at [facility], however [facility] has no record of call.

## 2022-06-13 NOTE — PROGRESS NOTES
[facility]   Hospitalist Progress Note  Date: 2022  Patient Name: Jordin Menon  : 1940  MRN: [id number]  Date of admission: 2022  Consultants:   -Pulmonology/Critical Care: Dr. Kobe Sheets, Dr. Robert Carbajal    Subjective   Subjective     Chief Complaint: Altered mental status, hypothermia, hypoglycemia and bradycardia    Summary:   Jordin Menon is a 82 y.o. male with frequent hospitalizations for sepsis, pneumonia and renal failure who has Alzheimer's dementia who presented to ED from his nursing home due to worsening mental status and hypothermia. Evaluation in ED significant for patient being hypoglycemic, bradycardic and hypothermic. Patient also noted to be lethargic only grimacing to pain but not following commands. Hospitalist service contacted for further evaluation management. Pulmonology/Critical Care consulted. Patient started on empiric antibiotics and levophed. NG tube initially inserted and tube feeds started.  Palliative care consulted.  Discussions were had between Pulmonology/Critical Care service and patient's family and decision has been made to discontinue invasive care measures and pursue comfort care measures only.  However, family at a later time decided that they did not wish to pursue comfort care measures at this time and invasive care measures were resumed.  Based on patient's living will feeding tube was not inserted.  Patient's condition did not improve after use of Wing hugger and resumption of IV antibiotics.  Critical care service contacted and had additional discussion with patient's family, decision then made to again pursue comfort care measures only.  On the evening of  family changed goals to full measures again but continue DNR.  PEG tube placed .  Post procedure patient has tolerated tube feeds, sodium improving with free water flushes.  Plan was for discharge back to his nursing home, patient discharged on .  Family is appealing his  "discharge, they wish for him to become a permanent resident at [facility] for the remainder of his days because they are concerned he will get worse when he goes to the nursing home.  They do not desire hospice care.     Interval Followup:   Appeal ongoing.  Patient remains confused at his baseline dementia, tolerating tube feeds.  Patient has no active complaints, continues to have poor oral intake    Review of Systems   Unable to obtain secondary to altered mental status, unreliable historian due to his dementia    Objective   Objective     Vitals:   Temp:  [95.1 °F (35.1 °C)-97.8 °F (36.6 °C)] 97.6 °F (36.4 °C)  Heart Rate:  [] 80  Resp:  [14-18] 14  BP: (120-150)/(62-94) 135/74  Physical Exam   Gen: NAD, WDWN elderly male lying in bed comfortably, opens eyes to voice and responds to basic commands  HEENT: NCAT, mmm  Resp: CTAB no wrr, no dyspnea  CV: RRR no mrg, 1+ pitting upper and lower extremity edema  GI: Abdomen soft NT, ND +bs, PEG tube in place with tube feeds infusing  Psych: Sleepy, opens eyes to voice and mumbles \"I'm alright\" but does not answer pointed questions    Result Review    Result Review:  I have personally reviewed the results from the time of this admission to 6/13/2022 14:05 EDT and agree with these findings:  [x]  Laboratory:   BMP pending, last sodium was 146  []  Microbiology: Negative  []  Radiology:   []  EKG/Telemetry:   []  Cardiology/Vascular:    []  Pathology:  []  Old records:  []  Other:    Assessment & Plan   Assessment / Plan      Assessment:  Septic shock due to unknown source  HCAP due to unspecified organism versus aspiration pneumonia  Therapeutic drug level monitoring, vancomycin  Toxic/metabolic encephalopathy  Acute renal failure   Hypothermia  Hypernatremia  Hypokalemia  Hypomagnesemia  Hypoglycemia  History of Alzheimer's dementia  GERD  DVT  Hypoalbuminemia  Hypophosphatemia    Plan:  · Tube feeds continued per PEG tube  · Continue aspirin, Lasix, " BuSpar and Pepcid  · Continue oral Lasix  · Change stool softeners to as needed, constipation resolved  · Discharged to nursing home on June 11, appeal pending from family    DVT Prophylaxis: Eliquis  Diet: Puréed nectar thick, tube feeds  Plan discussed with bedside RN

## 2022-06-13 NOTE — PLAN OF CARE
Goal Outcome Evaluation:  RD awaiting updated sodium labs and updated wt. Pt has not had wt taken since 6/2. Have reached out to RN requesting new wt and order for Na+. Pt has had elevated sodium, but currently unable to adjust free water flushes appropriately without updated labs. Last available lab value for Na+ was 2 days ago on 6/11.  Will CTM.

## 2022-06-13 NOTE — CASE MANAGEMENT/SOCIAL WORK
"Juan contacted at 1400 regarding status of appeal.  [facility] states they have no record of an appeal being initiated at this time.  Name,  and ss provided, however [facility] rep says \" I have nothing in they system\".    Patient's daughter , Shandra contacted regarding this.  Unable to contact her and had to leave a message for her to contact [facility]  "

## 2022-06-14 VITALS
HEART RATE: 77 BPM | TEMPERATURE: 97.8 F | DIASTOLIC BLOOD PRESSURE: 98 MMHG | WEIGHT: 172.84 LBS | SYSTOLIC BLOOD PRESSURE: 150 MMHG | RESPIRATION RATE: 16 BRPM | HEIGHT: 72 IN | BODY MASS INDEX: 23.41 KG/M2 | OXYGEN SATURATION: 100 %

## 2022-06-14 RX ADMIN — ASPIRIN 81 MG CHEWABLE TABLET 81 MG: 81 TABLET CHEWABLE at 09:13

## 2022-06-14 RX ADMIN — APIXABAN 5 MG: 5 TABLET, FILM COATED ORAL at 09:13

## 2022-06-14 RX ADMIN — FUROSEMIDE 40 MG: 40 TABLET ORAL at 09:21

## 2022-06-14 RX ADMIN — Medication 10 ML: at 09:14

## 2022-06-14 RX ADMIN — BUSPIRONE HYDROCHLORIDE 10 MG: 10 TABLET ORAL at 09:13

## 2022-06-14 NOTE — CASE MANAGEMENT/SOCIAL WORK
Attempted to contact Shandra Barlowd again at both work and home phones. No answer. Messages left that it is urgent for her to return call to [phone].

## 2022-06-14 NOTE — CASE MANAGEMENT/SOCIAL WORK
[facility] contacted at 0904 regarding status of appeal initiation.  [facility] states they have checked  3 sources in their system and have no record of any calls made by family to initiate appeal and have no case ID number at this time.  Contacted patient's daughter, Shandra Menon cell and work phone and messages left of both to contact [facility] at [phone]

## 2022-06-14 NOTE — PLAN OF CARE
Goal Outcome Evaluation:  Continues to tolerate enteral nutrition at goal rate via PEG tube. Labs reviewed - hypernatremia resolved. Still no updated wt in chart since 6/2. No new nutrition recommendations at this time. Will CTM.

## 2022-06-14 NOTE — CASE MANAGEMENT/SOCIAL WORK
[facility] contacted at 1225 to inquire if appeal has been started by family and to obtain case ID number.  [facility] rep informed [facility] that they have not received any calls to start an appeal at this time.

## 2022-06-14 NOTE — NURSING NOTE
ATTEMPTED TO CALL PT DAUGHTER KIERA TO UPDATE ON PT BEING MOVED - NO ANSWER- VOICEMAIL LEFT FOR CALL BACK

## 2022-06-14 NOTE — CASE MANAGEMENT/SOCIAL WORK
Multiple attempts have been made to contact Mynor Devi, and Aristides Menon to initiate discharge appeal since 6/11/22, no answers from anyone today.  At this time after calling [facility] multiple times there is no record of an appeal being called in  by family. Have discussed this with Dr. Butler and Rose Mary Khan RN Director of Case Management. Rose Mary has talked with compliance and risk department and are in agreement to proceed with discharge to [facility] today. The facility has returned a call that they are ready to accept patient and the RN today was also notified.

## 2022-06-23 ENCOUNTER — HOSPITAL ENCOUNTER (INPATIENT)
Facility: HOSPITAL | Age: 82
LOS: 6 days | Discharge: NURSING, REHAB OR LONG-TERM CARE | End: 2022-06-30
Attending: EMERGENCY MEDICINE | Admitting: INTERNAL MEDICINE

## 2022-06-23 ENCOUNTER — APPOINTMENT (OUTPATIENT)
Dept: GENERAL RADIOLOGY | Facility: HOSPITAL | Age: 82
End: 2022-06-23

## 2022-06-23 DIAGNOSIS — Z78.9 DECREASED ACTIVITIES OF DAILY LIVING (ADL): ICD-10-CM

## 2022-06-23 DIAGNOSIS — E87.5 HYPERKALEMIA: Primary | ICD-10-CM

## 2022-06-23 DIAGNOSIS — R26.2 DIFFICULTY WALKING: ICD-10-CM

## 2022-06-23 LAB
ALBUMIN SERPL-MCNC: 2.5 G/DL (ref 3.5–5.2)
ALBUMIN/GLOB SERPL: 0.8 G/DL
ALP SERPL-CCNC: 118 U/L (ref 39–117)
ALT SERPL W P-5'-P-CCNC: 11 U/L (ref 1–41)
ANION GAP SERPL CALCULATED.3IONS-SCNC: 6 MMOL/L (ref 5–15)
ANISOCYTOSIS BLD QL: NORMAL
APTT PPP: 37.8 SECONDS (ref 24.2–34.2)
AST SERPL-CCNC: 18 U/L (ref 1–40)
BACTERIA UR QL AUTO: ABNORMAL /HPF
BASOPHILS # BLD AUTO: 0.03 10*3/MM3 (ref 0–0.2)
BASOPHILS NFR BLD AUTO: 0.3 % (ref 0–1.5)
BILIRUB SERPL-MCNC: 0.3 MG/DL (ref 0–1.2)
BILIRUB UR QL STRIP: NEGATIVE
BUN SERPL-MCNC: 54 MG/DL (ref 8–23)
BUN/CREAT SERPL: 42.5 (ref 7–25)
CALCIUM SPEC-SCNC: 9.1 MG/DL (ref 8.6–10.5)
CHLORIDE SERPL-SCNC: 104 MMOL/L (ref 98–107)
CK SERPL-CCNC: 29 U/L (ref 20–200)
CLARITY UR: CLEAR
CO2 SERPL-SCNC: 28 MMOL/L (ref 22–29)
COLOR UR: YELLOW
CREAT SERPL-MCNC: 1.27 MG/DL (ref 0.76–1.27)
CRP SERPL-MCNC: 1.16 MG/DL (ref 0–0.5)
D-LACTATE SERPL-SCNC: 1.2 MMOL/L (ref 0.5–2)
DEPRECATED RDW RBC AUTO: 52.8 FL (ref 37–54)
EGFRCR SERPLBLD CKD-EPI 2021: 56.8 ML/MIN/1.73
EOSINOPHIL # BLD AUTO: 0.06 10*3/MM3 (ref 0–0.4)
EOSINOPHIL NFR BLD AUTO: 0.7 % (ref 0.3–6.2)
ERYTHROCYTE [DISTWIDTH] IN BLOOD BY AUTOMATED COUNT: 18.2 % (ref 12.3–15.4)
GLOBULIN UR ELPH-MCNC: 3 GM/DL
GLUCOSE BLDC GLUCOMTR-MCNC: 85 MG/DL (ref 70–99)
GLUCOSE SERPL-MCNC: 87 MG/DL (ref 65–99)
GLUCOSE UR STRIP-MCNC: NEGATIVE MG/DL
HCT VFR BLD AUTO: 21.1 % (ref 37.5–51)
HGB BLD-MCNC: 7.4 G/DL (ref 13–17.7)
HGB UR QL STRIP.AUTO: NEGATIVE
HOLD SPECIMEN: NORMAL
HOLD SPECIMEN: NORMAL
HYALINE CASTS UR QL AUTO: ABNORMAL /LPF
HYPOCHROMIA BLD QL: NORMAL
IMM GRANULOCYTES # BLD AUTO: 0.08 10*3/MM3 (ref 0–0.05)
IMM GRANULOCYTES NFR BLD AUTO: 0.9 % (ref 0–0.5)
KETONES UR QL STRIP: NEGATIVE
LEUKOCYTE ESTERASE UR QL STRIP.AUTO: ABNORMAL
LYMPHOCYTES # BLD AUTO: 1.03 10*3/MM3 (ref 0.7–3.1)
LYMPHOCYTES NFR BLD AUTO: 11.3 % (ref 19.6–45.3)
MAGNESIUM SERPL-MCNC: 1.8 MG/DL (ref 1.6–2.4)
MCH RBC QN AUTO: 28.9 PG (ref 26.6–33)
MCHC RBC AUTO-ENTMCNC: 35.1 G/DL (ref 31.5–35.7)
MCV RBC AUTO: 82.4 FL (ref 79–97)
MONOCYTES # BLD AUTO: 0.83 10*3/MM3 (ref 0.1–0.9)
MONOCYTES NFR BLD AUTO: 9.1 % (ref 5–12)
NEUTROPHILS NFR BLD AUTO: 7.09 10*3/MM3 (ref 1.7–7)
NEUTROPHILS NFR BLD AUTO: 77.7 % (ref 42.7–76)
NITRITE UR QL STRIP: NEGATIVE
NRBC BLD AUTO-RTO: 1.8 /100 WBC (ref 0–0.2)
PH UR STRIP.AUTO: 8.5 [PH] (ref 5–8)
PHOSPHATE SERPL-MCNC: 2 MG/DL (ref 2.5–4.5)
PLATELET # BLD AUTO: 297 10*3/MM3 (ref 140–450)
PMV BLD AUTO: 10.6 FL (ref 6–12)
POIKILOCYTOSIS BLD QL SMEAR: NORMAL
POTASSIUM SERPL-SCNC: 6.4 MMOL/L (ref 3.5–5.2)
PROCALCITONIN SERPL-MCNC: 0.15 NG/ML (ref 0–0.25)
PROT SERPL-MCNC: 5.5 G/DL (ref 6–8.5)
PROT UR QL STRIP: ABNORMAL
QT INTERVAL: 466 MS
RBC # BLD AUTO: 2.56 10*6/MM3 (ref 4.14–5.8)
RBC # UR STRIP: ABNORMAL /HPF
REF LAB TEST METHOD: ABNORMAL
SARS-COV-2 RNA PNL SPEC NAA+PROBE: NOT DETECTED
SMALL PLATELETS BLD QL SMEAR: ADEQUATE
SODIUM SERPL-SCNC: 138 MMOL/L (ref 136–145)
SP GR UR STRIP: 1.01 (ref 1–1.03)
SQUAMOUS #/AREA URNS HPF: ABNORMAL /HPF
TARGETS BLD QL SMEAR: NORMAL
UROBILINOGEN UR QL STRIP: ABNORMAL
WBC # UR STRIP: ABNORMAL /HPF
WBC MORPH BLD: NORMAL
WBC NRBC COR # BLD: 9.12 10*3/MM3 (ref 3.4–10.8)
WHOLE BLOOD HOLD COAG: NORMAL
WHOLE BLOOD HOLD SPECIMEN: NORMAL

## 2022-06-23 PROCEDURE — P9612 CATHETERIZE FOR URINE SPEC: HCPCS

## 2022-06-23 PROCEDURE — 25010000002 CALCIUM GLUCONATE-NACL 1-0.675 GM/50ML-% SOLUTION: Performed by: EMERGENCY MEDICINE

## 2022-06-23 PROCEDURE — 25010000002 PIPERACILLIN SOD-TAZOBACTAM PER 1 G: Performed by: INTERNAL MEDICINE

## 2022-06-23 PROCEDURE — 93005 ELECTROCARDIOGRAM TRACING: CPT | Performed by: EMERGENCY MEDICINE

## 2022-06-23 PROCEDURE — 82550 ASSAY OF CK (CPK): CPT | Performed by: EMERGENCY MEDICINE

## 2022-06-23 PROCEDURE — 85025 COMPLETE CBC W/AUTO DIFF WBC: CPT | Performed by: EMERGENCY MEDICINE

## 2022-06-23 PROCEDURE — 94799 UNLISTED PULMONARY SVC/PX: CPT

## 2022-06-23 PROCEDURE — 85007 BL SMEAR W/DIFF WBC COUNT: CPT | Performed by: EMERGENCY MEDICINE

## 2022-06-23 PROCEDURE — 84100 ASSAY OF PHOSPHORUS: CPT | Performed by: EMERGENCY MEDICINE

## 2022-06-23 PROCEDURE — U0004 COV-19 TEST NON-CDC HGH THRU: HCPCS | Performed by: EMERGENCY MEDICINE

## 2022-06-23 PROCEDURE — 83605 ASSAY OF LACTIC ACID: CPT | Performed by: EMERGENCY MEDICINE

## 2022-06-23 PROCEDURE — 87040 BLOOD CULTURE FOR BACTERIA: CPT | Performed by: EMERGENCY MEDICINE

## 2022-06-23 PROCEDURE — 25010000002 ENOXAPARIN PER 10 MG: Performed by: INTERNAL MEDICINE

## 2022-06-23 PROCEDURE — 86140 C-REACTIVE PROTEIN: CPT | Performed by: EMERGENCY MEDICINE

## 2022-06-23 PROCEDURE — 99223 1ST HOSP IP/OBS HIGH 75: CPT | Performed by: INTERNAL MEDICINE

## 2022-06-23 PROCEDURE — 63710000001 INSULIN REGULAR HUMAN PER 5 UNITS: Performed by: EMERGENCY MEDICINE

## 2022-06-23 PROCEDURE — 94664 DEMO&/EVAL PT USE INHALER: CPT

## 2022-06-23 PROCEDURE — G0378 HOSPITAL OBSERVATION PER HR: HCPCS

## 2022-06-23 PROCEDURE — 82962 GLUCOSE BLOOD TEST: CPT

## 2022-06-23 PROCEDURE — 80053 COMPREHEN METABOLIC PANEL: CPT | Performed by: EMERGENCY MEDICINE

## 2022-06-23 PROCEDURE — 94640 AIRWAY INHALATION TREATMENT: CPT

## 2022-06-23 PROCEDURE — 85730 THROMBOPLASTIN TIME PARTIAL: CPT | Performed by: EMERGENCY MEDICINE

## 2022-06-23 PROCEDURE — 84145 PROCALCITONIN (PCT): CPT | Performed by: INTERNAL MEDICINE

## 2022-06-23 PROCEDURE — 71045 X-RAY EXAM CHEST 1 VIEW: CPT

## 2022-06-23 PROCEDURE — 83735 ASSAY OF MAGNESIUM: CPT | Performed by: EMERGENCY MEDICINE

## 2022-06-23 PROCEDURE — 94761 N-INVAS EAR/PLS OXIMETRY MLT: CPT

## 2022-06-23 PROCEDURE — 25010000002 PIPERACILLIN SOD-TAZOBACTAM PER 1 G: Performed by: EMERGENCY MEDICINE

## 2022-06-23 PROCEDURE — 3E0G76Z INTRODUCTION OF NUTRITIONAL SUBSTANCE INTO UPPER GI, VIA NATURAL OR ARTIFICIAL OPENING: ICD-10-PCS | Performed by: INTERNAL MEDICINE

## 2022-06-23 PROCEDURE — 81001 URINALYSIS AUTO W/SCOPE: CPT | Performed by: EMERGENCY MEDICINE

## 2022-06-23 PROCEDURE — 93010 ELECTROCARDIOGRAM REPORT: CPT | Performed by: INTERNAL MEDICINE

## 2022-06-23 PROCEDURE — U0005 INFEC AGEN DETEC AMPLI PROBE: HCPCS | Performed by: EMERGENCY MEDICINE

## 2022-06-23 PROCEDURE — 99285 EMERGENCY DEPT VISIT HI MDM: CPT

## 2022-06-23 RX ORDER — SACCHAROMYCES BOULARDII 250 MG
250 CAPSULE ORAL 2 TIMES DAILY
COMMUNITY
End: 2022-07-02

## 2022-06-23 RX ORDER — SODIUM CHLORIDE 0.9 % (FLUSH) 0.9 %
10 SYRINGE (ML) INJECTION AS NEEDED
Status: DISCONTINUED | OUTPATIENT
Start: 2022-06-23 | End: 2022-06-30 | Stop reason: HOSPADM

## 2022-06-23 RX ORDER — DEXTROSE MONOHYDRATE 25 G/50ML
50 INJECTION, SOLUTION INTRAVENOUS ONCE
Status: COMPLETED | OUTPATIENT
Start: 2022-06-23 | End: 2022-06-23

## 2022-06-23 RX ORDER — ACETAMINOPHEN 325 MG/1
650 TABLET ORAL EVERY 4 HOURS PRN
Status: DISCONTINUED | OUTPATIENT
Start: 2022-06-23 | End: 2022-06-30 | Stop reason: HOSPADM

## 2022-06-23 RX ORDER — BISACODYL 5 MG/1
5 TABLET, DELAYED RELEASE ORAL DAILY PRN
Status: DISCONTINUED | OUTPATIENT
Start: 2022-06-23 | End: 2022-06-23

## 2022-06-23 RX ORDER — AMOXICILLIN 250 MG
2 CAPSULE ORAL 2 TIMES DAILY
Status: DISCONTINUED | OUTPATIENT
Start: 2022-06-24 | End: 2022-06-30 | Stop reason: HOSPADM

## 2022-06-23 RX ORDER — ONDANSETRON 2 MG/ML
4 INJECTION INTRAMUSCULAR; INTRAVENOUS EVERY 4 HOURS PRN
Status: DISCONTINUED | OUTPATIENT
Start: 2022-06-23 | End: 2022-06-30 | Stop reason: HOSPADM

## 2022-06-23 RX ORDER — BISACODYL 10 MG
10 SUPPOSITORY, RECTAL RECTAL DAILY PRN
Status: DISCONTINUED | OUTPATIENT
Start: 2022-06-23 | End: 2022-06-30 | Stop reason: HOSPADM

## 2022-06-23 RX ORDER — BISACODYL 5 MG/1
5 TABLET, DELAYED RELEASE ORAL DAILY PRN
Status: DISCONTINUED | OUTPATIENT
Start: 2022-06-23 | End: 2022-06-24

## 2022-06-23 RX ORDER — CHOLECALCIFEROL (VITAMIN D3) 125 MCG
5 CAPSULE ORAL NIGHTLY PRN
Status: DISCONTINUED | OUTPATIENT
Start: 2022-06-23 | End: 2022-06-30 | Stop reason: HOSPADM

## 2022-06-23 RX ORDER — SODIUM CHLORIDE 0.9 % (FLUSH) 0.9 %
10 SYRINGE (ML) INJECTION EVERY 12 HOURS SCHEDULED
Status: DISCONTINUED | OUTPATIENT
Start: 2022-06-23 | End: 2022-06-30 | Stop reason: HOSPADM

## 2022-06-23 RX ORDER — CALCIUM CARBONATE 200(500)MG
2 TABLET,CHEWABLE ORAL 2 TIMES DAILY PRN
Status: DISCONTINUED | OUTPATIENT
Start: 2022-06-23 | End: 2022-06-24

## 2022-06-23 RX ORDER — POLYETHYLENE GLYCOL 3350 17 G/17G
17 POWDER, FOR SOLUTION ORAL DAILY PRN
Status: DISCONTINUED | OUTPATIENT
Start: 2022-06-23 | End: 2022-06-30 | Stop reason: HOSPADM

## 2022-06-23 RX ORDER — ENOXAPARIN SODIUM 100 MG/ML
40 INJECTION SUBCUTANEOUS EVERY 24 HOURS
Status: DISCONTINUED | OUTPATIENT
Start: 2022-06-23 | End: 2022-06-26

## 2022-06-23 RX ORDER — BISACODYL 10 MG
10 SUPPOSITORY, RECTAL RECTAL DAILY PRN
Status: DISCONTINUED | OUTPATIENT
Start: 2022-06-23 | End: 2022-06-23

## 2022-06-23 RX ORDER — POLYETHYLENE GLYCOL 3350 17 G/17G
17 POWDER, FOR SOLUTION ORAL DAILY PRN
Status: DISCONTINUED | OUTPATIENT
Start: 2022-06-23 | End: 2022-06-23

## 2022-06-23 RX ORDER — CALCIUM GLUCONATE 20 MG/ML
1 INJECTION, SOLUTION INTRAVENOUS ONCE
Status: COMPLETED | OUTPATIENT
Start: 2022-06-23 | End: 2022-06-23

## 2022-06-23 RX ORDER — LEVOFLOXACIN 750 MG/1
750 TABLET ORAL DAILY
COMMUNITY
Start: 2022-06-21 | End: 2022-06-30 | Stop reason: HOSPADM

## 2022-06-23 RX ORDER — ACETAMINOPHEN 325 MG/1
650 TABLET ORAL EVERY 4 HOURS PRN
Status: DISCONTINUED | OUTPATIENT
Start: 2022-06-23 | End: 2022-06-23

## 2022-06-23 RX ORDER — AMOXICILLIN 250 MG
2 CAPSULE ORAL 2 TIMES DAILY
Status: DISCONTINUED | OUTPATIENT
Start: 2022-06-23 | End: 2022-06-23

## 2022-06-23 RX ORDER — CHOLECALCIFEROL (VITAMIN D3) 125 MCG
5 CAPSULE ORAL NIGHTLY PRN
Status: DISCONTINUED | OUTPATIENT
Start: 2022-06-23 | End: 2022-06-23

## 2022-06-23 RX ADMIN — SODIUM CHLORIDE 1000 ML: 9 INJECTION, SOLUTION INTRAVENOUS at 15:34

## 2022-06-23 RX ADMIN — SODIUM BICARBONATE 50 MEQ: 84 INJECTION, SOLUTION INTRAVENOUS at 12:09

## 2022-06-23 RX ADMIN — ENOXAPARIN SODIUM 40 MG: 100 INJECTION SUBCUTANEOUS at 15:32

## 2022-06-23 RX ADMIN — Medication 10 ML: at 20:22

## 2022-06-23 RX ADMIN — ALBUTEROL SULFATE 10 MG: 5 SOLUTION RESPIRATORY (INHALATION) at 12:05

## 2022-06-23 RX ADMIN — Medication 10 ML: at 15:33

## 2022-06-23 RX ADMIN — TAZOBACTAM SODIUM AND PIPERACILLIN SODIUM 3.38 G: 375; 3 INJECTION, SOLUTION INTRAVENOUS at 18:28

## 2022-06-23 RX ADMIN — SODIUM CHLORIDE 1000 ML: 9 INJECTION, SOLUTION INTRAVENOUS at 11:19

## 2022-06-23 RX ADMIN — CALCIUM GLUCONATE 1 G: 20 INJECTION, SOLUTION INTRAVENOUS at 11:59

## 2022-06-23 RX ADMIN — SODIUM ZIRCONIUM CYCLOSILICATE 10 G: 10 POWDER, FOR SUSPENSION ORAL at 13:02

## 2022-06-23 RX ADMIN — DEXTROSE MONOHYDRATE 50 ML: 25 INJECTION, SOLUTION INTRAVENOUS at 12:13

## 2022-06-23 RX ADMIN — PIPERACILLIN AND TAZOBACTAM 4.5 G: 4; .5 INJECTION, POWDER, FOR SOLUTION INTRAVENOUS at 11:22

## 2022-06-23 RX ADMIN — INSULIN HUMAN 10 UNITS: 100 INJECTION, SOLUTION PARENTERAL at 12:12

## 2022-06-24 LAB
ABO GROUP BLD: NORMAL
ANION GAP SERPL CALCULATED.3IONS-SCNC: 5.2 MMOL/L (ref 5–15)
ANION GAP SERPL CALCULATED.3IONS-SCNC: 6.2 MMOL/L (ref 5–15)
BASOPHILS # BLD AUTO: 0.04 10*3/MM3 (ref 0–0.2)
BASOPHILS NFR BLD AUTO: 0.5 % (ref 0–1.5)
BLD GP AB SCN SERPL QL: NEGATIVE
BUN SERPL-MCNC: 42 MG/DL (ref 8–23)
BUN SERPL-MCNC: 51 MG/DL (ref 8–23)
BUN/CREAT SERPL: 29.8 (ref 7–25)
BUN/CREAT SERPL: 35.2 (ref 7–25)
CALCIUM SPEC-SCNC: 8.7 MG/DL (ref 8.6–10.5)
CALCIUM SPEC-SCNC: 9.1 MG/DL (ref 8.6–10.5)
CHLORIDE SERPL-SCNC: 108 MMOL/L (ref 98–107)
CHLORIDE SERPL-SCNC: 111 MMOL/L (ref 98–107)
CK SERPL-CCNC: 39 U/L (ref 20–200)
CO2 SERPL-SCNC: 24.8 MMOL/L (ref 22–29)
CO2 SERPL-SCNC: 25.8 MMOL/L (ref 22–29)
CREAT SERPL-MCNC: 1.41 MG/DL (ref 0.76–1.27)
CREAT SERPL-MCNC: 1.45 MG/DL (ref 0.76–1.27)
CREAT UR-MCNC: 13.9 MG/DL
DEPRECATED RDW RBC AUTO: 52.5 FL (ref 37–54)
EGFRCR SERPLBLD CKD-EPI 2021: 48.4 ML/MIN/1.73
EGFRCR SERPLBLD CKD-EPI 2021: 50.1 ML/MIN/1.73
EOSINOPHIL # BLD AUTO: 0.02 10*3/MM3 (ref 0–0.4)
EOSINOPHIL NFR BLD AUTO: 0.2 % (ref 0.3–6.2)
ERYTHROCYTE [DISTWIDTH] IN BLOOD BY AUTOMATED COUNT: 18.1 % (ref 12.3–15.4)
GLUCOSE SERPL-MCNC: 84 MG/DL (ref 65–99)
GLUCOSE SERPL-MCNC: 89 MG/DL (ref 65–99)
HCT VFR BLD AUTO: 18.7 % (ref 37.5–51)
HCT VFR BLD AUTO: 22.9 % (ref 37.5–51)
HGB BLD-MCNC: 6.7 G/DL (ref 13–17.7)
HGB BLD-MCNC: 8 G/DL (ref 13–17.7)
IMM GRANULOCYTES # BLD AUTO: 0.06 10*3/MM3 (ref 0–0.05)
IMM GRANULOCYTES NFR BLD AUTO: 0.7 % (ref 0–0.5)
L PNEUMO1 AG UR QL IA: NEGATIVE
LYMPHOCYTES # BLD AUTO: 1.3 10*3/MM3 (ref 0.7–3.1)
LYMPHOCYTES NFR BLD AUTO: 16.2 % (ref 19.6–45.3)
MAGNESIUM SERPL-MCNC: 1.7 MG/DL (ref 1.6–2.4)
MCH RBC QN AUTO: 29.6 PG (ref 26.6–33)
MCHC RBC AUTO-ENTMCNC: 35.8 G/DL (ref 31.5–35.7)
MCV RBC AUTO: 82.7 FL (ref 79–97)
MONOCYTES # BLD AUTO: 0.93 10*3/MM3 (ref 0.1–0.9)
MONOCYTES NFR BLD AUTO: 11.6 % (ref 5–12)
NEUTROPHILS NFR BLD AUTO: 5.66 10*3/MM3 (ref 1.7–7)
NEUTROPHILS NFR BLD AUTO: 70.8 % (ref 42.7–76)
NRBC BLD AUTO-RTO: 2.7 /100 WBC (ref 0–0.2)
PHOSPHATE SERPL-MCNC: 2.2 MG/DL (ref 2.5–4.5)
PLATELET # BLD AUTO: 263 10*3/MM3 (ref 140–450)
PMV BLD AUTO: 11.1 FL (ref 6–12)
POTASSIUM SERPL-SCNC: 5.4 MMOL/L (ref 3.5–5.2)
POTASSIUM SERPL-SCNC: 6.3 MMOL/L (ref 3.5–5.2)
POTASSIUM UR-SCNC: 26 MMOL/L
RBC # BLD AUTO: 2.26 10*6/MM3 (ref 4.14–5.8)
RH BLD: POSITIVE
S PNEUM AG SPEC QL LA: NEGATIVE
SODIUM SERPL-SCNC: 139 MMOL/L (ref 136–145)
SODIUM SERPL-SCNC: 142 MMOL/L (ref 136–145)
T&S EXPIRATION DATE: NORMAL
WBC NRBC COR # BLD: 8.01 10*3/MM3 (ref 3.4–10.8)

## 2022-06-24 PROCEDURE — 25010000002 ENOXAPARIN PER 10 MG: Performed by: INTERNAL MEDICINE

## 2022-06-24 PROCEDURE — P9016 RBC LEUKOCYTES REDUCED: HCPCS

## 2022-06-24 PROCEDURE — 36430 TRANSFUSION BLD/BLD COMPNT: CPT

## 2022-06-24 PROCEDURE — 86850 RBC ANTIBODY SCREEN: CPT | Performed by: INTERNAL MEDICINE

## 2022-06-24 PROCEDURE — 63710000001 INSULIN REGULAR HUMAN PER 5 UNITS: Performed by: INTERNAL MEDICINE

## 2022-06-24 PROCEDURE — 86923 COMPATIBILITY TEST ELECTRIC: CPT

## 2022-06-24 PROCEDURE — 82550 ASSAY OF CK (CPK): CPT | Performed by: STUDENT IN AN ORGANIZED HEALTH CARE EDUCATION/TRAINING PROGRAM

## 2022-06-24 PROCEDURE — 85018 HEMOGLOBIN: CPT | Performed by: INTERNAL MEDICINE

## 2022-06-24 PROCEDURE — 94799 UNLISTED PULMONARY SVC/PX: CPT

## 2022-06-24 PROCEDURE — 99221 1ST HOSP IP/OBS SF/LOW 40: CPT | Performed by: INTERNAL MEDICINE

## 2022-06-24 PROCEDURE — 83735 ASSAY OF MAGNESIUM: CPT | Performed by: INTERNAL MEDICINE

## 2022-06-24 PROCEDURE — 87449 NOS EACH ORGANISM AG IA: CPT | Performed by: INTERNAL MEDICINE

## 2022-06-24 PROCEDURE — 80048 BASIC METABOLIC PNL TOTAL CA: CPT | Performed by: INTERNAL MEDICINE

## 2022-06-24 PROCEDURE — 82570 ASSAY OF URINE CREATININE: CPT | Performed by: STUDENT IN AN ORGANIZED HEALTH CARE EDUCATION/TRAINING PROGRAM

## 2022-06-24 PROCEDURE — 36415 COLL VENOUS BLD VENIPUNCTURE: CPT | Performed by: INTERNAL MEDICINE

## 2022-06-24 PROCEDURE — 99233 SBSQ HOSP IP/OBS HIGH 50: CPT | Performed by: INTERNAL MEDICINE

## 2022-06-24 PROCEDURE — 82610 CYSTATIN C: CPT | Performed by: STUDENT IN AN ORGANIZED HEALTH CARE EDUCATION/TRAINING PROGRAM

## 2022-06-24 PROCEDURE — 25010000002 PIPERACILLIN SOD-TAZOBACTAM PER 1 G: Performed by: INTERNAL MEDICINE

## 2022-06-24 PROCEDURE — 86900 BLOOD TYPING SEROLOGIC ABO: CPT

## 2022-06-24 PROCEDURE — 86900 BLOOD TYPING SEROLOGIC ABO: CPT | Performed by: INTERNAL MEDICINE

## 2022-06-24 PROCEDURE — 87899 AGENT NOS ASSAY W/OPTIC: CPT | Performed by: INTERNAL MEDICINE

## 2022-06-24 PROCEDURE — 94664 DEMO&/EVAL PT USE INHALER: CPT

## 2022-06-24 PROCEDURE — 25010000002 CALCIUM GLUCONATE-NACL 1-0.675 GM/50ML-% SOLUTION: Performed by: INTERNAL MEDICINE

## 2022-06-24 PROCEDURE — 85025 COMPLETE CBC W/AUTO DIFF WBC: CPT | Performed by: INTERNAL MEDICINE

## 2022-06-24 PROCEDURE — 0T9B70Z DRAINAGE OF BLADDER WITH DRAINAGE DEVICE, VIA NATURAL OR ARTIFICIAL OPENING: ICD-10-PCS | Performed by: STUDENT IN AN ORGANIZED HEALTH CARE EDUCATION/TRAINING PROGRAM

## 2022-06-24 PROCEDURE — 85014 HEMATOCRIT: CPT | Performed by: INTERNAL MEDICINE

## 2022-06-24 PROCEDURE — 84133 ASSAY OF URINE POTASSIUM: CPT | Performed by: STUDENT IN AN ORGANIZED HEALTH CARE EDUCATION/TRAINING PROGRAM

## 2022-06-24 PROCEDURE — 84100 ASSAY OF PHOSPHORUS: CPT | Performed by: STUDENT IN AN ORGANIZED HEALTH CARE EDUCATION/TRAINING PROGRAM

## 2022-06-24 PROCEDURE — 86901 BLOOD TYPING SEROLOGIC RH(D): CPT | Performed by: INTERNAL MEDICINE

## 2022-06-24 RX ORDER — DEXTROSE MONOHYDRATE 25 G/50ML
50 INJECTION, SOLUTION INTRAVENOUS ONCE
Status: COMPLETED | OUTPATIENT
Start: 2022-06-24 | End: 2022-06-24

## 2022-06-24 RX ORDER — BUSPIRONE HYDROCHLORIDE 10 MG/1
10 TABLET ORAL 2 TIMES DAILY
Status: DISCONTINUED | OUTPATIENT
Start: 2022-06-24 | End: 2022-06-24

## 2022-06-24 RX ORDER — CALCIUM CARBONATE 200(500)MG
2 TABLET,CHEWABLE ORAL 2 TIMES DAILY PRN
Status: DISCONTINUED | OUTPATIENT
Start: 2022-06-24 | End: 2022-06-30 | Stop reason: HOSPADM

## 2022-06-24 RX ORDER — BUSPIRONE HYDROCHLORIDE 10 MG/1
10 TABLET ORAL 2 TIMES DAILY
Status: DISCONTINUED | OUTPATIENT
Start: 2022-06-24 | End: 2022-06-30 | Stop reason: HOSPADM

## 2022-06-24 RX ORDER — FAMOTIDINE 20 MG/1
20 TABLET, FILM COATED ORAL NIGHTLY
Status: DISCONTINUED | OUTPATIENT
Start: 2022-06-24 | End: 2022-06-30 | Stop reason: HOSPADM

## 2022-06-24 RX ORDER — ALBUTEROL SULFATE 2.5 MG/3ML
10 SOLUTION RESPIRATORY (INHALATION) ONCE
Status: COMPLETED | OUTPATIENT
Start: 2022-06-24 | End: 2022-06-24

## 2022-06-24 RX ORDER — CALCIUM GLUCONATE 20 MG/ML
1 INJECTION, SOLUTION INTRAVENOUS ONCE
Status: COMPLETED | OUTPATIENT
Start: 2022-06-24 | End: 2022-06-24

## 2022-06-24 RX ORDER — SACCHAROMYCES BOULARDII 250 MG
250 CAPSULE ORAL 2 TIMES DAILY
Status: DISCONTINUED | OUTPATIENT
Start: 2022-06-24 | End: 2022-06-30 | Stop reason: HOSPADM

## 2022-06-24 RX ADMIN — SODIUM CHLORIDE 1000 ML: 9 INJECTION, SOLUTION INTRAVENOUS at 15:52

## 2022-06-24 RX ADMIN — Medication 10 ML: at 21:45

## 2022-06-24 RX ADMIN — INSULIN HUMAN 10 UNITS: 100 INJECTION, SOLUTION PARENTERAL at 07:46

## 2022-06-24 RX ADMIN — CALCIUM GLUCONATE 1 G: 20 INJECTION, SOLUTION INTRAVENOUS at 07:47

## 2022-06-24 RX ADMIN — SODIUM CHLORIDE 1000 ML: 9 INJECTION, SOLUTION INTRAVENOUS at 08:55

## 2022-06-24 RX ADMIN — TAZOBACTAM SODIUM AND PIPERACILLIN SODIUM 3.38 G: 375; 3 INJECTION, SOLUTION INTRAVENOUS at 03:02

## 2022-06-24 RX ADMIN — SODIUM BICARBONATE 50 MEQ: 84 INJECTION, SOLUTION INTRAVENOUS at 07:47

## 2022-06-24 RX ADMIN — ALBUTEROL SULFATE 10 MG: 2.5 SOLUTION RESPIRATORY (INHALATION) at 08:09

## 2022-06-24 RX ADMIN — ENOXAPARIN SODIUM 40 MG: 100 INJECTION SUBCUTANEOUS at 15:10

## 2022-06-24 RX ADMIN — TAZOBACTAM SODIUM AND PIPERACILLIN SODIUM 3.38 G: 375; 3 INJECTION, SOLUTION INTRAVENOUS at 12:19

## 2022-06-24 RX ADMIN — SODIUM ZIRCONIUM CYCLOSILICATE 10 G: 10 POWDER, FOR SUSPENSION ORAL at 08:55

## 2022-06-24 RX ADMIN — SODIUM ZIRCONIUM CYCLOSILICATE 10 G: 10 POWDER, FOR SUSPENSION ORAL at 15:51

## 2022-06-24 RX ADMIN — BUSPIRONE HYDROCHLORIDE 10 MG: 10 TABLET ORAL at 21:44

## 2022-06-24 RX ADMIN — SODIUM ZIRCONIUM CYCLOSILICATE 10 G: 10 POWDER, FOR SUSPENSION ORAL at 21:43

## 2022-06-24 RX ADMIN — Medication 250 MG: at 21:44

## 2022-06-24 RX ADMIN — FAMOTIDINE 20 MG: 20 TABLET ORAL at 21:44

## 2022-06-24 RX ADMIN — TAZOBACTAM SODIUM AND PIPERACILLIN SODIUM 3.38 G: 375; 3 INJECTION, SOLUTION INTRAVENOUS at 18:25

## 2022-06-24 RX ADMIN — DEXTROSE MONOHYDRATE 50 ML: 25 INJECTION, SOLUTION INTRAVENOUS at 07:46

## 2022-06-24 RX ADMIN — SODIUM CHLORIDE 1000 ML: 9 INJECTION, SOLUTION INTRAVENOUS at 18:25

## 2022-06-25 LAB
ANION GAP SERPL CALCULATED.3IONS-SCNC: 7.6 MMOL/L (ref 5–15)
BACTERIA UR QL AUTO: ABNORMAL /HPF
BASOPHILS # BLD AUTO: 0.06 10*3/MM3 (ref 0–0.2)
BASOPHILS NFR BLD AUTO: 0.8 % (ref 0–1.5)
BH BB BLOOD EXPIRATION DATE: NORMAL
BH BB BLOOD TYPE BARCODE: 6200
BH BB DISPENSE STATUS: NORMAL
BH BB PRODUCT CODE: NORMAL
BH BB UNIT NUMBER: NORMAL
BILIRUB UR QL STRIP: NEGATIVE
BUN SERPL-MCNC: 40 MG/DL (ref 8–23)
BUN/CREAT SERPL: 28.2 (ref 7–25)
CALCIUM SPEC-SCNC: 9.1 MG/DL (ref 8.6–10.5)
CHLORIDE SERPL-SCNC: 109 MMOL/L (ref 98–107)
CLARITY UR: CLEAR
CO2 SERPL-SCNC: 23.4 MMOL/L (ref 22–29)
COLOR UR: YELLOW
CREAT SERPL-MCNC: 1.42 MG/DL (ref 0.76–1.27)
CROSSMATCH INTERPRETATION: NORMAL
DEPRECATED RDW RBC AUTO: 50.4 FL (ref 37–54)
EGFRCR SERPLBLD CKD-EPI 2021: 49.6 ML/MIN/1.73
EOSINOPHIL # BLD AUTO: 0.1 10*3/MM3 (ref 0–0.4)
EOSINOPHIL NFR BLD AUTO: 1.4 % (ref 0.3–6.2)
ERYTHROCYTE [DISTWIDTH] IN BLOOD BY AUTOMATED COUNT: 17 % (ref 12.3–15.4)
GLUCOSE SERPL-MCNC: 81 MG/DL (ref 65–99)
GLUCOSE UR STRIP-MCNC: NEGATIVE MG/DL
HCT VFR BLD AUTO: 24.8 % (ref 37.5–51)
HGB BLD-MCNC: 8.6 G/DL (ref 13–17.7)
HGB UR QL STRIP.AUTO: ABNORMAL
HYALINE CASTS UR QL AUTO: ABNORMAL /LPF
IMM GRANULOCYTES # BLD AUTO: 0.04 10*3/MM3 (ref 0–0.05)
IMM GRANULOCYTES NFR BLD AUTO: 0.6 % (ref 0–0.5)
KETONES UR QL STRIP: NEGATIVE
LEUKOCYTE ESTERASE UR QL STRIP.AUTO: ABNORMAL
LYMPHOCYTES # BLD AUTO: 1.49 10*3/MM3 (ref 0.7–3.1)
LYMPHOCYTES NFR BLD AUTO: 20.8 % (ref 19.6–45.3)
MAGNESIUM SERPL-MCNC: 1.7 MG/DL (ref 1.6–2.4)
MCH RBC QN AUTO: 28.8 PG (ref 26.6–33)
MCHC RBC AUTO-ENTMCNC: 34.7 G/DL (ref 31.5–35.7)
MCV RBC AUTO: 82.9 FL (ref 79–97)
MONOCYTES # BLD AUTO: 0.84 10*3/MM3 (ref 0.1–0.9)
MONOCYTES NFR BLD AUTO: 11.7 % (ref 5–12)
NEUTROPHILS NFR BLD AUTO: 4.64 10*3/MM3 (ref 1.7–7)
NEUTROPHILS NFR BLD AUTO: 64.7 % (ref 42.7–76)
NITRITE UR QL STRIP: NEGATIVE
NRBC BLD AUTO-RTO: 1.4 /100 WBC (ref 0–0.2)
PH UR STRIP.AUTO: >=9 [PH] (ref 5–8)
PHOSPHATE SERPL-MCNC: 3.1 MG/DL (ref 2.5–4.5)
PLATELET # BLD AUTO: 254 10*3/MM3 (ref 140–450)
PMV BLD AUTO: 11.8 FL (ref 6–12)
POTASSIUM SERPL-SCNC: 5.3 MMOL/L (ref 3.5–5.2)
PROT UR QL STRIP: ABNORMAL
RBC # BLD AUTO: 2.99 10*6/MM3 (ref 4.14–5.8)
RBC # UR STRIP: ABNORMAL /HPF
REF LAB TEST METHOD: ABNORMAL
SODIUM SERPL-SCNC: 140 MMOL/L (ref 136–145)
SP GR UR STRIP: 1.01 (ref 1–1.03)
SQUAMOUS #/AREA URNS HPF: ABNORMAL /HPF
UNIT  ABO: NORMAL
UNIT  RH: NORMAL
UROBILINOGEN UR QL STRIP: ABNORMAL
WBC # UR STRIP: ABNORMAL /HPF
WBC NRBC COR # BLD: 7.17 10*3/MM3 (ref 3.4–10.8)
YEAST URNS QL MICRO: ABNORMAL /HPF

## 2022-06-25 PROCEDURE — 85025 COMPLETE CBC W/AUTO DIFF WBC: CPT | Performed by: INTERNAL MEDICINE

## 2022-06-25 PROCEDURE — 25010000002 ONDANSETRON PER 1 MG: Performed by: INTERNAL MEDICINE

## 2022-06-25 PROCEDURE — 83735 ASSAY OF MAGNESIUM: CPT | Performed by: INTERNAL MEDICINE

## 2022-06-25 PROCEDURE — 25010000002 PIPERACILLIN SOD-TAZOBACTAM PER 1 G: Performed by: INTERNAL MEDICINE

## 2022-06-25 PROCEDURE — 81001 URINALYSIS AUTO W/SCOPE: CPT | Performed by: STUDENT IN AN ORGANIZED HEALTH CARE EDUCATION/TRAINING PROGRAM

## 2022-06-25 PROCEDURE — 97161 PT EVAL LOW COMPLEX 20 MIN: CPT

## 2022-06-25 PROCEDURE — 84100 ASSAY OF PHOSPHORUS: CPT | Performed by: INTERNAL MEDICINE

## 2022-06-25 PROCEDURE — 25010000002 FUROSEMIDE PER 20 MG: Performed by: STUDENT IN AN ORGANIZED HEALTH CARE EDUCATION/TRAINING PROGRAM

## 2022-06-25 PROCEDURE — 25010000002 ENOXAPARIN PER 10 MG: Performed by: INTERNAL MEDICINE

## 2022-06-25 PROCEDURE — 99232 SBSQ HOSP IP/OBS MODERATE 35: CPT | Performed by: INTERNAL MEDICINE

## 2022-06-25 PROCEDURE — 80048 BASIC METABOLIC PNL TOTAL CA: CPT | Performed by: INTERNAL MEDICINE

## 2022-06-25 RX ORDER — FUROSEMIDE 10 MG/ML
60 INJECTION INTRAMUSCULAR; INTRAVENOUS ONCE
Status: COMPLETED | OUTPATIENT
Start: 2022-06-25 | End: 2022-06-25

## 2022-06-25 RX ADMIN — BUSPIRONE HYDROCHLORIDE 10 MG: 10 TABLET ORAL at 22:07

## 2022-06-25 RX ADMIN — SODIUM ZIRCONIUM CYCLOSILICATE 10 G: 10 POWDER, FOR SUSPENSION ORAL at 16:53

## 2022-06-25 RX ADMIN — Medication 10 ML: at 08:33

## 2022-06-25 RX ADMIN — SODIUM ZIRCONIUM CYCLOSILICATE 10 G: 10 POWDER, FOR SUSPENSION ORAL at 08:33

## 2022-06-25 RX ADMIN — ENOXAPARIN SODIUM 40 MG: 100 INJECTION SUBCUTANEOUS at 16:24

## 2022-06-25 RX ADMIN — TAZOBACTAM SODIUM AND PIPERACILLIN SODIUM 3.38 G: 375; 3 INJECTION, SOLUTION INTRAVENOUS at 11:34

## 2022-06-25 RX ADMIN — BUSPIRONE HYDROCHLORIDE 10 MG: 10 TABLET ORAL at 08:33

## 2022-06-25 RX ADMIN — SODIUM ZIRCONIUM CYCLOSILICATE 10 G: 10 POWDER, FOR SUSPENSION ORAL at 22:06

## 2022-06-25 RX ADMIN — Medication 10 ML: at 22:08

## 2022-06-25 RX ADMIN — TAZOBACTAM SODIUM AND PIPERACILLIN SODIUM 3.38 G: 375; 3 INJECTION, SOLUTION INTRAVENOUS at 03:44

## 2022-06-25 RX ADMIN — SENNOSIDES AND DOCUSATE SODIUM 2 TABLET: 50; 8.6 TABLET ORAL at 08:33

## 2022-06-25 RX ADMIN — Medication 250 MG: at 22:07

## 2022-06-25 RX ADMIN — TAZOBACTAM SODIUM AND PIPERACILLIN SODIUM 3.38 G: 375; 3 INJECTION, SOLUTION INTRAVENOUS at 18:15

## 2022-06-25 RX ADMIN — ONDANSETRON 4 MG: 2 INJECTION INTRAMUSCULAR; INTRAVENOUS at 22:07

## 2022-06-25 RX ADMIN — FUROSEMIDE 60 MG: 10 INJECTION, SOLUTION INTRAMUSCULAR; INTRAVENOUS at 11:33

## 2022-06-25 RX ADMIN — Medication 250 MG: at 08:33

## 2022-06-25 RX ADMIN — FAMOTIDINE 20 MG: 20 TABLET ORAL at 22:07

## 2022-06-25 RX ADMIN — Medication 5 MG: at 22:07

## 2022-06-26 PROBLEM — D64.9 ANEMIA: Status: ACTIVE | Noted: 2022-06-26

## 2022-06-26 LAB
ANION GAP SERPL CALCULATED.3IONS-SCNC: 9.6 MMOL/L (ref 5–15)
BASOPHILS # BLD AUTO: 0.06 10*3/MM3 (ref 0–0.2)
BASOPHILS # BLD AUTO: 0.08 10*3/MM3 (ref 0–0.2)
BASOPHILS NFR BLD AUTO: 0.7 % (ref 0–1.5)
BASOPHILS NFR BLD AUTO: 1 % (ref 0–1.5)
BUN SERPL-MCNC: 38 MG/DL (ref 8–23)
BUN/CREAT SERPL: 23.9 (ref 7–25)
CALCIUM SPEC-SCNC: 9 MG/DL (ref 8.6–10.5)
CHLORIDE SERPL-SCNC: 106 MMOL/L (ref 98–107)
CO2 SERPL-SCNC: 25.4 MMOL/L (ref 22–29)
CREAT SERPL-MCNC: 1.59 MG/DL (ref 0.76–1.27)
DEPRECATED RDW RBC AUTO: 48.9 FL (ref 37–54)
DEPRECATED RDW RBC AUTO: 49.6 FL (ref 37–54)
EGFRCR SERPLBLD CKD-EPI 2021: 43.3 ML/MIN/1.73
EOSINOPHIL # BLD AUTO: 0.11 10*3/MM3 (ref 0–0.4)
EOSINOPHIL # BLD AUTO: 0.12 10*3/MM3 (ref 0–0.4)
EOSINOPHIL NFR BLD AUTO: 1.4 % (ref 0.3–6.2)
EOSINOPHIL NFR BLD AUTO: 1.4 % (ref 0.3–6.2)
ERYTHROCYTE [DISTWIDTH] IN BLOOD BY AUTOMATED COUNT: 17.1 % (ref 12.3–15.4)
ERYTHROCYTE [DISTWIDTH] IN BLOOD BY AUTOMATED COUNT: 17.2 % (ref 12.3–15.4)
FOLATE SERPL-MCNC: 8.83 NG/ML (ref 4.78–24.2)
GLUCOSE SERPL-MCNC: 96 MG/DL (ref 65–99)
HCT VFR BLD AUTO: 22 % (ref 37.5–51)
HCT VFR BLD AUTO: 23.5 % (ref 37.5–51)
HGB BLD-MCNC: 7.7 G/DL (ref 13–17.7)
HGB BLD-MCNC: 8.6 G/DL (ref 13–17.7)
IMM GRANULOCYTES # BLD AUTO: 0.07 10*3/MM3 (ref 0–0.05)
IMM GRANULOCYTES # BLD AUTO: 0.11 10*3/MM3 (ref 0–0.05)
IMM GRANULOCYTES NFR BLD AUTO: 0.8 % (ref 0–0.5)
IMM GRANULOCYTES NFR BLD AUTO: 1.4 % (ref 0–0.5)
IRON 24H UR-MRATE: 71 MCG/DL (ref 59–158)
IRON SATN MFR SERPL: 30 % (ref 20–50)
LYMPHOCYTES # BLD AUTO: 1.37 10*3/MM3 (ref 0.7–3.1)
LYMPHOCYTES # BLD AUTO: 1.41 10*3/MM3 (ref 0.7–3.1)
LYMPHOCYTES NFR BLD AUTO: 16.5 % (ref 19.6–45.3)
LYMPHOCYTES NFR BLD AUTO: 17.1 % (ref 19.6–45.3)
MAGNESIUM SERPL-MCNC: 1.7 MG/DL (ref 1.6–2.4)
MCH RBC QN AUTO: 29.2 PG (ref 26.6–33)
MCH RBC QN AUTO: 29.6 PG (ref 26.6–33)
MCHC RBC AUTO-ENTMCNC: 35 G/DL (ref 31.5–35.7)
MCHC RBC AUTO-ENTMCNC: 36.6 G/DL (ref 31.5–35.7)
MCV RBC AUTO: 80.8 FL (ref 79–97)
MCV RBC AUTO: 83.3 FL (ref 79–97)
MONOCYTES # BLD AUTO: 1 10*3/MM3 (ref 0.1–0.9)
MONOCYTES # BLD AUTO: 1.02 10*3/MM3 (ref 0.1–0.9)
MONOCYTES NFR BLD AUTO: 11.7 % (ref 5–12)
MONOCYTES NFR BLD AUTO: 12.8 % (ref 5–12)
NEUTROPHILS NFR BLD AUTO: 5.31 10*3/MM3 (ref 1.7–7)
NEUTROPHILS NFR BLD AUTO: 5.89 10*3/MM3 (ref 1.7–7)
NEUTROPHILS NFR BLD AUTO: 66.3 % (ref 42.7–76)
NEUTROPHILS NFR BLD AUTO: 68.9 % (ref 42.7–76)
NRBC BLD AUTO-RTO: 0.8 /100 WBC (ref 0–0.2)
NRBC BLD AUTO-RTO: 1 /100 WBC (ref 0–0.2)
PHOSPHATE SERPL-MCNC: 3.5 MG/DL (ref 2.5–4.5)
PLATELET # BLD AUTO: 197 10*3/MM3 (ref 140–450)
PLATELET # BLD AUTO: 251 10*3/MM3 (ref 140–450)
PMV BLD AUTO: 11.6 FL (ref 6–12)
PMV BLD AUTO: 12.7 FL (ref 6–12)
POTASSIUM SERPL-SCNC: 4.1 MMOL/L (ref 3.5–5.2)
RBC # BLD AUTO: 2.64 10*6/MM3 (ref 4.14–5.8)
RBC # BLD AUTO: 2.91 10*6/MM3 (ref 4.14–5.8)
SODIUM SERPL-SCNC: 141 MMOL/L (ref 136–145)
TIBC SERPL-MCNC: 238 MCG/DL (ref 298–536)
TRANSFERRIN SERPL-MCNC: 160 MG/DL (ref 200–360)
VIT B12 BLD-MCNC: 447 PG/ML (ref 211–946)
WBC NRBC COR # BLD: 8 10*3/MM3 (ref 3.4–10.8)
WBC NRBC COR # BLD: 8.55 10*3/MM3 (ref 3.4–10.8)

## 2022-06-26 PROCEDURE — 25010000002 PIPERACILLIN SOD-TAZOBACTAM PER 1 G: Performed by: INTERNAL MEDICINE

## 2022-06-26 PROCEDURE — 99231 SBSQ HOSP IP/OBS SF/LOW 25: CPT | Performed by: INTERNAL MEDICINE

## 2022-06-26 PROCEDURE — 82607 VITAMIN B-12: CPT | Performed by: INTERNAL MEDICINE

## 2022-06-26 PROCEDURE — 99232 SBSQ HOSP IP/OBS MODERATE 35: CPT | Performed by: INTERNAL MEDICINE

## 2022-06-26 PROCEDURE — 84466 ASSAY OF TRANSFERRIN: CPT | Performed by: INTERNAL MEDICINE

## 2022-06-26 PROCEDURE — 83540 ASSAY OF IRON: CPT | Performed by: INTERNAL MEDICINE

## 2022-06-26 PROCEDURE — 97165 OT EVAL LOW COMPLEX 30 MIN: CPT

## 2022-06-26 PROCEDURE — 83735 ASSAY OF MAGNESIUM: CPT | Performed by: INTERNAL MEDICINE

## 2022-06-26 PROCEDURE — 85025 COMPLETE CBC W/AUTO DIFF WBC: CPT | Performed by: INTERNAL MEDICINE

## 2022-06-26 PROCEDURE — 82746 ASSAY OF FOLIC ACID SERUM: CPT | Performed by: INTERNAL MEDICINE

## 2022-06-26 PROCEDURE — 80048 BASIC METABOLIC PNL TOTAL CA: CPT | Performed by: INTERNAL MEDICINE

## 2022-06-26 PROCEDURE — 84100 ASSAY OF PHOSPHORUS: CPT | Performed by: INTERNAL MEDICINE

## 2022-06-26 PROCEDURE — 25010000002 CYANOCOBALAMIN PER 1000 MCG: Performed by: INTERNAL MEDICINE

## 2022-06-26 RX ORDER — TRAZODONE HYDROCHLORIDE 100 MG/1
100 TABLET ORAL NIGHTLY
Status: DISCONTINUED | OUTPATIENT
Start: 2022-06-26 | End: 2022-06-30 | Stop reason: HOSPADM

## 2022-06-26 RX ORDER — CYANOCOBALAMIN 1000 UG/ML
1000 INJECTION, SOLUTION INTRAMUSCULAR; SUBCUTANEOUS DAILY
Status: DISCONTINUED | OUTPATIENT
Start: 2022-06-26 | End: 2022-06-30 | Stop reason: HOSPADM

## 2022-06-26 RX ORDER — FERROUS SULFATE 325(65) MG
325 TABLET ORAL
Status: DISCONTINUED | OUTPATIENT
Start: 2022-06-26 | End: 2022-06-26

## 2022-06-26 RX ORDER — CYANOCOBALAMIN 1000 UG/ML
1000 INJECTION, SOLUTION INTRAMUSCULAR; SUBCUTANEOUS
Status: DISCONTINUED | OUTPATIENT
Start: 2022-06-26 | End: 2022-06-26

## 2022-06-26 RX ORDER — FERROUS SULFATE 220 (44)/5
220 ELIXIR ORAL DAILY
Status: DISCONTINUED | OUTPATIENT
Start: 2022-06-26 | End: 2022-06-27

## 2022-06-26 RX ORDER — QUETIAPINE FUMARATE 25 MG/1
25 TABLET, FILM COATED ORAL 2 TIMES DAILY
Status: DISCONTINUED | OUTPATIENT
Start: 2022-06-26 | End: 2022-06-26

## 2022-06-26 RX ORDER — TRAZODONE HYDROCHLORIDE 100 MG/1
100 TABLET ORAL NIGHTLY
Status: DISCONTINUED | OUTPATIENT
Start: 2022-06-26 | End: 2022-06-26

## 2022-06-26 RX ADMIN — TAZOBACTAM SODIUM AND PIPERACILLIN SODIUM 3.38 G: 375; 3 INJECTION, SOLUTION INTRAVENOUS at 02:52

## 2022-06-26 RX ADMIN — QUETIAPINE FUMARATE 25 MG: 25 TABLET ORAL at 08:28

## 2022-06-26 RX ADMIN — Medication 220 MG: at 10:01

## 2022-06-26 RX ADMIN — Medication 250 MG: at 19:34

## 2022-06-26 RX ADMIN — Medication 250 MG: at 08:27

## 2022-06-26 RX ADMIN — TAZOBACTAM SODIUM AND PIPERACILLIN SODIUM 3.38 G: 375; 3 INJECTION, SOLUTION INTRAVENOUS at 10:02

## 2022-06-26 RX ADMIN — TAZOBACTAM SODIUM AND PIPERACILLIN SODIUM 3.38 G: 375; 3 INJECTION, SOLUTION INTRAVENOUS at 19:41

## 2022-06-26 RX ADMIN — FAMOTIDINE 20 MG: 20 TABLET ORAL at 19:34

## 2022-06-26 RX ADMIN — BUSPIRONE HYDROCHLORIDE 10 MG: 10 TABLET ORAL at 19:35

## 2022-06-26 RX ADMIN — BUSPIRONE HYDROCHLORIDE 10 MG: 10 TABLET ORAL at 08:27

## 2022-06-26 RX ADMIN — CYANOCOBALAMIN 1000 MCG: 1000 INJECTION, SOLUTION INTRAMUSCULAR at 15:24

## 2022-06-27 ENCOUNTER — APPOINTMENT (OUTPATIENT)
Dept: GENERAL RADIOLOGY | Facility: HOSPITAL | Age: 82
End: 2022-06-27

## 2022-06-27 LAB
ANION GAP SERPL CALCULATED.3IONS-SCNC: 9.2 MMOL/L (ref 5–15)
BASOPHILS # BLD AUTO: 0.03 10*3/MM3 (ref 0–0.2)
BASOPHILS NFR BLD AUTO: 0.4 % (ref 0–1.5)
BUN SERPL-MCNC: 36 MG/DL (ref 8–23)
BUN/CREAT SERPL: 23.8 (ref 7–25)
CALCIUM SPEC-SCNC: 8.6 MG/DL (ref 8.6–10.5)
CHLORIDE SERPL-SCNC: 107 MMOL/L (ref 98–107)
CO2 SERPL-SCNC: 24.8 MMOL/L (ref 22–29)
CREAT SERPL-MCNC: 1.51 MG/DL (ref 0.76–1.27)
DEPRECATED RDW RBC AUTO: 51.6 FL (ref 37–54)
EGFRCR SERPLBLD CKD-EPI 2021: 46.1 ML/MIN/1.73
EOSINOPHIL # BLD AUTO: 0.14 10*3/MM3 (ref 0–0.4)
EOSINOPHIL NFR BLD AUTO: 1.9 % (ref 0.3–6.2)
ERYTHROCYTE [DISTWIDTH] IN BLOOD BY AUTOMATED COUNT: 17.2 % (ref 12.3–15.4)
GLUCOSE BLDC GLUCOMTR-MCNC: 100 MG/DL (ref 70–99)
GLUCOSE BLDC GLUCOMTR-MCNC: 110 MG/DL (ref 70–99)
GLUCOSE BLDC GLUCOMTR-MCNC: 65 MG/DL (ref 70–99)
GLUCOSE BLDC GLUCOMTR-MCNC: 71 MG/DL (ref 70–99)
GLUCOSE BLDC GLUCOMTR-MCNC: 99 MG/DL (ref 70–99)
GLUCOSE SERPL-MCNC: 91 MG/DL (ref 65–99)
HCT VFR BLD AUTO: 22.4 % (ref 37.5–51)
HGB BLD-MCNC: 7.7 G/DL (ref 13–17.7)
IMM GRANULOCYTES # BLD AUTO: 0.03 10*3/MM3 (ref 0–0.05)
IMM GRANULOCYTES NFR BLD AUTO: 0.4 % (ref 0–0.5)
LYMPHOCYTES # BLD AUTO: 1.09 10*3/MM3 (ref 0.7–3.1)
LYMPHOCYTES NFR BLD AUTO: 14.6 % (ref 19.6–45.3)
MAGNESIUM SERPL-MCNC: 1.5 MG/DL (ref 1.6–2.4)
MCH RBC QN AUTO: 29.1 PG (ref 26.6–33)
MCHC RBC AUTO-ENTMCNC: 34.4 G/DL (ref 31.5–35.7)
MCV RBC AUTO: 84.5 FL (ref 79–97)
MONOCYTES # BLD AUTO: 0.8 10*3/MM3 (ref 0.1–0.9)
MONOCYTES NFR BLD AUTO: 10.7 % (ref 5–12)
NEUTROPHILS NFR BLD AUTO: 5.36 10*3/MM3 (ref 1.7–7)
NEUTROPHILS NFR BLD AUTO: 72 % (ref 42.7–76)
NRBC BLD AUTO-RTO: 0.5 /100 WBC (ref 0–0.2)
PHOSPHATE SERPL-MCNC: 3.5 MG/DL (ref 2.5–4.5)
PLATELET # BLD AUTO: 202 10*3/MM3 (ref 140–450)
PMV BLD AUTO: 11.6 FL (ref 6–12)
POTASSIUM SERPL-SCNC: 3.8 MMOL/L (ref 3.5–5.2)
RBC # BLD AUTO: 2.65 10*6/MM3 (ref 4.14–5.8)
SODIUM SERPL-SCNC: 141 MMOL/L (ref 136–145)
WBC NRBC COR # BLD: 7.45 10*3/MM3 (ref 3.4–10.8)

## 2022-06-27 PROCEDURE — 25010000002 EPOETIN ALFA PER 1000 UNITS: Performed by: STUDENT IN AN ORGANIZED HEALTH CARE EDUCATION/TRAINING PROGRAM

## 2022-06-27 PROCEDURE — 84100 ASSAY OF PHOSPHORUS: CPT | Performed by: INTERNAL MEDICINE

## 2022-06-27 PROCEDURE — 25010000002 MAGNESIUM SULFATE 2 GM/50ML SOLUTION: Performed by: INTERNAL MEDICINE

## 2022-06-27 PROCEDURE — 82784 ASSAY IGA/IGD/IGG/IGM EACH: CPT | Performed by: INTERNAL MEDICINE

## 2022-06-27 PROCEDURE — 83735 ASSAY OF MAGNESIUM: CPT | Performed by: INTERNAL MEDICINE

## 2022-06-27 PROCEDURE — 25010000002 NA FERRIC GLUC CPLX PER 12.5 MG: Performed by: INTERNAL MEDICINE

## 2022-06-27 PROCEDURE — 49465 FLUORO EXAM OF G/COLON TUBE: CPT

## 2022-06-27 PROCEDURE — 99232 SBSQ HOSP IP/OBS MODERATE 35: CPT | Performed by: INTERNAL MEDICINE

## 2022-06-27 PROCEDURE — 25010000002 PIPERACILLIN SOD-TAZOBACTAM PER 1 G: Performed by: INTERNAL MEDICINE

## 2022-06-27 PROCEDURE — 80048 BASIC METABOLIC PNL TOTAL CA: CPT | Performed by: INTERNAL MEDICINE

## 2022-06-27 PROCEDURE — 25010000002 CYANOCOBALAMIN PER 1000 MCG: Performed by: INTERNAL MEDICINE

## 2022-06-27 PROCEDURE — 86334 IMMUNOFIX E-PHORESIS SERUM: CPT | Performed by: INTERNAL MEDICINE

## 2022-06-27 PROCEDURE — 83521 IG LIGHT CHAINS FREE EACH: CPT | Performed by: INTERNAL MEDICINE

## 2022-06-27 PROCEDURE — 84165 PROTEIN E-PHORESIS SERUM: CPT | Performed by: INTERNAL MEDICINE

## 2022-06-27 PROCEDURE — 85025 COMPLETE CBC W/AUTO DIFF WBC: CPT | Performed by: INTERNAL MEDICINE

## 2022-06-27 PROCEDURE — 84155 ASSAY OF PROTEIN SERUM: CPT | Performed by: INTERNAL MEDICINE

## 2022-06-27 PROCEDURE — 0 DIATRIZOATE MEGLUMINE & SODIUM PER 1 ML: Performed by: INTERNAL MEDICINE

## 2022-06-27 PROCEDURE — 82962 GLUCOSE BLOOD TEST: CPT

## 2022-06-27 RX ORDER — DEXTROSE MONOHYDRATE 25 G/50ML
50 INJECTION, SOLUTION INTRAVENOUS
Status: DISCONTINUED | OUTPATIENT
Start: 2022-06-27 | End: 2022-06-30 | Stop reason: HOSPADM

## 2022-06-27 RX ORDER — MAGNESIUM SULFATE HEPTAHYDRATE 40 MG/ML
2 INJECTION, SOLUTION INTRAVENOUS ONCE
Status: COMPLETED | OUTPATIENT
Start: 2022-06-27 | End: 2022-06-27

## 2022-06-27 RX ADMIN — DEXTROSE MONOHYDRATE 50 ML: 25 INJECTION, SOLUTION INTRAVENOUS at 17:17

## 2022-06-27 RX ADMIN — BUSPIRONE HYDROCHLORIDE 10 MG: 10 TABLET ORAL at 20:36

## 2022-06-27 RX ADMIN — MAGNESIUM SULFATE HEPTAHYDRATE 2 G: 2 INJECTION, SOLUTION INTRAVENOUS at 10:19

## 2022-06-27 RX ADMIN — CYANOCOBALAMIN 1000 MCG: 1000 INJECTION, SOLUTION INTRAMUSCULAR at 10:18

## 2022-06-27 RX ADMIN — SENNOSIDES AND DOCUSATE SODIUM 2 TABLET: 50; 8.6 TABLET ORAL at 20:36

## 2022-06-27 RX ADMIN — Medication 10 ML: at 10:22

## 2022-06-27 RX ADMIN — EPOETIN ALFA 6000 UNITS: 3000 SOLUTION INTRAVENOUS; SUBCUTANEOUS at 11:49

## 2022-06-27 RX ADMIN — Medication 5 MG: at 20:36

## 2022-06-27 RX ADMIN — DIATRIZOATE MEGLUMINE AND DIATRIZOATE SODIUM 30 ML: 660; 100 LIQUID ORAL; RECTAL at 16:46

## 2022-06-27 RX ADMIN — TAZOBACTAM SODIUM AND PIPERACILLIN SODIUM 3.38 G: 375; 3 INJECTION, SOLUTION INTRAVENOUS at 03:42

## 2022-06-27 RX ADMIN — Medication 250 MG: at 20:36

## 2022-06-27 RX ADMIN — FAMOTIDINE 20 MG: 20 TABLET ORAL at 20:36

## 2022-06-27 RX ADMIN — Medication 10 ML: at 20:37

## 2022-06-27 RX ADMIN — SODIUM CHLORIDE 250 MG: 9 INJECTION, SOLUTION INTRAVENOUS at 14:35

## 2022-06-27 RX ADMIN — TAZOBACTAM SODIUM AND PIPERACILLIN SODIUM 3.38 G: 375; 3 INJECTION, SOLUTION INTRAVENOUS at 12:42

## 2022-06-27 RX ADMIN — TRAZODONE HYDROCHLORIDE 100 MG: 100 TABLET ORAL at 20:37

## 2022-06-28 LAB
ALBUMIN SERPL ELPH-MCNC: 2.1 G/DL (ref 2.9–4.4)
ALBUMIN/GLOB SERPL: 0.8 {RATIO} (ref 0.7–1.7)
ALPHA1 GLOB SERPL ELPH-MCNC: 0.3 G/DL (ref 0–0.4)
ALPHA2 GLOB SERPL ELPH-MCNC: 0.7 G/DL (ref 0.4–1)
ANION GAP SERPL CALCULATED.3IONS-SCNC: 7.3 MMOL/L (ref 5–15)
B-GLOBULIN SERPL ELPH-MCNC: 0.8 G/DL (ref 0.7–1.3)
BACTERIA SPEC AEROBE CULT: NORMAL
BACTERIA SPEC AEROBE CULT: NORMAL
BASOPHILS # BLD AUTO: 0.03 10*3/MM3 (ref 0–0.2)
BASOPHILS NFR BLD AUTO: 0.4 % (ref 0–1.5)
BUN SERPL-MCNC: 33 MG/DL (ref 8–23)
BUN/CREAT SERPL: 25.4 (ref 7–25)
CALCIUM SPEC-SCNC: 9 MG/DL (ref 8.6–10.5)
CHLORIDE SERPL-SCNC: 107 MMOL/L (ref 98–107)
CO2 SERPL-SCNC: 25.7 MMOL/L (ref 22–29)
CREAT SERPL-MCNC: 1.3 MG/DL (ref 0.76–1.27)
CYSTATIN C SERPL-MCNC: 2.24 MG/L (ref 0.87–1.12)
DEPRECATED RDW RBC AUTO: 52.1 FL (ref 37–54)
EGFRCR SERPLBLD CKD-EPI 2021: 55.2 ML/MIN/1.73
EOSINOPHIL # BLD AUTO: 0.2 10*3/MM3 (ref 0–0.4)
EOSINOPHIL NFR BLD AUTO: 2.6 % (ref 0.3–6.2)
ERYTHROCYTE [DISTWIDTH] IN BLOOD BY AUTOMATED COUNT: 17.2 % (ref 12.3–15.4)
GAMMA GLOB SERPL ELPH-MCNC: 0.9 G/DL (ref 0.4–1.8)
GLOBULIN SER-MCNC: 2.7 G/DL (ref 2.2–3.9)
GLUCOSE BLDC GLUCOMTR-MCNC: 104 MG/DL (ref 70–99)
GLUCOSE BLDC GLUCOMTR-MCNC: 114 MG/DL (ref 70–99)
GLUCOSE BLDC GLUCOMTR-MCNC: 96 MG/DL (ref 70–99)
GLUCOSE SERPL-MCNC: 115 MG/DL (ref 65–99)
HCT VFR BLD AUTO: 25 % (ref 37.5–51)
HGB BLD-MCNC: 8.7 G/DL (ref 13–17.7)
IGA SERPL-MCNC: 186 MG/DL (ref 61–437)
IGG SERPL-MCNC: 956 MG/DL (ref 603–1613)
IGM SERPL-MCNC: 27 MG/DL (ref 15–143)
IMM GRANULOCYTES # BLD AUTO: 0.03 10*3/MM3 (ref 0–0.05)
IMM GRANULOCYTES NFR BLD AUTO: 0.4 % (ref 0–0.5)
INTERPRETATION SERPL IEP-IMP: ABNORMAL
LABORATORY COMMENT REPORT: ABNORMAL
LYMPHOCYTES # BLD AUTO: 1.14 10*3/MM3 (ref 0.7–3.1)
LYMPHOCYTES NFR BLD AUTO: 15 % (ref 19.6–45.3)
M PROTEIN SERPL ELPH-MCNC: ABNORMAL G/DL
MAGNESIUM SERPL-MCNC: 1.8 MG/DL (ref 1.6–2.4)
MCH RBC QN AUTO: 29.5 PG (ref 26.6–33)
MCHC RBC AUTO-ENTMCNC: 34.8 G/DL (ref 31.5–35.7)
MCV RBC AUTO: 84.7 FL (ref 79–97)
MONOCYTES # BLD AUTO: 0.79 10*3/MM3 (ref 0.1–0.9)
MONOCYTES NFR BLD AUTO: 10.4 % (ref 5–12)
NEUTROPHILS NFR BLD AUTO: 5.39 10*3/MM3 (ref 1.7–7)
NEUTROPHILS NFR BLD AUTO: 71.2 % (ref 42.7–76)
NRBC BLD AUTO-RTO: 0.4 /100 WBC (ref 0–0.2)
PHOSPHATE SERPL-MCNC: 2.9 MG/DL (ref 2.5–4.5)
PLATELET # BLD AUTO: 181 10*3/MM3 (ref 140–450)
PMV BLD AUTO: 11.4 FL (ref 6–12)
POTASSIUM SERPL-SCNC: 3.3 MMOL/L (ref 3.5–5.2)
PROT SERPL-MCNC: 4.8 G/DL (ref 6–8.5)
RBC # BLD AUTO: 2.95 10*6/MM3 (ref 4.14–5.8)
SODIUM SERPL-SCNC: 140 MMOL/L (ref 136–145)
WBC NRBC COR # BLD: 7.58 10*3/MM3 (ref 3.4–10.8)

## 2022-06-28 PROCEDURE — 82962 GLUCOSE BLOOD TEST: CPT

## 2022-06-28 PROCEDURE — 25010000002 CYANOCOBALAMIN PER 1000 MCG: Performed by: INTERNAL MEDICINE

## 2022-06-28 PROCEDURE — 25010000002 NA FERRIC GLUC CPLX PER 12.5 MG: Performed by: INTERNAL MEDICINE

## 2022-06-28 PROCEDURE — 83735 ASSAY OF MAGNESIUM: CPT | Performed by: INTERNAL MEDICINE

## 2022-06-28 PROCEDURE — 80048 BASIC METABOLIC PNL TOTAL CA: CPT | Performed by: INTERNAL MEDICINE

## 2022-06-28 PROCEDURE — 99233 SBSQ HOSP IP/OBS HIGH 50: CPT | Performed by: INTERNAL MEDICINE

## 2022-06-28 PROCEDURE — 84100 ASSAY OF PHOSPHORUS: CPT | Performed by: INTERNAL MEDICINE

## 2022-06-28 PROCEDURE — 85025 COMPLETE CBC W/AUTO DIFF WBC: CPT | Performed by: INTERNAL MEDICINE

## 2022-06-28 RX ADMIN — SENNOSIDES AND DOCUSATE SODIUM 2 TABLET: 50; 8.6 TABLET ORAL at 21:32

## 2022-06-28 RX ADMIN — SODIUM CHLORIDE 250 MG: 9 INJECTION, SOLUTION INTRAVENOUS at 09:30

## 2022-06-28 RX ADMIN — TRAZODONE HYDROCHLORIDE 100 MG: 100 TABLET ORAL at 21:32

## 2022-06-28 RX ADMIN — Medication 250 MG: at 21:33

## 2022-06-28 RX ADMIN — Medication 10 ML: at 21:33

## 2022-06-28 RX ADMIN — BUSPIRONE HYDROCHLORIDE 10 MG: 10 TABLET ORAL at 09:30

## 2022-06-28 RX ADMIN — CYANOCOBALAMIN 1000 MCG: 1000 INJECTION, SOLUTION INTRAMUSCULAR at 09:29

## 2022-06-28 RX ADMIN — FAMOTIDINE 20 MG: 20 TABLET ORAL at 21:32

## 2022-06-28 RX ADMIN — Medication 5 MG: at 21:32

## 2022-06-28 RX ADMIN — Medication 10 ML: at 09:31

## 2022-06-28 RX ADMIN — Medication 250 MG: at 09:30

## 2022-06-28 RX ADMIN — BUSPIRONE HYDROCHLORIDE 10 MG: 10 TABLET ORAL at 21:32

## 2022-06-29 LAB
ALBUMIN SERPL-MCNC: 2.6 G/DL (ref 3.5–5.2)
ALBUMIN/GLOB SERPL: 0.9 G/DL
ALP SERPL-CCNC: 86 U/L (ref 39–117)
ALT SERPL W P-5'-P-CCNC: 10 U/L (ref 1–41)
ANION GAP SERPL CALCULATED.3IONS-SCNC: 7.4 MMOL/L (ref 5–15)
AST SERPL-CCNC: 15 U/L (ref 1–40)
BASOPHILS # BLD AUTO: 0.01 10*3/MM3 (ref 0–0.2)
BASOPHILS NFR BLD AUTO: 0.2 % (ref 0–1.5)
BILIRUB SERPL-MCNC: 0.3 MG/DL (ref 0–1.2)
BUN SERPL-MCNC: 30 MG/DL (ref 8–23)
BUN/CREAT SERPL: 30.3 (ref 7–25)
CALCIUM SPEC-SCNC: 8.5 MG/DL (ref 8.6–10.5)
CHLORIDE SERPL-SCNC: 106 MMOL/L (ref 98–107)
CO2 SERPL-SCNC: 24.6 MMOL/L (ref 22–29)
CREAT SERPL-MCNC: 0.99 MG/DL (ref 0.76–1.27)
DEPRECATED RDW RBC AUTO: 50.4 FL (ref 37–54)
EGFRCR SERPLBLD CKD-EPI 2021: 76.5 ML/MIN/1.73
EOSINOPHIL # BLD AUTO: 0.11 10*3/MM3 (ref 0–0.4)
EOSINOPHIL NFR BLD AUTO: 1.7 % (ref 0.3–6.2)
ERYTHROCYTE [DISTWIDTH] IN BLOOD BY AUTOMATED COUNT: 16.9 % (ref 12.3–15.4)
GLOBULIN UR ELPH-MCNC: 2.9 GM/DL
GLUCOSE SERPL-MCNC: 111 MG/DL (ref 65–99)
HCT VFR BLD AUTO: 24.1 % (ref 37.5–51)
HGB BLD-MCNC: 8.3 G/DL (ref 13–17.7)
IMM GRANULOCYTES # BLD AUTO: 0.01 10*3/MM3 (ref 0–0.05)
IMM GRANULOCYTES NFR BLD AUTO: 0.2 % (ref 0–0.5)
KAPPA LC FREE SER-MCNC: 72 MG/L (ref 3.3–19.4)
KAPPA LC FREE/LAMBDA FREE SER: 1.84 {RATIO} (ref 0.26–1.65)
LAMBDA LC FREE SERPL-MCNC: 39.2 MG/L (ref 5.7–26.3)
LYMPHOCYTES # BLD AUTO: 0.82 10*3/MM3 (ref 0.7–3.1)
LYMPHOCYTES NFR BLD AUTO: 13 % (ref 19.6–45.3)
MAGNESIUM SERPL-MCNC: 1.7 MG/DL (ref 1.6–2.4)
MCH RBC QN AUTO: 28.8 PG (ref 26.6–33)
MCHC RBC AUTO-ENTMCNC: 34.4 G/DL (ref 31.5–35.7)
MCV RBC AUTO: 83.7 FL (ref 79–97)
MONOCYTES # BLD AUTO: 0.68 10*3/MM3 (ref 0.1–0.9)
MONOCYTES NFR BLD AUTO: 10.8 % (ref 5–12)
NEUTROPHILS NFR BLD AUTO: 4.68 10*3/MM3 (ref 1.7–7)
NEUTROPHILS NFR BLD AUTO: 74.1 % (ref 42.7–76)
NRBC BLD AUTO-RTO: 0.8 /100 WBC (ref 0–0.2)
PHOSPHATE SERPL-MCNC: 2.5 MG/DL (ref 2.5–4.5)
PLATELET # BLD AUTO: 153 10*3/MM3 (ref 140–450)
PMV BLD AUTO: 11.2 FL (ref 6–12)
POTASSIUM SERPL-SCNC: 3.6 MMOL/L (ref 3.5–5.2)
PROT SERPL-MCNC: 5.5 G/DL (ref 6–8.5)
RBC # BLD AUTO: 2.88 10*6/MM3 (ref 4.14–5.8)
SODIUM SERPL-SCNC: 138 MMOL/L (ref 136–145)
WBC NRBC COR # BLD: 6.31 10*3/MM3 (ref 3.4–10.8)

## 2022-06-29 PROCEDURE — 84100 ASSAY OF PHOSPHORUS: CPT | Performed by: INTERNAL MEDICINE

## 2022-06-29 PROCEDURE — 85025 COMPLETE CBC W/AUTO DIFF WBC: CPT | Performed by: INTERNAL MEDICINE

## 2022-06-29 PROCEDURE — 25010000002 EPOETIN ALFA PER 1000 UNITS: Performed by: STUDENT IN AN ORGANIZED HEALTH CARE EDUCATION/TRAINING PROGRAM

## 2022-06-29 PROCEDURE — 80053 COMPREHEN METABOLIC PANEL: CPT | Performed by: INTERNAL MEDICINE

## 2022-06-29 PROCEDURE — 99233 SBSQ HOSP IP/OBS HIGH 50: CPT | Performed by: INTERNAL MEDICINE

## 2022-06-29 PROCEDURE — 25010000002 CYANOCOBALAMIN PER 1000 MCG: Performed by: INTERNAL MEDICINE

## 2022-06-29 PROCEDURE — 83735 ASSAY OF MAGNESIUM: CPT | Performed by: INTERNAL MEDICINE

## 2022-06-29 RX ADMIN — SENNOSIDES AND DOCUSATE SODIUM 2 TABLET: 50; 8.6 TABLET ORAL at 08:53

## 2022-06-29 RX ADMIN — ACETAMINOPHEN 650 MG: 325 TABLET ORAL at 08:54

## 2022-06-29 RX ADMIN — Medication 10 ML: at 20:24

## 2022-06-29 RX ADMIN — Medication 250 MG: at 20:24

## 2022-06-29 RX ADMIN — SENNOSIDES AND DOCUSATE SODIUM 2 TABLET: 50; 8.6 TABLET ORAL at 20:24

## 2022-06-29 RX ADMIN — CYANOCOBALAMIN 1000 MCG: 1000 INJECTION, SOLUTION INTRAMUSCULAR at 08:53

## 2022-06-29 RX ADMIN — Medication 250 MG: at 08:53

## 2022-06-29 RX ADMIN — Medication 10 ML: at 08:54

## 2022-06-29 RX ADMIN — BUSPIRONE HYDROCHLORIDE 10 MG: 10 TABLET ORAL at 08:54

## 2022-06-29 RX ADMIN — BUSPIRONE HYDROCHLORIDE 10 MG: 10 TABLET ORAL at 20:24

## 2022-06-29 RX ADMIN — FAMOTIDINE 20 MG: 20 TABLET ORAL at 20:24

## 2022-06-29 RX ADMIN — TRAZODONE HYDROCHLORIDE 100 MG: 100 TABLET ORAL at 20:24

## 2022-06-29 RX ADMIN — EPOETIN ALFA 6000 UNITS: 3000 SOLUTION INTRAVENOUS; SUBCUTANEOUS at 08:53

## 2022-06-30 VITALS
BODY MASS INDEX: 24.79 KG/M2 | SYSTOLIC BLOOD PRESSURE: 111 MMHG | RESPIRATION RATE: 18 BRPM | WEIGHT: 163.58 LBS | OXYGEN SATURATION: 99 % | HEIGHT: 68 IN | TEMPERATURE: 97.3 F | DIASTOLIC BLOOD PRESSURE: 62 MMHG | HEART RATE: 95 BPM

## 2022-06-30 PROCEDURE — 99239 HOSP IP/OBS DSCHRG MGMT >30: CPT | Performed by: INTERNAL MEDICINE

## 2022-06-30 PROCEDURE — 25010000002 CYANOCOBALAMIN PER 1000 MCG: Performed by: INTERNAL MEDICINE

## 2022-06-30 RX ORDER — CYANOCOBALAMIN 1000 UG/ML
1000 INJECTION, SOLUTION INTRAMUSCULAR; SUBCUTANEOUS DAILY
Qty: 5 ML | Refills: 0 | Status: SHIPPED | OUTPATIENT
Start: 2022-07-01 | End: 2022-07-06

## 2022-06-30 RX ADMIN — Medication 10 ML: at 09:31

## 2022-06-30 RX ADMIN — BUSPIRONE HYDROCHLORIDE 10 MG: 10 TABLET ORAL at 09:30

## 2022-06-30 RX ADMIN — Medication 250 MG: at 09:29

## 2022-06-30 RX ADMIN — SENNOSIDES AND DOCUSATE SODIUM 2 TABLET: 50; 8.6 TABLET ORAL at 09:30

## 2022-06-30 RX ADMIN — CYANOCOBALAMIN 1000 MCG: 1000 INJECTION, SOLUTION INTRAMUSCULAR at 09:29

## 2022-07-02 ENCOUNTER — HOSPITAL ENCOUNTER (OUTPATIENT)
Facility: HOSPITAL | Age: 82
Discharge: NURSING, REHAB OR LONG-TERM CARE | End: 2022-07-04
Attending: EMERGENCY MEDICINE | Admitting: INTERNAL MEDICINE

## 2022-07-02 ENCOUNTER — PREP FOR SURGERY (OUTPATIENT)
Dept: OTHER | Facility: HOSPITAL | Age: 82
End: 2022-07-02

## 2022-07-02 ENCOUNTER — APPOINTMENT (OUTPATIENT)
Dept: CT IMAGING | Facility: HOSPITAL | Age: 82
End: 2022-07-02

## 2022-07-02 DIAGNOSIS — T85.528A DISLODGED GASTROSTOMY TUBE: Primary | ICD-10-CM

## 2022-07-02 DIAGNOSIS — E43 SEVERE MALNUTRITION: Primary | ICD-10-CM

## 2022-07-02 DIAGNOSIS — Z93.1 GASTROSTOMY TUBE DEPENDENT: ICD-10-CM

## 2022-07-02 LAB
ALBUMIN SERPL-MCNC: 2.9 G/DL (ref 3.5–5.2)
ALBUMIN/GLOB SERPL: 1.1 G/DL
ALP SERPL-CCNC: 84 U/L (ref 39–117)
ALT SERPL W P-5'-P-CCNC: 10 U/L (ref 1–41)
ANION GAP SERPL CALCULATED.3IONS-SCNC: 8.1 MMOL/L (ref 5–15)
AST SERPL-CCNC: 17 U/L (ref 1–40)
BASOPHILS # BLD AUTO: 0.03 10*3/MM3 (ref 0–0.2)
BASOPHILS NFR BLD AUTO: 0.3 % (ref 0–1.5)
BILIRUB SERPL-MCNC: 0.4 MG/DL (ref 0–1.2)
BUN SERPL-MCNC: 30 MG/DL (ref 8–23)
BUN/CREAT SERPL: 31.6 (ref 7–25)
CALCIUM SPEC-SCNC: 9 MG/DL (ref 8.6–10.5)
CHLORIDE SERPL-SCNC: 108 MMOL/L (ref 98–107)
CO2 SERPL-SCNC: 27.9 MMOL/L (ref 22–29)
CREAT SERPL-MCNC: 0.95 MG/DL (ref 0.76–1.27)
DEPRECATED RDW RBC AUTO: 52.3 FL (ref 37–54)
EGFRCR SERPLBLD CKD-EPI 2021: 80.4 ML/MIN/1.73
EOSINOPHIL # BLD AUTO: 0.19 10*3/MM3 (ref 0–0.4)
EOSINOPHIL NFR BLD AUTO: 1.9 % (ref 0.3–6.2)
ERYTHROCYTE [DISTWIDTH] IN BLOOD BY AUTOMATED COUNT: 17.4 % (ref 12.3–15.4)
GLOBULIN UR ELPH-MCNC: 2.7 GM/DL
GLUCOSE SERPL-MCNC: 87 MG/DL (ref 65–99)
HCT VFR BLD AUTO: 24.1 % (ref 37.5–51)
HGB BLD-MCNC: 8.4 G/DL (ref 13–17.7)
IMM GRANULOCYTES # BLD AUTO: 0.03 10*3/MM3 (ref 0–0.05)
IMM GRANULOCYTES NFR BLD AUTO: 0.3 % (ref 0–0.5)
LYMPHOCYTES # BLD AUTO: 1.69 10*3/MM3 (ref 0.7–3.1)
LYMPHOCYTES NFR BLD AUTO: 17.2 % (ref 19.6–45.3)
MCH RBC QN AUTO: 29.3 PG (ref 26.6–33)
MCHC RBC AUTO-ENTMCNC: 34.9 G/DL (ref 31.5–35.7)
MCV RBC AUTO: 84 FL (ref 79–97)
MONOCYTES # BLD AUTO: 0.9 10*3/MM3 (ref 0.1–0.9)
MONOCYTES NFR BLD AUTO: 9.1 % (ref 5–12)
NEUTROPHILS NFR BLD AUTO: 7.01 10*3/MM3 (ref 1.7–7)
NEUTROPHILS NFR BLD AUTO: 71.2 % (ref 42.7–76)
NRBC BLD AUTO-RTO: 0.5 /100 WBC (ref 0–0.2)
PLATELET # BLD AUTO: 160 10*3/MM3 (ref 140–450)
PMV BLD AUTO: 11.1 FL (ref 6–12)
POTASSIUM SERPL-SCNC: 2.9 MMOL/L (ref 3.5–5.2)
PROT SERPL-MCNC: 5.6 G/DL (ref 6–8.5)
RBC # BLD AUTO: 2.87 10*6/MM3 (ref 4.14–5.8)
SODIUM SERPL-SCNC: 144 MMOL/L (ref 136–145)
WBC NRBC COR # BLD: 9.85 10*3/MM3 (ref 3.4–10.8)

## 2022-07-02 PROCEDURE — G0378 HOSPITAL OBSERVATION PER HR: HCPCS

## 2022-07-02 PROCEDURE — 36415 COLL VENOUS BLD VENIPUNCTURE: CPT | Performed by: EMERGENCY MEDICINE

## 2022-07-02 PROCEDURE — 99284 EMERGENCY DEPT VISIT MOD MDM: CPT

## 2022-07-02 PROCEDURE — 94799 UNLISTED PULMONARY SVC/PX: CPT

## 2022-07-02 PROCEDURE — 94760 N-INVAS EAR/PLS OXIMETRY 1: CPT

## 2022-07-02 PROCEDURE — 25010000002 HYALURONIDASE (HUMAN) 150 UNIT/ML SOLUTION 1 ML VIAL: Performed by: FAMILY MEDICINE

## 2022-07-02 PROCEDURE — 99219 PR INITIAL OBSERVATION CARE/DAY 50 MINUTES: CPT | Performed by: HOSPITALIST

## 2022-07-02 PROCEDURE — 96366 THER/PROPH/DIAG IV INF ADDON: CPT

## 2022-07-02 PROCEDURE — 96372 THER/PROPH/DIAG INJ SC/IM: CPT

## 2022-07-02 PROCEDURE — 80053 COMPREHEN METABOLIC PANEL: CPT | Performed by: EMERGENCY MEDICINE

## 2022-07-02 PROCEDURE — 85025 COMPLETE CBC W/AUTO DIFF WBC: CPT | Performed by: EMERGENCY MEDICINE

## 2022-07-02 PROCEDURE — 25010000002 ENOXAPARIN PER 10 MG: Performed by: HOSPITALIST

## 2022-07-02 PROCEDURE — 96365 THER/PROPH/DIAG IV INF INIT: CPT

## 2022-07-02 PROCEDURE — 94664 DEMO&/EVAL PT USE INHALER: CPT

## 2022-07-02 PROCEDURE — 99214 OFFICE O/P EST MOD 30 MIN: CPT | Performed by: INTERNAL MEDICINE

## 2022-07-02 PROCEDURE — 94640 AIRWAY INHALATION TREATMENT: CPT

## 2022-07-02 PROCEDURE — 96361 HYDRATE IV INFUSION ADD-ON: CPT

## 2022-07-02 PROCEDURE — 74176 CT ABD & PELVIS W/O CONTRAST: CPT

## 2022-07-02 PROCEDURE — 0 POTASSIUM CHLORIDE 10 MEQ/100ML SOLUTION: Performed by: FAMILY MEDICINE

## 2022-07-02 RX ORDER — NALOXONE HCL 0.4 MG/ML
0.4 VIAL (ML) INJECTION
Status: DISCONTINUED | OUTPATIENT
Start: 2022-07-02 | End: 2022-07-05 | Stop reason: HOSPADM

## 2022-07-02 RX ORDER — SODIUM CHLORIDE 9 MG/ML
100 INJECTION, SOLUTION INTRAVENOUS CONTINUOUS
Status: DISCONTINUED | OUTPATIENT
Start: 2022-07-02 | End: 2022-07-04

## 2022-07-02 RX ORDER — SODIUM CHLORIDE 0.9 % (FLUSH) 0.9 %
10 SYRINGE (ML) INJECTION EVERY 12 HOURS SCHEDULED
Status: DISCONTINUED | OUTPATIENT
Start: 2022-07-02 | End: 2022-07-05 | Stop reason: HOSPADM

## 2022-07-02 RX ORDER — SODIUM CHLORIDE 0.9 % (FLUSH) 0.9 %
10 SYRINGE (ML) INJECTION AS NEEDED
Status: DISCONTINUED | OUTPATIENT
Start: 2022-07-02 | End: 2022-07-05 | Stop reason: HOSPADM

## 2022-07-02 RX ORDER — MORPHINE SULFATE 2 MG/ML
1 INJECTION, SOLUTION INTRAMUSCULAR; INTRAVENOUS EVERY 4 HOURS PRN
Status: DISCONTINUED | OUTPATIENT
Start: 2022-07-02 | End: 2022-07-05 | Stop reason: HOSPADM

## 2022-07-02 RX ORDER — ENOXAPARIN SODIUM 100 MG/ML
40 INJECTION SUBCUTANEOUS EVERY 24 HOURS
Status: DISCONTINUED | OUTPATIENT
Start: 2022-07-02 | End: 2022-07-03

## 2022-07-02 RX ORDER — IPRATROPIUM BROMIDE AND ALBUTEROL SULFATE 2.5; .5 MG/3ML; MG/3ML
3 SOLUTION RESPIRATORY (INHALATION)
Status: DISCONTINUED | OUTPATIENT
Start: 2022-07-02 | End: 2022-07-03

## 2022-07-02 RX ORDER — POTASSIUM CHLORIDE 7.45 MG/ML
10 INJECTION INTRAVENOUS
Status: DISPENSED | OUTPATIENT
Start: 2022-07-02 | End: 2022-07-02

## 2022-07-02 RX ORDER — POTASSIUM CHLORIDE 7.45 MG/ML
10 INJECTION INTRAVENOUS
Status: COMPLETED | OUTPATIENT
Start: 2022-07-02 | End: 2022-07-03

## 2022-07-02 RX ORDER — LIDOCAINE HYDROCHLORIDE 20 MG/ML
5 SOLUTION OROPHARYNGEAL ONCE
Status: COMPLETED | OUTPATIENT
Start: 2022-07-02 | End: 2022-07-02

## 2022-07-02 RX ORDER — SODIUM CHLORIDE 9 MG/ML
125 INJECTION, SOLUTION INTRAVENOUS CONTINUOUS
Status: DISCONTINUED | OUTPATIENT
Start: 2022-07-02 | End: 2022-07-02

## 2022-07-02 RX ADMIN — POTASSIUM CHLORIDE 10 MEQ: 7.46 INJECTION, SOLUTION INTRAVENOUS at 16:44

## 2022-07-02 RX ADMIN — POTASSIUM CHLORIDE 10 MEQ: 7.46 INJECTION, SOLUTION INTRAVENOUS at 22:34

## 2022-07-02 RX ADMIN — SODIUM CHLORIDE 100 ML/HR: 9 INJECTION, SOLUTION INTRAVENOUS at 22:25

## 2022-07-02 RX ADMIN — LIDOCAINE HYDROCHLORIDE 5 ML: 20 SOLUTION ORAL at 04:30

## 2022-07-02 RX ADMIN — POTASSIUM CHLORIDE 10 MEQ: 7.46 INJECTION, SOLUTION INTRAVENOUS at 19:22

## 2022-07-02 RX ADMIN — IPRATROPIUM BROMIDE AND ALBUTEROL SULFATE 3 ML: 2.5; .5 SOLUTION RESPIRATORY (INHALATION) at 12:14

## 2022-07-02 RX ADMIN — HYALURONIDASE (HUMAN RECOMBINANT) 150 UNITS: 150 INJECTION, SOLUTION SUBCUTANEOUS at 20:08

## 2022-07-02 RX ADMIN — POTASSIUM CHLORIDE 10 MEQ: 7.46 INJECTION, SOLUTION INTRAVENOUS at 18:16

## 2022-07-02 RX ADMIN — IPRATROPIUM BROMIDE AND ALBUTEROL SULFATE 3 ML: 2.5; .5 SOLUTION RESPIRATORY (INHALATION) at 19:51

## 2022-07-02 RX ADMIN — SODIUM CHLORIDE 100 ML/HR: 9 INJECTION, SOLUTION INTRAVENOUS at 16:42

## 2022-07-02 RX ADMIN — Medication 10 ML: at 20:09

## 2022-07-02 RX ADMIN — IPRATROPIUM BROMIDE AND ALBUTEROL SULFATE 3 ML: 2.5; .5 SOLUTION RESPIRATORY (INHALATION) at 07:47

## 2022-07-02 RX ADMIN — POTASSIUM CHLORIDE 10 MEQ: 7.46 INJECTION, SOLUTION INTRAVENOUS at 23:47

## 2022-07-02 RX ADMIN — ENOXAPARIN SODIUM 40 MG: 100 INJECTION SUBCUTANEOUS at 16:42

## 2022-07-02 NOTE — CONSULTS
[facility]  Initial Inpatient Consult Note    Referring Provider: Hospitalist    Reason for Consultation: G-tube removal    Subjective     History of present illness:    82 y.o. male with multiple comorbidities as described below.  He is a nursing home patient and transferred here for G-tube that was removed.  This G-tube was placed initially by Dr. Mistry June 8 of this year.  ER was unable to replace the tube patient denies any abdominal pain vomiting he is a poor historian unable to give significant past medical history.    Past Medical History:  Past Medical History:   Diagnosis Date   • Acute renal failure with pathological lesion in kidney ([facility])    • Alzheimer's dementia with behavioral disturbance ([facility])    • Anemia, vitamin B12 deficiency    • Anxiety    • Anxiety disorder due to known physiological condition    • Arthritis    • Benign neoplasm of prostate    • Cannot walk    • CHF (congestive heart failure) ([facility])    • Constipation    • Coronary atherosclerosis of native coronary artery    • CVA (cerebral vascular accident) ([facility])    • 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 (CVA)    • Edema    • Essential hypertension, benign    • Gastroesophageal reflux disease without esophagitis    • GERD (gastroesophageal reflux disease)    • High blood pressure    • Hirsutism    • HTN (hypertension)    • Hypercalcemia    • Hyperkalemia    • Hyperlipemia    • Hyperlipidemia    • Hypokalemia    • Ingrowing toenail 09/21/2018   • Left foot pain 09/21/2018   • Nutritional deficiency    • Osteoarthritis of right knee 08/22/2016   • Pneumonia    • Presence of right artificial knee joint    • Primary localized osteoarthrosis    • PVD (peripheral vascular disease) ([facility])    • 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 ([facility])    • Right foot pain 09/21/2018   • Senile dementia, uncomplicated ([facility])    • Severe sinus bradycardia    • Shock, septic ([facility])    • Sleep apnea     • Thrombosis of left femoral vein ([facility])    • Tinea unguium 09/21/2018   • Vitamin B12 deficiency anemia      Past Surgical History:  Past Surgical History:   Procedure Laterality Date   • ENDOSCOPY W/ PEG TUBE PLACEMENT N/A 6/8/2022    Procedure: ESOPHAGOGASTRODUODENOSCOPY WITH PERCUTANEOUS ENDOSCOPIC GASTROSTOMY TUBE INSERTION WITH ANESTHESIA;  Surgeon: Yanelis Mistry MD;  Location: [facility];  Service: Gastroenterology;  Laterality: N/A;  PEG TUBE PLACEMENT      Social History:   Social History     Tobacco Use   • Smoking status: Never Smoker   • Smokeless tobacco: Never Used   Substance Use Topics   • Alcohol use: No      Family History:  Family History   Family history unknown: Yes       Home Meds:  (Not in a hospital admission)    Current Meds:   ipratropium-albuterol, 3 mL, Nebulization, 4x Daily - RT      Allergies:  No Known Allergies  Review of Systems  Pertinent items are noted in HPI, all other systems reviewed and negative         Vital Signs  Temp:  [97.9 °F (36.6 °C)] 97.9 °F (36.6 °C)  Heart Rate:  [66-73] 73  Resp:  [16-18] 18  BP: (139)/(74) 139/74  Physical Exam:  General Appearance:    Alert, cooperative, in no acute distress   Head:    Normocephalic, without obvious abnormality, atraumatic   Eyes:          conjunctivae and sclerae normal, no   icterus   Throat:   no thrush, oral mucosa moist   Neck:   Supple, no adenopathy   Lungs:     Clear to auscultation bilaterally    Heart:    Regular rhythm and normal rate    Chest Wall:    No abnormalities observed   Abdomen:     Soft, nondistended, nontender; normal bowel sounds   Extremities:   no edema, no redness   Skin:   No bruising or rash   Psychiatric:  normal mood and insight     Results Review:  [x]  Laboratory   [x]  Radiology  []  Pathology      I reviewed the patient's new clinical results.    Results from last 7 days   Lab Units 06/29/22  1043 06/28/22  0537 06/27/22  0421   WBC 10*3/mm3 6.31 7.58 7.45   HEMOGLOBIN g/dL  8.3* 8.7* 7.7*   HEMATOCRIT % 24.1* 25.0* 22.4*   PLATELETS 10*3/mm3 153 181 202     Results from last 7 days   Lab Units 06/29/22  1043 06/28/22  0537 06/27/22  0421   SODIUM mmol/L 138 140 141   POTASSIUM mmol/L 3.6 3.3* 3.8   CHLORIDE mmol/L 106 107 107   CO2 mmol/L 24.6 25.7 24.8   BUN mg/dL 30* 33* 36*   CREATININE mg/dL 0.99 1.30* 1.51*   CALCIUM mg/dL 8.5* 9.0 8.6   BILIRUBIN mg/dL 0.3  --   --    ALK PHOS U/L 86  --   --    ALT (SGPT) U/L 10  --   --    AST (SGOT) U/L 15  --   --    GLUCOSE mg/dL 111* 115* 91         Lab Results   Lab Value Date/Time    LIPASE 17 07/06/2019 1926       Radiology:  No orders to display        Assessment & Plan     Patient Active Problem List   Diagnosis   • Primary osteoarthritis of right knee   • Status post total right knee replacement   • Essential hypertension   • Shortness of breath   • BRYANT (acute kidney injury) (HCC)   • Hypokalemia   • Hypernatremia   • Symptomatic bradycardia   • Hypothermia, initial encounter   • Acute CVA (cerebrovascular accident) (HCC)   • Severe sepsis (HCC)   • Severe malnutrition (HCC)   • Hyperkalemia   • Anemia   • Dislodged gastrostomy tube        Plan:  I will schedule the patient for CT scan of the abdomen today to evaluate any abnormalities at the site of the G-tube after it was accidentally dislodged.  This is a recently placed G-tube from June 8 by Dr. Mistry.  If there is no evidence of abscess, pneumoperitoneum, and/or hematoma and we will attempt replacement of G-tube  possibly tomorrow assuming family would like tube replaced.      I discussed the patients findings and my recommendations with patient.    Bam eMlo MD

## 2022-07-02 NOTE — H&P
[facility] HISTORY AND PHYSICAL  Date: 2022   Patient Name: Jordin Menon  : 1940  MRN: [id number]  Primary Care Physician:  Frank Llanes MD  Date of admission: 2022    Subjective Dislodged PEG tube  Subjective     Chief Complaint: Dislodged PEG tube    HPI:  Jordin Menon is a 82 y.o. male with advanced dementia, who resides in a nursing home, who has had multiple hospitalizations for sepsis, pneumonia, and renal failure, who was just discharged from the hospital on 2022 after being treated for hypothermia which is chronic likely secondary to patient's malnutrition.  Patient was brought in because his PEG tube has been dislodged.    On arrival to the ED, patient has a temperature of 97.7, pulse of 72, respiratory rate 16, blood pressure of 139/74 and he saturating 99% on room air.    Dr. Correa discussed the case with Dr. Melo.  He will replace the PEG tube today.    Personal History     Past Medical History:  Past Medical History:   Diagnosis Date   • Acute renal failure with pathological lesion in kidney ([facility])    • Alzheimer's dementia with behavioral disturbance ([facility])    • Anemia, vitamin B12 deficiency    • Anxiety    • Anxiety disorder due to known physiological condition    • Arthritis    • Benign neoplasm of prostate    • Cannot walk    • CHF (congestive heart failure) ([facility])    • Constipation    • Coronary atherosclerosis of native coronary artery    • CVA (cerebral vascular accident) ([facility])    • 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 (CVA)    • Edema    • Essential hypertension, benign    • Gastroesophageal reflux disease without esophagitis    • GERD (gastroesophageal reflux disease)    • High blood pressure    • Hirsutism    • HTN (hypertension)    • Hypercalcemia    • Hyperkalemia    • Hyperlipemia    • Hyperlipidemia    • Hypokalemia    • Ingrowing toenail 2018   • Left foot pain 2018   • Nutritional deficiency    • Osteoarthritis of  right knee 08/22/2016   • Pneumonia    • Presence of right artificial knee joint    • Primary localized osteoarthrosis    • PVD (peripheral vascular disease) (HCC)    • 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)    • Right foot pain 09/21/2018   • Senile dementia, uncomplicated (HCC)    • Severe sinus bradycardia    • Shock, septic (HCC)    • Sleep apnea    • Thrombosis of left femoral vein (HCC)    • Tinea unguium 09/21/2018   • Vitamin B12 deficiency anemia          Past Surgical History:  Past Surgical History:   Procedure Laterality Date   • ENDOSCOPY W/ PEG TUBE PLACEMENT N/A 6/8/2022    Procedure: ESOPHAGOGASTRODUODENOSCOPY WITH PERCUTANEOUS ENDOSCOPIC GASTROSTOMY TUBE INSERTION WITH ANESTHESIA;  Surgeon: Yanelis Mistry MD;  Location: [facility] ENDOSCOPY;  Service: Gastroenterology;  Laterality: N/A;  PEG TUBE PLACEMENT           Family History:   Family History   Family history unknown: Yes       Social History:   Social History     Socioeconomic History   • Marital status:    Tobacco Use   • Smoking status: Never Smoker   • Smokeless tobacco: Never Used   Vaping Use   • Vaping Use: Never used   Substance and Sexual Activity   • Alcohol use: No   • Drug use: Never   • Sexual activity: Defer         Home Medications:  Diclofenac Sodium, Docusate Sodium, acetaminophen, apixaban, aspirin, benzocaine-menthol, busPIRone, cyanocobalamin, docusate sodium, famotidine, furosemide, ipratropium-albuterol, magnesium hydroxide, melatonin, mineral oil-hydrophilic petrolatum, polyethylene glycol, saccharomyces boulardii, trolamine salicylate, and witch hazel-glycerin    Allergies:  No Known Allergies    Review of Systems   All systems were reviewed and negative except for: Unable to get because of advanced dementia    Objective   Objective     Vitals:   Temp:  [97.9 °F (36.6 °C)] 97.9 °F (36.6 °C)  Heart Rate:  [72] 72  Resp:  [16] 16  BP: (139)/(74) 139/74    Physical  Exam    Constitutional: Awake, alert, no acute distress   Eyes: Pupils equal, sclerae anicteric, no conjunctival injection   HENT: NCAT, mucous membranes moist   Neck: Supple, no thyromegaly, no lymphadenopathy, trachea midline   Respiratory: Clear to auscultation bilaterally, nonlabored respirations    Cardiovascular: RRR, no murmurs, rubs, or gallops, palpable pedal pulses bilaterally   Gastrointestinal: Positive bowel sounds, soft, nontender, nondistended   Musculoskeletal: No bilateral ankle edema, no clubbing or cyanosis to extremities   Psychiatric: Appropriate affect, cooperative   Neurologic: Oriented x1   Skin: No rashes     Result Review    Result Review:  I have personally reviewed the results from the time of this admission to 7/2/2022 05:50 EDT and agree with these findings:  [x]  Laboratory  []  Microbiology  []  Radiology  []  EKG/Telemetry   []  Cardiology/Vascular   []  Pathology  [x]  Old records  []  Other:      Assessment & Plan   Assessment / Plan   Assessment/Plan:   #1 disloged peg tube  -will need to be endoscopically placed.    -Dr. Melo consulted.  -patient NPO.    -IVFs.  -We will need to resume home meds once PEG tube replaced    #2  Advanced Alzheimer's dementia   #3 acute on chronic normocytic anemia  #4 prior CVA  #5 chronic CHF  #6 bedbound status  #7 hypertension  #8 osteoarthritis      DVT prophylaxis:  Lovenox  CODE STATUS:    Medical Intervention Limits: NO intubation (DNI); NO cardioversion  Code Status (Patient has no pulse and is not breathing): No CPR (Do Not Attempt to Resuscitate)  Medical Interventions (Patient has pulse or is breathing): Limited Support      Admission Status:  I believe this patient meets observation status.    Electronically signed by Diane Fox DO, 07/02/22, 5:50 AM EDT.

## 2022-07-02 NOTE — PLAN OF CARE
Goal Outcome Evaluation:  Plan of Care Reviewed With: patient        Progress: no change  Outcome Evaluation: Patient alert to self. Needs frequent reorientation. Continues on room air. No signs or symptoms of pain. CT scan completed this AM. PEG tube replacement scheduled for tomorrow. No other issues/needs at this time.

## 2022-07-02 NOTE — PROGRESS NOTES
[facility]       Patient Name: Jordin Mneon  : 1940  MRN: [id number]  Primary Care Physician: Frank Llanes MD  Date of admission: 2022  Today's Date: 2022  [facility]:   [facility]  Subjective   Chief Complaint:  Weakness.  Dislodged PEG tube.    Summary:  Pt admitted earlier today by nocturnist.  See admission note.  NH patient with advanced dementia and PEG tube came out.  Brought here to evaluate and replace.  GI consulted.    Interval Followup: 2022    • Admitted earlier by nocturnist  • Pt resting comfortably in bed  • Mumbles briefly.  Alert.  Minimal conversation.  Does follow some simple commands.  • IV runs of K+ ordered.  RN getting IV access established.      • REVIEW OF SYSTEMS: Unable to obtain due to dementia  Objective   Temp:  [94.6 °F (34.8 °C)-97.9 °F (36.6 °C)] 94.6 °F (34.8 °C)  Heart Rate:  [65-77] 65  Resp:  [16-18] 18  BP: (122-146)/(70-95) 140/70  PHYSICAL EXAM   • CON: Elderly and frail.  Cachectic.  NAD.   Edentulous.  • EYES:  Sclera anicteric. EOMI. Normal conjunctiva.   • ENT:  Oropharyngeal mucosa without ulcers or thrush.    • NECK:  No thyromegaly. No stridor. Trachea midline.  • RESP:  CTA. No wheezes. No crackles.   • CV:  Rhythm regular. Rate WNL. No murmur noted.  Trace edema.  • GI:  Soft and nontender. Nondistended.  Bowel sounds present.   • EXT: Peripheral pulses intact.  No joint deformities or cyanosis.  • LYMPH:  No lymphedema noted.  No cervical lymphadenopathy.  • PSYCH:  Alert. Not oriented. Normal affect and mood.  • NEURO:  CNII-XII grossly intact. No dysarthria or aphasia. No unilateral weakness or paresthesia.  • SKIN: Chronic venous stasis changes noted bilat pretib.  No cellulitis    Results from last 7 days   Lab Units 22  0934 22  1043 22  0537 22  0421 22  0828 22  0444   WBC 10*3/mm3 9.85 6.31 7.58 7.45 8.00 8.55   HEMOGLOBIN g/dL 8.4* 8.3* 8.7* 7.7* 8.6* 7.7*    HEMATOCRIT % 24.1* 24.1* 25.0* 22.4* 23.5* 22.0*   PLATELETS 10*3/mm3 160 153 181 202 197 251     Results from last 7 days   Lab Units 07/02/22  0934 06/29/22  1043 06/28/22  0537 06/27/22  0421 06/26/22  0444   SODIUM mmol/L 144 138 140 141 141   POTASSIUM mmol/L 2.9* 3.6 3.3* 3.8 4.1   CO2 mmol/L 27.9 24.6 25.7 24.8 25.4   CHLORIDE mmol/L 108* 106 107 107 106   ANION GAP mmol/L 8.1 7.4 7.3 9.2 9.6   BUN mg/dL 30* 30* 33* 36* 38*   CREATININE mg/dL 0.95 0.99 1.30* 1.51* 1.59*   GLUCOSE mg/dL 87 111* 115* 91 96           RESULTS REVIEWED:  I have personally reviewed the results from the time of this admission to 7/2/2022 14:39 EDT and agree with these findings:  [x]  Laboratory  []  Microbiology  []  Radiology  []  EKG/Telemetry   []  Cardiology/Vascular   []  Pathology  []  Old records  []  Other:  Assessment / Plan   Assessment:  • Dislodged PEG tube  • Advanced dementia  • Bedbound / NH resident  • Chronic CHF, unspecified  • HTN  • Hx of CVA  • Acute on chronic anemia  • Hypokalemia  • DNR     Plan:    • Regular bed  • Replace K+  • GI consulted.  PEG tomorrow possible.  Needs to discuss with family  • Gentle maintenance IV fluids  • CT abd ... noted  • Resume home meds when PEG available  • Code status: DNR  • Discussed plan with RN.  DVT prophylaxis:  Medical DVT prophylaxis orders are present.  CODE STATUS:      Medical Intervention Limits: NO intubation (DNI); NO cardioversion  Code Status (Patient has no pulse and is not breathing): No CPR (Do Not Attempt to Resuscitate)  Medical Interventions (Patient has pulse or is breathing): Limited Support       Electronically signed by NAYE Lees, 07/02/22, 2:39 PM EDT.

## 2022-07-02 NOTE — ED PROVIDER NOTES
Time: 3:49 AM EDT    Chief Complaint: GI problem    History of Present Illness:  Patient is a 82 y.o. year old male that presents to the emergency department with moderate GI problem. Per triage note, pt is transported via EMS with G tube pulled out. Pt is unable to provide any information at this time.        History provided by:  EMS personnel  History limited by:  Dementia   used: No    GI Problem  The illness began today.       Similar Symptoms Previously: No   Recently seen: pt was seen in this ED on 05/05/2022 for possible head injury.       Patient Care Team  Primary Care Provider: Frank Llanes MD    Past Medical History:     No Known Allergies  Past Medical History:   Diagnosis Date   • Acute renal failure with pathological lesion in kidney ([facility])    • Alzheimer's dementia with behavioral disturbance ([facility])    • Anemia, vitamin B12 deficiency    • Anxiety    • Anxiety disorder due to known physiological condition    • Arthritis    • Benign neoplasm of prostate    • Cannot walk    • CHF (congestive heart failure) ([facility])    • Constipation    • Coronary atherosclerosis of native coronary artery    • CVA (cerebral vascular accident) ([facility])    • 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 (CVA)    • Edema    • Essential hypertension, benign    • Gastroesophageal reflux disease without esophagitis    • GERD (gastroesophageal reflux disease)    • High blood pressure    • Hirsutism    • HTN (hypertension)    • Hypercalcemia    • Hyperkalemia    • Hyperlipemia    • Hyperlipidemia    • Hypokalemia    • Ingrowing toenail 09/21/2018   • Left foot pain 09/21/2018   • Nutritional deficiency    • Osteoarthritis of right knee 08/22/2016   • Pneumonia    • Presence of right artificial knee joint    • Primary localized osteoarthrosis    • PVD (peripheral vascular disease) (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)    • Right foot pain 09/21/2018   •  Senile dementia, uncomplicated (HCC)    • Severe sinus bradycardia    • Shock, septic (HCC)    • Sleep apnea    • Thrombosis of left femoral vein (HCC)    • Tinea unguium 09/21/2018   • Vitamin B12 deficiency anemia      Past Surgical History:   Procedure Laterality Date   • ENDOSCOPY W/ PEG TUBE PLACEMENT N/A 6/8/2022    Procedure: ESOPHAGOGASTRODUODENOSCOPY WITH PERCUTANEOUS ENDOSCOPIC GASTROSTOMY TUBE INSERTION WITH ANESTHESIA;  Surgeon: Yanelis Mistry MD;  Location: [facility] ENDOSCOPY;  Service: Gastroenterology;  Laterality: N/A;  PEG TUBE PLACEMENT     Family History   Family history unknown: Yes       Home Medications:  Prior to Admission medications    Medication Sig Start Date End Date Taking? Authorizing Provider   acetaminophen (TYLENOL) 325 MG tablet Take 650 mg by mouth 3 (Three) Times a Day.    Nanci Shafer MD   apixaban (ELIQUIS) 5 MG tablet tablet Take 1 tablet by mouth Every 12 (Twelve) Hours. Indications: DVT/PE (active thrombosis) 3/10/22   Mj Butler MD   aspirin 81 MG chewable tablet Chew 1 tablet Daily. 4/19/22   Aranza Herzog MD   benzocaine-menthol (CEPACOL) 15-3.6 MG lozenge lozenge Dissolve 1 lozenge in the mouth Every 2 (Two) Hours As Needed.    Nanci Shafer MD   busPIRone (BUSPAR) 10 MG tablet Take 10 mg by mouth 2 (Two) Times a Day.    Nanci Shafer MD   cyanocobalamin 1000 MCG/ML injection Inject 1 mL into the appropriate muscle as directed by prescriber Daily for 5 doses. 7/1/22 7/6/22  Shawn Sanchez MD   Diclofenac Sodium (VOLTAREN) 1 % gel gel Apply 4 g topically to the appropriate area as directed 2 (Two) Times a Day As Needed (Bilateral hands and shoulders).    Nanci Shafer MD   docusate sodium (COLACE) 50 MG capsule Take 50 mg by mouth Daily. Pt takes along with 2- 100 mg caps for a total of 250 mg    Nanci Shafer MD   Docusate Sodium 100 MG capsule Take 200 mg by mouth Daily. Pt takes along with 50mg for a total of  250mg QD    Nanci Shafer MD   famotidine (PEPCID) 20 MG tablet Take 20 mg by mouth Every Night.    Nanci Shafer MD   furosemide (Lasix) 20 MG tablet Take 1 tablet by mouth Daily. 3/3/22   Eric Rivas MD   ipratropium-albuterol (DUO-NEB) 0.5-2.5 mg/3 ml nebulizer Take 3 mL by nebulization Every 4 (Four) Hours As Needed for Wheezing.    Nanci Shafer MD   magnesium hydroxide (MILK OF MAGNESIA) 400 MG/5ML suspension Take 30 mL by mouth Daily As Needed for Constipation.    Nanci Shafer MD   melatonin 5 MG tablet tablet Take 5 mg by mouth Every Night.    Nanci Shafer MD   mineral oil-hydrophilic petrolatum (AQUAPHOR) ointment Apply 1 application topically to the appropriate area as directed 2 (Two) Times a Day As Needed for Dry Skin (bilateral hands and shoulders).    Nanci Shafer MD   polyethylene glycol (MIRALAX) packet Take 17 g by mouth Daily.    Nanci Shafer MD   saccharomyces boulardii (FLORASTOR) 250 MG capsule Take 250 mg by mouth 2 (Two) Times a Day.    Nanci Shafer MD   trolamine salicylate (ASPERCREME) 10 % cream Apply 1 application topically to the appropriate area as directed As Needed for Muscle / Joint Pain.    Provider, Historical, MD   witch hazel-glycerin (TUCKS) pad Apply 1 pad topically to the appropriate area as directed As Needed for Irritation or Hemorrhoids.    Nanci Shafer MD        Social History:   Social History     Tobacco Use   • Smoking status: Never Smoker   • Smokeless tobacco: Never Used   Vaping Use   • Vaping Use: Never used   Substance Use Topics   • Alcohol use: No   • Drug use: Never       Record Review:  I have reviewed the patient's records in [facility].     Review of Systems:  Review of Systems   Unable to perform ROS: Dementia        Physical Exam:  /74   Pulse 66   Temp 97.9 °F (36.6 °C) (Oral)   Resp 16   Wt 72.9 kg (160 lb 11.5 oz)   SpO2 99%   BMI 24.44 kg/m²     Physical Exam  Vitals  and nursing note reviewed.   Constitutional:       General: He is not in acute distress.     Appearance: Normal appearance. He is not toxic-appearing.      Comments: Chronic weakness of upper extremities.    HENT:      Head: Normocephalic and atraumatic.      Jaw: There is normal jaw occlusion.   Eyes:      General: Lids are normal.      Extraocular Movements: Extraocular movements intact.      Conjunctiva/sclera: Conjunctivae normal.      Pupils: Pupils are equal, round, and reactive to light.   Cardiovascular:      Rate and Rhythm: Normal rate and regular rhythm.      Pulses: Normal pulses.      Heart sounds: Normal heart sounds.   Pulmonary:      Effort: Pulmonary effort is normal. No respiratory distress.      Breath sounds: Normal breath sounds. No wheezing or rhonchi.   Abdominal:      General: Abdomen is flat.      Palpations: Abdomen is soft.      Tenderness: There is no abdominal tenderness. There is no guarding or rebound.      Comments: G tube site of left abdomen    Musculoskeletal:         General: Normal range of motion.      Cervical back: Normal range of motion and neck supple.      Right lower leg: No edema.      Left lower leg: No edema.   Skin:     General: Skin is warm and dry.   Neurological:      Mental Status: He is alert and oriented to person, place, and time. Mental status is at baseline.   Psychiatric:         Mood and Affect: Mood normal.                  Medications in the Emergency Department:  Medications   sodium chloride 0.9 % flush 10 mL (has no administration in time range)   sodium chloride 0.9 % infusion (has no administration in time range)   ipratropium-albuterol (DUO-NEB) nebulizer solution 3 mL (has no administration in time range)   Lidocaine Viscous HCl (XYLOCAINE) 2 % solution 5 mL (5 mL Topical Given 7/2/22 0430)        Labs  Lab Results (last 24 hours)     ** No results found for the last 24 hours. **           Imaging:  No Radiology Exams Resulted Within Past 24  Hours    Procedures:  Procedures    Progress                            Medical Decision Making:  MDM     After report from the nursing home that the patient's G-tube was well-established and old I attempted to replace a tube in the emergency department but immediately met resistance and stopped the procedure.    After review of the patient's medical record it appears patient just recently had his PEG tube placed endoscopically.    These concerns were discussed with on-call gastroenterologist Dr. Melo.  Who agrees to consultation inpatient and recommends no further attempts in the emergency department.    IV access will be obtained to the patient will be treated with maintenance IV fluids patient was discussed with hospitalist for admission.  The patient's airway is intact, vital signs, and respiratory status are safe for admission at this time.    Final diagnoses:   Dislodged gastrostomy tube   Gastrostomy tube dependent (HCC)        Disposition:  ED Disposition     ED Disposition   Decision to Admit    Condition   --    Comment   Level of Care: Med/Surg [1]   Diagnosis: Dislodged gastrostomy tube [929923]   Admitting Physician: JOIE DALEY [[id number]]   Attending Physician: JOIE DALEY [[id number]]               Dictated Utilizing Dragon Dictation    Documentation assistance provided by ANNE AMOS acting as scribe for uRssell Correa MD. Information recorded by the scribe was done at my direction and has been verified and validated by me.          Anne Amos  07/02/22 [id number]       Russell Correa MD  07/02/22 8030

## 2022-07-03 ENCOUNTER — ANESTHESIA (OUTPATIENT)
Dept: GASTROENTEROLOGY | Facility: HOSPITAL | Age: 82
End: 2022-07-03

## 2022-07-03 ENCOUNTER — ANESTHESIA EVENT (OUTPATIENT)
Dept: GASTROENTEROLOGY | Facility: HOSPITAL | Age: 82
End: 2022-07-03

## 2022-07-03 LAB
ALBUMIN SERPL-MCNC: 2.5 G/DL (ref 3.5–5.2)
ALP SERPL-CCNC: 96 U/L (ref 39–117)
ALT SERPL W P-5'-P-CCNC: 10 U/L (ref 1–41)
ANION GAP SERPL CALCULATED.3IONS-SCNC: 9.6 MMOL/L (ref 5–15)
AST SERPL-CCNC: 15 U/L (ref 1–40)
BASOPHILS # BLD AUTO: 0.03 10*3/MM3 (ref 0–0.2)
BASOPHILS NFR BLD AUTO: 0.5 % (ref 0–1.5)
BILIRUB CONJ SERPL-MCNC: <0.2 MG/DL (ref 0–0.3)
BILIRUB INDIRECT SERPL-MCNC: ABNORMAL MG/DL
BILIRUB SERPL-MCNC: 0.4 MG/DL (ref 0–1.2)
BUN SERPL-MCNC: 25 MG/DL (ref 8–23)
BUN/CREAT SERPL: 29.8 (ref 7–25)
CALCIUM SPEC-SCNC: 8.6 MG/DL (ref 8.6–10.5)
CHLORIDE SERPL-SCNC: 108 MMOL/L (ref 98–107)
CO2 SERPL-SCNC: 25.4 MMOL/L (ref 22–29)
CREAT SERPL-MCNC: 0.84 MG/DL (ref 0.76–1.27)
DEPRECATED RDW RBC AUTO: 54.1 FL (ref 37–54)
EGFRCR SERPLBLD CKD-EPI 2021: 87.6 ML/MIN/1.73
EOSINOPHIL # BLD AUTO: 0.07 10*3/MM3 (ref 0–0.4)
EOSINOPHIL NFR BLD AUTO: 1.1 % (ref 0.3–6.2)
ERYTHROCYTE [DISTWIDTH] IN BLOOD BY AUTOMATED COUNT: 18 % (ref 12.3–15.4)
GLUCOSE SERPL-MCNC: 68 MG/DL (ref 65–99)
HCT VFR BLD AUTO: 22.8 % (ref 37.5–51)
HGB BLD-MCNC: 7.8 G/DL (ref 13–17.7)
IMM GRANULOCYTES # BLD AUTO: 0.03 10*3/MM3 (ref 0–0.05)
IMM GRANULOCYTES NFR BLD AUTO: 0.5 % (ref 0–0.5)
LYMPHOCYTES # BLD AUTO: 1.23 10*3/MM3 (ref 0.7–3.1)
LYMPHOCYTES NFR BLD AUTO: 18.7 % (ref 19.6–45.3)
MAGNESIUM SERPL-MCNC: 1.5 MG/DL (ref 1.6–2.4)
MCH RBC QN AUTO: 29 PG (ref 26.6–33)
MCHC RBC AUTO-ENTMCNC: 34.2 G/DL (ref 31.5–35.7)
MCV RBC AUTO: 84.8 FL (ref 79–97)
MONOCYTES # BLD AUTO: 0.8 10*3/MM3 (ref 0.1–0.9)
MONOCYTES NFR BLD AUTO: 12.2 % (ref 5–12)
NEUTROPHILS NFR BLD AUTO: 4.41 10*3/MM3 (ref 1.7–7)
NEUTROPHILS NFR BLD AUTO: 67 % (ref 42.7–76)
NRBC BLD AUTO-RTO: 2 /100 WBC (ref 0–0.2)
PHOSPHATE SERPL-MCNC: 2.1 MG/DL (ref 2.5–4.5)
PLATELET # BLD AUTO: 160 10*3/MM3 (ref 140–450)
PMV BLD AUTO: 11.5 FL (ref 6–12)
POTASSIUM SERPL-SCNC: 4.1 MMOL/L (ref 3.5–5.2)
PROT SERPL-MCNC: 5.4 G/DL (ref 6–8.5)
RBC # BLD AUTO: 2.69 10*6/MM3 (ref 4.14–5.8)
SODIUM SERPL-SCNC: 143 MMOL/L (ref 136–145)
WBC NRBC COR # BLD: 6.57 10*3/MM3 (ref 3.4–10.8)

## 2022-07-03 PROCEDURE — 85025 COMPLETE CBC W/AUTO DIFF WBC: CPT | Performed by: HOSPITALIST

## 2022-07-03 PROCEDURE — 25010000002 MAGNESIUM SULFATE IN D5W 1G/100ML (PREMIX) 1-5 GM/100ML-% SOLUTION: Performed by: FAMILY MEDICINE

## 2022-07-03 PROCEDURE — 25010000002 PROPOFOL 10 MG/ML EMULSION: Performed by: ANESTHESIOLOGY

## 2022-07-03 PROCEDURE — 94799 UNLISTED PULMONARY SVC/PX: CPT

## 2022-07-03 PROCEDURE — 25010000002 CEFAZOLIN PER 500 MG: Performed by: INTERNAL MEDICINE

## 2022-07-03 PROCEDURE — 84100 ASSAY OF PHOSPHORUS: CPT | Performed by: FAMILY MEDICINE

## 2022-07-03 PROCEDURE — G0378 HOSPITAL OBSERVATION PER HR: HCPCS

## 2022-07-03 PROCEDURE — 80076 HEPATIC FUNCTION PANEL: CPT | Performed by: FAMILY MEDICINE

## 2022-07-03 PROCEDURE — 83735 ASSAY OF MAGNESIUM: CPT | Performed by: FAMILY MEDICINE

## 2022-07-03 PROCEDURE — 0 LIDOCAINE 1 % SOLUTION: Performed by: INTERNAL MEDICINE

## 2022-07-03 PROCEDURE — 99226 PR SBSQ OBSERVATION CARE/DAY 35 MINUTES: CPT | Performed by: FAMILY MEDICINE

## 2022-07-03 PROCEDURE — 0 POTASSIUM CHLORIDE 10 MEQ/100ML SOLUTION: Performed by: FAMILY MEDICINE

## 2022-07-03 PROCEDURE — C1769 GUIDE WIRE: HCPCS | Performed by: INTERNAL MEDICINE

## 2022-07-03 PROCEDURE — 94760 N-INVAS EAR/PLS OXIMETRY 1: CPT

## 2022-07-03 PROCEDURE — 43246 EGD PLACE GASTROSTOMY TUBE: CPT | Performed by: INTERNAL MEDICINE

## 2022-07-03 PROCEDURE — 96361 HYDRATE IV INFUSION ADD-ON: CPT

## 2022-07-03 PROCEDURE — 96366 THER/PROPH/DIAG IV INF ADDON: CPT

## 2022-07-03 PROCEDURE — 25010000002 CEFAZOLIN PER 500 MG: Performed by: ANESTHESIOLOGY

## 2022-07-03 PROCEDURE — 80048 BASIC METABOLIC PNL TOTAL CA: CPT | Performed by: HOSPITALIST

## 2022-07-03 PROCEDURE — 96367 TX/PROPH/DG ADDL SEQ IV INF: CPT

## 2022-07-03 RX ORDER — MAGNESIUM SULFATE 1 G/100ML
1 INJECTION INTRAVENOUS
Status: COMPLETED | OUTPATIENT
Start: 2022-07-03 | End: 2022-07-03

## 2022-07-03 RX ORDER — LIDOCAINE HYDROCHLORIDE 10 MG/ML
INJECTION, SOLUTION INFILTRATION; PERINEURAL AS NEEDED
Status: DISCONTINUED | OUTPATIENT
Start: 2022-07-03 | End: 2022-07-03 | Stop reason: HOSPADM

## 2022-07-03 RX ORDER — CEFAZOLIN SODIUM 1 G/3ML
INJECTION, POWDER, FOR SOLUTION INTRAMUSCULAR; INTRAVENOUS AS NEEDED
Status: DISCONTINUED | OUTPATIENT
Start: 2022-07-03 | End: 2022-07-03 | Stop reason: SURG

## 2022-07-03 RX ORDER — CEFAZOLIN SODIUM 2 G/100ML
2 INJECTION, SOLUTION INTRAVENOUS
Status: DISPENSED | OUTPATIENT
Start: 2022-07-03 | End: 2022-07-04

## 2022-07-03 RX ORDER — SODIUM CHLORIDE 9 MG/ML
INJECTION, SOLUTION INTRAVENOUS CONTINUOUS PRN
Status: DISCONTINUED | OUTPATIENT
Start: 2022-07-03 | End: 2022-07-03 | Stop reason: SURG

## 2022-07-03 RX ORDER — LIDOCAINE HYDROCHLORIDE 20 MG/ML
INJECTION, SOLUTION EPIDURAL; INFILTRATION; INTRACAUDAL; PERINEURAL AS NEEDED
Status: DISCONTINUED | OUTPATIENT
Start: 2022-07-03 | End: 2022-07-03 | Stop reason: SURG

## 2022-07-03 RX ORDER — PROPOFOL 10 MG/ML
VIAL (ML) INTRAVENOUS AS NEEDED
Status: DISCONTINUED | OUTPATIENT
Start: 2022-07-03 | End: 2022-07-03 | Stop reason: SURG

## 2022-07-03 RX ORDER — SODIUM CHLORIDE, SODIUM LACTATE, POTASSIUM CHLORIDE, CALCIUM CHLORIDE 600; 310; 30; 20 MG/100ML; MG/100ML; MG/100ML; MG/100ML
30 INJECTION, SOLUTION INTRAVENOUS CONTINUOUS
Status: CANCELLED | OUTPATIENT
Start: 2022-07-03

## 2022-07-03 RX ORDER — IPRATROPIUM BROMIDE AND ALBUTEROL SULFATE 2.5; .5 MG/3ML; MG/3ML
3 SOLUTION RESPIRATORY (INHALATION) EVERY 6 HOURS PRN
Status: DISCONTINUED | OUTPATIENT
Start: 2022-07-03 | End: 2022-07-05 | Stop reason: HOSPADM

## 2022-07-03 RX ADMIN — IPRATROPIUM BROMIDE AND ALBUTEROL SULFATE 3 ML: 2.5; .5 SOLUTION RESPIRATORY (INHALATION) at 07:20

## 2022-07-03 RX ADMIN — SODIUM CHLORIDE: 9 INJECTION, SOLUTION INTRAVENOUS at 13:27

## 2022-07-03 RX ADMIN — MAGNESIUM SULFATE 1 G: 1 INJECTION INTRAVENOUS at 08:13

## 2022-07-03 RX ADMIN — PROPOFOL 40 MG: 10 INJECTION, EMULSION INTRAVENOUS at 13:31

## 2022-07-03 RX ADMIN — MAGNESIUM SULFATE 1 G: 1 INJECTION INTRAVENOUS at 09:19

## 2022-07-03 RX ADMIN — Medication 10 ML: at 08:14

## 2022-07-03 RX ADMIN — MAGNESIUM SULFATE 1 G: 1 INJECTION INTRAVENOUS at 10:25

## 2022-07-03 RX ADMIN — PROPOFOL 50 MCG/KG/MIN: 10 INJECTION, EMULSION INTRAVENOUS at 13:32

## 2022-07-03 RX ADMIN — CEFAZOLIN SODIUM 2 G: 1 INJECTION, POWDER, FOR SOLUTION INTRAMUSCULAR; INTRAVENOUS at 13:26

## 2022-07-03 RX ADMIN — CEFAZOLIN SODIUM 1 G: 1 INJECTION, POWDER, FOR SOLUTION INTRAMUSCULAR; INTRAVENOUS at 05:11

## 2022-07-03 RX ADMIN — POTASSIUM CHLORIDE 10 MEQ: 7.46 INJECTION, SOLUTION INTRAVENOUS at 00:45

## 2022-07-03 RX ADMIN — LIDOCAINE HYDROCHLORIDE 40 MG: 20 INJECTION, SOLUTION EPIDURAL; INFILTRATION; INTRACAUDAL; PERINEURAL at 13:26

## 2022-07-03 RX ADMIN — IPRATROPIUM BROMIDE AND ALBUTEROL SULFATE 3 ML: 2.5; .5 SOLUTION RESPIRATORY (INHALATION) at 00:28

## 2022-07-03 RX ADMIN — SODIUM CHLORIDE 100 ML/HR: 9 INJECTION, SOLUTION INTRAVENOUS at 05:16

## 2022-07-03 RX ADMIN — MAGNESIUM SULFATE 1 G: 1 INJECTION INTRAVENOUS at 11:33

## 2022-07-03 NOTE — NURSING NOTE
Patient's rectal temp was 92.3. All other vitals stable. Provider put in order for bear hugger and transfer to 5th floor. Report was given to SHAE Murray. Contacted patient's daughter/POA, Shandra Menon to update, give room number, and floor phone number.

## 2022-07-03 NOTE — PROGRESS NOTES
[facility]       Patient Name: Jordin Menon  : 1940  MRN: [id number]  Primary Care Physician: Frank Llanes MD  Date of admission: 2022  Today's Date: 7/3/2022  Room / Bed:   [facility]/1  Subjective   Chief Complaint:  Weakness.  Dislodged PEG tube.    Summary:  Pt admitted earlier today by nocturnist.  See admission note.  NH patient with advanced dementia and PEG tube came out.  Brought here to evaluate and replace.  GI consulted.    Interval Followup: 7/3/2022    • No new complaints.  Requiring warming blankets. Pt resting comfortably in bed  • Mumbles briefly.  Alert.    • Minimal conversation.  Does follow some simple commands.  • GI planning PEG soon      • REVIEW OF SYSTEMS: Unable to obtain due to dementia  Objective   Temp:  [92.3 °F (33.5 °C)-99.1 °F (37.3 °C)] 98.4 °F (36.9 °C)  Heart Rate:  [54-88] 86  Resp:  [18] 18  BP: (107-154)/(58-84) 119/65  PHYSICAL EXAM   • CON: Elderly and frail.  Cachectic.  NAD.   Edentulous.  • EYES:  Sclera anicteric. EOMI. Normal conjunctiva.   • ENT:  Oropharyngeal mucosa without ulcers or thrush.    • NECK:  No thyromegaly. No stridor. Trachea midline.  • RESP:  CTA. No wheezes. No crackles.   • CV:  Rhythm regular. Rate WNL. No murmur noted.  Trace edema.  • GI:  Soft and nontender. Nondistended.  Bowel sounds present.   • EXT: Peripheral pulses intact.  No joint deformities or cyanosis.  • LYMPH:  No lymphedema noted.  No cervical lymphadenopathy.  • PSYCH:  Alert. Not oriented. Normal affect and mood.  • NEURO:  CNII-XII grossly intact. No dysarthria or aphasia. No unilateral weakness or paresthesia.  • SKIN: Chronic venous stasis changes noted bilat pretib.  No cellulitis    Results from last 7 days   Lab Units 22  0437 22  0934 22  1043 22  0537 22  0421   WBC 10*3/mm3 6.57 9.85 6.31 7.58 7.45   HEMOGLOBIN g/dL 7.8* 8.4* 8.3* 8.7* 7.7*   HEMATOCRIT % 22.8* 24.1* 24.1* 25.0* 22.4*   PLATELETS  10*3/mm3 160 160 153 181 202     Results from last 7 days   Lab Units 07/03/22  0438 07/02/22  0934 06/29/22  1043 06/28/22  0537 06/27/22  0421   SODIUM mmol/L 143 144 138 140 141   POTASSIUM mmol/L 4.1 2.9* 3.6 3.3* 3.8   CO2 mmol/L 25.4 27.9 24.6 25.7 24.8   CHLORIDE mmol/L 108* 108* 106 107 107   ANION GAP mmol/L 9.6 8.1 7.4 7.3 9.2   BUN mg/dL 25* 30* 30* 33* 36*   CREATININE mg/dL 0.84 0.95 0.99 1.30* 1.51*   GLUCOSE mg/dL 68 87 111* 115* 91           RESULTS REVIEWED:  I have personally reviewed the results from the time of this admission to 7/3/2022 12:31 EDT and agree with these findings:  [x]  Laboratory  []  Microbiology  []  Radiology  []  EKG/Telemetry   []  Cardiology/Vascular   []  Pathology  []  Old records  []  Other:  Assessment / Plan   Assessment:  • Dislodged PEG tube  • Advanced dementia  • Bedbound / NH resident  • Chronic CHF, unspecified  • HTN  • Hx of CVA  • Acute on chronic anemia  • Hypokalemia  • DNR     Plan:    • Regular bed  • Replace K+  • GI consulted.  PEG tube replaced ... to be scheduled   • Gentle maintenance IV fluids  • CT abd ... noted  • Resume home meds when PEG available  • Code status: DNR  • Discussed plan with RN.  DVT prophylaxis:  No DVT prophylaxis order currently exists.  CODE STATUS:      Medical Intervention Limits: NO intubation (DNI); NO cardioversion  Code Status (Patient has no pulse and is not breathing): No CPR (Do Not Attempt to Resuscitate)  Medical Interventions (Patient has pulse or is breathing): Limited Support         Attending documentation:  I reviewed the above documentation and independently reviewed and rounded and evaluated the patient and discussed the care plan with SHANNAN Singletary PA-C, I agree with his findings and plan as documented, what I have added to the care plan and modified is as follows in my documentation and my medical decision making; 82-year-old male with advanced dementia, nursing home resident, pulled out his PEG tube, hospitalized  for replacement, GI consulted, family requesting PEG tube to be replaced so patient can get nutrition, underwent upper GI endoscopy and PEG placement was successfully completed.  N.p.o. for 4 hours today then water only.  Resume PEG tube feeds on 7/4/2022, thereafter can return back to nursing facility.  Interval follow-up: Patient seen and examined this morning, mumbling, no acute distress.  Unable to obtain review of systems due to patient not mentally optimized, underlying dementia.  Discussed with GI, PEG tube placement successful.  Vitals reviewed, labs reviewed, low potassium, replacement potassium ordered via IV route.  On physical exam, elderly appearing male, mumbling, no acute distress, dry mucous membranes, air entry bilaterally, regular rate rhythm, soft nontender abdomen, PEG tube site was bandaged, no lower extreme edema, alert, disoriented.  Assessment as above, plan, as described by GI, n.p.o. for 4 hours then resume water only, tube feeds to resume tomorrow if no issues, IV fluids continued, replace potassium via IV route, a.m. labs, DNR code, DVT prophylaxis with SCDs, clinical course to dictate further management, discussed with nurse at bedside.  More than 60% of the time of this patient's encounter was performed by me, this included face-to-face time, planning and coordinating, medical decision making and critical thinking personally done by me.  -TONEY RENEE    Electronically signed by Aranza Herzog MD, 07/03/22, 3:54 PM EDT.

## 2022-07-03 NOTE — PLAN OF CARE
Problem: Adult Inpatient Plan of Care  Goal: Plan of Care Review  7/3/2022 1602 by Fabiola Wilkerson RN  Outcome: Ongoing, Progressing  Flowsheets (Taken 7/3/2022 1559)  Outcome Evaluation: pt cont. with warming blanket to maintain adequate body temp. EGD with PEG tube placement this shift, pt tolerated well. cont with NPO, ok to start tube feeding in am per dr. mejias.  cont with iv hydratoin. adequate urine output this shift. vital signs remain stable. will cont current treatment plan.  7/3/2022 1559 by Fabiola Wilkerson RN  Outcome: Ongoing, Progressing  Flowsheets  Taken 7/3/2022 1559 by Fabiola Wilkerson RN  Plan of Care Reviewed With:   patient   family  Outcome Evaluation: pt cont. with warming blanket to maintain adequate body temp. EGD with PEG tube placement this shift, pt tolerated well. cont with NPO, ok to start tube feeding in am per dr. mejias.  cont with iv hydratoin. adequate urine output this shift. vital signs remain stable. will cont current treatment plan.  Taken 7/3/2022 0456 by Terri Rodriguez RN  Progress: no change  Goal: Patient-Specific Goal (Individualized)  7/3/2022 1602 by Fabiola Wilkerson RN  Outcome: Ongoing, Progressing  7/3/2022 1559 by Fabiola Wilkerson RN  Outcome: Ongoing, Progressing  Goal: Absence of Hospital-Acquired Illness or Injury  7/3/2022 1602 by Fabiola Wilkerson RN  Outcome: Ongoing, Progressing  7/3/2022 1559 by Fabiola Wilkerson RN  Outcome: Ongoing, Progressing  Intervention: Identify and Manage Fall Risk  Recent Flowsheet Documentation  Taken 7/3/2022 1453 by Fabiola Wilkerson RN  Safety Promotion/Fall Prevention:   safety round/check completed   nonskid shoes/slippers when out of bed  Taken 7/3/2022 1200 by Fabiola Wilkerson RN  Safety Promotion/Fall Prevention:   safety round/check completed   nonskid shoes/slippers when out of bed  Taken 7/3/2022 1000 by Fabiola Wilkerson RN  Safety Promotion/Fall Prevention: safety round/check completed  Taken 7/3/2022 0800 by  Rock, Fabiola, RN  Safety Promotion/Fall Prevention:   safety round/check completed   nonskid shoes/slippers when out of bed  Intervention: Prevent Skin Injury  Recent Flowsheet Documentation  Taken 7/3/2022 1453 by Fabiola Wilkerson RN  Body Position:   turned   right   legs elevated  Taken 7/3/2022 1138 by Fabiola Wilkerson RN  Body Position:   turned   left   legs elevated  Taken 7/3/2022 0800 by Fabiola Wilkerson RN  Skin Protection:   adhesive use limited   incontinence pads utilized   skin-to-device areas padded   tubing/devices free from skin contact  Intervention: Prevent and Manage VTE (Venous Thromboembolism) Risk  Recent Flowsheet Documentation  Taken 7/3/2022 1453 by Fabiola Wilkerson RN  Activity Management: bedrest  Taken 7/3/2022 1200 by Fabiola Wilkerson RN  Activity Management: bedrest  Taken 7/3/2022 1000 by Fabiola Wilkerson RN  Activity Management: bedrest  Taken 7/3/2022 0800 by Fabiola Wilkerson RN  Activity Management: bedrest  Intervention: Prevent Infection  Recent Flowsheet Documentation  Taken 7/3/2022 1453 by Fabiola Wilkerson RN  Infection Prevention:   hand hygiene promoted   single patient room provided  Taken 7/3/2022 1200 by Fabiola Wilkerson RN  Infection Prevention:   hand hygiene promoted   single patient room provided  Taken 7/3/2022 1000 by Fabiola Wilkerson RN  Infection Prevention:   hand hygiene promoted   single patient room provided  Taken 7/3/2022 0800 by Fabiola Wilkerson RN  Infection Prevention:   hand hygiene promoted   single patient room provided  Goal: Optimal Comfort and Wellbeing  7/3/2022 1602 by Fabiola Wilkerson RN  Outcome: Ongoing, Progressing  7/3/2022 1559 by Fabiola Wilkerson RN  Outcome: Ongoing, Progressing  Intervention: Provide Person-Centered Care  Recent Flowsheet Documentation  Taken 7/3/2022 0800 by Fabiola Wilkerson RN  Trust Relationship/Rapport: care explained  Goal: Readiness for Transition of Care  7/3/2022 1602 by Fabiola Wilkerson RN  Outcome: Ongoing,  Progressing  7/3/2022 1559 by Fabiola Wilkerson RN  Outcome: Ongoing, Progressing     Problem: Skin Injury Risk Increased  Goal: Skin Health and Integrity  7/3/2022 1602 by Fabiola Wilkerson RN  Outcome: Ongoing, Progressing  7/3/2022 1559 by Fabiola Wilkerson RN  Outcome: Ongoing, Progressing  Intervention: Optimize Skin Protection  Recent Flowsheet Documentation  Taken 7/3/2022 1453 by Fabiola Wilkerson RN  Head of Bed (HOB) Positioning: HOB elevated  Taken 7/3/2022 1200 by Fabiola Wilkerson RN  Head of Bed ([facility]) Positioning: HOB elevated  Taken 7/3/2022 1138 by Fabiola Wilkerson RN  Head of Bed ([facility]) Positioning: HOB elevated  Taken 7/3/2022 0800 by Fabiola Wilkerson RN  Pressure Reduction Techniques:   heels elevated off bed   positioned off wounds   weight shift assistance provided  Pressure Reduction Devices: pressure-redistributing mattress utilized  Skin Protection:   adhesive use limited   incontinence pads utilized   skin-to-device areas padded   tubing/devices free from skin contact     Problem: Heart Failure Comorbidity  Goal: Maintenance of Heart Failure Symptom Control  7/3/2022 1602 by Fabiola Wilkerson RN  Outcome: Ongoing, Progressing  7/3/2022 1559 by Fabiola Wilkerson RN  Outcome: Ongoing, Progressing  Intervention: Maintain Heart Failure-Management  Recent Flowsheet Documentation  Taken 7/3/2022 1453 by Fabiola Wilkerson RN  Medication Review/Management: medications reviewed  Taken 7/3/2022 1200 by Fabiola Wilkerson RN  Medication Review/Management: medications reviewed  Taken 7/3/2022 1000 by Fabiola Wilkerson RN  Medication Review/Management: medications reviewed  Taken 7/3/2022 0800 by Fabiola Wilkerson RN  Medication Review/Management: medications reviewed     Problem: Hypertension Comorbidity  Goal: Blood Pressure in Desired Range  7/3/2022 1602 by Fabiola Wilkerson RN  Outcome: Ongoing, Progressing  7/3/2022 1559 by Fabiola Wilkerson RN  Outcome: Ongoing, Progressing  Intervention: Maintain Blood Pressure  Management  Recent Flowsheet Documentation  Taken 7/3/2022 1453 by Fabiola Wilkerson RN  Medication Review/Management: medications reviewed  Taken 7/3/2022 1200 by Fabiola Wilkerson RN  Medication Review/Management: medications reviewed  Taken 7/3/2022 1000 by Fabiola Wilkerson RN  Medication Review/Management: medications reviewed  Taken 7/3/2022 0800 by Fabiola Wilkerson RN  Medication Review/Management: medications reviewed     Problem: Fall Injury Risk  Goal: Absence of Fall and Fall-Related Injury  7/3/2022 1602 by Fabiola Wilkerson RN  Outcome: Ongoing, Progressing  7/3/2022 1559 by Fabiola Wilkerson RN  Outcome: Ongoing, Progressing  Intervention: Identify and Manage Contributors  Recent Flowsheet Documentation  Taken 7/3/2022 1453 by Fabiola Wilkerson RN  Medication Review/Management: medications reviewed  Taken 7/3/2022 1200 by Fabiola Wilkerson RN  Medication Review/Management: medications reviewed  Taken 7/3/2022 1000 by Fabiola Wilkerson RN  Medication Review/Management: medications reviewed  Taken 7/3/2022 0800 by Fabiola Wilkerson RN  Medication Review/Management: medications reviewed  Intervention: Promote Injury-Free Environment  Recent Flowsheet Documentation  Taken 7/3/2022 1453 by Fabiola Wilkerson RN  Safety Promotion/Fall Prevention:   safety round/check completed   nonskid shoes/slippers when out of bed  Taken 7/3/2022 1200 by Fabiola Wilkerson RN  Safety Promotion/Fall Prevention:   safety round/check completed   nonskid shoes/slippers when out of bed  Taken 7/3/2022 1000 by Fabiola Wilkerson RN  Safety Promotion/Fall Prevention: safety round/check completed  Taken 7/3/2022 0800 by Fabiola Wilkerson RN  Safety Promotion/Fall Prevention:   safety round/check completed   nonskid shoes/slippers when out of bed   Goal Outcome Evaluation:  Plan of Care Reviewed With: patient, family           Outcome Evaluation: pt cont. with warming blanket to maintain adequate body temp. EGD with PEG tube placement this shift,  pt tolerated well. cont with NPO, ok to start tube feeding in am per dr. mejias.  cont with iv hydratoin. adequate urine output this shift. vital signs remain stable. will cont current treatment plan.

## 2022-07-03 NOTE — ANESTHESIA POSTPROCEDURE EVALUATION
Patient: Jordin Menon    Procedure Summary     Date: 07/03/22 Room / Location: [facility] ENDOSCOPY 4 / [facility] ENDOSCOPY    Anesthesia Start: 1325 Anesthesia Stop: 1345    Procedure: ESOPHAGOGASTRODUODENOSCOPY WITH PERCUTANEOUS ENDOSCOPIC GASTROSTOMY TUBE INSERTION WITH ANESTHESIA (N/A ) Diagnosis:       Severe malnutrition (HCC)      (Severe malnutrition (HCC) [E43])    Surgeons: Bam Melo MD Provider: Vaibhav Jesus MD    Anesthesia Type: general ASA Status: 4          Anesthesia Type: general    Vitals  Vitals Value Taken Time   BP 91/52 07/03/22 1344   Temp 36 °C (96.8 °F) 07/03/22 1344   Pulse 84 07/03/22 1344   Resp 8 07/03/22 1344   SpO2 99 % 07/03/22 1344           Post Anesthesia Care and Evaluation    Patient location during evaluation: bedside  Patient participation: complete - patient participated  Level of consciousness: responsive to verbal stimuli, responsive to light touch, responsive to noxious stimuli, responsive to painful stimuli, responsive to physical stimuli, obtunded/minimal responses and sleepy but conscious  Pain score: 0  Pain management: adequate    Airway patency: patent  Anesthetic complications: No anesthetic complications  PONV Status: none  Cardiovascular status: acceptable and stable  Respiratory status: acceptable and room air  Hydration status: acceptable    Comments: An Anesthesiologist personally participated in the most demanding procedures (including induction and emergence if applicable) in the anesthesia plan, monitored the course of anesthesia administration at frequent intervals and remained physically present and available for immediate diagnosis and treatment of emergencies.        Pt back to his preoperative state, pt opens eye and responds to verbal commmands

## 2022-07-03 NOTE — PROGRESS NOTES
[facility]  Inpatient Progress Note    Reason for Follow Up:  Dislodged G-tube    Subjective     Interval History:   Pt presents to endoscopy for PEG tube placement per family request.    Current Facility-Administered Medications:   •  ipratropium-albuterol (DUO-NEB) nebulizer solution 3 mL, 3 mL, Nebulization, Q6H PRN, Aranza Herzog MD  •  magnesium sulfate in D5W 1g/100mL (PREMIX), 1 g, Intravenous, Q1H, Aranza Herzog MD, 1 g at 07/03/22 0919  •  morphine injection 1 mg, 1 mg, Intravenous, Q4H PRN **AND** naloxone (NARCAN) injection 0.4 mg, 0.4 mg, Intravenous, Q5 Min PRN, Fox, Dimpi, DO  •  [COMPLETED] Insert peripheral IV, , , Once **AND** sodium chloride 0.9 % flush 10 mL, 10 mL, Intravenous, PRN, Russell Correa MD  •  sodium chloride 0.9 % flush 10 mL, 10 mL, Intravenous, Q12H, Fox, Dimpi, DO, 10 mL at 07/03/22 0814  •  sodium chloride 0.9 % flush 10 mL, 10 mL, Intravenous, PRN, Fox, Dimpi, DO  •  sodium chloride 0.9 % infusion, 100 mL/hr, Intravenous, Continuous, Fox, Dimpi, DO, Last Rate: 100 mL/hr at 07/03/22 0516, 100 mL/hr at 07/03/22 0516  Review of Systems:    Review of systems could not be obtained due to  patient confusion.    Objective     Vital Signs  Temp:  [92.3 °F (33.5 °C)-99.1 °F (37.3 °C)] 98.7 °F (37.1 °C)  Heart Rate:  [54-88] 88  Resp:  [18] 18  BP: (107-161)/(58-84) 107/58  Body mass index is 24.44 kg/m².    Intake/Output Summary (Last 24 hours) at 7/3/2022 0933  Last data filed at 7/2/2022 1922  Gross per 24 hour   Intake 461 ml   Output --   Net 461 ml     No intake/output data recorded.     Physical Exam:   General: awake, alert and in no acute distress   Eyes: eyes move symmetrical in all directions, no scleral icterus   Neck: supple, trachea is midline   Skin: warm and dry, not jaundiced   Cardiovascular: no chest tenderness   Pulm: breathing unlabored   Abdomen: soft, nontender, nondistended   Rectal: deferred   Extremities: no rash or  edema   Psychiatric: altered mental status     Results Review:     I reviewed the patient's new clinical results.    Results from last 7 days   Lab Units 07/03/22  0437 07/02/22  0934 06/29/22  1043   WBC 10*3/mm3 6.57 9.85 6.31   HEMOGLOBIN g/dL 7.8* 8.4* 8.3*   HEMATOCRIT % 22.8* 24.1* 24.1*   PLATELETS 10*3/mm3 160 160 153     Results from last 7 days   Lab Units 07/03/22  0438 07/02/22  0934 06/29/22  1043   SODIUM mmol/L 143 144 138   POTASSIUM mmol/L 4.1 2.9* 3.6   CHLORIDE mmol/L 108* 108* 106   CO2 mmol/L 25.4 27.9 24.6   BUN mg/dL 25* 30* 30*   CREATININE mg/dL 0.84 0.95 0.99   CALCIUM mg/dL 8.6 9.0 8.5*   BILIRUBIN mg/dL 0.4 0.4 0.3   ALK PHOS U/L 96 84 86   ALT (SGPT) U/L 10 10 10   AST (SGOT) U/L 15 17 15   GLUCOSE mg/dL 68 87 111*         Lab Results   Lab Value Date/Time    LIPASE 17 07/06/2019 1926       Radiology:  [unfilled]      Assessment & Plan   Assessment:     Dislodged gastrostomy tube  Severe malnutrition    Plan:     - Will proceed with PEG tube placement  - Benefits vs risks of procedure d/w patient's son, Aristides Menon, daughter was unable to be reached; risks include but are not limited to bleeding, infection, perforation, injury to other organ, and risk of sedation  - son understands risks and wants to proceed  - son aware that DNR will be temporarily suspended during procedure and the immediate period after procedure and subsequently reinstated  - will order repeat dose of Cefazolin to be given on-call to OR as previous on-call dose was already given early AM  - I advised the son to discuss with family what is truly best for patient as well as considering the patient's wishes given he is high risk to pull out PEG again.  I would not recommend placing patient through repeated procedures to replace G-tube.  I encouraged palliative medicine; however, he is not interested.      I discussed the patients findings and my recommendations with family and primary care team.         Yanelis CHIU  SONYA Mistry.  [place] [id number] [place]  [address]  Office: [phone]

## 2022-07-03 NOTE — PLAN OF CARE
Goal Outcome Evaluation:  Plan of Care Reviewed With: patient        Progress: no change  Outcome Evaluation: Pt placed on warming blanket d/t chronic hypothermia. Current temp is 98.7F rectally. Temp is being monitored Q30min w/ minor temp adjustments to meet rectal temp goal of 98.6F. Pt had 3 runs of potassium once transferred to 5STU from 4NT. Pt tolerated infusions w/o any s/s of infiltration or discomfort. Pt remains NPO since transfer to 5STU for possible procedure today.

## 2022-07-03 NOTE — ANESTHESIA PREPROCEDURE EVALUATION
Anesthesia Evaluation     Patient summary reviewed and Nursing notes reviewed   no history of anesthetic complications:  NPO Solid Status: > 8 hours  NPO Liquid Status: > 2 hours           Airway   Mallampati: II  TM distance: >3 FB  Neck ROM: full  No difficulty expected  Dental      Pulmonary - normal exam    breath sounds clear to auscultation  (+) pneumonia , shortness of breath, sleep apnea,   Cardiovascular - normal exam  Exercise tolerance: poor (<4 METS)    Rhythm: regular  Rate: normal    (+) hypertension, CAD, CHF , PVD, DVT, hyperlipidemia,       Neuro/Psych  (+) CVA,    GI/Hepatic/Renal/Endo    (+)  GERD,  renal disease,     Musculoskeletal     Abdominal    Substance History - negative use     OB/GYN negative ob/gyn ROS         Other   arthritis,                Latest Reference Range & Units 07/03/22 04:38   Glucose 65 - 99 mg/dL 68   Sodium 136 - 145 mmol/L 143   Potassium 3.5 - 5.2 mmol/L 4.1   CO2 22.0 - 29.0 mmol/L 25.4   Chloride 98 - 107 mmol/L 108 (H)   Anion Gap 5.0 - 15.0 mmol/L 9.6   Creatinine 0.76 - 1.27 mg/dL 0.84   BUN 8 - 23 mg/dL 25 (H)   BUN/Creatinine Ratio 7.0 - 25.0  29.8 (H)   Calcium 8.6 - 10.5 mg/dL 8.6   eGFR >60.0 mL/min/1.73 87.6   Alkaline Phosphatase 39 - 117 U/L 96   Total Protein 6.0 - 8.5 g/dL 5.4 (L)   ALT (SGPT) 1 - 41 U/L 10   AST (SGOT) 1 - 40 U/L 15   Total Bilirubin 0.0 - 1.2 mg/dL 0.4   Albumin 3.50 - 5.20 g/dL 2.50 (L)   (H): Data is abnormally high  (L): Data is abnormally low       Latest Reference Range & Units 07/03/22 04:37   Hemoglobin 13.0 - 17.7 g/dL 7.8 (L)   Hematocrit 37.5 - 51.0 % 22.8 (L)   (L): Data is abnormally low    ABNORMAL ECG - 06/22  Sinus rhythm  Prolonged NC interval  Inferior infarct, old  When compared with ECG of 30-May-2022 4:20:03,    Echo 04/22  Echocardiogram Findings    Left Ventricle Calculated left ventricular EF = 54.8% Estimated left ventricular EF was in agreement with the calculated left ventricular EF. Left ventricular  systolic function is normal.   Normal left ventricular cavity size and wall thickness noted.   Right Ventricle Normal right ventricular cavity size, wall thickness, systolic function and septal motion noted.   Left Atrium Normal left atrial size and volume noted.   Right Atrium Normal right atrial cavity size noted.   Aortic Valve The aortic valve is abnormal in structure. The aortic valve exhibits sclerosis.   Mitral Valve The mitral valve is grossly normal in structure. Mild mitral valve regurgitation is present.   Tricuspid Valve The tricuspid valve is structurally normal with no significant regurgitation or significant stenosis present.   Pulmonic Valve The pulmonic valve is structurally normal with no regurgitation or significant stenosis present.   Pericardium The pericardium is normal. There is no evidence of pericardial effusion. .                      Anesthesia Plan    ASA 4     general     (Patient understands anesthesia not responsible for dental damage.    egd with reevalution replacement of g tube)  intravenous induction     Anesthetic plan, risks, benefits, and alternatives have been provided, discussed and informed consent has been obtained with: patient.  Use of blood products discussed with patient .   Plan discussed with CRNA.        CODE STATUS:    Medical Intervention Limits: NO intubation (DNI); NO cardioversion  Code Status (Patient has no pulse and is not breathing): No CPR (Do Not Attempt to Resuscitate)  Medical Interventions (Patient has pulse or is breathing): Limited Support

## 2022-07-04 VITALS
RESPIRATION RATE: 18 BRPM | HEIGHT: 68 IN | WEIGHT: 160.72 LBS | OXYGEN SATURATION: 96 % | TEMPERATURE: 98.7 F | SYSTOLIC BLOOD PRESSURE: 142 MMHG | HEART RATE: 86 BPM | BODY MASS INDEX: 24.36 KG/M2 | DIASTOLIC BLOOD PRESSURE: 65 MMHG

## 2022-07-04 LAB
ALBUMIN SERPL-MCNC: 2.7 G/DL (ref 3.5–5.2)
ALP SERPL-CCNC: 94 U/L (ref 39–117)
ALT SERPL W P-5'-P-CCNC: 7 U/L (ref 1–41)
ANION GAP SERPL CALCULATED.3IONS-SCNC: 9.4 MMOL/L (ref 5–15)
AST SERPL-CCNC: 13 U/L (ref 1–40)
BASOPHILS # BLD AUTO: 0.03 10*3/MM3 (ref 0–0.2)
BASOPHILS NFR BLD AUTO: 0.4 % (ref 0–1.5)
BILIRUB CONJ SERPL-MCNC: <0.2 MG/DL (ref 0–0.3)
BILIRUB INDIRECT SERPL-MCNC: ABNORMAL MG/DL
BILIRUB SERPL-MCNC: 0.4 MG/DL (ref 0–1.2)
BUN SERPL-MCNC: 19 MG/DL (ref 8–23)
BUN/CREAT SERPL: 19.4 (ref 7–25)
CALCIUM SPEC-SCNC: 8.8 MG/DL (ref 8.6–10.5)
CHLORIDE SERPL-SCNC: 109 MMOL/L (ref 98–107)
CO2 SERPL-SCNC: 23.6 MMOL/L (ref 22–29)
CREAT SERPL-MCNC: 0.98 MG/DL (ref 0.76–1.27)
DEPRECATED RDW RBC AUTO: 55.7 FL (ref 37–54)
EGFRCR SERPLBLD CKD-EPI 2021: 77.5 ML/MIN/1.73
EOSINOPHIL # BLD AUTO: 0.18 10*3/MM3 (ref 0–0.4)
EOSINOPHIL NFR BLD AUTO: 2.4 % (ref 0.3–6.2)
ERYTHROCYTE [DISTWIDTH] IN BLOOD BY AUTOMATED COUNT: 18.1 % (ref 12.3–15.4)
GLUCOSE BLDC GLUCOMTR-MCNC: 65 MG/DL (ref 70–99)
GLUCOSE SERPL-MCNC: 75 MG/DL (ref 65–99)
HCT VFR BLD AUTO: 24.4 % (ref 37.5–51)
HGB BLD-MCNC: 8.4 G/DL (ref 13–17.7)
IMM GRANULOCYTES # BLD AUTO: 0.01 10*3/MM3 (ref 0–0.05)
IMM GRANULOCYTES NFR BLD AUTO: 0.1 % (ref 0–0.5)
LYMPHOCYTES # BLD AUTO: 1.79 10*3/MM3 (ref 0.7–3.1)
LYMPHOCYTES NFR BLD AUTO: 24 % (ref 19.6–45.3)
MAGNESIUM SERPL-MCNC: 2.1 MG/DL (ref 1.6–2.4)
MCH RBC QN AUTO: 29.6 PG (ref 26.6–33)
MCHC RBC AUTO-ENTMCNC: 34.4 G/DL (ref 31.5–35.7)
MCV RBC AUTO: 85.9 FL (ref 79–97)
MONOCYTES # BLD AUTO: 0.91 10*3/MM3 (ref 0.1–0.9)
MONOCYTES NFR BLD AUTO: 12.2 % (ref 5–12)
NEUTROPHILS NFR BLD AUTO: 4.54 10*3/MM3 (ref 1.7–7)
NEUTROPHILS NFR BLD AUTO: 60.9 % (ref 42.7–76)
NRBC BLD AUTO-RTO: 0.3 /100 WBC (ref 0–0.2)
PHOSPHATE SERPL-MCNC: 2.1 MG/DL (ref 2.5–4.5)
PLATELET # BLD AUTO: 157 10*3/MM3 (ref 140–450)
PMV BLD AUTO: 11.2 FL (ref 6–12)
POTASSIUM SERPL-SCNC: 4 MMOL/L (ref 3.5–5.2)
PROT SERPL-MCNC: 5.7 G/DL (ref 6–8.5)
RBC # BLD AUTO: 2.84 10*6/MM3 (ref 4.14–5.8)
SODIUM SERPL-SCNC: 142 MMOL/L (ref 136–145)
WBC NRBC COR # BLD: 7.46 10*3/MM3 (ref 3.4–10.8)

## 2022-07-04 PROCEDURE — G0378 HOSPITAL OBSERVATION PER HR: HCPCS

## 2022-07-04 PROCEDURE — 94760 N-INVAS EAR/PLS OXIMETRY 1: CPT

## 2022-07-04 PROCEDURE — 99217 PR OBSERVATION CARE DISCHARGE MANAGEMENT: CPT | Performed by: FAMILY MEDICINE

## 2022-07-04 PROCEDURE — 82962 GLUCOSE BLOOD TEST: CPT

## 2022-07-04 PROCEDURE — 80048 BASIC METABOLIC PNL TOTAL CA: CPT | Performed by: FAMILY MEDICINE

## 2022-07-04 PROCEDURE — 80076 HEPATIC FUNCTION PANEL: CPT | Performed by: FAMILY MEDICINE

## 2022-07-04 PROCEDURE — 94799 UNLISTED PULMONARY SVC/PX: CPT

## 2022-07-04 PROCEDURE — 84100 ASSAY OF PHOSPHORUS: CPT | Performed by: FAMILY MEDICINE

## 2022-07-04 PROCEDURE — 83735 ASSAY OF MAGNESIUM: CPT | Performed by: FAMILY MEDICINE

## 2022-07-04 PROCEDURE — 85025 COMPLETE CBC W/AUTO DIFF WBC: CPT | Performed by: FAMILY MEDICINE

## 2022-07-04 RX ORDER — DEXTROSE AND SODIUM CHLORIDE 5; .9 G/100ML; G/100ML
50 INJECTION, SOLUTION INTRAVENOUS CONTINUOUS
Status: ACTIVE | OUTPATIENT
Start: 2022-07-04 | End: 2022-07-04

## 2022-07-04 RX ADMIN — DEXTROSE AND SODIUM CHLORIDE 50 ML/HR: 5; 900 INJECTION, SOLUTION INTRAVENOUS at 06:06

## 2022-07-04 RX ADMIN — SODIUM CHLORIDE 100 ML/HR: 9 INJECTION, SOLUTION INTRAVENOUS at 03:10

## 2022-07-04 NOTE — PLAN OF CARE
Goal Outcome Evaluation:  Plan of Care Reviewed With: patient        Progress: no change  Outcome Evaluation: CONTINUED WARMING BLANKET TO MAINTAIN BODY TEMP. STARTED ON D5 NS TO MAINTAIN BG. Natalie Sanchez RN

## 2022-07-04 NOTE — DISCHARGE SUMMARY
[facility]  HOSPITALIST  DISCHARGE SUMMARY       Patient Name: Jordin Menon  : 1940  MRN: [id number]  Primary Care Physician: Frank Llanes MD    Date of Admission: 2022  Date of Discharge: 2022    Discharge Diagnoses     Dislodged PEG tube  S/P PEG tube placed 7/3/22 by Dr. Mistry  Advanced dementia  Bedbound / NH resident  Chronic CHF, unspecified  HTN  Hx of CVA  Acute on chronic anemia  Hypokalemia, resolved  Code status DNR       Hospital Course   Hospital Course:  Jordin Menon is a pleasant 82 y.o. male with advanced dementia, who resides in a nursing home.  Patient was brought in because his PEG tube has become dislodged.  On arrival to the ED, patient has a temperature of 97.7, pulse of 72, respiratory rate 16, blood pressure of 139/74 and he saturating 99% on room air.    He was admitted to hospitalist service and gastroenterologist was consulted.  He has hypokalemia and this was supplemented.   His imaging included CT abdomen showing no acute abnormalities; as described below.  Mr Menon remained medically stable while here and had EGD with successful placement of PEG tube on 7/3/22.  He was cleared for using tube for feedings on .    See EGD report for more details.  He is now being discharged back to nursing home, resume feedings, resume medicine regimen unchanged, and will follow up with PCP at NH.    Discharge Follow Up / Recommendations (labs, diagnostics, meds, etc):   Follow up PCP at NH.  Follow up with gastroenterology as needed    Please follow the post-PEG recommendations including:   External bolster 1 cm from abdominal wall, change dressing once per day, dry dressing only, remove dressing after 2 weeks,  NPO x4 hrs after procedure.  May use PEG POD#1 for feedings, and  clean site with soap and water daily and dry thoroughly.  Consultants     Consults     Date and Time Order Name Status Description    2022  5:22 AM Hospitalist (on-call MD unless specified)       7/2/2022  5:12 AM Gastroenterology (on-call MD unless specified) Completed     6/24/2022  7:36 AM Inpatient Gastroenterology Consult Completed     6/24/2022  7:31 AM Inpatient Nephrology Consult Completed     6/7/2022  2:12 PM Inpatient Gastroenterology Consult Completed     5/29/2022  5:38 PM Inpatient Pulmonology Consult Completed         On Day of Discharge   VS: Temp:  [96.8 °F (36 °C)-98.6 °F (37 °C)] 98.2 °F (36.8 °C)  Heart Rate:  [78-85] 84  Resp:  [8-18] 18  BP: ()/(52-76) 140/73  FiO2 (%):  [33 %] 33 %  EXAM:  (refer to progress note from 7/4/2022)     Discharge Medications      Changes to Medications      Instructions Start Date   apixaban 5 MG tablet tablet  Commonly known as: ELIQUIS  What changed: when to take this   5 mg, Oral, Every 12 Hours Scheduled      cyanocobalamin 1000 MCG/ML injection  What changed: when to take this   1,000 mcg, Intramuscular, Daily         Continue These Medications      Instructions Start Date   acetaminophen 325 MG tablet  Commonly known as: TYLENOL   650 mg, Oral, 3 Times Daily      aspirin 81 MG chewable tablet   81 mg, Oral, Daily      busPIRone 10 MG tablet  Commonly known as: BUSPAR   10 mg, Oral, 2 Times Daily      Cepacol Sore Throat Spray 0.1-33 % liquid  Generic drug: Dyclonine-Glycerin   2 sprays, Mouth/Throat, 4 Times Daily PRN      Diclofenac Sodium 1 % gel gel  Commonly known as: VOLTAREN   4 g, Topical, 2 Times Daily PRN      docusate sodium 250 MG capsule  Commonly known as: COLACE   250 mg, Oral, Daily, Pt takes along with 2- 100 mg caps for a total of 250 mg      famotidine 20 MG tablet  Commonly known as: PEPCID   20 mg, Oral, Nightly      furosemide 20 MG tablet  Commonly known as: Lasix   20 mg, Oral, Daily      ipratropium-albuterol 0.5-2.5 mg/3 ml nebulizer  Commonly known as: DUO-NEB   3 mL, Nebulization, Every 4 Hours PRN      magnesium hydroxide 400 MG/5ML suspension  Commonly known as: MILK OF MAGNESIA   30 mL, Oral, Daily PRN       melatonin 5 MG tablet tablet   5 mg, Oral, Nightly      mineral oil-hydrophilic petrolatum ointment   1 application, Topical, Daily      trolamine salicylate 10 % cream  Commonly known as: ASPERCREME   1 application, Topical, 2 Times Daily PRN      witch hazel-glycerin pad  Commonly known as: TUCKS   1 pad, Topical, As Needed           Procedures   EGD/PEG tube placement 7/3/22 by Dr. iMstry Impression:    - Return patient to [facility] for [facility] care.  - Please follow the post-PEG recommendations including:   External bolster 1 cm from abdominal wall, change dressing once per day, dry dressing only, remove dressing after 2 weeks,  NPO x4 hrs after procedure.  May use PEG POD#1 for feedings, and  clean site with soap and water daily and dry thoroughly.    Imaging     CT Abdomen Pelvis Without Contrast    Result Date: 7/2/2022  PROCEDURE: CT ABDOMEN PELVIS WO CONTRAST  COMPARISON: [facility], CT, CHEST W/ CONTRAST, 1/18/2021, 18:25.  [facility], CT, CT CHEST WO CONTRAST DIAGNOSTIC, 5/18/2022, 17:27.  INDICATIONS: Abdominal pain, acute (Ped 0-17y)  TECHNIQUE: CT images were created without intravenous contrast.   PROTOCOL:   Standard imaging protocol performed    RADIATION:   DLP: 374.6 mGy*cm   Automated exposure control was utilized to minimize radiation dose.  FINDINGS: Bibasilar infiltrates are noted.  There are small bilateral pleural effusions.  The findings suggest bibasilar pneumonia.  The liver, spleen, and pancreas are unremarkable/stable without contrast.  There is slight fullness of the pancreatic body which is stable compared to prior studies dating back to 2021. There is fatty atrophy throughout the pancreas.  Multiple gallstones/sludge are seen within the gallbladder.  No definitive signs of cholecystitis are observed.  There is no biliary dilatation.  The common bile duct is within normal limits.  The bilateral adrenal glands are symmetric and unremarkable.  There  is a nonobstructive stone at the lower pole of the left kidney measuring 7 mm.  There is no hydronephrosis or hydroureter. There is no evidence for obstructive uropathy. No stones are seen within the bladder.  There is a calcification seen external to the bladder at the left inferior lateral aspect suggesting a phlebolith or possibly bladder wall calcification.  There is evidence for hemorrhagic cyst at the upper pole of the left kidney.  This finding is stable.  No significant bowel dilatation or obstruction is seen. A normal-appearing air-filled appendix is observed. There is no evidence for acute appendicitis.  There is evidence for residual changes of a prior PEG tube.  There may be a small associated focal diverticulum along the anterior inferior margin the stomach related to these changes.  No abnormal fluid collections are seen. No significant free fluid is observed. No significant lymphadenopathy is seen throughout the abdomen or pelvis. The bladder is partially decompressed.  There is partial retraction of the left testicle.  No acute osseous abnormalities are seen.        1. No evidence for acute abnormality throughout the abdomen or pelvis on this noncontrast study. 2. A nonobstructive stone is noted at the lower pole of the left kidney measuring 7 mm. There is no evidence for obstructive uropathy. 3. Bibasilar infiltrates are noted with small bilateral pleural effusions.  The findings suggest potential changes of bibasilar pneumonia. 4. Evidence for cholelithiasis without signs of cholecystitis.  No biliary dilatation is seen.     AZAM ESPAÑA MD       Electronically Signed and Approved By: AZAM ESPAÑA MD on 7/02/2022 at 10:25             XR Chest 1 View    Result Date: 6/23/2022  PROCEDURE: XR CHEST 1 VW  COMPARISON: [facility], MR, MRI BRAIN WO CONTRAST, 4/14/2022, 21:55.  [facility], CT, CT CERVICAL SPINE WO CONTRAST, 4/16/2022, 19:51.  [facility], CT, CT  CHEST WO CONTRAST DIAGNOSTIC, 5/18/2022, 17:27.   [facility], CR, XR CHEST 1 VW, 5/29/2022, 14:03.  INDICATIONS: Severe Sepsis triage protocol  FINDINGS:  There is severe bilateral glenohumeral joint degeneration with chronic erosions and large osteophytes.  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  There is persistent left basilar opacity with sulcal blunting.  There is no new lung opacity.  There is right perihilar atelectasis versus scarring.  The heart appears stable size.  Pulmonary vasculature is within normal limits.  There are extensive thoracic degenerative changes.  or study.  There is blunting of the left lateral sulcus also unchanged and possibly        1. Stable left basilar scarring. 2. Severe bilateral glenohumeral joint degeneration.       RAJAN NIETO MD       Electronically Signed and Approved By: RAJAN NIETO MD on 6/23/2022 at 10:31             IR J or G Tube Contrast Eval    Result Date: 6/27/2022  PROCEDURE: IR J OR G TUBE CONTRAST EVAL  COMPARISON: None  INDICATIONS: EVAL G-TUBE PLACEMENT, 30 mL GASTROGRAFIN  FINDINGS: Water-soluble iodinated contrast was injected through the percutaneous gastrostomy tube and into the lumen of the stomach confirming location of the feeding tube in the lumen of the stomach.  Contrast thin drains into the small bowel loops into the left upper quadrant the abdomen.  I see no evidence of leakage of contrast into the peritoneal cavity the abdomen        1. Contrast injected through the percutaneous gastrostomy tube into the lumen of the stomach.  This contrast then drains through the duodenum and into the small bowel loops in the left upper quadrant the abdomen. 2. No evidence of leakage of contrast into the peritoneal cavity of the abdomen      GABBY SCHOFIELD MD       Electronically Signed and Approved By: GABBY SCHOFIELD MD on 6/27/2022 at 17:18             Discharge Details   Hospital Diet:     Diet Order   Procedures    • NPO Diet NPO Type: Strict NPO     CODE STATUS:    Code Status and Medical Interventions:   Ordered at: 07/02/22 0548     Medical Intervention Limits:    NO intubation (DNI)    NO cardioversion     Code Status (Patient has no pulse and is not breathing):    No CPR (Do Not Attempt to Resuscitate)     Medical Interventions (Patient has pulse or is breathing):    Limited Support     Additional Instructions for the Follow-ups that You Need to Schedule     Discharge Follow-up with PCP   As directed       Currently Documented PCP:    Frank Llanes MD    PCP Phone Number:    [phone]     Follow Up Details: 3-5 days             Discharge Disposition: Long Term Care (DC - External)  Pertinent  Labs   LAB RESULTS:      Lab 07/04/22  0920 07/03/22  0437 07/02/22  0934 06/29/22  1043 06/28/22  0537   WBC 7.46 6.57 9.85 6.31 7.58   HEMOGLOBIN 8.4* 7.8* 8.4* 8.3* 8.7*   HEMATOCRIT 24.4* 22.8* 24.1* 24.1* 25.0*   PLATELETS 157 160 160 153 181   NEUTROS ABS 4.54 4.41 7.01* 4.68 5.39   IMMATURE GRANS (ABS) 0.01 0.03 0.03 0.01 0.03   LYMPHS ABS 1.79 1.23 1.69 0.82 1.14   MONOS ABS 0.91* 0.80 0.90 0.68 0.79   EOS ABS 0.18 0.07 0.19 0.11 0.20   MCV 85.9 84.8 84.0 83.7 84.7         Lab 07/04/22  0920 07/03/22  0438 07/02/22  0934 06/29/22  1043 06/28/22  0537   SODIUM 142 143 144 138 140   POTASSIUM 4.0 4.1 2.9* 3.6 3.3*   CHLORIDE 109* 108* 108* 106 107   CO2 23.6 25.4 27.9 24.6 25.7   ANION GAP 9.4 9.6 8.1 7.4 7.3   BUN 19 25* 30* 30* 33*   CREATININE 0.98 0.84 0.95 0.99 1.30*   EGFR 77.5 87.6 80.4 76.5 55.2*   GLUCOSE 75 68 87 111* 115*   CALCIUM 8.8 8.6 9.0 8.5* 9.0   MAGNESIUM 2.1 1.5*  --  1.7 1.8   PHOSPHORUS 2.1* 2.1*  --  2.5 2.9         Lab 07/04/22  0920 07/03/22  0438 07/02/22  0934 06/29/22  1043 06/27/22  1413   TOTAL PROTEIN 5.7* 5.4* 5.6* 5.5*  --    ALBUMIN 2.70* 2.50* 2.90* 2.60* 2.1*   GLOBULIN  --   --  2.7 2.9  --    ALT (SGPT) 7 10 10 10  --    AST (SGOT) 13 15 17 15  --    BILIRUBIN 0.4 0.4 0.4 0.3   --    BILIRUBIN DIRECT <0.2 <0.2  --   --   --    ALK PHOS 94 96 84 86  --                      Brief Urine Lab Results  (Last result in the past 365 days)      Color   Clarity   Blood   Leuk Est   Nitrite   Protein   CREAT   Urine HCG        06/25/22 0839 Yellow   Clear   Trace   Trace   Negative   30 mg/dL (1+)               Microbiology Results (last 10 days)     ** No results found for the last 240 hours. **        Labs Pending at Discharge:   Time spent on Discharge including face to face service: > 30 minutes  Electronically signed by NAYE Lees, 07/04/22, 12:35 PM EDT.       Attending documentation:  I reviewed the above documentation and independently reviewed and rounded and evaluated the patient and discussed the care plan with SHANNAN Singletary PA-C, I agree with his findings and plan as documented, what I have added to the care plan and modified is as follows in my documentation and my medical decision making and discharge plan; 82-year-old male with advanced dementia, nursing home resident, pulled out his PEG tube, hospitalized for replacement, GI consulted, family requesting PEG tube to be replaced so patient can get nutrition, underwent upper GI endoscopy and PEG placement was successfully completed.  Tube feeds resumed, resumed on home medications, discharged in hemodynamically stable condition on 7/4/2022 to return to facility of residence.  We will see him back in the hospital when he pulls at this PEG tube for replacement.  Patient seen and examined on the day of discharge,  no acute distress.  Unable to obtain review of systems due to patient not mentally optimized, underlying dementia.  Vitals reviewed, labs reviewed, low potassium.  On physical exam, elderly appearing male, mumbling, no acute distress, dry mucous membranes, air entry bilaterally, regular rate rhythm, soft nontender abdomen, PEG tube site was bandaged, intact, no lower extreme edema, alert, disoriented.  Discharged to rehab  facility where he resides.  Total discharge time 40 minutes more than 60% of the time of this patient's encounter was performed by me, this included face-to-face time, planning and coordinating, medical decision making and critical thinking personally done by me.  -TONEY RENEE    Electronically signed by Aranza Herzog MD, 07/04/22, 2:13 PM EDT.

## 2022-07-04 NOTE — PROGRESS NOTES
[facility]  Inpatient Progress Note    Reason for Follow Up:  Dislodged PEG tube    Subjective     Interval History:   No issues with PEG tube over night.    Current Facility-Administered Medications:   •  dextrose 5 % and sodium chloride 0.9 % infusion, 50 mL/hr, Intravenous, Continuous, Michelle Guzman PA, Last Rate: 50 mL/hr at 07/04/22 0606, 50 mL/hr at 07/04/22 0606  •  ipratropium-albuterol (DUO-NEB) nebulizer solution 3 mL, 3 mL, Nebulization, Q6H PRN, Aranza Herzog MD  •  morphine injection 1 mg, 1 mg, Intravenous, Q4H PRN **AND** naloxone (NARCAN) injection 0.4 mg, 0.4 mg, Intravenous, Q5 Min PRN, Fox, Dimpi, DO  •  [COMPLETED] Insert peripheral IV, , , Once **AND** sodium chloride 0.9 % flush 10 mL, 10 mL, Intravenous, PRN, Russell Correa MD  •  sodium chloride 0.9 % flush 10 mL, 10 mL, Intravenous, Q12H, Fox, Dimpi, DO, 10 mL at 07/03/22 0814  •  sodium chloride 0.9 % flush 10 mL, 10 mL, Intravenous, PRN, Fox, Dimpi, DO  Review of Systems:    Review of systems could not be obtained due to  patient confusion.    Objective     Vital Signs  Temp:  [96.8 °F (36 °C)-98.6 °F (37 °C)] 98.3 °F (36.8 °C)  Heart Rate:  [78-86] 82  Resp:  [8-18] 18  BP: ()/(52-76) 137/72  FiO2 (%):  [33 %] 33 %  Body mass index is 24.44 kg/m².    Intake/Output Summary (Last 24 hours) at 7/4/2022 0934  Last data filed at 7/4/2022 0606  Gross per 24 hour   Intake 2350 ml   Output 800 ml   Net 1550 ml     No intake/output data recorded.     Physical Exam:   General: awake, alert and in no acute distress   Eyes: eyes move symmetrical in all directions, no scleral icterus   Neck: supple, trachea is midline   Skin: warm and dry, not jaundiced   Cardiovascular: no chest tenderness   Pulm: breathing unlabored   Abdomen: soft, nontender, nondistended; G-tube with external bumper at 5.5 cm, small closing wound adjacent to G-tube site c/w previous G-tube site, no drainage   Rectal:  deferred   Extremities: no rash or edema   Psychiatric: mental status within normal limits, alert and oriented      Results Review:     I reviewed the patient's new clinical results.    Results from last 7 days   Lab Units 07/03/22  0437 07/02/22  0934 06/29/22  1043   WBC 10*3/mm3 6.57 9.85 6.31   HEMOGLOBIN g/dL 7.8* 8.4* 8.3*   HEMATOCRIT % 22.8* 24.1* 24.1*   PLATELETS 10*3/mm3 160 160 153     Results from last 7 days   Lab Units 07/03/22  0438 07/02/22  0934 06/29/22  1043   SODIUM mmol/L 143 144 138   POTASSIUM mmol/L 4.1 2.9* 3.6   CHLORIDE mmol/L 108* 108* 106   CO2 mmol/L 25.4 27.9 24.6   BUN mg/dL 25* 30* 30*   CREATININE mg/dL 0.84 0.95 0.99   CALCIUM mg/dL 8.6 9.0 8.5*   BILIRUBIN mg/dL 0.4 0.4 0.3   ALK PHOS U/L 96 84 86   ALT (SGPT) U/L 10 10 10   AST (SGOT) U/L 15 17 15   GLUCOSE mg/dL 68 87 111*         Lab Results   Lab Value Date/Time    LIPASE 17 07/06/2019 1926       Radiology:  [unfilled]      Assessment & Plan   Assessment:     Dislodged G-tube    Plan:     - tube replaced 7/3  - will restart tube feeds  - rec to hold anticoagulation until tomorrow  - dry dressing only; clean daily with soap and water and dry thoroughly  - please flush tube with water after use  - see endoscopy report for further recs regarding care of tube    I discussed the patients findings and my recommendations with patient, nursing staff and primary care team.         Yanelis Mistry M.D.  [place] [address] [address]  [address]  Office: [phone]

## 2022-07-04 NOTE — PLAN OF CARE
Returning back to [facility] rehab  Goal Outcome Evaluation:

## 2022-07-04 NOTE — PROGRESS NOTES
[facility]       Patient Name: Jordin Menon  : 1940  MRN: [id number]  Primary Care Physician: Frank Llanes MD  Date of admission: 2022  Today's Date: 2022  Room / Bed:   [facility]/1  Subjective   Chief Complaint:  Weakness.  Dislodged PEG tube.    Summary:  Pt admitted earlier today by nocturnist.  See admission note.  NH patient with advanced dementia and PEG tube came out.  Brought here to evaluate and replace.  GI consulted.    Interval Followup: 2022    • Pt resting comfortably in bed  • Alert.  Pleasant and calm.  • PEG tube placed. Tube feeds being arranged.  To return to Nursing Home today      • REVIEW OF SYSTEMS: Unable to obtain due to dementia  Objective   Temp:  [96.8 °F (36 °C)-98.6 °F (37 °C)] 98.3 °F (36.8 °C)  Heart Rate:  [78-86] 82  Resp:  [8-18] 18  BP: ()/(52-76) 137/72  FiO2 (%):  [33 %] 33 %  PHYSICAL EXAM   • CON: Elderly and frail.  Cachectic.  NAD.   Edentulous.  • EYES:  Sclera anicteric. EOMI. Normal conjunctiva.   • ENT:  Oropharyngeal mucosa without ulcers or thrush.    • NECK:  No thyromegaly. No stridor. Trachea midline.  • RESP:  CTA. No wheezes. No crackles.   • CV:  Rhythm regular. Rate WNL. No murmur noted.  Trace edema.  • GI:  Soft and nontender. Nondistended.  PEG tube in place   • EXT: Peripheral pulses intact.  No joint deformities or cyanosis.  • LYMPH:  No lymphedema noted.  No cervical lymphadenopathy.  • PSYCH:  Alert. Not oriented. Normal affect and mood.  • NEURO:  CNII-XII grossly intact. No dysarthria or aphasia. No unilateral weakness or paresthesia.  • SKIN: Chronic venous stasis changes noted bilat pretib.  No cellulitis    Results from last 7 days   Lab Units 22  0437 22  0934 22  1043 22  0537   WBC 10*3/mm3 6.57 9.85 6.31 7.58   HEMOGLOBIN g/dL 7.8* 8.4* 8.3* 8.7*   HEMATOCRIT % 22.8* 24.1* 24.1* 25.0*   PLATELETS 10*3/mm3 160 160 153 181     Results from last 7 days   Lab Units  07/03/22  0438 07/02/22  0934 06/29/22  1043 06/28/22  0537   SODIUM mmol/L 143 144 138 140   POTASSIUM mmol/L 4.1 2.9* 3.6 3.3*   CO2 mmol/L 25.4 27.9 24.6 25.7   CHLORIDE mmol/L 108* 108* 106 107   ANION GAP mmol/L 9.6 8.1 7.4 7.3   BUN mg/dL 25* 30* 30* 33*   CREATININE mg/dL 0.84 0.95 0.99 1.30*   GLUCOSE mg/dL 68 87 111* 115*           RESULTS REVIEWED:  I have personally reviewed the results from the time of this admission to 7/4/2022 09:47 EDT and agree with these findings:  [x]  Laboratory  []  Microbiology  []  Radiology  []  EKG/Telemetry   []  Cardiology/Vascular   []  Pathology  []  Old records  []  Other:  Assessment / Plan   Assessment:  • Dislodged PEG tube  • S/P PEG tube placed 7/3/22 by Dr. Mistry  • Advanced dementia  • Bedbound / NH resident  • Chronic CHF, unspecified  • HTN  • Hx of CVA  • Acute on chronic anemia  • Hypokalemia  • DNR     Plan:    • PEG tube replaced ... start tube feeds.  OK to DC after.  • Gastroenterology consult  o - tube replaced 7/3  o - will restart tube feeds  o - rec to hold anticoagulation until tomorrow  o - dry dressing only; clean daily with soap and water and dry thoroughly  o - please flush tube with water after use  o - see endoscopy report for further recs regarding care of tube  • Gentle maintenance IV fluids  • CT abd ... noted  • Resume home meds when PEG available  • Code status: DNR  • Discussed plan with RN.  DVT prophylaxis:  No DVT prophylaxis order currently exists.  CODE STATUS:      Medical Intervention Limits: NO intubation (DNI); NO cardioversion  Code Status (Patient has no pulse and is not breathing): No CPR (Do Not Attempt to Resuscitate)  Medical Interventions (Patient has pulse or is breathing): Limited Support         Attending documentation:  I reviewed the above documentation and independently reviewed and rounded and evaluated the patient and discussed the care plan with SHANNAN Singletary PA-C, I agree with his findings and plan as documented,  what I have added to the care plan and modified is as follows in my documentation and my medical decision making and discharge plan; 82-year-old male with advanced dementia, nursing home resident, pulled out his PEG tube, hospitalized for replacement, GI consulted, family requesting PEG tube to be replaced so patient can get nutrition, underwent upper GI endoscopy and PEG placement was successfully completed.  Tube feeds resumed, resumed on home medications, discharged in hemodynamically stable condition on 7/4/2022 to return to facility of residence.  We will see him back in the hospital when he pulls at this PEG tube for replacement.  Patient seen and examined on the day of discharge,  no acute distress.  Unable to obtain review of systems due to patient not mentally optimized, underlying dementia.  Vitals reviewed, labs reviewed, low potassium.  On physical exam, elderly appearing male, mumbling, no acute distress, dry mucous membranes, air entry bilaterally, regular rate rhythm, soft nontender abdomen, PEG tube site was bandaged, intact, no lower extreme edema, alert, disoriented.  Discharged to rehab facility where he resides.  Total discharge time 40 minutes more than 60% of the time of this patient's encounter was performed by me, this included face-to-face time, planning and coordinating, medical decision making and critical thinking personally done by me.  -TONEY RENEE      Electronically signed by Aranza Herzog MD, 07/04/22, 2:13 PM EDT.

## 2022-07-04 NOTE — CONSULTS
Nutrition Services    Patient Name: Jordin Menon  YOB: 1940  MRN: [id number]  Admission date: 7/2/2022      CLINICAL NUTRITION ASSESSMENT      Reason for Assessment  EN     H&P:    Past Medical History:   Diagnosis Date   • Acute renal failure with pathological lesion in kidney ([facility])    • Alzheimer's dementia with behavioral disturbance ([facility])    • Anemia, vitamin B12 deficiency    • Anxiety    • Anxiety disorder due to known physiological condition    • Arthritis    • Benign neoplasm of prostate    • Cannot walk    • CHF (congestive heart failure) ([facility])    • Constipation    • Coronary atherosclerosis of native coronary artery    • CVA (cerebral vascular accident) ([facility])    • 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 (CVA)    • Edema    • Essential hypertension, benign    • Gastroesophageal reflux disease without esophagitis    • GERD (gastroesophageal reflux disease)    • High blood pressure    • Hirsutism    • HTN (hypertension)    • Hypercalcemia    • Hyperkalemia    • Hyperlipemia    • Hyperlipidemia    • Hypokalemia    • Ingrowing toenail 09/21/2018   • Left foot pain 09/21/2018   • Nutritional deficiency    • Osteoarthritis of right knee 08/22/2016   • Pneumonia    • Presence of right artificial knee joint    • Primary localized osteoarthrosis    • PVD (peripheral vascular disease) ([facility])    • 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 GI replaced and pt to d/c to NH.Review of labs reveals PO4  "2.1L.  Previous formula Novasource 2' to elevated PO4. Will revert EN to original formula of Fibersource HN.          Anthropometrics        Current Height, Weight Height: 172.7 cm (67.99\")  Weight: 72.9 kg (160 lb 11.5 oz)   Current BMI Body mass index is 24.44 kg/m².       Weight Hx  Wt Readings from Last 30 Encounters:   07/02/22 1353 72.9 kg (160 lb 11.5 oz)   07/02/22 0307 72.9 kg (160 lb 11.5 oz)   06/23/22 0923 74.2 kg (163 lb 9.3 oz)   06/02/22 0415 78.4 kg (172 lb 13.5 oz)   06/01/22 0600 74.2 kg (163 lb 9.3 oz)   05/29/22 2100 67.4 kg (148 lb 9.4 oz)   05/29/22 1148 76.2 kg (167 lb 15.9 oz)   05/21/22 0500 76.1 kg (167 lb 12.3 oz)   05/19/22 0600 55.3 kg (121 lb 14.6 oz)   05/18/22 0500 56.5 kg (124 lb 9 oz)   05/17/22 1834 54.1 kg (119 lb 4.3 oz)   05/05/22 1642 60.2 kg (132 lb 11.5 oz)   05/01/22 1534 62.7 kg (138 lb 3.7 oz)   04/14/22 1751 66.5 kg (146 lb 9.7 oz)   04/14/22 1307 66.5 kg (146 lb 9.7 oz)   03/07/22 0900 69.9 kg (154 lb 1.6 oz)   03/06/22 2348 69.2 kg (152 lb 8.9 oz)   03/04/22 0353 66 kg (145 lb 8.1 oz)   02/28/22 0608 69 kg (152 lb 1.9 oz)   02/27/22 0400 68.6 kg (151 lb 3.8 oz)   02/25/22 1540 73.6 kg (162 lb 4.1 oz)   02/04/22 1854 73.6 kg (162 lb 4.1 oz)   07/28/21 1307 84.4 kg (186 lb)   03/05/19 1516 78.5 kg (173 lb)   09/27/18 0000 88.5 kg (195 lb)   09/21/18 0000 90.7 kg (200 lb)   03/22/18 0000 82.6 kg (182 lb)            Wt Change Observation stable     Estimated/Assessed Needs       Energy Requirements    EST Needs (kcal/day) 1960kcal       Protein Requirements    EST Daily Needs (g/day) 70-84 g       Fluid Requirements     Estimated Needs (mL/day) 2000 ml     Labs/Medications         Pertinent Labs Reviewed.   Results from last 7 days   Lab Units 07/04/22  0920 07/03/22  0438 07/02/22  0934   SODIUM mmol/L 142 143 144   POTASSIUM mmol/L 4.0 4.1 2.9*   CHLORIDE mmol/L 109* 108* 108*   CO2 mmol/L 23.6 25.4 27.9   BUN mg/dL 19 25* 30*   CREATININE mg/dL 0.98 0.84 0.95   CALCIUM " mg/dL 8.8 8.6 9.0   BILIRUBIN mg/dL 0.4 0.4 0.4   ALK PHOS U/L 94 96 84   ALT (SGPT) U/L 7 10 10   AST (SGOT) U/L 13 15 17   GLUCOSE mg/dL 75 68 87     Results from last 7 days   Lab Units 07/04/22  0920 07/03/22  0438 07/02/22  0934 06/29/22  1043   MAGNESIUM mg/dL 2.1 1.5*  --  1.7   PHOSPHORUS mg/dL 2.1* 2.1*  --  2.5   HEMOGLOBIN g/dL 8.4*  --    < > 8.3*   HEMATOCRIT % 24.4*  --    < > 24.1*    < > = values in this interval not displayed.     COVID19   Date Value Ref Range Status   06/23/2022 Not Detected Not Detected - Ref. Range Final     Lab Results   Component Value Date    HGBA1C 4.90 05/30/2022         Pertinent Medications Reviewed.     Current Nutrition Orders & Evaluation of Intake       Oral Nutrition     Current PO Diet NPO Diet NPO Type: Strict NPO   Supplement Orders Placed This Encounter      Diet, Tube Feeding Tube Feeding Formula: RD to Initiate & Manage; Tube Feeding Type: RD to Manage      Diet, Tube Feeding Tube Feeding Formula: Fibersource HN; Tube Feeding Type: Continuous; Continuous Tube Feeding Start Rate (mL/hr): 25; Then Advance Rate By (mL/hr): 25; Every __ Hours: 4; To Goal Rate of (mL/hr): 68; Total Daily Volume to Deliver...       Malnutrition Severity Assessment                Nutrition Diagnosis         Nutrition Dx Problem 1 Inadequate oral Intake related to Inability to consume sufficient energy as evidenced by NPO/PEG       Nutrition Intervention         Fibersource HN @ 68 ml/hr and FWF 55 Q2HR to provide   1958kcal 88g protein 1981ml     Medical Nutrition Therapy/Nutrition Education          Learner     Readiness Patient  Education not appropriate at this time     Method     Response Other  Other     Monitor/Evaluation        Monitor Pertinent labs, EN delivery/tolerance       Nutrition Discharge Plan         EN as above       Electronically signed by:  Cynthia Hansen RD  07/04/22 12:58 EDT

## 2022-07-20 ENCOUNTER — TRANSCRIBE ORDERS (OUTPATIENT)
Dept: ADMINISTRATIVE | Facility: HOSPITAL | Age: 82
End: 2022-07-20

## 2022-07-20 DIAGNOSIS — R13.10 DYSPHAGIA, UNSPECIFIED TYPE: Primary | ICD-10-CM

## 2022-08-06 ENCOUNTER — APPOINTMENT (OUTPATIENT)
Dept: GENERAL RADIOLOGY | Facility: HOSPITAL | Age: 82
End: 2022-08-06

## 2022-08-06 ENCOUNTER — HOSPITAL ENCOUNTER (EMERGENCY)
Facility: HOSPITAL | Age: 82
Discharge: NURSING, REHAB OR LONG-TERM CARE | End: 2022-08-06
Attending: EMERGENCY MEDICINE | Admitting: EMERGENCY MEDICINE

## 2022-08-06 VITALS
HEART RATE: 68 BPM | RESPIRATION RATE: 16 BRPM | OXYGEN SATURATION: 98 % | SYSTOLIC BLOOD PRESSURE: 125 MMHG | DIASTOLIC BLOOD PRESSURE: 78 MMHG | TEMPERATURE: 97.6 F | WEIGHT: 141.54 LBS | HEIGHT: 65 IN | BODY MASS INDEX: 23.58 KG/M2

## 2022-08-06 DIAGNOSIS — E87.5 HYPERKALEMIA: Primary | ICD-10-CM

## 2022-08-06 LAB
ALBUMIN SERPL-MCNC: 3.7 G/DL (ref 3.5–5.2)
ALBUMIN/GLOB SERPL: 1 G/DL
ALP SERPL-CCNC: 127 U/L (ref 39–117)
ALT SERPL W P-5'-P-CCNC: 16 U/L (ref 1–41)
ANION GAP SERPL CALCULATED.3IONS-SCNC: 6.9 MMOL/L (ref 5–15)
AST SERPL-CCNC: 24 U/L (ref 1–40)
BASOPHILS # BLD AUTO: 0.03 10*3/MM3 (ref 0–0.2)
BASOPHILS NFR BLD AUTO: 0.4 % (ref 0–1.5)
BILIRUB SERPL-MCNC: 0.5 MG/DL (ref 0–1.2)
BILIRUB UR QL STRIP: NEGATIVE
BUN SERPL-MCNC: 43 MG/DL (ref 8–23)
BUN/CREAT SERPL: 53.8 (ref 7–25)
CALCIUM SPEC-SCNC: 9.9 MG/DL (ref 8.6–10.5)
CHLORIDE SERPL-SCNC: 97 MMOL/L (ref 98–107)
CK SERPL-CCNC: 57 U/L (ref 20–200)
CLARITY UR: CLEAR
CO2 SERPL-SCNC: 26.1 MMOL/L (ref 22–29)
COLOR UR: YELLOW
CREAT SERPL-MCNC: 0.8 MG/DL (ref 0.76–1.27)
D-LACTATE SERPL-SCNC: 1.1 MMOL/L (ref 0.5–2)
DEPRECATED RDW RBC AUTO: 44.6 FL (ref 37–54)
EGFRCR SERPLBLD CKD-EPI 2021: 88.9 ML/MIN/1.73
EOSINOPHIL # BLD AUTO: 0.15 10*3/MM3 (ref 0–0.4)
EOSINOPHIL NFR BLD AUTO: 1.8 % (ref 0.3–6.2)
ERYTHROCYTE [DISTWIDTH] IN BLOOD BY AUTOMATED COUNT: 14.6 % (ref 12.3–15.4)
GLOBULIN UR ELPH-MCNC: 3.7 GM/DL
GLUCOSE SERPL-MCNC: 88 MG/DL (ref 65–99)
GLUCOSE UR STRIP-MCNC: NEGATIVE MG/DL
HCT VFR BLD AUTO: 27.6 % (ref 37.5–51)
HGB BLD-MCNC: 9.6 G/DL (ref 13–17.7)
HGB UR QL STRIP.AUTO: NEGATIVE
IMM GRANULOCYTES # BLD AUTO: 0.02 10*3/MM3 (ref 0–0.05)
IMM GRANULOCYTES NFR BLD AUTO: 0.2 % (ref 0–0.5)
KETONES UR QL STRIP: NEGATIVE
LEUKOCYTE ESTERASE UR QL STRIP.AUTO: ABNORMAL
LYMPHOCYTES # BLD AUTO: 1.8 10*3/MM3 (ref 0.7–3.1)
LYMPHOCYTES NFR BLD AUTO: 22.1 % (ref 19.6–45.3)
MAGNESIUM SERPL-MCNC: 1.7 MG/DL (ref 1.6–2.4)
MCH RBC QN AUTO: 29.3 PG (ref 26.6–33)
MCHC RBC AUTO-ENTMCNC: 34.8 G/DL (ref 31.5–35.7)
MCV RBC AUTO: 84.1 FL (ref 79–97)
MONOCYTES # BLD AUTO: 0.97 10*3/MM3 (ref 0.1–0.9)
MONOCYTES NFR BLD AUTO: 11.9 % (ref 5–12)
NEUTROPHILS NFR BLD AUTO: 5.19 10*3/MM3 (ref 1.7–7)
NEUTROPHILS NFR BLD AUTO: 63.6 % (ref 42.7–76)
NITRITE UR QL STRIP: NEGATIVE
NRBC BLD AUTO-RTO: 0 /100 WBC (ref 0–0.2)
PH UR STRIP.AUTO: 7.5 [PH] (ref 5–8)
PLATELET # BLD AUTO: 228 10*3/MM3 (ref 140–450)
PMV BLD AUTO: 11.3 FL (ref 6–12)
POTASSIUM SERPL-SCNC: 5.8 MMOL/L (ref 3.5–5.2)
PROT SERPL-MCNC: 7.4 G/DL (ref 6–8.5)
PROT UR QL STRIP: ABNORMAL
RBC # BLD AUTO: 3.28 10*6/MM3 (ref 4.14–5.8)
SODIUM SERPL-SCNC: 130 MMOL/L (ref 136–145)
SP GR UR STRIP: 1.01 (ref 1–1.03)
UROBILINOGEN UR QL STRIP: ABNORMAL
WBC NRBC COR # BLD: 8.16 10*3/MM3 (ref 3.4–10.8)

## 2022-08-06 PROCEDURE — 93005 ELECTROCARDIOGRAM TRACING: CPT | Performed by: EMERGENCY MEDICINE

## 2022-08-06 PROCEDURE — 81003 URINALYSIS AUTO W/O SCOPE: CPT | Performed by: EMERGENCY MEDICINE

## 2022-08-06 PROCEDURE — 80053 COMPREHEN METABOLIC PANEL: CPT | Performed by: EMERGENCY MEDICINE

## 2022-08-06 PROCEDURE — 85025 COMPLETE CBC W/AUTO DIFF WBC: CPT | Performed by: EMERGENCY MEDICINE

## 2022-08-06 PROCEDURE — 87086 URINE CULTURE/COLONY COUNT: CPT | Performed by: EMERGENCY MEDICINE

## 2022-08-06 PROCEDURE — 99284 EMERGENCY DEPT VISIT MOD MDM: CPT

## 2022-08-06 PROCEDURE — 82550 ASSAY OF CK (CPK): CPT | Performed by: EMERGENCY MEDICINE

## 2022-08-06 PROCEDURE — 83605 ASSAY OF LACTIC ACID: CPT | Performed by: EMERGENCY MEDICINE

## 2022-08-06 PROCEDURE — 83735 ASSAY OF MAGNESIUM: CPT | Performed by: EMERGENCY MEDICINE

## 2022-08-06 PROCEDURE — 36415 COLL VENOUS BLD VENIPUNCTURE: CPT

## 2022-08-06 PROCEDURE — P9612 CATHETERIZE FOR URINE SPEC: HCPCS

## 2022-08-06 PROCEDURE — 71045 X-RAY EXAM CHEST 1 VIEW: CPT

## 2022-08-06 RX ADMIN — SODIUM ZIRCONIUM CYCLOSILICATE 10 G: 10 POWDER, FOR SUSPENSION ORAL at 12:31

## 2022-08-06 NOTE — ED PROVIDER NOTES
Time: 10:26 AM EDT  Arrived by: ambulance  Chief Complaint: Hypoxia and bradycardia  History provided by: Patient  History is limited by: N/A     History of Present Illness:  Patient is a 82 y.o. year old male who presents to the emergency department with hypoxia and bradycardia.    Pt sent to ED by nursing home facility where Pt resides.  Pt ROS limited by dementia.  Pt denies coughing, vomiting, diarrhea.  Pt states they feel weak sometimes, since last night.       History provided by:  Nursing home and patient   used: No        Similar Symptoms Previously: No  Recently seen: Yes      Patient Care Team  Primary Care Provider: Frank Llanes MD    Past Medical History:     No Known Allergies  Past Medical History:   Diagnosis Date   • Acute renal failure with pathological lesion in kidney ([facility])    • Alzheimer's dementia with behavioral disturbance ([facility])    • Anemia, vitamin B12 deficiency    • Anxiety    • Anxiety disorder due to known physiological condition    • Arthritis    • Benign neoplasm of prostate    • Cannot walk    • CHF (congestive heart failure) ([facility])    • Constipation    • Coronary atherosclerosis of native coronary artery    • CVA (cerebral vascular accident) ([facility])    • 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 (CVA)    • Edema    • Essential hypertension, benign    • Gastroesophageal reflux disease without esophagitis    • GERD (gastroesophageal reflux disease)    • High blood pressure    • Hirsutism    • HTN (hypertension)    • Hypercalcemia    • Hyperkalemia    • Hyperlipemia    • Hyperlipidemia    • Hypokalemia    • Ingrowing toenail 09/21/2018   • Left foot pain 09/21/2018   • Nutritional deficiency    • Osteoarthritis of right knee 08/22/2016   • Pneumonia    • Presence of right artificial knee joint    • Primary localized osteoarthrosis    • PVD (peripheral vascular disease) ([facility])    • 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 ([facility])     • Right foot pain 09/21/2018   • Senile dementia, uncomplicated ([facility])    • Severe sinus bradycardia    • Shock, septic ([facility])    • Sleep apnea    • Thrombosis of left femoral vein ([facility])    • Tinea unguium 09/21/2018   • Vitamin B12 deficiency anemia      Past Surgical History:   Procedure Laterality Date   • ENDOSCOPY W/ PEG TUBE PLACEMENT N/A 6/8/2022    Procedure: ESOPHAGOGASTRODUODENOSCOPY WITH PERCUTANEOUS ENDOSCOPIC GASTROSTOMY TUBE INSERTION WITH ANESTHESIA;  Surgeon: Yanelis Mistry MD;  Location: [facility] ENDOSCOPY;  Service: Gastroenterology;  Laterality: N/A;  PEG TUBE PLACEMENT   • ENDOSCOPY W/ PEG TUBE PLACEMENT N/A 7/3/2022    Procedure: ESOPHAGOGASTRODUODENOSCOPY WITH PERCUTANEOUS ENDOSCOPIC GASTROSTOMY TUBE INSERTION WITH ANESTHESIA;  Surgeon: Bam Melo MD;  Location: [facility] ENDOSCOPY;  Service: Gastroenterology;  Laterality: N/A;  PEG TUBE PLACEMENT     Family History   Family history unknown: Yes       Home Medications:  Prior to Admission medications    Medication Sig Start Date End Date Taking? Authorizing Provider   acetaminophen (TYLENOL) 325 MG tablet Take 650 mg by mouth 3 (Three) Times a Day.    ProviderNanci MD   apixaban (ELIQUIS) 5 MG tablet tablet Take 1 tablet by mouth Every 12 (Twelve) Hours. Indications: DVT/PE (active thrombosis)  Patient taking differently: Take 5 mg by mouth 2 (Two) Times a Day. Indications: DVT/PE (active thrombosis) 3/10/22   Mj Butler MD   aspirin 81 MG chewable tablet Chew 1 tablet Daily. 4/19/22   Aranza Herzog MD   busPIRone (BUSPAR) 10 MG tablet Take 10 mg by mouth 2 (Two) Times a Day.    ProviderNanci MD   Diclofenac Sodium (VOLTAREN) 1 % gel gel Apply 4 g topically to the appropriate area as directed 2 (Two) Times a Day As Needed (Bilateral hands and shoulders).    ProviderNanci MD   docusate sodium (COLACE) 250 MG capsule Take 250 mg by mouth Daily. Pt takes along with 2- 100 mg caps for a total of 250  mg    Nanci Shafer MD   Dyclonine-Glycerin (Cepacol Sore Throat Spray) 0.1-33 % liquid Apply 2 sprays to the mouth or throat 4 (Four) Times a Day As Needed.    Nanci Shafer MD   famotidine (PEPCID) 20 MG tablet Take 20 mg by mouth Every Night.    Nanci Shafer MD   furosemide (Lasix) 20 MG tablet Take 1 tablet by mouth Daily. 3/3/22   Eric Rivas MD   ipratropium-albuterol (DUO-NEB) 0.5-2.5 mg/3 ml nebulizer Take 3 mL by nebulization Every 4 (Four) Hours As Needed for Wheezing.    Nanci Shafer MD   magnesium hydroxide (MILK OF MAGNESIA) 400 MG/5ML suspension Take 30 mL by mouth Daily As Needed for Constipation.    Nanci Shafer MD   melatonin 5 MG tablet tablet Take 5 mg by mouth Every Night.    Nanci Shafer MD   mineral oil-hydrophilic petrolatum (AQUAPHOR) ointment Apply 1 application topically to the appropriate area as directed Daily.    Nanci Shafer MD   trolamine salicylate (ASPERCREME) 10 % cream Apply 1 application topically to the appropriate area as directed 2 (Two) Times a Day As Needed for Muscle / Joint Pain.    Provider, Historical, MD   witch hazel-glycerin (TUCKS) pad Apply 1 pad topically to the appropriate area as directed As Needed for Irritation or Hemorrhoids.    Nanci Shafer MD        Social History:   Social History     Tobacco Use   • Smoking status: Never Smoker   • Smokeless tobacco: Never Used   Vaping Use   • Vaping Use: Never used   Substance Use Topics   • Alcohol use: No   • Drug use: Never     Recent travel: not applicable     Review of Systems:  Review of Systems   Constitutional: Negative for chills and fever.   HENT: Negative for congestion, rhinorrhea and sore throat.    Eyes: Negative for pain and visual disturbance.   Respiratory: Negative for apnea, cough, chest tightness and shortness of breath.    Cardiovascular: Negative for chest pain and palpitations.   Gastrointestinal: Negative for abdominal pain,  "diarrhea, nausea and vomiting.   Genitourinary: Negative for difficulty urinating and dysuria.   Musculoskeletal: Negative for joint swelling and myalgias.   Skin: Negative for color change.   Neurological: Positive for weakness. Negative for seizures and headaches.   Psychiatric/Behavioral: Negative.    All other systems reviewed and are negative.       Physical Exam:  /85 (Patient Position: Lying)   Pulse 68   Temp 97.6 °F (36.4 °C) (Oral)   Resp 16   Ht 165.1 cm (65\")   Wt 64.2 kg (141 lb 8.6 oz)   SpO2 92%   BMI 23.55 kg/m²     Physical Exam  Vitals and nursing note reviewed.   Constitutional:       General: He is not in acute distress.     Appearance: Normal appearance. He is not toxic-appearing.   HENT:      Head: Normocephalic and atraumatic.      Jaw: There is normal jaw occlusion.   Eyes:      General: Lids are normal.      Extraocular Movements: Extraocular movements intact.      Conjunctiva/sclera: Conjunctivae normal.      Pupils: Pupils are equal, round, and reactive to light.   Cardiovascular:      Rate and Rhythm: Normal rate and regular rhythm.      Pulses: Normal pulses.      Heart sounds: Normal heart sounds.   Pulmonary:      Effort: Pulmonary effort is normal. No respiratory distress.      Breath sounds: Normal breath sounds. No wheezing or rhonchi.   Abdominal:      General: Abdomen is flat.      Palpations: Abdomen is soft.      Tenderness: There is no abdominal tenderness. There is no guarding or rebound.   Musculoskeletal:         General: Normal range of motion.      Cervical back: Normal range of motion and neck supple.      Right lower leg: No edema.      Left lower leg: No edema.   Skin:     General: Skin is warm and dry.   Neurological:      Mental Status: He is alert and oriented to person, place, and time. Mental status is at baseline.      Motor: Weakness (Mild symmetric) present.   Psychiatric:         Mood and Affect: Mood normal.                Medications in the " Emergency Department:  Medications   sodium zirconium cyclosilicate (LOKELMA) pack 10 g (10 g Oral Given 8/6/22 1231)        Labs  Lab Results (last 24 hours)     Procedure Component Value Units Date/Time    CBC & Differential [[id number]]  (Abnormal) Collected: 08/06/22 1051    Specimen: Blood Updated: 08/06/22 1103    Narrative:      The following orders were created for panel order CBC & Differential.  Procedure                               Abnormality         Status                     ---------                               -----------         ------                     CBC Auto Differential[[id number]]        Abnormal            Final result                 Please view results for these tests on the individual orders.    CK [[id number]]  (Normal) Collected: 08/06/22 1051    Specimen: Blood Updated: 08/06/22 1144     Creatine Kinase 57 U/L     Comprehensive Metabolic Panel [[id number]]  (Abnormal) Collected: 08/06/22 1051    Specimen: Blood Updated: 08/06/22 1151     Glucose 88 mg/dL      BUN 43 mg/dL      Creatinine 0.80 mg/dL      Sodium 130 mmol/L      Potassium 5.8 mmol/L      Comment: Slight hemolysis detected by analyzer. Results may be affected.        Chloride 97 mmol/L      CO2 26.1 mmol/L      Calcium 9.9 mg/dL      Total Protein 7.4 g/dL      Albumin 3.70 g/dL      ALT (SGPT) 16 U/L      AST (SGOT) 24 U/L      Comment: Slight hemolysis detected by analyzer. Results may be affected.        Alkaline Phosphatase 127 U/L      Total Bilirubin 0.5 mg/dL      Globulin 3.7 gm/dL      A/G Ratio 1.0 g/dL      BUN/Creatinine Ratio 53.8     Anion Gap 6.9 mmol/L      eGFR 88.9 mL/min/1.73      Comment: National Kidney Foundation and American Society of Nephrology (ASN) Task Force recommended calculation based on the Chronic Kidney Disease Epidemiology Collaboration (CKD-EPI) equation refit without adjustment for race.       Narrative:      GFR Normal >60  Chronic Kidney Disease <60  Kidney Failure <15      Lactic  Acid, Plasma [[id number]]  (Normal) Collected: 08/06/22 1051    Specimen: Blood Updated: 08/06/22 1126     Lactate 1.1 mmol/L     Magnesium [[id number]]  (Normal) Collected: 08/06/22 1051    Specimen: Blood Updated: 08/06/22 1144     Magnesium 1.7 mg/dL     CBC Auto Differential [[id number]]  (Abnormal) Collected: 08/06/22 1051    Specimen: Blood Updated: 08/06/22 1103     WBC 8.16 10*3/mm3      RBC 3.28 10*6/mm3      Hemoglobin 9.6 g/dL      Hematocrit 27.6 %      MCV 84.1 fL      MCH 29.3 pg      MCHC 34.8 g/dL      RDW 14.6 %      RDW-SD 44.6 fl      MPV 11.3 fL      Platelets 228 10*3/mm3      Neutrophil % 63.6 %      Lymphocyte % 22.1 %      Monocyte % 11.9 %      Eosinophil % 1.8 %      Basophil % 0.4 %      Immature Grans % 0.2 %      Neutrophils, Absolute 5.19 10*3/mm3      Lymphocytes, Absolute 1.80 10*3/mm3      Monocytes, Absolute 0.97 10*3/mm3      Eosinophils, Absolute 0.15 10*3/mm3      Basophils, Absolute 0.03 10*3/mm3      Immature Grans, Absolute 0.02 10*3/mm3      nRBC 0.0 /100 WBC     Urinalysis With Culture If Indicated - Urine, Catheter In/Out [[id number]]  (Abnormal) Collected: 08/06/22 1057    Specimen: Urine, Catheter In/Out Updated: 08/06/22 1109     Color, UA Yellow     Appearance, UA Clear     pH, UA 7.5     Specific Gravity, UA 1.015     Glucose, UA Negative     Ketones, UA Negative     Bilirubin, UA Negative     Blood, UA Negative     Protein, UA Trace     Leuk Esterase, UA Small (1+)     Nitrite, UA Negative     Urobilinogen, UA 0.2 E.U./dL    Narrative:      In absence of clinical symptoms, the presence of pyuria, bacteria, and/or nitrites on the urinalysis result does not correlate with infection.    Urine Culture - Urine, Urine, Catheter In/Out [[id number]] Collected: 08/06/22 1057    Specimen: Urine, Catheter In/Out Updated: 08/06/22 1105           Imaging:  XR Chest 1 View    Result Date: 8/6/2022  PROCEDURE: XR CHEST 1 VW  COMPARISON: [facility], CT, CT ABDOMEN PELVIS WO  CONTRAST, 7/02/2022, 9:09.  [facility], CR, IR J OR G TUBE CONTRAST EVAL, 6/27/2022, 16:24.  [facility], CR, XR CHEST 1 VW, 6/23/2022, 10:16.  INDICATIONS: cough  FINDINGS:   There is a G-tube in the upper abdomen.  There are questionable bilateral pleural effusions.  There are low lung volumes.  The heart and pulmonary vasculature are within normal limits.  No evidence of pneumothorax.  There are advanced degenerative changes in the glenohumeral joints.  Airspace consolidation in the left lung base has improved.  IMPRESSION: Questionable bilateral pleural effusions.  Airspace consolidation in the left lung base has improved.   WHITLEY NUÑEZ MD       Electronically Signed and Approved By: WHITLEY NUÑEZ MD on 8/06/2022 at 11:20               Procedures:  Procedures    Progress  ED Course as of 08/06/22 1338   Sat Aug 06, 2022   1336 EKG:    Rhythm: sinus  Rate: 60  Axis: normal  Intervals: normal  ST Segment: no elevations    EKG Comparison: unchanged    Interpreted by me   [BN]      ED Course User Index  [BN] Carey Aburto MD                            Medical Decision Making:  MDM  Number of Diagnoses or Management Options  Hyperkalemia  Diagnosis management comments: The patient is resting comfortably and feels better, is alert, talkative and in no distress.  The patient´s CBC was reviewed and shows no abnormalities of critical concern. Of note, there is no anemia requiring a blood transfusion and the platelet count is acceptable.  The patient´s CMP was reviewed and shows no abnormalities of critical concern.  The patient did have a potassium of 5.8 and a sodium of 130.. The patient´s liver enzymes are unremarkable. The patient´s renal function (creatinine) is preserved. The patient has a normal anion gap.  The repeat examination is unremarkable and benign. Patient was given Lokelma for a potassium of 5.8 although this was slightly hemolyzed.  The patient has no changes on  his EKG.  The patient is stable and suitable for discharge.  The patient is neurologically intact, has a baseline mental status and this ambulatory in the ED. The history, exam, diagnostic testing in the patient's current condition do not suggest meningitis, stroke, sepsis, subarachnoid hemorrhage, intracranial bleeding, encephalitis or other significant pathology that would warrant further testing, continued ED treatment, admission, neurological consultation, or other specialist evaluation at this point. The vital signs have been stable. The patient's condition is stable and appropriate for discharge. The patient will pursue further outpatient evaluation with the primary care physician or other designated or consulting position as indicated in the discharge instructions.    Risk of Complications, Morbidity, and/or Mortality  Presenting problems: moderate  Management options: moderate    Patient Progress  Patient progress: stable       Final diagnoses:   Hyperkalemia        Disposition:  ED Disposition     ED Disposition   Discharge    Condition   Stable    Comment   --             This medical record created using voice recognition software.        Documentation assistance provided by Mena Trejo acting as scribe for Carey Aburto MD. Information recorded by the scribe was done at my direction and has been verified and validated by me.          Mena Trejo  08/06/22 1037       Carey Aburto MD  08/06/22 [id number]

## 2022-08-07 LAB — BACTERIA SPEC AEROBE CULT: ABNORMAL

## 2022-08-09 LAB — QT INTERVAL: 396 MS

## 2022-08-22 ENCOUNTER — APPOINTMENT (OUTPATIENT)
Dept: GENERAL RADIOLOGY | Facility: HOSPITAL | Age: 82
End: 2022-08-22

## 2022-08-23 ENCOUNTER — APPOINTMENT (OUTPATIENT)
Dept: GENERAL RADIOLOGY | Facility: HOSPITAL | Age: 82
End: 2022-08-23

## 2022-08-23 ENCOUNTER — HOSPITAL ENCOUNTER (EMERGENCY)
Facility: HOSPITAL | Age: 82
Discharge: NURSING, REHAB OR LONG-TERM CARE | End: 2022-08-24
Attending: EMERGENCY MEDICINE | Admitting: EMERGENCY MEDICINE

## 2022-08-23 VITALS
TEMPERATURE: 99 F | SYSTOLIC BLOOD PRESSURE: 112 MMHG | WEIGHT: 139.55 LBS | HEART RATE: 87 BPM | DIASTOLIC BLOOD PRESSURE: 64 MMHG | BODY MASS INDEX: 23.22 KG/M2 | OXYGEN SATURATION: 92 % | RESPIRATION RATE: 20 BRPM

## 2022-08-23 DIAGNOSIS — Z43.1 ATTENTION TO G-TUBE: Primary | ICD-10-CM

## 2022-08-23 PROCEDURE — 49465 FLUORO EXAM OF G/COLON TUBE: CPT

## 2022-08-23 PROCEDURE — 99284 EMERGENCY DEPT VISIT MOD MDM: CPT

## 2022-08-23 PROCEDURE — 99283 EMERGENCY DEPT VISIT LOW MDM: CPT

## 2022-08-23 NOTE — ED NOTES
Called [facility] and spoke with Tara she advised she just started having problems with it today, she states he was getting a tube feeding, she unhooked it and then tried to give him meds and it would not push the meds, she reports he has pulled one out before.

## 2022-08-23 NOTE — ED PROVIDER NOTES
Time: 5:07 PM EDT  Arrived by: ambulance  Chief Complaint: G-tube malfunction  History provided by: Patient  History is limited by: Dementia    History of Present Illness:  Patient is a 82 y.o. year old male who presents to the emergency department with a clogged gastrostomy tube, which is a 20 Fr. Per [place] notes, Nursing home was called and personnel stated Pt was getting a tube feeding, they unhooked it and then tried to give him meds and it would not push the meds. Nursing home personnel reports he has pulled one out before. Pt states he doesn't have any complaints at present, he 'just wants to go home'.    History provided by:  Patient  History limited by:  Dementia   used: No        Similar Symptoms Previously: Unknown  Recently seen: Yes, on 08/06/22 for Slow HR      Patient Care Team  Primary Care Provider: Frank Llanes MD    Past Medical History:     No Known Allergies  Past Medical History:   Diagnosis Date   • Acute renal failure with pathological lesion in kidney ([facility])    • Alzheimer's dementia with behavioral disturbance ([facility])    • Anemia, vitamin B12 deficiency    • Anxiety    • Anxiety disorder due to known physiological condition    • Arthritis    • Benign neoplasm of prostate    • Cannot walk    • CHF (congestive heart failure) ([facility])    • Constipation    • Coronary atherosclerosis of native coronary artery    • CVA (cerebral vascular accident) ([facility])    • 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 (CVA)    • Edema    • Essential hypertension, benign    • Gastroesophageal reflux disease without esophagitis    • GERD (gastroesophageal reflux disease)    • High blood pressure    • Hirsutism    • HTN (hypertension)    • Hypercalcemia    • Hyperkalemia    • Hyperlipemia    • Hyperlipidemia    • Hypokalemia    • Ingrowing toenail 09/21/2018   • Left foot pain 09/21/2018   • Nutritional deficiency    • Osteoarthritis of right knee 08/22/2016   • Pneumonia    •  Presence of right artificial knee joint    • Primary localized osteoarthrosis    • PVD (peripheral vascular disease) (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)    • Right foot pain 09/21/2018   • Senile dementia, uncomplicated ([facility])    • Severe sinus bradycardia    • Shock, septic (HCC)    • Sleep apnea    • Thrombosis of left femoral vein ([facility])    • Tinea unguium 09/21/2018   • Vitamin B12 deficiency anemia      Past Surgical History:   Procedure Laterality Date   • ENDOSCOPY W/ PEG TUBE PLACEMENT N/A 6/8/2022    Procedure: ESOPHAGOGASTRODUODENOSCOPY WITH PERCUTANEOUS ENDOSCOPIC GASTROSTOMY TUBE INSERTION WITH ANESTHESIA;  Surgeon: Yanelis Mistry MD;  Location: [facility] ENDOSCOPY;  Service: Gastroenterology;  Laterality: N/A;  PEG TUBE PLACEMENT   • ENDOSCOPY W/ PEG TUBE PLACEMENT N/A 7/3/2022    Procedure: ESOPHAGOGASTRODUODENOSCOPY WITH PERCUTANEOUS ENDOSCOPIC GASTROSTOMY TUBE INSERTION WITH ANESTHESIA;  Surgeon: Bam Melo MD;  Location: [facility] ENDOSCOPY;  Service: Gastroenterology;  Laterality: N/A;  PEG TUBE PLACEMENT     Family History   Family history unknown: Yes       Home Medications:  Prior to Admission medications    Medication Sig Start Date End Date Taking? Authorizing Provider   acetaminophen (TYLENOL) 325 MG tablet Take 650 mg by mouth 3 (Three) Times a Day.    Provider, MD Nanci   apixaban (ELIQUIS) 5 MG tablet tablet Take 1 tablet by mouth Every 12 (Twelve) Hours. Indications: DVT/PE (active thrombosis)  Patient taking differently: Take 5 mg by mouth 2 (Two) Times a Day. Indications: DVT/PE (active thrombosis) 3/10/22   Mj Butler MD   aspirin 81 MG chewable tablet Chew 1 tablet Daily. 4/19/22   Aranza Herzog MD   busPIRone (BUSPAR) 10 MG tablet Take 10 mg by mouth 2 (Two) Times a Day.    Provider, MD Nanci   Diclofenac Sodium (VOLTAREN) 1 % gel gel Apply 4 g topically to the appropriate area as directed 2 (Two) Times a  Day As Needed (Bilateral hands and shoulders).    Nanci Shafer MD   docusate sodium (COLACE) 250 MG capsule Take 250 mg by mouth Daily. Pt takes along with 2- 100 mg caps for a total of 250 mg    Nanci Shafer MD   Dyclonine-Glycerin (Cepacol Sore Throat Spray) 0.1-33 % liquid Apply 2 sprays to the mouth or throat 4 (Four) Times a Day As Needed.    Nanci Shafer MD   famotidine (PEPCID) 20 MG tablet Take 20 mg by mouth Every Night.    Nanci Shafer MD   furosemide (Lasix) 20 MG tablet Take 1 tablet by mouth Daily. 3/3/22   Eric Rivas MD   ipratropium-albuterol (DUO-NEB) 0.5-2.5 mg/3 ml nebulizer Take 3 mL by nebulization Every 4 (Four) Hours As Needed for Wheezing.    Nanci Shafer MD   magnesium hydroxide (MILK OF MAGNESIA) 400 MG/5ML suspension Take 30 mL by mouth Daily As Needed for Constipation.    Nanci Shafer MD   melatonin 5 MG tablet tablet Take 5 mg by mouth Every Night.    Nanci Shafer MD   mineral oil-hydrophilic petrolatum (AQUAPHOR) ointment Apply 1 application topically to the appropriate area as directed Daily.    Nanci Shafer MD   trolamine salicylate (ASPERCREME) 10 % cream Apply 1 application topically to the appropriate area as directed 2 (Two) Times a Day As Needed for Muscle / Joint Pain.    Provider, Historical, MD   witch hazel-glycerin (TUCKS) pad Apply 1 pad topically to the appropriate area as directed As Needed for Irritation or Hemorrhoids.    Nanci Shafer MD        Social History:   Social History     Tobacco Use   • Smoking status: Never Smoker   • Smokeless tobacco: Never Used   Vaping Use   • Vaping Use: Never used   Substance Use Topics   • Alcohol use: No   • Drug use: Never         Review of Systems:  Review of Systems   Constitutional: Negative for chills and fever.   HENT: Negative for congestion, rhinorrhea and sore throat.    Eyes: Negative for photophobia.   Respiratory: Negative for apnea, cough,  chest tightness and shortness of breath.    Cardiovascular: Negative for chest pain and palpitations.   Gastrointestinal: Negative for abdominal pain, diarrhea, nausea and vomiting.   Endocrine: Negative.    Genitourinary: Negative for difficulty urinating and dysuria.   Musculoskeletal: Negative for back pain, joint swelling and myalgias.   Skin: Negative for color change and wound.   Allergic/Immunologic: Negative.    Neurological: Negative for seizures and headaches.   Psychiatric/Behavioral: Negative.    All other systems reviewed and are negative.       Physical Exam:  /64   Pulse 87   Temp 99 °F (37.2 °C) (Oral)   Resp 20   Wt 63.3 kg (139 lb 8.8 oz)   SpO2 92%   BMI 23.22 kg/m²     Physical Exam  Vitals and nursing note reviewed.   Constitutional:       General: He is awake.      Appearance: Normal appearance.   HENT:      Head: Normocephalic and atraumatic.      Nose: Nose normal.      Mouth/Throat:      Mouth: Mucous membranes are moist.   Eyes:      Extraocular Movements: Extraocular movements intact.      Pupils: Pupils are equal, round, and reactive to light.   Cardiovascular:      Rate and Rhythm: Normal rate and regular rhythm.      Heart sounds: Normal heart sounds.   Pulmonary:      Effort: Pulmonary effort is normal. No respiratory distress.      Breath sounds: Normal breath sounds. No wheezing, rhonchi or rales.   Abdominal:      General: Bowel sounds are normal.      Palpations: Abdomen is soft.      Tenderness: There is no abdominal tenderness. There is no guarding or rebound.      Comments: G-tube located to LUQ   Musculoskeletal:         General: No tenderness. Normal range of motion.      Cervical back: Normal range of motion and neck supple.   Skin:     General: Skin is warm and dry.      Coloration: Skin is not jaundiced.   Neurological:      General: No focal deficit present.      Mental Status: He is alert and oriented to person, place, and time. Mental status is at baseline.  He is confused.      Sensory: Sensation is intact.      Motor: Motor function is intact.      Coordination: Coordination is intact.      Comments: Pleasantly confused   Psychiatric:         Attention and Perception: Attention and perception normal.         Mood and Affect: Mood and affect normal.         Speech: Speech normal.         Behavior: Behavior normal.         Judgment: Judgment normal.                Medications in the Emergency Department:  Medications - No data to display     Labs  Lab Results (last 24 hours)     ** No results found for the last 24 hours. **           Imaging:  No Radiology Exams Resulted Within Past 24 Hours    Procedures:  Feeding Tube Replacement    Date/Time: 8/23/2022 7:29 PM  Performed by: Fawad Junior MD  Authorized by: Fawad Junior MD     Consent:     Consent obtained:  Verbal    Consent given by:  Patient    Risks, benefits, and alternatives were discussed: not applicable    Universal protocol:     Patient identity confirmed:  Arm band  Pre-procedure details:     Old tube type:  Gastrostomy    Old tube size: 20 French.  Sedation:     Sedation type:  None  Anesthesia:     Anesthesia method:  None  Procedure details:     Procedure type:  Replacement    Tube type:  Gastrostomy    Tube size: 20 French.    Bulb inflation volume:  20 mL    Bulb inflation fluid:  Normal saline  Post-procedure details:     Placement/position confirmation:  Auscultation and gastric contents aspirated    Placement difficulty:  None    Bleeding:  Minimal    Procedure completion:  Tolerated well, no immediate complications        Progress  ED Course as of 08/30/22 0755   Tue Aug 23, 2022   1853 Case discussed with Dr. Rao on-call GI.  I alerted him to the fact that the tube was placed on 7/3/2022 and is currently occluded. He states that this can be quickly removed and replaced with another tube.  If unable this is something that they can take care of as an outpatient tomorrow.  This is not  emergent either way and does not need to be admitted.  He states it is okay the patient goes without his tube feedings until tomorrow. [RP]      ED Course User Index  [RP] Fawad Junior MD                             Medical Decision Making:  MDM  Number of Diagnoses or Management Options     Amount and/or Complexity of Data Reviewed  Discuss the patient with other providers: yes  Independent visualization of images, tracings, or specimens: yes    Risk of Complications, Morbidity, and/or Mortality  Presenting problems: low  Management options: low    Patient Progress  Patient progress: improved       Final diagnoses:   Attention to G-tube (HCC)        Disposition:  ED Disposition     ED Disposition   Discharge    Condition   Stable    Comment   --             This medical record created using voice recognition software and a virtual scribe.    Documentation assistance provided by Elizabeth Macdonald acting as scribe for Fawad Junior MD. Information recorded by the scribe was done at my direction and has been verified and validated by me.          Elizabeth Macdonald  08/23/22 3957       Fawad Junior MD  08/23/22 5441       Fawad Junior MD  08/30/22 5198

## 2022-08-24 NOTE — ED NOTES
In to move/turn pt and he reports he doesn't need to be repositioned . Awaiting reading on radiology films at this time

## 2022-08-25 ENCOUNTER — APPOINTMENT (OUTPATIENT)
Dept: GENERAL RADIOLOGY | Facility: HOSPITAL | Age: 82
End: 2022-08-25

## 2022-08-25 ENCOUNTER — HOSPITAL ENCOUNTER (EMERGENCY)
Facility: HOSPITAL | Age: 82
Discharge: NURSING, REHAB OR LONG-TERM CARE | End: 2022-08-25
Attending: EMERGENCY MEDICINE | Admitting: EMERGENCY MEDICINE

## 2022-08-25 VITALS
WEIGHT: 133.6 LBS | OXYGEN SATURATION: 97 % | TEMPERATURE: 98.3 F | RESPIRATION RATE: 16 BRPM | HEIGHT: 66 IN | DIASTOLIC BLOOD PRESSURE: 61 MMHG | SYSTOLIC BLOOD PRESSURE: 115 MMHG | BODY MASS INDEX: 21.47 KG/M2 | HEART RATE: 90 BPM

## 2022-08-25 DIAGNOSIS — T85.528A DISLODGED GASTROSTOMY TUBE: ICD-10-CM

## 2022-08-25 DIAGNOSIS — Z46.59 ENCOUNTER FOR CARE RELATED TO FEEDING TUBE: Primary | ICD-10-CM

## 2022-08-25 PROCEDURE — 99283 EMERGENCY DEPT VISIT LOW MDM: CPT

## 2022-08-25 PROCEDURE — 0 DIATRIZOATE MEGLUMINE & SODIUM PER 1 ML: Performed by: EMERGENCY MEDICINE

## 2022-08-25 PROCEDURE — 49465 FLUORO EXAM OF G/COLON TUBE: CPT

## 2022-08-25 RX ADMIN — DIATRIZOATE MEGLUMINE AND DIATRIZOATE SODIUM 30 ML: 660; 100 LIQUID ORAL; RECTAL at 12:21

## 2022-08-25 NOTE — ED PROVIDER NOTES
Time: 11:30 AM EDT  Arrived by: ambulance  Chief Complaint: G tub  History provided by: patient  History is limited by: Alzheimer's dementia    History of Present Illness:  Patient is a 82 y.o.  male that presents to the emergency department with gastrointestinal problem. Pt was brought from [facility] and rehab after pulling out tube this morning.  Patient denies any verbal complaints.       used: No        Patient Care Team  Primary Care Provider: Frank Llanes MD    Past Medical History:     No Known Allergies  Past Medical History:   Diagnosis Date   • Acute renal failure with pathological lesion in kidney ([facility])    • Alzheimer's dementia with behavioral disturbance ([facility])    • Anemia, vitamin B12 deficiency    • Anxiety    • Anxiety disorder due to known physiological condition    • Arthritis    • Benign neoplasm of prostate    • Cannot walk    • CHF (congestive heart failure) ([facility])    • Constipation    • Coronary atherosclerosis of native coronary artery    • CVA (cerebral vascular accident) ([facility])    • 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 (CVA)    • Edema    • Essential hypertension, benign    • Gastroesophageal reflux disease without esophagitis    • GERD (gastroesophageal reflux disease)    • High blood pressure    • Hirsutism    • HTN (hypertension)    • Hypercalcemia    • Hyperkalemia    • Hyperlipemia    • Hyperlipidemia    • Hypokalemia    • Ingrowing toenail 09/21/2018   • Left foot pain 09/21/2018   • Nutritional deficiency    • Osteoarthritis of right knee 08/22/2016   • Pneumonia    • Presence of right artificial knee joint    • Primary localized osteoarthrosis    • PVD (peripheral vascular disease) ([facility])    • 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 ([facility])    • Right foot pain 09/21/2018   • Senile dementia, uncomplicated ([facility])    • Severe sinus bradycardia    • Shock, septic ([facility])    • Sleep apnea    • Thrombosis of left femoral  vein (HCC)    • Tinea unguium 09/21/2018   • Vitamin B12 deficiency anemia      Past Surgical History:   Procedure Laterality Date   • ENDOSCOPY W/ PEG TUBE PLACEMENT N/A 6/8/2022    Procedure: ESOPHAGOGASTRODUODENOSCOPY WITH PERCUTANEOUS ENDOSCOPIC GASTROSTOMY TUBE INSERTION WITH ANESTHESIA;  Surgeon: Yanelis Mistry MD;  Location: [facility] ENDOSCOPY;  Service: Gastroenterology;  Laterality: N/A;  PEG TUBE PLACEMENT   • ENDOSCOPY W/ PEG TUBE PLACEMENT N/A 7/3/2022    Procedure: ESOPHAGOGASTRODUODENOSCOPY WITH PERCUTANEOUS ENDOSCOPIC GASTROSTOMY TUBE INSERTION WITH ANESTHESIA;  Surgeon: Bam Melo MD;  Location: [facility] ENDOSCOPY;  Service: Gastroenterology;  Laterality: N/A;  PEG TUBE PLACEMENT     Family History   Family history unknown: Yes       Home Medications:  Prior to Admission medications    Medication Sig Start Date End Date Taking? Authorizing Provider   acetaminophen (TYLENOL) 325 MG tablet Take 650 mg by mouth 3 (Three) Times a Day.    Nanci Shafer MD   apixaban (ELIQUIS) 5 MG tablet tablet Take 1 tablet by mouth Every 12 (Twelve) Hours. Indications: DVT/PE (active thrombosis)  Patient taking differently: Take 5 mg by mouth 2 (Two) Times a Day. Indications: DVT/PE (active thrombosis) 3/10/22   Mj Butler MD   aspirin 81 MG chewable tablet Chew 1 tablet Daily. 4/19/22   Aranza Herzog MD   busPIRone (BUSPAR) 10 MG tablet Take 10 mg by mouth 2 (Two) Times a Day.    Nanci Shafer MD   Diclofenac Sodium (VOLTAREN) 1 % gel gel Apply 4 g topically to the appropriate area as directed 2 (Two) Times a Day As Needed (Bilateral hands and shoulders).    Nanci Shafer MD   docusate sodium (COLACE) 250 MG capsule Take 250 mg by mouth Daily. Pt takes along with 2- 100 mg caps for a total of 250 mg    Nanci Shafer MD   Dyclonine-Glycerin (Cepacol Sore Throat Spray) 0.1-33 % liquid Apply 2 sprays to the mouth or throat 4 (Four) Times a Day As Needed.     Nanci Shafer MD   famotidine (PEPCID) 20 MG tablet Take 20 mg by mouth Every Night.    Nanci Shafer MD   furosemide (Lasix) 20 MG tablet Take 1 tablet by mouth Daily. 3/3/22   Eric Rivas MD   ipratropium-albuterol (DUO-NEB) 0.5-2.5 mg/3 ml nebulizer Take 3 mL by nebulization Every 4 (Four) Hours As Needed for Wheezing.    Nanci Shafer MD   magnesium hydroxide (MILK OF MAGNESIA) 400 MG/5ML suspension Take 30 mL by mouth Daily As Needed for Constipation.    Nanci Shafer MD   melatonin 5 MG tablet tablet Take 5 mg by mouth Every Night.    Nanci Shafer MD   mineral oil-hydrophilic petrolatum (AQUAPHOR) ointment Apply 1 application topically to the appropriate area as directed Daily.    Nanci Shafer MD   trolamine salicylate (ASPERCREME) 10 % cream Apply 1 application topically to the appropriate area as directed 2 (Two) Times a Day As Needed for Muscle / Joint Pain.    Provider, Historical, MD   witch hazel-glycerin (TUCKS) pad Apply 1 pad topically to the appropriate area as directed As Needed for Irritation or Hemorrhoids.    Nanci Shafer MD        Social History:   Social History     Tobacco Use   • Smoking status: Never Smoker   • Smokeless tobacco: Never Used   Vaping Use   • Vaping Use: Never used   Substance Use Topics   • Alcohol use: No   • Drug use: Never       Review of Systems:  Review of Systems   Constitutional: Negative for chills and fever.   HENT: Negative for congestion, rhinorrhea and sore throat.    Eyes: Negative for pain and visual disturbance.   Respiratory: Negative for apnea, cough, chest tightness and shortness of breath.    Cardiovascular: Negative for chest pain and palpitations.   Gastrointestinal: Negative for abdominal pain, diarrhea, nausea and vomiting.   Genitourinary: Negative for difficulty urinating and dysuria.   Musculoskeletal: Negative for joint swelling and myalgias.   Skin: Negative for color change.  "  Neurological: Negative for seizures and headaches.   Psychiatric/Behavioral: Negative.    All other systems reviewed and are negative.         Physical Exam:  /61 (BP Location: Right arm, Patient Position: Lying)   Pulse 90   Temp 98.3 °F (36.8 °C) (Oral)   Resp 16   Ht 167.6 cm (66\")   Wt 60.6 kg (133 lb 9.6 oz)   SpO2 97%   BMI 21.56 kg/m²     Physical Exam  Vitals and nursing note reviewed.   Constitutional:       General: He is not in acute distress.     Appearance: Normal appearance. He is not toxic-appearing.   HENT:      Head: Normocephalic and atraumatic.      Mouth/Throat:      Mouth: Mucous membranes are moist.   Eyes:      General: No scleral icterus.  Cardiovascular:      Rate and Rhythm: Normal rate and regular rhythm.      Pulses: Normal pulses.      Heart sounds: Normal heart sounds.   Pulmonary:      Effort: Pulmonary effort is normal. No respiratory distress.      Breath sounds: Normal breath sounds.   Abdominal:      General: Abdomen is flat.      Palpations: Abdomen is soft.      Tenderness: There is no abdominal tenderness.      Comments: There is a G-tube ostomy noted without any bleeding or signs of infection.   Musculoskeletal:         General: Normal range of motion.      Cervical back: Normal range of motion and neck supple.   Skin:     General: Skin is warm and dry.   Neurological:      Mental Status: He is alert and oriented to person, place, and time. Mental status is at baseline.                Medications in the Emergency Department:  Medications   diatrizoate meglumine-sodium (GASTROGRAFIN) 66-10 % oral solution 30 mL (30 mL Oral Given 8/25/22 1221)        Labs  Lab Results (last 24 hours)     ** No results found for the last 24 hours. **           Imaging:  IR J or G Tube Contrast Eval    Result Date: 8/25/2022  PROCEDURE: IR J OR G TUBE CONTRAST EVAL  COMPARISON: [facility], CR, IR J OR G TUBE CONTRAST EVAL, 8/23/2022, 20:19.  INDICATIONS: g tube " placement  FINDINGS:   view demonstrates contrast in the previous G-tube evaluation.  Injection water-soluble contrast was performed.  Contrast appears to opacify the stomach suggesting appropriate placement of the G-tube.        1. The percutaneous gastrostomy tube appears to be in expected position.  Contrast opacifies the stomach after injection with contrast.      MELVINA CUMMINS MD       Electronically Signed and Approved By: MELVINA CUMMINS MD on 8/25/2022 at 12:27                EKG:      Procedures:  Procedures     Implied consent was assumed for replacement of the feeding tube.  Patient has Alzheimer's dementia.  The patient was placed in a supine position.  A 14 Arabic/5 mL Kangaroo feeding tube was inserted in standard fashion.  This was inserted without difficulty and without meeting any significant resistance.  Once the tube was appropriately placed, the balloon was inflated and the tube was secured in place using its bumper.  Post procedure x-rays with contrast were utilized to confirm placement.  There is no sign of any bleeding.      Progress                      The patient was seen and evaluated the ED by me.  The above history and physical examination was performed as document.  The diagnostic data is obtained.  Results reviewed.  Discussed with the patient.  The feeding tube is noted to be in place.  Patient over discharge back to the nursing facility.      Medical Decision Making:  [facility]     Final diagnoses:   Encounter for care related to feeding tube   Dislodged gastrostomy tube        Disposition:  ED Disposition     ED Disposition   Discharge    Condition   Stable    Comment   --                Shirin Vyas  08/25/22 1142       Venu Oconnor DO  08/26/22 0617

## 2022-08-25 NOTE — ED NOTES
Assisted dr albarran replace the g tube. Used a 12 Irish with 5cc of NS in the balloon   
Pt awaiting ems back to nursing home  
Pt brought ems from [facility] nursing and rehab for pulling out gtube this AM  
Report called back to [facility] nursing and rehab (to Deepti)  
64 yo male with h/o HTN on Norvasc daily presenting with episode of dizziness, slurred speech that lasted for less than 15 min with persistent confusion that resolved prior to arrival in ED. Pt is currently asymptomatic with no speech or neurological symptoms.

## 2022-08-25 NOTE — DISCHARGE INSTRUCTIONS
Resume nursing home orders and tube feedings.  Return to the ER for any concerns issues that may arise.

## 2022-09-15 NOTE — PLAN OF CARE
Goal Outcome Evaluation:              Outcome Evaluation: VSS on RA. Tolerates tube feed well having 3 mL residual. No other changes at this time.   no

## 2022-10-06 ENCOUNTER — APPOINTMENT (OUTPATIENT)
Dept: GENERAL RADIOLOGY | Facility: HOSPITAL | Age: 82
End: 2022-10-06

## 2022-10-13 ENCOUNTER — HOSPITAL ENCOUNTER (EMERGENCY)
Facility: HOSPITAL | Age: 82
Discharge: NURSING, REHAB OR LONG-TERM CARE | End: 2022-10-13
Attending: EMERGENCY MEDICINE | Admitting: EMERGENCY MEDICINE

## 2022-10-13 VITALS
DIASTOLIC BLOOD PRESSURE: 59 MMHG | BODY MASS INDEX: 23.52 KG/M2 | RESPIRATION RATE: 16 BRPM | SYSTOLIC BLOOD PRESSURE: 101 MMHG | HEART RATE: 91 BPM | TEMPERATURE: 98.9 F | WEIGHT: 145.72 LBS | OXYGEN SATURATION: 96 %

## 2022-10-13 DIAGNOSIS — R31.0 GROSS HEMATURIA: Primary | ICD-10-CM

## 2022-10-13 LAB
ALBUMIN SERPL-MCNC: 2.8 G/DL (ref 3.5–5.2)
ALBUMIN/GLOB SERPL: 0.5 G/DL
ALP SERPL-CCNC: 97 U/L (ref 39–117)
ALT SERPL W P-5'-P-CCNC: 15 U/L (ref 1–41)
ANION GAP SERPL CALCULATED.3IONS-SCNC: 10.1 MMOL/L (ref 5–15)
AST SERPL-CCNC: 27 U/L (ref 1–40)
BACTERIA UR QL AUTO: ABNORMAL /HPF
BASOPHILS # BLD AUTO: 0.07 10*3/MM3 (ref 0–0.2)
BASOPHILS NFR BLD AUTO: 0.6 % (ref 0–1.5)
BILIRUB SERPL-MCNC: 1 MG/DL (ref 0–1.2)
BILIRUB UR QL STRIP: NEGATIVE
BUN SERPL-MCNC: 46 MG/DL (ref 8–23)
BUN/CREAT SERPL: 46.5 (ref 7–25)
CALCIUM SPEC-SCNC: 9.8 MG/DL (ref 8.6–10.5)
CHLORIDE SERPL-SCNC: 104 MMOL/L (ref 98–107)
CLARITY UR: ABNORMAL
CO2 SERPL-SCNC: 24.9 MMOL/L (ref 22–29)
COLOR UR: ABNORMAL
CREAT SERPL-MCNC: 0.99 MG/DL (ref 0.76–1.27)
DEPRECATED RDW RBC AUTO: 47.9 FL (ref 37–54)
EGFRCR SERPLBLD CKD-EPI 2021: 76.5 ML/MIN/1.73
EOSINOPHIL # BLD AUTO: 0.12 10*3/MM3 (ref 0–0.4)
EOSINOPHIL NFR BLD AUTO: 1 % (ref 0.3–6.2)
ERYTHROCYTE [DISTWIDTH] IN BLOOD BY AUTOMATED COUNT: 16.6 % (ref 12.3–15.4)
GLOBULIN UR ELPH-MCNC: 5.2 GM/DL
GLUCOSE SERPL-MCNC: 101 MG/DL (ref 65–99)
GLUCOSE UR STRIP-MCNC: NEGATIVE MG/DL
HCT VFR BLD AUTO: 26.4 % (ref 37.5–51)
HGB BLD-MCNC: 8.7 G/DL (ref 13–17.7)
HGB UR QL STRIP.AUTO: ABNORMAL
HYALINE CASTS UR QL AUTO: ABNORMAL /LPF
IMM GRANULOCYTES # BLD AUTO: 0.04 10*3/MM3 (ref 0–0.05)
IMM GRANULOCYTES NFR BLD AUTO: 0.3 % (ref 0–0.5)
INR PPP: 1.1 (ref 0.86–1.15)
KETONES UR QL STRIP: NEGATIVE
LEUKOCYTE ESTERASE UR QL STRIP.AUTO: ABNORMAL
LYMPHOCYTES # BLD AUTO: 2.11 10*3/MM3 (ref 0.7–3.1)
LYMPHOCYTES NFR BLD AUTO: 17 % (ref 19.6–45.3)
MCH RBC QN AUTO: 26.5 PG (ref 26.6–33)
MCHC RBC AUTO-ENTMCNC: 33 G/DL (ref 31.5–35.7)
MCV RBC AUTO: 80.5 FL (ref 79–97)
MONOCYTES # BLD AUTO: 1.15 10*3/MM3 (ref 0.1–0.9)
MONOCYTES NFR BLD AUTO: 9.3 % (ref 5–12)
NEUTROPHILS NFR BLD AUTO: 71.8 % (ref 42.7–76)
NEUTROPHILS NFR BLD AUTO: 8.9 10*3/MM3 (ref 1.7–7)
NITRITE UR QL STRIP: NEGATIVE
NRBC BLD AUTO-RTO: 0 /100 WBC (ref 0–0.2)
PH UR STRIP.AUTO: 8.5 [PH] (ref 5–8)
PLATELET # BLD AUTO: 244 10*3/MM3 (ref 140–450)
PMV BLD AUTO: 12.5 FL (ref 6–12)
POTASSIUM SERPL-SCNC: 5.7 MMOL/L (ref 3.5–5.2)
PROT SERPL-MCNC: 8 G/DL (ref 6–8.5)
PROT UR QL STRIP: ABNORMAL
PROTHROMBIN TIME: 14.4 SECONDS (ref 11.8–14.9)
RBC # BLD AUTO: 3.28 10*6/MM3 (ref 4.14–5.8)
RBC # UR STRIP: ABNORMAL /HPF
REF LAB TEST METHOD: ABNORMAL
SODIUM SERPL-SCNC: 139 MMOL/L (ref 136–145)
SP GR UR STRIP: 1.02 (ref 1–1.03)
SQUAMOUS #/AREA URNS HPF: ABNORMAL /HPF
TRI-PHOS CRY URNS QL MICRO: ABNORMAL /HPF
UNIDENT CRYS URNS QL MICRO: ABNORMAL /HPF
UROBILINOGEN UR QL STRIP: ABNORMAL
WBC # UR STRIP: ABNORMAL /HPF
WBC NRBC COR # BLD: 12.39 10*3/MM3 (ref 3.4–10.8)

## 2022-10-13 PROCEDURE — 80053 COMPREHEN METABOLIC PANEL: CPT | Performed by: EMERGENCY MEDICINE

## 2022-10-13 PROCEDURE — 87077 CULTURE AEROBIC IDENTIFY: CPT | Performed by: EMERGENCY MEDICINE

## 2022-10-13 PROCEDURE — 85610 PROTHROMBIN TIME: CPT | Performed by: EMERGENCY MEDICINE

## 2022-10-13 PROCEDURE — 87086 URINE CULTURE/COLONY COUNT: CPT | Performed by: EMERGENCY MEDICINE

## 2022-10-13 PROCEDURE — 87186 SC STD MICRODIL/AGAR DIL: CPT | Performed by: EMERGENCY MEDICINE

## 2022-10-13 PROCEDURE — 51798 US URINE CAPACITY MEASURE: CPT

## 2022-10-13 PROCEDURE — 81001 URINALYSIS AUTO W/SCOPE: CPT | Performed by: EMERGENCY MEDICINE

## 2022-10-13 PROCEDURE — 85025 COMPLETE CBC W/AUTO DIFF WBC: CPT | Performed by: EMERGENCY MEDICINE

## 2022-10-13 PROCEDURE — 99284 EMERGENCY DEPT VISIT MOD MDM: CPT

## 2022-10-13 RX ORDER — SODIUM CHLORIDE 0.9 % (FLUSH) 0.9 %
10 SYRINGE (ML) INJECTION AS NEEDED
Status: DISCONTINUED | OUTPATIENT
Start: 2022-10-13 | End: 2022-10-13 | Stop reason: HOSPADM

## 2022-10-13 NOTE — DISCHARGE INSTRUCTIONS
Mr. Menon does have hematuria today, however he does not have urinary retention.  His bladder scan did not show an abnormally large volume of urine.  In this case his primary care provider needs to weigh the risk/benefits of holding Eliquis.  If he does begin to have urinary retention a Irby catheter should be placed and the patient should follow-up with urology.  Return to the emergency department as needed for worsening of symptoms.

## 2022-10-13 NOTE — ED NOTES
Attempted to call Nursing Rehab center for report. Unsuccessfully did so. Was put on hold for 10 minutes.

## 2022-10-13 NOTE — ED PROVIDER NOTES
Time: 8:13 AM EDT  Arrived by: ambulance  Chief Complaint: Bleeding and Bruising   History provided by: Patient  History is limited by: Dementia     History of Present Illness:  Patient is a 82 y.o. year old male who presents to the emergency department with bleeding and bruising.    Pt chart states the Pt is sent from a nursing facility due to bleeding and bruising, but pt states he is not sure why he is at the ED.  Pt denies having pain symptoms.  Pt notes his son should be called for information.      History provided by:  Patient  History limited by:  Dementia   used: No        Similar Symptoms Previously: No  Recently seen: Yes      Patient Care Team  Primary Care Provider: Frank Llanes MD    Past Medical History:     No Known Allergies  Past Medical History:   Diagnosis Date   • Acute renal failure with pathological lesion in kidney ([facility])    • Alzheimer's dementia with behavioral disturbance ([facility])    • Anemia, vitamin B12 deficiency    • Anxiety    • Anxiety disorder due to known physiological condition    • Arthritis    • Benign neoplasm of prostate    • Cannot walk    • CHF (congestive heart failure) ([facility])    • Constipation    • Coronary atherosclerosis of native coronary artery    • CVA (cerebral vascular accident) ([facility])    • 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 (CVA)    • Edema    • Essential hypertension, benign    • Gastroesophageal reflux disease without esophagitis    • GERD (gastroesophageal reflux disease)    • High blood pressure    • Hirsutism    • HTN (hypertension)    • Hypercalcemia    • Hyperkalemia    • Hyperlipemia    • Hyperlipidemia    • Hypokalemia    • Ingrowing toenail 09/21/2018   • Left foot pain 09/21/2018   • Nutritional deficiency    • Osteoarthritis of right knee 08/22/2016   • Pneumonia    • Presence of right artificial knee joint    • Primary localized osteoarthrosis    • PVD (peripheral vascular disease) ([facility])    • 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)    • Right foot pain 09/21/2018   • Senile dementia, uncomplicated (HCC)    • Severe sinus bradycardia    • Shock, septic (HCC)    • Sleep apnea    • Thrombosis of left femoral vein (HCC)    • Tinea unguium 09/21/2018   • Vitamin B12 deficiency anemia      Past Surgical History:   Procedure Laterality Date   • ENDOSCOPY W/ PEG TUBE PLACEMENT N/A 6/8/2022    Procedure: ESOPHAGOGASTRODUODENOSCOPY WITH PERCUTANEOUS ENDOSCOPIC GASTROSTOMY TUBE INSERTION WITH ANESTHESIA;  Surgeon: Yanelis Mistry MD;  Location: [facility] ENDOSCOPY;  Service: Gastroenterology;  Laterality: N/A;  PEG TUBE PLACEMENT   • ENDOSCOPY W/ PEG TUBE PLACEMENT N/A 7/3/2022    Procedure: ESOPHAGOGASTRODUODENOSCOPY WITH PERCUTANEOUS ENDOSCOPIC GASTROSTOMY TUBE INSERTION WITH ANESTHESIA;  Surgeon: Bam Melo MD;  Location: [facility] ENDOSCOPY;  Service: Gastroenterology;  Laterality: N/A;  PEG TUBE PLACEMENT     Family History   Family history unknown: Yes       Home Medications:  Prior to Admission medications    Medication Sig Start Date End Date Taking? Authorizing Provider   acetaminophen (TYLENOL) 325 MG tablet Take 650 mg by mouth 3 (Three) Times a Day.    ProviderNanci MD   apixaban (ELIQUIS) 5 MG tablet tablet Take 1 tablet by mouth Every 12 (Twelve) Hours. Indications: DVT/PE (active thrombosis)  Patient taking differently: Take 5 mg by mouth 2 (Two) Times a Day. Indications: DVT/PE (active thrombosis) 3/10/22   Mj Butler MD   aspirin 81 MG chewable tablet Chew 1 tablet Daily. 4/19/22   Aranza Herzog MD   busPIRone (BUSPAR) 10 MG tablet Take 10 mg by mouth 2 (Two) Times a Day.    ProviderNanci MD   Diclofenac Sodium (VOLTAREN) 1 % gel gel Apply 4 g topically to the appropriate area as directed 2 (Two) Times a Day As Needed (Bilateral hands and shoulders).    ProviderNanci MD   docusate sodium (COLACE) 250 MG capsule Take 250 mg by mouth Daily.  Pt takes along with 2- 100 mg caps for a total of 250 mg    Nanci Shafer MD   Dyclonine-Glycerin (Cepacol Sore Throat Spray) 0.1-33 % liquid Apply 2 sprays to the mouth or throat 4 (Four) Times a Day As Needed.    Nanci Shafer MD   famotidine (PEPCID) 20 MG tablet Take 20 mg by mouth Every Night.    Nanci Shafer MD   furosemide (Lasix) 20 MG tablet Take 1 tablet by mouth Daily. 3/3/22   Eric Rivas MD   ipratropium-albuterol (DUO-NEB) 0.5-2.5 mg/3 ml nebulizer Take 3 mL by nebulization Every 4 (Four) Hours As Needed for Wheezing.    Nanci Shafer MD   magnesium hydroxide (MILK OF MAGNESIA) 400 MG/5ML suspension Take 30 mL by mouth Daily As Needed for Constipation.    Nanci Shafer MD   melatonin 5 MG tablet tablet Take 5 mg by mouth Every Night.    Nanci Shafer MD   mineral oil-hydrophilic petrolatum (AQUAPHOR) ointment Apply 1 application topically to the appropriate area as directed Daily.    Nanci Shafer MD   trolamine salicylate (ASPERCREME) 10 % cream Apply 1 application topically to the appropriate area as directed 2 (Two) Times a Day As Needed for Muscle / Joint Pain.    Provider, Historical, MD   witch hazel-glycerin (TUCKS) pad Apply 1 pad topically to the appropriate area as directed As Needed for Irritation or Hemorrhoids.    Nanci Shafer MD        Social History:   Social History     Tobacco Use   • Smoking status: Never   • Smokeless tobacco: Never   Vaping Use   • Vaping Use: Never used   Substance Use Topics   • Alcohol use: No   • Drug use: Never     Recent travel: not applicable     Review of Systems:  Review of Systems   Unable to perform ROS: Dementia        Physical Exam:  /59   Pulse 91   Temp 98.9 °F (37.2 °C) (Oral)   Resp 16   Wt 66.1 kg (145 lb 11.6 oz)   SpO2 96%   BMI 23.52 kg/m²     Physical Exam  Vitals and nursing note reviewed.   Constitutional:       General: He is awake.      Appearance: Normal  appearance.   HENT:      Head: Normocephalic and atraumatic.      Nose: Nose normal.      Mouth/Throat:      Mouth: Mucous membranes are moist.   Eyes:      Extraocular Movements: Extraocular movements intact.      Pupils: Pupils are equal, round, and reactive to light.   Cardiovascular:      Rate and Rhythm: Normal rate and regular rhythm.      Heart sounds: Normal heart sounds.   Pulmonary:      Effort: Pulmonary effort is normal. No respiratory distress.      Breath sounds: Normal breath sounds. No wheezing, rhonchi or rales.   Abdominal:      General: Bowel sounds are normal.      Palpations: Abdomen is soft.      Tenderness: There is no abdominal tenderness. There is no guarding or rebound.      Comments: No rigidity   Musculoskeletal:         General: No tenderness. Normal range of motion.      Cervical back: Normal range of motion and neck supple.      Comments: No active bleeding or bruising on arms, legs, chest, pelvis, or abdomen.    Skin:     General: Skin is warm and dry.      Coloration: Skin is not jaundiced.   Neurological:      General: No focal deficit present.      Mental Status: He is alert. Mental status is at baseline. He is confused.      Sensory: Sensation is intact.      Motor: Motor function is intact.      Coordination: Coordination is intact.      Comments: Pt is difficult to understand and asks repeated questions.   Psychiatric:         Attention and Perception: Attention and perception normal.         Mood and Affect: Mood and affect normal.         Speech: Speech normal.         Behavior: Behavior normal.         Judgment: Judgment normal.                Medications in the Emergency Department:  Medications   sodium chloride 0.9 % flush 10 mL (has no administration in time range)        Labs  Lab Results (last 24 hours)     Procedure Component Value Units Date/Time    CBC & Differential [[id number]]  (Abnormal) Collected: 10/13/22 1007    Specimen: Blood Updated: 10/13/22 1016     Narrative:      The following orders were created for panel order CBC & Differential.  Procedure                               Abnormality         Status                     ---------                               -----------         ------                     CBC Auto Differential[[id number]]        Abnormal            Final result                 Please view results for these tests on the individual orders.    Comprehensive Metabolic Panel [[id number]]  (Abnormal) Collected: 10/13/22 1007    Specimen: Blood Updated: 10/13/22 1034     Glucose 101 mg/dL      BUN 46 mg/dL      Creatinine 0.99 mg/dL      Sodium 139 mmol/L      Potassium 5.7 mmol/L      Comment: Slight hemolysis detected by analyzer. Results may be affected.        Chloride 104 mmol/L      CO2 24.9 mmol/L      Calcium 9.8 mg/dL      Total Protein 8.0 g/dL      Albumin 2.80 g/dL      ALT (SGPT) 15 U/L      AST (SGOT) 27 U/L      Comment: Slight hemolysis detected by analyzer. Results may be affected.        Alkaline Phosphatase 97 U/L      Total Bilirubin 1.0 mg/dL      Globulin 5.2 gm/dL      A/G Ratio 0.5 g/dL      BUN/Creatinine Ratio 46.5     Anion Gap 10.1 mmol/L      eGFR 76.5 mL/min/1.73      Comment: National Kidney Foundation and American Society of Nephrology (ASN) Task Force recommended calculation based on the Chronic Kidney Disease Epidemiology Collaboration (CKD-EPI) equation refit without adjustment for race.       Narrative:      GFR Normal >60  Chronic Kidney Disease <60  Kidney Failure <15      Protime-INR [[id number]]  (Normal) Collected: 10/13/22 1007    Specimen: Blood Updated: 10/13/22 1027     Protime 14.4 Seconds      INR 1.10    Narrative:      Suggested Therapeutic Ranges For Oral Anticoagulant Therapy:  Level of Therapy                      INR Target Range  Standard Dose                            2.0-3.0  High Dose                                2.5-3.5  Patients not receiving anticoagulant  Therapy Normal Range                      0.86-1.15    CBC Auto Differential [[id number]]  (Abnormal) Collected: 10/13/22 1007    Specimen: Blood Updated: 10/13/22 1016     WBC 12.39 10*3/mm3      RBC 3.28 10*6/mm3      Hemoglobin 8.7 g/dL      Hematocrit 26.4 %      MCV 80.5 fL      MCH 26.5 pg      MCHC 33.0 g/dL      RDW 16.6 %      RDW-SD 47.9 fl      MPV 12.5 fL      Platelets 244 10*3/mm3      Neutrophil % 71.8 %      Lymphocyte % 17.0 %      Monocyte % 9.3 %      Eosinophil % 1.0 %      Basophil % 0.6 %      Immature Grans % 0.3 %      Neutrophils, Absolute 8.90 10*3/mm3      Lymphocytes, Absolute 2.11 10*3/mm3      Monocytes, Absolute 1.15 10*3/mm3      Eosinophils, Absolute 0.12 10*3/mm3      Basophils, Absolute 0.07 10*3/mm3      Immature Grans, Absolute 0.04 10*3/mm3      nRBC 0.0 /100 WBC     Urinalysis With Culture If Indicated - Urine, Clean Catch [[id number]]  (Abnormal) Collected: 10/13/22 1148    Specimen: Urine, Clean Catch Updated: 10/13/22 1228     Color, UA Red     Appearance, UA Turbid     pH, UA 8.5     Specific Gravity, UA 1.016     Glucose, UA Negative     Ketones, UA Negative     Bilirubin, UA Negative     Blood, UA Large (3+)     Protein, UA >=300 mg/dL (3+)     Leuk Esterase, UA Large (3+)     Nitrite, UA Negative     Urobilinogen, UA 1.0 E.U./dL    Narrative:      In absence of clinical symptoms, the presence of pyuria, bacteria, and/or nitrites on the urinalysis result does not correlate with infection.    Urinalysis, Microscopic Only - Urine, Clean Catch [[id number]]  (Abnormal) Collected: 10/13/22 1148    Specimen: Urine, Clean Catch Updated: 10/13/22 1250     RBC, UA 31-50 /HPF      WBC, UA 31-50 /HPF      Bacteria, UA 2+ /HPF      Squamous Epithelial Cells, UA 0-2 /HPF      Hyaline Casts, UA None Seen /LPF      Triple Phosphate Crystals, UA Small/1+ /HPF      Amorphous Urate Crystals, UA Small/1+ /HPF      Methodology Manual Light Microscopy    Urine Culture - Urine, Urine, Clean Catch [[id number]] Collected: 10/13/22 1149     Specimen: Urine, Clean Catch Updated: 10/13/22 1250           Imaging:  No Radiology Exams Resulted Within Past 24 Hours    Procedures:  Procedures    Progress                            Medical Decision Making:  MDM  Number of Diagnoses or Management Options     Amount and/or Complexity of Data Reviewed  Clinical lab tests: reviewed  Decide to obtain previous medical records or to obtain history from someone other than the patient: yes  Independent visualization of images, tracings, or specimens: yes    Risk of Complications, Morbidity, and/or Mortality  Presenting problems: moderate  Management options: low         Final diagnoses:   Gross hematuria        Disposition:  ED Disposition     ED Disposition   Discharge    Condition   Stable    Comment   --             This medical record created using voice recognition software.        Documentation assistance provided by Mena Trejo acting as scribe for No att. providers found. Information recorded by the scribe was done at my direction and has been verified and validated by me.          Mena Trejo  10/13/22 [id number]       Fawad Junior MD  10/13/22 [id number]

## 2022-10-15 LAB — BACTERIA SPEC AEROBE CULT: NORMAL

## 2022-10-22 ENCOUNTER — APPOINTMENT (OUTPATIENT)
Dept: GENERAL RADIOLOGY | Facility: HOSPITAL | Age: 82
End: 2022-10-22

## 2022-10-22 ENCOUNTER — HOSPITAL ENCOUNTER (INPATIENT)
Facility: HOSPITAL | Age: 82
LOS: 4 days | Discharge: NURSING, REHAB OR LONG-TERM CARE | End: 2022-10-27
Attending: EMERGENCY MEDICINE | Admitting: INTERNAL MEDICINE

## 2022-10-22 DIAGNOSIS — A41.9 SEVERE SEPSIS: Primary | ICD-10-CM

## 2022-10-22 DIAGNOSIS — R65.20 SEVERE SEPSIS: Primary | ICD-10-CM

## 2022-10-22 DIAGNOSIS — R31.9 URINARY TRACT INFECTION WITH HEMATURIA, SITE UNSPECIFIED: ICD-10-CM

## 2022-10-22 DIAGNOSIS — N39.0 URINARY TRACT INFECTION WITH HEMATURIA, SITE UNSPECIFIED: ICD-10-CM

## 2022-10-22 DIAGNOSIS — Z78.9 DECREASED ACTIVITIES OF DAILY LIVING (ADL): ICD-10-CM

## 2022-10-22 DIAGNOSIS — R26.2 DIFFICULTY WALKING: ICD-10-CM

## 2022-10-22 DIAGNOSIS — K94.20 COMPLICATION OF FEEDING TUBE: ICD-10-CM

## 2022-10-22 DIAGNOSIS — R13.12 DYSPHAGIA, OROPHARYNGEAL: ICD-10-CM

## 2022-10-22 LAB
ALBUMIN SERPL-MCNC: 3.7 G/DL (ref 3.5–5.2)
ALBUMIN/GLOB SERPL: 0.6 G/DL
ALP SERPL-CCNC: 106 U/L (ref 39–117)
ALT SERPL W P-5'-P-CCNC: 53 U/L (ref 1–41)
ANION GAP SERPL CALCULATED.3IONS-SCNC: 13.4 MMOL/L (ref 5–15)
AST SERPL-CCNC: 31 U/L (ref 1–40)
BASOPHILS # BLD AUTO: 0.09 10*3/MM3 (ref 0–0.2)
BASOPHILS NFR BLD AUTO: 0.4 % (ref 0–1.5)
BILIRUB SERPL-MCNC: 1.1 MG/DL (ref 0–1.2)
BUN SERPL-MCNC: 92 MG/DL (ref 8–23)
BUN/CREAT SERPL: 59 (ref 7–25)
CALCIUM SPEC-SCNC: 10.1 MG/DL (ref 8.6–10.5)
CHLORIDE SERPL-SCNC: 124 MMOL/L (ref 98–107)
CO2 SERPL-SCNC: 23.6 MMOL/L (ref 22–29)
CREAT SERPL-MCNC: 1.56 MG/DL (ref 0.76–1.27)
D-LACTATE SERPL-SCNC: 2.4 MMOL/L (ref 0.5–2)
DEPRECATED RDW RBC AUTO: 56.3 FL (ref 37–54)
EGFRCR SERPLBLD CKD-EPI 2021: 44.3 ML/MIN/1.73
EOSINOPHIL # BLD AUTO: 0 10*3/MM3 (ref 0–0.4)
EOSINOPHIL NFR BLD AUTO: 0 % (ref 0.3–6.2)
ERYTHROCYTE [DISTWIDTH] IN BLOOD BY AUTOMATED COUNT: 19.4 % (ref 12.3–15.4)
GLOBULIN UR ELPH-MCNC: 5.8 GM/DL
GLUCOSE SERPL-MCNC: 140 MG/DL (ref 65–99)
HCT VFR BLD AUTO: 36.6 % (ref 37.5–51)
HGB BLD-MCNC: 11.7 G/DL (ref 13–17.7)
HOLD SPECIMEN: NORMAL
HOLD SPECIMEN: NORMAL
IMM GRANULOCYTES # BLD AUTO: 0.13 10*3/MM3 (ref 0–0.05)
IMM GRANULOCYTES NFR BLD AUTO: 0.6 % (ref 0–0.5)
LYMPHOCYTES # BLD AUTO: 2.71 10*3/MM3 (ref 0.7–3.1)
LYMPHOCYTES NFR BLD AUTO: 12 % (ref 19.6–45.3)
MCH RBC QN AUTO: 27.1 PG (ref 26.6–33)
MCHC RBC AUTO-ENTMCNC: 32 G/DL (ref 31.5–35.7)
MCV RBC AUTO: 84.7 FL (ref 79–97)
MONOCYTES # BLD AUTO: 1.82 10*3/MM3 (ref 0.1–0.9)
MONOCYTES NFR BLD AUTO: 8.1 % (ref 5–12)
NEUTROPHILS NFR BLD AUTO: 17.8 10*3/MM3 (ref 1.7–7)
NEUTROPHILS NFR BLD AUTO: 78.9 % (ref 42.7–76)
NRBC BLD AUTO-RTO: 0 /100 WBC (ref 0–0.2)
PLATELET # BLD AUTO: 611 10*3/MM3 (ref 140–450)
PMV BLD AUTO: 11.3 FL (ref 6–12)
POTASSIUM SERPL-SCNC: 4.9 MMOL/L (ref 3.5–5.2)
PROT SERPL-MCNC: 9.5 G/DL (ref 6–8.5)
RBC # BLD AUTO: 4.32 10*6/MM3 (ref 4.14–5.8)
SODIUM SERPL-SCNC: 161 MMOL/L (ref 136–145)
WBC NRBC COR # BLD: 22.55 10*3/MM3 (ref 3.4–10.8)
WHOLE BLOOD HOLD COAG: NORMAL
WHOLE BLOOD HOLD SPECIMEN: NORMAL

## 2022-10-22 PROCEDURE — 25010000002 PIPERACILLIN SOD-TAZOBACTAM PER 1 G: Performed by: EMERGENCY MEDICINE

## 2022-10-22 PROCEDURE — 83605 ASSAY OF LACTIC ACID: CPT | Performed by: EMERGENCY MEDICINE

## 2022-10-22 PROCEDURE — 85025 COMPLETE CBC W/AUTO DIFF WBC: CPT | Performed by: EMERGENCY MEDICINE

## 2022-10-22 PROCEDURE — 87040 BLOOD CULTURE FOR BACTERIA: CPT | Performed by: EMERGENCY MEDICINE

## 2022-10-22 PROCEDURE — 36415 COLL VENOUS BLD VENIPUNCTURE: CPT

## 2022-10-22 PROCEDURE — 99285 EMERGENCY DEPT VISIT HI MDM: CPT

## 2022-10-22 PROCEDURE — 80053 COMPREHEN METABOLIC PANEL: CPT | Performed by: EMERGENCY MEDICINE

## 2022-10-22 PROCEDURE — 71045 X-RAY EXAM CHEST 1 VIEW: CPT

## 2022-10-22 RX ORDER — LIDOCAINE HYDROCHLORIDE 20 MG/ML
JELLY TOPICAL ONCE
Status: COMPLETED | OUTPATIENT
Start: 2022-10-22 | End: 2022-10-22

## 2022-10-22 RX ORDER — SODIUM CHLORIDE 0.9 % (FLUSH) 0.9 %
10 SYRINGE (ML) INJECTION AS NEEDED
Status: DISCONTINUED | OUTPATIENT
Start: 2022-10-22 | End: 2022-10-23

## 2022-10-22 RX ADMIN — LIDOCAINE HYDROCHLORIDE: 20 JELLY TOPICAL at 23:31

## 2022-10-22 RX ADMIN — SODIUM CHLORIDE 1000 ML: 9 INJECTION, SOLUTION INTRAVENOUS at 23:09

## 2022-10-22 RX ADMIN — TAZOBACTAM SODIUM AND PIPERACILLIN SODIUM 3.38 G: 375; 3 INJECTION, SOLUTION INTRAVENOUS at 23:09

## 2022-10-23 LAB
AMORPH URATE CRY URNS QL MICRO: ABNORMAL /HPF
ANION GAP SERPL CALCULATED.3IONS-SCNC: 11.3 MMOL/L (ref 5–15)
ANION GAP SERPL CALCULATED.3IONS-SCNC: 11.5 MMOL/L (ref 5–15)
ANION GAP SERPL CALCULATED.3IONS-SCNC: 11.7 MMOL/L (ref 5–15)
APTT PPP: 23.2 SECONDS (ref 78–95.9)
BACTERIA UR QL AUTO: ABNORMAL /HPF
BASOPHILS # BLD AUTO: 0.06 10*3/MM3 (ref 0–0.2)
BASOPHILS NFR BLD AUTO: 0.3 % (ref 0–1.5)
BILIRUB UR QL STRIP: ABNORMAL
BUN SERPL-MCNC: 77 MG/DL (ref 8–23)
BUN SERPL-MCNC: 79 MG/DL (ref 8–23)
BUN SERPL-MCNC: 83 MG/DL (ref 8–23)
BUN/CREAT SERPL: 56.8 (ref 7–25)
BUN/CREAT SERPL: 61.2 (ref 7–25)
BUN/CREAT SERPL: 65.8 (ref 7–25)
CALCIUM SPEC-SCNC: 9.4 MG/DL (ref 8.6–10.5)
CALCIUM SPEC-SCNC: 9.4 MG/DL (ref 8.6–10.5)
CALCIUM SPEC-SCNC: 9.7 MG/DL (ref 8.6–10.5)
CHLORIDE SERPL-SCNC: 126 MMOL/L (ref 98–107)
CLARITY UR: ABNORMAL
CO2 SERPL-SCNC: 22.3 MMOL/L (ref 22–29)
CO2 SERPL-SCNC: 22.5 MMOL/L (ref 22–29)
CO2 SERPL-SCNC: 22.7 MMOL/L (ref 22–29)
COLOR UR: ABNORMAL
CREAT SERPL-MCNC: 1.17 MG/DL (ref 0.76–1.27)
CREAT SERPL-MCNC: 1.29 MG/DL (ref 0.76–1.27)
CREAT SERPL-MCNC: 1.46 MG/DL (ref 0.76–1.27)
D-LACTATE SERPL-SCNC: 1.7 MMOL/L (ref 0.5–2)
DEPRECATED RDW RBC AUTO: 57.7 FL (ref 37–54)
EGFRCR SERPLBLD CKD-EPI 2021: 48 ML/MIN/1.73
EGFRCR SERPLBLD CKD-EPI 2021: 55.7 ML/MIN/1.73
EGFRCR SERPLBLD CKD-EPI 2021: 62.6 ML/MIN/1.73
EOSINOPHIL # BLD AUTO: 0 10*3/MM3 (ref 0–0.4)
EOSINOPHIL NFR BLD AUTO: 0 % (ref 0.3–6.2)
ERYTHROCYTE [DISTWIDTH] IN BLOOD BY AUTOMATED COUNT: 18.7 % (ref 12.3–15.4)
GLUCOSE BLDC GLUCOMTR-MCNC: 103 MG/DL (ref 70–99)
GLUCOSE BLDC GLUCOMTR-MCNC: 130 MG/DL (ref 70–99)
GLUCOSE BLDC GLUCOMTR-MCNC: 131 MG/DL (ref 70–99)
GLUCOSE SERPL-MCNC: 126 MG/DL (ref 65–99)
GLUCOSE SERPL-MCNC: 142 MG/DL (ref 65–99)
GLUCOSE SERPL-MCNC: 156 MG/DL (ref 65–99)
GLUCOSE UR STRIP-MCNC: ABNORMAL MG/DL
HCT VFR BLD AUTO: 30.9 % (ref 37.5–51)
HGB BLD-MCNC: 9.7 G/DL (ref 13–17.7)
HGB UR QL STRIP.AUTO: ABNORMAL
HYALINE CASTS UR QL AUTO: ABNORMAL /LPF
IMM GRANULOCYTES # BLD AUTO: 0.11 10*3/MM3 (ref 0–0.05)
IMM GRANULOCYTES NFR BLD AUTO: 0.6 % (ref 0–0.5)
INR PPP: 1.29 (ref 0.86–1.15)
KETONES UR QL STRIP: ABNORMAL
LEUKOCYTE ESTERASE UR QL STRIP.AUTO: ABNORMAL
LYMPHOCYTES # BLD AUTO: 2.53 10*3/MM3 (ref 0.7–3.1)
LYMPHOCYTES NFR BLD AUTO: 13.1 % (ref 19.6–45.3)
MAGNESIUM SERPL-MCNC: 2.7 MG/DL (ref 1.6–2.4)
MCH RBC QN AUTO: 27 PG (ref 26.6–33)
MCHC RBC AUTO-ENTMCNC: 31.4 G/DL (ref 31.5–35.7)
MCV RBC AUTO: 86.1 FL (ref 79–97)
MONOCYTES # BLD AUTO: 1.41 10*3/MM3 (ref 0.1–0.9)
MONOCYTES NFR BLD AUTO: 7.3 % (ref 5–12)
NEUTROPHILS NFR BLD AUTO: 15.19 10*3/MM3 (ref 1.7–7)
NEUTROPHILS NFR BLD AUTO: 78.7 % (ref 42.7–76)
NITRITE UR QL STRIP: ABNORMAL
NRBC BLD AUTO-RTO: 0 /100 WBC (ref 0–0.2)
PH UR STRIP.AUTO: ABNORMAL [PH]
PHOSPHATE SERPL-MCNC: 4.3 MG/DL (ref 2.5–4.5)
PLATELET # BLD AUTO: 482 10*3/MM3 (ref 140–450)
PMV BLD AUTO: 10.8 FL (ref 6–12)
POTASSIUM SERPL-SCNC: 4 MMOL/L (ref 3.5–5.2)
PROT UR QL STRIP: ABNORMAL
PROTHROMBIN TIME: 16.3 SECONDS (ref 11.8–14.9)
RBC # BLD AUTO: 3.59 10*6/MM3 (ref 4.14–5.8)
RBC # UR STRIP: ABNORMAL /HPF
REF LAB TEST METHOD: ABNORMAL
SODIUM SERPL-SCNC: 160 MMOL/L (ref 136–145)
SP GR UR STRIP: 1.02 (ref 1–1.03)
SQUAMOUS #/AREA URNS HPF: ABNORMAL /HPF
TRI-PHOS CRY URNS QL MICRO: ABNORMAL /HPF
UROBILINOGEN UR QL STRIP: ABNORMAL
VANCOMYCIN SERPL-MCNC: 13.7 MCG/ML (ref 5–40)
WBC # UR STRIP: ABNORMAL /HPF
WBC NRBC COR # BLD: 19.3 10*3/MM3 (ref 3.4–10.8)

## 2022-10-23 PROCEDURE — 25010000002 PIPERACILLIN SOD-TAZOBACTAM PER 1 G: Performed by: HOSPITALIST

## 2022-10-23 PROCEDURE — 87077 CULTURE AEROBIC IDENTIFY: CPT | Performed by: EMERGENCY MEDICINE

## 2022-10-23 PROCEDURE — 80048 BASIC METABOLIC PNL TOTAL CA: CPT | Performed by: HOSPITALIST

## 2022-10-23 PROCEDURE — 25010000002 ENOXAPARIN PER 10 MG: Performed by: INTERNAL MEDICINE

## 2022-10-23 PROCEDURE — 80202 ASSAY OF VANCOMYCIN: CPT | Performed by: HOSPITALIST

## 2022-10-23 PROCEDURE — 85025 COMPLETE CBC W/AUTO DIFF WBC: CPT | Performed by: HOSPITALIST

## 2022-10-23 PROCEDURE — 99223 1ST HOSP IP/OBS HIGH 75: CPT | Performed by: INTERNAL MEDICINE

## 2022-10-23 PROCEDURE — 25010000002 VANCOMYCIN 5 G RECONSTITUTED SOLUTION: Performed by: HOSPITALIST

## 2022-10-23 PROCEDURE — 99291 CRITICAL CARE FIRST HOUR: CPT | Performed by: HOSPITALIST

## 2022-10-23 PROCEDURE — 85730 THROMBOPLASTIN TIME PARTIAL: CPT | Performed by: HOSPITALIST

## 2022-10-23 PROCEDURE — 99222 1ST HOSP IP/OBS MODERATE 55: CPT | Performed by: INTERNAL MEDICINE

## 2022-10-23 PROCEDURE — 83735 ASSAY OF MAGNESIUM: CPT | Performed by: HOSPITALIST

## 2022-10-23 PROCEDURE — 25010000002 CEFTRIAXONE PER 250 MG: Performed by: INTERNAL MEDICINE

## 2022-10-23 PROCEDURE — 36415 COLL VENOUS BLD VENIPUNCTURE: CPT | Performed by: HOSPITALIST

## 2022-10-23 PROCEDURE — 80048 BASIC METABOLIC PNL TOTAL CA: CPT | Performed by: INTERNAL MEDICINE

## 2022-10-23 PROCEDURE — 84100 ASSAY OF PHOSPHORUS: CPT | Performed by: HOSPITALIST

## 2022-10-23 PROCEDURE — 81001 URINALYSIS AUTO W/SCOPE: CPT | Performed by: EMERGENCY MEDICINE

## 2022-10-23 PROCEDURE — 87186 SC STD MICRODIL/AGAR DIL: CPT | Performed by: EMERGENCY MEDICINE

## 2022-10-23 PROCEDURE — 82962 GLUCOSE BLOOD TEST: CPT

## 2022-10-23 PROCEDURE — 85610 PROTHROMBIN TIME: CPT | Performed by: HOSPITALIST

## 2022-10-23 PROCEDURE — 87086 URINE CULTURE/COLONY COUNT: CPT | Performed by: EMERGENCY MEDICINE

## 2022-10-23 PROCEDURE — 25010000002 VANCOMYCIN 5 G RECONSTITUTED SOLUTION: Performed by: EMERGENCY MEDICINE

## 2022-10-23 PROCEDURE — 83605 ASSAY OF LACTIC ACID: CPT | Performed by: EMERGENCY MEDICINE

## 2022-10-23 RX ORDER — NICOTINE POLACRILEX 4 MG
15 LOZENGE BUCCAL
Status: DISCONTINUED | OUTPATIENT
Start: 2022-10-23 | End: 2022-10-27 | Stop reason: HOSPADM

## 2022-10-23 RX ORDER — ONDANSETRON 2 MG/ML
4 INJECTION INTRAMUSCULAR; INTRAVENOUS EVERY 6 HOURS PRN
Status: DISCONTINUED | OUTPATIENT
Start: 2022-10-23 | End: 2022-10-27

## 2022-10-23 RX ORDER — CEFTRIAXONE SODIUM 1 G/50ML
1 INJECTION, SOLUTION INTRAVENOUS EVERY 24 HOURS
Status: DISCONTINUED | OUTPATIENT
Start: 2022-10-23 | End: 2022-10-27 | Stop reason: HOSPADM

## 2022-10-23 RX ORDER — FAMOTIDINE 20 MG/1
20 TABLET, FILM COATED ORAL NIGHTLY
Status: DISCONTINUED | OUTPATIENT
Start: 2022-10-23 | End: 2022-10-23

## 2022-10-23 RX ORDER — PANTOPRAZOLE SODIUM 40 MG/10ML
40 INJECTION, POWDER, LYOPHILIZED, FOR SOLUTION INTRAVENOUS
Status: DISCONTINUED | OUTPATIENT
Start: 2022-10-24 | End: 2022-10-27 | Stop reason: HOSPADM

## 2022-10-23 RX ORDER — ENOXAPARIN SODIUM 100 MG/ML
60 INJECTION SUBCUTANEOUS EVERY 12 HOURS
Status: DISCONTINUED | OUTPATIENT
Start: 2022-10-23 | End: 2022-10-25

## 2022-10-23 RX ORDER — SODIUM CHLORIDE 9 MG/ML
40 INJECTION, SOLUTION INTRAVENOUS AS NEEDED
Status: DISCONTINUED | OUTPATIENT
Start: 2022-10-23 | End: 2022-10-27 | Stop reason: HOSPADM

## 2022-10-23 RX ORDER — ACETAMINOPHEN 650 MG/1
650 SUPPOSITORY RECTAL EVERY 4 HOURS PRN
Status: DISCONTINUED | OUTPATIENT
Start: 2022-10-23 | End: 2022-10-27

## 2022-10-23 RX ORDER — BUSPIRONE HYDROCHLORIDE 10 MG/1
10 TABLET ORAL 2 TIMES DAILY
Status: DISCONTINUED | OUTPATIENT
Start: 2022-10-23 | End: 2022-10-27

## 2022-10-23 RX ORDER — BISACODYL 5 MG/1
5 TABLET, DELAYED RELEASE ORAL DAILY PRN
Status: DISCONTINUED | OUTPATIENT
Start: 2022-10-23 | End: 2022-10-27

## 2022-10-23 RX ORDER — DOCUSATE SODIUM 50 MG/5 ML
250 LIQUID (ML) ORAL DAILY
COMMUNITY

## 2022-10-23 RX ORDER — ACETAMINOPHEN 325 MG/1
650 TABLET ORAL EVERY 4 HOURS PRN
Status: DISCONTINUED | OUTPATIENT
Start: 2022-10-23 | End: 2022-10-27

## 2022-10-23 RX ORDER — BISACODYL 10 MG
10 SUPPOSITORY, RECTAL RECTAL DAILY PRN
Status: DISCONTINUED | OUTPATIENT
Start: 2022-10-23 | End: 2022-10-27

## 2022-10-23 RX ORDER — NITROGLYCERIN 0.4 MG/1
0.4 TABLET SUBLINGUAL
Status: DISCONTINUED | OUTPATIENT
Start: 2022-10-23 | End: 2022-10-27 | Stop reason: HOSPADM

## 2022-10-23 RX ORDER — SODIUM CHLORIDE 0.9 % (FLUSH) 0.9 %
10 SYRINGE (ML) INJECTION EVERY 12 HOURS SCHEDULED
Status: DISCONTINUED | OUTPATIENT
Start: 2022-10-23 | End: 2022-10-27 | Stop reason: HOSPADM

## 2022-10-23 RX ORDER — HEPARIN SODIUM 5000 [USP'U]/ML
5000 INJECTION, SOLUTION INTRAVENOUS; SUBCUTANEOUS EVERY 8 HOURS SCHEDULED
Status: DISCONTINUED | OUTPATIENT
Start: 2022-10-23 | End: 2022-10-23

## 2022-10-23 RX ORDER — CYANOCOBALAMIN 1000 UG/ML
1000 INJECTION, SOLUTION INTRAMUSCULAR; SUBCUTANEOUS
COMMUNITY

## 2022-10-23 RX ORDER — ACETAMINOPHEN 160 MG/5ML
650 SOLUTION ORAL EVERY 4 HOURS PRN
Status: DISCONTINUED | OUTPATIENT
Start: 2022-10-23 | End: 2022-10-27

## 2022-10-23 RX ORDER — IPRATROPIUM BROMIDE AND ALBUTEROL SULFATE 2.5; .5 MG/3ML; MG/3ML
3 SOLUTION RESPIRATORY (INHALATION) EVERY 4 HOURS PRN
Status: DISCONTINUED | OUTPATIENT
Start: 2022-10-23 | End: 2022-10-27 | Stop reason: HOSPADM

## 2022-10-23 RX ORDER — DEXTROSE MONOHYDRATE 25 G/50ML
25 INJECTION, SOLUTION INTRAVENOUS
Status: DISCONTINUED | OUTPATIENT
Start: 2022-10-23 | End: 2022-10-27 | Stop reason: HOSPADM

## 2022-10-23 RX ORDER — SODIUM CHLORIDE 0.9 % (FLUSH) 0.9 %
10 SYRINGE (ML) INJECTION AS NEEDED
Status: DISCONTINUED | OUTPATIENT
Start: 2022-10-23 | End: 2022-10-23

## 2022-10-23 RX ORDER — HEPARIN SODIUM 10000 [USP'U]/100ML
18 INJECTION, SOLUTION INTRAVENOUS
Status: DISCONTINUED | OUTPATIENT
Start: 2022-10-23 | End: 2022-10-23

## 2022-10-23 RX ORDER — DEXTROSE MONOHYDRATE 50 MG/ML
125 INJECTION, SOLUTION INTRAVENOUS CONTINUOUS
Status: DISPENSED | OUTPATIENT
Start: 2022-10-23 | End: 2022-10-25

## 2022-10-23 RX ORDER — AMOXICILLIN 250 MG
2 CAPSULE ORAL 2 TIMES DAILY
Status: DISCONTINUED | OUTPATIENT
Start: 2022-10-23 | End: 2022-10-27

## 2022-10-23 RX ORDER — ASPIRIN 81 MG/1
81 TABLET, CHEWABLE ORAL DAILY
Status: DISCONTINUED | OUTPATIENT
Start: 2022-10-23 | End: 2022-10-23

## 2022-10-23 RX ORDER — INSULIN LISPRO 100 [IU]/ML
2-9 INJECTION, SOLUTION INTRAVENOUS; SUBCUTANEOUS
Status: DISCONTINUED | OUTPATIENT
Start: 2022-10-23 | End: 2022-10-27 | Stop reason: HOSPADM

## 2022-10-23 RX ORDER — ONDANSETRON 4 MG/1
4 TABLET, FILM COATED ORAL EVERY 6 HOURS PRN
Status: DISCONTINUED | OUTPATIENT
Start: 2022-10-23 | End: 2022-10-27

## 2022-10-23 RX ORDER — MUPIROCIN CALCIUM 20 MG/G
1 CREAM TOPICAL 2 TIMES DAILY
COMMUNITY

## 2022-10-23 RX ORDER — POLYETHYLENE GLYCOL 3350 17 G/17G
17 POWDER, FOR SOLUTION ORAL DAILY PRN
Status: DISCONTINUED | OUTPATIENT
Start: 2022-10-23 | End: 2022-10-27

## 2022-10-23 RX ORDER — NALOXONE HCL 0.4 MG/ML
0.4 VIAL (ML) INJECTION
Status: DISCONTINUED | OUTPATIENT
Start: 2022-10-23 | End: 2022-10-27 | Stop reason: HOSPADM

## 2022-10-23 RX ORDER — POLYETHYLENE GLYCOL 3350 17 G/17G
17 POWDER, FOR SOLUTION ORAL DAILY
COMMUNITY

## 2022-10-23 RX ADMIN — TAZOBACTAM SODIUM AND PIPERACILLIN SODIUM 3.38 G: 375; 3 INJECTION, SOLUTION INTRAVENOUS at 09:57

## 2022-10-23 RX ADMIN — DEXTROSE MONOHYDRATE 125 ML/HR: 50 INJECTION, SOLUTION INTRAVENOUS at 17:46

## 2022-10-23 RX ADMIN — Medication 750 MG: at 10:55

## 2022-10-23 RX ADMIN — Medication 1000 MG: at 00:32

## 2022-10-23 RX ADMIN — SODIUM CHLORIDE 1000 ML: 9 INJECTION, SOLUTION INTRAVENOUS at 00:32

## 2022-10-23 RX ADMIN — Medication 10 ML: at 03:58

## 2022-10-23 RX ADMIN — DEXTROSE MONOHYDRATE 125 ML/HR: 50 INJECTION, SOLUTION INTRAVENOUS at 05:16

## 2022-10-23 RX ADMIN — Medication 10 ML: at 21:59

## 2022-10-23 RX ADMIN — SODIUM CHLORIDE, POTASSIUM CHLORIDE, SODIUM LACTATE AND CALCIUM CHLORIDE 1000 ML: 600; 310; 30; 20 INJECTION, SOLUTION INTRAVENOUS at 13:00

## 2022-10-23 RX ADMIN — ENOXAPARIN SODIUM 60 MG: 100 INJECTION SUBCUTANEOUS at 17:46

## 2022-10-23 RX ADMIN — CEFTRIAXONE SODIUM 1 G: 1 INJECTION, SOLUTION INTRAVENOUS at 17:47

## 2022-10-24 LAB
ANION GAP SERPL CALCULATED.3IONS-SCNC: 10.2 MMOL/L (ref 5–15)
ANION GAP SERPL CALCULATED.3IONS-SCNC: 11.5 MMOL/L (ref 5–15)
ANION GAP SERPL CALCULATED.3IONS-SCNC: 9.5 MMOL/L (ref 5–15)
BASOPHILS # BLD AUTO: 0.06 10*3/MM3 (ref 0–0.2)
BASOPHILS NFR BLD AUTO: 0.4 % (ref 0–1.5)
BUN SERPL-MCNC: 61 MG/DL (ref 8–23)
BUN SERPL-MCNC: 67 MG/DL (ref 8–23)
BUN SERPL-MCNC: 69 MG/DL (ref 8–23)
BUN/CREAT SERPL: 58.1 (ref 7–25)
BUN/CREAT SERPL: 60 (ref 7–25)
BUN/CREAT SERPL: 62 (ref 7–25)
CALCIUM SPEC-SCNC: 9.2 MG/DL (ref 8.6–10.5)
CALCIUM SPEC-SCNC: 9.4 MG/DL (ref 8.6–10.5)
CALCIUM SPEC-SCNC: 9.5 MG/DL (ref 8.6–10.5)
CHLORIDE SERPL-SCNC: 122 MMOL/L (ref 98–107)
CHLORIDE SERPL-SCNC: 124 MMOL/L (ref 98–107)
CHLORIDE SERPL-SCNC: 125 MMOL/L (ref 98–107)
CO2 SERPL-SCNC: 20.5 MMOL/L (ref 22–29)
CO2 SERPL-SCNC: 21.8 MMOL/L (ref 22–29)
CO2 SERPL-SCNC: 22.5 MMOL/L (ref 22–29)
CREAT SERPL-MCNC: 1.05 MG/DL (ref 0.76–1.27)
CREAT SERPL-MCNC: 1.08 MG/DL (ref 0.76–1.27)
CREAT SERPL-MCNC: 1.15 MG/DL (ref 0.76–1.27)
DEPRECATED RDW RBC AUTO: 58.2 FL (ref 37–54)
EGFRCR SERPLBLD CKD-EPI 2021: 63.9 ML/MIN/1.73
EGFRCR SERPLBLD CKD-EPI 2021: 68.9 ML/MIN/1.73
EGFRCR SERPLBLD CKD-EPI 2021: 71.3 ML/MIN/1.73
EOSINOPHIL # BLD AUTO: 0.05 10*3/MM3 (ref 0–0.4)
EOSINOPHIL NFR BLD AUTO: 0.3 % (ref 0.3–6.2)
ERYTHROCYTE [DISTWIDTH] IN BLOOD BY AUTOMATED COUNT: 19.1 % (ref 12.3–15.4)
GLUCOSE BLDC GLUCOMTR-MCNC: 111 MG/DL (ref 70–99)
GLUCOSE BLDC GLUCOMTR-MCNC: 117 MG/DL (ref 70–99)
GLUCOSE BLDC GLUCOMTR-MCNC: 128 MG/DL (ref 70–99)
GLUCOSE SERPL-MCNC: 133 MG/DL (ref 65–99)
GLUCOSE SERPL-MCNC: 137 MG/DL (ref 65–99)
GLUCOSE SERPL-MCNC: 137 MG/DL (ref 65–99)
HCT VFR BLD AUTO: 30.5 % (ref 37.5–51)
HGB BLD-MCNC: 9.3 G/DL (ref 13–17.7)
HOLD SPECIMEN: NORMAL
IMM GRANULOCYTES # BLD AUTO: 0.07 10*3/MM3 (ref 0–0.05)
IMM GRANULOCYTES NFR BLD AUTO: 0.4 % (ref 0–0.5)
LYMPHOCYTES # BLD AUTO: 2.24 10*3/MM3 (ref 0.7–3.1)
LYMPHOCYTES NFR BLD AUTO: 13.7 % (ref 19.6–45.3)
MAGNESIUM SERPL-MCNC: 2.6 MG/DL (ref 1.6–2.4)
MCH RBC QN AUTO: 26.5 PG (ref 26.6–33)
MCHC RBC AUTO-ENTMCNC: 30.5 G/DL (ref 31.5–35.7)
MCV RBC AUTO: 86.9 FL (ref 79–97)
MONOCYTES # BLD AUTO: 1.33 10*3/MM3 (ref 0.1–0.9)
MONOCYTES NFR BLD AUTO: 8.1 % (ref 5–12)
NEUTROPHILS NFR BLD AUTO: 12.65 10*3/MM3 (ref 1.7–7)
NEUTROPHILS NFR BLD AUTO: 77.1 % (ref 42.7–76)
NRBC BLD AUTO-RTO: 0 /100 WBC (ref 0–0.2)
PHOSPHATE SERPL-MCNC: 3.4 MG/DL (ref 2.5–4.5)
PLATELET # BLD AUTO: 460 10*3/MM3 (ref 140–450)
PMV BLD AUTO: 11.5 FL (ref 6–12)
POTASSIUM SERPL-SCNC: 3.4 MMOL/L (ref 3.5–5.2)
POTASSIUM SERPL-SCNC: 4 MMOL/L (ref 3.5–5.2)
POTASSIUM SERPL-SCNC: 4.6 MMOL/L (ref 3.5–5.2)
RBC # BLD AUTO: 3.51 10*6/MM3 (ref 4.14–5.8)
SODIUM SERPL-SCNC: 154 MMOL/L (ref 136–145)
SODIUM SERPL-SCNC: 156 MMOL/L (ref 136–145)
SODIUM SERPL-SCNC: 157 MMOL/L (ref 136–145)
WBC NRBC COR # BLD: 16.4 10*3/MM3 (ref 3.4–10.8)

## 2022-10-24 PROCEDURE — 99233 SBSQ HOSP IP/OBS HIGH 50: CPT | Performed by: INTERNAL MEDICINE

## 2022-10-24 PROCEDURE — 83735 ASSAY OF MAGNESIUM: CPT | Performed by: HOSPITALIST

## 2022-10-24 PROCEDURE — 36415 COLL VENOUS BLD VENIPUNCTURE: CPT | Performed by: INTERNAL MEDICINE

## 2022-10-24 PROCEDURE — 0 POTASSIUM CHLORIDE 10 MEQ/100ML SOLUTION: Performed by: STUDENT IN AN ORGANIZED HEALTH CARE EDUCATION/TRAINING PROGRAM

## 2022-10-24 PROCEDURE — 82962 GLUCOSE BLOOD TEST: CPT

## 2022-10-24 PROCEDURE — 80048 BASIC METABOLIC PNL TOTAL CA: CPT | Performed by: INTERNAL MEDICINE

## 2022-10-24 PROCEDURE — 25010000002 ENOXAPARIN PER 10 MG: Performed by: INTERNAL MEDICINE

## 2022-10-24 PROCEDURE — 85025 COMPLETE CBC W/AUTO DIFF WBC: CPT | Performed by: HOSPITALIST

## 2022-10-24 PROCEDURE — 99291 CRITICAL CARE FIRST HOUR: CPT | Performed by: STUDENT IN AN ORGANIZED HEALTH CARE EDUCATION/TRAINING PROGRAM

## 2022-10-24 PROCEDURE — 25010000002 CEFTRIAXONE PER 250 MG: Performed by: INTERNAL MEDICINE

## 2022-10-24 PROCEDURE — 84100 ASSAY OF PHOSPHORUS: CPT | Performed by: HOSPITALIST

## 2022-10-24 RX ORDER — DEXTROSE MONOHYDRATE 50 MG/ML
75 INJECTION, SOLUTION INTRAVENOUS CONTINUOUS
Status: DISCONTINUED | OUTPATIENT
Start: 2022-10-24 | End: 2022-10-27

## 2022-10-24 RX ORDER — POTASSIUM CHLORIDE 7.45 MG/ML
10 INJECTION INTRAVENOUS
Status: COMPLETED | OUTPATIENT
Start: 2022-10-24 | End: 2022-10-24

## 2022-10-24 RX ADMIN — CEFTRIAXONE SODIUM 1 G: 1 INJECTION, SOLUTION INTRAVENOUS at 16:21

## 2022-10-24 RX ADMIN — ENOXAPARIN SODIUM 60 MG: 100 INJECTION SUBCUTANEOUS at 05:39

## 2022-10-24 RX ADMIN — Medication 10 ML: at 22:33

## 2022-10-24 RX ADMIN — DEXTROSE MONOHYDRATE 125 ML/HR: 50 INJECTION, SOLUTION INTRAVENOUS at 19:03

## 2022-10-24 RX ADMIN — PANTOPRAZOLE SODIUM 40 MG: 40 INJECTION, POWDER, FOR SOLUTION INTRAVENOUS at 05:41

## 2022-10-24 RX ADMIN — ENOXAPARIN SODIUM 60 MG: 100 INJECTION SUBCUTANEOUS at 18:53

## 2022-10-24 RX ADMIN — POTASSIUM CHLORIDE 10 MEQ: 7.46 INJECTION, SOLUTION INTRAVENOUS at 18:53

## 2022-10-24 RX ADMIN — POTASSIUM CHLORIDE 10 MEQ: 7.46 INJECTION, SOLUTION INTRAVENOUS at 18:08

## 2022-10-24 RX ADMIN — DEXTROSE MONOHYDRATE 125 ML/HR: 50 INJECTION, SOLUTION INTRAVENOUS at 01:34

## 2022-10-24 RX ADMIN — POTASSIUM CHLORIDE 10 MEQ: 7.46 INJECTION, SOLUTION INTRAVENOUS at 16:19

## 2022-10-24 RX ADMIN — Medication 10 ML: at 09:00

## 2022-10-25 LAB
BACTERIA SPEC AEROBE CULT: ABNORMAL
GLUCOSE BLDC GLUCOMTR-MCNC: 104 MG/DL (ref 70–99)
GLUCOSE BLDC GLUCOMTR-MCNC: 69 MG/DL (ref 70–99)
GLUCOSE BLDC GLUCOMTR-MCNC: 78 MG/DL (ref 70–99)
GLUCOSE BLDC GLUCOMTR-MCNC: 78 MG/DL (ref 70–99)
GLUCOSE BLDC GLUCOMTR-MCNC: 96 MG/DL (ref 70–99)

## 2022-10-25 PROCEDURE — 25010000002 ENOXAPARIN PER 10 MG: Performed by: INTERNAL MEDICINE

## 2022-10-25 PROCEDURE — 82962 GLUCOSE BLOOD TEST: CPT

## 2022-10-25 PROCEDURE — 99233 SBSQ HOSP IP/OBS HIGH 50: CPT | Performed by: INTERNAL MEDICINE

## 2022-10-25 PROCEDURE — 97161 PT EVAL LOW COMPLEX 20 MIN: CPT

## 2022-10-25 PROCEDURE — 25010000002 CEFTRIAXONE PER 250 MG: Performed by: INTERNAL MEDICINE

## 2022-10-25 PROCEDURE — 92610 EVALUATE SWALLOWING FUNCTION: CPT

## 2022-10-25 PROCEDURE — 97165 OT EVAL LOW COMPLEX 30 MIN: CPT

## 2022-10-25 RX ORDER — POTASSIUM CHLORIDE 750 MG/1
40 CAPSULE, EXTENDED RELEASE ORAL ONCE
Status: DISCONTINUED | OUTPATIENT
Start: 2022-10-25 | End: 2022-10-26

## 2022-10-25 RX ADMIN — CEFTRIAXONE SODIUM 1 G: 1 INJECTION, SOLUTION INTRAVENOUS at 18:45

## 2022-10-25 RX ADMIN — DEXTROSE MONOHYDRATE 125 ML/HR: 50 INJECTION, SOLUTION INTRAVENOUS at 17:37

## 2022-10-25 RX ADMIN — Medication 10 ML: at 10:13

## 2022-10-25 RX ADMIN — Medication 10 ML: at 22:54

## 2022-10-25 RX ADMIN — PANTOPRAZOLE SODIUM 40 MG: 40 INJECTION, POWDER, FOR SOLUTION INTRAVENOUS at 05:55

## 2022-10-25 RX ADMIN — ENOXAPARIN SODIUM 60 MG: 100 INJECTION SUBCUTANEOUS at 05:55

## 2022-10-25 RX ADMIN — BUSPIRONE HYDROCHLORIDE 10 MG: 10 TABLET ORAL at 22:54

## 2022-10-26 ENCOUNTER — ANESTHESIA EVENT (OUTPATIENT)
Dept: GASTROENTEROLOGY | Facility: HOSPITAL | Age: 82
End: 2022-10-26

## 2022-10-26 ENCOUNTER — ANESTHESIA (OUTPATIENT)
Dept: GASTROENTEROLOGY | Facility: HOSPITAL | Age: 82
End: 2022-10-26

## 2022-10-26 PROBLEM — R13.12 DYSPHAGIA, OROPHARYNGEAL: Status: ACTIVE | Noted: 2022-10-22

## 2022-10-26 LAB
ANION GAP SERPL CALCULATED.3IONS-SCNC: 12.2 MMOL/L (ref 5–15)
BASOPHILS # BLD AUTO: 0.03 10*3/MM3 (ref 0–0.2)
BASOPHILS NFR BLD AUTO: 0.2 % (ref 0–1.5)
BUN SERPL-MCNC: 31 MG/DL (ref 8–23)
BUN/CREAT SERPL: 37.8 (ref 7–25)
CALCIUM SPEC-SCNC: 8.8 MG/DL (ref 8.6–10.5)
CHLORIDE SERPL-SCNC: 110 MMOL/L (ref 98–107)
CO2 SERPL-SCNC: 17.8 MMOL/L (ref 22–29)
CREAT SERPL-MCNC: 0.82 MG/DL (ref 0.76–1.27)
DEPRECATED RDW RBC AUTO: 54.3 FL (ref 37–54)
EGFRCR SERPLBLD CKD-EPI 2021: 88.3 ML/MIN/1.73
EOSINOPHIL # BLD AUTO: 0.19 10*3/MM3 (ref 0–0.4)
EOSINOPHIL NFR BLD AUTO: 1.3 % (ref 0.3–6.2)
ERYTHROCYTE [DISTWIDTH] IN BLOOD BY AUTOMATED COUNT: 18.6 % (ref 12.3–15.4)
GLUCOSE BLDC GLUCOMTR-MCNC: 107 MG/DL (ref 70–99)
GLUCOSE BLDC GLUCOMTR-MCNC: 82 MG/DL (ref 70–99)
GLUCOSE BLDC GLUCOMTR-MCNC: 91 MG/DL (ref 70–99)
GLUCOSE BLDC GLUCOMTR-MCNC: 96 MG/DL (ref 70–99)
GLUCOSE SERPL-MCNC: 98 MG/DL (ref 65–99)
HCT VFR BLD AUTO: 29.2 % (ref 37.5–51)
HGB BLD-MCNC: 9.7 G/DL (ref 13–17.7)
IMM GRANULOCYTES # BLD AUTO: 0.09 10*3/MM3 (ref 0–0.05)
IMM GRANULOCYTES NFR BLD AUTO: 0.6 % (ref 0–0.5)
LYMPHOCYTES # BLD AUTO: 2.17 10*3/MM3 (ref 0.7–3.1)
LYMPHOCYTES NFR BLD AUTO: 14.8 % (ref 19.6–45.3)
MCH RBC QN AUTO: 27.3 PG (ref 26.6–33)
MCHC RBC AUTO-ENTMCNC: 33.2 G/DL (ref 31.5–35.7)
MCV RBC AUTO: 82.3 FL (ref 79–97)
MONOCYTES # BLD AUTO: 1.79 10*3/MM3 (ref 0.1–0.9)
MONOCYTES NFR BLD AUTO: 12.2 % (ref 5–12)
NEUTROPHILS NFR BLD AUTO: 10.41 10*3/MM3 (ref 1.7–7)
NEUTROPHILS NFR BLD AUTO: 70.9 % (ref 42.7–76)
NRBC BLD AUTO-RTO: 0 /100 WBC (ref 0–0.2)
PLATELET # BLD AUTO: 405 10*3/MM3 (ref 140–450)
PMV BLD AUTO: 11.3 FL (ref 6–12)
POTASSIUM SERPL-SCNC: 4.2 MMOL/L (ref 3.5–5.2)
RBC # BLD AUTO: 3.55 10*6/MM3 (ref 4.14–5.8)
SODIUM SERPL-SCNC: 140 MMOL/L (ref 136–145)
WBC NRBC COR # BLD: 14.68 10*3/MM3 (ref 3.4–10.8)

## 2022-10-26 PROCEDURE — 25010000002 CEFTRIAXONE PER 250 MG: Performed by: INTERNAL MEDICINE

## 2022-10-26 PROCEDURE — 0DH63UZ INSERTION OF FEEDING DEVICE INTO STOMACH, PERCUTANEOUS APPROACH: ICD-10-PCS | Performed by: INTERNAL MEDICINE

## 2022-10-26 PROCEDURE — 85025 COMPLETE CBC W/AUTO DIFF WBC: CPT | Performed by: INTERNAL MEDICINE

## 2022-10-26 PROCEDURE — 82962 GLUCOSE BLOOD TEST: CPT

## 2022-10-26 PROCEDURE — 25010000002 PHENYLEPHRINE 10 MG/ML SOLUTION: Performed by: NURSE ANESTHETIST, CERTIFIED REGISTERED

## 2022-10-26 PROCEDURE — 25010000002 PROPOFOL 10 MG/ML EMULSION: Performed by: NURSE ANESTHETIST, CERTIFIED REGISTERED

## 2022-10-26 PROCEDURE — 0DJ08ZZ INSPECTION OF UPPER INTESTINAL TRACT, VIA NATURAL OR ARTIFICIAL OPENING ENDOSCOPIC: ICD-10-PCS | Performed by: INTERNAL MEDICINE

## 2022-10-26 PROCEDURE — C1769 GUIDE WIRE: HCPCS | Performed by: INTERNAL MEDICINE

## 2022-10-26 PROCEDURE — 80048 BASIC METABOLIC PNL TOTAL CA: CPT | Performed by: INTERNAL MEDICINE

## 2022-10-26 PROCEDURE — 43246 EGD PLACE GASTROSTOMY TUBE: CPT | Performed by: INTERNAL MEDICINE

## 2022-10-26 PROCEDURE — 0 LIDOCAINE 1 % SOLUTION: Performed by: INTERNAL MEDICINE

## 2022-10-26 PROCEDURE — 99233 SBSQ HOSP IP/OBS HIGH 50: CPT | Performed by: INTERNAL MEDICINE

## 2022-10-26 PROCEDURE — 25010000002 CEFAZOLIN IN DEXTROSE 2-4 GM/100ML-% SOLUTION: Performed by: INTERNAL MEDICINE

## 2022-10-26 RX ORDER — PROPOFOL 10 MG/ML
VIAL (ML) INTRAVENOUS AS NEEDED
Status: DISCONTINUED | OUTPATIENT
Start: 2022-10-26 | End: 2022-10-26 | Stop reason: SURG

## 2022-10-26 RX ORDER — SODIUM CHLORIDE, SODIUM LACTATE, POTASSIUM CHLORIDE, CALCIUM CHLORIDE 600; 310; 30; 20 MG/100ML; MG/100ML; MG/100ML; MG/100ML
30 INJECTION, SOLUTION INTRAVENOUS CONTINUOUS
Status: CANCELLED | OUTPATIENT
Start: 2022-10-26

## 2022-10-26 RX ORDER — CEFAZOLIN SODIUM 2 G/100ML
2 INJECTION, SOLUTION INTRAVENOUS
Status: COMPLETED | OUTPATIENT
Start: 2022-10-26 | End: 2022-10-26

## 2022-10-26 RX ORDER — ENOXAPARIN SODIUM 100 MG/ML
40 INJECTION SUBCUTANEOUS DAILY
Status: DISCONTINUED | OUTPATIENT
Start: 2022-10-27 | End: 2022-10-27

## 2022-10-26 RX ORDER — LIDOCAINE HYDROCHLORIDE 20 MG/ML
INJECTION, SOLUTION EPIDURAL; INFILTRATION; INTRACAUDAL; PERINEURAL AS NEEDED
Status: DISCONTINUED | OUTPATIENT
Start: 2022-10-26 | End: 2022-10-26 | Stop reason: SURG

## 2022-10-26 RX ORDER — PHENYLEPHRINE HYDROCHLORIDE 10 MG/ML
INJECTION INTRAVENOUS AS NEEDED
Status: DISCONTINUED | OUTPATIENT
Start: 2022-10-26 | End: 2022-10-26 | Stop reason: SURG

## 2022-10-26 RX ORDER — LIDOCAINE HYDROCHLORIDE 10 MG/ML
INJECTION, SOLUTION INFILTRATION; PERINEURAL AS NEEDED
Status: DISCONTINUED | OUTPATIENT
Start: 2022-10-26 | End: 2022-10-26 | Stop reason: HOSPADM

## 2022-10-26 RX ADMIN — DEXTROSE MONOHYDRATE 125 ML/HR: 50 INJECTION, SOLUTION INTRAVENOUS at 09:47

## 2022-10-26 RX ADMIN — CEFTRIAXONE SODIUM 1 G: 1 INJECTION, SOLUTION INTRAVENOUS at 17:43

## 2022-10-26 RX ADMIN — PHENYLEPHRINE HYDROCHLORIDE 100 MCG: 10 INJECTION INTRAVENOUS at 16:31

## 2022-10-26 RX ADMIN — LIDOCAINE HYDROCHLORIDE 60 MG: 20 INJECTION, SOLUTION EPIDURAL; INFILTRATION; INTRACAUDAL; PERINEURAL at 16:09

## 2022-10-26 RX ADMIN — METOPROLOL TARTRATE 12.5 MG: 25 TABLET, FILM COATED ORAL at 21:22

## 2022-10-26 RX ADMIN — PROPOFOL 50 MG: 10 INJECTION, EMULSION INTRAVENOUS at 16:09

## 2022-10-26 RX ADMIN — Medication 10 ML: at 09:50

## 2022-10-26 RX ADMIN — BUSPIRONE HYDROCHLORIDE 10 MG: 10 TABLET ORAL at 21:22

## 2022-10-26 RX ADMIN — DEXTROSE MONOHYDRATE 125 ML/HR: 50 INJECTION, SOLUTION INTRAVENOUS at 01:33

## 2022-10-26 RX ADMIN — PANTOPRAZOLE SODIUM 40 MG: 40 INJECTION, POWDER, FOR SOLUTION INTRAVENOUS at 06:13

## 2022-10-26 RX ADMIN — CEFAZOLIN SODIUM 2 G: 2 INJECTION, SOLUTION INTRAVENOUS at 16:29

## 2022-10-26 RX ADMIN — PROPOFOL 80 MCG/KG/MIN: 10 INJECTION, EMULSION INTRAVENOUS at 16:09

## 2022-10-26 NOTE — ANESTHESIA PREPROCEDURE EVALUATION
Anesthesia Evaluation     Patient summary reviewed and Nursing notes reviewed   no history of anesthetic complications:  NPO Solid Status: > 8 hours  NPO Liquid Status: > 2 hours           Airway   Mallampati: II  TM distance: >3 FB  Neck ROM: full  No difficulty expected  Dental    (+) edentulous    Pulmonary - normal exam    breath sounds clear to auscultation  (+) shortness of breath,   Cardiovascular - normal exam  Exercise tolerance: poor (<4 METS)    Rhythm: regular  Rate: normal    (+) hypertension, CAD, CHF Diastolic >=55%, PVD, DVT,       Neuro/Psych  (+) CVA, dementia,    GI/Hepatic/Renal/Endo    (+)  GERD,  renal disease,     ROS Comment: Multiple PEG    Musculoskeletal     Abdominal    Substance History - negative use     OB/GYN          Other   arthritis,      ROS/Med Hx Other: PAT Nursing Notes unavailable.                   Anesthesia Plan    ASA 4     general and MAC   total IV anesthesia    Anesthetic plan, risks, benefits, and alternatives have been provided, discussed and informed consent has been obtained with: patient and healthcare power of .        CODE STATUS:    Level Of Support Discussed With: Patient  Code Status (Patient has no pulse and is not breathing): CPR (Attempt to Resuscitate)  Medical Interventions (Patient has pulse or is breathing): Full Support

## 2022-10-26 NOTE — ANESTHESIA POSTPROCEDURE EVALUATION
Patient: Jordin Menon    Procedure Summary     Date: 10/26/22 Room / Location: [facility]COPY 3 / [facility]    Anesthesia Start: 1602 Anesthesia Stop: 1644    Procedure: ESOPHAGOGASTRODUODENOSCOPY WITH PERCUTANEOUS ENDOSCOPIC GASTROSTOMY TUBE INSERTION WITH ANESTHESIA Diagnosis:       Dysphagia, oropharyngeal      (Dysphagia, oropharyngeal [R13.12])    Surgeons: Yanelis Mistry MD Provider: Vaibhav Jesus MD    Anesthesia Type: general, MAC ASA Status: 4          Anesthesia Type: general, MAC    Vitals  Vitals Value Taken Time   /63 10/26/22 1647   Temp 36.3 °C (97.4 °F) 10/26/22 1643   Pulse 90 10/26/22 1652   Resp 14 10/26/22 1652   SpO2 98 % 10/26/22 1652           Post Anesthesia Care and Evaluation    Patient location during evaluation: bedside  Patient participation: complete - patient participated  Level of consciousness: awake  Pain management: adequate    Airway patency: patent  Anesthetic complications: No anesthetic complications  PONV Status: none  Cardiovascular status: acceptable and stable  Respiratory status: acceptable and nasal cannula  Hydration status: acceptable    Comments: An Anesthesiologist personally participated in the most demanding procedures (including induction and emergence if applicable) in the anesthesia plan, monitored the course of anesthesia administration at frequent intervals and remained physically present and available for immediate diagnosis and treatment of emergencies.

## 2022-10-27 VITALS
DIASTOLIC BLOOD PRESSURE: 68 MMHG | BODY MASS INDEX: 21.91 KG/M2 | RESPIRATION RATE: 18 BRPM | SYSTOLIC BLOOD PRESSURE: 104 MMHG | HEART RATE: 76 BPM | HEIGHT: 64 IN | WEIGHT: 128.31 LBS | TEMPERATURE: 98 F | OXYGEN SATURATION: 91 %

## 2022-10-27 LAB
BACTERIA SPEC AEROBE CULT: NORMAL
BACTERIA SPEC AEROBE CULT: NORMAL
GLUCOSE BLDC GLUCOMTR-MCNC: 80 MG/DL (ref 70–99)
GLUCOSE BLDC GLUCOMTR-MCNC: 86 MG/DL (ref 70–99)
GLUCOSE BLDC GLUCOMTR-MCNC: 91 MG/DL (ref 70–99)

## 2022-10-27 PROCEDURE — 25010000002 INFLUENZA VAC HIGH-DOSE QUAD 0.7 ML SUSPENSION PREFILLED SYRINGE: Performed by: INTERNAL MEDICINE

## 2022-10-27 PROCEDURE — 82962 GLUCOSE BLOOD TEST: CPT

## 2022-10-27 PROCEDURE — G0008 ADMIN INFLUENZA VIRUS VAC: HCPCS | Performed by: INTERNAL MEDICINE

## 2022-10-27 PROCEDURE — 99239 HOSP IP/OBS DSCHRG MGMT >30: CPT | Performed by: INTERNAL MEDICINE

## 2022-10-27 PROCEDURE — 99231 SBSQ HOSP IP/OBS SF/LOW 25: CPT | Performed by: INTERNAL MEDICINE

## 2022-10-27 PROCEDURE — 25010000002 ENOXAPARIN PER 10 MG: Performed by: INTERNAL MEDICINE

## 2022-10-27 PROCEDURE — 90662 IIV NO PRSV INCREASED AG IM: CPT | Performed by: INTERNAL MEDICINE

## 2022-10-27 RX ORDER — BISACODYL 10 MG
10 SUPPOSITORY, RECTAL RECTAL DAILY PRN
Status: DISCONTINUED | OUTPATIENT
Start: 2022-10-27 | End: 2022-10-27 | Stop reason: HOSPADM

## 2022-10-27 RX ORDER — ONDANSETRON 2 MG/ML
4 INJECTION INTRAMUSCULAR; INTRAVENOUS EVERY 6 HOURS PRN
Status: DISCONTINUED | OUTPATIENT
Start: 2022-10-27 | End: 2022-10-27 | Stop reason: HOSPADM

## 2022-10-27 RX ORDER — ACETAMINOPHEN 325 MG/1
650 TABLET ORAL EVERY 4 HOURS PRN
Status: DISCONTINUED | OUTPATIENT
Start: 2022-10-27 | End: 2022-10-27 | Stop reason: HOSPADM

## 2022-10-27 RX ORDER — BUSPIRONE HYDROCHLORIDE 10 MG/1
10 TABLET ORAL 2 TIMES DAILY
Status: DISCONTINUED | OUTPATIENT
Start: 2022-10-27 | End: 2022-10-27 | Stop reason: HOSPADM

## 2022-10-27 RX ORDER — AMOXICILLIN 250 MG
2 CAPSULE ORAL 2 TIMES DAILY
Status: DISCONTINUED | OUTPATIENT
Start: 2022-10-27 | End: 2022-10-27 | Stop reason: HOSPADM

## 2022-10-27 RX ORDER — ONDANSETRON 4 MG/1
4 TABLET, FILM COATED ORAL EVERY 6 HOURS PRN
Status: DISCONTINUED | OUTPATIENT
Start: 2022-10-27 | End: 2022-10-27 | Stop reason: HOSPADM

## 2022-10-27 RX ORDER — POLYETHYLENE GLYCOL 3350 17 G/17G
17 POWDER, FOR SOLUTION ORAL DAILY PRN
Status: DISCONTINUED | OUTPATIENT
Start: 2022-10-27 | End: 2022-10-27 | Stop reason: HOSPADM

## 2022-10-27 RX ORDER — BISACODYL 5 MG/1
5 TABLET, DELAYED RELEASE ORAL DAILY PRN
Status: DISCONTINUED | OUTPATIENT
Start: 2022-10-27 | End: 2022-10-27 | Stop reason: HOSPADM

## 2022-10-27 RX ORDER — ACETAMINOPHEN 160 MG/5ML
650 SOLUTION ORAL EVERY 4 HOURS PRN
Status: DISCONTINUED | OUTPATIENT
Start: 2022-10-27 | End: 2022-10-27 | Stop reason: HOSPADM

## 2022-10-27 RX ORDER — ACETAMINOPHEN 650 MG/1
650 SUPPOSITORY RECTAL EVERY 4 HOURS PRN
Status: DISCONTINUED | OUTPATIENT
Start: 2022-10-27 | End: 2022-10-27 | Stop reason: HOSPADM

## 2022-10-27 RX ORDER — CEFDINIR 300 MG/1
300 CAPSULE ORAL 2 TIMES DAILY
Qty: 4 CAPSULE | Refills: 0 | Status: SHIPPED | OUTPATIENT
Start: 2022-10-27 | End: 2022-10-29

## 2022-10-27 RX ADMIN — Medication 10 ML: at 08:35

## 2022-10-27 RX ADMIN — PANTOPRAZOLE SODIUM 40 MG: 40 INJECTION, POWDER, FOR SOLUTION INTRAVENOUS at 05:26

## 2022-10-27 RX ADMIN — METOPROLOL TARTRATE 12.5 MG: 25 TABLET, FILM COATED ORAL at 08:34

## 2022-10-27 RX ADMIN — INFLUENZA A VIRUS A/VICTORIA/2570/2019 IVR-215 (H1N1) ANTIGEN (FORMALDEHYDE INACTIVATED), INFLUENZA A VIRUS A/DARWIN/9/2021 SAN-010 (H3N2) ANTIGEN (FORMALDEHYDE INACTIVATED), INFLUENZA B VIRUS B/PHUKET/3073/2013 ANTIGEN (FORMALDEHYDE INACTIVATED), AND INFLUENZA B VIRUS B/MICHIGAN/01/2021 ANTIGEN (FORMALDEHYDE INACTIVATED) 0.7 ML: 60; 60; 60; 60 INJECTION, SUSPENSION INTRAMUSCULAR at 15:09

## 2022-10-27 RX ADMIN — ENOXAPARIN SODIUM 40 MG: 100 INJECTION SUBCUTANEOUS at 08:34

## 2022-10-27 RX ADMIN — DEXTROSE MONOHYDRATE 75 ML/HR: 50 INJECTION, SOLUTION INTRAVENOUS at 08:41

## 2022-10-27 RX ADMIN — BUSPIRONE HYDROCHLORIDE 10 MG: 10 TABLET ORAL at 08:34

## 2022-10-27 RX ADMIN — APIXABAN 5 MG: 5 TABLET, FILM COATED ORAL at 09:19

## 2023-09-04 ENCOUNTER — APPOINTMENT (OUTPATIENT)
Dept: GENERAL RADIOLOGY | Facility: HOSPITAL | Age: 83
DRG: 872 | End: 2023-09-04
Payer: MEDICARE

## 2023-09-04 ENCOUNTER — HOSPITAL ENCOUNTER (INPATIENT)
Facility: HOSPITAL | Age: 83
LOS: 7 days | Discharge: NURSING, REHAB OR LONG-TERM CARE | DRG: 872 | End: 2023-09-11
Attending: EMERGENCY MEDICINE | Admitting: FAMILY MEDICINE
Payer: MEDICARE

## 2023-09-04 DIAGNOSIS — R26.2 DIFFICULTY WALKING: ICD-10-CM

## 2023-09-04 DIAGNOSIS — N17.9 SEPSIS WITH ACUTE RENAL FAILURE WITHOUT SEPTIC SHOCK, DUE TO UNSPECIFIED ORGANISM, UNSPECIFIED ACUTE RENAL FAILURE TYPE: Primary | ICD-10-CM

## 2023-09-04 DIAGNOSIS — A41.9 SEPSIS WITH ACUTE RENAL FAILURE WITHOUT SEPTIC SHOCK, DUE TO UNSPECIFIED ORGANISM, UNSPECIFIED ACUTE RENAL FAILURE TYPE: Primary | ICD-10-CM

## 2023-09-04 DIAGNOSIS — R65.20 SEPSIS WITH ACUTE RENAL FAILURE WITHOUT SEPTIC SHOCK, DUE TO UNSPECIFIED ORGANISM, UNSPECIFIED ACUTE RENAL FAILURE TYPE: Primary | ICD-10-CM

## 2023-09-04 DIAGNOSIS — Z78.9 DECREASED ACTIVITIES OF DAILY LIVING (ADL): ICD-10-CM

## 2023-09-04 LAB
ALBUMIN SERPL-MCNC: 3.5 G/DL (ref 3.5–5.2)
ALBUMIN/GLOB SERPL: 0.7 G/DL
ALP SERPL-CCNC: 87 U/L (ref 39–117)
ALT SERPL W P-5'-P-CCNC: 23 U/L (ref 1–41)
ANION GAP SERPL CALCULATED.3IONS-SCNC: 11.5 MMOL/L (ref 5–15)
APTT PPP: 32.4 SECONDS (ref 24.2–34.2)
ARTERIAL PATENCY WRIST A: POSITIVE
AST SERPL-CCNC: 35 U/L (ref 1–40)
BACTERIA UR QL AUTO: ABNORMAL /HPF
BASE EXCESS BLDA CALC-SCNC: 3.2 MMOL/L (ref -2–2)
BASOPHILS # BLD AUTO: 0.06 10*3/MM3 (ref 0–0.2)
BASOPHILS NFR BLD AUTO: 0.3 % (ref 0–1.5)
BDY SITE: ABNORMAL
BILIRUB SERPL-MCNC: 1.7 MG/DL (ref 0–1.2)
BILIRUB UR QL STRIP: NEGATIVE
BUN SERPL-MCNC: 46 MG/DL (ref 8–23)
BUN/CREAT SERPL: 35.9 (ref 7–25)
CA-I BLDA-SCNC: 1.12 MMOL/L (ref 1.13–1.32)
CALCIUM SPEC-SCNC: 9.7 MG/DL (ref 8.6–10.5)
CHLORIDE BLDA-SCNC: 118 MMOL/L (ref 98–106)
CHLORIDE SERPL-SCNC: 119 MMOL/L (ref 98–107)
CLARITY UR: ABNORMAL
CO2 SERPL-SCNC: 25.5 MMOL/L (ref 22–29)
COHGB MFR BLD: 0.8 % (ref 0–1.5)
COLOR UR: ABNORMAL
CREAT SERPL-MCNC: 1.28 MG/DL (ref 0.76–1.27)
CRP SERPL-MCNC: 14.7 MG/DL (ref 0–0.5)
D-LACTATE SERPL-SCNC: 1.9 MMOL/L (ref 0.5–2)
DEPRECATED RDW RBC AUTO: 50.6 FL (ref 37–54)
EGFRCR SERPLBLD CKD-EPI 2021: 55.9 ML/MIN/1.73
EOSINOPHIL # BLD AUTO: 0 10*3/MM3 (ref 0–0.4)
EOSINOPHIL NFR BLD AUTO: 0 % (ref 0.3–6.2)
ERYTHROCYTE [DISTWIDTH] IN BLOOD BY AUTOMATED COUNT: 15.1 % (ref 12.3–15.4)
FHHB: 5 % (ref 0–5)
FLUAV AG NPH QL: NEGATIVE
FLUBV AG NPH QL IA: NEGATIVE
GAS FLOW AIRWAY: ABNORMAL L/MIN
GLOBULIN UR ELPH-MCNC: 4.9 GM/DL
GLUCOSE BLDA-MCNC: 174 MG/DL (ref 70–99)
GLUCOSE SERPL-MCNC: 140 MG/DL (ref 65–99)
GLUCOSE UR STRIP-MCNC: NEGATIVE MG/DL
GRAN CASTS URNS QL MICRO: ABNORMAL /LPF
HCO3 BLDA-SCNC: 25 MMOL/L (ref 22–26)
HCT VFR BLD AUTO: 48 % (ref 37.5–51)
HGB BLD-MCNC: 16.1 G/DL (ref 13–17.7)
HGB BLDA-MCNC: 16 G/DL (ref 13.8–16.4)
HGB UR QL STRIP.AUTO: ABNORMAL
HOLD SPECIMEN: NORMAL
HOLD SPECIMEN: NORMAL
HYALINE CASTS UR QL AUTO: ABNORMAL /LPF
IMM GRANULOCYTES # BLD AUTO: 0.14 10*3/MM3 (ref 0–0.05)
IMM GRANULOCYTES NFR BLD AUTO: 0.8 % (ref 0–0.5)
INHALED O2 CONCENTRATION: ABNORMAL %
INR PPP: 1.33 (ref 0.86–1.15)
KETONES UR QL STRIP: NEGATIVE
LACTATE BLDA-SCNC: 1.95 MMOL/L (ref 0.5–2)
LEUKOCYTE ESTERASE UR QL STRIP.AUTO: ABNORMAL
LYMPHOCYTES # BLD AUTO: 2.25 10*3/MM3 (ref 0.7–3.1)
LYMPHOCYTES NFR BLD AUTO: 12.4 % (ref 19.6–45.3)
MAGNESIUM SERPL-MCNC: 2.7 MG/DL (ref 1.6–2.4)
MCH RBC QN AUTO: 30.9 PG (ref 26.6–33)
MCHC RBC AUTO-ENTMCNC: 33.5 G/DL (ref 31.5–35.7)
MCV RBC AUTO: 92.1 FL (ref 79–97)
METHGB BLD QL: 0.3 % (ref 0–1.5)
MODALITY: ABNORMAL
MONOCYTES # BLD AUTO: 1.95 10*3/MM3 (ref 0.1–0.9)
MONOCYTES NFR BLD AUTO: 10.8 % (ref 5–12)
NEUTROPHILS NFR BLD AUTO: 13.73 10*3/MM3 (ref 1.7–7)
NEUTROPHILS NFR BLD AUTO: 75.7 % (ref 42.7–76)
NITRITE UR QL STRIP: NEGATIVE
NOTE: ABNORMAL
NRBC BLD AUTO-RTO: 0 /100 WBC (ref 0–0.2)
NT-PROBNP SERPL-MCNC: 793.4 PG/ML (ref 0–1800)
OXYHGB MFR BLDV: 93.9 % (ref 94–99)
PCO2 BLDA: 30.5 MM HG (ref 35–45)
PH BLDA: 7.53 PH UNITS (ref 7.35–7.45)
PH UR STRIP.AUTO: 5.5 [PH] (ref 5–8)
PHOSPHATE SERPL-MCNC: 2.9 MG/DL (ref 2.5–4.5)
PLATELET # BLD AUTO: 204 10*3/MM3 (ref 140–450)
PMV BLD AUTO: 13 FL (ref 6–12)
PO2 BLD: ABNORMAL MM[HG]
PO2 BLDA: 68.9 MM HG (ref 80–100)
POTASSIUM BLDA-SCNC: 4.7 MMOL/L (ref 3.5–5)
POTASSIUM SERPL-SCNC: 4.8 MMOL/L (ref 3.5–5.2)
PROT SERPL-MCNC: 8.4 G/DL (ref 6–8.5)
PROT UR QL STRIP: ABNORMAL
PROTHROMBIN TIME: 16.5 SECONDS (ref 11.8–14.9)
RBC # BLD AUTO: 5.21 10*6/MM3 (ref 4.14–5.8)
RBC # UR STRIP: ABNORMAL /HPF
REF LAB TEST METHOD: ABNORMAL
SAO2 % BLDCOA: 94.9 % (ref 95–99)
SARS-COV-2 RNA RESP QL NAA+PROBE: NOT DETECTED
SODIUM BLDA-SCNC: 156.1 MMOL/L (ref 136–146)
SODIUM SERPL-SCNC: 156 MMOL/L (ref 136–145)
SP GR UR STRIP: 1.03 (ref 1–1.03)
SQUAMOUS #/AREA URNS HPF: ABNORMAL /HPF
UROBILINOGEN UR QL STRIP: ABNORMAL
WBC # UR STRIP: ABNORMAL /HPF
WBC NRBC COR # BLD: 18.13 10*3/MM3 (ref 3.4–10.8)
WHOLE BLOOD HOLD COAG: NORMAL
WHOLE BLOOD HOLD SPECIMEN: NORMAL

## 2023-09-04 PROCEDURE — 87040 BLOOD CULTURE FOR BACTERIA: CPT | Performed by: EMERGENCY MEDICINE

## 2023-09-04 PROCEDURE — 83735 ASSAY OF MAGNESIUM: CPT | Performed by: EMERGENCY MEDICINE

## 2023-09-04 PROCEDURE — 93005 ELECTROCARDIOGRAM TRACING: CPT | Performed by: EMERGENCY MEDICINE

## 2023-09-04 PROCEDURE — 85025 COMPLETE CBC W/AUTO DIFF WBC: CPT | Performed by: EMERGENCY MEDICINE

## 2023-09-04 PROCEDURE — 86140 C-REACTIVE PROTEIN: CPT | Performed by: EMERGENCY MEDICINE

## 2023-09-04 PROCEDURE — 84100 ASSAY OF PHOSPHORUS: CPT | Performed by: EMERGENCY MEDICINE

## 2023-09-04 PROCEDURE — 85610 PROTHROMBIN TIME: CPT | Performed by: EMERGENCY MEDICINE

## 2023-09-04 PROCEDURE — 83880 ASSAY OF NATRIURETIC PEPTIDE: CPT | Performed by: EMERGENCY MEDICINE

## 2023-09-04 PROCEDURE — P9612 CATHETERIZE FOR URINE SPEC: HCPCS

## 2023-09-04 PROCEDURE — 87804 INFLUENZA ASSAY W/OPTIC: CPT | Performed by: EMERGENCY MEDICINE

## 2023-09-04 PROCEDURE — 82375 ASSAY CARBOXYHB QUANT: CPT | Performed by: EMERGENCY MEDICINE

## 2023-09-04 PROCEDURE — 36415 COLL VENOUS BLD VENIPUNCTURE: CPT

## 2023-09-04 PROCEDURE — 25010000002 PIPERACILLIN SOD-TAZOBACTAM PER 1 G: Performed by: INTERNAL MEDICINE

## 2023-09-04 PROCEDURE — 87635 SARS-COV-2 COVID-19 AMP PRB: CPT | Performed by: EMERGENCY MEDICINE

## 2023-09-04 PROCEDURE — 71045 X-RAY EXAM CHEST 1 VIEW: CPT

## 2023-09-04 PROCEDURE — 25010000002 CEFTRIAXONE PER 250 MG: Performed by: EMERGENCY MEDICINE

## 2023-09-04 PROCEDURE — 83050 HGB METHEMOGLOBIN QUAN: CPT | Performed by: EMERGENCY MEDICINE

## 2023-09-04 PROCEDURE — 99285 EMERGENCY DEPT VISIT HI MDM: CPT

## 2023-09-04 PROCEDURE — 82805 BLOOD GASES W/O2 SATURATION: CPT | Performed by: EMERGENCY MEDICINE

## 2023-09-04 PROCEDURE — 25010000002 AZITHROMYCIN PER 500 MG: Performed by: EMERGENCY MEDICINE

## 2023-09-04 PROCEDURE — 36600 WITHDRAWAL OF ARTERIAL BLOOD: CPT | Performed by: EMERGENCY MEDICINE

## 2023-09-04 PROCEDURE — 85730 THROMBOPLASTIN TIME PARTIAL: CPT | Performed by: EMERGENCY MEDICINE

## 2023-09-04 PROCEDURE — 80053 COMPREHEN METABOLIC PANEL: CPT | Performed by: EMERGENCY MEDICINE

## 2023-09-04 PROCEDURE — 83605 ASSAY OF LACTIC ACID: CPT | Performed by: EMERGENCY MEDICINE

## 2023-09-04 PROCEDURE — 81001 URINALYSIS AUTO W/SCOPE: CPT | Performed by: EMERGENCY MEDICINE

## 2023-09-04 PROCEDURE — 93010 ELECTROCARDIOGRAM REPORT: CPT | Performed by: INTERNAL MEDICINE

## 2023-09-04 PROCEDURE — 99223 1ST HOSP IP/OBS HIGH 75: CPT | Performed by: INTERNAL MEDICINE

## 2023-09-04 RX ORDER — ACETAMINOPHEN 325 MG/1
650 TABLET ORAL EVERY 4 HOURS PRN
Status: DISCONTINUED | OUTPATIENT
Start: 2023-09-04 | End: 2023-09-04

## 2023-09-04 RX ORDER — SODIUM CHLORIDE 0.9 % (FLUSH) 0.9 %
10 SYRINGE (ML) INJECTION AS NEEDED
Status: DISCONTINUED | OUTPATIENT
Start: 2023-09-04 | End: 2023-09-11 | Stop reason: HOSPADM

## 2023-09-04 RX ORDER — CEFTRIAXONE SODIUM 1 G/50ML
1000 INJECTION, SOLUTION INTRAVENOUS ONCE
Status: COMPLETED | OUTPATIENT
Start: 2023-09-04 | End: 2023-09-04

## 2023-09-04 RX ORDER — ACETAMINOPHEN 650 MG/1
650 SUPPOSITORY RECTAL EVERY 4 HOURS PRN
Status: DISCONTINUED | OUTPATIENT
Start: 2023-09-04 | End: 2023-09-11 | Stop reason: HOSPADM

## 2023-09-04 RX ORDER — BISACODYL 5 MG/1
5 TABLET, DELAYED RELEASE ORAL DAILY PRN
Status: DISCONTINUED | OUTPATIENT
Start: 2023-09-04 | End: 2023-09-05

## 2023-09-04 RX ORDER — SODIUM CHLORIDE 0.9 % (FLUSH) 0.9 %
10 SYRINGE (ML) INJECTION EVERY 12 HOURS SCHEDULED
Status: DISCONTINUED | OUTPATIENT
Start: 2023-09-04 | End: 2023-09-11 | Stop reason: HOSPADM

## 2023-09-04 RX ORDER — ACETAMINOPHEN 160 MG/5ML
650 SOLUTION ORAL EVERY 4 HOURS PRN
Status: DISCONTINUED | OUTPATIENT
Start: 2023-09-04 | End: 2023-09-11 | Stop reason: HOSPADM

## 2023-09-04 RX ORDER — IPRATROPIUM BROMIDE AND ALBUTEROL SULFATE 2.5; .5 MG/3ML; MG/3ML
3 SOLUTION RESPIRATORY (INHALATION) EVERY 4 HOURS PRN
Status: DISCONTINUED | OUTPATIENT
Start: 2023-09-04 | End: 2023-09-11 | Stop reason: HOSPADM

## 2023-09-04 RX ORDER — ACETAMINOPHEN 650 MG/1
650 SUPPOSITORY RECTAL EVERY 4 HOURS PRN
Status: DISCONTINUED | OUTPATIENT
Start: 2023-09-04 | End: 2023-09-04

## 2023-09-04 RX ORDER — BISACODYL 5 MG/1
5 TABLET, DELAYED RELEASE ORAL DAILY PRN
Status: DISCONTINUED | OUTPATIENT
Start: 2023-09-04 | End: 2023-09-04

## 2023-09-04 RX ORDER — ASPIRIN 81 MG/1
81 TABLET, CHEWABLE ORAL DAILY
Status: DISCONTINUED | OUTPATIENT
Start: 2023-09-05 | End: 2023-09-05

## 2023-09-04 RX ORDER — AMOXICILLIN 250 MG
2 CAPSULE ORAL 2 TIMES DAILY
Status: DISCONTINUED | OUTPATIENT
Start: 2023-09-04 | End: 2023-09-04

## 2023-09-04 RX ORDER — AMOXICILLIN 250 MG
2 CAPSULE ORAL 2 TIMES DAILY
Status: DISCONTINUED | OUTPATIENT
Start: 2023-09-05 | End: 2023-09-11 | Stop reason: HOSPADM

## 2023-09-04 RX ORDER — POLYETHYLENE GLYCOL 3350 17 G/17G
17 POWDER, FOR SOLUTION ORAL DAILY PRN
Status: DISCONTINUED | OUTPATIENT
Start: 2023-09-04 | End: 2023-09-04

## 2023-09-04 RX ORDER — ACETAMINOPHEN 650 MG/1
650 SUPPOSITORY RECTAL ONCE
Status: COMPLETED | OUTPATIENT
Start: 2023-09-04 | End: 2023-09-04

## 2023-09-04 RX ORDER — BISACODYL 10 MG
10 SUPPOSITORY, RECTAL RECTAL DAILY PRN
Status: DISCONTINUED | OUTPATIENT
Start: 2023-09-04 | End: 2023-09-04

## 2023-09-04 RX ORDER — BISACODYL 10 MG
10 SUPPOSITORY, RECTAL RECTAL DAILY PRN
Status: DISCONTINUED | OUTPATIENT
Start: 2023-09-04 | End: 2023-09-11 | Stop reason: HOSPADM

## 2023-09-04 RX ORDER — NITROGLYCERIN 0.4 MG/1
0.4 TABLET SUBLINGUAL
Status: DISCONTINUED | OUTPATIENT
Start: 2023-09-04 | End: 2023-09-11 | Stop reason: HOSPADM

## 2023-09-04 RX ORDER — ACETAMINOPHEN 325 MG/1
650 TABLET ORAL EVERY 4 HOURS PRN
Status: DISCONTINUED | OUTPATIENT
Start: 2023-09-04 | End: 2023-09-11 | Stop reason: HOSPADM

## 2023-09-04 RX ORDER — ACETAMINOPHEN 160 MG/5ML
650 SOLUTION ORAL EVERY 4 HOURS PRN
Status: DISCONTINUED | OUTPATIENT
Start: 2023-09-04 | End: 2023-09-04

## 2023-09-04 RX ORDER — SODIUM CHLORIDE 9 MG/ML
40 INJECTION, SOLUTION INTRAVENOUS AS NEEDED
Status: DISCONTINUED | OUTPATIENT
Start: 2023-09-04 | End: 2023-09-11 | Stop reason: HOSPADM

## 2023-09-04 RX ORDER — SODIUM CHLORIDE, SODIUM LACTATE, POTASSIUM CHLORIDE, CALCIUM CHLORIDE 600; 310; 30; 20 MG/100ML; MG/100ML; MG/100ML; MG/100ML
100 INJECTION, SOLUTION INTRAVENOUS CONTINUOUS
Status: DISCONTINUED | OUTPATIENT
Start: 2023-09-04 | End: 2023-09-07

## 2023-09-04 RX ORDER — POLYETHYLENE GLYCOL 3350 17 G/17G
17 POWDER, FOR SOLUTION ORAL DAILY PRN
Status: DISCONTINUED | OUTPATIENT
Start: 2023-09-04 | End: 2023-09-11 | Stop reason: HOSPADM

## 2023-09-04 RX ADMIN — ACETAMINOPHEN 650 MG: 325 TABLET ORAL at 22:33

## 2023-09-04 RX ADMIN — CEFTRIAXONE SODIUM 1000 MG: 1 INJECTION, SOLUTION INTRAVENOUS at 16:59

## 2023-09-04 RX ADMIN — APIXABAN 5 MG: 5 TABLET, FILM COATED ORAL at 22:33

## 2023-09-04 RX ADMIN — SODIUM CHLORIDE 1000 ML: 9 INJECTION, SOLUTION INTRAVENOUS at 17:34

## 2023-09-04 RX ADMIN — AZITHROMYCIN 500 MG: 500 INJECTION, POWDER, LYOPHILIZED, FOR SOLUTION INTRAVENOUS at 18:28

## 2023-09-04 RX ADMIN — Medication 10 ML: at 21:00

## 2023-09-04 RX ADMIN — ACETAMINOPHEN 650 MG: 650 SUPPOSITORY RECTAL at 19:44

## 2023-09-04 RX ADMIN — PIPERACILLIN AND TAZOBACTAM 3.38 G: 3; .375 INJECTION, POWDER, LYOPHILIZED, FOR SOLUTION INTRAVENOUS at 22:32

## 2023-09-04 RX ADMIN — SODIUM CHLORIDE, POTASSIUM CHLORIDE, SODIUM LACTATE AND CALCIUM CHLORIDE 100 ML/HR: 600; 310; 30; 20 INJECTION, SOLUTION INTRAVENOUS at 23:24

## 2023-09-04 NOTE — H&P
[facility]  Date: 2023   Patient Name: Jordin Menon  : 1940  MRN: [id number]  Primary Care Physician:  Frank Llanes MD  Date of admission: 2023    Subjective   Subjective     Chief Complaint: Shortness of breath    HPI:    Jordin Menon is a 83 y.o. male past medical history of Alzheimer's dementia, CVA that presents to the emergency department for evaluation of shortness of breath from nursing facility.  Patient has limited communication at baseline but is usually oriented x1.  Currently he is not answering my questions or following simple commands and is somnolent on exam.  History was obtained from ER signout and chart review.  Patient reportedly seemed short of breath at the nursing home prompting them to call EMS.  In the emergency department he was also found to be febrile and tachycardic along with leukocytosis.  He has been started on Rocephin and azithromycin for suspected pneumonia.  UA pending at this time.  Cultures have been obtained he will be admitted for ongoing monitoring and management.  Patient is unable to provide any other review of systems at this time.      Personal History     Past Medical History:  Past Medical History:   Diagnosis Date    Acute renal failure with pathological lesion in kidney     Alzheimer's dementia with behavioral disturbance     Anemia, vitamin B12 deficiency     Anxiety     Anxiety disorder due to known physiological condition     Arthritis     Benign neoplasm of prostate     Cannot walk     CHF (congestive heart failure)     Constipation     Coronary atherosclerosis of native coronary artery     CVA (cerebral vascular accident)     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 (CVA)     Edema     Essential hypertension, benign     Gastroesophageal reflux disease without esophagitis     GERD (gastroesophageal reflux disease)     High blood pressure     Hirsutism     HTN (hypertension)     Hypercalcemia      Hyperkalemia     Hyperlipemia     Hyperlipidemia     Hypokalemia     Ingrowing toenail 09/21/2018    Left foot pain 09/21/2018    Nutritional deficiency     Osteoarthritis of right knee 08/22/2016    Pneumonia     Presence of right artificial knee joint     Primary localized osteoarthrosis     PVD (peripheral vascular disease)     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     Right foot pain 09/21/2018    Senile dementia, uncomplicated     Severe sinus bradycardia     Shock, septic     Sleep apnea     Thrombosis of left femoral vein     Tinea unguium 09/21/2018    Vitamin B12 deficiency anemia          Past Surgical History:  Past Surgical History:   Procedure Laterality Date    ENDOSCOPY W/ PEG TUBE PLACEMENT N/A 6/8/2022    Procedure: ESOPHAGOGASTRODUODENOSCOPY WITH PERCUTANEOUS ENDOSCOPIC GASTROSTOMY TUBE INSERTION WITH ANESTHESIA;  Surgeon: Yanelis Mistry MD;  Location: [facility] ENDOSCOPY;  Service: Gastroenterology;  Laterality: N/A;  PEG TUBE PLACEMENT    ENDOSCOPY W/ PEG TUBE PLACEMENT N/A 7/3/2022    Procedure: ESOPHAGOGASTRODUODENOSCOPY WITH PERCUTANEOUS ENDOSCOPIC GASTROSTOMY TUBE INSERTION WITH ANESTHESIA;  Surgeon: Bam Melo MD;  Location: [facility] ENDOSCOPY;  Service: Gastroenterology;  Laterality: N/A;  PEG TUBE PLACEMENT    ENDOSCOPY W/ PEG TUBE PLACEMENT N/A 10/26/2022    Procedure: ESOPHAGOGASTRODUODENOSCOPY WITH PERCUTANEOUS ENDOSCOPIC GASTROSTOMY TUBE INSERTION WITH ANESTHESIA;  Surgeon: Yanelis Mistry MD;  Location: [facility] ENDOSCOPY;  Service: Gastroenterology;  Laterality: N/A;  PERCUTANEOUS ENDOSCOPIC GASTROSTOMY TUBE PLACEMENT         Family History:   Family History   Family history unknown: Yes         Social History:   Social History     Tobacco Use    Smoking status: Never    Smokeless tobacco: Never   Vaping Use    Vaping Use: Never used   Substance Use Topics    Alcohol use: No    Drug use: Never         Home Medications:  Alum & Mag  Hydroxide-Simeth, Diclofenac Sodium, acetaminophen, apixaban, aspirin, busPIRone, cyanocobalamin, docusate sodium, famotidine, furosemide, ipratropium-albuterol, magnesium hydroxide, melatonin, metoprolol tartrate, mineral oil-hydrophilic petrolatum, mupirocin, polyethylene glycol, trolamine salicylate, and witch hazel-glycerin    Allergies:  No Known Allergies    Review of Systems   Unable to obtain due to mental status    Objective   Objective     Vitals:   Temp:  [102.5 °F (39.2 °C)-102.9 °F (39.4 °C)] 102.5 °F (39.2 °C)  Heart Rate:  [131-143] 131  Resp:  [33-36] 33  BP: (115-129)/(73-79) 115/79    Physical Exam    Constitutional: A somnolent, not answering questions   Eyes: Pupils equal, sclerae anicteric, no conjunctival injection   HENT: NCAT, mucous membranes moist   Neck: Supple, no thyromegaly, no lymphadenopathy, trachea midline   Respiratory: Diminished bilaterally, nonlabored on room air   Cardiovascular: RRR, no murmurs, rubs, or gallops, palpable pedal pulses bilaterally   Gastrointestinal: Positive bowel sounds, soft, nontender, nondistended   Musculoskeletal: No bilateral ankle edema, no clubbing or cyanosis to extremities   Psychiatric: Not answering questions or following commands   Neurologic: Not answering questions or following commands.  Minimally verbal at baseline.   Skin: No rashes     Result Review    Result Review:  I have personally reviewed the results from the time of this admission to 9/4/2023 19:40 EDT and agree with these findings:  [x]  Laboratory  [x]  Microbiology  [x]  Radiology  []  EKG/Telemetry   []  Cardiology/Vascular   []  Pathology  []  Old records  []  Other:      Assessment & Plan   Assessment / Plan     Assessment/Plan:   Sepsis, suspected pneumonia: Received Rocephin and azithromycin.  UA pending however at this time.  Will transition to Zosyn to cover for aspiration.  Supportive care.  Serial labs.  Follow cultures already obtained.  Alzheimer's dementia: Supportive  care.  Resume home medications once verified  Hypertension: Holding home medications in setting of sepsis for the time being add back home medications as appropriate    Of note, per previous documentation patient was DNR however patient's daughter was on the phone who stated he is full code.  We will consult palliative care.  DVT prophylaxis:  Eliquis    CODE STATUS:     Full code    Admission Status:  I believe this patient meets inpatient status.    Electronically signed by Russell Jimenez Jr, MD, 09/04/23, 7:40 PM EDT.

## 2023-09-04 NOTE — ED PROVIDER NOTES
Time: 3:32 PM EDT  Date of encounter:  9/4/2023  Independent Historian/Clinical History and Information was obtained by:   Patient    History is limited by: Dementia, Altered Mental Status    Chief Complaint: Respiratory distress      History of Present Illness:  Patient is a 83 y.o. year old male who presents to the emergency department for evaluation of respiratory distress per nursing report.  Patient resides in a nursing facility with a history of COPD and CHF.  Reportedly sent for respiratory distress.  Also has a history of dysphagia/dysarthria presumably due to CVA along with Alzheimer's dementia.  Patient denies any complaints but history is of course limited.    HPI    Patient Care Team  Primary Care Provider: Frank Llanes MD    Past Medical History:     No Known Allergies  Past Medical History:   Diagnosis Date    Acute renal failure with pathological lesion in kidney     Alzheimer's dementia with behavioral disturbance     Anemia, vitamin B12 deficiency     Anxiety     Anxiety disorder due to known physiological condition     Arthritis     Benign neoplasm of prostate     Cannot walk     CHF (congestive heart failure)     Constipation     Coronary atherosclerosis of native coronary artery     CVA (cerebral vascular accident)     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 (CVA)     Edema     Essential hypertension, benign     Gastroesophageal reflux disease without esophagitis     GERD (gastroesophageal reflux disease)     High blood pressure     Hirsutism     HTN (hypertension)     Hypercalcemia     Hyperkalemia     Hyperlipemia     Hyperlipidemia     Hypokalemia     Ingrowing toenail 09/21/2018    Left foot pain 09/21/2018    Nutritional deficiency     Osteoarthritis of right knee 08/22/2016    Pneumonia     Presence of right artificial knee joint     Primary localized osteoarthrosis     PVD (peripheral vascular disease)     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     Right foot pain 09/21/2018    Senile dementia, uncomplicated     Severe sinus bradycardia     Shock, septic     Sleep apnea     Thrombosis of left femoral vein     Tinea unguium 09/21/2018    Vitamin B12 deficiency anemia      Past Surgical History:   Procedure Laterality Date    ENDOSCOPY W/ PEG TUBE PLACEMENT N/A 6/8/2022    Procedure: ESOPHAGOGASTRODUODENOSCOPY WITH PERCUTANEOUS ENDOSCOPIC GASTROSTOMY TUBE INSERTION WITH ANESTHESIA;  Surgeon: Yanelis Mistry MD;  Location: [facility] ENDOSCOPY;  Service: Gastroenterology;  Laterality: N/A;  PEG TUBE PLACEMENT    ENDOSCOPY W/ PEG TUBE PLACEMENT N/A 7/3/2022    Procedure: ESOPHAGOGASTRODUODENOSCOPY WITH PERCUTANEOUS ENDOSCOPIC GASTROSTOMY TUBE INSERTION WITH ANESTHESIA;  Surgeon: Bam Melo MD;  Location: [facility] ENDOSCOPY;  Service: Gastroenterology;  Laterality: N/A;  PEG TUBE PLACEMENT    ENDOSCOPY W/ PEG TUBE PLACEMENT N/A 10/26/2022    Procedure: ESOPHAGOGASTRODUODENOSCOPY WITH PERCUTANEOUS ENDOSCOPIC GASTROSTOMY TUBE INSERTION WITH ANESTHESIA;  Surgeon: Yanelis Mistry MD;  Location: [facility] ENDOSCOPY;  Service: Gastroenterology;  Laterality: N/A;  PERCUTANEOUS ENDOSCOPIC GASTROSTOMY TUBE PLACEMENT     Family History   Family history unknown: Yes       Home Medications:  Prior to Admission medications    Medication Sig Start Date End Date Taking? Authorizing Provider   acetaminophen (TYLENOL) 325 MG tablet Take 650 mg by mouth 3 (Three) Times a Day.    Provider, MD Nanci   Alum & Mag Hydroxide-Simeth 200-200-25 MG chewable tablet Administer  per G tube Every 6 (Six) Hours As Needed for Indigestion or Heartburn. G- tube 1 tab every  6 hrs    ProviderNanci MD   apixaban (ELIQUIS) 5 MG tablet tablet Administer 1 tablet per G tube 2 (Two) Times a Day. Indications: DVT/PE (active thrombosis) 10/27/22   Thiago Soares MD   aspirin 81 MG chewable tablet Chew 1 tablet Daily. 4/19/22   Aranza Herzog MD busPIRone  (BUSPAR) 10 MG tablet Take 10 mg by mouth 2 (Two) Times a Day.    Nanci Shafer MD   cyanocobalamin 1000 MCG/ML injection Inject 1 mL into the appropriate muscle as directed by prescriber Every 30 (Thirty) Days. On the 5th of the month    Nanci Shafer MD   Diclofenac Sodium (VOLTAREN) 1 % gel gel Apply 4 g topically to the appropriate area as directed 2 (Two) Times a Day As Needed (Bilateral hands and shoulders).    Nanci Shafer MD   docusate sodium (COLACE) 50 mg/5 mL liquid Administer 25 mL per G tube Daily.    Nanci Shafer MD   famotidine (PEPCID) 40 MG tablet Take 1 tablet by mouth Every Night.    Nanci Shafer MD   furosemide (Lasix) 20 MG tablet Take 1 tablet by mouth Daily. 3/3/22   Eric Rivas MD   ipratropium-albuterol (DUO-NEB) 0.5-2.5 mg/3 ml nebulizer Take 3 mL by nebulization Every 4 (Four) Hours As Needed for Wheezing.    Nanci Shafer MD   magnesium hydroxide (MILK OF MAGNESIA) 400 MG/5ML suspension Administer 30 mL per G tube Daily As Needed for Constipation.    Nanci Shafer MD   melatonin 5 MG tablet tablet Administer 1 tablet per G tube every night at bedtime.    Nanci Shafer MD   metoprolol tartrate (LOPRESSOR) 25 MG tablet Administer 0.5 tablets per G tube Every 12 (Twelve) Hours. 10/27/22   Thiago Soares MD   mineral oil-hydrophilic petrolatum (AQUAPHOR) ointment Apply 1 application topically to the appropriate area as directed Daily.    Nanci Shafer MD   mupirocin (BACTROBAN) 2 % cream Apply 1 application topically to the appropriate area as directed 2 (Two) Times a Day.    Nanci Shafer MD   polyethylene glycol (MIRALAX) 17 g packet Take 17 g by mouth Daily. G-tube    Nanci Shafer MD   trolamine salicylate (ASPERCREME) 10 % cream Apply 1 application topically to the appropriate area as directed 2 (Two) Times a Day As Needed for Muscle / Joint Pain.    Provider, Historical, MD   witch  "luis-glycerin (TUCKS) pad Apply 1 pad topically to the appropriate area as directed As Needed for Irritation or Hemorrhoids.    Provider, Nanci, MD        Social History:   Social History     Tobacco Use    Smoking status: Never    Smokeless tobacco: Never   Vaping Use    Vaping Use: Never used   Substance Use Topics    Alcohol use: No    Drug use: Never         Review of Systems:  Review of Systems   Unable to perform ROS: Dementia      Physical Exam:  /79   Pulse (!) 131   Temp (!) 102.5 °F (39.2 °C) (Rectal)   Resp (!) 33   Ht 162.6 cm (64\")   Wt 70.9 kg (156 lb 4.9 oz)   SpO2 93%   BMI 26.83 kg/m²     Physical Exam  Vitals and nursing note reviewed.   Constitutional:       General: He is awake.      Appearance: Normal appearance.   HENT:      Head: Normocephalic and atraumatic.      Nose: Nose normal.      Mouth/Throat:      Mouth: Mucous membranes are moist.   Eyes:      Extraocular Movements: Extraocular movements intact.      Pupils: Pupils are equal, round, and reactive to light.   Cardiovascular:      Rate and Rhythm: Regular rhythm. Tachycardia present.      Heart sounds: Normal heart sounds.   Pulmonary:      Effort: Pulmonary effort is normal. No respiratory distress.      Breath sounds: Normal breath sounds. No wheezing, rhonchi or rales.   Abdominal:      General: Bowel sounds are normal.      Palpations: Abdomen is soft.      Tenderness: There is no abdominal tenderness. There is no guarding or rebound.      Comments: No rigidity   Musculoskeletal:         General: No tenderness. Normal range of motion.      Cervical back: Normal range of motion and neck supple.   Skin:     General: Skin is warm and dry.      Coloration: Skin is not jaundiced.   Neurological:      General: No focal deficit present.      Mental Status: He is alert.   Psychiatric:         Attention and Perception: Attention normal.         Mood and Affect: Mood normal.          "       Procedures:  Procedures      Medical Decision Making:      Comorbidities that affect care:    COPD, CHF, GERD, CVA, DVT, hypertension, dementia    External Notes reviewed:    Encounter review: Hospital admission 10/22/2022 for severe sepsis, complication of feeding tube      The following orders were placed and all results were independently analyzed by me:  Orders Placed This Encounter   Procedures    Blood Culture - Blood,    Blood Culture - Blood,    Influenza Antigen, Rapid - Swab, Nasopharynx    COVID-19,CEPHEID/BOY,COR/SIMONE/PAD/REYNOLD/MAD IN-HOUSE(OR EMERGENT/ADD-ON),NP SWAB IN TRANSPORT MEDIA 3-4 HR TAT, RT-PCR - Swab, Nasopharynx    XR Chest 1 View    [place] Draw    Comprehensive Metabolic Panel    Protime-INR    aPTT    Magnesium    Phosphorus    Lactic Acid, Plasma    C-reactive Protein    Urinalysis With Microscopic If Indicated (No Culture) - Urine, Catheter    CBC Auto Differential    ABG with Co-Ox and Electrolytes    BNP    NPO Diet NPO Type: Sips with Meds    Undress & Gown    Continuous Pulse Oximetry    Measure Blood Pressure    Vital Signs    Verify & Document Antipyretic Administration    Reassess & Document Temperature 30-60 Minutes After Antipyretic Administration    Hospitalist (on-call MD unless specified)    Oxygen Therapy- Nasal Cannula; Titrate 1-6 LPM Per SpO2; 90 - 95%    ECG 12 Lead Other; Sepsis    Insert Large Bore Peripheral IV    Insert 2nd Large Bore Peripheral IV    Inpatient Admission    CBC & Differential    Green Top (Gel)    Lavender Top    Gold Top - SST    Light Blue Top       Medications Given in the Emergency Department:  Medications   sodium chloride 0.9 % flush 10 mL (has no administration in time range)   acetaminophen (TYLENOL) suppository 650 mg (has no administration in time range)   sodium chloride 0.9 % bolus 1,000 mL (1,000 mL Intravenous New Bag 9/4/23 3437)   cefTRIAXone (ROCEPHIN) IVPB 1,000 mg (0 mg Intravenous Stopped 9/4/23 1729)   AZITHROMYCIN 500  MG/250 ML 0.9% NS IVPB (vial-mate) (500 mg Intravenous New Bag 9/4/23 1828)        ED Course:    ED Course as of 09/04/23 1935   Mon Sep 04, 2023   1534 I have personally interpreted the EKG today and it shows no evidence of any acute ischemia or heart arrhythmia.  Sinus tachycardia, heart rate 143 [RP]   1934 Blood pressure at the time of admission 110/54 with a MAP of 69.  Heart rate is mildly improved with a heart rate of 126. [RP]      ED Course User Index  [RP] Fawad Junior MD       Labs:    Lab Results (last 24 hours)       Procedure Component Value Units Date/Time    CBC & Differential [[id number]]  (Abnormal) Collected: 09/04/23 1507    Specimen: Blood Updated: 09/04/23 1656    Narrative:      The following orders were created for panel order CBC & Differential.  Procedure                               Abnormality         Status                     ---------                               -----------         ------                     CBC Auto Differential[[id number]]        Abnormal            Final result               Scan Slide[[id number]]                                                                    Please view results for these tests on the individual orders.    C-reactive Protein [[id number]]  (Abnormal) Collected: 09/04/23 1507    Specimen: Blood Updated: 09/04/23 1657     C-Reactive Protein 14.70 mg/dL     Blood Culture - Blood, Arm, Right [[id number]] Collected: 09/04/23 1507    Specimen: Blood from Arm, Right Updated: 09/04/23 1630    Blood Culture - Blood, Arm, Left [[id number]] Collected: 09/04/23 1507    Specimen: Blood from Arm, Left Updated: 09/04/23 1630    CBC Auto Differential [[id number]]  (Abnormal) Collected: 09/04/23 1507    Specimen: Blood Updated: 09/04/23 1656     WBC 18.13 10*3/mm3      RBC 5.21 10*6/mm3      Hemoglobin 16.1 g/dL      Hematocrit 48.0 %      MCV 92.1 fL      MCH 30.9 pg      MCHC 33.5 g/dL      RDW 15.1 %      RDW-SD 50.6 fl      MPV 13.0 fL      Platelets 204  10*3/mm3      Neutrophil % 75.7 %      Lymphocyte % 12.4 %      Monocyte % 10.8 %      Eosinophil % 0.0 %      Basophil % 0.3 %      Immature Grans % 0.8 %      Neutrophils, Absolute 13.73 10*3/mm3      Lymphocytes, Absolute 2.25 10*3/mm3      Monocytes, Absolute 1.95 10*3/mm3      Eosinophils, Absolute 0.00 10*3/mm3      Basophils, Absolute 0.06 10*3/mm3      Immature Grans, Absolute 0.14 10*3/mm3      nRBC 0.0 /100 WBC     BNP [[id number]]  (Normal) Collected: 09/04/23 1507    Specimen: Blood Updated: 09/04/23 1653     proBNP 793.4 pg/mL     Narrative:      Among patients with dyspnea, NT-proBNP is highly sensitive for the detection of acute congestive heart failure. In addition NT-proBNP of <300 pg/ml effectively rules out acute congestive heart failure with 99% negative predictive value.      ABG with Co-Ox and Electrolytes [[id number]]  (Abnormal) Collected: 09/04/23 1508    Specimen: Arterial Blood Updated: 09/04/23 1519     pH, Arterial 7.531 pH units      pCO2, Arterial 30.5 mm Hg      pO2, Arterial 68.9 mm Hg      HCO3, Arterial 25.0 mmol/L      Base Excess, Arterial 3.2 mmol/L      O2 Saturation, Arterial 94.9 %      Hemoglobin, Blood Gas 16.0 g/dL      Carboxyhemoglobin 0.8 %      Methemoglobin 0.30 %      Oxyhemoglobin 93.9 %      FHHB 5.0 %      Mitchel's Test Positive     Note --     Site Arterial: left radial     Modality Room Air     FIO2 --     Flow Rate --     Sodium, Arterial 156.1 mmol/L      Potassium, Arterial 4.70 mmol/L      Ionized Calcium, Arterial 1.12 mmol/L      Chloride, Arterial 118 mmol/L      Glucose, Arterial 174 mg/dL      Lactate, Arterial 1.95 mmol/L      PO2/FIO2 --    Comprehensive Metabolic Panel [[id number]]  (Abnormal) Collected: 09/04/23 1748    Specimen: Blood Updated: 09/04/23 1818     Glucose 140 mg/dL      BUN 46 mg/dL      Creatinine 1.28 mg/dL      Sodium 156 mmol/L      Potassium 4.8 mmol/L      Chloride 119 mmol/L      CO2 25.5 mmol/L      Calcium 9.7 mg/dL      Total  Protein 8.4 g/dL      Albumin 3.5 g/dL      ALT (SGPT) 23 U/L      AST (SGOT) 35 U/L      Alkaline Phosphatase 87 U/L      Total Bilirubin 1.7 mg/dL      Globulin 4.9 gm/dL      A/G Ratio 0.7 g/dL      BUN/Creatinine Ratio 35.9     Anion Gap 11.5 mmol/L      eGFR 55.9 mL/min/1.73     Narrative:      GFR Normal >60  Chronic Kidney Disease <60  Kidney Failure <15    The GFR formula is only valid for adults with stable renal function between ages 18 and 70.    Protime-INR [[id number]]  (Abnormal) Collected: 09/04/23 1748    Specimen: Blood Updated: 09/04/23 1818     Protime 16.5 Seconds      INR 1.33    Narrative:      Suggested Therapeutic Ranges For Oral Anticoagulant Therapy:  Level of Therapy                      INR Target Range  Standard Dose                            2.0-3.0  High Dose                                2.5-3.5  Patients not receiving anticoagulant  Therapy Normal Range                     0.86-1.15    aPTT [[id number]]  (Normal) Collected: 09/04/23 1748    Specimen: Blood Updated: 09/04/23 1818     PTT 32.4 seconds     Magnesium [[id number]]  (Abnormal) Collected: 09/04/23 1748    Specimen: Blood Updated: 09/04/23 1818     Magnesium 2.7 mg/dL     Phosphorus [[id number]]  (Normal) Collected: 09/04/23 1748    Specimen: Blood Updated: 09/04/23 1818     Phosphorus 2.9 mg/dL     Lactic Acid, Plasma [[id number]]  (Normal) Collected: 09/04/23 1748    Specimen: Blood Updated: 09/04/23 1813     Lactate 1.9 mmol/L     Influenza Antigen, Rapid - Swab, Nasopharynx [[id number]]  (Normal) Collected: 09/04/23 1749    Specimen: Swab from Nasopharynx Updated: 09/04/23 1818     Influenza A Ag, EIA Negative     Influenza B Ag, EIA Negative    COVID-19,CEPHEID/BOY,COR/SIMONE/PAD/REYNOLD/MAD IN-HOUSE(OR EMERGENT/ADD-ON),NP SWAB IN TRANSPORT MEDIA 3-4 HR TAT, RT-PCR - Swab, Nasopharynx [[id number]]  (Normal) Collected: 09/04/23 1749    Specimen: Swab from Nasopharynx Updated: 09/04/23 1842     COVID19 Not Detected    Narrative:       Fact sheet for providers: https://www.fda.gov/media/508886/download     Fact sheet for patients: https://www.fda.gov/media/136936/download  Fact sheet for providers: https://www.fda.gov/media/231093/download     Fact sheet for patients: https://www.fda.gov/media/336579/download    Urinalysis With Microscopic If Indicated (No Culture) - Urine, Catheter [[id number]] Collected: 09/04/23 1919    Specimen: Urine, Catheter Updated: 09/04/23 1926             Imaging:    XR Chest 1 View    Result Date: 9/4/2023  PROCEDURE: XR CHEST 1 VW  COMPARISON: [facility], CR, XR CHEST 1 VW, 10/22/2022, 22:07.  INDICATIONS: SHORTNESS OF BREATH.  FINDINGS:  There are low lung volumes.  The patient is rotated.  Cardiac silhouette is unchanged.  There is aortic arch atherosclerotic calcification.  There is streaky left basilar retrocardiac airspace disease representing atelectasis or pneumonia.  There is no pleural effusion or pneumothorax.  There are degenerative changes of the thoracic spine.  There are degenerative changes of the bilateral glenohumeral joints.        1. Low lung volumes with streaky left basilar retrocardiac airspace disease representing atelectasis or pneumonia.        ISAIAH LOZANO MD       Electronically Signed and Approved By: ISAIAH LOZANO MD on 9/04/2023 at 16:08                Differential Diagnosis and Discussion:    Dyspnea: Differential diagnosis includes but is not limited to metabolic acidosis, neurological disorders, psychogenic, asthma, pneumothorax, upper airway obstruction, COPD, pneumonia, noncardiogenic pulmonary edema, interstitial lung disease, anemia, congestive heart failure, and pulmonary embolism  Fever: Based on the complaint of fever, differential diagnosis includes but is not limited to meningitis, pneumonia, pyelonephritis, acute uti,  systemic immune response syndrome, sepsis, viral syndrome, fungal infection, tick born illness and other bacterial infections.    All  labs were reviewed and interpreted by me.  All X-rays impressions were independently interpreted by me.  EKG was interpreted by me.    MDM  Number of Diagnoses or Management Options  Diagnosis management comments: Sepsis criteria was met in the emergency department and the Sepsis protocol (including antibiotic administration) was initiated.      SIRS criteria considered:   1.  Temperature > 100.4 or <98.6    2.  Heart Rate > 90    3.  Respiratory Rate > 22    4.  WBC > 12K or <4K.             Severe Sepsis:     Respiratory: Mechanical Ventilation or Bipap  Hypotension: SBP > 90 or MAP < 65  Renal: Creatinine > 2  Metabolic: Lactic Acid > 2  Hematologic: Platelets < 100K or INR > 1.5  Hepatic: BILI  >  2  CNS: Sudden AMS     Septic Shock:     Severe Sepsis + Persistent hypotension or Lactic Acid > 4     Normal saline bolus, Antibiotics, and final disposition was based on these definitions.        Sepsis was recognized at 15:43 EDT      Antibiotics were ordered.     30 cc/kg bolus was not indicated.       Patient did not receive the recommended 30ml/kg fluid bolus for sepsis because it would be harmful or detrimental to the patient.    The patient has concern for fluid overload and Heart Failure.   The patient was ordered 1 L of fluids.    Total Critical Care time of 45 minutes. Total critical care time documented does not include time spent on separately billed procedures for services of nurses or physician assistants. I personally saw and examined the patient. I have reviewed all diagnostic interpretations and treatment plans as written. I was present for the key portions of any procedures performed and the inclusive time noted in any critical care statement. Critical care time includes patient management by me, time spent at the patients bedside,  time to review lab and imaging results, discussing patient care, documentation in the medical record, and time spent with family or caregiver.        Amount and/or  Complexity of Data Reviewed  Decide to obtain previous medical records or to obtain history from someone other than the patient: yes         Critical Care Note: Total Critical Care time of 45 minutes. Total critical care time documented does not include time spent on separately billed procedures for services of nurses or physician assistants. I personally saw and examined the patient. I have reviewed all diagnostic interpretations and treatment plans as written. I was present for the key portions of any procedures performed and the inclusive time noted in any critical care statement. Critical care time includes patient management by me, time spent at the patients bedside,  time to review lab and imaging results, discussing patient care, documentation in the medical record, and time spent with family or caregiver.    Patient Care Considerations:          Consultants/Shared Management Plan:    Hospitalist: I have discussed the case with Dr. Jimenez who agrees to accept the patient for admission.    Social Determinants of Health:    Patient is a nursing home/assisted living resident and has reliable access to care.      Disposition and Care Coordination:    Admit:   Through independent evaluation of the patient's history, physical, and imperical data, the patient meets criteria for observation/admission to the hospital.        Final diagnoses:   Sepsis with acute renal failure without septic shock, due to unspecified organism, unspecified acute renal failure type        ED Disposition       ED Disposition   Decision to Admit    Condition   --    Comment   Level of Care: Telemetry [5]   Diagnosis: Sepsis [[id number]]   Certification: I Certify That Inpatient Hospital Services Are Medically Necessary For Greater Than 2 Midnights                 This medical record created using voice recognition software.             Fawad Junior MD  09/04/23 1002

## 2023-09-04 NOTE — ED TRIAGE NOTES
"Arrives from [facility] for \"respiratory distress\". Upon arrival EMS states pts O2 @ 95% RA, febrile, tachycardia.  A&O to sell which his baseline.  Hx of COPD.   "

## 2023-09-05 LAB
ALBUMIN SERPL-MCNC: 3 G/DL (ref 3.5–5.2)
ALBUMIN/GLOB SERPL: 0.7 G/DL
ALP SERPL-CCNC: 76 U/L (ref 39–117)
ALT SERPL W P-5'-P-CCNC: 20 U/L (ref 1–41)
ANION GAP SERPL CALCULATED.3IONS-SCNC: 12.5 MMOL/L (ref 5–15)
AST SERPL-CCNC: 34 U/L (ref 1–40)
BASOPHILS # BLD AUTO: 0.04 10*3/MM3 (ref 0–0.2)
BASOPHILS NFR BLD AUTO: 0.2 % (ref 0–1.5)
BILIRUB SERPL-MCNC: 2 MG/DL (ref 0–1.2)
BUN SERPL-MCNC: 45 MG/DL (ref 8–23)
BUN/CREAT SERPL: 34.9 (ref 7–25)
CALCIUM SPEC-SCNC: 8.9 MG/DL (ref 8.6–10.5)
CHLORIDE SERPL-SCNC: 119 MMOL/L (ref 98–107)
CO2 SERPL-SCNC: 23.5 MMOL/L (ref 22–29)
CREAT SERPL-MCNC: 1.29 MG/DL (ref 0.76–1.27)
DEPRECATED RDW RBC AUTO: 49.1 FL (ref 37–54)
EGFRCR SERPLBLD CKD-EPI 2021: 55.4 ML/MIN/1.73
EOSINOPHIL # BLD AUTO: 0 10*3/MM3 (ref 0–0.4)
EOSINOPHIL NFR BLD AUTO: 0 % (ref 0.3–6.2)
ERYTHROCYTE [DISTWIDTH] IN BLOOD BY AUTOMATED COUNT: 14.8 % (ref 12.3–15.4)
GLOBULIN UR ELPH-MCNC: 4.6 GM/DL
GLUCOSE SERPL-MCNC: 134 MG/DL (ref 65–99)
HCT VFR BLD AUTO: 40.9 % (ref 37.5–51)
HGB BLD-MCNC: 13.9 G/DL (ref 13–17.7)
IMM GRANULOCYTES # BLD AUTO: 0.09 10*3/MM3 (ref 0–0.05)
IMM GRANULOCYTES NFR BLD AUTO: 0.5 % (ref 0–0.5)
LYMPHOCYTES # BLD AUTO: 2.5 10*3/MM3 (ref 0.7–3.1)
LYMPHOCYTES NFR BLD AUTO: 13.1 % (ref 19.6–45.3)
MCH RBC QN AUTO: 31 PG (ref 26.6–33)
MCHC RBC AUTO-ENTMCNC: 34 G/DL (ref 31.5–35.7)
MCV RBC AUTO: 91.1 FL (ref 79–97)
MONOCYTES # BLD AUTO: 1.98 10*3/MM3 (ref 0.1–0.9)
MONOCYTES NFR BLD AUTO: 10.4 % (ref 5–12)
NEUTROPHILS NFR BLD AUTO: 14.44 10*3/MM3 (ref 1.7–7)
NEUTROPHILS NFR BLD AUTO: 75.8 % (ref 42.7–76)
NRBC BLD AUTO-RTO: 0 /100 WBC (ref 0–0.2)
PLATELET # BLD AUTO: 165 10*3/MM3 (ref 140–450)
PMV BLD AUTO: 13.5 FL (ref 6–12)
POTASSIUM SERPL-SCNC: 4.9 MMOL/L (ref 3.5–5.2)
PROT SERPL-MCNC: 7.6 G/DL (ref 6–8.5)
QT INTERVAL: 269 MS
QTC INTERVAL: 421 MS
RBC # BLD AUTO: 4.49 10*6/MM3 (ref 4.14–5.8)
SODIUM SERPL-SCNC: 155 MMOL/L (ref 136–145)
WBC NRBC COR # BLD: 19.05 10*3/MM3 (ref 3.4–10.8)

## 2023-09-05 PROCEDURE — 25010000002 CEFTRIAXONE PER 250 MG: Performed by: FAMILY MEDICINE

## 2023-09-05 PROCEDURE — 85025 COMPLETE CBC W/AUTO DIFF WBC: CPT | Performed by: INTERNAL MEDICINE

## 2023-09-05 PROCEDURE — 25010000002 PIPERACILLIN SOD-TAZOBACTAM PER 1 G: Performed by: INTERNAL MEDICINE

## 2023-09-05 PROCEDURE — 80053 COMPREHEN METABOLIC PANEL: CPT | Performed by: INTERNAL MEDICINE

## 2023-09-05 PROCEDURE — 99232 SBSQ HOSP IP/OBS MODERATE 35: CPT | Performed by: FAMILY MEDICINE

## 2023-09-05 PROCEDURE — 25010000002 AZITHROMYCIN PER 500 MG: Performed by: FAMILY MEDICINE

## 2023-09-05 RX ORDER — CEFTRIAXONE SODIUM 1 G/50ML
1000 INJECTION, SOLUTION INTRAVENOUS ONCE
Status: COMPLETED | OUTPATIENT
Start: 2023-09-05 | End: 2023-09-05

## 2023-09-05 RX ORDER — ASPIRIN 81 MG/1
81 TABLET, CHEWABLE ORAL DAILY
Status: DISCONTINUED | OUTPATIENT
Start: 2023-09-05 | End: 2023-09-11 | Stop reason: HOSPADM

## 2023-09-05 RX ADMIN — AZITHROMYCIN 500 MG: 500 INJECTION, POWDER, LYOPHILIZED, FOR SOLUTION INTRAVENOUS at 20:53

## 2023-09-05 RX ADMIN — PIPERACILLIN AND TAZOBACTAM 3.38 G: 3; .375 INJECTION, POWDER, LYOPHILIZED, FOR SOLUTION INTRAVENOUS at 03:55

## 2023-09-05 RX ADMIN — SENNOSIDES AND DOCUSATE SODIUM 2 TABLET: 50; 8.6 TABLET ORAL at 20:37

## 2023-09-05 RX ADMIN — PIPERACILLIN AND TAZOBACTAM 3.38 G: 3; .375 INJECTION, POWDER, LYOPHILIZED, FOR SOLUTION INTRAVENOUS at 12:52

## 2023-09-05 RX ADMIN — Medication 10 ML: at 08:41

## 2023-09-05 RX ADMIN — ACETAMINOPHEN 650 MG: 325 TABLET ORAL at 20:37

## 2023-09-05 RX ADMIN — ASPIRIN 81 MG: 81 TABLET, CHEWABLE ORAL at 08:40

## 2023-09-05 RX ADMIN — APIXABAN 5 MG: 5 TABLET, FILM COATED ORAL at 20:37

## 2023-09-05 RX ADMIN — SENNOSIDES AND DOCUSATE SODIUM 2 TABLET: 50; 8.6 TABLET ORAL at 08:40

## 2023-09-05 RX ADMIN — Medication 10 ML: at 23:02

## 2023-09-05 RX ADMIN — APIXABAN 5 MG: 5 TABLET, FILM COATED ORAL at 08:40

## 2023-09-05 RX ADMIN — SODIUM CHLORIDE, POTASSIUM CHLORIDE, SODIUM LACTATE AND CALCIUM CHLORIDE 100 ML/HR: 600; 310; 30; 20 INJECTION, SOLUTION INTRAVENOUS at 12:52

## 2023-09-05 RX ADMIN — CEFTRIAXONE SODIUM 1000 MG: 1 INJECTION, SOLUTION INTRAVENOUS at 18:47

## 2023-09-05 NOTE — PROGRESS NOTES
[facility]   Hospitalist Progress Note  Date: 2023  Patient Name: Jordin Menon  : 1940  MRN: [id number]  Date of admission: 2023      Subjective   Subjective     Summary:  past medical history of Alzheimer's dementia, CVA that presents to the emergency department for evaluation of shortness of breath from nursing facility.  Patient has limited communication at baseline but is usually oriented x1.  Currently he is not answering my questions or following simple commands and is somnolent on exam.  History was obtained from ER signout and chart review.  Patient reportedly seemed short of breath at the nursing home prompting them to call EMS.  In the emergency department he was also found to be febrile and tachycardic along with leukocytosis.  He has been started on Rocephin and azithromycin for suspected pneumonia.  UA pending at this time.  Cultures have been obtained he will be admitted for ongoing monitoring and management.  Patient is unable to provide any other review of systems at this time.     Interval Followup: fevers improving.  HR improving.  Asking for a shave.  Difficult to understand.        Objective   Objective     Vitals:   Temp:  [98.5 °F (36.9 °C)-102.5 °F (39.2 °C)] 98.5 °F (36.9 °C)  Heart Rate:  [112-131] 116  Resp:  [22-33] 22  BP: (104-134)/(62-91) 104/91  Physical Exam   Constitutional: A somnolent, not answering questions              Eyes: Pupils equal, sclerae anicteric, no conjunctival injection              HENT: NCAT, mucous membranes moist              Neck: Supple, no thyromegaly, no lymphadenopathy, trachea midline              Respiratory: Diminished bilaterally, nonlabored on room air              Cardiovascular: RRR, no murmurs, rubs, or gallops, palpable pedal pulses bilaterally              Gastrointestinal: Positive bowel sounds, soft, nontender, nondistended              Musculoskeletal: contractures BLE              Psychiatric: Not answering questions or  following commands              Neurologic: Not answering questions or following commands.  Minimally verbal at baseline.          Assessment & Plan   Assessment / Plan     Assessment/Plan:  Sepsis, suspected pneumonia   Alzheimer's dementia  Hypertension     Restart Rocephin and azithromycin.    Serial labs.    Follow cultures already obtained.  Supportive care.    Resume home medications once verified  Holding home medications in setting of sepsis for the time being add back home medications as appropriate  Discussed plan with RN.  DVT prophylaxis:  Medical DVT prophylaxis orders are present.    CODE STATUS:   Code Status (Patient has no pulse and is not breathing): CPR (Attempt to Resuscitate)  Medical Interventions (Patient has pulse or is breathing): Full Support

## 2023-09-05 NOTE — CONSULTS
Purpose of the visit was to evaluate for: goals of care/advanced care planning. Spoke with RN, patient, and family and discussed palliative care, goals of care, resuscitation status, and pallitus care .      Assessment: Patient is currently located on [facility], palliative care nurse collaborated with primary RN regarding patient's current condition.  [facility]  He has a past medical history of Alzheimer's dementia, CVA and presented to ED with complaints of shortness of air.  He was febrile on 9/4 with a temperature of 102.9.  He was afebrile this morning.  He is NPO, has a peg tube but no feedings are being given at this time.      Patient is alert, pleasantly confused and unable to follow commands.  He is on room air and has no signs of respiratory discomfort noted.  He is resting in the hospital bed, there is no family at bedside, he is restless and pulling his gown up over his mouth.  He repeatedly asked to be shaved and to be pulled up in the bed.  Staff notified of his request.     Called patient's HCS Shandra introduced palliative care and explained my role.  We discussed patient's current condition, treatment options and resuscitation.  She requests patient remain FULL CODE and FULL TREATMENT.  She desires him to return to [facility] at time of discharge.  We discussed pallitus at the facility and she was open to a referral for more information.        Recommendations/Plan:  Pallitus referral made .    Tasks Completed: Code Status clarification and Emotional Support.      Birgit MOLINA, RN, CHPN

## 2023-09-05 NOTE — PLAN OF CARE
Problem: Adult Inpatient Plan of Care  Goal: Plan of Care Review  Outcome: Ongoing, Progressing  Flowsheets (Taken 9/5/2023 0418)  Progress: no change  Plan of Care Reviewed With: patient  Outcome Evaluation: Patient arrived during shift. Patient alerted to self. Temperature was elevated throughout shift. Treated per MAR. Recieved antibiotic therapy per MAR. Completed wound care. Continue with plan of care.   Goal Outcome Evaluation:  Plan of Care Reviewed With: patient        Progress: no change  Outcome Evaluation: Patient arrived during shift. Patient alerted to self. Temperature was elevated throughout shift. Treated per MAR. Recieved antibiotic therapy per MAR. Completed wound care. Continue with plan of care.

## 2023-09-05 NOTE — PLAN OF CARE
Goal Outcome Evaluation:  Plan of Care Reviewed With: patient           Outcome Evaluation: Pt alert to self. No temperature throughout shift. No evidence of pain. Pt complained of itchy back. Treated with back rub. Antibiotic therapy per MAR. Continue with plan of care.

## 2023-09-06 PROCEDURE — 25010000002 AZITHROMYCIN PER 500 MG: Performed by: FAMILY MEDICINE

## 2023-09-06 PROCEDURE — 99232 SBSQ HOSP IP/OBS MODERATE 35: CPT | Performed by: FAMILY MEDICINE

## 2023-09-06 PROCEDURE — 97161 PT EVAL LOW COMPLEX 20 MIN: CPT

## 2023-09-06 PROCEDURE — 25010000002 CEFTRIAXONE PER 250 MG: Performed by: FAMILY MEDICINE

## 2023-09-06 PROCEDURE — 97165 OT EVAL LOW COMPLEX 30 MIN: CPT

## 2023-09-06 RX ORDER — CEFTRIAXONE SODIUM 1 G/50ML
1000 INJECTION, SOLUTION INTRAVENOUS EVERY 24 HOURS
Status: COMPLETED | OUTPATIENT
Start: 2023-09-06 | End: 2023-09-08

## 2023-09-06 RX ADMIN — METOPROLOL TARTRATE 12.5 MG: 25 TABLET, FILM COATED ORAL at 14:30

## 2023-09-06 RX ADMIN — AZITHROMYCIN 500 MG: 500 INJECTION, POWDER, LYOPHILIZED, FOR SOLUTION INTRAVENOUS at 10:29

## 2023-09-06 RX ADMIN — CEFTRIAXONE SODIUM 1000 MG: 1 INJECTION, SOLUTION INTRAVENOUS at 18:14

## 2023-09-06 RX ADMIN — SENNOSIDES AND DOCUSATE SODIUM 2 TABLET: 50; 8.6 TABLET ORAL at 21:05

## 2023-09-06 RX ADMIN — APIXABAN 5 MG: 5 TABLET, FILM COATED ORAL at 21:04

## 2023-09-06 RX ADMIN — APIXABAN 5 MG: 5 TABLET, FILM COATED ORAL at 08:39

## 2023-09-06 RX ADMIN — ASPIRIN 81 MG: 81 TABLET, CHEWABLE ORAL at 08:39

## 2023-09-06 RX ADMIN — SODIUM CHLORIDE, POTASSIUM CHLORIDE, SODIUM LACTATE AND CALCIUM CHLORIDE 100 ML/HR: 600; 310; 30; 20 INJECTION, SOLUTION INTRAVENOUS at 08:39

## 2023-09-06 RX ADMIN — Medication 10 ML: at 08:39

## 2023-09-06 RX ADMIN — METOPROLOL TARTRATE 12.5 MG: 25 TABLET, FILM COATED ORAL at 21:05

## 2023-09-06 RX ADMIN — ACETAMINOPHEN 650 MG: 325 TABLET ORAL at 21:05

## 2023-09-06 NOTE — PROGRESS NOTES
" [facility]   Hospitalist Progress Note  Date: 2023  Patient Name: Jordin Menon  : 1940  MRN: [id number]  Date of admission: 2023      Subjective   Subjective     Summary:  past medical history of Alzheimer's dementia, CVA that presents to the emergency department for evaluation of shortness of breath from nursing facility.  Patient has limited communication at baseline but is usually oriented x1.  Currently he is not answering my questions or following simple commands and is somnolent on exam.  History was obtained from ER signout and chart review.  Patient reportedly seemed short of breath at the nursing home prompting them to call EMS.  In the emergency department he was also found to be febrile and tachycardic along with leukocytosis.  He has been started on Rocephin and azithromycin for suspected pneumonia.  UA pending at this time.  Cultures have been obtained he will be admitted for ongoing monitoring and management.  Patient is unable to provide any other review of systems at this time.     Interval Followup: fevers continue improve.  HR continues improve.  Saying \"hands\" but I can't tell what the problem is with his hands.  Difficult to understand.        Objective   Objective     Vitals:   Temp:  [97.3 °F (36.3 °C)-101 °F (38.3 °C)] 97.5 °F (36.4 °C)  Heart Rate:  [] 81  Resp:  [18-22] 20  BP: (102-122)/(50-96) 114/96  Physical Exam   Constitutional: A somnolent, not answering questions              Eyes: Pupils equal, sclerae anicteric, no conjunctival injection              HENT: NCAT, mucous membranes moist              Neck: Supple, no thyromegaly, no lymphadenopathy, trachea midline              Respiratory: Diminished bilaterally, nonlabored on room air              Cardiovascular: RRR, no murmurs, rubs, or gallops, palpable pedal pulses bilaterally              Gastrointestinal: Positive bowel sounds, soft, nontender, nondistended              Musculoskeletal: contractures " BLE              Psychiatric: Not answering questions or following commands              Neurologic: Not answering questions or following commands.  Minimally verbal at baseline.          Assessment & Plan   Assessment / Plan     Assessment/Plan:  Sepsis, suspected pneumonia   Alzheimer's dementia  Hypertension     Continue Rocephin and azithromycin.    Lab holiday today.  Repeat labs in am    Follow cultures already obtained.  Blood cultures so far negative  Supportive care.  Oral care for dry mouth and mucous   Resume home medications once verified  Holding home medications in setting of sepsis for the time being add back home medications as appropriate  Discussed plan with RN.  DVT prophylaxis:  Medical DVT prophylaxis orders are present.    CODE STATUS:   Code Status (Patient has no pulse and is not breathing): CPR (Attempt to Resuscitate)  Medical Interventions (Patient has pulse or is breathing): Full Support

## 2023-09-06 NOTE — THERAPY EVALUATION
Patient Name: Jordin Menon  : 1940    MRN: [id number]                              Today's Date: 2023       Admit Date: 2023    Visit Dx:     ICD-10-CM ICD-9-CM   1. Sepsis with acute renal failure without septic shock, due to unspecified organism, unspecified acute renal failure type  A41.9 038.9    R65.20 995.92    N17.9 584.9   2. Difficulty walking  R26.2 719.7   3. Decreased activities of daily living (ADL)  Z78.9 V49.89     Patient Active Problem List   Diagnosis    Primary osteoarthritis of right knee    Status post total right knee replacement    Essential hypertension    Shortness of breath    BRYANT (acute kidney injury)    Hypokalemia    Hypernatremia    Symptomatic bradycardia    Hypothermia, initial encounter    Acute CVA (cerebrovascular accident)    Severe sepsis    Severe malnutrition    Hyperkalemia    Anemia    Dislodged gastrostomy tube    Dysphagia, oropharyngeal    Sepsis     Past Medical History:   Diagnosis Date    Acute renal failure with pathological lesion in kidney     Alzheimer's dementia with behavioral disturbance     Anemia, vitamin B12 deficiency     Anxiety     Anxiety disorder due to known physiological condition     Arthritis     Benign neoplasm of prostate     Cannot walk     CHF (congestive heart failure)     Constipation     Coronary atherosclerosis of native coronary artery     CVA (cerebral vascular accident)     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 (CVA)     Edema     Essential hypertension, benign     Gastroesophageal reflux disease without esophagitis     GERD (gastroesophageal reflux disease)     High blood pressure     Hirsutism     HTN (hypertension)     Hypercalcemia     Hyperkalemia     Hyperlipemia     Hyperlipidemia     Hypokalemia     Ingrowing toenail 2018    Left foot pain 2018    Nutritional deficiency     Osteoarthritis of right knee 2016    Pneumonia     Presence of right artificial knee joint     Primary localized  osteoarthrosis     PVD (peripheral vascular disease)     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     Right foot pain 09/21/2018    Senile dementia, uncomplicated     Severe sinus bradycardia     Shock, septic     Sleep apnea     Thrombosis of left femoral vein     Tinea unguium 09/21/2018    Vitamin B12 deficiency anemia      Past Surgical History:   Procedure Laterality Date    ENDOSCOPY W/ PEG TUBE PLACEMENT N/A 6/8/2022    Procedure: ESOPHAGOGASTRODUODENOSCOPY WITH PERCUTANEOUS ENDOSCOPIC GASTROSTOMY TUBE INSERTION WITH ANESTHESIA;  Surgeon: Yanelis Mistry MD;  Location: [facility] ENDOSCOPY;  Service: Gastroenterology;  Laterality: N/A;  PEG TUBE PLACEMENT    ENDOSCOPY W/ PEG TUBE PLACEMENT N/A 7/3/2022    Procedure: ESOPHAGOGASTRODUODENOSCOPY WITH PERCUTANEOUS ENDOSCOPIC GASTROSTOMY TUBE INSERTION WITH ANESTHESIA;  Surgeon: Bam Melo MD;  Location: [facility] ENDOSCOPY;  Service: Gastroenterology;  Laterality: N/A;  PEG TUBE PLACEMENT    ENDOSCOPY W/ PEG TUBE PLACEMENT N/A 10/26/2022    Procedure: ESOPHAGOGASTRODUODENOSCOPY WITH PERCUTANEOUS ENDOSCOPIC GASTROSTOMY TUBE INSERTION WITH ANESTHESIA;  Surgeon: Yanelis Mistry MD;  Location: [facility] ENDOSCOPY;  Service: Gastroenterology;  Laterality: N/A;  PERCUTANEOUS ENDOSCOPIC GASTROSTOMY TUBE PLACEMENT      General Information       Row Name 09/06/23 1504          OT Time and Intention    Document Type evaluation  -LF     Mode of Treatment individual therapy;occupational therapy  -       Row Name 09/06/23 1504          General Information    Patient Profile Reviewed yes  -LF     Prior Level of Function --  Per EMR patient is a LTC resident at [facility] where he is bedbound per prior OT eval. When asked if he walked pt stated no but does not elaborate on other subjective info when questioned.  -LF     Existing Precautions/Restrictions no known precautions/restrictions  -LF     Barriers to Rehab previous  functional deficit  -       Row Name 09/06/23 1504          Occupational Profile    Reason for Services/Referral (Occupational Profile) Patient is an 83 year old male admitted to [facility] for shortness of breath on September 4th, 2023. Occupational therapy consulted due to recent decline in ADLs/functional transfers. No previous occupational therapy services for current condition.  -       Row Name 09/06/23 1504          Living Environment    [facility] in Home facility resident  LTC resident at [facility] per EMR  -       Row Name 09/06/23 1504          Cognition    Orientation Status (Cognition) oriented to;person  Garbled speech and difficult to understand at times. Difficulty following commands throughout evaluation.  -       Row Name 09/06/23 1504          Safety Issues, Functional Mobility    Safety Issues Affecting Function (Mobility) ability to follow commands;awareness of need for assistance;insight into deficits/self-awareness;judgment;problem-solving;sequencing abilities  -     Impairments Affecting Function (Mobility) balance;cognition;endurance/activity tolerance;strength;range of motion (ROM);other (see comments)  at baseline  -               User Key  (r) = Recorded By, (t) = Taken By, (c) = Cosigned By      Initials Name Provider Type     Carly Bonner OT Occupational Therapist                     Mobility/ADL's       Row Name 09/06/23 1505          Bed Mobility    Comment, (Bed Mobility) Dependent for longsitting during evaluation and all other bed mobility per [facility]. BLE contractures noted, supporting evidence that patient is bedbound at baseline.  -       Row Name 09/06/23 1506          Activities of Daily Living    BADL Assessment/Intervention other (see comments)  Patient is dependent for all BADLs  -               User Key  (r) = Recorded By, (t) = Taken By, (c) = Cosigned By      Initials Name Provider Type     Carly Bonner OT Occupational Therapist                    Obj/Interventions       Row Name 09/06/23 1512          Sensory Assessment (Somatosensory)    Sensory Assessment (Somatosensory) UE sensation intact  Firm touch awareness to BUEs.  -LF       Row Name 09/06/23 1512          Vision Assessment/Intervention    Visual Impairment/Limitations WFL  Patient lethargic, requiring max verbal/tactile cues for arousal and to open eyes. Once open he appropiately tracked therapist in room but unable to formally assess due to difficulty following commands.  -LF       Row Name 09/06/23 1512          Range of Motion Comprehensive    Comment, General Range of Motion Full AAROM of BUEs. RUE contracted in flexion pattern throughout.  -LF       Row Name 09/06/23 1512          Strength Comprehensive (MMT)    Comment, General Manual Muscle Testing (MMT) Assessment 4-/5 BUEs  -LF       Row Name 09/06/23 1512          Motor Skills    Motor Skills coordination;functional endurance  -     Coordination bilateral;upper extremity;minimal impairment  -LF     Functional Endurance Poor+  -LF       Row Name 09/06/23 1512          Balance    Balance Assessment sitting static balance  -     Static Sitting Balance maximum assist;dependent;1-person assist  -     Position, Sitting Balance supported;long sitting  -               User Key  (r) = Recorded By, (t) = Taken By, (c) = Cosigned By      Initials Name Provider Type    LF Carly Bonner, OT Occupational Therapist                   Goals/Plan    No documentation.                  Clinical Impression       Row Name 09/06/23 1514          Pain Assessment    Additional Documentation Pain Scale: FACES Pre/Post-Treatment (Group)  -LF       Row Name 09/06/23 1514          Pain Scale: FACES Pre/Post-Treatment    Pain: FACES Scale, Pretreatment 0-->no hurt  -     Posttreatment Pain Rating 0-->no hurt  -LF       Row Name 09/06/23 1514          Plan of Care Review    Plan of Care Reviewed With patient  -     Progress no change   -LF     Outcome Evaluation Patient does not present with any significant decline in function and does not require inpatient occupational therapy, therefore they will be discharged from services at this time. Per EMR he is a long-term care resident at [facility] where he is bedbound and appears to be dependent of BADLs due to observed impairments/contractures. It is recommended he discharge to long-term care facility when medically able to do so where rehab services can evaluate patient to determine if OT services are appropiate.  -       Row Name 09/06/23 1514          Therapy Assessment/Plan (OT)    Patient/Family Therapy Goal Statement (OT) None reported  -LF     Rehab Potential (OT) other (see comments)  N/A  -     Criteria for Skilled Therapeutic Interventions Met (OT) no problems identified which require skilled intervention  -LF     Therapy Frequency (OT) evaluation only  -LF       Row Name 09/06/23 1514          Therapy Plan Review/Discharge Plan (OT)    Anticipated Discharge Disposition (OT) [facility]  -       Row Name 09/06/23 1514          Vital Signs    O2 Delivery Pre Treatment room air  -LF     O2 Delivery Intra Treatment room air  -LF     O2 Delivery Post Treatment room air  -LF               User Key  (r) = Recorded By, (t) = Taken By, (c) = Cosigned By      Initials Name Provider Type    LF Carly Bonner, OT Occupational Therapist                   Outcome Measures       Row Name 09/06/23 1517          How much help from another is currently needed...    Putting on and taking off regular lower body clothing? 1  -LF     Bathing (including washing, rinsing, and drying) 1  -LF     Toileting (which includes using toilet bed pan or urinal) 1  -LF     Putting on and taking off regular upper body clothing 1  -LF     Taking care of personal grooming (such as brushing teeth) 1  -LF     Eating meals 1  -LF     AM-PAC 6 Clicks Score (OT) 6  -LF       Row Name 09/06/23 1300           How much help from another person do you currently need...    Turning from your back to your side while in flat bed without using bedrails? 1  -DP     Moving from lying on back to sitting on the side of a flat bed without bedrails? 1  -DP     Moving to and from a bed to a chair (including a wheelchair)? 1  -DP     Standing up from a chair using your arms (e.g., wheelchair, bedside chair)? 1  -DP     Climbing 3-5 steps with a railing? 1  -DP     To walk in hospital room? 1  -DP     AM-PAC 6 Clicks Score (PT) 6  -DP     Highest level of mobility 2 --> Bed activities/dependent transfer  -DP       Row Name 09/06/23 1517          Functional Assessment    Outcome Measure Options AM-PAC 6 Clicks Daily Activity (OT);Optimal Instrument  -LF       Row Name 09/06/23 1517          Optimal Instrument    Optimal Instrument Optimal - 3  -LF     Bending/Stooping 5  -LF     Standing 5  -LF     Reaching 2  -LF     From the list, choose the 3 activities you would most like to be able to do without any difficulty Bending/stooping;Standing;Reaching  -LF     Total Score Optimal - 3 12  -LF               User Key  (r) = Recorded By, (t) = Taken By, (c) = Cosigned By      Initials Name Provider Type    Carly Cueto OT Occupational Therapist    Ciera Uriarte, PT Physical Therapist                    Occupational Therapy Education       Title: PT OT SLP Therapies (In Progress)       Topic: Occupational Therapy (In Progress)       Point: ADL training (In Progress)       Description:   Instruct learner(s) on proper safety adaptation and remediation techniques during self care or transfers.   Instruct in proper use of assistive devices.                  Learning Progress Summary             Patient Acceptance, E,TB, NL by  at 9/6/2023 1517                         Point: Precautions (In Progress)       Description:   Instruct learner(s) on prescribed precautions during self-care and functional transfers.                   Learning Progress Summary             Patient Acceptance, E,TB, NL by  at 9/6/2023 1517                         Point: Body mechanics (In Progress)       Description:   Instruct learner(s) on proper positioning and spine alignment during self-care, functional mobility activities and/or exercises.                  Learning Progress Summary             Patient Acceptance, E,TB, NL by  at 9/6/2023 1517                                         User Key       Initials Effective Dates Name Provider Type Discipline     06/16/21 -  Carly Bonner OT Occupational Therapist OT                  OT Recommendation and Plan  Therapy Frequency (OT): evaluation only  Plan of Care Review  Plan of Care Reviewed With: patient  Progress: no change  Outcome Evaluation: Patient does not present with any significant decline in function and does not require inpatient occupational therapy, therefore they will be discharged from services at this time. Per EMR he is a long-term care resident at [facility] where he is bedbound and appears to be dependent of BADLs due to observed impairments/contractures. It is recommended he discharge to long-term care facility when medically able to do so where rehab services can evaluate patient to determine if OT services are appropiate.     Time Calculation:   Evaluation Complexity (OT)  Review Occupational Profile/Medical/Therapy History Complexity: brief/low complexity  Assessment, Occupational Performance/Identification of Deficit Complexity: 5 or more performance deficits (at baseline)  Clinical Decision Making Complexity (OT): problem focused assessment/low complexity  Overall Complexity of Evaluation (OT): low complexity     Time Calculation- OT       Row Name 09/06/23 1518             Time Calculation- OT    OT Received On 09/06/23  -LF         Untimed Charges    OT Eval/Re-eval Minutes 33  -LF         Total Minutes    Untimed Charges Total Minutes 33  -LF       Total Minutes 33   -                User Key  (r) = Recorded By, (t) = Taken By, (c) = Cosigned By      Initials Name Provider Type     Carly Bonner OT Occupational Therapist                  Therapy Charges for Today       Code Description Service Date Service Provider Modifiers Qty    [id number]  OT EVAL LOW COMPLEXITY 3 9/6/2023 Carly Bonner OT GO 1                 Carly Bonner OT  9/6/2023

## 2023-09-06 NOTE — THERAPY EVALUATION
Acute Care - Physical Therapy Initial Evaluation  KASSI Davis     Patient Name: Jordin Menon  : 1940  MRN: [id number]  Today's Date: 2023      Visit Dx:     ICD-10-CM ICD-9-CM   1. Sepsis with acute renal failure without septic shock, due to unspecified organism, unspecified acute renal failure type  A41.9 038.9    R65.20 995.92    N17.9 584.9   2. Difficulty walking  R26.2 719.7   3. Decreased activities of daily living (ADL)  Z78.9 V49.89     Patient Active Problem List   Diagnosis    Primary osteoarthritis of right knee    Status post total right knee replacement    Essential hypertension    Shortness of breath    BRYANT (acute kidney injury)    Hypokalemia    Hypernatremia    Symptomatic bradycardia    Hypothermia, initial encounter    Acute CVA (cerebrovascular accident)    Severe sepsis    Severe malnutrition    Hyperkalemia    Anemia    Dislodged gastrostomy tube    Dysphagia, oropharyngeal    Sepsis     Past Medical History:   Diagnosis Date    Acute renal failure with pathological lesion in kidney     Alzheimer's dementia with behavioral disturbance     Anemia, vitamin B12 deficiency     Anxiety     Anxiety disorder due to known physiological condition     Arthritis     Benign neoplasm of prostate     Cannot walk     CHF (congestive heart failure)     Constipation     Coronary atherosclerosis of native coronary artery     CVA (cerebral vascular accident)     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 (CVA)     Edema     Essential hypertension, benign     Gastroesophageal reflux disease without esophagitis     GERD (gastroesophageal reflux disease)     High blood pressure     Hirsutism     HTN (hypertension)     Hypercalcemia     Hyperkalemia     Hyperlipemia     Hyperlipidemia     Hypokalemia     Ingrowing toenail 2018    Left foot pain 2018    Nutritional deficiency     Osteoarthritis of right knee 2016    Pneumonia     Presence of right artificial knee joint     Primary  localized osteoarthrosis     PVD (peripheral vascular disease)     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     Right foot pain 09/21/2018    Senile dementia, uncomplicated     Severe sinus bradycardia     Shock, septic     Sleep apnea     Thrombosis of left femoral vein     Tinea unguium 09/21/2018    Vitamin B12 deficiency anemia      Past Surgical History:   Procedure Laterality Date    ENDOSCOPY W/ PEG TUBE PLACEMENT N/A 6/8/2022    Procedure: ESOPHAGOGASTRODUODENOSCOPY WITH PERCUTANEOUS ENDOSCOPIC GASTROSTOMY TUBE INSERTION WITH ANESTHESIA;  Surgeon: Yanelis Mistry MD;  Location: [facility] ENDOSCOPY;  Service: Gastroenterology;  Laterality: N/A;  PEG TUBE PLACEMENT    ENDOSCOPY W/ PEG TUBE PLACEMENT N/A 7/3/2022    Procedure: ESOPHAGOGASTRODUODENOSCOPY WITH PERCUTANEOUS ENDOSCOPIC GASTROSTOMY TUBE INSERTION WITH ANESTHESIA;  Surgeon: Bam Melo MD;  Location: [facility] ENDOSCOPY;  Service: Gastroenterology;  Laterality: N/A;  PEG TUBE PLACEMENT    ENDOSCOPY W/ PEG TUBE PLACEMENT N/A 10/26/2022    Procedure: ESOPHAGOGASTRODUODENOSCOPY WITH PERCUTANEOUS ENDOSCOPIC GASTROSTOMY TUBE INSERTION WITH ANESTHESIA;  Surgeon: Yanelis Mistry MD;  Location: [facility] ENDOSCOPY;  Service: Gastroenterology;  Laterality: N/A;  PERCUTANEOUS ENDOSCOPIC GASTROSTOMY TUBE PLACEMENT     PT Assessment (last 12 hours)       PT Evaluation and Treatment       Row Name 09/06/23 1300          Physical Therapy Time and Intention    Subjective Information no complaints  -DP     Document Type evaluation  -DP     Mode of Treatment individual therapy;physical therapy  -DP     Patient Effort good  -DP     Symptoms Noted During/After Treatment none  -DP       Row Name 09/06/23 1300          General Information    Patient Profile Reviewed yes  -DP     Patient Observations unresponsive  unable to wake at this time  -DP     General Observations of Patient Pt  is a long term care resident. He is dependent  for all ADLs and transfers.  -DP     Equipment Currently Used at Home wheelchair;walker, rolling;lift device  -DP     Existing Precautions/Restrictions no known precautions/restrictions  -DP     Barriers to Rehab none identified  -DP     Comment, General Information Pt bed bound and confused at baseline  -DP       Row Name 09/06/23 1300          Living Environment    Current Living Arrangements extended care facility  -DP     People in Home facility resident  -DP     Primary Care Provided by other (see comments)  -DP       Row Name 09/06/23 1300          Cognition    Affect/Mental Status (Cognition) unresponsive  unable to wake at this time  -DP       Row Name 09/06/23 1300          Range of Motion (ROM)    Range of Motion bilateral lower extremities;ROM is WFL;other (see comments)  B knee extnesion -30 degrees  -DP       Row Name 09/06/23 1300          Strength (Manual Muscle Testing)    Strength (Manual Muscle Testing) other (see comments)  not tested due to inability to wake  -DP       Row Name 09/06/23 1300          Bed Mobility    Bed Mobility supine-sit-supine  -DP     Supine-Sit-Supine [place] (Bed Mobility) dependent (less than 25% patient effort)  -DP     Comment, (Bed Mobility) unable to complete the transfer  -DP       Row Name 09/06/23 1300          Transfers    Comment, (Transfers) deferred  -DP       Row Name             Wound 07/02/22 1136 scalp Abrasion    Wound - Properties Group Placement Date: 07/02/22  -KT Placement Time: 1136  -KT Present on Hospital Admission: Y  -KT Location: scalp  -KT Primary Wound Type: Abrasion  -KT    Retired Wound - Properties Group Placement Date: 07/02/22  -KT Placement Time: 1136  -KT Present on Hospital Admission: Y  -KT Location: scalp  -KT Primary Wound Type: Abrasion  -KT    Retired Wound - Properties Group Date first assessed: 07/02/22  -KT Time first assessed: 1136  -KT Present on Hospital Admission: Y  -KT Location: scalp  -KT Primary Wound Type: Abrasion   -KT      Row Name             Wound 09/04/23 2331 Right gluteal    Wound - Properties Group Placement Date: 09/04/23  -SC Placement Time: 2331  -SC Side: Right  -SC Location: gluteal  -SC    Retired Wound - Properties Group Placement Date: 09/04/23  -SC Placement Time: 2331  -SC Side: Right  -SC Location: gluteal  -SC    Retired Wound - Properties Group Date first assessed: 09/04/23  -SC Time first assessed: 2331  -SC Side: Right  -SC Location: gluteal  -SC      Row Name             Wound 09/04/23 2334 Right lower flank    Wound - Properties Group Placement Date: 09/04/23  -SC Placement Time: 2334 -SC Side: Right  -SC Orientation: lower  -SC Location: flank  -SC    Retired Wound - Properties Group Placement Date: 09/04/23  -SC Placement Time: 2334 -SC Side: Right  -SC Orientation: lower  -SC Location: flank  -SC    Retired Wound - Properties Group Date first assessed: 09/04/23  -SC Time first assessed: 2334  -SC Side: Right  -SC Location: flank  -SC      Row Name             Wound 09/04/23 2337 Left posterior heel    Wound - Properties Group Placement Date: 09/04/23  -SC Placement Time: 2337 -SC Side: Left  -SC Orientation: posterior  -SC Location: heel  -SC    Retired Wound - Properties Group Placement Date: 09/04/23  -SC Placement Time: 2337 -SC Side: Left  -SC Orientation: posterior  -SC Location: heel  -SC    Retired Wound - Properties Group Date first assessed: 09/04/23  -SC Time first assessed: 2337  -SC Side: Left  -SC Location: heel  -SC      Row Name             Wound 09/04/23 2339 Right posterior heel    Wound - Properties Group Placement Date: 09/04/23  -SC Placement Time: 2339 -SC Side: Right  -SC Orientation: posterior  -SC Location: heel  -SC    Retired Wound - Properties Group Placement Date: 09/04/23  -SC Placement Time: 2339 -SC Side: Right  -SC Orientation: posterior  -SC Location: heel  -SC    Retired Wound - Properties Group Date first assessed: 09/04/23  -SC Time first assessed: 2339  -SC  Side: Right  -SC Location: heel  -SC      Row Name 09/06/23 1300          Plan of Care Review    Plan of Care Reviewed With patient  -DP     Progress no change  -DP     Outcome Evaluation Pt services not indicated at this time due to no change in baseline. Recommendation for return to extended care facility [facility] d/c.  -DP       Row Name 09/06/23 1300          Therapy Assessment/Plan (PT)    Criteria for Skilled Interventions Met (PT) does not meet criteria for skilled intervention  -DP     Therapy Frequency (PT) evaluation only  -DP       Row Name 09/06/23 1300          PT Evaluation Complexity    History, PT Evaluation Complexity no personal factors and/or comorbidities  -DP     Examination of Body Systems (PT Eval Complexity) total of 4 or more elements  -DP     Clinical Presentation (PT Evaluation Complexity) stable  -DP     Clinical Decision Making (PT Evaluation Complexity) low complexity  -DP     Overall Complexity (PT Evaluation Complexity) low complexity  -DP               User Key  (r) = Recorded By, (t) = Taken By, (c) = Cosigned By      Initials Name Provider Type    DP Ciera Fox, PT Physical Therapist    Esperanza Oliveira RN Registered Nurse    Lisa Levin RN Registered Nurse                  PHYSICAL THERAPY GOALS/ PLAN    LTG 1:  Today:  Complete PT evaluation.  Treatment:  None  Time Frame/Durataion: 1 day  Outcome: Goal met      PT Recommendation and Plan  Anticipated Discharge Disposition (PT): extended care facility  Therapy Frequency (PT): evaluation only  Plan of Care Reviewed With: patient  Progress: no change  Outcome Evaluation: Pt services not indicated at this time due to no change in baseline. Recommendation for return to extended care facility [facility] d/c.   Outcome Measures       Row Name 09/06/23 1300             How much help from another person do you currently need...    Turning from your back to your side while in flat bed without using bedrails? 1  -DP      Moving  from lying on back to sitting on the side of a flat bed without bedrails? 1  -DP      Moving to and from a bed to a chair (including a wheelchair)? 1  -DP      Standing up from a chair using your arms (e.g., wheelchair, bedside chair)? 1  -DP      Climbing 3-5 steps with a railing? 1  -DP      To walk in hospital room? 1  -DP      AM-PAC 6 Clicks Score (PT) 6  -DP                User Key  (r) = Recorded By, (t) = Taken By, (c) = Cosigned By      Initials Name Provider Type    Ciera Uriarte, PT Physical Therapist                     Time Calculation:    PT Charges       Row Name 09/06/23 1314             Time Calculation    PT Received On 09/06/23  -DP      PT Goal Re-Cert Due Date 09/15/23  -DP         Untimed Charges    PT Eval/Re-eval Minutes 40  -DP         Total Minutes    Untimed Charges Total Minutes 40  -DP       Total Minutes 40  -DP                User Key  (r) = Recorded By, (t) = Taken By, (c) = Cosigned By      Initials Name Provider Type    Ciera Uriarte, PT Physical Therapist                  Therapy Charges for Today       Code Description Service Date Service Provider Modifiers Qty    85842614733 HC PT EVAL LOW COMPLEXITY 3 9/6/2023 Ciera Fox, PT GP 1            PT G-Codes  AM-PAC 6 Clicks Score (PT): 6    Ciera Fox, PT  9/6/2023

## 2023-09-06 NOTE — PLAN OF CARE
Goal Outcome Evaluation:      Pt q2 turn during shift, oral care provided, continues with bed alarm, bed in lowest position, call light in reach. Patient febrile during shift, medication as indicated by MAR.

## 2023-09-06 NOTE — PLAN OF CARE
Goal Outcome Evaluation:  Plan of Care Reviewed With: patient           Outcome Evaluation: Pt is alert and oriented to self and is able to follow commands. VSS no evidence of pain or discomfort. Continue with plan of care.

## 2023-09-06 NOTE — PLAN OF CARE
Goal Outcome Evaluation:  Plan of Care Reviewed With: patient        Progress: no change  Outcome Evaluation: Pt services not indicated at this time due to no change in baseline. Recommendation for return to extended care facility [facility] d/c.      Anticipated Discharge Disposition (PT): extended care facility

## 2023-09-06 NOTE — PLAN OF CARE
Goal Outcome Evaluation:  Plan of Care Reviewed With: patient        Progress: no change  Outcome Evaluation: Patient does not present with any significant decline in function and does not require inpatient occupational therapy, therefore they will be discharged from services at this time. Per EMR he is a long-term care resident at [facility] where he is bedbound and appears to be dependent of BADLs due to observed impairments/contractures. It is recommended he discharge to long-term care facility when medically able to do so where rehab services can evaluate patient to determine if OT services are appropiate.      Anticipated Discharge Disposition (OT): extended care facility

## 2023-09-06 NOTE — CONSULTS
Nutrition Services    Patient Name: Jordin Menon  YOB: 1940  MRN: [id number]  Admission date: 9/4/2023      CLINICAL NUTRITION ASSESSMENT      Reason for Assessment  Nonhealing wound or pressure ulcer   H&P:    Past Medical History:   Diagnosis Date    Acute renal failure with pathological lesion in kidney     Alzheimer's dementia with behavioral disturbance     Anemia, vitamin B12 deficiency     Anxiety     Anxiety disorder due to known physiological condition     Arthritis     Benign neoplasm of prostate     Cannot walk     CHF (congestive heart failure)     Constipation     Coronary atherosclerosis of native coronary artery     CVA (cerebral vascular accident)     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 (CVA)     Edema     Essential hypertension, benign     Gastroesophageal reflux disease without esophagitis     GERD (gastroesophageal reflux disease)     High blood pressure     Hirsutism     HTN (hypertension)     Hypercalcemia     Hyperkalemia     Hyperlipemia     Hyperlipidemia     Hypokalemia     Ingrowing toenail 09/21/2018    Left foot pain 09/21/2018    Nutritional deficiency     Osteoarthritis of right knee 08/22/2016    Pneumonia     Presence of right artificial knee joint     Primary localized osteoarthrosis     PVD (peripheral vascular disease)     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 Pt is a resident of [facility] who presented to ED with respiratory distress. Admitted with sepsis and suspected pneumonia.  "PMH significant for Alzheimer's dementia, dysphagia/dysarthria d/t CVA.     Patient alert to self only. KHUSHBOO changes in weight d/t limited wt hx available. Pt is NPO x 2 days. Pt with multiple wounds and areas of impaired skin integrity per LDA avatar. May benefit from [place] and increased protein once diet advances.     RD will CTM per protocol and provided nutrition interventions as able.      Anthropometrics        Current Height, Weight Height: 162.6 cm (64\")  Weight: 70.9 kg (156 lb 4.9 oz)   Current BMI Body mass index is 26.83 kg/m².       Weight Hx  Wt Readings from Last 30 Encounters:   09/04/23 1503 70.9 kg (156 lb 4.9 oz)   10/27/22 0933 58.2 kg (128 lb 4.9 oz)   10/25/22 1100 60.1 kg (132 lb 7.9 oz)   10/22/22 2021 55.1 kg (121 lb 7.6 oz)   10/13/22 0815 66.1 kg (145 lb 11.6 oz)   08/25/22 1051 60.6 kg (133 lb 9.6 oz)   08/23/22 1805 63.3 kg (139 lb 8.8 oz)   08/23/22 1301 63.3 kg (139 lb 8.8 oz)   08/06/22 1017 64.2 kg (141 lb 8.6 oz)   07/02/22 1353 72.9 kg (160 lb 11.5 oz)   07/02/22 0307 72.9 kg (160 lb 11.5 oz)   06/23/22 0923 74.2 kg (163 lb 9.3 oz)   06/02/22 0415 78.4 kg (172 lb 13.5 oz)   06/01/22 0600 74.2 kg (163 lb 9.3 oz)   05/29/22 2100 67.4 kg (148 lb 9.4 oz)   05/29/22 1148 76.2 kg (167 lb 15.9 oz)   05/21/22 0500 76.1 kg (167 lb 12.3 oz)   05/19/22 0600 55.3 kg (121 lb 14.6 oz)   05/18/22 0500 56.5 kg (124 lb 9 oz)   05/17/22 1834 54.1 kg (119 lb 4.3 oz)   05/05/22 1642 60.2 kg (132 lb 11.5 oz)   05/01/22 1534 62.7 kg (138 lb 3.7 oz)   04/14/22 1751 66.5 kg (146 lb 9.7 oz)   04/14/22 1307 66.5 kg (146 lb 9.7 oz)   03/07/22 0900 69.9 kg (154 lb 1.6 oz)   03/06/22 2348 69.2 kg (152 lb 8.9 oz)   03/04/22 0353 66 kg (145 lb 8.1 oz)   02/28/22 0608 69 kg (152 lb 1.9 oz)   02/27/22 0400 68.6 kg (151 lb 3.8 oz)   02/25/22 1540 73.6 kg (162 lb 4.1 oz)   02/04/22 1854 73.6 kg (162 lb 4.1 oz)   07/28/21 1307 84.4 kg (186 lb)   03/05/19 1516 78.5 kg (173 lb)   09/27/18 0000 88.5 kg (195 lb) "   09/21/18 0000 90.7 kg (200 lb)   03/22/18 0000 82.6 kg (182 lb)            Wt Change Observation KHUSHBOO     Estimated/Assessed Needs       Energy Requirements 25-30 kca/kg    EST Needs (kcal/day) 6205-2032 kcal       Protein Requirements 1.2-1.4 g/kg   EST Daily Needs (g/day) 85-99 g       Fluid Requirements 1 ml/kcal    Estimated Needs (mL/day) 3152-5516 ml     Labs/Medications         Pertinent Labs Reviewed.   Results from last 7 days   Lab Units 09/05/23  0434 09/04/23  1748 09/04/23  1508   SODIUM mmol/L 155* 156*  --    SODIUM, ARTERIAL mmol/L  --   --  156.1*   POTASSIUM mmol/L 4.9 4.8  --    CHLORIDE mmol/L 119* 119*  --    CO2 mmol/L 23.5 25.5  --    BUN mg/dL 45* 46*  --    CREATININE mg/dL 1.29* 1.28*  --    CALCIUM mg/dL 8.9 9.7  --    BILIRUBIN mg/dL 2.0* 1.7*  --    ALK PHOS U/L 76 87  --    ALT (SGPT) U/L 20 23  --    AST (SGOT) U/L 34 35  --    GLUCOSE mg/dL 134* 140*  --    GLUCOSE, ARTERIAL mg/dL  --   --  174*     Results from last 7 days   Lab Units 09/05/23 0434 09/04/23  1748   MAGNESIUM mg/dL  --  2.7*   PHOSPHORUS mg/dL  --  2.9   HEMOGLOBIN g/dL 13.9  --    HEMATOCRIT % 40.9  --      COVID19   Date Value Ref Range Status   09/04/2023 Not Detected Not Detected - Ref. Range Final     Lab Results   Component Value Date    HGBA1C 4.90 05/30/2022         Pertinent Medications Reviewed.     Current Nutrition Orders & Evaluation of Intake       Oral Nutrition     Current PO Diet NPO Diet NPO Type: Sips with Meds   Supplement No active supplement orders       Malnutrition Severity Assessment                Nutrition Diagnosis         Nutrition Dx Problem 1 Increased nutrient needs related to  increased demands to promote wound healing  as evidenced by  multiple areas of impaired skin integrity.      Nutrition Intervention         Pt currently NPO. Will follow to offer ONS as diet is advanced.      Medical Nutrition Therapy/Nutrition Education          Learner     Readiness Patient  Education not  appropriate at this time     Method     Response N/A  N/A     Monitor/Evaluation        Monitor Per protocol, POC/GOC, Diet advancement     Nutrition Discharge Plan         To be determined     Electronically signed by:  Arin Hinds RD  09/06/23 13:41 EDT

## 2023-09-07 LAB
ALBUMIN SERPL-MCNC: 2.8 G/DL (ref 3.5–5.2)
ALBUMIN/GLOB SERPL: 0.7 G/DL
ALP SERPL-CCNC: 62 U/L (ref 39–117)
ALT SERPL W P-5'-P-CCNC: 16 U/L (ref 1–41)
ANION GAP SERPL CALCULATED.3IONS-SCNC: 11.6 MMOL/L (ref 5–15)
AST SERPL-CCNC: 21 U/L (ref 1–40)
BASOPHILS # BLD AUTO: 0.02 10*3/MM3 (ref 0–0.2)
BASOPHILS NFR BLD AUTO: 0.2 % (ref 0–1.5)
BILIRUB SERPL-MCNC: 0.7 MG/DL (ref 0–1.2)
BUN SERPL-MCNC: 36 MG/DL (ref 8–23)
BUN/CREAT SERPL: 44.4 (ref 7–25)
CALCIUM SPEC-SCNC: 9.2 MG/DL (ref 8.6–10.5)
CHLORIDE SERPL-SCNC: 122 MMOL/L (ref 98–107)
CO2 SERPL-SCNC: 23.4 MMOL/L (ref 22–29)
CREAT SERPL-MCNC: 0.81 MG/DL (ref 0.76–1.27)
DEPRECATED RDW RBC AUTO: 50 FL (ref 37–54)
EGFRCR SERPLBLD CKD-EPI 2021: 88 ML/MIN/1.73
EOSINOPHIL # BLD AUTO: 0.06 10*3/MM3 (ref 0–0.4)
EOSINOPHIL NFR BLD AUTO: 0.7 % (ref 0.3–6.2)
ERYTHROCYTE [DISTWIDTH] IN BLOOD BY AUTOMATED COUNT: 14.5 % (ref 12.3–15.4)
GLOBULIN UR ELPH-MCNC: 3.8 GM/DL
GLUCOSE BLDC GLUCOMTR-MCNC: 59 MG/DL (ref 70–99)
GLUCOSE BLDC GLUCOMTR-MCNC: 94 MG/DL (ref 70–99)
GLUCOSE SERPL-MCNC: 70 MG/DL (ref 65–99)
HCT VFR BLD AUTO: 35.5 % (ref 37.5–51)
HGB BLD-MCNC: 11.2 G/DL (ref 13–17.7)
IMM GRANULOCYTES # BLD AUTO: 0.04 10*3/MM3 (ref 0–0.05)
IMM GRANULOCYTES NFR BLD AUTO: 0.4 % (ref 0–0.5)
LYMPHOCYTES # BLD AUTO: 1.57 10*3/MM3 (ref 0.7–3.1)
LYMPHOCYTES NFR BLD AUTO: 17.4 % (ref 19.6–45.3)
MAGNESIUM SERPL-MCNC: 2.4 MG/DL (ref 1.6–2.4)
MCH RBC QN AUTO: 29.7 PG (ref 26.6–33)
MCHC RBC AUTO-ENTMCNC: 31.5 G/DL (ref 31.5–35.7)
MCV RBC AUTO: 94.2 FL (ref 79–97)
MONOCYTES # BLD AUTO: 0.84 10*3/MM3 (ref 0.1–0.9)
MONOCYTES NFR BLD AUTO: 9.3 % (ref 5–12)
NEUTROPHILS NFR BLD AUTO: 6.48 10*3/MM3 (ref 1.7–7)
NEUTROPHILS NFR BLD AUTO: 72 % (ref 42.7–76)
NRBC BLD AUTO-RTO: 0 /100 WBC (ref 0–0.2)
PHOSPHATE SERPL-MCNC: 2.8 MG/DL (ref 2.5–4.5)
PLATELET # BLD AUTO: 131 10*3/MM3 (ref 140–450)
PMV BLD AUTO: 13.5 FL (ref 6–12)
POTASSIUM SERPL-SCNC: 3.5 MMOL/L (ref 3.5–5.2)
PROT SERPL-MCNC: 6.6 G/DL (ref 6–8.5)
RBC # BLD AUTO: 3.77 10*6/MM3 (ref 4.14–5.8)
SODIUM SERPL-SCNC: 157 MMOL/L (ref 136–145)
WBC NRBC COR # BLD: 9.01 10*3/MM3 (ref 3.4–10.8)

## 2023-09-07 PROCEDURE — 80053 COMPREHEN METABOLIC PANEL: CPT | Performed by: FAMILY MEDICINE

## 2023-09-07 PROCEDURE — 99232 SBSQ HOSP IP/OBS MODERATE 35: CPT | Performed by: FAMILY MEDICINE

## 2023-09-07 PROCEDURE — 83735 ASSAY OF MAGNESIUM: CPT | Performed by: FAMILY MEDICINE

## 2023-09-07 PROCEDURE — 25010000002 CEFTRIAXONE PER 250 MG: Performed by: FAMILY MEDICINE

## 2023-09-07 PROCEDURE — 85025 COMPLETE CBC W/AUTO DIFF WBC: CPT | Performed by: FAMILY MEDICINE

## 2023-09-07 PROCEDURE — 84100 ASSAY OF PHOSPHORUS: CPT | Performed by: FAMILY MEDICINE

## 2023-09-07 PROCEDURE — 82948 REAGENT STRIP/BLOOD GLUCOSE: CPT

## 2023-09-07 RX ORDER — DEXTROSE MONOHYDRATE, SODIUM CHLORIDE, AND POTASSIUM CHLORIDE 50; 1.49; 4.5 G/1000ML; G/1000ML; G/1000ML
100 INJECTION, SOLUTION INTRAVENOUS CONTINUOUS
Status: DISCONTINUED | OUTPATIENT
Start: 2023-09-07 | End: 2023-09-09

## 2023-09-07 RX ADMIN — ACETAMINOPHEN 650 MG: 325 TABLET ORAL at 21:56

## 2023-09-07 RX ADMIN — SENNOSIDES AND DOCUSATE SODIUM 2 TABLET: 50; 8.6 TABLET ORAL at 21:55

## 2023-09-07 RX ADMIN — CEFTRIAXONE SODIUM 1000 MG: 1 INJECTION, SOLUTION INTRAVENOUS at 18:14

## 2023-09-07 RX ADMIN — ASPIRIN 81 MG: 81 TABLET, CHEWABLE ORAL at 09:38

## 2023-09-07 RX ADMIN — APIXABAN 5 MG: 5 TABLET, FILM COATED ORAL at 21:55

## 2023-09-07 RX ADMIN — SENNOSIDES AND DOCUSATE SODIUM 2 TABLET: 50; 8.6 TABLET ORAL at 09:37

## 2023-09-07 RX ADMIN — POTASSIUM CHLORIDE, DEXTROSE MONOHYDRATE AND SODIUM CHLORIDE 100 ML/HR: 150; 5; 450 INJECTION, SOLUTION INTRAVENOUS at 13:46

## 2023-09-07 RX ADMIN — METOPROLOL TARTRATE 12.5 MG: 25 TABLET, FILM COATED ORAL at 09:38

## 2023-09-07 RX ADMIN — APIXABAN 5 MG: 5 TABLET, FILM COATED ORAL at 09:38

## 2023-09-07 RX ADMIN — Medication 10 ML: at 09:39

## 2023-09-07 NOTE — PLAN OF CARE
Goal Outcome Evaluation:  Plan of Care Reviewed With: patient  PT A&O to self no s/s of pain or distress CB in reach

## 2023-09-07 NOTE — PROGRESS NOTES
[facility]   Hospitalist Progress Note  Date: 2023  Patient Name: Jordin Menon  : 1940  MRN: [id number]  Date of admission: 2023      Subjective   Subjective     Summary:  past medical history of Alzheimer's dementia, CVA that presents to the emergency department for evaluation of shortness of breath from nursing facility.  Patient has limited communication at baseline but is usually oriented x1.  Currently he is not answering my questions or following simple commands and is somnolent on exam.  History was obtained from ER signout and chart review.  Patient reportedly seemed short of breath at the nursing home prompting them to call EMS.  In the emergency department he was also found to be febrile and tachycardic along with leukocytosis.  He has been started on Rocephin and azithromycin for suspected pneumonia.  UA pending at this time.  Cultures have been obtained he will be admitted for ongoing monitoring and management.  Patient is unable to provide any other review of systems at this time.     Interval Followup: fevers continue improve.  HR continues improve.  Difficult to understand.  Serum sodium is elevated in spite of being on LR.        Objective   Objective     Vitals:   Temp:  [97.5 °F (36.4 °C)-98.7 °F (37.1 °C)] 98.2 °F (36.8 °C)  Heart Rate:  [69-71] 70  Resp:  [18-21] 18  BP: ()/(50-94) 124/50  Physical Exam   Constitutional: A somnolent, not answering questions              Eyes: Pupils equal, sclerae anicteric, no conjunctival injection              HENT: NCAT, mucous membranes moist              Neck: Supple, no thyromegaly, no lymphadenopathy, trachea midline              Respiratory: Diminished bilaterally, nonlabored on room air              Cardiovascular: RRR, no murmurs, rubs, or gallops, palpable pedal pulses bilaterally              Gastrointestinal: Positive bowel sounds, soft, nontender, nondistended              Musculoskeletal: contractures BLE               Psychiatric: Not answering questions or following commands              Neurologic: Not answering questions or following commands.  Minimally verbal at baseline.          Assessment & Plan   Assessment / Plan     Assessment/Plan:  Sepsis, suspected pneumonia   Dehydration  Alzheimer's dementia  Hypertension     Continue abx.    Change IV fluids from LR to D5 Half-normal saline with 20 of K  Repeat labs in am    Follow cultures already obtained.  Blood cultures so far negative  Supportive care.  Oral care for dry mouth and mucous   Holding home medications in setting of sepsis for the time being add back home medications as appropriate  Discussed plan with RN.  DVT prophylaxis:  Medical DVT prophylaxis orders are present.    CODE STATUS:   Code Status (Patient has no pulse and is not breathing): CPR (Attempt to Resuscitate)  Medical Interventions (Patient has pulse or is breathing): Full Support

## 2023-09-07 NOTE — CONSULTS
Nutrition Services    Patient Name: Jordin Menon  YOB: 1940  MRN: [id number]  Admission date: 9/4/2023      CLINICAL NUTRITION ASSESSMENT      Reason for Assessment  Physician consult, EN   H&P:    Past Medical History:   Diagnosis Date    Acute renal failure with pathological lesion in kidney     Alzheimer's dementia with behavioral disturbance     Anemia, vitamin B12 deficiency     Anxiety     Anxiety disorder due to known physiological condition     Arthritis     Benign neoplasm of prostate     Cannot walk     CHF (congestive heart failure)     Constipation     Coronary atherosclerosis of native coronary artery     CVA (cerebral vascular accident)     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 (CVA)     Edema     Essential hypertension, benign     Gastroesophageal reflux disease without esophagitis     GERD (gastroesophageal reflux disease)     High blood pressure     Hirsutism     HTN (hypertension)     Hypercalcemia     Hyperkalemia     Hyperlipemia     Hyperlipidemia     Hypokalemia     Ingrowing toenail 09/21/2018    Left foot pain 09/21/2018    Nutritional deficiency     Osteoarthritis of right knee 08/22/2016    Pneumonia     Presence of right artificial knee joint     Primary localized osteoarthrosis     PVD (peripheral vascular disease)     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 EN has been on hold since admission. Pt with PEG placement. RD called [facility], speaking to Anne. Patient receives Jevity 1.5 @ 68 ml/hr per NF staff. RD to  "order equivalent formula for resident while inpatient.     Pt hypernatremic with a free water deficit of 5.2 L. Will provide free water to help correct. See recommendations below.     Addendum: RD visited patient mid-morning. Nurse and nursing student in room at time of visit. Tube feeds not started at this time. Pt was pleasant but confused. RD introduced self but patient did not acknowledge or seem to understand, repeatedly asked to be covered up. RD performed NFPE, talking to pt during. RD notes mild/moderate loses in shoulder area. No other pertinent findings at this time. RD tucked patient in prior to leaving room.      Anthropometrics        Current Height, Weight Height: 162.6 cm (64\")  Weight: 70.9 kg (156 lb 4.9 oz)   Current BMI Body mass index is 26.83 kg/m².       Weight Hx  Wt Readings from Last 30 Encounters:   09/04/23 1503 70.9 kg (156 lb 4.9 oz)   10/27/22 0933 58.2 kg (128 lb 4.9 oz)   10/25/22 1100 60.1 kg (132 lb 7.9 oz)   10/22/22 2021 55.1 kg (121 lb 7.6 oz)   10/13/22 0815 66.1 kg (145 lb 11.6 oz)   08/25/22 1051 60.6 kg (133 lb 9.6 oz)   08/23/22 1805 63.3 kg (139 lb 8.8 oz)   08/23/22 1301 63.3 kg (139 lb 8.8 oz)   08/06/22 1017 64.2 kg (141 lb 8.6 oz)   07/02/22 1353 72.9 kg (160 lb 11.5 oz)   07/02/22 0307 72.9 kg (160 lb 11.5 oz)   06/23/22 0923 74.2 kg (163 lb 9.3 oz)   06/02/22 0415 78.4 kg (172 lb 13.5 oz)   06/01/22 0600 74.2 kg (163 lb 9.3 oz)   05/29/22 2100 67.4 kg (148 lb 9.4 oz)   05/29/22 1148 76.2 kg (167 lb 15.9 oz)   05/21/22 0500 76.1 kg (167 lb 12.3 oz)   05/19/22 0600 55.3 kg (121 lb 14.6 oz)   05/18/22 0500 56.5 kg (124 lb 9 oz)   05/17/22 1834 54.1 kg (119 lb 4.3 oz)   05/05/22 1642 60.2 kg (132 lb 11.5 oz)   05/01/22 1534 62.7 kg (138 lb 3.7 oz)   04/14/22 1751 66.5 kg (146 lb 9.7 oz)   04/14/22 1307 66.5 kg (146 lb 9.7 oz)   03/07/22 0900 69.9 kg (154 lb 1.6 oz)   03/06/22 2348 69.2 kg (152 lb 8.9 oz)   03/04/22 0353 66 kg (145 lb 8.1 oz)   02/28/22 0608 69 kg (152 lb " 1.9 oz)   02/27/22 0400 68.6 kg (151 lb 3.8 oz)   02/25/22 1540 73.6 kg (162 lb 4.1 oz)   02/04/22 1854 73.6 kg (162 lb 4.1 oz)   07/28/21 1307 84.4 kg (186 lb)   03/05/19 1516 78.5 kg (173 lb)   09/27/18 0000 88.5 kg (195 lb)   09/21/18 0000 90.7 kg (200 lb)   03/22/18 0000 82.6 kg (182 lb)            Wt Change Observation [id number]     Estimated/Assessed Needs       Energy Requirements 30-35 kca/kg    EST Needs (kcal/day) 2430-6697 kcal       Protein Requirements 1.2-1.4 g/kg   EST Daily Needs (g/day) 85-99 g       Fluid Requirements 1 ml/kcal    Estimated Needs (mL/day) 2228-2366 ml     Labs/Medications         Pertinent Labs Reviewed.   Results from last 7 days   Lab Units 09/07/23 0515 09/05/23 0434 09/04/23  1748   SODIUM mmol/L 157* 155* 156*   POTASSIUM mmol/L 3.5 4.9 4.8   CHLORIDE mmol/L 122* 119* 119*   CO2 mmol/L 23.4 23.5 25.5   BUN mg/dL 36* 45* 46*   CREATININE mg/dL 0.81 1.29* 1.28*   CALCIUM mg/dL 9.2 8.9 9.7   BILIRUBIN mg/dL 0.7 2.0* 1.7*   ALK PHOS U/L 62 76 87   ALT (SGPT) U/L 16 20 23   AST (SGOT) U/L 21 34 35   GLUCOSE mg/dL 70 134* 140*       Results from last 7 days   Lab Units 09/07/23  0515 09/05/23  0434 09/04/23  1748   MAGNESIUM mg/dL 2.4  --  2.7*   PHOSPHORUS mg/dL 2.8  --  2.9   HEMOGLOBIN g/dL 11.2*   < >  --    HEMATOCRIT % 35.5*   < >  --     < > = values in this interval not displayed.       COVID19   Date Value Ref Range Status   09/04/2023 Not Detected Not Detected - Ref. Range Final     Lab Results   Component Value Date    HGBA1C 4.90 05/30/2022         Pertinent Medications Reviewed.     Current Nutrition Orders & Evaluation of Intake       Oral Nutrition     Current PO Diet NPO Diet NPO Type: Sips with Meds   Supplement No active supplement orders       Malnutrition Severity Assessment                Nutrition Diagnosis         Nutrition Dx Problem 1 Inadequate oral intake r/t decrease ability to consume sufficient energy AEB NPO with PEG placement.    Increased nutrient needs  related to  increased demands to promote wound healing  as evidenced by  multiple areas of impaired skin integrity.      Nutrition Intervention         Isosource 1.5 @ 68 ml/hr  125 ml Q2H (1500 ml)  Provides 2448 kcal, 111 g pro, 2740 ml fluid    Free water provides ~53% of free water deficit. Will continue to adjust free water dependant on Na+.     Medical Nutrition Therapy/Nutrition Education          Learner     Readiness Patient  Education not appropriate at this time     Method     Response Explanation  No evidence of learning     Monitor/Evaluation        Monitor Per protocol, I&O, Pertinent labs, EN delivery/tolerance, POC/GOC     Nutrition Discharge Plan         Continue with Jevity 1.5 upon d/c.     Electronically signed by:  Arin Hinds RD  09/07/23 07:45 EDT

## 2023-09-08 LAB
ALBUMIN SERPL-MCNC: 2.6 G/DL (ref 3.5–5.2)
ALBUMIN/GLOB SERPL: 0.7 G/DL
ALP SERPL-CCNC: 62 U/L (ref 39–117)
ALT SERPL W P-5'-P-CCNC: 17 U/L (ref 1–41)
ANION GAP SERPL CALCULATED.3IONS-SCNC: 7.5 MMOL/L (ref 5–15)
AST SERPL-CCNC: 25 U/L (ref 1–40)
BASOPHILS # BLD AUTO: 0.02 10*3/MM3 (ref 0–0.2)
BASOPHILS NFR BLD AUTO: 0.3 % (ref 0–1.5)
BILIRUB SERPL-MCNC: 0.5 MG/DL (ref 0–1.2)
BUN SERPL-MCNC: 31 MG/DL (ref 8–23)
BUN/CREAT SERPL: 38.3 (ref 7–25)
CALCIUM SPEC-SCNC: 8.6 MG/DL (ref 8.6–10.5)
CHLORIDE SERPL-SCNC: 123 MMOL/L (ref 98–107)
CO2 SERPL-SCNC: 22.5 MMOL/L (ref 22–29)
CREAT SERPL-MCNC: 0.81 MG/DL (ref 0.76–1.27)
DEPRECATED RDW RBC AUTO: 47.8 FL (ref 37–54)
EGFRCR SERPLBLD CKD-EPI 2021: 88 ML/MIN/1.73
EOSINOPHIL # BLD AUTO: 0.12 10*3/MM3 (ref 0–0.4)
EOSINOPHIL NFR BLD AUTO: 1.5 % (ref 0.3–6.2)
ERYTHROCYTE [DISTWIDTH] IN BLOOD BY AUTOMATED COUNT: 14.3 % (ref 12.3–15.4)
GLOBULIN UR ELPH-MCNC: 3.7 GM/DL
GLUCOSE BLDC GLUCOMTR-MCNC: 101 MG/DL (ref 70–99)
GLUCOSE BLDC GLUCOMTR-MCNC: 126 MG/DL (ref 70–99)
GLUCOSE BLDC GLUCOMTR-MCNC: 126 MG/DL (ref 70–99)
GLUCOSE SERPL-MCNC: 143 MG/DL (ref 65–99)
HCT VFR BLD AUTO: 37.8 % (ref 37.5–51)
HGB BLD-MCNC: 12.2 G/DL (ref 13–17.7)
IMM GRANULOCYTES # BLD AUTO: 0.03 10*3/MM3 (ref 0–0.05)
IMM GRANULOCYTES NFR BLD AUTO: 0.4 % (ref 0–0.5)
LYMPHOCYTES # BLD AUTO: 1.56 10*3/MM3 (ref 0.7–3.1)
LYMPHOCYTES NFR BLD AUTO: 19.9 % (ref 19.6–45.3)
MAGNESIUM SERPL-MCNC: 2.4 MG/DL (ref 1.6–2.4)
MCH RBC QN AUTO: 29.5 PG (ref 26.6–33)
MCHC RBC AUTO-ENTMCNC: 32.3 G/DL (ref 31.5–35.7)
MCV RBC AUTO: 91.5 FL (ref 79–97)
MONOCYTES # BLD AUTO: 0.72 10*3/MM3 (ref 0.1–0.9)
MONOCYTES NFR BLD AUTO: 9.2 % (ref 5–12)
NEUTROPHILS NFR BLD AUTO: 5.38 10*3/MM3 (ref 1.7–7)
NEUTROPHILS NFR BLD AUTO: 68.7 % (ref 42.7–76)
NRBC BLD AUTO-RTO: 0 /100 WBC (ref 0–0.2)
PHOSPHATE SERPL-MCNC: 2.4 MG/DL (ref 2.5–4.5)
PLATELET # BLD AUTO: 141 10*3/MM3 (ref 140–450)
PMV BLD AUTO: 13.5 FL (ref 6–12)
POTASSIUM SERPL-SCNC: 3.8 MMOL/L (ref 3.5–5.2)
PROT SERPL-MCNC: 6.3 G/DL (ref 6–8.5)
RBC # BLD AUTO: 4.13 10*6/MM3 (ref 4.14–5.8)
SODIUM SERPL-SCNC: 153 MMOL/L (ref 136–145)
WBC NRBC COR # BLD: 7.83 10*3/MM3 (ref 3.4–10.8)

## 2023-09-08 PROCEDURE — 85025 COMPLETE CBC W/AUTO DIFF WBC: CPT | Performed by: FAMILY MEDICINE

## 2023-09-08 PROCEDURE — 25010000002 CEFTRIAXONE PER 250 MG: Performed by: FAMILY MEDICINE

## 2023-09-08 PROCEDURE — 82948 REAGENT STRIP/BLOOD GLUCOSE: CPT

## 2023-09-08 PROCEDURE — 80053 COMPREHEN METABOLIC PANEL: CPT | Performed by: FAMILY MEDICINE

## 2023-09-08 PROCEDURE — 84100 ASSAY OF PHOSPHORUS: CPT | Performed by: FAMILY MEDICINE

## 2023-09-08 PROCEDURE — 83735 ASSAY OF MAGNESIUM: CPT | Performed by: FAMILY MEDICINE

## 2023-09-08 PROCEDURE — 99232 SBSQ HOSP IP/OBS MODERATE 35: CPT | Performed by: FAMILY MEDICINE

## 2023-09-08 RX ADMIN — CEFTRIAXONE SODIUM 1000 MG: 1 INJECTION, SOLUTION INTRAVENOUS at 18:03

## 2023-09-08 RX ADMIN — ASPIRIN 81 MG: 81 TABLET, CHEWABLE ORAL at 09:18

## 2023-09-08 RX ADMIN — Medication 10 ML: at 09:18

## 2023-09-08 RX ADMIN — APIXABAN 5 MG: 5 TABLET, FILM COATED ORAL at 20:35

## 2023-09-08 RX ADMIN — SENNOSIDES AND DOCUSATE SODIUM 2 TABLET: 50; 8.6 TABLET ORAL at 20:35

## 2023-09-08 RX ADMIN — METOPROLOL TARTRATE 12.5 MG: 25 TABLET, FILM COATED ORAL at 09:18

## 2023-09-08 RX ADMIN — Medication 10 ML: at 20:35

## 2023-09-08 RX ADMIN — APIXABAN 5 MG: 5 TABLET, FILM COATED ORAL at 09:18

## 2023-09-08 RX ADMIN — METOPROLOL TARTRATE 12.5 MG: 25 TABLET, FILM COATED ORAL at 20:35

## 2023-09-08 RX ADMIN — POTASSIUM CHLORIDE, DEXTROSE MONOHYDRATE AND SODIUM CHLORIDE 100 ML/HR: 150; 5; 450 INJECTION, SOLUTION INTRAVENOUS at 20:40

## 2023-09-08 NOTE — PLAN OF CARE
Goal Outcome Evaluation:      Patient tolerated continous tube feeds,Q2 turn, bed in lowest position and call light in reach.

## 2023-09-08 NOTE — NURSING NOTE
Patient is alert and oriented x 2. NSR. VSS. On room air. Tuebe feeding resumed, infusing. Will monitor and comtinue plan of care.

## 2023-09-08 NOTE — PROGRESS NOTES
Nutrition Services    Patient Name: Jordin Menon  YOB: 1940  MRN: [id number]  Admission date: 9/4/2023    PROGRESS NOTE      Encounter Information: EN follow up       PO Diet: NPO Diet NPO Type: Strict NPO   PO Supplements: N/A   PO Intake:  N/A       Current nutrition support: Isosource 1.5 @ 68 ml/hr via PEG   Q2H (1500 ml)  Provides: 2448 kcal, 111 g pro, 2740 ml fluid     Nutrition support review: No intolerances noted.       Labs (reviewed below): Hypernatremia continues, -3.95 L free water deficit. Slight drop in phos.        GI Function:  Last BM noted 9/05/23.       Nutrition Intervention Updates: Will increase free water to correct FW deficit.  Q2H (1800 ml)   Total provided: 2448 kcal, 111 g pro, 3040 ml fluid  Will continue to adjust free water for Na+ correction.      Results from last 7 days   Lab Units 09/08/23  0503 09/07/23  0515 09/05/23  0434   SODIUM mmol/L 153* 157* 155*   POTASSIUM mmol/L 3.8 3.5 4.9   CHLORIDE mmol/L 123* 122* 119*   CO2 mmol/L 22.5 23.4 23.5   BUN mg/dL 31* 36* 45*   CREATININE mg/dL 0.81 0.81 1.29*   CALCIUM mg/dL 8.6 9.2 8.9   BILIRUBIN mg/dL 0.5 0.7 2.0*   ALK PHOS U/L 62 62 76   ALT (SGPT) U/L 17 16 20   AST (SGOT) U/L 25 21 34   GLUCOSE mg/dL 143* 70 134*     Results from last 7 days   Lab Units 09/08/23  0503 09/07/23  0515 09/05/23  0434 09/04/23  1748   MAGNESIUM mg/dL 2.4 2.4  --  2.7*   PHOSPHORUS mg/dL 2.4* 2.8  --  2.9   HEMOGLOBIN g/dL 12.2* 11.2*   < >  --    HEMATOCRIT % 37.8 35.5*   < >  --     < > = values in this interval not displayed.     COVID19   Date Value Ref Range Status   09/04/2023 Not Detected Not Detected - Ref. Range Final     Lab Results   Component Value Date    HGBA1C 4.90 05/30/2022       RD to follow up per protocol.    Electronically signed by:  Arin Hinds RD  09/08/23 08:57 EDT

## 2023-09-08 NOTE — PROGRESS NOTES
[facility]   Hospitalist Progress Note  Date: 2023  Patient Name: Jordin Menon  : 1940  MRN: [id number]  Date of admission: 2023      Subjective   Subjective     Summary:  past medical history of Alzheimer's dementia, CVA that presents to the emergency department for evaluation of shortness of breath from nursing facility.  Patient has limited communication at baseline but is usually oriented x1.  Currently he is not answering my questions or following simple commands and is somnolent on exam.  History was obtained from ER signout and chart review.  Patient reportedly seemed short of breath at the nursing home prompting them to call EMS.  In the emergency department he was also found to be febrile and tachycardic along with leukocytosis.  He has been started on Rocephin and azithromycin for suspected pneumonia.  UA pending at this time.  Cultures have been obtained he will be admitted for ongoing monitoring and management.  Patient is unable to provide any other review of systems at this time.     Interval Followup: fevers continue improve.  HR continues improve.  Difficult to understand.  Serum sodium is improving after switching to D5-1/2 NS from LR.        Objective   Objective     Vitals:   Temp:  [97.3 °F (36.3 °C)-98.2 °F (36.8 °C)] 97.3 °F (36.3 °C)  Heart Rate:  [70-82] 81  Resp:  [16-20] 16  BP: ()/(50-68) 100/67  Physical Exam   Constitutional: A somnolent, not answering questions              Eyes: Pupils equal, sclerae anicteric, no conjunctival injection              HENT: NCAT, mucous membranes moist              Neck: Supple, no thyromegaly, no lymphadenopathy, trachea midline              Respiratory: Diminished bilaterally, nonlabored on room air              Cardiovascular: RRR, no murmurs, rubs, or gallops, palpable pedal pulses bilaterally              Gastrointestinal: Positive bowel sounds, soft, nontender, nondistended              Musculoskeletal: contractures BLE               Psychiatric: Not answering questions or following commands              Neurologic: Not answering questions or following commands.  Minimally verbal at baseline.          Assessment & Plan   Assessment / Plan     Assessment/Plan:  Sepsis, suspected pneumonia   Dehydration  Hypernatremia  Alzheimer's dementia  Hypertension     Continue abx.    Continue D5 Half-normal saline with 20 of K  Repeat labs in am    Cultures so far negative  Supportive care.  Oral care for dry mouth and mucous   Holding home medications in setting of sepsis for the time being add back home medications as appropriate  Discussed plan with RN.  DVT prophylaxis:  Medical DVT prophylaxis orders are present.    CODE STATUS:   Code Status (Patient has no pulse and is not breathing): CPR (Attempt to Resuscitate)  Medical Interventions (Patient has pulse or is breathing): Full Support

## 2023-09-08 NOTE — PLAN OF CARE
Goal Outcome Evaluation:  Plan of Care Reviewed With: patient           Outcome Evaluation: Pt alert and oriented to self and will follow commands. VSS, no evidence of pain or discomfort. Pt is Q2 turns. TPN discontinued. Continue with plan of care.

## 2023-09-09 LAB
ALBUMIN SERPL-MCNC: 2.6 G/DL (ref 3.5–5.2)
ALBUMIN SERPL-MCNC: 3 G/DL (ref 3.5–5.2)
ALBUMIN/GLOB SERPL: 0.8 G/DL
ALP SERPL-CCNC: 66 U/L (ref 39–117)
ALT SERPL W P-5'-P-CCNC: 17 U/L (ref 1–41)
ANION GAP SERPL CALCULATED.3IONS-SCNC: 6 MMOL/L (ref 5–15)
ANION GAP SERPL CALCULATED.3IONS-SCNC: 9 MMOL/L (ref 5–15)
AST SERPL-CCNC: 19 U/L (ref 1–40)
BACTERIA SPEC AEROBE CULT: NORMAL
BACTERIA SPEC AEROBE CULT: NORMAL
BASOPHILS # BLD AUTO: 0.01 10*3/MM3 (ref 0–0.2)
BASOPHILS NFR BLD AUTO: 0.1 % (ref 0–1.5)
BILIRUB SERPL-MCNC: 0.3 MG/DL (ref 0–1.2)
BUN SERPL-MCNC: 21 MG/DL (ref 8–23)
BUN SERPL-MCNC: 21 MG/DL (ref 8–23)
BUN/CREAT SERPL: 28.4 (ref 7–25)
BUN/CREAT SERPL: 30.4 (ref 7–25)
CALCIUM SPEC-SCNC: 8.4 MG/DL (ref 8.6–10.5)
CALCIUM SPEC-SCNC: 8.7 MG/DL (ref 8.6–10.5)
CHLORIDE SERPL-SCNC: 120 MMOL/L (ref 98–107)
CHLORIDE SERPL-SCNC: 122 MMOL/L (ref 98–107)
CO2 SERPL-SCNC: 18 MMOL/L (ref 22–29)
CO2 SERPL-SCNC: 24 MMOL/L (ref 22–29)
CREAT SERPL-MCNC: 0.69 MG/DL (ref 0.76–1.27)
CREAT SERPL-MCNC: 0.74 MG/DL (ref 0.76–1.27)
DEPRECATED RDW RBC AUTO: 48.2 FL (ref 37–54)
EGFRCR SERPLBLD CKD-EPI 2021: 90.5 ML/MIN/1.73
EGFRCR SERPLBLD CKD-EPI 2021: 92.4 ML/MIN/1.73
EOSINOPHIL # BLD AUTO: 0.19 10*3/MM3 (ref 0–0.4)
EOSINOPHIL NFR BLD AUTO: 2 % (ref 0.3–6.2)
ERYTHROCYTE [DISTWIDTH] IN BLOOD BY AUTOMATED COUNT: 14.4 % (ref 12.3–15.4)
GLOBULIN UR ELPH-MCNC: 3.7 GM/DL
GLUCOSE BLDC GLUCOMTR-MCNC: 106 MG/DL (ref 70–99)
GLUCOSE BLDC GLUCOMTR-MCNC: 107 MG/DL (ref 70–99)
GLUCOSE BLDC GLUCOMTR-MCNC: 128 MG/DL (ref 70–99)
GLUCOSE BLDC GLUCOMTR-MCNC: 132 MG/DL (ref 70–99)
GLUCOSE SERPL-MCNC: 123 MG/DL (ref 65–99)
GLUCOSE SERPL-MCNC: 130 MG/DL (ref 65–99)
HCT VFR BLD AUTO: 36.3 % (ref 37.5–51)
HGB BLD-MCNC: 12.3 G/DL (ref 13–17.7)
IMM GRANULOCYTES # BLD AUTO: 0.05 10*3/MM3 (ref 0–0.05)
IMM GRANULOCYTES NFR BLD AUTO: 0.5 % (ref 0–0.5)
LYMPHOCYTES # BLD AUTO: 1.66 10*3/MM3 (ref 0.7–3.1)
LYMPHOCYTES NFR BLD AUTO: 17.5 % (ref 19.6–45.3)
MAGNESIUM SERPL-MCNC: 2.3 MG/DL (ref 1.6–2.4)
MAGNESIUM SERPL-MCNC: 2.4 MG/DL (ref 1.6–2.4)
MCH RBC QN AUTO: 31.1 PG (ref 26.6–33)
MCHC RBC AUTO-ENTMCNC: 33.9 G/DL (ref 31.5–35.7)
MCV RBC AUTO: 91.7 FL (ref 79–97)
MONOCYTES # BLD AUTO: 0.66 10*3/MM3 (ref 0.1–0.9)
MONOCYTES NFR BLD AUTO: 6.9 % (ref 5–12)
NEUTROPHILS NFR BLD AUTO: 6.94 10*3/MM3 (ref 1.7–7)
NEUTROPHILS NFR BLD AUTO: 73 % (ref 42.7–76)
NRBC BLD AUTO-RTO: 0 /100 WBC (ref 0–0.2)
PHOSPHATE SERPL-MCNC: 2.5 MG/DL (ref 2.5–4.5)
PHOSPHATE SERPL-MCNC: 2.8 MG/DL (ref 2.5–4.5)
PLATELET # BLD AUTO: 149 10*3/MM3 (ref 140–450)
PMV BLD AUTO: 12.6 FL (ref 6–12)
POTASSIUM SERPL-SCNC: 4.4 MMOL/L (ref 3.5–5.2)
POTASSIUM SERPL-SCNC: 4.9 MMOL/L (ref 3.5–5.2)
POTASSIUM SERPL-SCNC: 5.7 MMOL/L (ref 3.5–5.2)
PROT SERPL-MCNC: 6.7 G/DL (ref 6–8.5)
RBC # BLD AUTO: 3.96 10*6/MM3 (ref 4.14–5.8)
SODIUM SERPL-SCNC: 149 MMOL/L (ref 136–145)
SODIUM SERPL-SCNC: 150 MMOL/L (ref 136–145)
WBC NRBC COR # BLD: 9.51 10*3/MM3 (ref 3.4–10.8)

## 2023-09-09 PROCEDURE — 83735 ASSAY OF MAGNESIUM: CPT | Performed by: INTERNAL MEDICINE

## 2023-09-09 PROCEDURE — 80053 COMPREHEN METABOLIC PANEL: CPT | Performed by: FAMILY MEDICINE

## 2023-09-09 PROCEDURE — 84100 ASSAY OF PHOSPHORUS: CPT | Performed by: FAMILY MEDICINE

## 2023-09-09 PROCEDURE — 83735 ASSAY OF MAGNESIUM: CPT | Performed by: FAMILY MEDICINE

## 2023-09-09 PROCEDURE — 85025 COMPLETE CBC W/AUTO DIFF WBC: CPT | Performed by: FAMILY MEDICINE

## 2023-09-09 PROCEDURE — 84132 ASSAY OF SERUM POTASSIUM: CPT | Performed by: INTERNAL MEDICINE

## 2023-09-09 PROCEDURE — 82948 REAGENT STRIP/BLOOD GLUCOSE: CPT

## 2023-09-09 PROCEDURE — 99233 SBSQ HOSP IP/OBS HIGH 50: CPT | Performed by: INTERNAL MEDICINE

## 2023-09-09 RX ORDER — DEXTROSE AND SODIUM CHLORIDE 5; .45 G/100ML; G/100ML
75 INJECTION, SOLUTION INTRAVENOUS CONTINUOUS
Status: DISCONTINUED | OUTPATIENT
Start: 2023-09-09 | End: 2023-09-11 | Stop reason: HOSPADM

## 2023-09-09 RX ADMIN — METOPROLOL TARTRATE 12.5 MG: 25 TABLET, FILM COATED ORAL at 10:58

## 2023-09-09 RX ADMIN — APIXABAN 5 MG: 5 TABLET, FILM COATED ORAL at 10:58

## 2023-09-09 RX ADMIN — APIXABAN 5 MG: 5 TABLET, FILM COATED ORAL at 21:27

## 2023-09-09 RX ADMIN — SENNOSIDES AND DOCUSATE SODIUM 2 TABLET: 50; 8.6 TABLET ORAL at 21:27

## 2023-09-09 RX ADMIN — POTASSIUM CHLORIDE, DEXTROSE MONOHYDRATE AND SODIUM CHLORIDE 100 ML/HR: 150; 5; 450 INJECTION, SOLUTION INTRAVENOUS at 09:34

## 2023-09-09 RX ADMIN — METOPROLOL TARTRATE 12.5 MG: 25 TABLET, FILM COATED ORAL at 21:27

## 2023-09-09 RX ADMIN — Medication 10 ML: at 10:58

## 2023-09-09 RX ADMIN — ASPIRIN 81 MG: 81 TABLET, CHEWABLE ORAL at 10:58

## 2023-09-09 NOTE — PROGRESS NOTES
[facility]   Hospitalist Progress Note  Date: 2023  Patient Name: Jordin Menon  : 1940  MRN: [id number]  Date of admission: 2023      Subjective   Subjective     Summary: Jordin is an 83-year-old gentleman with past medical history of Alzheimer's dementia, CVA that presents to the emergency department for evaluation of shortness of breath from nursing facility.  Patient has limited communication at baseline but is usually oriented x1.  Currently he is not answering my questions or following simple commands and is somnolent on exam.  History was obtained from ER signout and chart review.  Patient reportedly seemed short of breath at the nursing home prompting them to call EMS.  In the emergency department he was also found to be febrile and tachycardic along with leukocytosis.  He has been started on Rocephin and azithromycin for suspected pneumonia.  UA pending at this time.  Cultures have been obtained he will be admitted for ongoing monitoring and management.  Patient is unable to provide any other review of systems at this time.     Interval Followup: f  -- Patient seems to be nearing his baseline  -- Sodium is still elevated at 155  -- We will continue D5 half-normal  -- Apparently tube feeds were started back last night so this will help with some free water as well  -- Patient has completed antibiotics  -- We will recheck sodium this afternoon    All systems reviewed abnormal as noted above      Objective   Objective     Vitals:   Temp:  [97.3 °F (36.3 °C)-98.2 °F (36.8 °C)] 98.2 °F (36.8 °C)  Heart Rate:  [63-78] 74  Resp:  [17-20] 18  BP: ()/(61-81) 114/72  Physical Exam   Constitutional: A somnolent, not answering questions              Eyes: Pupils equal, sclerae anicteric, no conjunctival injection              HENT: NCAT, mucous membranes moist              Neck: Supple, no thyromegaly, no lymphadenopathy, trachea midline              Respiratory: Diminished bilaterally,  nonlabored on room air              Cardiovascular: RRR, no murmurs, rubs, or gallops, palpable pedal pulses bilaterally              Gastrointestinal: Positive bowel sounds, soft, nontender, nondistended              Musculoskeletal: contractures BLE              Psychiatric: Not answering questions or following commands              Neurologic: Not answering questions or following commands.  Minimally verbal at baseline.          Assessment & Plan   Assessment / Plan     Assessment/Plan:  Sepsis, suspected pneumonia   Dehydration  Hypernatremia  Alzheimer's dementia  Hypertension     Completed antibioticsContinue   D5 Half-normal saline with 20 of K  Repeat labs in am    Cultures so far negative  Supportive care.  Oral care for dry mouth and mucous   Holding home medications in setting of sepsis for the time being add back home medications as appropriate      Discussed plan with RN.  DVT prophylaxis:  Apixaban      CODE STATUS:   Code Status (Patient has no pulse and is not breathing): CPR (Attempt to Resuscitate)  Medical Interventions (Patient has pulse or is breathing): Full Support    Electronically signed by Cosmo Betancourt MD, 09/09/23, 10:29 AM EDT.

## 2023-09-09 NOTE — PROGRESS NOTES
Nutrition Services    Patient Name: Jordin Menon  YOB: 1940  MRN: [id number]  Admission date: 9/4/2023    PROGRESS NOTE      Encounter Information: EN follow up       PO Diet: NPO Diet NPO Type: Strict NPO   PO Supplements: N/A   PO Intake:  N/A       Current nutrition support: Isosource 1.5 @ 68 ml/hr via PEG   Q2H (1800 ml)  Provides: 2448 kcal, 111 g pro, 3040 ml fluid     Nutrition support review: No intolerances noted. Tube feeds resumed overnight.    Receiving D5 1/2 NS @ 100 ml/hr providing 2400 ml fluid.         Labs (reviewed below): Hypernatremia continues, improving slowly.  Current deficit is 4.5L       GI Function:  Last BM noted 9/07/23.       Nutrition Intervention Updates: Will increase free water to correct FW deficit.  Q2H (1800 ml)   Total provided: 2448 kcal, 111 g pro, 3040 ml fluid    With IVF, receiving a total of 5440 ml      Results from last 7 days   Lab Units 09/09/23  1122 09/08/23  0503 09/07/23  0515   SODIUM mmol/L 150* 153* 157*   POTASSIUM mmol/L 4.4 3.8 3.5   CHLORIDE mmol/L 120* 123* 122*   CO2 mmol/L 24.0 22.5 23.4   BUN mg/dL 21 31* 36*   CREATININE mg/dL 0.69* 0.81 0.81   CALCIUM mg/dL 8.7 8.6 9.2   BILIRUBIN mg/dL 0.3 0.5 0.7   ALK PHOS U/L 66 62 62   ALT (SGPT) U/L 17 17 16   AST (SGOT) U/L 19 25 21   GLUCOSE mg/dL 123* 143* 70       Results from last 7 days   Lab Units 09/09/23  1345 09/09/23  1122 09/08/23  0503   MAGNESIUM mg/dL 2.4 2.3 2.4   PHOSPHORUS mg/dL  --  2.5 2.4*   HEMOGLOBIN g/dL  --  12.3* 12.2*   HEMATOCRIT %  --  36.3* 37.8       COVID19   Date Value Ref Range Status   09/04/2023 Not Detected Not Detected - Ref. Range Final     Lab Results   Component Value Date    HGBA1C 4.90 05/30/2022       RD to follow up per protocol.    Electronically signed by:  Yanelis Coyne RD  09/09/23 14:15 EDT

## 2023-09-10 LAB
ALBUMIN SERPL-MCNC: 2.7 G/DL (ref 3.5–5.2)
ALBUMIN/GLOB SERPL: 0.8 G/DL
ALP SERPL-CCNC: 61 U/L (ref 39–117)
ALT SERPL W P-5'-P-CCNC: 14 U/L (ref 1–41)
ANION GAP SERPL CALCULATED.3IONS-SCNC: 6.6 MMOL/L (ref 5–15)
AST SERPL-CCNC: 19 U/L (ref 1–40)
BASOPHILS # BLD AUTO: 0.02 10*3/MM3 (ref 0–0.2)
BASOPHILS NFR BLD AUTO: 0.2 % (ref 0–1.5)
BILIRUB SERPL-MCNC: 0.3 MG/DL (ref 0–1.2)
BUN SERPL-MCNC: 20 MG/DL (ref 8–23)
BUN/CREAT SERPL: 32.3 (ref 7–25)
CALCIUM SPEC-SCNC: 8.6 MG/DL (ref 8.6–10.5)
CHLORIDE SERPL-SCNC: 114 MMOL/L (ref 98–107)
CO2 SERPL-SCNC: 25.4 MMOL/L (ref 22–29)
CREAT SERPL-MCNC: 0.62 MG/DL (ref 0.76–1.27)
DEPRECATED RDW RBC AUTO: 47.8 FL (ref 37–54)
EGFRCR SERPLBLD CKD-EPI 2021: 95.4 ML/MIN/1.73
EOSINOPHIL # BLD AUTO: 0.34 10*3/MM3 (ref 0–0.4)
EOSINOPHIL NFR BLD AUTO: 3.1 % (ref 0.3–6.2)
ERYTHROCYTE [DISTWIDTH] IN BLOOD BY AUTOMATED COUNT: 14.3 % (ref 12.3–15.4)
GLOBULIN UR ELPH-MCNC: 3.5 GM/DL
GLUCOSE BLDC GLUCOMTR-MCNC: 104 MG/DL (ref 70–99)
GLUCOSE BLDC GLUCOMTR-MCNC: 108 MG/DL (ref 70–99)
GLUCOSE BLDC GLUCOMTR-MCNC: 115 MG/DL (ref 70–99)
GLUCOSE BLDC GLUCOMTR-MCNC: 120 MG/DL (ref 70–99)
GLUCOSE SERPL-MCNC: 95 MG/DL (ref 65–99)
HCT VFR BLD AUTO: 34 % (ref 37.5–51)
HGB BLD-MCNC: 10.8 G/DL (ref 13–17.7)
IMM GRANULOCYTES # BLD AUTO: 0.05 10*3/MM3 (ref 0–0.05)
IMM GRANULOCYTES NFR BLD AUTO: 0.5 % (ref 0–0.5)
LYMPHOCYTES # BLD AUTO: 2.06 10*3/MM3 (ref 0.7–3.1)
LYMPHOCYTES NFR BLD AUTO: 18.8 % (ref 19.6–45.3)
MAGNESIUM SERPL-MCNC: 2.3 MG/DL (ref 1.6–2.4)
MCH RBC QN AUTO: 29.5 PG (ref 26.6–33)
MCHC RBC AUTO-ENTMCNC: 31.8 G/DL (ref 31.5–35.7)
MCV RBC AUTO: 92.9 FL (ref 79–97)
MONOCYTES # BLD AUTO: 1 10*3/MM3 (ref 0.1–0.9)
MONOCYTES NFR BLD AUTO: 9.1 % (ref 5–12)
NEUTROPHILS NFR BLD AUTO: 68.3 % (ref 42.7–76)
NEUTROPHILS NFR BLD AUTO: 7.47 10*3/MM3 (ref 1.7–7)
NRBC BLD AUTO-RTO: 0 /100 WBC (ref 0–0.2)
PHOSPHATE SERPL-MCNC: 2.3 MG/DL (ref 2.5–4.5)
PLATELET # BLD AUTO: 134 10*3/MM3 (ref 140–450)
PMV BLD AUTO: 13 FL (ref 6–12)
POTASSIUM SERPL-SCNC: 4.6 MMOL/L (ref 3.5–5.2)
PROT SERPL-MCNC: 6.2 G/DL (ref 6–8.5)
RBC # BLD AUTO: 3.66 10*6/MM3 (ref 4.14–5.8)
SODIUM SERPL-SCNC: 146 MMOL/L (ref 136–145)
WBC NRBC COR # BLD: 10.94 10*3/MM3 (ref 3.4–10.8)

## 2023-09-10 PROCEDURE — 80053 COMPREHEN METABOLIC PANEL: CPT | Performed by: FAMILY MEDICINE

## 2023-09-10 PROCEDURE — 99232 SBSQ HOSP IP/OBS MODERATE 35: CPT | Performed by: FAMILY MEDICINE

## 2023-09-10 PROCEDURE — 82948 REAGENT STRIP/BLOOD GLUCOSE: CPT

## 2023-09-10 PROCEDURE — 84100 ASSAY OF PHOSPHORUS: CPT | Performed by: FAMILY MEDICINE

## 2023-09-10 PROCEDURE — 83735 ASSAY OF MAGNESIUM: CPT | Performed by: FAMILY MEDICINE

## 2023-09-10 PROCEDURE — 85025 COMPLETE CBC W/AUTO DIFF WBC: CPT | Performed by: FAMILY MEDICINE

## 2023-09-10 RX ADMIN — SENNOSIDES AND DOCUSATE SODIUM 2 TABLET: 50; 8.6 TABLET ORAL at 09:59

## 2023-09-10 RX ADMIN — METOPROLOL TARTRATE 12.5 MG: 25 TABLET, FILM COATED ORAL at 09:59

## 2023-09-10 RX ADMIN — ASPIRIN 81 MG: 81 TABLET, CHEWABLE ORAL at 09:59

## 2023-09-10 RX ADMIN — SENNOSIDES AND DOCUSATE SODIUM 2 TABLET: 50; 8.6 TABLET ORAL at 21:11

## 2023-09-10 RX ADMIN — APIXABAN 5 MG: 5 TABLET, FILM COATED ORAL at 21:11

## 2023-09-10 RX ADMIN — APIXABAN 5 MG: 5 TABLET, FILM COATED ORAL at 09:59

## 2023-09-10 RX ADMIN — METOPROLOL TARTRATE 12.5 MG: 25 TABLET, FILM COATED ORAL at 21:11

## 2023-09-10 RX ADMIN — Medication 10 ML: at 21:12

## 2023-09-10 RX ADMIN — Medication 10 ML: at 09:59

## 2023-09-10 NOTE — PLAN OF CARE
Goal Outcome Evaluation:  Plan of Care Reviewed With: patient        Progress: no change  Outcome Evaluation: Pt alert and oriented to self. Q 2 hr turn, skin care per protocol, heels floated. Wound consult ordered. Tube feeds at goal rate. Cardiac monitor discontinued , MD states pt not to transfer off floor. Continue plan of care.

## 2023-09-10 NOTE — PLAN OF CARE
Goal Outcome Evaluation:           Progress: no change  Outcome Evaluation: Patient alert to self only, q2 turn implemented throughout shift, tube feedings continued at goal rate, bags changed, vss, will continue to monitor,

## 2023-09-10 NOTE — PROGRESS NOTES
Nutrition Services    Patient Name: Jordin Menon  YOB: 1940  MRN: [id number]  Admission date: 9/4/2023    PROGRESS NOTE      Encounter Information: EN follow up       PO Diet: NPO Diet NPO Type: Strict NPO   PO Supplements: N/A   PO Intake:  N/A       Current nutrition support: Isosource 1.5 @ 68 ml/hr via PEG   Q2H (1800 ml)  Provides: 2448 kcal, 111 g pro, 3040 ml fluid     Nutrition support review: No intolerances noted. Tube feeds at goral rate.         Labs (reviewed below): Hypernatremia continues to improve.  Na+ today is 146.         GI Function:  Last BM noted 9/09/23. Soft, formed.        Nutrition Intervention Updates: IVF decreased to 75 ml/hr today.    Will also decrease free water flushes to 100 ml Q2 hours.     Isosource 1.5 @ 68 lm/hr  Free water flushes 100 ml every 2 hours  Provides 2448 kcal, 111 g pro, 2440 ml fluid     Receiving an additional 1800 ml from IVF.  Total fluid over 24 hours is 4240 ml, 1200 ml less than the previous 24 hours.      Results from last 7 days   Lab Units 09/10/23  0522 09/09/23  1637 09/09/23  1345 09/09/23  1122 09/08/23  0503   SODIUM mmol/L 146*  --  149* 150* 153*   POTASSIUM mmol/L 4.6 4.9 5.7* 4.4 3.8   CHLORIDE mmol/L 114*  --  122* 120* 123*   CO2 mmol/L 25.4  --  18.0* 24.0 22.5   BUN mg/dL 20  --  21 21 31*   CREATININE mg/dL 0.62*  --  0.74* 0.69* 0.81   CALCIUM mg/dL 8.6  --  8.4* 8.7 8.6   BILIRUBIN mg/dL 0.3  --   --  0.3 0.5   ALK PHOS U/L 61  --   --  66 62   ALT (SGPT) U/L 14  --   --  17 17   AST (SGOT) U/L 19  --   --  19 25   GLUCOSE mg/dL 95  --  130* 123* 143*       Results from last 7 days   Lab Units 09/10/23  0522 09/09/23  1345 09/09/23  1122   MAGNESIUM mg/dL 2.3 2.4 2.3   PHOSPHORUS mg/dL 2.3* 2.8 2.5   HEMOGLOBIN g/dL 10.8*  --  12.3*   HEMATOCRIT % 34.0*  --  36.3*       COVID19   Date Value Ref Range Status   09/04/2023 Not Detected Not Detected - Ref. Range Final     Lab Results   Component Value Date    HGBA1C 4.90  05/30/2022       RD to follow up per protocol.    Electronically signed by:  Yanelis Coyne RD  09/10/23 08:37 EDT

## 2023-09-11 VITALS
OXYGEN SATURATION: 99 % | WEIGHT: 156.31 LBS | TEMPERATURE: 98.1 F | HEART RATE: 72 BPM | DIASTOLIC BLOOD PRESSURE: 54 MMHG | RESPIRATION RATE: 20 BRPM | SYSTOLIC BLOOD PRESSURE: 107 MMHG | HEIGHT: 64 IN | BODY MASS INDEX: 26.69 KG/M2

## 2023-09-11 LAB
ALBUMIN SERPL-MCNC: 2.6 G/DL (ref 3.5–5.2)
ALBUMIN/GLOB SERPL: 0.8 G/DL
ALP SERPL-CCNC: 62 U/L (ref 39–117)
ALT SERPL W P-5'-P-CCNC: 15 U/L (ref 1–41)
ANION GAP SERPL CALCULATED.3IONS-SCNC: 8.3 MMOL/L (ref 5–15)
AST SERPL-CCNC: 16 U/L (ref 1–40)
BASOPHILS # BLD AUTO: 0.02 10*3/MM3 (ref 0–0.2)
BASOPHILS NFR BLD AUTO: 0.2 % (ref 0–1.5)
BILIRUB SERPL-MCNC: 0.3 MG/DL (ref 0–1.2)
BUN SERPL-MCNC: 19 MG/DL (ref 8–23)
BUN/CREAT SERPL: 29.2 (ref 7–25)
CALCIUM SPEC-SCNC: 8.6 MG/DL (ref 8.6–10.5)
CHLORIDE SERPL-SCNC: 110 MMOL/L (ref 98–107)
CO2 SERPL-SCNC: 24.7 MMOL/L (ref 22–29)
CREAT SERPL-MCNC: 0.65 MG/DL (ref 0.76–1.27)
DEPRECATED RDW RBC AUTO: 46.5 FL (ref 37–54)
EGFRCR SERPLBLD CKD-EPI 2021: 94.1 ML/MIN/1.73
EOSINOPHIL # BLD AUTO: 0.31 10*3/MM3 (ref 0–0.4)
EOSINOPHIL NFR BLD AUTO: 3.6 % (ref 0.3–6.2)
ERYTHROCYTE [DISTWIDTH] IN BLOOD BY AUTOMATED COUNT: 14.1 % (ref 12.3–15.4)
GLOBULIN UR ELPH-MCNC: 3.4 GM/DL
GLUCOSE BLDC GLUCOMTR-MCNC: 117 MG/DL (ref 70–99)
GLUCOSE SERPL-MCNC: 120 MG/DL (ref 65–99)
HCT VFR BLD AUTO: 31.9 % (ref 37.5–51)
HGB BLD-MCNC: 10.4 G/DL (ref 13–17.7)
IMM GRANULOCYTES # BLD AUTO: 0.04 10*3/MM3 (ref 0–0.05)
IMM GRANULOCYTES NFR BLD AUTO: 0.5 % (ref 0–0.5)
LYMPHOCYTES # BLD AUTO: 1.66 10*3/MM3 (ref 0.7–3.1)
LYMPHOCYTES NFR BLD AUTO: 19.2 % (ref 19.6–45.3)
MAGNESIUM SERPL-MCNC: 2.1 MG/DL (ref 1.6–2.4)
MCH RBC QN AUTO: 29.7 PG (ref 26.6–33)
MCHC RBC AUTO-ENTMCNC: 32.6 G/DL (ref 31.5–35.7)
MCV RBC AUTO: 91.1 FL (ref 79–97)
MONOCYTES # BLD AUTO: 0.87 10*3/MM3 (ref 0.1–0.9)
MONOCYTES NFR BLD AUTO: 10.1 % (ref 5–12)
NEUTROPHILS NFR BLD AUTO: 5.74 10*3/MM3 (ref 1.7–7)
NEUTROPHILS NFR BLD AUTO: 66.4 % (ref 42.7–76)
NRBC BLD AUTO-RTO: 0 /100 WBC (ref 0–0.2)
PHOSPHATE SERPL-MCNC: 2.3 MG/DL (ref 2.5–4.5)
PLATELET # BLD AUTO: 144 10*3/MM3 (ref 140–450)
PMV BLD AUTO: 12 FL (ref 6–12)
POTASSIUM SERPL-SCNC: 4.3 MMOL/L (ref 3.5–5.2)
PROT SERPL-MCNC: 6 G/DL (ref 6–8.5)
RBC # BLD AUTO: 3.5 10*6/MM3 (ref 4.14–5.8)
SODIUM SERPL-SCNC: 143 MMOL/L (ref 136–145)
WBC NRBC COR # BLD: 8.64 10*3/MM3 (ref 3.4–10.8)

## 2023-09-11 PROCEDURE — 99238 HOSP IP/OBS DSCHRG MGMT 30/<: CPT | Performed by: FAMILY MEDICINE

## 2023-09-11 PROCEDURE — 84100 ASSAY OF PHOSPHORUS: CPT | Performed by: FAMILY MEDICINE

## 2023-09-11 PROCEDURE — 80053 COMPREHEN METABOLIC PANEL: CPT | Performed by: FAMILY MEDICINE

## 2023-09-11 PROCEDURE — 85025 COMPLETE CBC W/AUTO DIFF WBC: CPT | Performed by: FAMILY MEDICINE

## 2023-09-11 PROCEDURE — 83735 ASSAY OF MAGNESIUM: CPT | Performed by: FAMILY MEDICINE

## 2023-09-11 PROCEDURE — 82948 REAGENT STRIP/BLOOD GLUCOSE: CPT

## 2023-09-11 RX ADMIN — Medication 10 ML: at 08:51

## 2023-09-11 RX ADMIN — METOPROLOL TARTRATE 12.5 MG: 25 TABLET, FILM COATED ORAL at 08:51

## 2023-09-11 RX ADMIN — APIXABAN 5 MG: 5 TABLET, FILM COATED ORAL at 08:51

## 2023-09-11 RX ADMIN — ASPIRIN 81 MG: 81 TABLET, CHEWABLE ORAL at 08:51

## 2023-09-11 NOTE — PROGRESS NOTES
Nutrition Services    Patient Name: Jordin Menon  YOB: 1940  MRN: [id number]  Admission date: 9/4/2023    PROGRESS NOTE      Encounter Information: EN follow up       PO Diet: NPO Diet NPO Type: Strict NPO   PO Supplements: N/A   PO Intake:  N/A       Current nutrition support: Isosource 1.5 @ 68 ml/hr via PEG   Q2H (1200 ml)  Provides: 2448 kcal, 111 g pro, 2440 ml fluid    IVF 75 ml/hr providing additional 1800 ml fluids      Nutrition support review: No intolerances noted. Tube feeds at goal rate.         Labs (reviewed below): Hypernatremia corrected.        GI Function:  Last BM noted 9/11/23.         Nutrition Intervention Updates: Continue with current nutrition plan of care.     Results from last 7 days   Lab Units 09/11/23  0608 09/10/23  0522 09/09/23  1637 09/09/23  1345 09/09/23  1122   SODIUM mmol/L 143 146*  --  149* 150*   POTASSIUM mmol/L 4.3 4.6 4.9 5.7* 4.4   CHLORIDE mmol/L 110* 114*  --  122* 120*   CO2 mmol/L 24.7 25.4  --  18.0* 24.0   BUN mg/dL 19 20  --  21 21   CREATININE mg/dL 0.65* 0.62*  --  0.74* 0.69*   CALCIUM mg/dL 8.6 8.6  --  8.4* 8.7   BILIRUBIN mg/dL 0.3 0.3  --   --  0.3   ALK PHOS U/L 62 61  --   --  66   ALT (SGPT) U/L 15 14  --   --  17   AST (SGOT) U/L 16 19  --   --  19   GLUCOSE mg/dL 120* 95  --  130* 123*       Results from last 7 days   Lab Units 09/11/23  0608 09/10/23  0522 09/09/23  1345   MAGNESIUM mg/dL 2.1 2.3 2.4   PHOSPHORUS mg/dL 2.3* 2.3* 2.8   HEMOGLOBIN g/dL 10.4* 10.8*  --    HEMATOCRIT % 31.9* 34.0*  --        COVID19   Date Value Ref Range Status   09/04/2023 Not Detected Not Detected - Ref. Range Final     Lab Results   Component Value Date    HGBA1C 4.90 05/30/2022       RD to follow up per protocol.    Electronically signed by:  Arin Hinds RD  09/11/23 08:04 EDT

## 2023-09-11 NOTE — DISCHARGE SUMMARY
[facility]         HOSPITALIST  DISCHARGE SUMMARY    Patient Name: Jordin Menon  : 1940  MRN: [id number]    Date of Admission: 2023  Date of Discharge:  2023   Primary Care Physician: Frank Llanes MD    Consults       Date and Time Order Name Status Description    2023  7:20 PM Hospitalist (on-call MD unless specified)              Active and Resolved Hospital Problems:  Sepsis, suspected pneumonia   Dehydration  Hypernatremia  Alzheimer's dementia  Hypertension    Hospital Course     Hospital Course:  Jordin Menon is a 83 y.o. male with past medical history of Alzheimer's dementia, CVA that presents to the emergency department for evaluation of shortness of breath from nursing facility.  Patient has limited communication at baseline but is usually oriented x1.  Currently he is not answering my questions or following simple commands and is somnolent on exam.  History was obtained from ER signout and chart review.  Patient reportedly seemed short of breath at the nursing home prompting them to call EMS.  In the emergency department he was also found to be febrile and tachycardic along with leukocytosis.  He has been started on Rocephin and azithromycin for suspected pneumonia.  UA pending at this time.  Cultures have been obtained he will be admitted for ongoing monitoring and management.  Patient is unable to provide any other review of systems at this time.     Patient was admitted and treated for sepsis.  He completed a course of antibiotics.  He was also noted to have hypernatremia and dehydration.  He was treated with IV fluids and these were actually switched to D5 half-normal saline with 20 of K.  He improved with resuscitation and his tube feedings were restarted as well.  On the day of discharge he was back to his baseline and laboratory values had improved.  He was discharged in stable condition back to long-term care facility.        Day of Discharge     Vital  Signs:  Temp:  [98.1 °F (36.7 °C)-99.1 °F (37.3 °C)] 98.1 °F (36.7 °C)  Heart Rate:  [64-75] 72  Resp:  [20] 20  BP: ()/(52-73) 107/54  Physical Exam:              Eyes: Pupils equal, sclerae anicteric, no conjunctival injection              HENT: NCAT, mucous membranes moist              Neck: Supple, no thyromegaly, no lymphadenopathy, trachea midline              Respiratory: Diminished bilaterally, nonlabored on room air              Cardiovascular: RRR, no murmurs, rubs, or gallops, palpable pedal pulses bilaterally              Gastrointestinal: Positive bowel sounds, soft, nontender, nondistended              Musculoskeletal: contractures BLE              Psychiatric: Not answering questions or following commands              Neurologic: Not answering questions or following commands.  Minimally verbal at baseline.        Discharge Details        Discharge Medications        ASK your doctor about these medications        Instructions Start Date   acetaminophen 325 MG tablet  Commonly known as: TYLENOL   650 mg, Oral, 3 Times Daily      Alum & Mag Hydroxide-Simeth 200-200-25 MG chewable tablet   1 each, Per G Tube, Every 6 Hours PRN, G- tube 1 tab every  6 hrs      apixaban 5 MG tablet tablet  Commonly known as: ELIQUIS   5 mg, Per G Tube, 2 Times Daily      aspirin 81 MG chewable tablet   81 mg, Oral, Daily      busPIRone 10 MG tablet  Commonly known as: BUSPAR   10 mg, Oral, 3 Times Daily      cyanocobalamin 1000 MCG/ML injection   1,000 mcg, Intramuscular, Every 30 Days, On the 5th of the month      Diclofenac Sodium 1 % gel gel  Commonly known as: VOLTAREN   4 g, Topical, 2 Times Daily PRN      docusate sodium 50 mg/5 mL liquid  Commonly known as: COLACE   250 mg, Per G Tube, Daily      famotidine 40 MG tablet  Commonly known as: PEPCID   40 mg, Oral, Nightly      furosemide 20 MG tablet  Commonly known as: Lasix   20 mg, Oral, Daily      ipratropium-albuterol 0.5-2.5 mg/3 ml nebulizer  Commonly known  as: DUO-NEB   3 mL, Nebulization, Every 4 Hours PRN      magnesium hydroxide 400 MG/5ML suspension  Commonly known as: MILK OF MAGNESIA   30 mL, Per G Tube, Daily PRN      melatonin 5 MG tablet tablet   5 mg, Per G Tube, Every Night at Bedtime      metoprolol tartrate 25 MG tablet  Commonly known as: LOPRESSOR   12.5 mg, Per G Tube, Every 12 Hours Scheduled      mineral oil-hydrophilic petrolatum ointment   1 application , Topical, Daily      mupirocin 2 % cream  Commonly known as: BACTROBAN   1 application , Topical, 2 Times Daily      polyethylene glycol 17 g packet  Commonly known as: MIRALAX   17 g, Oral, Daily, G-tube      trolamine salicylate 10 % cream  Commonly known as: ASPERCREME   1 application , Topical, 2 Times Daily PRN      witch hazel-glycerin pad  Commonly known as: TUCKS   1 pad , Topical, As Needed               No Known Allergies    Discharge Disposition:  long term care     Diet:  Hospital:  Diet Order   Procedures    NPO Diet NPO Type: Strict NPO       Discharge Activity:       CODE STATUS:  Code Status and Medical Interventions:   Ordered at: 09/1940     Code Status (Patient has no pulse and is not breathing):    CPR (Attempt to Resuscitate)     Medical Interventions (Patient has pulse or is breathing):    Full Support         No future appointments.        Pertinent  and/or Most Recent Results     PROCEDURES:   none    LAB RESULTS:      Lab 09/11/23  0608 09/10/23  0522 09/09/23  1122 09/08/23  0503 09/07/23  0515 09/05/23  0434 09/04/23  1748 09/04/23  1508 09/04/23  1507   WBC 8.64 10.94* 9.51 7.83 9.01   < >  --   --  18.13*   HEMOGLOBIN 10.4* 10.8* 12.3* 12.2* 11.2*   < >  --   --  16.1   HEMATOCRIT 31.9* 34.0* 36.3* 37.8 35.5*   < >  --   --  48.0   PLATELETS 144 134* 149 141 131*   < >  --   --  204   NEUTROS ABS 5.74 7.47* 6.94 5.38 6.48   < >  --   --  13.73*   IMMATURE GRANS (ABS) 0.04 0.05 0.05 0.03 0.04   < >  --   --  0.14*   LYMPHS ABS 1.66 2.06 1.66 1.56 1.57   < >  --    --  2.25   MONOS ABS 0.87 1.00* 0.66 0.72 0.84   < >  --   --  1.95*   EOS ABS 0.31 0.34 0.19 0.12 0.06   < >  --   --  0.00   MCV 91.1 92.9 91.7 91.5 94.2   < >  --   --  92.1   CRP  --   --   --   --   --   --   --   --  14.70*   LACTATE  --   --   --   --   --   --  1.9  --   --    LACTATE, ARTERIAL  --   --   --   --   --   --   --  1.95  --    PROTIME  --   --   --   --   --   --  16.5*  --   --    APTT  --   --   --   --   --   --  32.4  --   --     < > = values in this interval not displayed.         Lab 09/11/23  0608 09/10/23  0522 09/09/23  1637 09/09/23  1345 09/09/23  1122 09/08/23  0503 09/04/23  1748 09/04/23  1508   SODIUM 143 146*  --  149* 150* 153*   < >  --    SODIUM, ARTERIAL  --   --   --   --   --   --   --  156.1*   POTASSIUM 4.3 4.6 4.9 5.7* 4.4 3.8   < >  --    CHLORIDE 110* 114*  --  122* 120* 123*   < >  --    CO2 24.7 25.4  --  18.0* 24.0 22.5   < >  --    ANION GAP 8.3 6.6  --  9.0 6.0 7.5   < >  --    BUN 19 20  --  21 21 31*   < >  --    CREATININE 0.65* 0.62*  --  0.74* 0.69* 0.81   < >  --    EGFR 94.1 95.4  --  90.5 92.4 88.0   < >  --    GLUCOSE 120* 95  --  130* 123* 143*   < >  --    GLUCOSE, ARTERIAL  --   --   --   --   --   --   --  174*   CALCIUM 8.6 8.6  --  8.4* 8.7 8.6   < >  --    IONIZED CALCIUM  --   --   --   --   --   --   --  1.12*   MAGNESIUM 2.1 2.3  --  2.4 2.3 2.4   < >  --    PHOSPHORUS 2.3* 2.3*  --  2.8 2.5 2.4*   < >  --     < > = values in this interval not displayed.         Lab 09/11/23  0608 09/10/23  0522 09/09/23  1345 09/09/23  1122 09/08/23  0503 09/07/23  0515   TOTAL PROTEIN 6.0 6.2  --  6.7 6.3 6.6   ALBUMIN 2.6* 2.7* 2.6* 3.0* 2.6* 2.8*   GLOBULIN 3.4 3.5  --  3.7 3.7 3.8   ALT (SGPT) 15 14  --  17 17 16   AST (SGOT) 16 19  --  19 25 21   BILIRUBIN 0.3 0.3  --  0.3 0.5 0.7   ALK PHOS 62 61  --  66 62 62         Lab 09/04/23  1748 09/04/23  1507   PROBNP  --  793.4   PROTIME 16.5*  --    INR 1.33*  --                  Lab 09/04/23  1508   PH,  ARTERIAL 7.531*   PCO2, ARTERIAL 30.5*   PO2 ART 68.9*   O2 SATURATION ART 94.9*   HCO3 ART 25.0   BASE EXCESS ART 3.2*   CARBOXYHEMOGLOBIN 0.8     Brief Urine Lab Results  (Last result in the past 365 days)        Color   Clarity   Blood   Leuk Est   Nitrite   Protein   CREAT   Urine HCG        09/04/23 1919 Dark Yellow   Cloudy   Large (3+)   Trace   Negative   100 mg/dL (2+)                 Microbiology Results (last 10 days)       Procedure Component Value - Date/Time    Influenza Antigen, Rapid - Swab, Nasopharynx [[id number]]  (Normal) Collected: 09/04/23 1749    Lab Status: Final result Specimen: Swab from Nasopharynx Updated: 09/04/23 1818     Influenza A Ag, EIA Negative     Influenza B Ag, EIA Negative    COVID-19,CEPHEID/BOY,COR/SIMONE/PAD/REYNOLD/MAD IN-HOUSE(OR EMERGENT/ADD-ON),NP SWAB IN TRANSPORT MEDIA 3-4 HR TAT, RT-PCR - Swab, Nasopharynx [[id number]]  (Normal) Collected: 09/04/23 1749    Lab Status: Final result Specimen: Swab from Nasopharynx Updated: 09/04/23 1842     COVID19 Not Detected    Narrative:      Fact sheet for providers: https://www.fda.gov/media/532503/download     Fact sheet for patients: https://www.fda.gov/media/552760/download  Fact sheet for providers: https://www.fda.gov/media/651578/download     Fact sheet for patients: https://www.fda.gov/media/415547/download    Blood Culture - Blood, Arm, Right [[id number]]  (Normal) Collected: 09/04/23 1507    Lab Status: Final result Specimen: Blood from Arm, Right Updated: 09/09/23 1631     Blood Culture No growth at 5 days    Blood Culture - Blood, Arm, Left [[id number]]  (Normal) Collected: 09/04/23 1507    Lab Status: Final result Specimen: Blood from Arm, Left Updated: 09/09/23 1631     Blood Culture No growth at 5 days            XR Chest 1 View    Result Date: 9/4/2023    1. Low lung volumes with streaky left basilar retrocardiac airspace disease representing atelectasis or pneumonia.        ISAIAH LOZANO MD       Electronically Signed  and Approved By: ISAIAH LOZANO MD on 9/04/2023 at 16:08               Results for orders placed during the hospital encounter of 02/25/22    Duplex Venous Lower Extremity - Bilateral CV-READ    Interpretation Summary  · Acute left lower extremity deep vein thrombosis noted in the common femoral, proximal femoral, mid femoral, distal femoral, popliteal and peroneal.  · All other veins appeared normal bilaterally.      Results for orders placed during the hospital encounter of 02/25/22    Duplex Venous Lower Extremity - Bilateral CV-READ    Interpretation Summary  · Acute left lower extremity deep vein thrombosis noted in the common femoral, proximal femoral, mid femoral, distal femoral, popliteal and peroneal.  · All other veins appeared normal bilaterally.      Results for orders placed during the hospital encounter of 04/14/22    Adult Transthoracic Echo Complete W/ Cont if Necessary Per Protocol    Interpretation Summary  · Calculated left ventricular EF = 54.8% Estimated left ventricular EF was in agreement with the calculated left ventricular EF. Left ventricular systolic function is normal.  · Mild mitral valve regurgitation is present.      Labs Pending at Discharge:        Time spent on Discharge including face to face service:  35 minutes    Electronically signed by Jose Anderson DO, 09/11/23, 1:42 PM EDT.

## 2023-09-11 NOTE — NURSING NOTE
Attempted to see patient for wound consult. Patient was pending discharge and awaiting EMS upon arrival to floor, prior to leaving floor, EMS had already arrived to  patient.   Jenni Edge RN

## 2023-09-11 NOTE — PROGRESS NOTES
[facility]   Hospitalist Progress Note  Date: 9/10/2023  Patient Name: Jordin Menon  : 1940  MRN: [id number]  Date of admission: 2023      Subjective   Subjective     Summary: Jordin is an 83-year-old gentleman with past medical history of Alzheimer's dementia, CVA that presents to the emergency department for evaluation of shortness of breath from nursing facility.  Patient has limited communication at baseline but is usually oriented x1.  Currently he is not answering my questions or following simple commands and is somnolent on exam.  History was obtained from ER signout and chart review.  Patient reportedly seemed short of breath at the nursing home prompting them to call EMS.  In the emergency department he was also found to be febrile and tachycardic along with leukocytosis.  He has been started on Rocephin and azithromycin for suspected pneumonia.  UA pending at this time.  Cultures have been obtained he will be admitted for ongoing monitoring and management.  Patient is unable to provide any other review of systems at this time.     Interval Followup:   -- Patient seems to be nearing his baseline  -- Sodium is still elevated but much improved  -- We will continue D5 half-normal  -- Tube feeds were started back so this will help with some free water as well  -- Patient has completed antibiotics  -- We will recheck sodium tomorrow    Objective   Objective     Vitals:   Temp:  [98.1 °F (36.7 °C)-98.6 °F (37 °C)] 98.6 °F (37 °C)  Heart Rate:  [66-76] 68  Resp:  [17-20] 20  BP: (108-131)/(52-92) 111/67  Physical Exam   Constitutional: A somnolent, not answering questions              Eyes: Pupils equal, sclerae anicteric, no conjunctival injection              HENT: NCAT, mucous membranes moist              Neck: Supple, no thyromegaly, no lymphadenopathy, trachea midline              Respiratory: Diminished bilaterally, nonlabored on room air              Cardiovascular: RRR, no murmurs, rubs, or  gallops, palpable pedal pulses bilaterally              Gastrointestinal: Positive bowel sounds, soft, nontender, nondistended              Musculoskeletal: contractures BLE              Psychiatric: Not answering questions or following commands              Neurologic: Not answering questions or following commands.  Minimally verbal at baseline.          Assessment & Plan   Assessment / Plan     Assessment/Plan:  Sepsis, suspected pneumonia   Dehydration  Hypernatremia  Alzheimer's dementia  Hypertension     Completed antibioticsContinue   D5 Half-normal saline with 20 of K  Repeat labs in am    Cultures so far negative  Supportive care.  Oral care for dry mouth and mucous   Holding home medications in setting of sepsis for the time being add back home medications as appropriate    Disposition: Home in morning if continues to improve    Discussed plan with RN.  DVT prophylaxis:  Apixaban      CODE STATUS:   Code Status (Patient has no pulse and is not breathing): CPR (Attempt to Resuscitate)  Medical Interventions (Patient has pulse or is breathing): Full Support

## 2023-09-11 NOTE — PLAN OF CARE
Goal Outcome Evaluation:           Progress: no change  Outcome Evaluation: No acute changes throughout shift today, feedings continued at goal rate, tube feedings continued at goal rate, vss, wound car eperformed, will continue to monitor.

## 2023-11-10 ENCOUNTER — APPOINTMENT (OUTPATIENT)
Dept: GENERAL RADIOLOGY | Facility: HOSPITAL | Age: 83
DRG: 871 | End: 2023-11-10
Payer: MEDICARE

## 2023-11-10 ENCOUNTER — APPOINTMENT (OUTPATIENT)
Dept: CT IMAGING | Facility: HOSPITAL | Age: 83
DRG: 871 | End: 2023-11-10
Payer: MEDICARE

## 2023-11-10 ENCOUNTER — HOSPITAL ENCOUNTER (INPATIENT)
Facility: HOSPITAL | Age: 83
LOS: 4 days | Discharge: NURSING, REHAB OR LONG-TERM CARE | DRG: 871 | End: 2023-11-14
Attending: EMERGENCY MEDICINE | Admitting: FAMILY MEDICINE
Payer: MEDICARE

## 2023-11-10 DIAGNOSIS — R13.12 OROPHARYNGEAL DYSPHAGIA: ICD-10-CM

## 2023-11-10 DIAGNOSIS — Z78.9 DECREASED ACTIVITIES OF DAILY LIVING (ADL): ICD-10-CM

## 2023-11-10 DIAGNOSIS — J96.01 ACUTE RESPIRATORY FAILURE WITH HYPOXIA: ICD-10-CM

## 2023-11-10 DIAGNOSIS — R26.2 DIFFICULTY IN WALKING: ICD-10-CM

## 2023-11-10 DIAGNOSIS — A41.9 SEPSIS, DUE TO UNSPECIFIED ORGANISM, UNSPECIFIED WHETHER ACUTE ORGAN DYSFUNCTION PRESENT: Primary | ICD-10-CM

## 2023-11-10 DIAGNOSIS — N17.9 ACUTE RENAL FAILURE, UNSPECIFIED ACUTE RENAL FAILURE TYPE: ICD-10-CM

## 2023-11-10 DIAGNOSIS — J18.9 MULTIFOCAL PNEUMONIA: ICD-10-CM

## 2023-11-10 LAB
ALBUMIN SERPL-MCNC: 3 G/DL (ref 3.5–5.2)
ALBUMIN SERPL-MCNC: 4.2 G/DL (ref 3.5–5.2)
ALBUMIN/GLOB SERPL: 0.8 G/DL
ALBUMIN/GLOB SERPL: 0.9 G/DL
ALP SERPL-CCNC: 100 U/L (ref 39–117)
ALP SERPL-CCNC: 64 U/L (ref 39–117)
ALT SERPL W P-5'-P-CCNC: 25 U/L (ref 1–41)
ALT SERPL W P-5'-P-CCNC: 38 U/L (ref 1–41)
ANION GAP SERPL CALCULATED.3IONS-SCNC: 10.9 MMOL/L (ref 5–15)
ANION GAP SERPL CALCULATED.3IONS-SCNC: 8.1 MMOL/L (ref 5–15)
AST SERPL-CCNC: 38 U/L (ref 1–40)
AST SERPL-CCNC: 43 U/L (ref 1–40)
BACTERIA UR QL AUTO: ABNORMAL /HPF
BASOPHILS # BLD AUTO: 0.04 10*3/MM3 (ref 0–0.2)
BASOPHILS NFR BLD AUTO: 0.2 % (ref 0–1.5)
BILIRUB SERPL-MCNC: 1.7 MG/DL (ref 0–1.2)
BILIRUB SERPL-MCNC: 1.8 MG/DL (ref 0–1.2)
BILIRUB UR QL STRIP: NEGATIVE
BUN SERPL-MCNC: 43 MG/DL (ref 8–23)
BUN SERPL-MCNC: 48 MG/DL (ref 8–23)
BUN/CREAT SERPL: 30.7 (ref 7–25)
BUN/CREAT SERPL: 36.1 (ref 7–25)
CALCIUM SPEC-SCNC: 8.4 MG/DL (ref 8.6–10.5)
CALCIUM SPEC-SCNC: 9.7 MG/DL (ref 8.6–10.5)
CHLORIDE SERPL-SCNC: 105 MMOL/L (ref 98–107)
CHLORIDE SERPL-SCNC: 99 MMOL/L (ref 98–107)
CK SERPL-CCNC: 448 U/L (ref 20–200)
CLARITY UR: CLEAR
CO2 SERPL-SCNC: 22.9 MMOL/L (ref 22–29)
CO2 SERPL-SCNC: 26.1 MMOL/L (ref 22–29)
COLOR UR: YELLOW
CREAT SERPL-MCNC: 1.33 MG/DL (ref 0.76–1.27)
CREAT SERPL-MCNC: 1.4 MG/DL (ref 0.76–1.27)
D-LACTATE SERPL-SCNC: 2.1 MMOL/L (ref 0.5–2)
D-LACTATE SERPL-SCNC: 3.4 MMOL/L (ref 0.5–2)
D-LACTATE SERPL-SCNC: 3.6 MMOL/L (ref 0.5–2)
DEPRECATED RDW RBC AUTO: 41.7 FL (ref 37–54)
DEPRECATED RDW RBC AUTO: 43.8 FL (ref 37–54)
EGFRCR SERPLBLD CKD-EPI 2021: 50.2 ML/MIN/1.73
EGFRCR SERPLBLD CKD-EPI 2021: 53.4 ML/MIN/1.73
EOSINOPHIL # BLD AUTO: 0.01 10*3/MM3 (ref 0–0.4)
EOSINOPHIL NFR BLD AUTO: 0.1 % (ref 0.3–6.2)
ERYTHROCYTE [DISTWIDTH] IN BLOOD BY AUTOMATED COUNT: 13.2 % (ref 12.3–15.4)
ERYTHROCYTE [DISTWIDTH] IN BLOOD BY AUTOMATED COUNT: 13.6 % (ref 12.3–15.4)
FLUAV SUBTYP SPEC NAA+PROBE: NOT DETECTED
FLUBV RNA ISLT QL NAA+PROBE: NOT DETECTED
GEN 5 2HR TROPONIN T REFLEX: 70 NG/L
GLOBULIN UR ELPH-MCNC: 3.6 GM/DL
GLOBULIN UR ELPH-MCNC: 4.6 GM/DL
GLUCOSE BLDC GLUCOMTR-MCNC: 112 MG/DL (ref 70–99)
GLUCOSE BLDC GLUCOMTR-MCNC: 113 MG/DL (ref 70–99)
GLUCOSE BLDC GLUCOMTR-MCNC: 79 MG/DL (ref 70–99)
GLUCOSE BLDC GLUCOMTR-MCNC: 84 MG/DL (ref 70–99)
GLUCOSE SERPL-MCNC: 116 MG/DL (ref 65–99)
GLUCOSE SERPL-MCNC: 99 MG/DL (ref 65–99)
GLUCOSE UR STRIP-MCNC: NEGATIVE MG/DL
HCT VFR BLD AUTO: 30.1 % (ref 37.5–51)
HCT VFR BLD AUTO: 38.2 % (ref 37.5–51)
HGB BLD-MCNC: 10.1 G/DL (ref 13–17.7)
HGB BLD-MCNC: 13.3 G/DL (ref 13–17.7)
HGB UR QL STRIP.AUTO: NEGATIVE
HOLD SPECIMEN: NORMAL
HOLD SPECIMEN: NORMAL
HYALINE CASTS UR QL AUTO: ABNORMAL /LPF
HYPOCHROMIA BLD QL: ABNORMAL
IMM GRANULOCYTES # BLD AUTO: 0.11 10*3/MM3 (ref 0–0.05)
IMM GRANULOCYTES NFR BLD AUTO: 0.6 % (ref 0–0.5)
KETONES UR QL STRIP: NEGATIVE
L PNEUMO1 AG UR QL IA: NEGATIVE
LARGE PLATELETS: ABNORMAL
LEUKOCYTE ESTERASE UR QL STRIP.AUTO: ABNORMAL
LYMPHOCYTES # BLD AUTO: 0.89 10*3/MM3 (ref 0.7–3.1)
LYMPHOCYTES # BLD MANUAL: 1.63 10*3/MM3 (ref 0.7–3.1)
LYMPHOCYTES NFR BLD AUTO: 4.8 % (ref 19.6–45.3)
LYMPHOCYTES NFR BLD MANUAL: 5 % (ref 5–12)
MAGNESIUM SERPL-MCNC: 1.9 MG/DL (ref 1.6–2.4)
MCH RBC QN AUTO: 29.6 PG (ref 26.6–33)
MCH RBC QN AUTO: 30.3 PG (ref 26.6–33)
MCHC RBC AUTO-ENTMCNC: 33.6 G/DL (ref 31.5–35.7)
MCHC RBC AUTO-ENTMCNC: 34.8 G/DL (ref 31.5–35.7)
MCV RBC AUTO: 87 FL (ref 79–97)
MCV RBC AUTO: 88.3 FL (ref 79–97)
METAMYELOCYTES NFR BLD MANUAL: 1 % (ref 0–0)
MONOCYTES # BLD AUTO: 0.89 10*3/MM3 (ref 0.1–0.9)
MONOCYTES # BLD: 1.02 10*3/MM3 (ref 0.1–0.9)
MONOCYTES NFR BLD AUTO: 4.8 % (ref 5–12)
MRSA DNA SPEC QL NAA+PROBE: NORMAL
NEUTROPHILS # BLD AUTO: 17.57 10*3/MM3 (ref 1.7–7)
NEUTROPHILS NFR BLD AUTO: 16.64 10*3/MM3 (ref 1.7–7)
NEUTROPHILS NFR BLD AUTO: 89.5 % (ref 42.7–76)
NEUTROPHILS NFR BLD MANUAL: 61 % (ref 42.7–76)
NEUTS BAND NFR BLD MANUAL: 25 % (ref 0–5)
NITRITE UR QL STRIP: NEGATIVE
NRBC BLD AUTO-RTO: 0 /100 WBC (ref 0–0.2)
NT-PROBNP SERPL-MCNC: 237.9 PG/ML (ref 0–1800)
PH UR STRIP.AUTO: 8.5 [PH] (ref 5–8)
PLATELET # BLD AUTO: 150 10*3/MM3 (ref 140–450)
PLATELET # BLD AUTO: 166 10*3/MM3 (ref 140–450)
PMV BLD AUTO: 11.1 FL (ref 6–12)
PMV BLD AUTO: 12.3 FL (ref 6–12)
POTASSIUM SERPL-SCNC: 5.6 MMOL/L (ref 3.5–5.2)
POTASSIUM SERPL-SCNC: 5.8 MMOL/L (ref 3.5–5.2)
PROCALCITONIN SERPL-MCNC: 42.09 NG/ML (ref 0–0.25)
PROT SERPL-MCNC: 6.6 G/DL (ref 6–8.5)
PROT SERPL-MCNC: 8.8 G/DL (ref 6–8.5)
PROT UR QL STRIP: ABNORMAL
QT INTERVAL: 299 MS
QTC INTERVAL: 416 MS
RBC # BLD AUTO: 3.41 10*6/MM3 (ref 4.14–5.8)
RBC # BLD AUTO: 4.39 10*6/MM3 (ref 4.14–5.8)
RBC # UR STRIP: ABNORMAL /HPF
REF LAB TEST METHOD: ABNORMAL
RSV RNA NPH QL NAA+NON-PROBE: NOT DETECTED
S PNEUM AG SPEC QL LA: NEGATIVE
SARS-COV-2 RNA RESP QL NAA+PROBE: NOT DETECTED
SCAN SLIDE: NORMAL
SMALL PLATELETS BLD QL SMEAR: ADEQUATE
SODIUM SERPL-SCNC: 136 MMOL/L (ref 136–145)
SODIUM SERPL-SCNC: 136 MMOL/L (ref 136–145)
SP GR UR STRIP: 1.02 (ref 1–1.03)
SQUAMOUS #/AREA URNS HPF: ABNORMAL /HPF
TROPONIN T DELTA: -8 NG/L
TROPONIN T SERPL HS-MCNC: 78 NG/L
UROBILINOGEN UR QL STRIP: ABNORMAL
VARIANT LYMPHS NFR BLD MANUAL: 2 % (ref 0–5)
VARIANT LYMPHS NFR BLD MANUAL: 6 % (ref 19.6–45.3)
WBC # UR STRIP: ABNORMAL /HPF
WBC MORPH BLD: NORMAL
WBC NRBC COR # BLD: 18.58 10*3/MM3 (ref 3.4–10.8)
WBC NRBC COR # BLD: 20.43 10*3/MM3 (ref 3.4–10.8)
WHOLE BLOOD HOLD COAG: NORMAL
WHOLE BLOOD HOLD SPECIMEN: NORMAL

## 2023-11-10 PROCEDURE — 84484 ASSAY OF TROPONIN QUANT: CPT | Performed by: EMERGENCY MEDICINE

## 2023-11-10 PROCEDURE — 87899 AGENT NOS ASSAY W/OPTIC: CPT | Performed by: INTERNAL MEDICINE

## 2023-11-10 PROCEDURE — 92610 EVALUATE SWALLOWING FUNCTION: CPT

## 2023-11-10 PROCEDURE — 87040 BLOOD CULTURE FOR BACTERIA: CPT | Performed by: EMERGENCY MEDICINE

## 2023-11-10 PROCEDURE — 25010000002 CEFEPIME PER 500 MG: Performed by: EMERGENCY MEDICINE

## 2023-11-10 PROCEDURE — 82550 ASSAY OF CK (CPK): CPT | Performed by: INTERNAL MEDICINE

## 2023-11-10 PROCEDURE — 25810000003 SODIUM CHLORIDE 0.9 % SOLUTION: Performed by: INTERNAL MEDICINE

## 2023-11-10 PROCEDURE — 25810000003 SODIUM CHLORIDE 0.9 % SOLUTION: Performed by: FAMILY MEDICINE

## 2023-11-10 PROCEDURE — 25810000003 SODIUM CHLORIDE 0.9 % SOLUTION: Performed by: EMERGENCY MEDICINE

## 2023-11-10 PROCEDURE — 87449 NOS EACH ORGANISM AG IA: CPT | Performed by: INTERNAL MEDICINE

## 2023-11-10 PROCEDURE — 84145 PROCALCITONIN (PCT): CPT | Performed by: FAMILY MEDICINE

## 2023-11-10 PROCEDURE — 25010000002 VANCOMYCIN 5 G RECONSTITUTED SOLUTION: Performed by: EMERGENCY MEDICINE

## 2023-11-10 PROCEDURE — 85007 BL SMEAR W/DIFF WBC COUNT: CPT | Performed by: FAMILY MEDICINE

## 2023-11-10 PROCEDURE — 99285 EMERGENCY DEPT VISIT HI MDM: CPT

## 2023-11-10 PROCEDURE — 63710000001 INSULIN REGULAR HUMAN PER 5 UNITS: Performed by: INTERNAL MEDICINE

## 2023-11-10 PROCEDURE — 83605 ASSAY OF LACTIC ACID: CPT | Performed by: EMERGENCY MEDICINE

## 2023-11-10 PROCEDURE — 71250 CT THORAX DX C-: CPT

## 2023-11-10 PROCEDURE — 25810000003 SODIUM CHLORIDE 0.9 % SOLUTION

## 2023-11-10 PROCEDURE — 71045 X-RAY EXAM CHEST 1 VIEW: CPT

## 2023-11-10 PROCEDURE — 80053 COMPREHEN METABOLIC PANEL: CPT | Performed by: FAMILY MEDICINE

## 2023-11-10 PROCEDURE — 87641 MR-STAPH DNA AMP PROBE: CPT | Performed by: INTERNAL MEDICINE

## 2023-11-10 PROCEDURE — 85025 COMPLETE CBC W/AUTO DIFF WBC: CPT | Performed by: FAMILY MEDICINE

## 2023-11-10 PROCEDURE — 36415 COLL VENOUS BLD VENIPUNCTURE: CPT

## 2023-11-10 PROCEDURE — 83880 ASSAY OF NATRIURETIC PEPTIDE: CPT | Performed by: EMERGENCY MEDICINE

## 2023-11-10 PROCEDURE — 80053 COMPREHEN METABOLIC PANEL: CPT | Performed by: EMERGENCY MEDICINE

## 2023-11-10 PROCEDURE — 83605 ASSAY OF LACTIC ACID: CPT | Performed by: INTERNAL MEDICINE

## 2023-11-10 PROCEDURE — 93005 ELECTROCARDIOGRAM TRACING: CPT | Performed by: EMERGENCY MEDICINE

## 2023-11-10 PROCEDURE — 82948 REAGENT STRIP/BLOOD GLUCOSE: CPT

## 2023-11-10 PROCEDURE — 85025 COMPLETE CBC W/AUTO DIFF WBC: CPT | Performed by: EMERGENCY MEDICINE

## 2023-11-10 PROCEDURE — 25010000002 CEFEPIME PER 500 MG: Performed by: FAMILY MEDICINE

## 2023-11-10 PROCEDURE — 87637 SARSCOV2&INF A&B&RSV AMP PRB: CPT | Performed by: EMERGENCY MEDICINE

## 2023-11-10 PROCEDURE — 93010 ELECTROCARDIOGRAM REPORT: CPT | Performed by: INTERNAL MEDICINE

## 2023-11-10 PROCEDURE — 25810000003 LACTATED RINGERS SOLUTION: Performed by: EMERGENCY MEDICINE

## 2023-11-10 PROCEDURE — P9612 CATHETERIZE FOR URINE SPEC: HCPCS

## 2023-11-10 PROCEDURE — 99223 1ST HOSP IP/OBS HIGH 75: CPT | Performed by: FAMILY MEDICINE

## 2023-11-10 PROCEDURE — 83735 ASSAY OF MAGNESIUM: CPT | Performed by: INTERNAL MEDICINE

## 2023-11-10 PROCEDURE — 25010000002 VANCOMYCIN 5 G RECONSTITUTED SOLUTION: Performed by: INTERNAL MEDICINE

## 2023-11-10 PROCEDURE — 81001 URINALYSIS AUTO W/SCOPE: CPT | Performed by: EMERGENCY MEDICINE

## 2023-11-10 RX ORDER — BISACODYL 10 MG
10 SUPPOSITORY, RECTAL RECTAL DAILY PRN
Status: DISCONTINUED | OUTPATIENT
Start: 2023-11-10 | End: 2023-11-10

## 2023-11-10 RX ORDER — POLYETHYLENE GLYCOL 3350 17 G/17G
17 POWDER, FOR SOLUTION ORAL DAILY PRN
Status: DISCONTINUED | OUTPATIENT
Start: 2023-11-10 | End: 2023-11-10

## 2023-11-10 RX ORDER — DEXTROSE MONOHYDRATE 25 G/50ML
25 INJECTION, SOLUTION INTRAVENOUS ONCE
Status: COMPLETED | OUTPATIENT
Start: 2023-11-10 | End: 2023-11-10

## 2023-11-10 RX ORDER — BISACODYL 10 MG
10 SUPPOSITORY, RECTAL RECTAL DAILY PRN
Status: DISCONTINUED | OUTPATIENT
Start: 2023-11-10 | End: 2023-11-15 | Stop reason: HOSPADM

## 2023-11-10 RX ORDER — BISACODYL 5 MG/1
5 TABLET, DELAYED RELEASE ORAL DAILY PRN
Status: DISCONTINUED | OUTPATIENT
Start: 2023-11-10 | End: 2023-11-10

## 2023-11-10 RX ORDER — SODIUM CHLORIDE 9 MG/ML
75 INJECTION, SOLUTION INTRAVENOUS CONTINUOUS
Status: DISCONTINUED | OUTPATIENT
Start: 2023-11-10 | End: 2023-11-11

## 2023-11-10 RX ORDER — SODIUM CHLORIDE 9 MG/ML
40 INJECTION, SOLUTION INTRAVENOUS AS NEEDED
Status: DISCONTINUED | OUTPATIENT
Start: 2023-11-10 | End: 2023-11-15 | Stop reason: HOSPADM

## 2023-11-10 RX ORDER — AMOXICILLIN 250 MG
2 CAPSULE ORAL NIGHTLY PRN
Status: DISCONTINUED | OUTPATIENT
Start: 2023-11-10 | End: 2023-11-15 | Stop reason: HOSPADM

## 2023-11-10 RX ORDER — SODIUM CHLORIDE 0.9 % (FLUSH) 0.9 %
10 SYRINGE (ML) INJECTION AS NEEDED
Status: DISCONTINUED | OUTPATIENT
Start: 2023-11-10 | End: 2023-11-15 | Stop reason: HOSPADM

## 2023-11-10 RX ORDER — AMOXICILLIN 250 MG
2 CAPSULE ORAL 2 TIMES DAILY
Status: DISCONTINUED | OUTPATIENT
Start: 2023-11-10 | End: 2023-11-10

## 2023-11-10 RX ORDER — SODIUM CHLORIDE 0.9 % (FLUSH) 0.9 %
10 SYRINGE (ML) INJECTION EVERY 12 HOURS SCHEDULED
Status: DISCONTINUED | OUTPATIENT
Start: 2023-11-10 | End: 2023-11-15 | Stop reason: HOSPADM

## 2023-11-10 RX ORDER — NITROGLYCERIN 0.4 MG/1
0.4 TABLET SUBLINGUAL
Status: DISCONTINUED | OUTPATIENT
Start: 2023-11-10 | End: 2023-11-10

## 2023-11-10 RX ORDER — POLYETHYLENE GLYCOL 3350 17 G/17G
17 POWDER, FOR SOLUTION ORAL DAILY PRN
Status: DISCONTINUED | OUTPATIENT
Start: 2023-11-10 | End: 2023-11-15 | Stop reason: HOSPADM

## 2023-11-10 RX ADMIN — CEFEPIME 2000 MG: 2 INJECTION, POWDER, FOR SOLUTION INTRAVENOUS at 11:56

## 2023-11-10 RX ADMIN — VANCOMYCIN HYDROCHLORIDE 1750 MG: 500 INJECTION, POWDER, LYOPHILIZED, FOR SOLUTION INTRAVENOUS at 02:02

## 2023-11-10 RX ADMIN — SODIUM CHLORIDE 250 ML: 9 INJECTION, SOLUTION INTRAVENOUS at 06:33

## 2023-11-10 RX ADMIN — SODIUM ZIRCONIUM CYCLOSILICATE 10 G: 10 POWDER, FOR SUSPENSION ORAL at 17:21

## 2023-11-10 RX ADMIN — SODIUM CHLORIDE, POTASSIUM CHLORIDE, SODIUM LACTATE AND CALCIUM CHLORIDE 1000 ML: 600; 310; 30; 20 INJECTION, SOLUTION INTRAVENOUS at 01:48

## 2023-11-10 RX ADMIN — VANCOMYCIN HYDROCHLORIDE 1250 MG: 500 INJECTION, POWDER, LYOPHILIZED, FOR SOLUTION INTRAVENOUS at 14:25

## 2023-11-10 RX ADMIN — CEFEPIME 2000 MG: 2 INJECTION, POWDER, FOR SOLUTION INTRAVENOUS at 01:23

## 2023-11-10 RX ADMIN — SODIUM CHLORIDE 1000 ML: 9 INJECTION, SOLUTION INTRAVENOUS at 03:25

## 2023-11-10 RX ADMIN — DEXTROSE MONOHYDRATE 25 G: 25 INJECTION, SOLUTION INTRAVENOUS at 17:08

## 2023-11-10 RX ADMIN — SODIUM CHLORIDE 500 ML: 9 INJECTION, SOLUTION INTRAVENOUS at 13:51

## 2023-11-10 RX ADMIN — SODIUM CHLORIDE 75 ML/HR: 9 INJECTION, SOLUTION INTRAVENOUS at 06:33

## 2023-11-10 RX ADMIN — INSULIN HUMAN 10 UNITS: 100 INJECTION, SOLUTION PARENTERAL at 17:08

## 2023-11-10 NOTE — PLAN OF CARE
Admitting MD H&P reviewed along with orders  Seen and examined  Will bolus additional 500cc NS and increase to 100 cc/hr give soft BP  Repeat lactate improved to 2.1  Continue broad spectrum abx  Check MRSA nares pcr, strep/legionalla urinary antigen, resp culture  Continue oxygen, SpO2 goal >90%  Repeat BMP shows improved Cr but K+ 5.8; Give 25g dextrose + 10 units regular insulin + lokelma 10 mg x1.  Check PVR  Keep strict NPO. Nutrition consult for tube feedings    Electronically signed by Miguel A Mazariegos DO, 11/10/23, 4:06 PM EST.

## 2023-11-10 NOTE — THERAPY EVALUATION
Acute Care - Speech Language Pathology   Swallow Initial Evaluation KASSI Davis     Patient Name: Jordin Menon  : 1940  MRN: [id number]  Today's Date: 11/10/2023               Admit Date: 11/10/2023    Visit Dx:     ICD-10-CM ICD-9-CM   1. Sepsis, due to unspecified organism, unspecified whether acute organ dysfunction present  A41.9 038.9     995.91   2. Acute respiratory failure with hypoxia  J96.01 518.81   3. Acute renal failure, unspecified acute renal failure type  N17.9 584.9   4. Multifocal pneumonia  J18.9 486   5. Oropharyngeal dysphagia  R13.12 787.22     Patient Active Problem List   Diagnosis    Primary osteoarthritis of right knee    Status post total right knee replacement    Essential hypertension    Shortness of breath    BRYANT (acute kidney injury)    Hypokalemia    Hypernatremia    Symptomatic bradycardia    Hypothermia, initial encounter    Acute CVA (cerebrovascular accident)    Severe sepsis    Severe malnutrition    Hyperkalemia    Anemia    Dislodged gastrostomy tube    Dysphagia, oropharyngeal    Sepsis     Past Medical History:   Diagnosis Date    Acute renal failure with pathological lesion in kidney     Alzheimer's dementia with behavioral disturbance     Anemia, vitamin B12 deficiency     Anxiety     Anxiety disorder due to known physiological condition     Arthritis     Benign neoplasm of prostate     Cannot walk     CHF (congestive heart failure)     Constipation     Coronary atherosclerosis of native coronary artery     CVA (cerebral vascular accident)     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 (CVA)     Edema     Essential hypertension, benign     Gastroesophageal reflux disease without esophagitis     GERD (gastroesophageal reflux disease)     High blood pressure     Hirsutism     HTN (hypertension)     Hypercalcemia     Hyperkalemia     Hyperlipemia     Hyperlipidemia     Hypokalemia     Ingrowing toenail 2018    Left foot pain 2018    Nutritional  deficiency     Osteoarthritis of right knee 08/22/2016    Pneumonia     Presence of right artificial knee joint     Primary localized osteoarthrosis     PVD (peripheral vascular disease)     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     Right foot pain 09/21/2018    Senile dementia, uncomplicated     Severe sinus bradycardia     Shock, septic     Sleep apnea     Thrombosis of left femoral vein     Tinea unguium 09/21/2018    Vitamin B12 deficiency anemia      Past Surgical History:   Procedure Laterality Date    ENDOSCOPY W/ PEG TUBE PLACEMENT N/A 6/8/2022    Procedure: ESOPHAGOGASTRODUODENOSCOPY WITH PERCUTANEOUS ENDOSCOPIC GASTROSTOMY TUBE INSERTION WITH ANESTHESIA;  Surgeon: Yanelis Mistry MD;  Location: [facility] ENDOSCOPY;  Service: Gastroenterology;  Laterality: N/A;  PEG TUBE PLACEMENT    ENDOSCOPY W/ PEG TUBE PLACEMENT N/A 7/3/2022    Procedure: ESOPHAGOGASTRODUODENOSCOPY WITH PERCUTANEOUS ENDOSCOPIC GASTROSTOMY TUBE INSERTION WITH ANESTHESIA;  Surgeon: Bam Melo MD;  Location: [facility] ENDOSCOPY;  Service: Gastroenterology;  Laterality: N/A;  PEG TUBE PLACEMENT    ENDOSCOPY W/ PEG TUBE PLACEMENT N/A 10/26/2022    Procedure: ESOPHAGOGASTRODUODENOSCOPY WITH PERCUTANEOUS ENDOSCOPIC GASTROSTOMY TUBE INSERTION WITH ANESTHESIA;  Surgeon: Yanelis Mistry MD;  Location: [facility] ENDOSCOPY;  Service: Gastroenterology;  Laterality: N/A;  PERCUTANEOUS ENDOSCOPIC GASTROSTOMY TUBE PLACEMENT       SLP Recommendation and Plan          Inpatient Speech Pathology Dysphagia Evaluation        PAIN SCALE: None indicated    PRECAUTIONS/CONTRAINDICATIONS: Standard    SUSPECTED ABUSE/NEGLECT/EXPLOITATION: None identified    SOCIAL/PSYCHOLOGICAL NEEDS/BARRIERS: None identified    PAST SOCIAL HISTORY: 83-year-old male, nursing home resident    PRIOR FUNCTION: Has PEG for primary nutrition.  It is not known if patient was on a p.o. diet at the nursing home.    PATIENT GOALS/EXPECTATIONS:  Did not state or indicate    HISTORY: Patient is a 83-year-old male admitted to [facility] on 11/10/2023 secondary to sepsis.  Has history of PEG placement for nutritional support due to poor p.o. intake.  He has been evaluated by speech pathology services during previous admissions at [facility].  It is not known at this time if patient was on any kind of p.o. diet at the nursing home.  Previous speech pathology evaluation of [facility] on 10, 2022 with diet recommendations of purée and nectar thickened liquids.    CURRENT DIET LEVEL: Tube feeding, n.p.o.    OBJECTIVE:    TEST ADMINISTERED: Clinical dysphagia evaluation    COGNITION/SAFETY AWARENESS: Impaired    BEHAVIORAL OBSERVATIONS: Awakens easily, requires max cues and encouragement for participation    ORAL MOTOR EXAM: Left-sided facial droop, dry oral mucosa, edentulous    VOICE QUALITY: Clear    REFLEX EXAM: Deferred    POSTURE: Total assist    FEEDING/SWALLOWING FUNCTION: Assessed with ice chips, nectar thick liquids and purée    CLINICAL OBSERVATIONS: Patient requiring significant encouragement and max cues for participation.  Initially refusing all attempts at p.o. despite verbally agreeing.  Patient eventually agreeing for single ice chip.  Initially spitting from oral cavity.  Second trial with delayed swallow completed.  Vocal quality laryngeal sounds clear.  Nectar thick liquid by spoon with max encouragement/cueing.  Delayed swallow completed with some left-sided anterior loss.  Patient was holding in mouth for greater than 15 seconds before swallow completed.  Purée solids with patient initially refusing.  Eventual agreeing with very small amount of purée applesauce via spoon.  Holding in mouth with delayed initiation of swallow greater than 15 seconds.  Laryngeal sounds clear to auscultation.  Patient refusing any further trials of p.o.  Laryngeal sounds remained clear to auscultation throughout evaluation.   Patient exhibiting swallow delay delay initiation of swallow at times greater than 15 seconds.  Silent aspiration cannot be ruled out at bedside.    DYSPHAGIA CRITERIA: Risk of aspiration, history of dysphagia, risk of malnutrition and dehydration    FUNCTIONAL ASSESSMENT INSTRUMENT: Patient currently scored a level 3 of 7 on Functional Communication Measures for swallowing indicating a 60-79% limitation in function.    ASSESSMENT/ PLAN OF CARE:  Patient is not currently a candidate for skilled speech pathology services.  He appears at his baseline level of function regarding dysphagia and nutrition.    RECOMMENDATIONS:   1.   DIET: Continue PEG as primary nutrition      Pt/responsible party agrees with plan of care and has been informed of all alternatives, risks and benefits.                    Anticipated Discharge Disposition (SLP): No further SLP services warranted (11/10/23 1240)                                                                  EDUCATION  The patient has been educated in the following areas:   Dysphagia (Swallowing Impairment).              Time Calculation:    Time Calculation- SLP       Row Name 11/10/23 1240             Time Calculation- SLP    SLP Start Time 1000  -SN      SLP Stop Time 1100  -SN      SLP Time Calculation (min) 60 min  -SN      SLP Received On 11/10/23  -SN         Untimed Charges    [id number] Eval Oral Pharyng Swallow Minutes 60  -SN         Total Minutes    Untimed Charges Total Minutes 60  -SN       Total Minutes 60  -SN                User Key  (r) = Recorded By, (t) = Taken By, (c) = Cosigned By      Initials Name Provider Type    aMrleny Gonzales MS-CCC/SLP, RUTHY Speech and Language Pathologist                    Therapy Charges for Today       Code Description Service Date Service Provider Modifiers Qty    [id number] HC ST EVAL ORAL PHARYNG SWALLOW 4 11/10/2023 Marleny Wolfe MS-CCC/SLPRUTHY GN 1                 RYAN Webber/RUTHY VALLES  11/10/2023

## 2023-11-10 NOTE — H&P
[facility]   HISTORY AND PHYSICAL    Patient Name: Jordin Menon  : 1940  MRN: [id number]  Primary Care Physician:  Frank Llanes MD  Date of admission: 11/10/2023    Subjective   Subjective     Chief Complaint: Fever, shortness of breath    History of Present Illness  Patient is an 83-year-old male with multiple past and current medical problems.  Please see the list below.  He was sent in by the nursing home because of fever and tachycardia.  There his temp was greater than 101 and his heart rate was in the 120s.  Patient also complained of some shortness of breath and productive cough.  Here in the ED the patient was hypoxic with a respiratory rate of 35 breaths/min.  His heart rate was in the 120s and his temp was 101.  The initial work-up demonstrated an elevated white blood cell count, elevated lactic acid, worsening renal failure but did not point to the infection source as his urine analysis was fairly unremarkable and his chest x-ray showed no acute infiltrate.  We are awaiting a CT scan of the chest now given the patient's symptoms and clinical presentation.  Nevertheless he will be admitted for further evaluation and treatment  Review of Systems   Unable to perform ROS: Dementia        Personal History     Past Medical History:   Diagnosis Date   • Acute renal failure with pathological lesion in kidney    • Alzheimer's dementia with behavioral disturbance    • Anemia, vitamin B12 deficiency    • Anxiety    • Anxiety disorder due to known physiological condition    • Arthritis    • Benign neoplasm of prostate    • Cannot walk    • CHF (congestive heart failure)    • Constipation    • Coronary atherosclerosis of native coronary artery    • CVA (cerebral vascular accident)    • 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 (CVA)    • Edema    • Essential hypertension, benign    • Gastroesophageal reflux disease without esophagitis    • GERD (gastroesophageal reflux disease)    • High  blood pressure    • Hirsutism    • HTN (hypertension)    • Hypercalcemia    • Hyperkalemia    • Hyperlipemia    • Hyperlipidemia    • Hypokalemia    • Ingrowing toenail 09/21/2018   • Left foot pain 09/21/2018   • Nutritional deficiency    • Osteoarthritis of right knee 08/22/2016   • Pneumonia    • Presence of right artificial knee joint    • Primary localized osteoarthrosis    • PVD (peripheral vascular disease)    • 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    • Right foot pain 09/21/2018   • Senile dementia, uncomplicated    • Severe sinus bradycardia    • Shock, septic    • Sleep apnea    • Thrombosis of left femoral vein    • Tinea unguium 09/21/2018   • Vitamin B12 deficiency anemia        Past Surgical History:   Procedure Laterality Date   • ENDOSCOPY W/ PEG TUBE PLACEMENT N/A 6/8/2022    Procedure: ESOPHAGOGASTRODUODENOSCOPY WITH PERCUTANEOUS ENDOSCOPIC GASTROSTOMY TUBE INSERTION WITH ANESTHESIA;  Surgeon: Yanelis Mistry MD;  Location: [facility] ENDOSCOPY;  Service: Gastroenterology;  Laterality: N/A;  PEG TUBE PLACEMENT   • ENDOSCOPY W/ PEG TUBE PLACEMENT N/A 7/3/2022    Procedure: ESOPHAGOGASTRODUODENOSCOPY WITH PERCUTANEOUS ENDOSCOPIC GASTROSTOMY TUBE INSERTION WITH ANESTHESIA;  Surgeon: Bam Melo MD;  Location: [facility] ENDOSCOPY;  Service: Gastroenterology;  Laterality: N/A;  PEG TUBE PLACEMENT   • ENDOSCOPY W/ PEG TUBE PLACEMENT N/A 10/26/2022    Procedure: ESOPHAGOGASTRODUODENOSCOPY WITH PERCUTANEOUS ENDOSCOPIC GASTROSTOMY TUBE INSERTION WITH ANESTHESIA;  Surgeon: Yanelis Mistry MD;  Location: [facility] ENDOSCOPY;  Service: Gastroenterology;  Laterality: N/A;  PERCUTANEOUS ENDOSCOPIC GASTROSTOMY TUBE PLACEMENT       Family History: Family history is unknown by patient. Otherwise pertinent FHx was reviewed and not pertinent to current issue.    Social History:  reports that he has never smoked. He has never used smokeless tobacco. He reports that he does  not drink alcohol and does not use drugs.    Home Medications:  Alum & Mag Hydroxide-Simeth, Diclofenac Sodium, acetaminophen, apixaban, aspirin, busPIRone, cyanocobalamin, docusate sodium, famotidine, furosemide, ipratropium-albuterol, magnesium hydroxide, melatonin, metoprolol tartrate, mineral oil-hydrophilic petrolatum, mupirocin, polyethylene glycol, trolamine salicylate, and witch hazel-glycerin    Allergies:  No Known Allergies    Objective    Objective     Vitals:   Temp:  [100.3 °F (37.9 °C)] 100.3 °F (37.9 °C)  Heart Rate:  [118-123] 123  Resp:  [35] 35  BP: ()/(54-55) 106/54  Flow (L/min):  [2] 2    Physical Exam  Constitutional:  Well-developed and well-nourished.  No acute distress.      HENT:  Head:  Normocephalic and atraumatic.  Mouth:  Moist mucous membranes.    Eyes:  Conjunctivae and EOM are normal. No scleral icterus.    Neck:  Neck supple.  No JVD present.    Cardiovascular: Rapid rate regular rhythm and normal heart sounds with no murmur.  Pulmonary/Chest: Mild respiratory distress with tachypnea.  He is got wheezing and rhonchi on auscultation throughout all lung zones.  There is no real clearance with cough.  Abdominal:  Soft. No distension and no tenderness.   Musculoskeletal:  No tenderness, and no deformity.  No red or swollen joints anywhere.    Neurological:  Alert and oriented to person, place, and time.  No cranial nerve deficit.    Skin:  Skin is warm and dry. No rash noted. No pallor.   Peripheral vascular:  No clubbing, no cyanosis, no edema.  Psychiatric: Appropriate mood and affect  :    Result Review    Result Review:  I have personally reviewed the results from the time of this admission to 11/10/2023 04:26 EST and agree with these findings:  [x]  Laboratory list / accordion  []  Microbiology  [x]  Radiology  []  EKG/Telemetry   []  Cardiology/Vascular   []  Pathology  []  Old records  []  Other:  Most notable findings include: Pertinent positives  reviewed      Assessment & Plan   Assessment / Plan     Brief Patient Summary:  Jordin Menon is a 83 y.o. male who presented to the emergency department with fever cough and chest congestion.  He was found to be clinically septic.  Were treating for pneumonia despite a normal chest x-ray.  We are awaiting CT scan results now    Active Hospital Problems:  1.  Severe sepsis  2.  Clinical pneumonia  3.  Lactic acidosis  4.  Leukocytosis  5.  Sinus tachycardia  6.  Acute on chronic renal failure  7.  Hyperkalemia    Plan:   Patient will be admitted to the hospital service  Patient has been started on the sepsis protocol  Most start patient on broad-spectrum antibiotics  We will hydrate with normal saline at 125 cc/h.  We will monitor for improvement in his laboratory data.  I will asked the ER physician to go ahead and give medications for hyperkalemia  Start the patient on pulmonary toilet  We will monitor on telemetry        DVT prophylaxis:  No DVT prophylaxis order currently exists.    CODE STATUS:    Code Status (Patient has no pulse and is not breathing): CPR (Attempt to Resuscitate)  Medical Interventions (Patient has pulse or is breathing): Full Support    Admission Status:  I believe this patient meets inpatient status.    Eric Vela, DO

## 2023-11-10 NOTE — PROGRESS NOTES
"[facility] Clinical Pharmacy Services: Vancomycin Pharmacokinetic Initial Consult Note  Jordin Menon is a 83 y.o. male who is on day 1 of pharmacy to dose vancomycin for Pneumonia and Sepsis.    Consult Information  Consulting Provider: Yair  Planned Duration of Therapy: 7 days  Was Patient Receiving Prior to Admission/Consult?: No  Loading Dose Ordered or Given: 1750 mg on 11/10 at 0202  PK/PD Target: -600 mg/L.hr   Relevant ID History:  No  Other Antimicrobials: Cefepime    Imaging Reviewed?: Yes    Microbiology Data  MRSA PCR performed: 11/10/23; Result: Negative  Culture/Source:   11/10 MRSA PCR- No MRSA detected   11/10 S Pneumo, Legionella:  Negative   11/10 COVID, flu, RSV:  Not detected     Vitals/Labs  Ht: 162.6 cm (64\"); Wt: 90.1 kg (198 lb 10.2 oz)  Temp (24hrs), Av.7 °F (37.6 °C), Min:99.3 °F (37.4 °C), Max:100.3 °F (37.9 °C)   Estimated Creatinine Clearance: 41.2 mL/min (A) (by C-G formula based on SCr of 1.4 mg/dL (H)).        Results from last 7 days   Lab Units 11/10/23  0120   CREATININE mg/dL 1.40*   WBC 10*3/mm3 18.58*     Assessment/Plan:  Loading dose: 1750 mg once one  Vancomycin Dose:  1250 mg IV every 24 hours; which provides the following predicted parameters:    Regimen: 1250 mg IV every 24 hours.  Start time: 14:02 on 11/10/2023  Exposure target: AUC24 (range)400-600 mg/L.hr   AUC24,ss: 531 mg/L.hr  PAUC*: 80 %  Ctrough,ss: 16.9 mg/L  Pconc*: 34 %  Tox.: 13 %    Vanc Random ordered for  at 0600  Patient has order for Basic Metabolic Panel    Pharmacy will follow patient's kidney function and will adjust doses and obtain levels as necessary. Thank you for involving pharmacy in this patient's care. Please contact pharmacy with any questions or concerns.                           Juan Perez  Clinical Pharmacist  "

## 2023-11-10 NOTE — CONSULTS
Nutrition Services    Patient Name: Jordin Menon  YOB: 1940  MRN: [id number]  Admission date: 11/10/2023      CLINICAL NUTRITION ASSESSMENT      Reason for Assessment  Physician consult, EN     H&P:    Past Medical History:   Diagnosis Date    Acute renal failure with pathological lesion in kidney     Alzheimer's dementia with behavioral disturbance     Anemia, vitamin B12 deficiency     Anxiety     Anxiety disorder due to known physiological condition     Arthritis     Benign neoplasm of prostate     Cannot walk     CHF (congestive heart failure)     Constipation     Coronary atherosclerosis of native coronary artery     CVA (cerebral vascular accident)     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 (CVA)     Edema     Essential hypertension, benign     Gastroesophageal reflux disease without esophagitis     GERD (gastroesophageal reflux disease)     High blood pressure     Hirsutism     HTN (hypertension)     Hypercalcemia     Hyperkalemia     Hyperlipemia     Hyperlipidemia     Hypokalemia     Ingrowing toenail 09/21/2018    Left foot pain 09/21/2018    Nutritional deficiency     Osteoarthritis of right knee 08/22/2016    Pneumonia     Presence of right artificial knee joint     Primary localized osteoarthrosis     PVD (peripheral vascular disease)     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  Has a PEG tube.  Evaluated by speech therapy and will remain NPO.  Plan to resume enteral nutrition.  Reviewed RD assessment from previous admission in 09/2023, and will resume enteral nutrition  "formula as ordered on that admission.      Patient unable to fully communicate his usual tube feeding regimen or participate in nutrition interview.  NFPE conducted with no pertinent findings to note.       Anthropometrics        Current Height, Weight Height: 162.6 cm (64\")  Weight: 90.1 kg (198 lb 10.2 oz)   Current BMI Body mass index is 34.1 kg/m².       Weight Hx  Wt Readings from Last 30 Encounters:   11/10/23 0053 90.1 kg (198 lb 10.2 oz)   09/04/23 1503 70.9 kg (156 lb 4.9 oz)   10/27/22 0933 58.2 kg (128 lb 4.9 oz)   10/25/22 1100 60.1 kg (132 lb 7.9 oz)   10/22/22 2021 55.1 kg (121 lb 7.6 oz)   10/13/22 0815 66.1 kg (145 lb 11.6 oz)   08/25/22 1051 60.6 kg (133 lb 9.6 oz)   08/23/22 1805 63.3 kg (139 lb 8.8 oz)   08/23/22 1301 63.3 kg (139 lb 8.8 oz)   08/06/22 1017 64.2 kg (141 lb 8.6 oz)   07/02/22 1353 72.9 kg (160 lb 11.5 oz)   07/02/22 0307 72.9 kg (160 lb 11.5 oz)   06/23/22 0923 74.2 kg (163 lb 9.3 oz)   06/02/22 0415 78.4 kg (172 lb 13.5 oz)   06/01/22 0600 74.2 kg (163 lb 9.3 oz)   05/29/22 2100 67.4 kg (148 lb 9.4 oz)   05/29/22 1148 76.2 kg (167 lb 15.9 oz)   05/21/22 0500 76.1 kg (167 lb 12.3 oz)   05/19/22 0600 55.3 kg (121 lb 14.6 oz)   05/18/22 0500 56.5 kg (124 lb 9 oz)   05/17/22 1834 54.1 kg (119 lb 4.3 oz)   05/05/22 1642 60.2 kg (132 lb 11.5 oz)   05/01/22 1534 62.7 kg (138 lb 3.7 oz)   04/14/22 1751 66.5 kg (146 lb 9.7 oz)   04/14/22 1307 66.5 kg (146 lb 9.7 oz)   03/07/22 0900 69.9 kg (154 lb 1.6 oz)   03/06/22 2348 69.2 kg (152 lb 8.9 oz)   03/04/22 0353 66 kg (145 lb 8.1 oz)   02/28/22 0608 69 kg (152 lb 1.9 oz)   02/27/22 0400 68.6 kg (151 lb 3.8 oz)   02/25/22 1540 73.6 kg (162 lb 4.1 oz)   02/04/22 1854 73.6 kg (162 lb 4.1 oz)   07/28/21 1307 84.4 kg (186 lb)   03/05/19 1516 78.5 kg (173 lb)   09/27/18 0000 88.5 kg (195 lb)   09/21/18 0000 90.7 kg (200 lb)   03/22/18 0000 82.6 kg (182 lb)            Wt Change Observation +55% x 1 year.    Will follow weight trend throughout " admission to verify accuracy of significant weight gain over the past year.      Obesity Class II   152% IBW  Adjusted body weight=66.9 kg     Estimated/Assessed Needs       Energy Requirements 25-30 kcal adj BW   EST Needs (kcal/day) 9318-5817 kcal        Protein Requirements 1.0 g/kg adj BW   EST Daily Needs (g/day) 67 g       Fluid Requirements 25-30 ml/kg adj BW    Estimated Needs (mL/day) 4329-8640 ml      Labs/Medications         Pertinent Labs Reviewed.   Results from last 7 days   Lab Units 11/10/23  1530 11/10/23  0120   SODIUM mmol/L 136 136   POTASSIUM mmol/L 5.8* 5.6*   CHLORIDE mmol/L 105 99   CO2 mmol/L 22.9 26.1   BUN mg/dL 48* 43*   CREATININE mg/dL 1.33* 1.40*   CALCIUM mg/dL 8.4* 9.7   BILIRUBIN mg/dL 1.7* 1.8*   ALK PHOS U/L 64 100   ALT (SGPT) U/L 25 38   AST (SGOT) U/L 38 43*   GLUCOSE mg/dL 116* 99     Results from last 7 days   Lab Units 11/10/23  1530   MAGNESIUM mg/dL 1.9   HEMOGLOBIN g/dL 10.1*   HEMATOCRIT % 30.1*     COVID19   Date Value Ref Range Status   11/10/2023 Not Detected Not Detected - Ref. Range Final     Lab Results   Component Value Date    HGBA1C 4.90 05/30/2022         Pertinent Medications Reviewed.     Current Nutrition Orders & Evaluation of Intake       Oral Nutrition     Current PO Diet NPO Diet NPO Type: Strict NPO   Supplement No active supplement orders       Malnutrition Severity Assessment                Nutrition Diagnosis         Nutrition Dx Problem 1 Inadequate oral Intake related to Inability to consume sufficient energy as evidenced by NPO       Nutrition Intervention         Isosource 1.5 @ 60 ml/hr x 22 hours   Free water flushes 100 ml every 3 hours   Provides 1980 kcal, 90 g pro, 1803 ml fluid      Medical Nutrition Therapy/Nutrition Education          Learner     Readiness Patient  Acceptance     Method     Response Explanation  No evidence of learning     Monitor/Evaluation        Monitor Per protocol, I&O, Pertinent labs, EN delivery/tolerance, Weight,  Symptoms, POC/GOC       Nutrition Discharge Plan         To be determined       Electronically signed by:  Yanelis Coyne RD  11/10/23 16:58 EST

## 2023-11-10 NOTE — ED PROVIDER NOTES
Time: 1:04 AM EST  Date of encounter:  11/10/2023  Independent Historian/Clinical History and Information was obtained by:   Nursing Staff and EMS    History is limited by: Dementia    Chief Complaint: Cough and shortness of breath      History of Present Illness:  Patient is a 83 y.o. year old male who presents to the emergency department for evaluation of cough and shortness of breath    EMS reports that the patient has had increasing cough and work of breathing while at the nursing home.  He had an episode of emesis yesterday and they are concerned he possibly aspirated.  He now has a 3 L oxygen requirement and normally does not use supplemental oxygen.  It is reported that the patient is normally very outspoken and somewhat loud but he has been very quiet over the past 2 days.    HPI    Patient Care Team  Primary Care Provider: Frank Llanes MD    Past Medical History:     No Known Allergies  Past Medical History:   Diagnosis Date    Acute renal failure with pathological lesion in kidney     Alzheimer's dementia with behavioral disturbance     Anemia, vitamin B12 deficiency     Anxiety     Anxiety disorder due to known physiological condition     Arthritis     Benign neoplasm of prostate     Cannot walk     CHF (congestive heart failure)     Constipation     Coronary atherosclerosis of native coronary artery     CVA (cerebral vascular accident)     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 (CVA)     Edema     Essential hypertension, benign     Gastroesophageal reflux disease without esophagitis     GERD (gastroesophageal reflux disease)     High blood pressure     Hirsutism     HTN (hypertension)     Hypercalcemia     Hyperkalemia     Hyperlipemia     Hyperlipidemia     Hypokalemia     Ingrowing toenail 09/21/2018    Left foot pain 09/21/2018    Nutritional deficiency     Osteoarthritis of right knee 08/22/2016    Pneumonia     Presence of right artificial knee joint     Primary localized  osteoarthrosis     PVD (peripheral vascular disease)     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     Right foot pain 09/21/2018    Senile dementia, uncomplicated     Severe sinus bradycardia     Shock, septic     Sleep apnea     Thrombosis of left femoral vein     Tinea unguium 09/21/2018    Vitamin B12 deficiency anemia      Past Surgical History:   Procedure Laterality Date    ENDOSCOPY W/ PEG TUBE PLACEMENT N/A 6/8/2022    Procedure: ESOPHAGOGASTRODUODENOSCOPY WITH PERCUTANEOUS ENDOSCOPIC GASTROSTOMY TUBE INSERTION WITH ANESTHESIA;  Surgeon: Yanelis Mistry MD;  Location: [facility] ENDOSCOPY;  Service: Gastroenterology;  Laterality: N/A;  PEG TUBE PLACEMENT    ENDOSCOPY W/ PEG TUBE PLACEMENT N/A 7/3/2022    Procedure: ESOPHAGOGASTRODUODENOSCOPY WITH PERCUTANEOUS ENDOSCOPIC GASTROSTOMY TUBE INSERTION WITH ANESTHESIA;  Surgeon: Bam Melo MD;  Location: [facility] ENDOSCOPY;  Service: Gastroenterology;  Laterality: N/A;  PEG TUBE PLACEMENT    ENDOSCOPY W/ PEG TUBE PLACEMENT N/A 10/26/2022    Procedure: ESOPHAGOGASTRODUODENOSCOPY WITH PERCUTANEOUS ENDOSCOPIC GASTROSTOMY TUBE INSERTION WITH ANESTHESIA;  Surgeon: Yanelis Mistry MD;  Location: [facility] ENDOSCOPY;  Service: Gastroenterology;  Laterality: N/A;  PERCUTANEOUS ENDOSCOPIC GASTROSTOMY TUBE PLACEMENT     Family History   Family history unknown: Yes       Home Medications:  Prior to Admission medications    Medication Sig Start Date End Date Taking? Authorizing Provider   acetaminophen (TYLENOL) 325 MG tablet Take 2 tablets by mouth 3 (Three) Times a Day.    ProviderNanci MD   Alum & Mag Hydroxide-Simeth 200-200-25 MG chewable tablet Administer 1 each per G tube Every 6 (Six) Hours As Needed for Indigestion or Heartburn. G- tube 1 tab every  6 hrs    ProviderNanci MD   apixaban (ELIQUIS) 5 MG tablet tablet Administer 1 tablet per G tube 2 (Two) Times a Day. Indications: DVT/PE (active thrombosis)  10/27/22   Thiago Soares MD   aspirin 81 MG chewable tablet Chew 1 tablet Daily. 4/19/22   Aranza Herzog MD   busPIRone (BUSPAR) 10 MG tablet Take 1 tablet by mouth 3 (Three) Times a Day.    Nanci Shafer MD   cyanocobalamin 1000 MCG/ML injection Inject 1 mL into the appropriate muscle as directed by prescriber Every 30 (Thirty) Days. On the 5th of the month    Nanci Shafer MD   Diclofenac Sodium (VOLTAREN) 1 % gel gel Apply 4 g topically to the appropriate area as directed 2 (Two) Times a Day As Needed (Bilateral hands and shoulders).    Nanci Shafer MD   docusate sodium (COLACE) 50 mg/5 mL liquid Administer 25 mL per G tube Daily.    Nanci Shafer MD   famotidine (PEPCID) 40 MG tablet Take 1 tablet by mouth Every Night.    Nanci Shafer MD   furosemide (Lasix) 20 MG tablet Take 1 tablet by mouth Daily. 3/3/22   Eric Rivas MD   ipratropium-albuterol (DUO-NEB) 0.5-2.5 mg/3 ml nebulizer Take 3 mL by nebulization Every 4 (Four) Hours As Needed for Wheezing.    Nanci Shafer MD   magnesium hydroxide (MILK OF MAGNESIA) 400 MG/5ML suspension Administer 30 mL per G tube Daily As Needed for Constipation.    Nanci Shafer MD   melatonin 5 MG tablet tablet Administer 1 tablet per G tube every night at bedtime.    Nanci Shafer MD   metoprolol tartrate (LOPRESSOR) 25 MG tablet Administer 0.5 tablets per G tube Every 12 (Twelve) Hours. 10/27/22   Thiago Soares MD   mineral oil-hydrophilic petrolatum (AQUAPHOR) ointment Apply 1 application  topically to the appropriate area as directed Daily.    Nanci Shafer MD   mupirocin (BACTROBAN) 2 % cream Apply 1 application  topically to the appropriate area as directed 2 (Two) Times a Day.    Nanci Shafer MD   polyethylene glycol (MIRALAX) 17 g packet Take 17 g by mouth Daily. G-tube    Nanci Shafer MD   trolamine salicylate (ASPERCREME) 10 % cream Apply 1 application   "topically to the appropriate area as directed 2 (Two) Times a Day As Needed for Muscle / Joint Pain.    Provider, Historical, MD   witch hazel-glycerin (TUCKS) pad Apply 1 pad  topically to the appropriate area as directed As Needed for Irritation or Hemorrhoids.    Provider, MD Nanci        Social History:   Social History     Tobacco Use    Smoking status: Never    Smokeless tobacco: Never   Vaping Use    Vaping Use: Never used   Substance Use Topics    Alcohol use: No    Drug use: Never         Review of Systems:  Review of Systems   Unable to perform ROS: Dementia   Respiratory:  Positive for cough.         Physical Exam:  /61 (BP Location: Left arm, Patient Position: Lying)   Pulse 113   Temp 99.3 °F (37.4 °C) (Oral)   Resp 18   Ht 162.6 cm (64\")   Wt 90.1 kg (198 lb 10.2 oz)   SpO2 90%   BMI 34.10 kg/m²     Physical Exam  Vitals and nursing note reviewed.   Constitutional:       General: He is not in acute distress.     Appearance: Normal appearance. He is ill-appearing. He is not toxic-appearing.   HENT:      Head: Normocephalic and atraumatic.      Jaw: There is normal jaw occlusion.   Eyes:      General: Lids are normal.      Extraocular Movements: Extraocular movements intact.      Conjunctiva/sclera: Conjunctivae normal.      Pupils: Pupils are equal, round, and reactive to light.   Cardiovascular:      Rate and Rhythm: Normal rate and regular rhythm.      Pulses: Normal pulses.      Heart sounds: Normal heart sounds.   Pulmonary:      Effort: Pulmonary effort is normal. No respiratory distress.      Breath sounds: Examination of the right-lower field reveals decreased breath sounds and rales. Examination of the left-lower field reveals decreased breath sounds and rales. Decreased breath sounds present. No wheezing or rhonchi.   Abdominal:      General: Abdomen is flat.      Palpations: Abdomen is soft.      Tenderness: There is no abdominal tenderness. There is no guarding or rebound. "   Musculoskeletal:         General: Normal range of motion.      Cervical back: Normal range of motion and neck supple.      Right lower leg: No edema.      Left lower leg: No edema.      Comments: Chronic right-sided weakness   Skin:     General: Skin is warm and dry.   Neurological:      Mental Status: He is alert. Mental status is at baseline.      Comments: Oriented to self  Does not answer questions   Psychiatric:         Mood and Affect: Mood normal.               Procedures:  Procedures      Medical Decision Making:      Comorbidities that affect care:    Chronic anticoagulation, coronary artery disease, previous CVA, peripheral vascular disease, hypertension, Alzheimer's dementia, CHF,    External Notes reviewed:    Hospital Discharge Summary: Hospital discharge summary for sepsis suspected pneumonia 9/11/2023      The following orders were placed and all results were independently analyzed by me:  Orders Placed This Encounter   Procedures    Blood Culture - Blood,    Blood Culture - Blood,    COVID-19, FLU A/B, RSV PCR 1 HR TAT - Swab, Nasopharynx    XR Chest 1 View    CT Chest Without Contrast Diagnostic    [place] Draw    Comprehensive Metabolic Panel    BNP    Single High Sensitivity Troponin T    CBC Auto Differential    Lactic Acid, Plasma    Urinalysis With Culture If Indicated - Urine, Clean Catch    STAT Lactic Acid, Reflex    High Sensitivity Troponin T 2Hr    Urinalysis, Microscopic Only - Urine, Clean Catch    Procalcitonin    Blood Gas, Arterial -With Co-Ox Panel: Yes    Comprehensive Metabolic Panel    STAT Lactic Acid, Reflex    CBC Auto Differential    NPO Diet NPO Type: Strict NPO    Undress & Gown    Continuous Pulse Oximetry    Vital Signs    Straight cath    Vital Signs    Intake & Output    Weigh Patient    Nursing Dysphagia Screening (Complete Prior to Giving Anything By Mouth)    RN to Place Order SLP Consult - Eval & Treat Choosing Reason of RN Dysphagia Screen Failed    Nurse to Call  MD or Nutrition Services for Diet if Patient Passes Dysphagia Screen    Telemetry - Maintain IV Access    Telemetry - Place Orders & Notify Provider of Results When Patient Experiences Acute Chest Pain, Dysrhythmia or Respiratory Distress    Activity - Ad Fallon    Notify Provider (With Default Parameters)    Code Status and Medical Interventions:    Hospitalist (on-call MD unless specified)    Oxygen Therapy- Nasal Cannula; Titrate 1-6 LPM Per SpO2; 90 - 95%    Incentive Spirometry    Pulse Oximetry on Admit    Oxygen Therapy- Nasal Cannula; Titrate 1-6 LPM Per SpO2; 90 - 95%    SLP Consult: Eval & Treat RN Dysphagia Screen Failed    ECG 12 Lead ED Triage Standing Order; SOA    Insert Peripheral IV    Insert Peripheral IV    Inpatient Admission    CBC & Differential    Green Top (Gel)    Lavender Top    Gold Top - SST    Light Blue Top    CBC & Differential       Medications Given in the Emergency Department:  Medications   sodium chloride 0.9 % flush 10 mL (has no administration in time range)   sodium chloride 0.9 % flush 10 mL (has no administration in time range)   sodium chloride 0.9 % flush 10 mL (has no administration in time range)   sodium chloride 0.9 % infusion 40 mL (has no administration in time range)   nitroglycerin (NITROSTAT) SL tablet 0.4 mg (has no administration in time range)   sodium chloride 0.9 % infusion (75 mL/hr Intravenous New Bag 11/10/23 0633)   cefepime (MAXIPIME) 2000 mg/100 mL 0.9% NS (mbp) (has no administration in time range)   Pharmacy to dose vancomycin (has no administration in time range)   vancomycin 1250 mg/250 mL 0.9% NS IVPB ([facility]) (has no administration in time range)   sennosides-docusate (PERICOLACE) 8.6-50 MG per tablet 2 tablet (has no administration in time range)     And   polyethylene glycol (MIRALAX) packet 17 g (has no administration in time range)     And   bisacodyl (DULCOLAX) suppository 10 mg (has no administration in time range)   lactated ringers bolus 1,000 mL  (0 mL Intravenous Stopped 11/10/23 0325)   cefepime (MAXIPIME) 2000 mg/100 mL 0.9% NS (mbp) (0 mg Intravenous Stopped 11/10/23 0205)   vancomycin 1750 mg/500 mL 0.9% NS IVPB ([facility]) (0 mg Intravenous Stopped 11/10/23 0503)   sodium chloride 0.9 % bolus 1,000 mL (0 mL Intravenous Stopped 11/10/23 0532)   sodium chloride 0.9 % bolus 250 mL (250 mL Intravenous New Bag 11/10/23 0633)        ED Course:         Labs:    Lab Results (last 24 hours)       Procedure Component Value Units Date/Time    CBC & Differential [[id number]]  (Abnormal) Collected: 11/10/23 0120    Specimen: Blood Updated: 11/10/23 0139    Narrative:      The following orders were created for panel order CBC & Differential.  Procedure                               Abnormality         Status                     ---------                               -----------         ------                     CBC Auto Differential[[id number]]        Abnormal            Final result                 Please view results for these tests on the individual orders.    Comprehensive Metabolic Panel [[id number]]  (Abnormal) Collected: 11/10/23 0120    Specimen: Blood Updated: 11/10/23 0211     Glucose 99 mg/dL      BUN 43 mg/dL      Creatinine 1.40 mg/dL      Sodium 136 mmol/L      Potassium 5.6 mmol/L      Comment: Slight hemolysis detected by analyzer. Result may be falsely elevated.        Chloride 99 mmol/L      CO2 26.1 mmol/L      Calcium 9.7 mg/dL      Total Protein 8.8 g/dL      Albumin 4.2 g/dL      ALT (SGPT) 38 U/L      AST (SGOT) 43 U/L      Comment: Slight hemolysis detected by analyzer. Result may be falsely elevated.        Alkaline Phosphatase 100 U/L      Total Bilirubin 1.8 mg/dL      Globulin 4.6 gm/dL      A/G Ratio 0.9 g/dL      BUN/Creatinine Ratio 30.7     Anion Gap 10.9 mmol/L      eGFR 50.2 mL/min/1.73     Narrative:      GFR Normal >60  Chronic Kidney Disease <60  Kidney Failure <15    The GFR formula is only valid for adults with stable renal function  between ages 18 and 70.    BNP [[id number]]  (Normal) Collected: 11/10/23 0120    Specimen: Blood Updated: 11/10/23 0157     proBNP 237.9 pg/mL     Narrative:      This assay is used as an aid in the diagnosis of individuals suspected of having heart failure. It can be used as an aid in the diagnosis of acute decompensated heart failure (ADHF) in patients presenting with signs and symptoms of ADHF to the emergency department (ED). In addition, NT-proBNP of <300 pg/mL indicates ADHF is not likely.    Age Range Result Interpretation  NT-proBNP Concentration (pg/mL:      <50             Positive            >450                   Gray                 300-450                    Negative             <300    50-75           Positive            >900                  Gray                300-900                  Negative            <300      >75             Positive            >1800                  Gray                300-1800                  Negative            <300    Single High Sensitivity Troponin T [[id number]]  (Abnormal) Collected: 11/10/23 0120    Specimen: Blood Updated: 11/10/23 0213     HS Troponin T 78 ng/L     Narrative:      High Sensitive Troponin T Reference Range:  <14.0 ng/L- Negative Female for AMI  <22.0 ng/L- Negative Male for AMI  >=14 - Abnormal Female indicating possible myocardial injury.  >=22 - Abnormal Male indicating possible myocardial injury.   Clinicians would have to utilize clinical acumen, EKG, Troponin, and serial changes to determine if it is an Acute Myocardial Infarction or myocardial injury due to an underlying chronic condition.         CBC Auto Differential [[id number]]  (Abnormal) Collected: 11/10/23 0120    Specimen: Blood Updated: 11/10/23 0139     WBC 18.58 10*3/mm3      RBC 4.39 10*6/mm3      Hemoglobin 13.3 g/dL      Hematocrit 38.2 %      MCV 87.0 fL      MCH 30.3 pg      MCHC 34.8 g/dL      RDW 13.2 %      RDW-SD 41.7 fl      MPV 12.3 fL      Platelets 166 10*3/mm3       Neutrophil % 89.5 %      Lymphocyte % 4.8 %      Monocyte % 4.8 %      Eosinophil % 0.1 %      Basophil % 0.2 %      Immature Grans % 0.6 %      Neutrophils, Absolute 16.64 10*3/mm3      Lymphocytes, Absolute 0.89 10*3/mm3      Monocytes, Absolute 0.89 10*3/mm3      Eosinophils, Absolute 0.01 10*3/mm3      Basophils, Absolute 0.04 10*3/mm3      Immature Grans, Absolute 0.11 10*3/mm3      nRBC 0.0 /100 WBC     Blood Culture - Blood, Arm, Left [[id number]] Collected: 11/10/23 0120    Specimen: Blood from Arm, Left Updated: 11/10/23 0134    Lactic Acid, Plasma [[id number]]  (Abnormal) Collected: 11/10/23 0120    Specimen: Blood Updated: 11/10/23 0213     Lactate 3.6 mmol/L     COVID-19, FLU A/B, RSV PCR 1 HR TAT - Swab, Nasopharynx [[id number]]  (Normal) Collected: 11/10/23 0120    Specimen: Swab from Nasopharynx Updated: 11/10/23 0227     COVID19 Not Detected     Influenza A PCR Not Detected     Influenza B PCR Not Detected     RSV, PCR Not Detected    Narrative:      Fact sheet for providers: https://www.fda.gov/media/440424/download    Fact sheet for patients: https://www.fda.gov/media/175156/download    Test performed by PCR.    Blood Culture - Blood, Arm, Left [[id number]] Collected: 11/10/23 0122    Specimen: Blood from Arm, Left Updated: 11/10/23 0133    Urinalysis With Culture If Indicated - Urine, Clean Catch [[id number]]  (Abnormal) Collected: 11/10/23 0328    Specimen: Urine, Clean Catch Updated: 11/10/23 0338     Color, UA Yellow     Appearance, UA Clear     pH, UA 8.5     Specific Gravity, UA 1.016     Glucose, UA Negative     Ketones, UA Negative     Bilirubin, UA Negative     Blood, UA Negative     Protein, UA Trace     Leuk Esterase, UA Trace     Nitrite, UA Negative     Urobilinogen, UA 1.0 E.U./dL    Narrative:      In absence of clinical symptoms, the presence of pyuria, bacteria, and/or nitrites on the urinalysis result does not correlate with infection.    Urinalysis, Microscopic Only - Urine, Clean  "Catch [[id number]]  (Abnormal) Collected: 11/10/23 0328    Specimen: Urine, Clean Catch Updated: 11/10/23 0404     RBC, UA 3-5 /HPF      WBC, UA 0-2 /HPF      Comment: Urine culture not indicated.        Bacteria, UA None Seen /HPF      Squamous Epithelial Cells, UA 0-2 /HPF      Hyaline Casts, UA 0-2 /LPF      Methodology Manual Light Microscopy    STAT Lactic Acid, Reflex [[id number]]  (Abnormal) Collected: 11/10/23 0504    Specimen: Blood Updated: 11/10/23 0601     Lactate 3.4 mmol/L     High Sensitivity Troponin T 2Hr [[id number]]  (Abnormal) Collected: 11/10/23 0504    Specimen: Blood Updated: 11/10/23 0600     HS Troponin T 70 ng/L      Troponin T Delta -8 ng/L     Narrative:      High Sensitive Troponin T Reference Range:  <14.0 ng/L- Negative Female for AMI  <22.0 ng/L- Negative Male for AMI  >=14 - Abnormal Female indicating possible myocardial injury.  >=22 - Abnormal Male indicating possible myocardial injury.   Clinicians would have to utilize clinical acumen, EKG, Troponin, and serial changes to determine if it is an Acute Myocardial Infarction or myocardial injury due to an underlying chronic condition.         Procalcitonin [[id number]]  (Abnormal) Collected: 11/10/23 0504    Specimen: Blood Updated: 11/10/23 0621     Procalcitonin 42.09 ng/mL     Narrative:      As a Marker for Sepsis (Non-Neonates):    1. <0.5 ng/mL represents a low risk of severe sepsis and/or septic shock.  2. >2 ng/mL represents a high risk of severe sepsis and/or septic shock.    As a Marker for Lower Respiratory Tract Infections that require antibiotic therapy:    PCT on Admission    Antibiotic Therapy       6-12 Hrs later    >0.5                Strongly Recommended  >0.25 - <0.5        Recommended  0.1 - 0.25          Discouraged              Remeasure/reassess PCT  <0.1                Strongly Discouraged     Remeasure/reassess PCT    As 28 day mortality risk marker: \"Change in Procalcitonin Result\" (>80% or <=80%) if Day 0 (or " Day 1) and Day 4 values are available. Refer to http://www.[facility]-pct-calculator.com    Change in PCT <=80%  A decrease of PCT levels below or equal to 80% defines a positive change in PCT test result representing a higher risk for 28-day all-cause mortality of patients diagnosed with severe sepsis for septic shock.    Change in PCT >80%  A decrease of PCT levels of more than 80% defines a negative change in PCT result representing a lower risk for 28-day all-cause mortality of patients diagnosed with severe sepsis or septic shock.    This test is Prognostic not Diagnostic, if elevated correlate with clinical findings before administering antibiotic treatment.                 Imaging:    CT Chest Without Contrast Diagnostic    Result Date: 11/10/2023  PROCEDURE: CT CHEST WO CONTRAST DIAGNOSTIC  COMPARISONS: 11/10/2023; 9/4/2023; 10/22/2022; 7/2/2022; 5/18/2022.  INDICATIONS: Hypoxic.  TECHNIQUE: 599 CT images were created without the administration of contrast material.   PROTOCOL:   Standard CT imaging protocol performed.    RADIATION:   Total DLP: 441.6 mGy*cm.   Automated exposure control was utilized to minimize radiation dose.  FINDINGS: Diffuse bilateral airspace opacities are present, especially patchy somewhat confluent ground-glass opacities, which are most suggestive of infectious multifocal pneumonia.  Aspiration pneumonia or pulmonary edema is also thought to be less likely.  There is motion artifact on the study.  There are low lung volumes.  There is narrowing of the anteroposterior (AP) dimension of the trachea and, to a lesser extent, the mainstem bronchi, which may related to respiratory artifact.  Otherwise, the central airway is patent.  Borderline cardiac enlargement is possible.  There is minimal pericardial effusion.  Arterial calcifications are seen, including involvement of the coronary arteries.  No aneurysmal dilatation of the thoracic aorta is suspected.  There is prominence of the main  pulmonary artery, which has a maximum diameter of about 3.5 cm in diameter.  The maximum diameter of right pulmonary artery is 3 cm.  The maximum diameter left pulmonary artery is 2.2 cm. Pulmonary arterial hypertension is possible.  A similar nonenhanced CT appearance was seen on the 5/18/2022 chest (CT) study.  Minimal if any pleural effusion is seen.  A small hiatal hernia is present.  There may be complex renal cysts, measuring about 1.5 cm or less in size, seen previously, and not fully characterized on the study.  Again, there are gallstones.  No definite acute cholecystitis or acute pancreatitis.  Nonobstructing left nephrolithiasis is identified with a 7 mm calyceal stone present, as on image 144 of series 201. No hydronephrosis is suggested.  The stomach is distended with an air-fluid level.  There is a percutaneous gastrostomy (PEG) tube in place, seen on the  topogram.  Severe degenerative changes involve the shoulders.  Severe degenerative changes are seen throughout the imaged spine.  There may be diffuse idiopathic skeletal hyperostosis (DISH).  No acute fracture.  No aggressive osseous lesion is suggested.  No pneumothorax.  No pneumomediastinum.        Infectious multifocal pneumonia is suspected by nonenhanced CT.  It is new since the prior chest CT study from 5/18/2022.  Please see above comments for further detail.     Please note that portions of this note were completed with a voice recognition program.  ADONIS OLVERA JR, MD       Electronically Signed and Approved By: ADONIS OLVERA JR, MD on 11/10/2023 at 5:05             XR Chest 1 View    Result Date: 11/10/2023  PROCEDURE: XR CHEST 1 VW  COMPARISONS: 9/4/2023.  INDICATIONS: 83-YEAR-OLD MALE W/ H/O SHORTNESS OF BREATH.  FINDINGS:  A single AP (or PA) upright portable chest radiograph was performed.  Mild-to-moderate cardiac enlargement is seen, as before.  Probably no acute infiltrate is appreciated.  Fibrosis and/or atelectasis is  (are) seen bilaterally, similar to the prior exam.  No pleural effusion or pneumothorax is identified.  Low lung volumes persist.  The thoracic aorta is atherosclerotic.  Chronic calcified granulomatous disease involves the chest.  External artifacts obscure detail.  Severe degenerative changes involve the bilateral shoulders.  Degenerative changes involve the imaged spine.  There is suspected thoracolumbar scoliosis.  There may be new mild gaseous distension of the stomach.  Otherwise, no significant interval change is seen since the prior study (or studies).        No acute infiltrate is suspected.  There may be new mild gaseous distension of the stomach.  Otherwise, no significant interval change is seen since the prior study (or studies).     Please note that portions of this note were completed with a voice recognition program.  ADONIS OLVERA JR, MD       Electronically Signed and Approved By: ADONIS OLVERA JR, MD on 11/10/2023 at 2:35                 Differential Diagnosis and Discussion:    Fever: Based on the complaint of fever, differential diagnosis includes but is not limited to meningitis, pneumonia, pyelonephritis, acute uti,  systemic immune response syndrome, sepsis, viral syndrome, fungal infection, tick born illness and other bacterial infections.    All labs were reviewed and interpreted by me.  All X-rays impressions were independently interpreted by me.  EKG was interpreted by me.  CT scan radiology impression was interpreted by me.    MDM  Number of Diagnoses or Management Options  Acute renal failure, unspecified acute renal failure type  Acute respiratory failure with hypoxia  Multifocal pneumonia  Sepsis, due to unspecified organism, unspecified whether acute organ dysfunction present  Diagnosis management comments: Sepsis criteria was met in the emergency department and the Sepsis protocol (including antibiotic administration) was initiated.      SIRS criteria considered:   1.  Temperature  > 100.4 or <98.6    2.  Heart Rate > 90    3.  Respiratory Rate > 22    4.  WBC > 12K or <4K.             Severe Sepsis:     Respiratory: Mechanical Ventilation or BiPAP  Hypotension: SBP > 90 or MAP < 65  Renal: Creatinine > 2  Metabolic: Lactic Acid > 2  Hematologic: Platelets < 100K or INR > 1.5  Hepatic: BILI  >  2  CNS: Sudden AMS     Septic Shock:     Severe Sepsis + Persistent hypotension or Lactic Acid > 4     Normal saline bolus, Antibiotics, and final disposition was based on these definitions.        Sepsis was recognized at 0104    Antibiotics were ordered.       30 mL/kg bolus was NOT indicated.       Patient did not receive the recommended 30 mL/kg fluid bolus for sepsis because it would be harmful or detrimental to the patient.    The patient has concern for fluid overload.   The patient was ordered 1L of fluids.    Total Critical Care time of 35 minutes. Total critical care time documented does not include time spent on separately billed procedures for services of nurses or physician assistants. I personally saw and examined the patient. I have reviewed all diagnostic interpretations and treatment plans as written. I was present for the key portions of any procedures performed and the inclusive time noted in any critical care statement. Critical care time includes patient management by me, time spent at the patient's bedside,  time to review lab and imaging results, discussing patient care, documentation in the medical record, and time spent with family or caregiver.         Amount and/or Complexity of Data Reviewed  Decide to obtain previous medical records or to obtain history from someone other than the patient: yes         Critical Care Note: Total Critical Care time of 35 minutes. Total critical care time documented does not include time spent on separately billed procedures for services of nurses or physician assistants. I personally saw and examined the patient. I have reviewed all diagnostic  interpretations and treatment plans as written. I was present for the key portions of any procedures performed and the inclusive time noted in any critical care statement. Critical care time includes patient management by me, time spent at the patients bedside,  time to review lab and imaging results, discussing patient care, documentation in the medical record, and time spent with family or caregiver.    Patient Care Considerations:          Consultants/Shared Management Plan:    Hospitalist: I have discussed the case with the hospitalist who agrees to accept the patient for admission.    Social Determinants of Health:    Patient is a nursing home/assisted living resident and has reliable access to care.      Disposition and Care Coordination:    Admit:   Through independent evaluation of the patient's history, physical, and imperical data, the patient meets criteria for observation/admission to the hospital.        Final diagnoses:   Sepsis, due to unspecified organism, unspecified whether acute organ dysfunction present   Acute respiratory failure with hypoxia   Acute renal failure, unspecified acute renal failure type   Multifocal pneumonia        ED Disposition       ED Disposition   Decision to Admit    Condition   --    Comment   Level of Care: Remote Telemetry [26]   Diagnosis: Sepsis [[id number]]   Admitting Physician: JANELL JENKINS [[id number]]   Certification: I Certify That Inpatient Hospital Services Are Medically Necessary For Greater Than 2 Midnights                 This medical record created using voice recognition software.             Russell Correa MD  11/10/23 0862

## 2023-11-10 NOTE — ED TRIAGE NOTES
PT ARRIVED VIA EMS FROM [facility] WITH REPORTS OF FLU-LIKE SYMPTOMS, PT HAD FLU SHOT 3 DAYS AGO. EMS REPORTS SNF TOLD THEM HE VOMITED AND MAY HAVE ASPIRATED. PT IS HAVING SOA AND COUGH. EMS REPORTS TACHYCARDIA, SATS 93% ON 3L VIA NC. PT WAS GIVEN TYLENOL AT MIDNIGHT AT [facility].

## 2023-11-11 LAB
ANION GAP SERPL CALCULATED.3IONS-SCNC: 8.4 MMOL/L (ref 5–15)
BUN SERPL-MCNC: 35 MG/DL (ref 8–23)
BUN/CREAT SERPL: 38 (ref 7–25)
CALCIUM SPEC-SCNC: 8.8 MG/DL (ref 8.6–10.5)
CHLORIDE SERPL-SCNC: 111 MMOL/L (ref 98–107)
CO2 SERPL-SCNC: 23.6 MMOL/L (ref 22–29)
CREAT SERPL-MCNC: 0.92 MG/DL (ref 0.76–1.27)
DEPRECATED RDW RBC AUTO: 43.1 FL (ref 37–54)
EGFRCR SERPLBLD CKD-EPI 2021: 83.1 ML/MIN/1.73
ERYTHROCYTE [DISTWIDTH] IN BLOOD BY AUTOMATED COUNT: 13.4 % (ref 12.3–15.4)
FLUAV AG NPH QL: NEGATIVE
FLUBV AG NPH QL IA: POSITIVE
GLUCOSE SERPL-MCNC: 126 MG/DL (ref 65–99)
HCT VFR BLD AUTO: 29.3 % (ref 37.5–51)
HGB BLD-MCNC: 10 G/DL (ref 13–17.7)
M PNEUMO IGM SER QL: NEGATIVE
MCH RBC QN AUTO: 29.9 PG (ref 26.6–33)
MCHC RBC AUTO-ENTMCNC: 34.1 G/DL (ref 31.5–35.7)
MCV RBC AUTO: 87.7 FL (ref 79–97)
PLATELET # BLD AUTO: 152 10*3/MM3 (ref 140–450)
PMV BLD AUTO: 11.4 FL (ref 6–12)
POTASSIUM SERPL-SCNC: 4.7 MMOL/L (ref 3.5–5.2)
RBC # BLD AUTO: 3.34 10*6/MM3 (ref 4.14–5.8)
SODIUM SERPL-SCNC: 143 MMOL/L (ref 136–145)
VANCOMYCIN SERPL-MCNC: 16.9 MCG/ML (ref 5–40)
WBC NRBC COR # BLD: 19.97 10*3/MM3 (ref 3.4–10.8)

## 2023-11-11 PROCEDURE — 94664 DEMO&/EVAL PT USE INHALER: CPT

## 2023-11-11 PROCEDURE — 80202 ASSAY OF VANCOMYCIN: CPT | Performed by: INTERNAL MEDICINE

## 2023-11-11 PROCEDURE — 94799 UNLISTED PULMONARY SVC/PX: CPT

## 2023-11-11 PROCEDURE — 80048 BASIC METABOLIC PNL TOTAL CA: CPT | Performed by: PHYSICIAN ASSISTANT

## 2023-11-11 PROCEDURE — 25810000003 SODIUM CHLORIDE 0.9 % SOLUTION: Performed by: PHYSICIAN ASSISTANT

## 2023-11-11 PROCEDURE — 99233 SBSQ HOSP IP/OBS HIGH 50: CPT | Performed by: INTERNAL MEDICINE

## 2023-11-11 PROCEDURE — 94640 AIRWAY INHALATION TREATMENT: CPT

## 2023-11-11 PROCEDURE — 87804 INFLUENZA ASSAY W/OPTIC: CPT | Performed by: PHYSICIAN ASSISTANT

## 2023-11-11 PROCEDURE — 25010000002 CEFEPIME PER 500 MG: Performed by: FAMILY MEDICINE

## 2023-11-11 PROCEDURE — 85027 COMPLETE CBC AUTOMATED: CPT | Performed by: PHYSICIAN ASSISTANT

## 2023-11-11 PROCEDURE — 86738 MYCOPLASMA ANTIBODY: CPT | Performed by: PHYSICIAN ASSISTANT

## 2023-11-11 RX ORDER — BUDESONIDE 0.5 MG/2ML
0.5 INHALANT ORAL
Status: DISCONTINUED | OUTPATIENT
Start: 2023-11-11 | End: 2023-11-15 | Stop reason: HOSPADM

## 2023-11-11 RX ORDER — ARFORMOTEROL TARTRATE 15 UG/2ML
15 SOLUTION RESPIRATORY (INHALATION)
Status: DISCONTINUED | OUTPATIENT
Start: 2023-11-11 | End: 2023-11-15 | Stop reason: HOSPADM

## 2023-11-11 RX ORDER — VANCOMYCIN/0.9 % SOD CHLORIDE 1.5G/250ML
1500 PLASTIC BAG, INJECTION (ML) INTRAVENOUS EVERY 24 HOURS
Status: DISCONTINUED | OUTPATIENT
Start: 2023-11-11 | End: 2023-11-11

## 2023-11-11 RX ORDER — IPRATROPIUM BROMIDE AND ALBUTEROL SULFATE 2.5; .5 MG/3ML; MG/3ML
3 SOLUTION RESPIRATORY (INHALATION)
Status: DISCONTINUED | OUTPATIENT
Start: 2023-11-11 | End: 2023-11-15 | Stop reason: HOSPADM

## 2023-11-11 RX ORDER — OSELTAMIVIR PHOSPHATE 75 MG/1
75 CAPSULE ORAL EVERY 12 HOURS SCHEDULED
Status: DISCONTINUED | OUTPATIENT
Start: 2023-11-11 | End: 2023-11-15 | Stop reason: HOSPADM

## 2023-11-11 RX ADMIN — ARFORMOTEROL TARTRATE 15 MCG: 15 SOLUTION RESPIRATORY (INHALATION) at 11:10

## 2023-11-11 RX ADMIN — CEFEPIME 2000 MG: 2 INJECTION, POWDER, FOR SOLUTION INTRAVENOUS at 00:26

## 2023-11-11 RX ADMIN — IPRATROPIUM BROMIDE AND ALBUTEROL SULFATE 3 ML: .5; 3 SOLUTION RESPIRATORY (INHALATION) at 15:20

## 2023-11-11 RX ADMIN — BUDESONIDE 0.5 MG: 0.5 INHALANT RESPIRATORY (INHALATION) at 20:01

## 2023-11-11 RX ADMIN — IPRATROPIUM BROMIDE AND ALBUTEROL SULFATE 3 ML: .5; 3 SOLUTION RESPIRATORY (INHALATION) at 20:01

## 2023-11-11 RX ADMIN — IPRATROPIUM BROMIDE AND ALBUTEROL SULFATE 3 ML: .5; 3 SOLUTION RESPIRATORY (INHALATION) at 12:43

## 2023-11-11 RX ADMIN — SODIUM CHLORIDE 75 ML/HR: 9 INJECTION, SOLUTION INTRAVENOUS at 11:49

## 2023-11-11 RX ADMIN — BUDESONIDE 0.5 MG: 0.5 INHALANT RESPIRATORY (INHALATION) at 11:11

## 2023-11-11 RX ADMIN — OSELTAMIVIR PHOSPHATE 75 MG: 75 CAPSULE ORAL at 20:35

## 2023-11-11 RX ADMIN — ARFORMOTEROL TARTRATE 15 MCG: 15 SOLUTION RESPIRATORY (INHALATION) at 20:01

## 2023-11-11 RX ADMIN — CEFEPIME 2000 MG: 2 INJECTION, POWDER, FOR SOLUTION INTRAVENOUS at 11:47

## 2023-11-11 NOTE — PROGRESS NOTES
"[facility] Clinical Pharmacy Services: Vancomycin Monitoring Note    Jordin Menon is a 83 y.o. male who is on day  of pharmacy to dose vancomycin for Pneumonia and Sepsis.    Previous Vancomycin Dose:   1250 mg IV every  24  hours  Imaging Reviewed?: Yes  Updated Cultures and Sensitivities:   11/10 blood ngd1  MRSA swab neg  COVID/flu/RSV neg  Strep pneumo ag urine neg  Legionella ag urine neg    Vitals/Labs  Ht: 162.6 cm (64\"); Wt: 90.1 kg (198 lb 10.2 oz)   Temp (24hrs), Av.7 °F (37.1 °C), Min:97.8 °F (36.6 °C), Max:99.5 °F (37.5 °C)   Estimated Creatinine Clearance: 62.7 mL/min (by C-G formula based on SCr of 0.92 mg/dL).       Results from last 7 days   Lab Units 23  1121 11/10/23  1530 11/10/23  0120   VANCOMYCIN RM mcg/mL 16.90  --   --    CREATININE mg/dL 0.92 1.33* 1.40*   WBC 10*3/mm3 19.97* 20.43* 18.58*     Assessment/Plan    Current Vancomycin Dose:  1500 mg IV every 24 hours; which provides the following predicted parameters:  AUC24,ss: 503 mg/L.hr  PAUC*: 88 %  Ctrough,ss: 14.3 mg/L  Pconc*: 13 %  Tox.: 9 %  Next Vanc Random ordered for 11/12 AM  We will continue to monitor patient changes and renal function     Thank you for involving pharmacy in this patient's care. Please contact pharmacy with any questions or concerns.    Rick Castorena [facility]  Clinical Pharmacist    "

## 2023-11-11 NOTE — PROGRESS NOTES
[facility]   Hospitalist Progress Note  Date: 2023  Patient Name: Jordin Menon  : 1940  MRN: [id number]  Date of admission: 11/10/2023      Subjective   Subjective     Chief Complaint: Fevers shortness of breath    Summary: Patient is 83-year-old male past medical history significant for dementia, congestive heart failure, history of CVA, history of dysphagia status post PEG placement on tube feeds, GERD, hypertension, hyperlipidemia, peripheral vascular disease, osteoarthritis, resides at long-term care facility patient was noted to have worsening shortness of air and fevers brought to the emergency department was noted to have multifocal pneumonia noted to be dehydrated with worsening renal failure hyperkalemia admitted to the hospitalist started on broad-spectrum antibiotics    Interval Followup: Seen and examined history limited due to mental status continuing antibiotics for multifocal pneumonia pending culture results de-escalate based on cultures.  Renal function improved we will discontinue IV fluids at this time potassium is corrected start tube feeds per dietitian recommendations consult PT OT  Review of systems: All systems reviewed and negative except following: Unable to obtain secondary to mental status    Objective   Objective     Vitals:   Temp:  [97.8 °F (36.6 °C)-99.5 °F (37.5 °C)] 99.5 °F (37.5 °C)  Heart Rate:  [104-114] 114  Resp:  [18-20] 18  BP: ()/(40-82) 119/47  Flow (L/min):  [2.5-4] 2.5    Physical Exam  Constitutional: Awake confused  Cardiovascular: RRR  Respiratory scattered expiratory wheezes and rhonchi  GI: Abdomen soft nontender PEG tube in place    Result Review    Result Review:  I have reviewed the following:  [x]  Laboratory  CBC          2023    06:08 11/10/2023    01:20 11/10/2023    15:30 2023    11:21   CBC   WBC 8.64  18.58  20.43  19.97    RBC 3.50  4.39  3.41  3.34    Hemoglobin 10.4  13.3  10.1  10.0    Hematocrit 31.9  38.2  30.1  29.3     MCV 91.1  87.0  88.3  87.7    MCH 29.7  30.3  29.6  29.9    MCHC 32.6  34.8  33.6  34.1    RDW 14.1  13.2  13.6  13.4    Platelets 144  166  150  152      CMP          9/11/2023    06:08 11/10/2023    01:20 11/10/2023    15:30 11/11/2023    11:21   CMP   Glucose 120  99  116  126    BUN 19  43  48  35    Creatinine 0.65  1.40  1.33  0.92    EGFR 94.1  50.2  53.4  83.1    Sodium 143  136  136  143    Potassium 4.3  5.6  5.8  4.7    Chloride 110  99  105  111    Calcium 8.6  9.7  8.4  8.8    Total Protein 6.0  8.8  6.6     Albumin 2.6  4.2  3.0     Globulin 3.4  4.6  3.6     Total Bilirubin 0.3  1.8  1.7     Alkaline Phosphatase 62  100  64     AST (SGOT) 16  43  38     ALT (SGPT) 15  38  25     Albumin/Globulin Ratio 0.8  0.9  0.8     BUN/Creatinine Ratio 29.2  30.7  36.1  38.0    Anion Gap 8.3  10.9  8.1  8.4        []  Microbiology  []  Radiology  []  EKG/Telemetry   []  Cardiology/Vascular   []  Pathology  []  Old records  []  Other:    Assessment & Plan   Assessment / Plan   Assessment:    Healthcare associated pneumonia  Sepsis secondary to above  Lactic acidosis  Hyperkalemia resolved  Acute kidney injury resolved  History of CHF  Hypertension  Hyperlipidemia  Chronic dysphagia with PEG placement and tube feeds    Plan:    Continue antibiotics vancomycin and cefepime de-escalate based on culture results  Discontinue IV fluids  Start nebulizer treatments  Repeat a.m. labs  Review resume appropriate home medications once reconciled  Pepcid for GI prophylaxis  Further treatment is patient's contingent upon his hospital course     Discussed plan with RN.    DVT prophylaxis:  No DVT prophylaxis order currently exists.    CODE STATUS:   Code Status (Patient has no pulse and is not breathing): CPR (Attempt to Resuscitate)  Medical Interventions (Patient has pulse or is breathing): Full Support        Electronically signed by NAYE Draper, 11/11/23, 1:38 PM EST.         Attending Documentation:  Patient  independently seen and evaluated, above documentation reflects plan put forth during bedside rounds.  More than 51% of the time of this patient encounter was performed by me. I discussed the care plan with MARCIAL Wilkerson PA-C, I agree with his findings and plan as documented, what I have added to the care plan and modified is as follows in my documentation and my medical decision making; 82 yo male with SOA, here with sepsis and pneumonia.  MRSA pcr negative, will dc vancomycin.  Continue cefepime. Labs in am.  Electronically signed by Eric Rivas MD, 11/11/23, 2:01 PM EST.

## 2023-11-11 NOTE — PLAN OF CARE
Goal Outcome Evaluation:  Plan of Care Reviewed With: patient        Progress: no change  Outcome Evaluation: Tube feeding at goal rate of 60ml/hr

## 2023-11-11 NOTE — CONSULTS
Nutrition Services    Patient Name: Jordin Menon  YOB: 1940  MRN: [id number]  Admission date: 11/10/2023    PROGRESS NOTE      Encounter Information: EN Follow UP       PO Diet: NPO Diet NPO Type: Strict NPO   PO Supplements: NA   PO Intake:  NA       Current nutrition support: Isosource 1.5 @ 60 ml/hr x 22 hours   Free water flushes 100 ml every 3 hours   Provides 1980 kcal, 90 g pro, 1803 ml fluid    Nutrition support review: Pt achieved goal rate at 0246, 11/11/2023.        Labs (reviewed below): Labs reviewed, may benefit from additional fluid however also has hx of CHF.  Monitor labs to see if current free water flushes help resolve the BUN/Creat        GI Function:  10/10/23, 2300 nursing documented  BM=smear       Nutrition Intervention Updates: Continue current feeding, monitor for tolerance.  Pt difficult to understand.  Unsure of cognition.  Asks repetitive questions. Checked to ensure feeding hanging matches order.  Found to be in compliance.        Results from last 7 days   Lab Units 11/10/23  1530 11/10/23  0120   SODIUM mmol/L 136 136   POTASSIUM mmol/L 5.8* 5.6*   CHLORIDE mmol/L 105 99   CO2 mmol/L 22.9 26.1   BUN mg/dL 48* 43*   CREATININE mg/dL 1.33* 1.40*   CALCIUM mg/dL 8.4* 9.7   BILIRUBIN mg/dL 1.7* 1.8*   ALK PHOS U/L 64 100   ALT (SGPT) U/L 25 38   AST (SGOT) U/L 38 43*   GLUCOSE mg/dL 116* 99     Results from last 7 days   Lab Units 11/10/23  1530   MAGNESIUM mg/dL 1.9   HEMOGLOBIN g/dL 10.1*   HEMATOCRIT % 30.1*     COVID19   Date Value Ref Range Status   11/10/2023 Not Detected Not Detected - Ref. Range Final     Lab Results   Component Value Date    HGBA1C 4.90 05/30/2022       RD to follow up per protocol.    Electronically signed by:  Christal Cruz RD  11/11/23 09:31 EST

## 2023-11-12 LAB
GLUCOSE BLDC GLUCOMTR-MCNC: 117 MG/DL (ref 70–99)
GLUCOSE BLDC GLUCOMTR-MCNC: 122 MG/DL (ref 70–99)
GLUCOSE BLDC GLUCOMTR-MCNC: 128 MG/DL (ref 70–99)

## 2023-11-12 PROCEDURE — 25010000002 CEFEPIME PER 500 MG: Performed by: FAMILY MEDICINE

## 2023-11-12 PROCEDURE — 94799 UNLISTED PULMONARY SVC/PX: CPT

## 2023-11-12 PROCEDURE — 99233 SBSQ HOSP IP/OBS HIGH 50: CPT | Performed by: INTERNAL MEDICINE

## 2023-11-12 PROCEDURE — 94664 DEMO&/EVAL PT USE INHALER: CPT

## 2023-11-12 PROCEDURE — 82948 REAGENT STRIP/BLOOD GLUCOSE: CPT

## 2023-11-12 RX ORDER — FUROSEMIDE 20 MG/1
20 TABLET ORAL DAILY
Status: DISCONTINUED | OUTPATIENT
Start: 2023-11-12 | End: 2023-11-13

## 2023-11-12 RX ORDER — NICOTINE POLACRILEX 4 MG
15 LOZENGE BUCCAL
Status: DISCONTINUED | OUTPATIENT
Start: 2023-11-12 | End: 2023-11-15 | Stop reason: HOSPADM

## 2023-11-12 RX ORDER — HYDROXYZINE HYDROCHLORIDE 25 MG/1
25 TABLET, FILM COATED ORAL EVERY 6 HOURS PRN
COMMUNITY
Start: 2023-11-09

## 2023-11-12 RX ORDER — FAMOTIDINE 20 MG/1
40 TABLET, FILM COATED ORAL NIGHTLY
Status: DISCONTINUED | OUTPATIENT
Start: 2023-11-12 | End: 2023-11-13

## 2023-11-12 RX ORDER — ACETAMINOPHEN 325 MG/1
650 TABLET ORAL EVERY 6 HOURS PRN
Status: DISCONTINUED | OUTPATIENT
Start: 2023-11-12 | End: 2023-11-13

## 2023-11-12 RX ORDER — IBUPROFEN 600 MG/1
1 TABLET ORAL
Status: DISCONTINUED | OUTPATIENT
Start: 2023-11-12 | End: 2023-11-15 | Stop reason: HOSPADM

## 2023-11-12 RX ORDER — ONDANSETRON 2 MG/ML
4 INJECTION INTRAMUSCULAR; INTRAVENOUS EVERY 6 HOURS PRN
Status: DISCONTINUED | OUTPATIENT
Start: 2023-11-12 | End: 2023-11-15 | Stop reason: HOSPADM

## 2023-11-12 RX ORDER — SENNOSIDES 8.6 MG
650 CAPSULE ORAL EVERY 4 HOURS PRN
COMMUNITY

## 2023-11-12 RX ORDER — DEXTROSE MONOHYDRATE 25 G/50ML
25 INJECTION, SOLUTION INTRAVENOUS
Status: DISCONTINUED | OUTPATIENT
Start: 2023-11-12 | End: 2023-11-15 | Stop reason: HOSPADM

## 2023-11-12 RX ORDER — ASPIRIN 81 MG/1
81 TABLET, CHEWABLE ORAL DAILY
Status: DISCONTINUED | OUTPATIENT
Start: 2023-11-12 | End: 2023-11-15 | Stop reason: HOSPADM

## 2023-11-12 RX ORDER — DOCUSATE SODIUM 50 MG/5 ML
250 LIQUID (ML) ORAL DAILY
Status: DISCONTINUED | OUTPATIENT
Start: 2023-11-12 | End: 2023-11-15 | Stop reason: HOSPADM

## 2023-11-12 RX ORDER — LORAZEPAM 1 MG/1
1 TABLET ORAL EVERY 8 HOURS
COMMUNITY
Start: 2023-10-29 | End: 2023-11-15 | Stop reason: HOSPADM

## 2023-11-12 RX ORDER — POLYETHYLENE GLYCOL 3350 17 G/17G
17 POWDER, FOR SOLUTION ORAL DAILY
COMMUNITY

## 2023-11-12 RX ORDER — MAGNESIUM HYDROXIDE/ALUMINUM HYDROXICE/SIMETHICONE 120; 1200; 1200 MG/30ML; MG/30ML; MG/30ML
30 SUSPENSION ORAL EVERY 6 HOURS PRN
COMMUNITY

## 2023-11-12 RX ORDER — IPRATROPIUM BROMIDE AND ALBUTEROL SULFATE 2.5; .5 MG/3ML; MG/3ML
3 SOLUTION RESPIRATORY (INHALATION) EVERY 4 HOURS PRN
COMMUNITY

## 2023-11-12 RX ADMIN — ARFORMOTEROL TARTRATE 15 MCG: 15 SOLUTION RESPIRATORY (INHALATION) at 06:34

## 2023-11-12 RX ADMIN — ASPIRIN 81 MG: 81 TABLET, CHEWABLE ORAL at 11:12

## 2023-11-12 RX ADMIN — OSELTAMIVIR PHOSPHATE 75 MG: 75 CAPSULE ORAL at 20:39

## 2023-11-12 RX ADMIN — BUDESONIDE 0.5 MG: 0.5 INHALANT RESPIRATORY (INHALATION) at 06:34

## 2023-11-12 RX ADMIN — IPRATROPIUM BROMIDE AND ALBUTEROL SULFATE 3 ML: .5; 3 SOLUTION RESPIRATORY (INHALATION) at 23:32

## 2023-11-12 RX ADMIN — CEFEPIME 2000 MG: 2 INJECTION, POWDER, FOR SOLUTION INTRAVENOUS at 00:05

## 2023-11-12 RX ADMIN — OSELTAMIVIR PHOSPHATE 75 MG: 75 CAPSULE ORAL at 11:12

## 2023-11-12 RX ADMIN — METOPROLOL TARTRATE 12.5 MG: 25 TABLET, FILM COATED ORAL at 11:12

## 2023-11-12 RX ADMIN — IPRATROPIUM BROMIDE AND ALBUTEROL SULFATE 3 ML: .5; 3 SOLUTION RESPIRATORY (INHALATION) at 06:34

## 2023-11-12 RX ADMIN — IPRATROPIUM BROMIDE AND ALBUTEROL SULFATE 3 ML: .5; 3 SOLUTION RESPIRATORY (INHALATION) at 11:41

## 2023-11-12 RX ADMIN — IPRATROPIUM BROMIDE AND ALBUTEROL SULFATE 3 ML: .5; 3 SOLUTION RESPIRATORY (INHALATION) at 18:58

## 2023-11-12 RX ADMIN — ARFORMOTEROL TARTRATE 15 MCG: 15 SOLUTION RESPIRATORY (INHALATION) at 18:58

## 2023-11-12 RX ADMIN — ACETAMINOPHEN 650 MG: 325 TABLET ORAL at 11:16

## 2023-11-12 RX ADMIN — METOPROLOL TARTRATE 12.5 MG: 25 TABLET, FILM COATED ORAL at 20:39

## 2023-11-12 RX ADMIN — FAMOTIDINE 40 MG: 20 TABLET, FILM COATED ORAL at 20:39

## 2023-11-12 RX ADMIN — Medication 10 ML: at 20:40

## 2023-11-12 RX ADMIN — APIXABAN 5 MG: 5 TABLET, FILM COATED ORAL at 20:39

## 2023-11-12 RX ADMIN — BUDESONIDE 0.5 MG: 0.5 INHALANT RESPIRATORY (INHALATION) at 18:58

## 2023-11-12 RX ADMIN — CEFEPIME 2000 MG: 2 INJECTION, POWDER, FOR SOLUTION INTRAVENOUS at 20:38

## 2023-11-12 RX ADMIN — CEFEPIME 2000 MG: 2 INJECTION, POWDER, FOR SOLUTION INTRAVENOUS at 12:52

## 2023-11-12 RX ADMIN — FUROSEMIDE 20 MG: 20 TABLET ORAL at 11:12

## 2023-11-12 NOTE — PROGRESS NOTES
[facility]   Hospitalist Progress Note  Date: 2023  Patient Name: Jordin Menon  : 1940  MRN: [id number]  Date of admission: 11/10/2023      Subjective   Subjective     Chief Complaint: Fevers shortness of breath    Summary: Patient is 83-year-old male past medical history significant for dementia, congestive heart failure, history of CVA, history of dysphagia status post PEG placement on tube feeds, GERD, hypertension, hyperlipidemia, peripheral vascular disease, osteoarthritis, resides at long-term care facility patient was noted to have worsening shortness of air and fevers brought to the emergency department was noted to have multifocal pneumonia noted to be dehydrated with worsening renal failure hyperkalemia admitted to the hospitalist started on broad-spectrum antibiotics    Interval Followup: Seen and examined pleasantly confused remains on high vancomycin and cefepime Healthcare acquired pneumonia flu screen positive for influenza B started on Tamiflu discontinue IV fluids.started tube feeds starting sliding scale insulin per protocol every 6 hours nursing staff to obtain list of accurate home medications    Review of systems: All systems reviewed and negative except following: Unable to obtain secondary to mental status    Objective   Objective     Vitals:   Temp:  [98.8 °F (37.1 °C)-100 °F (37.8 °C)] 100 °F (37.8 °C)  Heart Rate:  [] 113  Resp:  [16-20] 20  BP: (111-137)/(47-67) 130/62  Flow (L/min):  [2-3] 2    Physical Exam  Constitutional: Awake pleasantly confused  Cardiovascular: RRR  Respiratory scattered expiratory wheezes and rhonchi  GI: Abdomen soft nontender PEG tube in place    Result Review    Result Review:  I have reviewed the following:  [x]  Laboratory  CBC          2023    06:08 11/10/2023    01:20 11/10/2023    15:30 2023    11:21   CBC   WBC 8.64  18.58  20.43  19.97    RBC 3.50  4.39  3.41  3.34    Hemoglobin 10.4  13.3  10.1  10.0    Hematocrit 31.9   38.2  30.1  29.3    MCV 91.1  87.0  88.3  87.7    MCH 29.7  30.3  29.6  29.9    MCHC 32.6  34.8  33.6  34.1    RDW 14.1  13.2  13.6  13.4    Platelets 144  166  150  152      CMP          9/11/2023    06:08 11/10/2023    01:20 11/10/2023    15:30 11/11/2023    11:21   CMP   Glucose 120  99  116  126    BUN 19  43  48  35    Creatinine 0.65  1.40  1.33  0.92    EGFR 94.1  50.2  53.4  83.1    Sodium 143  136  136  143    Potassium 4.3  5.6  5.8  4.7    Chloride 110  99  105  111    Calcium 8.6  9.7  8.4  8.8    Total Protein 6.0  8.8  6.6     Albumin 2.6  4.2  3.0     Globulin 3.4  4.6  3.6     Total Bilirubin 0.3  1.8  1.7     Alkaline Phosphatase 62  100  64     AST (SGOT) 16  43  38     ALT (SGPT) 15  38  25     Albumin/Globulin Ratio 0.8  0.9  0.8     BUN/Creatinine Ratio 29.2  30.7  36.1  38.0    Anion Gap 8.3  10.9  8.1  8.4        []  Microbiology  []  Radiology  []  EKG/Telemetry   []  Cardiology/Vascular   []  Pathology  []  Old records  []  Other:    Assessment & Plan   Assessment / Plan   Assessment:    Healthcare associated pneumonia  Sepsis secondary to above  Lactic acidosis  Hyperkalemia resolved  Acute kidney injury resolved  History of CHF  Hypertension  Hyperlipidemia  Chronic dysphagia with PEG placement and tube feeds    Plan:    Continue antibiotics vancomycin and cefepime de-escalate based on culture results  Start Tamiflu  Start tube feeds start sliding scale insulin per protocol every 6  Nursing staff to get accurate list of home medications  Continue nebulizer treatments  repeat a.m. labs  Review resume appropriate home medications once reconciled  Pepcid for GI prophylaxis  Further treatment contingent upon his hospital course     Discussed plan with RN.    DVT prophylaxis:  No DVT prophylaxis order currently exists.    CODE STATUS:   Code Status (Patient has no pulse and is not breathing): CPR (Attempt to Resuscitate)  Medical Interventions (Patient has pulse or is breathing): Full  Support        Electronically signed by NAYE Draper, 11/12/23, 10:17 AM EST.           Attending Documentation:  Patient independently seen and evaluated, above documentation reflects plan put forth during bedside rounds.  More than 51% of the time of this patient encounter was performed by me. I discussed the care plan with MARCIAL Wilkerson PA-C, I agree with his findings and plan as documented, what I have added to the care plan and modified is as follows in my documentation and my medical decision making; 82 yo male with SOA, here with sepsis and pneumonia.  MRSA pcr negative, DC vancomycin.  Continue cefepime.  Influenza tested positive, begin Tamiflu last night.  May need midline access, await labs.  I discussed the case with the nurse.  Electronically signed by Eric Rivas MD, 11/12/23, 2:41 PM EST.

## 2023-11-13 LAB
ALBUMIN SERPL-MCNC: 2.9 G/DL (ref 3.5–5.2)
ALP SERPL-CCNC: 75 U/L (ref 39–117)
ALT SERPL W P-5'-P-CCNC: 18 U/L (ref 1–41)
ANION GAP SERPL CALCULATED.3IONS-SCNC: 7.2 MMOL/L (ref 5–15)
AST SERPL-CCNC: 18 U/L (ref 1–40)
BASOPHILS # BLD AUTO: 0.04 10*3/MM3 (ref 0–0.2)
BASOPHILS NFR BLD AUTO: 0.3 % (ref 0–1.5)
BILIRUB CONJ SERPL-MCNC: 0.3 MG/DL (ref 0–0.3)
BILIRUB INDIRECT SERPL-MCNC: 0.9 MG/DL
BILIRUB SERPL-MCNC: 1.2 MG/DL (ref 0–1.2)
BUN SERPL-MCNC: 24 MG/DL (ref 8–23)
BUN/CREAT SERPL: 32.4 (ref 7–25)
CALCIUM SPEC-SCNC: 9.1 MG/DL (ref 8.6–10.5)
CHLORIDE SERPL-SCNC: 105 MMOL/L (ref 98–107)
CK SERPL-CCNC: 92 U/L (ref 20–200)
CO2 SERPL-SCNC: 24.8 MMOL/L (ref 22–29)
CREAT SERPL-MCNC: 0.74 MG/DL (ref 0.76–1.27)
DEPRECATED RDW RBC AUTO: 43.9 FL (ref 37–54)
EGFRCR SERPLBLD CKD-EPI 2021: 90.5 ML/MIN/1.73
EOSINOPHIL # BLD AUTO: 0.09 10*3/MM3 (ref 0–0.4)
EOSINOPHIL NFR BLD AUTO: 0.7 % (ref 0.3–6.2)
ERYTHROCYTE [DISTWIDTH] IN BLOOD BY AUTOMATED COUNT: 13.7 % (ref 12.3–15.4)
GLUCOSE BLDC GLUCOMTR-MCNC: 113 MG/DL (ref 70–99)
GLUCOSE BLDC GLUCOMTR-MCNC: 122 MG/DL (ref 70–99)
GLUCOSE BLDC GLUCOMTR-MCNC: 123 MG/DL (ref 70–99)
GLUCOSE BLDC GLUCOMTR-MCNC: 127 MG/DL (ref 70–99)
GLUCOSE BLDC GLUCOMTR-MCNC: 129 MG/DL (ref 70–99)
GLUCOSE SERPL-MCNC: 106 MG/DL (ref 65–99)
HCT VFR BLD AUTO: 28.2 % (ref 37.5–51)
HGB BLD-MCNC: 9.4 G/DL (ref 13–17.7)
IMM GRANULOCYTES # BLD AUTO: 0.07 10*3/MM3 (ref 0–0.05)
IMM GRANULOCYTES NFR BLD AUTO: 0.6 % (ref 0–0.5)
LYMPHOCYTES # BLD AUTO: 1.18 10*3/MM3 (ref 0.7–3.1)
LYMPHOCYTES NFR BLD AUTO: 9.4 % (ref 19.6–45.3)
MAGNESIUM SERPL-MCNC: 1.8 MG/DL (ref 1.6–2.4)
MCH RBC QN AUTO: 29.5 PG (ref 26.6–33)
MCHC RBC AUTO-ENTMCNC: 33.3 G/DL (ref 31.5–35.7)
MCV RBC AUTO: 88.4 FL (ref 79–97)
MONOCYTES # BLD AUTO: 1.44 10*3/MM3 (ref 0.1–0.9)
MONOCYTES NFR BLD AUTO: 11.5 % (ref 5–12)
NEUTROPHILS NFR BLD AUTO: 77.5 % (ref 42.7–76)
NEUTROPHILS NFR BLD AUTO: 9.7 10*3/MM3 (ref 1.7–7)
NRBC BLD AUTO-RTO: 0 /100 WBC (ref 0–0.2)
PHOSPHATE SERPL-MCNC: 1.1 MG/DL (ref 2.5–4.5)
PLATELET # BLD AUTO: 158 10*3/MM3 (ref 140–450)
PMV BLD AUTO: 11.2 FL (ref 6–12)
POTASSIUM SERPL-SCNC: 4.2 MMOL/L (ref 3.5–5.2)
PROT SERPL-MCNC: 7.2 G/DL (ref 6–8.5)
RBC # BLD AUTO: 3.19 10*6/MM3 (ref 4.14–5.8)
SODIUM SERPL-SCNC: 137 MMOL/L (ref 136–145)
WBC NRBC COR # BLD: 12.52 10*3/MM3 (ref 3.4–10.8)

## 2023-11-13 PROCEDURE — 97161 PT EVAL LOW COMPLEX 20 MIN: CPT

## 2023-11-13 PROCEDURE — 99232 SBSQ HOSP IP/OBS MODERATE 35: CPT | Performed by: FAMILY MEDICINE

## 2023-11-13 PROCEDURE — 25810000003 SODIUM CHLORIDE 0.9 % SOLUTION: Performed by: FAMILY MEDICINE

## 2023-11-13 PROCEDURE — 25010000002 CEFEPIME PER 500 MG: Performed by: FAMILY MEDICINE

## 2023-11-13 PROCEDURE — 94799 UNLISTED PULMONARY SVC/PX: CPT

## 2023-11-13 PROCEDURE — 94664 DEMO&/EVAL PT USE INHALER: CPT

## 2023-11-13 PROCEDURE — 82948 REAGENT STRIP/BLOOD GLUCOSE: CPT

## 2023-11-13 PROCEDURE — 85025 COMPLETE CBC W/AUTO DIFF WBC: CPT | Performed by: FAMILY MEDICINE

## 2023-11-13 PROCEDURE — 84100 ASSAY OF PHOSPHORUS: CPT | Performed by: FAMILY MEDICINE

## 2023-11-13 PROCEDURE — 83735 ASSAY OF MAGNESIUM: CPT | Performed by: FAMILY MEDICINE

## 2023-11-13 PROCEDURE — 80076 HEPATIC FUNCTION PANEL: CPT | Performed by: FAMILY MEDICINE

## 2023-11-13 PROCEDURE — 80048 BASIC METABOLIC PNL TOTAL CA: CPT | Performed by: FAMILY MEDICINE

## 2023-11-13 PROCEDURE — 82550 ASSAY OF CK (CPK): CPT | Performed by: PHYSICIAN ASSISTANT

## 2023-11-13 RX ORDER — FENTANYL/ROPIVACAINE/NS/PF 2-625MCG/1
15 PLASTIC BAG, INJECTION (ML) EPIDURAL ONCE
Status: DISCONTINUED | OUTPATIENT
Start: 2023-11-13 | End: 2023-11-14

## 2023-11-13 RX ORDER — FENTANYL/ROPIVACAINE/NS/PF 2-625MCG/1
15 PLASTIC BAG, INJECTION (ML) EPIDURAL
Status: COMPLETED | OUTPATIENT
Start: 2023-11-13 | End: 2023-11-14

## 2023-11-13 RX ORDER — FUROSEMIDE 20 MG/1
20 TABLET ORAL DAILY
Status: DISCONTINUED | OUTPATIENT
Start: 2023-11-13 | End: 2023-11-15 | Stop reason: HOSPADM

## 2023-11-13 RX ORDER — FAMOTIDINE 20 MG/1
40 TABLET, FILM COATED ORAL NIGHTLY
Status: DISCONTINUED | OUTPATIENT
Start: 2023-11-13 | End: 2023-11-15 | Stop reason: HOSPADM

## 2023-11-13 RX ORDER — ACETAMINOPHEN 325 MG/1
650 TABLET ORAL EVERY 6 HOURS PRN
Status: DISCONTINUED | OUTPATIENT
Start: 2023-11-13 | End: 2023-11-15 | Stop reason: HOSPADM

## 2023-11-13 RX ADMIN — OSELTAMIVIR PHOSPHATE 75 MG: 75 CAPSULE ORAL at 21:14

## 2023-11-13 RX ADMIN — FAMOTIDINE 40 MG: 20 TABLET, FILM COATED ORAL at 21:14

## 2023-11-13 RX ADMIN — Medication 10 ML: at 09:15

## 2023-11-13 RX ADMIN — IPRATROPIUM BROMIDE AND ALBUTEROL SULFATE 3 ML: .5; 3 SOLUTION RESPIRATORY (INHALATION) at 19:19

## 2023-11-13 RX ADMIN — METOPROLOL TARTRATE 12.5 MG: 25 TABLET, FILM COATED ORAL at 21:15

## 2023-11-13 RX ADMIN — OSELTAMIVIR PHOSPHATE 75 MG: 75 CAPSULE ORAL at 09:14

## 2023-11-13 RX ADMIN — CEFEPIME 2000 MG: 2 INJECTION, POWDER, FOR SOLUTION INTRAVENOUS at 21:13

## 2023-11-13 RX ADMIN — ARFORMOTEROL TARTRATE 15 MCG: 15 SOLUTION RESPIRATORY (INHALATION) at 19:19

## 2023-11-13 RX ADMIN — POTASSIUM PHOSPHATE, MONOBASIC POTASSIUM PHOSPHATE, DIBASIC 15 MMOL: 224; 236 INJECTION, SOLUTION, CONCENTRATE INTRAVENOUS at 21:14

## 2023-11-13 RX ADMIN — IPRATROPIUM BROMIDE AND ALBUTEROL SULFATE 3 ML: .5; 3 SOLUTION RESPIRATORY (INHALATION) at 07:56

## 2023-11-13 RX ADMIN — APIXABAN 5 MG: 5 TABLET, FILM COATED ORAL at 21:15

## 2023-11-13 RX ADMIN — CEFEPIME 2000 MG: 2 INJECTION, POWDER, FOR SOLUTION INTRAVENOUS at 03:43

## 2023-11-13 RX ADMIN — BUDESONIDE 0.5 MG: 0.5 INHALANT RESPIRATORY (INHALATION) at 07:56

## 2023-11-13 RX ADMIN — FUROSEMIDE 20 MG: 20 TABLET ORAL at 09:13

## 2023-11-13 RX ADMIN — BUDESONIDE 0.5 MG: 0.5 INHALANT RESPIRATORY (INHALATION) at 19:19

## 2023-11-13 RX ADMIN — APIXABAN 5 MG: 5 TABLET, FILM COATED ORAL at 09:13

## 2023-11-13 RX ADMIN — ASPIRIN 81 MG: 81 TABLET, CHEWABLE ORAL at 09:14

## 2023-11-13 RX ADMIN — CEFEPIME 2000 MG: 2 INJECTION, POWDER, FOR SOLUTION INTRAVENOUS at 11:57

## 2023-11-13 RX ADMIN — IPRATROPIUM BROMIDE AND ALBUTEROL SULFATE 3 ML: .5; 3 SOLUTION RESPIRATORY (INHALATION) at 13:27

## 2023-11-13 RX ADMIN — Medication 10 ML: at 21:15

## 2023-11-13 RX ADMIN — ARFORMOTEROL TARTRATE 15 MCG: 15 SOLUTION RESPIRATORY (INHALATION) at 07:56

## 2023-11-13 RX ADMIN — METOPROLOL TARTRATE 12.5 MG: 25 TABLET, FILM COATED ORAL at 09:14

## 2023-11-13 RX ADMIN — DOCUSATE SODIUM LIQUID 250 MG: 100 LIQUID ORAL at 09:14

## 2023-11-13 RX ADMIN — POTASSIUM PHOSPHATE, MONOBASIC POTASSIUM PHOSPHATE, DIBASIC 15 MMOL: 224; 236 INJECTION, SOLUTION, CONCENTRATE INTRAVENOUS at 16:33

## 2023-11-13 NOTE — CONSULTS
Nutrition Services    Patient Name: Jordin Menon  YOB: 1940  MRN: [id number]  Admission date: 11/10/2023    PROGRESS NOTE      Encounter Information: EN Follow UP       PO Diet: NPO Diet NPO Type: Strict NPO   PO Supplements: NA   PO Intake:  NA       Current nutrition support: Isosource 1.5 @ 60 ml/hr x 22 hours   Free water flushes 100 ml every 3 hours   Provides 1980 kcal, 90 g pro, 1803 ml fluid      Nutrition support review: Tolerating at goal rate.  No GI issues noted.         Labs (reviewed below): Labs reviewed, essentially WDL.          GI Function:  Last BM noted 11/12/23, smear.        Nutrition Intervention Updates: Continue current nutrition plan of care.       Results from last 7 days   Lab Units 11/11/23  1121 11/10/23  1530 11/10/23  0120   SODIUM mmol/L 143 136 136   POTASSIUM mmol/L 4.7 5.8* 5.6*   CHLORIDE mmol/L 111* 105 99   CO2 mmol/L 23.6 22.9 26.1   BUN mg/dL 35* 48* 43*   CREATININE mg/dL 0.92 1.33* 1.40*   CALCIUM mg/dL 8.8 8.4* 9.7   BILIRUBIN mg/dL  --  1.7* 1.8*   ALK PHOS U/L  --  64 100   ALT (SGPT) U/L  --  25 38   AST (SGOT) U/L  --  38 43*   GLUCOSE mg/dL 126* 116* 99     Results from last 7 days   Lab Units 11/11/23  1121 11/10/23  1530   MAGNESIUM mg/dL  --  1.9   HEMOGLOBIN g/dL 10.0* 10.1*   HEMATOCRIT % 29.3* 30.1*     COVID19   Date Value Ref Range Status   11/10/2023 Not Detected Not Detected - Ref. Range Final     Lab Results   Component Value Date    HGBA1C 4.90 05/30/2022       RD to follow up per protocol.    Electronically signed by:  Yanelis Coyne RD  11/13/23 08:58 EST

## 2023-11-13 NOTE — PLAN OF CARE
Goal Outcome Evaluation:  Plan of Care Reviewed With: (P) patient           Outcome Evaluation: (P) Pt presents at his baseline level of function. At the nursing home they use a lift device for transfers and use a wheelchair for his mobility. He does not require skilled PT services at this time as he is at baseline level of function. Upon discharge it is recommended he return to his extended care facility.      Anticipated Discharge Disposition (PT): (P) extended care facility

## 2023-11-13 NOTE — PROGRESS NOTES
[facility]   Hospitalist Progress Note  Date: 2023  Patient Name: Jordin Menon  : 1940  MRN: [id number]  Date of admission: 11/10/2023      Subjective   Subjective     Chief Complaint: Fevers shortness of breath    Summary: Patient is 83-year-old male past medical history significant for dementia, congestive heart failure, history of CVA, history of dysphagia status post PEG placement on tube feeds, GERD, hypertension, hyperlipidemia, peripheral vascular disease, osteoarthritis, resides at long-term care facility patient was noted to have worsening shortness of air and fevers brought to the emergency department was noted to have multifocal pneumonia noted to be dehydrated with worsening renal failure hyperkalemia admitted to the hospitalist started on broad-spectrum antibiotics    Interval Followup: Patient seen and examined resting comfortably in bed respiratory status stable/improved mental status able/improved awaiting a.m. labs.  Spiked fever again last night 101.2  continuing on empiric antibiotics for pneumonia continue with Tamiflu for influenza B Home medications have been resumed      Review of systems: All systems reviewed and negative except following: Unable to obtain secondary to mental status    Objective   Objective     Vitals:   Temp:  [98 °F (36.7 °C)-99.5 °F (37.5 °C)] 99.4 °F (37.4 °C)  Heart Rate:  [] 98  Resp:  [18-20] 20  BP: (118-148)/(52-95) 128/75  Flow (L/min):  [2] 2    Physical Exam  Constitutional: Awake pleasantly confused  Cardiovascular: RRR  Respiratory scattered expiratory wheezes and rhonchi  GI: Abdomen soft nontender PEG tube in place    Result Review    Result Review:  I have reviewed the following:  [x]  Laboratory  CBC          2023    06:08 11/10/2023    01:20 11/10/2023    15:30 2023    11:21   CBC   WBC 8.64  18.58  20.43  19.97    RBC 3.50  4.39  3.41  3.34    Hemoglobin 10.4  13.3  10.1  10.0    Hematocrit 31.9  38.2  30.1  29.3    MCV  91.1  87.0  88.3  87.7    MCH 29.7  30.3  29.6  29.9    MCHC 32.6  34.8  33.6  34.1    RDW 14.1  13.2  13.6  13.4    Platelets 144  166  150  152      CMP          9/11/2023    06:08 11/10/2023    01:20 11/10/2023    15:30 11/11/2023    11:21   CMP   Glucose 120  99  116  126    BUN 19  43  48  35    Creatinine 0.65  1.40  1.33  0.92    EGFR 94.1  50.2  53.4  83.1    Sodium 143  136  136  143    Potassium 4.3  5.6  5.8  4.7    Chloride 110  99  105  111    Calcium 8.6  9.7  8.4  8.8    Total Protein 6.0  8.8  6.6     Albumin 2.6  4.2  3.0     Globulin 3.4  4.6  3.6     Total Bilirubin 0.3  1.8  1.7     Alkaline Phosphatase 62  100  64     AST (SGOT) 16  43  38     ALT (SGPT) 15  38  25     Albumin/Globulin Ratio 0.8  0.9  0.8     BUN/Creatinine Ratio 29.2  30.7  36.1  38.0    Anion Gap 8.3  10.9  8.1  8.4        []  Microbiology  []  Radiology  []  EKG/Telemetry   []  Cardiology/Vascular   []  Pathology  []  Old records  []  Other:    Assessment & Plan   Assessment / Plan   Assessment:    Healthcare associated pneumonia  Sepsis secondary to above  Lactic acidosis secondary to sepsis  Hyperkalemia resolved  Acute kidney injury likely secondary to sepsis dehydration resolved  History of CHF  Hypertension  Hyperlipidemia  Chronic dysphagia with PEG placement and tube feeds  Hypophosphatemia    Plan:    Continue with IV antibiotics cefepime vancomycin discontinue  Continue with Tamiflu  Replace phosphorus intravenously  Continues to spike fevers intermittently like to see the patient fever free for 24 hours  Start tube feeds start sliding scale insulin per protocol every 6  Resuming appropriate home medications  Continue with nebulizer treatments  repeat a.m. labs await labs from today 11/13/2023 address electrolyte disturbances as needed  Pepcid for GI prophylaxis  Possibly back to nursing home tomorrow on antibiotics and Tamiflu   Further treatment contingent upon his hospital course     Discussed plan with RN.    DVT  prophylaxis:  Medical DVT prophylaxis orders are present.    CODE STATUS:   Code Status (Patient has no pulse and is not breathing): CPR (Attempt to Resuscitate)  Medical Interventions (Patient has pulse or is breathing): Full Support      Electronically signed by NAYE Draper, 11/13/23, 12:58 PM EST.        Attending documentation:  I reviewed the above documentation and independently reviewed and rounded and evaluated the patient and discussed the care plan with MARCIAL Wilkerson PA-C, I agree with his findings and plan as documented, what I have added to the care plan and modified is as follows in my documentation and my medical decision making; 83-year-old male hospitalized initially on 11/10/2023, re for treatment and management of fever, shortness of breath, found to be influenza B positive, placed on Tamiflu, with concern for healthcare associated pneumonia superimposed with viral infection, placed on IV fluids and supplemental O2, sepsis managed, continued on tube feeds, replacing electrolytes.  Interval follow-up: Seen and examined this morning, no acute distress, no acute major night events, patient's mentation at baseline, mumbling incoherent words, try to communicate, febrile overnight, Tmax 101.2 °F, fevers are down some 99.4 °F.  Satting well on room air White blood cell count down to 12,000, phosphorus 1.1, potassium 4.2, sodium 137, blood sugars in the 100 range.  Review of systems unable to be obtained due to altered mental status.  Vitals reviewed, labs reviewed, telemetry reviewed, sinus rhythm in the 90s.  On physical exam elderly appearing male, no acute distress, regular rate rhythm, scattered rhonchi and wheezing, soft nontender abdomen with PEG tube in place with tube feeds infusing, pleasantly confused at baseline.  Assessment as above, plan continue Tamiflu to complete course for flu, potassium replacement, potassium phosphate 15 mmol x 2 rounds, continue tube feeds, continue Lasix 20 mg  in the G-tube daily, continue insulin sliding scale coverage, once he is afebrile for 24 hours and we see further improvement in white blood cell count, will plan disposition, continue empiric antibiotics with cefepime, does not need vancomycin.  A.m. labs, full code, DVT prophylaxis with Eliquis, clinical course to dictate further management, discussed with nurse at the bedside.  More than 70% of the time of this patient's encounter was performed by me, this included face-to-face time, planning and coordinating, medical decision making and critical thinking personally done by me.  -AVBMD    Electronically signed by Aranza Herzog MD, 11/13/23, 4:32 PM EST.  Portions of this documentation were transcribed electronically from a voice recognition software.  I confirm all data accurately represents the service(s) I performed at today's visit.

## 2023-11-13 NOTE — NURSING NOTE
VSS. Patient is alert to self only. No c/o pain. IV ABX administered per MAR. Tube feed administered per orders, PEG tube dressing changed this shift. Bed alarm in place. All needs met at this time.

## 2023-11-13 NOTE — THERAPY EVALUATION
Acute Care - Physical Therapy Initial Evaluation  KASSI Davis     Patient Name: Jordin Menon  : 1940  MRN: [id number]  Today's Date: 2023      Visit Dx:     ICD-10-CM ICD-9-CM   1. Sepsis, due to unspecified organism, unspecified whether acute organ dysfunction present  A41.9 038.9     995.91   2. Acute respiratory failure with hypoxia  J96.01 518.81   3. Acute renal failure, unspecified acute renal failure type  N17.9 584.9   4. Multifocal pneumonia  J18.9 486   5. Oropharyngeal dysphagia  R13.12 787.22   6. Difficulty in walking  R26.2 719.7     Patient Active Problem List   Diagnosis    Primary osteoarthritis of right knee    Status post total right knee replacement    Essential hypertension    Shortness of breath    BRYANT (acute kidney injury)    Hypokalemia    Hypernatremia    Symptomatic bradycardia    Hypothermia, initial encounter    Acute CVA (cerebrovascular accident)    Severe sepsis    Severe malnutrition    Hyperkalemia    Anemia    Dislodged gastrostomy tube    Dysphagia, oropharyngeal    Sepsis     Past Medical History:   Diagnosis Date    Acute renal failure with pathological lesion in kidney     Alzheimer's dementia with behavioral disturbance     Anemia, vitamin B12 deficiency     Anxiety     Anxiety disorder due to known physiological condition     Arthritis     Benign neoplasm of prostate     Cannot walk     CHF (congestive heart failure)     Constipation     Coronary atherosclerosis of native coronary artery     CVA (cerebral vascular accident)     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 (CVA)     Edema     Essential hypertension, benign     Gastroesophageal reflux disease without esophagitis     GERD (gastroesophageal reflux disease)     High blood pressure     Hirsutism     HTN (hypertension)     Hypercalcemia     Hyperkalemia     Hyperlipemia     Hyperlipidemia     Hypokalemia     Ingrowing toenail 2018    Left foot pain 2018    Nutritional deficiency      Osteoarthritis of right knee 08/22/2016    Pneumonia     Presence of right artificial knee joint     Primary localized osteoarthrosis     PVD (peripheral vascular disease)     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     Right foot pain 09/21/2018    Senile dementia, uncomplicated     Severe sinus bradycardia     Shock, septic     Sleep apnea     Thrombosis of left femoral vein     Tinea unguium 09/21/2018    Vitamin B12 deficiency anemia      Past Surgical History:   Procedure Laterality Date    ENDOSCOPY W/ PEG TUBE PLACEMENT N/A 6/8/2022    Procedure: ESOPHAGOGASTRODUODENOSCOPY WITH PERCUTANEOUS ENDOSCOPIC GASTROSTOMY TUBE INSERTION WITH ANESTHESIA;  Surgeon: Yanelis Mistry MD;  Location: [facility] ENDOSCOPY;  Service: Gastroenterology;  Laterality: N/A;  PEG TUBE PLACEMENT    ENDOSCOPY W/ PEG TUBE PLACEMENT N/A 7/3/2022    Procedure: ESOPHAGOGASTRODUODENOSCOPY WITH PERCUTANEOUS ENDOSCOPIC GASTROSTOMY TUBE INSERTION WITH ANESTHESIA;  Surgeon: Bam Melo MD;  Location: [facility] ENDOSCOPY;  Service: Gastroenterology;  Laterality: N/A;  PEG TUBE PLACEMENT    ENDOSCOPY W/ PEG TUBE PLACEMENT N/A 10/26/2022    Procedure: ESOPHAGOGASTRODUODENOSCOPY WITH PERCUTANEOUS ENDOSCOPIC GASTROSTOMY TUBE INSERTION WITH ANESTHESIA;  Surgeon: Yanelis Mistry MD;  Location: [facility] ENDOSCOPY;  Service: Gastroenterology;  Laterality: N/A;  PERCUTANEOUS ENDOSCOPIC GASTROSTOMY TUBE PLACEMENT     PT Assessment (last 12 hours)       PT Evaluation and Treatment       Row Name 11/13/23 1043          Physical Therapy Time and Intention    Subjective Information complains of;weakness;fatigue;pain (P)   -ZT     Document Type evaluation (P)   -ZT     Mode of Treatment individual therapy;physical therapy (P)   -ZT     Patient Effort good (P)   -ZT       Row Name 11/13/23 1043          General Information    Patient Profile Reviewed yes (P)   -ZT     Patient Observations alert;cooperative;agree to  therapy (P)   -ZT     Prior Level of Function dependent:;all household mobility;ADL's (P)   -ZT     Equipment Currently Used at Home wheelchair;walker, rolling;lift device (P)   -ZT     Existing Precautions/Restrictions fall (P)   -ZT       Row Name 11/13/23 1043          Living Environment    Current Living Arrangements extended care facility (P)   -ZT     Home Accessibility wheelchair accessible (P)   -ZT     People in Home facility resident (P)   -ZT     Primary Care Provided by other (see comments);self (P)   Facility staff  -ZT       Row Name 11/13/23 1043          Home Use of Assistive/Adaptive Equipment    Equipment Currently Used at Home wheelchair;walker, rolling;lift device (P)   -ZT       Row Name 11/13/23 1043          Range of Motion (ROM)    Range of Motion bilateral lower extremities;ROM is WFL (P)   -ZT       Row Name 11/13/23 1043          Strength (Manual Muscle Testing)    Strength (Manual Muscle Testing) bilateral lower extremities;other (see comments) (P)   4/5 BLE strength  -       Row Name 11/13/23 1043          Bed Mobility    Bed Mobility bed mobility (all) activities (P)   -ZT     All Activities, Holmes (Bed Mobility) dependent (less than 25% patient effort) (P)   -ZT       Row Name 11/13/23 1043          Transfers    Transfers other (see comments) (P)   Pt declined Tfs at this time  -ZT       Row Name 11/13/23 1043          Gait/Stairs (Locomotion)    Gait/Stairs Locomotion other (see comments) (P)   Pt declined AMB at this time  -ZT       Row Name 11/13/23 1043          Safety Issues, Functional Mobility    Impairments Affecting Function (Mobility) balance;endurance/activity tolerance;pain;strength (P)   -ZT       Row Name 11/13/23 1043          Balance    Balance Assessment other (see comments) (P)   Pt remained in supine for strength testing  -ZT       Row Name             Wound 07/02/22 1136 scalp Abrasion    Wound - Properties Group Placement Date: 07/02/22  -KT Placement  Time: 1136  -KT Present on Original Admission: Y  -KT Location: scalp  -KT Primary Wound Type: Abrasion  -KT    Retired Wound - Properties Group Placement Date: 07/02/22  -KT Placement Time: 1136 -KT Present on Original Admission: Y  -KT Location: scalp  -KT Primary Wound Type: Abrasion  -KT    Retired Wound - Properties Group Date first assessed: 07/02/22  -KT Time first assessed: 1136  -KT Present on Original Admission: Y  -KT Location: scalp  -KT Primary Wound Type: Abrasion  -KT      Row Name 11/13/23 1043          Plan of Care Review    Plan of Care Reviewed With patient (P)   -ZT     Outcome Evaluation Pt presents at his baseline level of function. At the nursing home they use a lift device for transfers and use a wheelchair for his mobility. He does not require skilled PT services at this time as he is at baseline level of function. Upon discharge it is recommended he return to his extended care facility. (P)   -ZT       Row Name 11/13/23 1043          Positioning and Restraints    Pre-Treatment Position in bed (P)   -ZT     Post Treatment Position bed (P)   -ZT     In Bed call light within reach;encouraged to call for assist;exit alarm on (P)   -ZT       Row Name 11/13/23 1043          Therapy Assessment/Plan (PT)    Criteria for Skilled Interventions Met (PT) no;no problems identified which require skilled intervention (P)   -ZT     Therapy Frequency (PT) evaluation only (P)   -ZT       Row Name 11/13/23 1043          Therapy Plan Review/Discharge Plan (PT)    Therapy Plan Review (PT) evaluation/treatment results reviewed;care plan/treatment goals reviewed;participants included;patient (P)   -ZT               User Key  (r) = Recorded By, (t) = Taken By, (c) = Cosigned By      Initials Name Provider Type    Esperanza Oliveira, RN Registered Nurse    Daryl Bennett, PT Student PT Student                    Physical Therapy Education       Title: PT OT SLP Therapies (Done)       Topic: Physical Therapy  (Done)       Point: Mobility training (Done)       Learning Progress Summary             Patient Acceptance, E,TB, VU by ZT at 11/13/2023 1047                         Point: Home exercise program (Done)       Learning Progress Summary             Patient Acceptance, E,TB, VU by ZT at 11/13/2023 1047                         Point: Body mechanics (Done)       Learning Progress Summary             Patient Acceptance, E,TB, VU by ZT at 11/13/2023 1047                         Point: Precautions (Done)       Learning Progress Summary             Patient Acceptance, E,TB, VU by ZT at 11/13/2023 1047                                         User Key       Initials Effective Dates Name Provider Type Discipline    ZT 09/05/23 -  Daryl Alejandra, PT Student PT Student PT                  PT Recommendation and Plan  Anticipated Discharge Disposition (PT): (P) extended care facility  Therapy Frequency (PT): (P) evaluation only  Plan of Care Reviewed With: (P) patient  Outcome Evaluation: (P) Pt presents at his baseline level of function. At the nursing home they use a lift device for transfers and use a wheelchair for his mobility. He does not require skilled PT services at this time as he is at baseline level of function. Upon discharge it is recommended he return to his extended care facility.   Outcome Measures       Row Name 11/13/23 1000             How much help from another person do you currently need...    Turning from your back to your side while in flat bed without using bedrails? 1 (P)   -ZT      Moving from lying on back to sitting on the side of a flat bed without bedrails? 1 (P)   -ZT      Moving to and from a bed to a chair (including a wheelchair)? 1 (P)   -ZT      Standing up from a chair using your arms (e.g., wheelchair, bedside chair)? 1 (P)   -ZT      Climbing 3-5 steps with a railing? 1 (P)   -ZT      To walk in hospital room? 1 (P)   -ZT      AM-PAC 6 Clicks Score (PT) 6 (P)   -ZT      Highest level of  mobility 2 --> Bed activities/dependent transfer (P)   -ZT                User Key  (r) = Recorded By, (t) = Taken By, (c) = Cosigned By      Initials Name Provider Type    ZT Daryl Alejandra, PT Student PT Student                     Time Calculation:    PT Charges       Row Name 11/13/23 1043             Time Calculation    PT Received On 11/13/23 (P)   -ZT      PT Goal Re-Cert Due Date 11/22/23 (P)   -ZT         Untimed Charges    PT Eval/Re-eval Minutes 25 (P)   -ZT         Total Minutes    Untimed Charges Total Minutes 25 (P)   -ZT       Total Minutes 25 (P)   -ZT                User Key  (r) = Recorded By, (t) = Taken By, (c) = Cosigned By      Initials Name Provider Type    ZT Daryl Alejandra, PT Student PT Student                      PT Seda  AM-PAC 6 Clicks Score (PT): (P) 6    Daryl Alejandra, PT Student  11/13/2023

## 2023-11-13 NOTE — PLAN OF CARE
Goal Outcome Evaluation:        A/O to person. Vital signs stable. Medications administered per MAR per G tube. 2L of Nasal cannula maintained. Patient removed oxygen intermittently. Patient speech extremely slurred, incoherent. Bath completed on shift. Tube feeding maintained. Critical Lab value on shift - phosphorus 1.1. MD notified of critical. No other concerns on shift. Plan of care ongoing.       Progress: no change            Problem: Adult Inpatient Plan of Care  Goal: Plan of Care Review  Outcome: Ongoing, Not Progressing  Flowsheets  Taken 11/13/2023 1803 by Allen Tuttle RN  Progress: no change  Taken 11/13/2023 1043 by Daryl Alejandra PT Student  Plan of Care Reviewed With: patient  Goal: Patient-Specific Goal (Individualized)  Outcome: Ongoing, Not Progressing  Goal: Absence of Hospital-Acquired Illness or Injury  Outcome: Ongoing, Not Progressing  Intervention: Identify and Manage Fall Risk  Recent Flowsheet Documentation  Taken 11/13/2023 1707 by Allen Tuttle RN  Safety Promotion/Fall Prevention:   safety round/check completed   room organization consistent  Taken 11/13/2023 1507 by Allen Tuttle RN  Safety Promotion/Fall Prevention:   safety round/check completed   room organization consistent  Taken 11/13/2023 1307 by Aleln Tuttle RN  Safety Promotion/Fall Prevention:   safety round/check completed   room organization consistent  Taken 11/13/2023 1107 by Allen Tuttle RN  Safety Promotion/Fall Prevention:   safety round/check completed   room organization consistent  Taken 11/13/2023 0907 by Allen Tuttle, RN  Safety Promotion/Fall Prevention:   safety round/check completed   room organization consistent  Taken 11/13/2023 0707 by Allen Tuttle RN  Safety Promotion/Fall Prevention:   safety round/check completed   room organization consistent  Intervention: Prevent and Manage VTE (Venous Thromboembolism) Risk  Recent Flowsheet Documentation  Taken 11/13/2023 0907  by Tuttle, Allen F, RN  VTE Prevention/Management: (see MAR) other (see comments)  Range of Motion: active ROM (range of motion) encouraged  Intervention: Prevent Infection  Recent Flowsheet Documentation  Taken 11/13/2023 1707 by Allen Tuttle RN  Infection Prevention:   single patient room provided   rest/sleep promoted   hand hygiene promoted  Taken 11/13/2023 1507 by Allen Tuttle RN  Infection Prevention:   single patient room provided   rest/sleep promoted   hand hygiene promoted  Taken 11/13/2023 1307 by Allen Tuttle RN  Infection Prevention:   single patient room provided   rest/sleep promoted   hand hygiene promoted  Taken 11/13/2023 1107 by Allen Tuttle RN  Infection Prevention:   single patient room provided   rest/sleep promoted   hand hygiene promoted  Taken 11/13/2023 0907 by Allen Tuttle RN  Infection Prevention:   single patient room provided   rest/sleep promoted   hand hygiene promoted  Taken 11/13/2023 0707 by Allen Tuttle RN  Infection Prevention:   single patient room provided   rest/sleep promoted   hand hygiene promoted  Goal: Optimal Comfort and Wellbeing  Outcome: Ongoing, Not Progressing  Intervention: Provide Person-Centered Care  Recent Flowsheet Documentation  Taken 11/13/2023 0907 by Allen Tuttle RN  Trust Relationship/Rapport:   care explained   choices provided   emotional support provided   empathic listening provided   questions answered   questions encouraged   reassurance provided   thoughts/feelings acknowledged  Goal: Readiness for Transition of Care  Outcome: Ongoing, Not Progressing     Problem: Skin Injury Risk Increased  Goal: Skin Health and Integrity  Outcome: Ongoing, Not Progressing     Problem: Adjustment to Illness (Sepsis/Septic Shock)  Goal: Optimal Coping  Outcome: Ongoing, Not Progressing  Intervention: Optimize Psychosocial Adjustment to Illness  Recent Flowsheet Documentation  Taken 11/13/2023 0907 by Allen Tuttle  RN  Family/Support System Care:   presence promoted   support provided     Problem: Bleeding (Sepsis/Septic Shock)  Goal: Absence of Bleeding  Outcome: Ongoing, Not Progressing     Problem: Glycemic Control Impaired (Sepsis/Septic Shock)  Goal: Blood Glucose Level Within Desired Range  Outcome: Ongoing, Not Progressing  Intervention: Optimize Glycemic Control  Recent Flowsheet Documentation  Taken 11/13/2023 0907 by Allen Tuttle RN  Glycemic Management: blood glucose monitored     Problem: Infection Progression (Sepsis/Septic Shock)  Goal: Absence of Infection Signs and Symptoms  Outcome: Ongoing, Not Progressing  Intervention: Initiate Sepsis Management  Recent Flowsheet Documentation  Taken 11/13/2023 1707 by Allen Tuttle RN  Infection Prevention:   single patient room provided   rest/sleep promoted   hand hygiene promoted  Taken 11/13/2023 1507 by Allen Tuttle RN  Infection Prevention:   single patient room provided   rest/sleep promoted   hand hygiene promoted  Taken 11/13/2023 1307 by Allen Tuttle RN  Infection Prevention:   single patient room provided   rest/sleep promoted   hand hygiene promoted  Isolation Precautions:   droplet   precautions maintained  Taken 11/13/2023 1107 by Allen Tuttle RN  Infection Prevention:   single patient room provided   rest/sleep promoted   hand hygiene promoted  Isolation Precautions:   droplet   precautions maintained  Taken 11/13/2023 0907 by Allen Tuttle RN  Infection Prevention:   single patient room provided   rest/sleep promoted   hand hygiene promoted  Isolation Precautions:   droplet   precautions maintained  Taken 11/13/2023 0707 by Allen Tuttle RN  Infection Prevention:   single patient room provided   rest/sleep promoted   hand hygiene promoted  Isolation Precautions:   droplet   precautions maintained     Problem: Nutrition Impaired (Sepsis/Septic Shock)  Goal: Optimal Nutrition Intake  Outcome: Ongoing, Not Progressing

## 2023-11-14 VITALS
HEIGHT: 64 IN | DIASTOLIC BLOOD PRESSURE: 93 MMHG | BODY MASS INDEX: 33.91 KG/M2 | RESPIRATION RATE: 22 BRPM | WEIGHT: 198.63 LBS | SYSTOLIC BLOOD PRESSURE: 140 MMHG | HEART RATE: 98 BPM | TEMPERATURE: 99.4 F | OXYGEN SATURATION: 91 %

## 2023-11-14 LAB
ALBUMIN SERPL-MCNC: 3.2 G/DL (ref 3.5–5.2)
ALP SERPL-CCNC: 82 U/L (ref 39–117)
ALT SERPL W P-5'-P-CCNC: 17 U/L (ref 1–41)
ANION GAP SERPL CALCULATED.3IONS-SCNC: 8 MMOL/L (ref 5–15)
AST SERPL-CCNC: 19 U/L (ref 1–40)
BASOPHILS # BLD AUTO: 0.06 10*3/MM3 (ref 0–0.2)
BASOPHILS NFR BLD AUTO: 0.5 % (ref 0–1.5)
BILIRUB CONJ SERPL-MCNC: 0.2 MG/DL (ref 0–0.3)
BILIRUB INDIRECT SERPL-MCNC: 0.6 MG/DL
BILIRUB SERPL-MCNC: 0.8 MG/DL (ref 0–1.2)
BUN SERPL-MCNC: 21 MG/DL (ref 8–23)
BUN/CREAT SERPL: 32.8 (ref 7–25)
CALCIUM SPEC-SCNC: 9.1 MG/DL (ref 8.6–10.5)
CHLORIDE SERPL-SCNC: 105 MMOL/L (ref 98–107)
CO2 SERPL-SCNC: 27 MMOL/L (ref 22–29)
CREAT SERPL-MCNC: 0.64 MG/DL (ref 0.76–1.27)
DEPRECATED RDW RBC AUTO: 41.6 FL (ref 37–54)
EGFRCR SERPLBLD CKD-EPI 2021: 94.5 ML/MIN/1.73
EOSINOPHIL # BLD AUTO: 0.2 10*3/MM3 (ref 0–0.4)
EOSINOPHIL NFR BLD AUTO: 1.6 % (ref 0.3–6.2)
ERYTHROCYTE [DISTWIDTH] IN BLOOD BY AUTOMATED COUNT: 13.4 % (ref 12.3–15.4)
GLUCOSE BLDC GLUCOMTR-MCNC: 106 MG/DL (ref 70–99)
GLUCOSE BLDC GLUCOMTR-MCNC: 110 MG/DL (ref 70–99)
GLUCOSE BLDC GLUCOMTR-MCNC: 122 MG/DL (ref 70–99)
GLUCOSE SERPL-MCNC: 109 MG/DL (ref 65–99)
HCT VFR BLD AUTO: 32.1 % (ref 37.5–51)
HGB BLD-MCNC: 11 G/DL (ref 13–17.7)
IMM GRANULOCYTES # BLD AUTO: 0.1 10*3/MM3 (ref 0–0.05)
IMM GRANULOCYTES NFR BLD AUTO: 0.8 % (ref 0–0.5)
LYMPHOCYTES # BLD AUTO: 1.59 10*3/MM3 (ref 0.7–3.1)
LYMPHOCYTES NFR BLD AUTO: 12.8 % (ref 19.6–45.3)
MAGNESIUM SERPL-MCNC: 1.8 MG/DL (ref 1.6–2.4)
MCH RBC QN AUTO: 29.3 PG (ref 26.6–33)
MCHC RBC AUTO-ENTMCNC: 34.3 G/DL (ref 31.5–35.7)
MCV RBC AUTO: 85.4 FL (ref 79–97)
MONOCYTES # BLD AUTO: 1.66 10*3/MM3 (ref 0.1–0.9)
MONOCYTES NFR BLD AUTO: 13.4 % (ref 5–12)
NEUTROPHILS NFR BLD AUTO: 70.9 % (ref 42.7–76)
NEUTROPHILS NFR BLD AUTO: 8.78 10*3/MM3 (ref 1.7–7)
NRBC BLD AUTO-RTO: 0 /100 WBC (ref 0–0.2)
PHOSPHATE SERPL-MCNC: 2.9 MG/DL (ref 2.5–4.5)
PLATELET # BLD AUTO: 209 10*3/MM3 (ref 140–450)
PMV BLD AUTO: 11.3 FL (ref 6–12)
POTASSIUM SERPL-SCNC: 4.3 MMOL/L (ref 3.5–5.2)
PROT SERPL-MCNC: 7.5 G/DL (ref 6–8.5)
RBC # BLD AUTO: 3.76 10*6/MM3 (ref 4.14–5.8)
SODIUM SERPL-SCNC: 140 MMOL/L (ref 136–145)
WBC NRBC COR # BLD: 12.39 10*3/MM3 (ref 3.4–10.8)

## 2023-11-14 PROCEDURE — 99239 HOSP IP/OBS DSCHRG MGMT >30: CPT | Performed by: FAMILY MEDICINE

## 2023-11-14 PROCEDURE — 82948 REAGENT STRIP/BLOOD GLUCOSE: CPT

## 2023-11-14 PROCEDURE — 25810000003 SODIUM CHLORIDE 0.9 % SOLUTION: Performed by: FAMILY MEDICINE

## 2023-11-14 PROCEDURE — 84100 ASSAY OF PHOSPHORUS: CPT | Performed by: FAMILY MEDICINE

## 2023-11-14 PROCEDURE — 83735 ASSAY OF MAGNESIUM: CPT | Performed by: FAMILY MEDICINE

## 2023-11-14 PROCEDURE — 94799 UNLISTED PULMONARY SVC/PX: CPT

## 2023-11-14 PROCEDURE — 80076 HEPATIC FUNCTION PANEL: CPT | Performed by: FAMILY MEDICINE

## 2023-11-14 PROCEDURE — 97165 OT EVAL LOW COMPLEX 30 MIN: CPT

## 2023-11-14 PROCEDURE — 94664 DEMO&/EVAL PT USE INHALER: CPT

## 2023-11-14 PROCEDURE — 25010000002 CEFEPIME PER 500 MG: Performed by: FAMILY MEDICINE

## 2023-11-14 PROCEDURE — 85025 COMPLETE CBC W/AUTO DIFF WBC: CPT | Performed by: FAMILY MEDICINE

## 2023-11-14 PROCEDURE — 80048 BASIC METABOLIC PNL TOTAL CA: CPT | Performed by: FAMILY MEDICINE

## 2023-11-14 RX ORDER — OSELTAMIVIR PHOSPHATE 75 MG/1
75 CAPSULE ORAL EVERY 12 HOURS SCHEDULED
Start: 2023-11-14 | End: 2023-11-16

## 2023-11-14 RX ORDER — AMOXICILLIN AND CLAVULANATE POTASSIUM 875; 125 MG/1; MG/1
1 TABLET, FILM COATED ORAL 2 TIMES DAILY
Qty: 8 TABLET | Refills: 0 | Status: SHIPPED | OUTPATIENT
Start: 2023-11-14 | End: 2023-11-18

## 2023-11-14 RX ORDER — FENTANYL/ROPIVACAINE/NS/PF 2-625MCG/1
15 PLASTIC BAG, INJECTION (ML) EPIDURAL ONCE
Status: COMPLETED | OUTPATIENT
Start: 2023-11-14 | End: 2023-11-14

## 2023-11-14 RX ADMIN — METOPROLOL TARTRATE 12.5 MG: 25 TABLET, FILM COATED ORAL at 21:00

## 2023-11-14 RX ADMIN — IPRATROPIUM BROMIDE AND ALBUTEROL SULFATE 3 ML: .5; 3 SOLUTION RESPIRATORY (INHALATION) at 01:42

## 2023-11-14 RX ADMIN — METOPROLOL TARTRATE 12.5 MG: 25 TABLET, FILM COATED ORAL at 09:48

## 2023-11-14 RX ADMIN — FAMOTIDINE 40 MG: 20 TABLET, FILM COATED ORAL at 21:01

## 2023-11-14 RX ADMIN — DOCUSATE SODIUM LIQUID 250 MG: 100 LIQUID ORAL at 09:46

## 2023-11-14 RX ADMIN — BUDESONIDE 0.5 MG: 0.5 INHALANT RESPIRATORY (INHALATION) at 20:17

## 2023-11-14 RX ADMIN — OSELTAMIVIR PHOSPHATE 75 MG: 75 CAPSULE ORAL at 09:47

## 2023-11-14 RX ADMIN — FUROSEMIDE 20 MG: 20 TABLET ORAL at 09:47

## 2023-11-14 RX ADMIN — IPRATROPIUM BROMIDE AND ALBUTEROL SULFATE 3 ML: .5; 3 SOLUTION RESPIRATORY (INHALATION) at 08:04

## 2023-11-14 RX ADMIN — CEFEPIME 2000 MG: 2 INJECTION, POWDER, FOR SOLUTION INTRAVENOUS at 04:44

## 2023-11-14 RX ADMIN — APIXABAN 5 MG: 5 TABLET, FILM COATED ORAL at 09:48

## 2023-11-14 RX ADMIN — Medication 10 ML: at 09:48

## 2023-11-14 RX ADMIN — OSELTAMIVIR PHOSPHATE 75 MG: 75 CAPSULE ORAL at 21:00

## 2023-11-14 RX ADMIN — ASPIRIN 81 MG: 81 TABLET, CHEWABLE ORAL at 09:48

## 2023-11-14 RX ADMIN — POTASSIUM PHOSPHATE, MONOBASIC POTASSIUM PHOSPHATE, DIBASIC 15 MMOL: 224; 236 INJECTION, SOLUTION, CONCENTRATE INTRAVENOUS at 03:11

## 2023-11-14 RX ADMIN — BUDESONIDE 0.5 MG: 0.5 INHALANT RESPIRATORY (INHALATION) at 08:04

## 2023-11-14 RX ADMIN — CEFEPIME 2000 MG: 2 INJECTION, POWDER, FOR SOLUTION INTRAVENOUS at 11:45

## 2023-11-14 RX ADMIN — APIXABAN 5 MG: 5 TABLET, FILM COATED ORAL at 21:00

## 2023-11-14 RX ADMIN — IPRATROPIUM BROMIDE AND ALBUTEROL SULFATE 3 ML: .5; 3 SOLUTION RESPIRATORY (INHALATION) at 12:25

## 2023-11-14 RX ADMIN — ARFORMOTEROL TARTRATE 15 MCG: 15 SOLUTION RESPIRATORY (INHALATION) at 20:17

## 2023-11-14 RX ADMIN — IPRATROPIUM BROMIDE AND ALBUTEROL SULFATE 3 ML: .5; 3 SOLUTION RESPIRATORY (INHALATION) at 20:17

## 2023-11-14 RX ADMIN — ARFORMOTEROL TARTRATE 15 MCG: 15 SOLUTION RESPIRATORY (INHALATION) at 08:04

## 2023-11-14 NOTE — PLAN OF CARE
Goal Outcome Evaluation:  Plan of Care Reviewed With: patient        Progress: no change  Outcome Evaluation: Patient does not present with any significant decline in function and does not require inpatient occupational therapy, therefore they will be discharged from services at this time. Per patient, EMR, and nursing he is a long-term care resident at [facility] where he is bedbound and appears to be dependent of BADLs due to observed impairments/contractures. It is recommended he discharge to long-term care facility when medically able to do so where rehab services can evaluate patient to determine if OT services are appropiate.      Anticipated Discharge Disposition (OT): extended care facility

## 2023-11-14 NOTE — PLAN OF CARE
Goal Outcome Evaluation:              Outcome Evaluation: Patient presents oriented x1, to self. Patient difficult to understand due to garbled speech and hx of stroke, alzheimers. Patient recieved continuous tube feed throught the night, cefepime, and K Phos. Pt. requests gospel music to be played and may benefit from  consult. Will contine plan of care.

## 2023-11-14 NOTE — CONSULTS
Nutrition Services    Patient Name: Jordin Menon  YOB: 1940  MRN: [id number]  Admission date: 11/10/2023    PROGRESS NOTE      Encounter Information: EN Follow UP       PO Diet: NPO Diet NPO Type: Strict NPO   PO Supplements: NA   PO Intake:  NA       Current nutrition support: Isosource 1.5 @ 60 ml/hr x 22 hours   Free water flushes 100 ml every 3 hours   Provides 1980 kcal, 90 g pro, 1803 ml fluid      Nutrition support review: Tolerating at goal rate.  No GI issues noted.  Visited and confirmed EN at goal rate on pump.         Labs (reviewed below): Labs reviewed, essentially WDL.          GI Function:  Last BM noted 11/14/23.       Nutrition Intervention Updates: Continue current nutrition plan of care.       Results from last 7 days   Lab Units 11/14/23  0628 11/13/23  1336 11/11/23  1121 11/10/23  1530   SODIUM mmol/L 140 137 143 136   POTASSIUM mmol/L 4.3 4.2 4.7 5.8*   CHLORIDE mmol/L 105 105 111* 105   CO2 mmol/L 27.0 24.8 23.6 22.9   BUN mg/dL 21 24* 35* 48*   CREATININE mg/dL 0.64* 0.74* 0.92 1.33*   CALCIUM mg/dL 9.1 9.1 8.8 8.4*   BILIRUBIN mg/dL 0.8 1.2  --  1.7*   ALK PHOS U/L 82 75  --  64   ALT (SGPT) U/L 17 18  --  25   AST (SGOT) U/L 19 18  --  38   GLUCOSE mg/dL 109* 106* 126* 116*     Results from last 7 days   Lab Units 11/14/23  0628 11/13/23  1336 11/11/23  1121 11/10/23  1530   MAGNESIUM mg/dL 1.8 1.8  --  1.9   PHOSPHORUS mg/dL 2.9 1.1*   < >  --    HEMOGLOBIN g/dL 11.0* 9.4*   < > 10.1*   HEMATOCRIT % 32.1* 28.2*   < > 30.1*    < > = values in this interval not displayed.     COVID19   Date Value Ref Range Status   11/10/2023 Not Detected Not Detected - Ref. Range Final     Lab Results   Component Value Date    HGBA1C 4.90 05/30/2022       RD to follow up per protocol.    Electronically signed by:  Yanelis Coyne RD  11/14/23 14:38 EST

## 2023-11-14 NOTE — DISCHARGE SUMMARY
[facility]         HOSPITALIST  DISCHARGE SUMMARY    Patient Name: Jordin Menon  : 1940  MRN: [id number]    Date of Admission: 11/10/2023  Date of Discharge: 2023  Primary Care Physician: Frank Llanes MD  Reason for admission:  Fever shortness of breath    Final diagnosis:  Healthcare associated pneumonia  Sepsis secondary to above  Lactic acidosis secondary to sepsis  Hyperkalemia resolved  Acute kidney injury likely secondary to sepsis dehydration resolved  History of CHF  Hypertension  Hyperlipidemia  Chronic dysphagia with PEG placement and tube feeds  Hypophosphatemia    Consults       Date and Time Order Name Status Description    11/10/2023  4:05 AM Hospitalist (on-call MD unless specified)              Active and Resolved Hospital Problems:  Active Hospital Problems    Diagnosis POA    **Sepsis [A41.9] Yes      Resolved Hospital Problems   No resolved problems to display.       Hospital Course     Hospital Course:  Jordin Menon is a 83 y.o. male past medical history significant for dementia, congestive heart failure, history of CVA, history of dysphagia status post PEG placement on tube feeds, GERD, hypertension, hyperlipidemia, peripheral vascular disease, osteoarthritis, resides at long-term care facility patient was noted to have worsening shortness of air and fevers brought to the emergency department was noted to have multifocal pneumonia noted to be dehydrated with worsening renal failure hyperkalemia admitted to the hospitalist started on broad-spectrum antibiotics flu positive started on Tamiflu.  Renal failure corrected hyperkalemia corrected hemodynamics stabilized respiratory status stabilized leukocytosis improved will be transferred back to the nursing facility to complete course of Tamiflu and antibiotics with Augmentin would recommend repeat CBC and chemistries 5 to 7 days.        DISCHARGE Follow Up Recommendations for labs and diagnostics: As  above      Day of Discharge     Vital Signs:  Temp:  [98.4 °F (36.9 °C)-99.1 °F (37.3 °C)] 98.5 °F (36.9 °C)  Heart Rate:  [81-97] 81  Resp:  [16-20] 16  BP: (133-159)/(55-91) 133/55  Flow (L/min):  [2] 2  Physical Exam:   Constitutional: Awake pleasantly confused  Cardiovascular: RRR  Respiratory: Diminished bases poor inspiratory effort  GI: Abdomen soft nontender bowel sounds present      Discharge Details        Discharge Medications        New Medications        Instructions Start Date   amoxicillin-clavulanate 875-125 MG per tablet  Commonly known as: AUGMENTIN   1 tablet, Per G Tube, 2 Times Daily      oseltamivir 75 MG capsule  Commonly known as: TAMIFLU   75 mg, Oral, Every 12 Hours Scheduled             Continue These Medications        Instructions Start Date   aluminum-magnesium hydroxide-simethicone 200-200-20 MG/5ML suspension  Commonly known as: MAALOX/MYLANTA   30 mL, Per G Tube, Every 6 Hours PRN      apixaban 5 MG tablet tablet  Commonly known as: ELIQUIS   5 mg, Per G Tube, 2 Times Daily      aspirin 81 MG chewable tablet   81 mg, Oral, Daily      busPIRone 10 MG tablet  Commonly known as: BUSPAR   10 mg, Per G Tube, 3 Times Daily      cyanocobalamin 1000 MCG/ML injection   1,000 mcg, Intramuscular, Every 30 Days, On the 15th of the month      docusate sodium 50 mg/5 mL liquid  Commonly known as: COLACE   250 mg, Per G Tube, Daily      famotidine 40 MG tablet  Commonly known as: PEPCID   40 mg, Oral, Nightly      furosemide 20 MG tablet  Commonly known as: Lasix   20 mg, Oral, Daily      hydrOXYzine 25 MG tablet  Commonly known as: ATARAX   25 mg, Per G Tube, Every 6 Hours PRN      ipratropium-albuterol 0.5-2.5 mg/3 ml nebulizer  Commonly known as: DUO-NEB   3 mL, Nebulization, Every 4 Hours PRN      melatonin 5 MG tablet tablet   5 mg, Per G Tube, Every Night at Bedtime      metoprolol tartrate 25 MG tablet  Commonly known as: LOPRESSOR   12.5 mg, Per G Tube, Every 12 Hours Scheduled       polyethylene glycol 17 GM/SCOOP powder  Commonly known as: MIRALAX   17 g, Per G Tube, Daily      Tylenol 8 Hour Arthritis Pain 650 MG 8 hr tablet  Generic drug: acetaminophen   650 mg, Oral, Every 4 Hours PRN             Stop These Medications      LORazepam 1 MG tablet  Commonly known as: ATIVAN              No Known Allergies    Discharge Disposition:  Skilled Nursing Facility (DC - External)    Diet:  Hospital:  Diet Order   Procedures    NPO Diet NPO Type: Strict NPO       Discharge Activity: As tolerated      CODE STATUS:  Code Status and Medical Interventions:   Ordered at: 11/10/23 0424     Code Status (Patient has no pulse and is not breathing):    CPR (Attempt to Resuscitate)     Medical Interventions (Patient has pulse or is breathing):    Full Support         No future appointments.        Pertinent  and/or Most Recent Results     PROCEDURES:   None    LAB RESULTS:      Lab 11/14/23 0628 11/13/23 1336 11/11/23  1121 11/10/23  1530 11/10/23  1428 11/10/23  0504 11/10/23  0120   WBC 12.39* 12.52* 19.97* 20.43*  --   --  18.58*   HEMOGLOBIN 11.0* 9.4* 10.0* 10.1*  --   --  13.3   HEMATOCRIT 32.1* 28.2* 29.3* 30.1*  --   --  38.2   PLATELETS 209 158 152 150  --   --  166   NEUTROS ABS 8.78* 9.70*  --  17.57*  --   --  16.64*   IMMATURE GRANS (ABS) 0.10* 0.07*  --   --   --   --  0.11*   LYMPHS ABS 1.59 1.18  --   --   --   --  0.89   MONOS ABS 1.66* 1.44*  --   --   --   --  0.89   EOS ABS 0.20 0.09  --   --   --   --  0.01   MCV 85.4 88.4 87.7 88.3  --   --  87.0   PROCALCITONIN  --   --   --   --   --  42.09*  --    LACTATE  --   --   --   --  2.1* 3.4* 3.6*         Lab 11/14/23 0628 11/13/23  1336 11/11/23  1121 11/10/23  1530 11/10/23  0120   SODIUM 140 137 143 136 136   POTASSIUM 4.3 4.2 4.7 5.8* 5.6*   CHLORIDE 105 105 111* 105 99   CO2 27.0 24.8 23.6 22.9 26.1   ANION GAP 8.0 7.2 8.4 8.1 10.9   BUN 21 24* 35* 48* 43*   CREATININE 0.64* 0.74* 0.92 1.33* 1.40*   EGFR 94.5 90.5 83.1 53.4* 50.2*    GLUCOSE 109* 106* 126* 116* 99   CALCIUM 9.1 9.1 8.8 8.4* 9.7   MAGNESIUM 1.8 1.8  --  1.9  --    PHOSPHORUS 2.9 1.1*  --   --   --          Lab 11/14/23  0628 11/13/23  1336 11/10/23  1530 11/10/23  0120   TOTAL PROTEIN 7.5 7.2 6.6 8.8*   ALBUMIN 3.2* 2.9* 3.0* 4.2   GLOBULIN  --   --  3.6 4.6   ALT (SGPT) 17 18 25 38   AST (SGOT) 19 18 38 43*   BILIRUBIN 0.8 1.2 1.7* 1.8*   INDIRECT BILIRUBIN 0.6 0.9  --   --    BILIRUBIN DIRECT 0.2 0.3  --   --    ALK PHOS 82 75 64 100         Lab 11/10/23  0504 11/10/23  0120   PROBNP  --  237.9   HSTROP T 70* 78*                 Brief Urine Lab Results  (Last result in the past 365 days)        Color   Clarity   Blood   Leuk Est   Nitrite   Protein   CREAT   Urine HCG        11/10/23 0328 Yellow   Clear   Negative   Trace   Negative   Trace                 Microbiology Results (last 10 days)       Procedure Component Value - Date/Time    Influenza Antigen, Rapid - Swab, Nasopharynx [[id number]]  (Abnormal) Collected: 11/11/23 1829    Lab Status: Final result Specimen: Swab from Nasopharynx Updated: 11/11/23 1925     Influenza A Ag, EIA Negative     Influenza B Ag, EIA Positive    Mycoplasma Pneumoniae Antibody, IgM - Blood, Arm, Left [[id number]]  (Normal) Collected: 11/11/23 1121    Lab Status: Final result Specimen: Blood from Arm, Left Updated: 11/11/23 1201     Mycoplasma pneumo IgM Negative    MRSA Screen, PCR (Inpatient) - Swab, Nares [[id number]]  (Normal) Collected: 11/10/23 0858    Lab Status: Final result Specimen: Swab from Nares Updated: 11/10/23 1033     MRSA PCR No MRSA Detected    Narrative:      The negative predictive value of this diagnostic test is high and should only be used to consider de-escalating anti-MRSA therapy. A positive result may indicate colonization with MRSA and must be correlated clinically.    S. Pneumo Ag Urine or CSF - Urine, Urine, Clean Catch [[id number]]  (Normal) Collected: 11/10/23 0328    Lab Status: Final result Specimen: Urine, Clean  Catch Updated: 11/10/23 0903     Strep Pneumo Ag Negative    Legionella Antigen, Urine - Urine, Urine, Clean Catch [[id number]]  (Normal) Collected: 11/10/23 0328    Lab Status: Final result Specimen: Urine, Clean Catch Updated: 11/10/23 0903     LEGIONELLA ANTIGEN, URINE Negative    Blood Culture - Blood, Arm, Left [[id number]]  (Normal) Collected: 11/10/23 0122    Lab Status: Preliminary result Specimen: Blood from Arm, Left Updated: 11/14/23 0146     Blood Culture No growth at 4 days    Blood Culture - Blood, Arm, Left [[id number]]  (Normal) Collected: 11/10/23 0120    Lab Status: Preliminary result Specimen: Blood from Arm, Left Updated: 11/14/23 0146     Blood Culture No growth at 4 days    COVID-19, FLU A/B, RSV PCR 1 HR TAT - Swab, Nasopharynx [[id number]]  (Normal) Collected: 11/10/23 0120    Lab Status: Final result Specimen: Swab from Nasopharynx Updated: 11/10/23 0227     COVID19 Not Detected     Influenza A PCR Not Detected     Influenza B PCR Not Detected     RSV, PCR Not Detected    Narrative:      Fact sheet for providers: https://www.fda.gov/media/875991/download    Fact sheet for patients: https://www.fda.gov/media/293801/download    Test performed by PCR.            CT Chest Without Contrast Diagnostic    Result Date: 11/10/2023  Impression:  Infectious multifocal pneumonia is suspected by nonenhanced CT.  It is new since the prior chest CT study from 5/18/2022.  Please see above comments for further detail.     Please note that portions of this note were completed with a voice recognition program.  ADONSI OLVERA JR, MD       Electronically Signed and Approved By: ADONIS OLVERA JR, MD on 11/10/2023 at 5:05             XR Chest 1 View    Result Date: 11/10/2023  Impression:   No acute infiltrate is suspected.  There may be new mild gaseous distension of the stomach.  Otherwise, no significant interval change is seen since the prior study (or studies).     Please note that portions of this note were  completed with a voice recognition program.  ADONIS OLVERA JR, MD       Electronically Signed and Approved By: ADONIS OLVERA JR, MD on 11/10/2023 at 2:35               Results for orders placed during the hospital encounter of 02/25/22    Duplex Venous Lower Extremity - Bilateral CV-READ    Interpretation Summary  · Acute left lower extremity deep vein thrombosis noted in the common femoral, proximal femoral, mid femoral, distal femoral, popliteal and peroneal.  · All other veins appeared normal bilaterally.      Results for orders placed during the hospital encounter of 02/25/22    Duplex Venous Lower Extremity - Bilateral CV-READ    Interpretation Summary  · Acute left lower extremity deep vein thrombosis noted in the common femoral, proximal femoral, mid femoral, distal femoral, popliteal and peroneal.  · All other veins appeared normal bilaterally.      Results for orders placed during the hospital encounter of 04/14/22    Adult Transthoracic Echo Complete W/ Cont if Necessary Per Protocol    Interpretation Summary  · Calculated left ventricular EF = 54.8% Estimated left ventricular EF was in agreement with the calculated left ventricular EF. Left ventricular systolic function is normal.  · Mild mitral valve regurgitation is present.      Labs Pending at Discharge:  Pending Labs       Order Current Status    Blood Culture - Blood, Arm, Left Preliminary result    Blood Culture - Blood, Arm, Left Preliminary result              Time spent on Discharge including face to face service: 35 minutes    Electronically signed by NAYE Draper, 11/14/23, 1:44 PM EST.    Attending documentation:  I reviewed the above documentation and independently reviewed and rounded and evaluated the patient and discussed the care plan with MARCIAL Wilkerson PA-C, I agree with his findings and plan as documented, what I have added to the care plan and modified is as follows in my documentation and my medical decision making and  discharge plan; 83-year-old male hospitalized initially on 11/10/2023, re for treatment and management of fever, shortness of breath, found to be influenza B positive, placed on Tamiflu, with concern for healthcare associated pneumonia superimposed with viral infection, placed on IV fluids and supplemental O2, sepsis managed, continued on tube feeds, replace electrolytes.  Finally achieved afebrile status as of 11/14/2023, labs are stable, hemodynamically stable at time of discharge.  Interval follow-up: Seen and examined on day of discharge, no acute distress, no acute major night events, patient's mentation at baseline, Review of systems unable to be obtained due to altered mental status.  Vitals reviewed, labs reviewed, telemetry reviewed, sinus rhythm in the 90s.  On physical exam elderly appearing male, no acute distress, regular rate rhythm, scattered rhonchi and wheezing, soft nontender abdomen with PEG tube in place with tube feeds infusing, pleasantly confused at baseline.  Assessment as above, discharge plan reviewed with the nurse at the bedside, all questions answered.  Total discharge time 45 minutes.  More than 85% of the time of this patient's encounter was performed by me, this included face-to-face time, planning and coordinating, medical decision making and critical thinking personally done by me.  -AVBMD    Electronically signed by Aranza Herzog MD, 11/14/23, 3:44 PM EST.  Portions of this documentation were transcribed electronically from a voice recognition software.  I confirm all data accurately represents the service(s) I performed at today's visit.

## 2023-11-14 NOTE — THERAPY EVALUATION
Patient Name: Jordin Menon  : 1940    MRN: [id number]                              Today's Date: 2023       Admit Date: 11/10/2023    Visit Dx:     ICD-10-CM ICD-9-CM   1. Sepsis, due to unspecified organism, unspecified whether acute organ dysfunction present  A41.9 038.9     995.91   2. Acute respiratory failure with hypoxia  J96.01 518.81   3. Acute renal failure, unspecified acute renal failure type  N17.9 584.9   4. Multifocal pneumonia  J18.9 486   5. Oropharyngeal dysphagia  R13.12 787.22   6. Difficulty in walking  R26.2 719.7   7. Decreased activities of daily living (ADL)  Z78.9 V49.89     Patient Active Problem List   Diagnosis    Primary osteoarthritis of right knee    Status post total right knee replacement    Essential hypertension    Shortness of breath    BRYANT (acute kidney injury)    Hypokalemia    Hypernatremia    Symptomatic bradycardia    Hypothermia, initial encounter    Acute CVA (cerebrovascular accident)    Severe sepsis    Severe malnutrition    Hyperkalemia    Anemia    Dislodged gastrostomy tube    Dysphagia, oropharyngeal    Sepsis     Past Medical History:   Diagnosis Date    Acute renal failure with pathological lesion in kidney     Alzheimer's dementia with behavioral disturbance     Anemia, vitamin B12 deficiency     Anxiety     Anxiety disorder due to known physiological condition     Arthritis     Benign neoplasm of prostate     Cannot walk     CHF (congestive heart failure)     Constipation     Coronary atherosclerosis of native coronary artery     CVA (cerebral vascular accident)     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 (CVA)     Edema     Essential hypertension, benign     Gastroesophageal reflux disease without esophagitis     GERD (gastroesophageal reflux disease)     High blood pressure     Hirsutism     HTN (hypertension)     Hypercalcemia     Hyperkalemia     Hyperlipemia     Hyperlipidemia     Hypokalemia     Ingrowing toenail 2018    Left  foot pain 09/21/2018    Nutritional deficiency     Osteoarthritis of right knee 08/22/2016    Pneumonia     Presence of right artificial knee joint     Primary localized osteoarthrosis     PVD (peripheral vascular disease)     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     Right foot pain 09/21/2018    Senile dementia, uncomplicated     Severe sinus bradycardia     Shock, septic     Sleep apnea     Thrombosis of left femoral vein     Tinea unguium 09/21/2018    Vitamin B12 deficiency anemia      Past Surgical History:   Procedure Laterality Date    ENDOSCOPY W/ PEG TUBE PLACEMENT N/A 6/8/2022    Procedure: ESOPHAGOGASTRODUODENOSCOPY WITH PERCUTANEOUS ENDOSCOPIC GASTROSTOMY TUBE INSERTION WITH ANESTHESIA;  Surgeon: Yanelis Mistry MD;  Location: [facility] ENDOSCOPY;  Service: Gastroenterology;  Laterality: N/A;  PEG TUBE PLACEMENT    ENDOSCOPY W/ PEG TUBE PLACEMENT N/A 7/3/2022    Procedure: ESOPHAGOGASTRODUODENOSCOPY WITH PERCUTANEOUS ENDOSCOPIC GASTROSTOMY TUBE INSERTION WITH ANESTHESIA;  Surgeon: Bam Melo MD;  Location: [facility] ENDOSCOPY;  Service: Gastroenterology;  Laterality: N/A;  PEG TUBE PLACEMENT    ENDOSCOPY W/ PEG TUBE PLACEMENT N/A 10/26/2022    Procedure: ESOPHAGOGASTRODUODENOSCOPY WITH PERCUTANEOUS ENDOSCOPIC GASTROSTOMY TUBE INSERTION WITH ANESTHESIA;  Surgeon: Yanelis Mistry MD;  Location: [facility] ENDOSCOPY;  Service: Gastroenterology;  Laterality: N/A;  PERCUTANEOUS ENDOSCOPIC GASTROSTOMY TUBE PLACEMENT      General Information       Row Name 11/14/23 1204          OT Time and Intention    Document Type evaluation  -LF     Mode of Treatment individual therapy;occupational therapy  -       Row Name 11/14/23 1207          General Information    Patient Profile Reviewed yes  -LF     Prior Level of Function --  Pt is a LTC resident at [facility] where he requires assist with ADLs and is bedbound per EMR. Pt mildly disoriented, reporting he  walks, requesting his shoes repeatedly. No family present at time of eval but nursing verified pt is bedbound.  -     Existing Precautions/Restrictions fall  -     Barriers to Rehab previous functional deficit  -       Row Name 11/14/23 1204          Occupational Profile    Reason for Services/Referral (Occupational Profile) Patient is an 83 year old male admitted to [facility] for fever and shortness of breath on November 10th, 2023. Occupational therapy consulted due to recent decline in ADLs/functional transfers. No previous occupational therapy services for current condition.  -       Row Name 11/14/23 1204          Living Environment    People in Home facility resident  LT resident [facility]  -       Row Name 11/14/23 1204          Cognition    Orientation Status (Cognition) oriented to;person  -       Row Name 11/14/23 1206          Safety Issues, Functional Mobility    Impairments Affecting Function (Mobility) balance;endurance/activity tolerance;range of motion (ROM);postural/trunk control;strength;other (see comments)  at baseline  -               User Key  (r) = Recorded By, (t) = Taken By, (c) = Cosigned By      Initials Name Provider Type     Carly Bonner OT Occupational Therapist                     Mobility/ADL's       Row Name 11/14/23 1201          Bed Mobility    Bed Mobility bed mobility (all) activities  -     All Activities, [place] (Bed Mobility) maximum assist (25% patient effort);dependent (less than 25% patient effort)  -     Bed Mobility, Safety Issues decreased use of arms for pushing/pulling;decreased use of legs for bridging/pushing;cognitive deficits limit understanding  -     Comment, (Bed Mobility) Further functional transfers deferred d/t dependence at baseline.  -       Row Name 11/14/23 1202          Activities of Daily Living    BADL Assessment/Intervention bathing;upper body dressing;lower body  dressing;grooming;feeding;toileting  -[facility] Name 11/14/23 1207          Bathing Assessment/Intervention    [place] Level (Bathing) bathing skills;upper body;lower body;dependent (less than 25% patient effort)  -[facility] Name 11/14/23 1207          Upper Body Dressing Assessment/Training    [place] Level (Upper Body Dressing) upper body dressing skills;dependent (less than 25% patient effort)  -[facility] Name 11/14/23 1207          Lower Body Dressing Assessment/Training    [place] Level (Lower Body Dressing) lower body dressing skills;dependent (less than 25% patient effort)  -[facility] Name 11/14/23 1207          Grooming Assessment/Training    [place] Level (Grooming) grooming skills;dependent (less than 25% patient effort)  -[facility] Name 11/14/23 1207          Self-Feeding Assessment/Training    [place] Level (Feeding) feeding skills;dependent (less than 25% patient effort)  -     Comment, (Feeding) PEG tube currently in place.  -LF       Row Name 11/14/23 1207          Toileting Assessment/Training    [place] Level (Toileting) dependent (less than 25% patient effort)  -     Comment, (Toileting) Male purewick currently in place  -               User Key  (r) = Recorded By, (t) = Taken By, (c) = Cosigned By      Initials Name Provider Type     Carly Bonner OT Occupational Therapist                   Obj/Interventions       [facility] Name 11/14/23 1208          Sensory Assessment (Somatosensory)    Sensory Assessment (Somatosensory) UE sensation intact  -LF       Row Name 11/14/23 1208          Vision Assessment/Intervention    Visual Impairment/Limitations WFL  -LF       Row Name 11/14/23 1208          Range of Motion Comprehensive    Comment, General Range of Motion Full AAROM of proximal BUEs throughout. RUE contracted in flexion pattern and patient resistive to PROM distally.  -[facility] Name 11/14/23 1208          Strength Comprehensive (MMT)     Comment, General Manual Muscle Testing (MMT) Assessment 3+/5 BUEs proximally  -LF       Row Name 11/14/23 1208          Motor Skills    Motor Skills coordination;functional endurance  -LF     Coordination bilateral;upper extremity;severe impairment  -     Functional Endurance Poor  -       Row Name 11/14/23 1208          Balance    Balance Assessment --  -LF     Comment, Balance Not tested, likely impaired at baseline.  -               User Key  (r) = Recorded By, (t) = Taken By, (c) = Cosigned By      Initials Name Provider Type     Carly Bonner OT Occupational Therapist                   Goals/Plan    No documentation.                  Clinical Impression       Row Name 11/14/23 1210          Pain Assessment    Additional Documentation Pain Scale: FACES Pre/Post-Treatment (Group)  -       Row Name 11/14/23 1210          Pain Scale: FACES Pre/Post-Treatment    Pain: FACES Scale, Pretreatment 0-->no hurt  -LF     Posttreatment Pain Rating 2-->hurts little bit  -LF     Pain Location - Side/Orientation Right  -LF     Pain Location upper  -LF     Pain Location - extremity  -       Row Name 11/14/23 1210          Plan of Care Review    Plan of Care Reviewed With patient  -LF     Progress no change  -LF     Outcome Evaluation Patient does not present with any significant decline in function and does not require inpatient occupational therapy, therefore they will be discharged from services at this time. Per patient, EMR, and nursing he is a long-term care resident at [facility] where he is bedbound and appears to be dependent of BADLs due to observed impairments/contractures. It is recommended he discharge to long-term care facility when medically able to do so where rehab services can evaluate patient to determine if OT services are appropiate.  -       Row Name 11/14/23 1210          Therapy Assessment/Plan (OT)    Patient/Family Therapy Goal Statement (OT) None reported  -      Criteria for Skilled Therapeutic Interventions Met (OT) no problems identified which require skilled intervention  -LF     Therapy Frequency (OT) evaluation only  -       Row Name 11/14/23 1210          Therapy Plan Review/Discharge Plan (OT)    Anticipated Discharge Disposition (OT) [facility]  -       Row Name 11/14/23 1210          Vital Signs    O2 Delivery Pre Treatment nasal cannula  -LF     O2 Delivery Intra Treatment nasal cannula  -LF     O2 Delivery Post Treatment nasal cannula  -LF       Row Name 11/14/23 1210          Positioning and Restraints    Pre-Treatment Position in bed  -LF     Post Treatment Position bed  -LF     In Bed supine;call light within reach;encouraged to call for assist;exit alarm on;with nsg  -LF               User Key  (r) = Recorded By, (t) = Taken By, (c) = Cosigned By      Initials Name Provider Type    LF Carly Bonner, ANANYA Occupational Therapist                   Outcome Measures       Row Name 11/14/23 1211          How much help from another is currently needed...    Putting on and taking off regular lower body clothing? 1  -LF     Bathing (including washing, rinsing, and drying) 1  -LF     Toileting (which includes using toilet bed pan or urinal) 1  -LF     Putting on and taking off regular upper body clothing 1  -LF     Taking care of personal grooming (such as brushing teeth) 1  -LF     Eating meals 1  -LF     AM-PAC 6 Clicks Score (OT) 6  -LF       Row Name 11/14/23 1211          Functional Assessment    Outcome Measure Options AM-PAC 6 Clicks Daily Activity (OT);Optimal Instrument  -LF       Row Name 11/14/23 1211          Optimal Instrument    Optimal Instrument Optimal - 3  -LF     Bending/Stooping 5  -LF     Standing 5  -LF     Reaching 4  -LF     From the list, choose the 3 activities you would most like to be able to do without any difficulty Bending/stooping;Standing;Reaching  -LF     Total Score Optimal - 3 14  -LF               User Key  (r) =  Recorded By, (t) = Taken By, (c) = Cosigned By      Initials Name Provider Type     Carly Bonner OT Occupational Therapist                    Occupational Therapy Education       Title: PT OT SLP Therapies (In Progress)       Topic: Occupational Therapy (In Progress)       Point: ADL training (In Progress)       Description:   Instruct learner(s) on proper safety adaptation and remediation techniques during self care or transfers.   Instruct in proper use of assistive devices.                  Learning Progress Summary             Patient Acceptance, E,TB, NL by  at 11/14/2023 1211                         Point: Precautions (In Progress)       Description:   Instruct learner(s) on prescribed precautions during self-care and functional transfers.                  Learning Progress Summary             Patient Acceptance, E,TB, NL by  at 11/14/2023 1211                         Point: Body mechanics (In Progress)       Description:   Instruct learner(s) on proper positioning and spine alignment during self-care, functional mobility activities and/or exercises.                  Learning Progress Summary             Patient Acceptance, E,TB, NL by  at 11/14/2023 1211                                         User Key       Initials Effective Dates Name Provider Type [facility] 06/16/21 -  Carly Bonner OT Occupational Therapist OT                  OT Recommendation and Plan  Therapy Frequency (OT): evaluation only  Plan of Care Review  Plan of Care Reviewed With: patient  Progress: no change  Outcome Evaluation: Patient does not present with any significant decline in function and does not require inpatient occupational therapy, therefore they will be discharged from services at this time. Per patient, EMR, and nursing he is a long-term care resident at [facility] where he is bedbound and appears to be dependent of BADLs due to observed impairments/contractures. It is recommended he  discharge to long-term care facility when medically able to do so where rehab services can evaluate patient to determine if OT services are appropiate.     Time Calculation:   Evaluation Complexity (OT)  Review Occupational Profile/Medical/Therapy History Complexity: expanded/moderate complexity  Assessment, Occupational Performance/Identification of Deficit Complexity: 5 or more performance deficits (at baseline)  Clinical Decision Making Complexity (OT): problem focused assessment/low complexity  Overall Complexity of Evaluation (OT): low complexity     Time Calculation- OT       Row Name 11/14/23 1211             Time Calculation- OT    OT Received On 11/14/23  -LF         Untimed Charges    OT Eval/Re-eval Minutes 25  -LF         Total Minutes    Untimed Charges Total Minutes 25  -LF       Total Minutes 25  -LF                User Key  (r) = Recorded By, (t) = Taken By, (c) = Cosigned By      Initials Name Provider Type    LF Carly Bonner OT Occupational Therapist                  Therapy Charges for Today       Code Description Service Date Service Provider Modifiers Qty    72321532706 HC OT EVAL LOW COMPLEXITY 2 11/14/2023 Carly Bonner OT GO 1                 Carly Bonner OT  11/14/2023

## 2023-11-14 NOTE — PLAN OF CARE
Goal Outcome Evaluation:    TOLERATING ROOM AIR & TUBE FEEDS AT GOAL. BED BOUND AT BASELINE; TURNED EVERY 2 HRS.    ISOLATION FOR FLU.    DISCHARGE BACK TO [facility].    UNABLE TO REACH FAMILY;  MSG LEFT AT NUMBER LISTED AS DAUGHTER.    WAITING FOR TRANSPORT VIA EMS.

## 2023-11-15 LAB
BACTERIA SPEC AEROBE CULT: NORMAL
BACTERIA SPEC AEROBE CULT: NORMAL

## 2023-11-15 NOTE — PROGRESS NOTES
"Enter Query Response Below      Query Response: Bacterial pneumonia unspecified Electronically signed by Aranza Herzog MD, 11/15/23, 6:19 AM EST.               If applicable, please update the problem list.       Patient: Jordin Menon        : 1940  Account: [id number]           Admit Date: 11/10/2023        How to Respond to this query:       a. Click New Note     b. Answer query within the yellow box.                c. Update the Problem List, if applicable.      If you have any questions about this query contact me at: Sincerely,    Alex Acosta RN, BSN  Clinical Documentation Integrity  [facility]  [email or web address]       ,     83 year old male with history of Alzheimer's dementia, CVA with G-tube for dysphagia, CHF, Rheumatoid lung disease presented with cough, shortness of breath and was found to have sepsis with healthcare associated pneumonia per the discharge summary.      -ED documentation reports \"He had an episode of emesis yesterday and they are concerned he possibly aspirated.\"      -Labs on admission included positivity of influenza B, procalcitonin 42.09, WBC 18.58.     -Treatments included intravenous Vancomycin 11/10, Cefepime 11/10- with discharge on Augmentin.    Please clarify the type of pneumonia the patient was treated/monitored for:    Gram negative pneumonia (excluding Haemophilus influenzae)  Bacterial pneumonia unspecified  Aspiration pneumonia  Viral pneumonia with influenzae B  Other- specify______  Unable to determine      By submitting this query, we are merely seeking further clarification of documentation to accurately reflect all conditions that you are monitoring, evaluating, treating or that extend the hospitalization or utilize additional resources of care. Please utilize your independent clinical judgment when addressing the question(s) above.     This query and your response, once completed, will be entered into the legal medical " record.    Sincerely,  Alex Acosta  Clinical Documentation Integrity Program

## 2024-01-01 ENCOUNTER — APPOINTMENT (OUTPATIENT)
Dept: CT IMAGING | Facility: HOSPITAL | Age: 84
End: 2024-01-01
Payer: MEDICARE

## 2024-01-01 ENCOUNTER — HOSPITAL ENCOUNTER (EMERGENCY)
Facility: HOSPITAL | Age: 84
End: 2024-10-06
Attending: EMERGENCY MEDICINE | Admitting: EMERGENCY MEDICINE
Payer: MEDICARE

## 2024-01-01 ENCOUNTER — APPOINTMENT (OUTPATIENT)
Dept: GENERAL RADIOLOGY | Facility: HOSPITAL | Age: 84
End: 2024-01-01
Payer: MEDICAID

## 2024-01-01 VITALS
DIASTOLIC BLOOD PRESSURE: 33 MMHG | RESPIRATION RATE: 26 BRPM | BODY MASS INDEX: 24.58 KG/M2 | SYSTOLIC BLOOD PRESSURE: 45 MMHG | WEIGHT: 143.96 LBS | OXYGEN SATURATION: 87 % | TEMPERATURE: 90.5 F | HEART RATE: 72 BPM | HEIGHT: 64 IN

## 2024-01-01 DIAGNOSIS — J96.01 ACUTE RESPIRATORY FAILURE WITH HYPOXIA: Primary | ICD-10-CM

## 2024-01-01 DIAGNOSIS — I46.9 CARDIAC ARREST: ICD-10-CM

## 2024-01-01 LAB
ALBUMIN SERPL-MCNC: 2.6 G/DL (ref 3.5–5.2)
ALBUMIN/GLOB SERPL: 0.6 G/DL
ALP SERPL-CCNC: 115 U/L (ref 39–117)
ALT SERPL W P-5'-P-CCNC: 11 U/L (ref 1–41)
ANION GAP SERPL CALCULATED.3IONS-SCNC: 16.1 MMOL/L (ref 5–15)
ANISOCYTOSIS BLD QL: ABNORMAL
ARTERIAL PATENCY WRIST A: POSITIVE
AST SERPL-CCNC: 15 U/L (ref 1–40)
ATMOSPHERIC PRESS: 744.4 MMHG
BASE EXCESS BLDA CALC-SCNC: -7.6 MMOL/L (ref -2–2)
BDY SITE: ABNORMAL
BILIRUB SERPL-MCNC: <0.2 MG/DL (ref 0–1.2)
BUN BLDA-MCNC: 30 MG/DL (ref 8–23)
BUN SERPL-MCNC: 32 MG/DL (ref 8–23)
BUN/CREAT SERPL: 17.4 (ref 7–25)
CA-I BLDA-SCNC: 1.22 MMOL/L (ref 1.13–1.32)
CALCIUM SPEC-SCNC: 9.2 MG/DL (ref 8.6–10.5)
CHLORIDE BLDA-SCNC: 116 MMOL/L (ref 98–107)
CHLORIDE SERPL-SCNC: 110 MMOL/L (ref 98–107)
CO2 BLDA-SCNC: 24.1 MMOL/L (ref 23–27)
CO2 SERPL-SCNC: 22.9 MMOL/L (ref 22–29)
CREAT BLDA-MCNC: 1.44 MG/DL (ref 0.6–1.3)
CREAT SERPL-MCNC: 1.84 MG/DL (ref 0.76–1.27)
D-LACTATE SERPL-SCNC: 6.8 MMOL/L
D-LACTATE SERPL-SCNC: 8.1 MMOL/L (ref 0.5–2)
DEPRECATED RDW RBC AUTO: 52.6 FL (ref 37–54)
EGFRCR SERPLBLD CKD-EPI 2021: 35.9 ML/MIN/1.73
EOSINOPHIL # BLD MANUAL: 0.11 10*3/MM3 (ref 0–0.4)
EOSINOPHIL NFR BLD MANUAL: 2 % (ref 0.3–6.2)
ERYTHROCYTE [DISTWIDTH] IN BLOOD BY AUTOMATED COUNT: 17.2 % (ref 12.3–15.4)
GLOBULIN UR ELPH-MCNC: 4.2 GM/DL
GLUCOSE BLDC GLUCOMTR-MCNC: 119 MG/DL (ref 70–99)
GLUCOSE BLDC GLUCOMTR-MCNC: 147 MG/DL (ref 70–99)
GLUCOSE SERPL-MCNC: 158 MG/DL (ref 65–99)
HCO3 BLDA-SCNC: 23 MMOL/L (ref 22–26)
HCT VFR BLD AUTO: 36.5 % (ref 37.5–51)
HCT VFR BLD CALC: 33 % (ref 38–51)
HEMODILUTION: NO
HGB BLD-MCNC: 11.4 G/DL (ref 13–17.7)
HGB BLDA-MCNC: 11.1 G/DL (ref 12–18)
HOLD SPECIMEN: NORMAL
HOLD SPECIMEN: NORMAL
HYPOCHROMIA BLD QL: ABNORMAL
INHALED O2 CONCENTRATION: 100 %
LYMPHOCYTES # BLD MANUAL: 0.97 10*3/MM3 (ref 0.7–3.1)
LYMPHOCYTES NFR BLD MANUAL: 3 % (ref 5–12)
Lab: ABNORMAL
Lab: ABNORMAL
MAGNESIUM SERPL-MCNC: 2.6 MG/DL (ref 1.6–2.4)
MCH RBC QN AUTO: 26.8 PG (ref 26.6–33)
MCHC RBC AUTO-ENTMCNC: 31.2 G/DL (ref 31.5–35.7)
MCV RBC AUTO: 85.9 FL (ref 79–97)
MODALITY: ABNORMAL
MONOCYTES # BLD: 0.16 10*3/MM3 (ref 0.1–0.9)
NEUTROPHILS # BLD AUTO: 4.15 10*3/MM3 (ref 1.7–7)
NEUTROPHILS NFR BLD MANUAL: 67 % (ref 42.7–76)
NEUTS BAND NFR BLD MANUAL: 10 % (ref 0–5)
NOTIFIED WHO: ABNORMAL
NRBC SPEC MANUAL: 27 /100 WBC (ref 0–0.2)
PCO2 BLDA: 74.5 MM HG (ref 35–45)
PEEP RESPIRATORY: 5 CM[H2O]
PH BLDA: 7.1 PH UNITS (ref 7.35–7.45)
PLAT MORPH BLD: NORMAL
PLATELET # BLD AUTO: 144 10*3/MM3 (ref 140–450)
PMV BLD AUTO: 11.4 FL (ref 6–12)
PO2 BLD: 249 MM[HG] (ref 0–500)
PO2 BLDA: 249 MM HG (ref 80–100)
POTASSIUM BLDA-SCNC: 3.7 MMOL/L (ref 3.5–5)
POTASSIUM SERPL-SCNC: 4.2 MMOL/L (ref 3.5–5.2)
PROT SERPL-MCNC: 6.8 G/DL (ref 6–8.5)
QTC INTERVAL: NORMAL MS
RBC # BLD AUTO: 4.25 10*6/MM3 (ref 4.14–5.8)
READ BACK: YES
RESPIRATORY RATE: 16
SAO2 % BLDCOA: 99.6 % (ref 95–99)
SODIUM BLD-SCNC: 155 MMOL/L (ref 131–143)
SODIUM SERPL-SCNC: 149 MMOL/L (ref 136–145)
TROPONIN T SERPL HS-MCNC: 52 NG/L
VARIANT LYMPHS NFR BLD MANUAL: 18 % (ref 19.6–45.3)
VENTILATOR MODE: AC
VT ON VENT VENT: 400 ML
WBC MORPH BLD: NORMAL
WBC NRBC COR # BLD AUTO: 5.39 10*3/MM3 (ref 3.4–10.8)
WHOLE BLOOD HOLD COAG: NORMAL
WHOLE BLOOD HOLD SPECIMEN: NORMAL

## 2024-01-01 PROCEDURE — 94799 UNLISTED PULMONARY SVC/PX: CPT

## 2024-01-01 PROCEDURE — 94002 VENT MGMT INPAT INIT DAY: CPT

## 2024-01-01 PROCEDURE — 84484 ASSAY OF TROPONIN QUANT: CPT | Performed by: EMERGENCY MEDICINE

## 2024-01-01 PROCEDURE — 93005 ELECTROCARDIOGRAM TRACING: CPT | Performed by: EMERGENCY MEDICINE

## 2024-01-01 PROCEDURE — 36415 COLL VENOUS BLD VENIPUNCTURE: CPT

## 2024-01-01 PROCEDURE — 71045 X-RAY EXAM CHEST 1 VIEW: CPT

## 2024-01-01 PROCEDURE — 36600 WITHDRAWAL OF ARTERIAL BLOOD: CPT

## 2024-01-01 PROCEDURE — 87040 BLOOD CULTURE FOR BACTERIA: CPT | Performed by: EMERGENCY MEDICINE

## 2024-01-01 PROCEDURE — 82948 REAGENT STRIP/BLOOD GLUCOSE: CPT | Performed by: EMERGENCY MEDICINE

## 2024-01-01 PROCEDURE — 25010000002 CEFEPIME PER 500 MG: Performed by: EMERGENCY MEDICINE

## 2024-01-01 PROCEDURE — 80047 BASIC METABLC PNL IONIZED CA: CPT

## 2024-01-01 PROCEDURE — 83605 ASSAY OF LACTIC ACID: CPT | Performed by: EMERGENCY MEDICINE

## 2024-01-01 PROCEDURE — 85007 BL SMEAR W/DIFF WBC COUNT: CPT | Performed by: EMERGENCY MEDICINE

## 2024-01-01 PROCEDURE — 25010000002 EPINEPHRINE 1 MG/10ML SOLUTION PREFILLED SYRINGE: Performed by: EMERGENCY MEDICINE

## 2024-01-01 PROCEDURE — 25810000003 SODIUM CHLORIDE 0.9 % SOLUTION: Performed by: EMERGENCY MEDICINE

## 2024-01-01 PROCEDURE — 85025 COMPLETE CBC W/AUTO DIFF WBC: CPT | Performed by: EMERGENCY MEDICINE

## 2024-01-01 PROCEDURE — 99285 EMERGENCY DEPT VISIT HI MDM: CPT

## 2024-01-01 PROCEDURE — 80053 COMPREHEN METABOLIC PANEL: CPT | Performed by: EMERGENCY MEDICINE

## 2024-01-01 PROCEDURE — 82803 BLOOD GASES ANY COMBINATION: CPT

## 2024-01-01 PROCEDURE — 88312 SPECIAL STAINS GROUP 1: CPT | Performed by: EMERGENCY MEDICINE

## 2024-01-01 PROCEDURE — 83605 ASSAY OF LACTIC ACID: CPT

## 2024-01-01 PROCEDURE — 92950 HEART/LUNG RESUSCITATION CPR: CPT

## 2024-01-01 PROCEDURE — 83735 ASSAY OF MAGNESIUM: CPT | Performed by: EMERGENCY MEDICINE

## 2024-01-01 PROCEDURE — 87147 CULTURE TYPE IMMUNOLOGIC: CPT | Performed by: EMERGENCY MEDICINE

## 2024-01-01 RX ORDER — CALCIUM CHLORIDE 100 MG/ML
INJECTION INTRAVENOUS; INTRAVENTRICULAR
Status: COMPLETED | OUTPATIENT
Start: 2024-01-01 | End: 2024-01-01

## 2024-01-01 RX ORDER — SODIUM CHLORIDE 0.9 % (FLUSH) 0.9 %
10 SYRINGE (ML) INJECTION AS NEEDED
Status: DISCONTINUED | OUTPATIENT
Start: 2024-01-01 | End: 2024-01-01 | Stop reason: HOSPADM

## 2024-01-01 RX ORDER — VANCOMYCIN/0.9 % SOD CHLORIDE 1.5G/250ML
20 PLASTIC BAG, INJECTION (ML) INTRAVENOUS ONCE
Status: DISCONTINUED | OUTPATIENT
Start: 2024-01-01 | End: 2024-01-01 | Stop reason: HOSPADM

## 2024-01-01 RX ADMIN — CALCIUM CHLORIDE INJECTION 1 G: 100 INJECTION, SOLUTION INTRAVENOUS at 05:16

## 2024-01-01 RX ADMIN — SODIUM CHLORIDE 1000 ML: 9 INJECTION, SOLUTION INTRAVENOUS at 05:22

## 2024-01-01 RX ADMIN — EPINEPHRINE 1 MG: 0.1 INJECTION INTRAVENOUS at 05:12

## 2024-01-01 RX ADMIN — SODIUM BICARBONATE 50 MEQ: 84 INJECTION INTRAVENOUS at 05:13

## 2024-02-08 NOTE — PROGRESS NOTES
[facility]   Hospitalist Progress Note  Date: 2022  Patient Name: Jordin Menon  : 1940  MRN: [id number]  Date of admission: 2022  Consultants:   -Pulmonology/Critical Care: Dr. Kobe Sheets, Dr. Robert Carbajal    Subjective   Subjective     Chief Complaint: Altered mental status, hypothermia, hypoglycemia and bradycardia    Summary:   Jordin Menon is a 82 y.o. male with frequent hospitalizations for sepsis, pneumonia and renal failure who has Alzheimer's dementia who presented to ED from his nursing home due to worsening mental status and hypothermia. Evaluation in ED significant for patient being hypoglycemic, bradycardic and hypothermic. Patient also noted to be lethargic only grimacing to pain but not following commands. Hospitalist service contacted for further evaluation management. Pulmonology/Critical Care consulted. Patient started on empiric antibiotics and levophed. NG tube initially inserted and tube feeds started.  Palliative care consulted.  Discussions were had between Pulmonology/Critical Care service and patient's family and decision has been made to discontinue invasive care measures and pursue comfort care measures only.  However, family at a later time decided that they did not wish to pursue comfort care measures at this time and invasive care measures were resumed.  Based on patient's living will feeding tube was not inserted.  Patient's condition did not improve after use of Wing hugger and resumption of IV antibiotics.  Critical care service contacted and had additional discussion with patient's family, decision then made to again pursue comfort care measures only.  On the evening of  family changed goals to full measures again but continue DNR.  PEG tube placed      Interval Followup:   PEG tube placed yesterday, overnight received as needed clonidine, developed hypothermia received Wing hugger, today temperature and blood pressure maintained.  Tube feeds have been  "initiated without issue.  When asked questions patient responds with \"I am okay\" and \"I'll be alright\"    Review of Systems   Unable to obtain secondary to altered mental status, to anything asked the patient responds \"I will be all right\"    Objective   Objective     Vitals:   Temp:  [93.9 °F (34.4 °C)-98.3 °F (36.8 °C)] 97.5 °F (36.4 °C)  Heart Rate:  [53-92] 73  Resp:  [16-20] 18  BP: (100-156)/() 112/67  Physical Exam   Gen: NAD, WDWN elderly male lying in bed comfortably asleep easily awakened by voice  HEENT: NCAT, mmm  Resp: CTAB no wrr, no dyspnea  CV: RRR no mrg, trace LE edema  GI: Abdomen soft NT, ND +bs, PEG tube in place with tube feeds infusing  Psych: AOx 1, self, normal mood and affect    Result Review    Result Review:  I have personally reviewed the results from the time of this admission to 6/9/2022 15:49 EDT and agree with these findings:  [x]  Laboratory:    Sodium 150  Bicarb 21  Creatinine 2-1.7  White count 5  Hemoglobin 7.8  Platelets 108  [x]  Microbiology: Negative  []  Radiology:   []  EKG/Telemetry:   []  Cardiology/Vascular:    []  Pathology:  []  Old records:  []  Other:    Assessment & Plan   Assessment / Plan      Assessment:  Septic shock due to unknown source  HCAP due to unspecified organism versus aspiration pneumonia  Therapeutic drug level monitoring, vancomycin  Toxic/metabolic encephalopathy  Acute renal failure  Hypothermia  Hypernatremia  Hypokalemia  Hypomagnesemia  Hypoglycemia  History of Alzheimer's dementia  GERD  DVT  Hypoalbuminemia  Hypophosphatemia    Plan:  Tube feeds initiated per PEG tube  Discontinue IV fluids  Thrombocytopenia improving  DC Irby catheter to see if he is able to urinate independently  Holding Eliquis and aspirin at this time preprocedurally  Holding Lasix, Remeron, BuSpar for now    DVT Prophylaxis: SCDs   Diet: Puréed nectar thick, tube feeds  Plan discussed with bedside RN     " weight-bearing as tolerated

## 2024-03-03 ENCOUNTER — APPOINTMENT (OUTPATIENT)
Dept: CT IMAGING | Facility: HOSPITAL | Age: 84
DRG: 689 | End: 2024-03-03
Payer: MEDICARE

## 2024-03-03 ENCOUNTER — HOSPITAL ENCOUNTER (INPATIENT)
Facility: HOSPITAL | Age: 84
LOS: 5 days | Discharge: NURSING, REHAB OR LONG-TERM CARE | DRG: 689 | End: 2024-03-08
Attending: EMERGENCY MEDICINE | Admitting: STUDENT IN AN ORGANIZED HEALTH CARE EDUCATION/TRAINING PROGRAM
Payer: MEDICARE

## 2024-03-03 ENCOUNTER — APPOINTMENT (OUTPATIENT)
Dept: GENERAL RADIOLOGY | Facility: HOSPITAL | Age: 84
DRG: 689 | End: 2024-03-03
Payer: MEDICARE

## 2024-03-03 DIAGNOSIS — R31.0 GROSS HEMATURIA: ICD-10-CM

## 2024-03-03 DIAGNOSIS — A41.9 SEPSIS, DUE TO UNSPECIFIED ORGANISM, UNSPECIFIED WHETHER ACUTE ORGAN DYSFUNCTION PRESENT: ICD-10-CM

## 2024-03-03 DIAGNOSIS — N39.0 ACUTE UTI: ICD-10-CM

## 2024-03-03 DIAGNOSIS — R13.12 OROPHARYNGEAL DYSPHAGIA: ICD-10-CM

## 2024-03-03 DIAGNOSIS — R26.2 DIFFICULTY WALKING: ICD-10-CM

## 2024-03-03 DIAGNOSIS — R41.82 ALTERED MENTAL STATUS, UNSPECIFIED ALTERED MENTAL STATUS TYPE: Primary | ICD-10-CM

## 2024-03-03 DIAGNOSIS — F41.9 ANXIETY DISORDER, UNSPECIFIED TYPE: ICD-10-CM

## 2024-03-03 PROBLEM — G93.41 ACUTE METABOLIC ENCEPHALOPATHY: Status: ACTIVE | Noted: 2024-03-03

## 2024-03-03 LAB
ALBUMIN SERPL-MCNC: 3.8 G/DL (ref 3.5–5.2)
ALBUMIN/GLOB SERPL: 0.8 G/DL
ALP SERPL-CCNC: 132 U/L (ref 39–117)
ALT SERPL W P-5'-P-CCNC: 30 U/L (ref 1–41)
ANION GAP SERPL CALCULATED.3IONS-SCNC: 11.1 MMOL/L (ref 5–15)
ANION GAP SERPL CALCULATED.3IONS-SCNC: 9.2 MMOL/L (ref 5–15)
ANION GAP SERPL CALCULATED.3IONS-SCNC: 9.7 MMOL/L (ref 5–15)
ARTERIAL PATENCY WRIST A: POSITIVE
AST SERPL-CCNC: 29 U/L (ref 1–40)
BACTERIA UR QL AUTO: ABNORMAL /HPF
BASE EXCESS BLDA CALC-SCNC: 2.4 MMOL/L (ref -2–2)
BASOPHILS # BLD AUTO: 0.02 10*3/MM3 (ref 0–0.2)
BASOPHILS NFR BLD AUTO: 0.2 % (ref 0–1.5)
BDY SITE: ABNORMAL
BILIRUB SERPL-MCNC: 0.5 MG/DL (ref 0–1.2)
BILIRUB UR QL STRIP: NEGATIVE
BUN SERPL-MCNC: 23 MG/DL (ref 8–23)
BUN SERPL-MCNC: 24 MG/DL (ref 8–23)
BUN SERPL-MCNC: 26 MG/DL (ref 8–23)
BUN/CREAT SERPL: 26.7 (ref 7–25)
BUN/CREAT SERPL: 27.1 (ref 7–25)
BUN/CREAT SERPL: 29.2 (ref 7–25)
CA-I BLDA-SCNC: 1.2 MMOL/L (ref 1.13–1.32)
CALCIUM SPEC-SCNC: 9.5 MG/DL (ref 8.6–10.5)
CALCIUM SPEC-SCNC: 9.7 MG/DL (ref 8.6–10.5)
CALCIUM SPEC-SCNC: 9.7 MG/DL (ref 8.6–10.5)
CHLORIDE BLDA-SCNC: 93 MMOL/L (ref 98–106)
CHLORIDE SERPL-SCNC: 92 MMOL/L (ref 98–107)
CHLORIDE SERPL-SCNC: 93 MMOL/L (ref 98–107)
CHLORIDE SERPL-SCNC: 94 MMOL/L (ref 98–107)
CLARITY UR: CLEAR
CO2 SERPL-SCNC: 22.9 MMOL/L (ref 22–29)
CO2 SERPL-SCNC: 25.8 MMOL/L (ref 22–29)
CO2 SERPL-SCNC: 26.3 MMOL/L (ref 22–29)
COHGB MFR BLD: 0.7 % (ref 0–1.5)
COLOR UR: YELLOW
CREAT SERPL-MCNC: 0.85 MG/DL (ref 0.76–1.27)
CREAT SERPL-MCNC: 0.89 MG/DL (ref 0.76–1.27)
CREAT SERPL-MCNC: 0.9 MG/DL (ref 0.76–1.27)
D-LACTATE SERPL-SCNC: 1.2 MMOL/L (ref 0.5–2)
DEPRECATED RDW RBC AUTO: 35.3 FL (ref 37–54)
EGFRCR SERPLBLD CKD-EPI 2021: 84.7 ML/MIN/1.73
EGFRCR SERPLBLD CKD-EPI 2021: 85 ML/MIN/1.73
EGFRCR SERPLBLD CKD-EPI 2021: 86.2 ML/MIN/1.73
EOSINOPHIL # BLD AUTO: 0.1 10*3/MM3 (ref 0–0.4)
EOSINOPHIL NFR BLD AUTO: 1.1 % (ref 0.3–6.2)
ERYTHROCYTE [DISTWIDTH] IN BLOOD BY AUTOMATED COUNT: 12.1 % (ref 12.3–15.4)
FHHB: 6.7 % (ref 0–5)
FLUAV SUBTYP SPEC NAA+PROBE: NOT DETECTED
FLUBV RNA ISLT QL NAA+PROBE: NOT DETECTED
GLOBULIN UR ELPH-MCNC: 4.9 GM/DL
GLUCOSE BLDA-MCNC: 84 MG/DL (ref 70–99)
GLUCOSE BLDC GLUCOMTR-MCNC: 81 MG/DL (ref 70–99)
GLUCOSE SERPL-MCNC: 80 MG/DL (ref 65–99)
GLUCOSE SERPL-MCNC: 84 MG/DL (ref 65–99)
GLUCOSE SERPL-MCNC: 86 MG/DL (ref 65–99)
GLUCOSE UR STRIP-MCNC: NEGATIVE MG/DL
HCO3 BLDA-SCNC: 26.8 MMOL/L (ref 22–26)
HCT VFR BLD AUTO: 35.6 % (ref 37.5–51)
HGB BLD-MCNC: 12.7 G/DL (ref 13–17.7)
HGB BLDA-MCNC: 13.1 G/DL (ref 13.8–16.4)
HGB UR QL STRIP.AUTO: ABNORMAL
HOLD SPECIMEN: NORMAL
HOLD SPECIMEN: NORMAL
HYALINE CASTS UR QL AUTO: ABNORMAL /LPF
IMM GRANULOCYTES # BLD AUTO: 0.02 10*3/MM3 (ref 0–0.05)
IMM GRANULOCYTES NFR BLD AUTO: 0.2 % (ref 0–0.5)
INHALED O2 CONCENTRATION: 21 %
KETONES UR QL STRIP: NEGATIVE
LACTATE BLDA-SCNC: 0.91 MMOL/L (ref 0.5–2)
LEUKOCYTE ESTERASE UR QL STRIP.AUTO: ABNORMAL
LYMPHOCYTES # BLD AUTO: 1.73 10*3/MM3 (ref 0.7–3.1)
LYMPHOCYTES NFR BLD AUTO: 18.6 % (ref 19.6–45.3)
MAGNESIUM SERPL-MCNC: 1.8 MG/DL (ref 1.6–2.4)
MAGNESIUM SERPL-MCNC: 2 MG/DL (ref 1.6–2.4)
MCH RBC QN AUTO: 28.9 PG (ref 26.6–33)
MCHC RBC AUTO-ENTMCNC: 35.7 G/DL (ref 31.5–35.7)
MCV RBC AUTO: 80.9 FL (ref 79–97)
METHGB BLD QL: 0.1 % (ref 0–1.5)
MODALITY: ABNORMAL
MONOCYTES # BLD AUTO: 1.11 10*3/MM3 (ref 0.1–0.9)
MONOCYTES NFR BLD AUTO: 11.9 % (ref 5–12)
NEUTROPHILS NFR BLD AUTO: 6.33 10*3/MM3 (ref 1.7–7)
NEUTROPHILS NFR BLD AUTO: 68 % (ref 42.7–76)
NITRITE UR QL STRIP: NEGATIVE
NRBC BLD AUTO-RTO: 0 /100 WBC (ref 0–0.2)
OXYHGB MFR BLDV: 92.5 % (ref 94–99)
PCO2 BLDA: 40.6 MM HG (ref 35–45)
PH BLDA: 7.44 PH UNITS (ref 7.35–7.45)
PH UR STRIP.AUTO: 8 [PH] (ref 5–8)
PLATELET # BLD AUTO: 411 10*3/MM3 (ref 140–450)
PMV BLD AUTO: 10.1 FL (ref 6–12)
PO2 BLD: 331 MM[HG] (ref 0–500)
PO2 BLDA: 69.5 MM HG (ref 80–100)
POTASSIUM BLDA-SCNC: 5.55 MMOL/L (ref 3.5–5)
POTASSIUM SERPL-SCNC: 5.4 MMOL/L (ref 3.5–5.2)
POTASSIUM SERPL-SCNC: 5.4 MMOL/L (ref 3.5–5.2)
POTASSIUM SERPL-SCNC: 6.1 MMOL/L (ref 3.5–5.2)
PROCALCITONIN SERPL-MCNC: 0.07 NG/ML (ref 0–0.25)
PROT SERPL-MCNC: 8.7 G/DL (ref 6–8.5)
PROT UR QL STRIP: NEGATIVE
RBC # BLD AUTO: 4.4 10*6/MM3 (ref 4.14–5.8)
RBC # UR STRIP: ABNORMAL /HPF
REF LAB TEST METHOD: ABNORMAL
RSV RNA NPH QL NAA+NON-PROBE: NOT DETECTED
SAO2 % BLDCOA: 93.2 % (ref 95–99)
SARS-COV-2 RNA RESP QL NAA+PROBE: NOT DETECTED
SODIUM BLDA-SCNC: 127 MMOL/L (ref 136–146)
SODIUM SERPL-SCNC: 127 MMOL/L (ref 136–145)
SODIUM SERPL-SCNC: 128 MMOL/L (ref 136–145)
SODIUM SERPL-SCNC: 129 MMOL/L (ref 136–145)
SP GR UR STRIP: 1.01 (ref 1–1.03)
SQUAMOUS #/AREA URNS HPF: ABNORMAL /HPF
TROPONIN T SERPL HS-MCNC: 46 NG/L
UROBILINOGEN UR QL STRIP: ABNORMAL
WBC # UR STRIP: ABNORMAL /HPF
WBC NRBC COR # BLD AUTO: 9.31 10*3/MM3 (ref 3.4–10.8)
WHOLE BLOOD HOLD COAG: NORMAL
WHOLE BLOOD HOLD SPECIMEN: NORMAL
YEAST URNS QL MICRO: ABNORMAL /HPF

## 2024-03-03 PROCEDURE — 70450 CT HEAD/BRAIN W/O DYE: CPT

## 2024-03-03 PROCEDURE — 82948 REAGENT STRIP/BLOOD GLUCOSE: CPT

## 2024-03-03 PROCEDURE — 84484 ASSAY OF TROPONIN QUANT: CPT

## 2024-03-03 PROCEDURE — 25010000002 CEFEPIME PER 500 MG: Performed by: EMERGENCY MEDICINE

## 2024-03-03 PROCEDURE — 99285 EMERGENCY DEPT VISIT HI MDM: CPT

## 2024-03-03 PROCEDURE — 83735 ASSAY OF MAGNESIUM: CPT

## 2024-03-03 PROCEDURE — 93010 ELECTROCARDIOGRAM REPORT: CPT | Performed by: INTERNAL MEDICINE

## 2024-03-03 PROCEDURE — 87040 BLOOD CULTURE FOR BACTERIA: CPT | Performed by: EMERGENCY MEDICINE

## 2024-03-03 PROCEDURE — 83050 HGB METHEMOGLOBIN QUAN: CPT | Performed by: EMERGENCY MEDICINE

## 2024-03-03 PROCEDURE — 25810000003 SODIUM CHLORIDE 0.9 % SOLUTION: Performed by: EMERGENCY MEDICINE

## 2024-03-03 PROCEDURE — 80053 COMPREHEN METABOLIC PANEL: CPT

## 2024-03-03 PROCEDURE — 71250 CT THORAX DX C-: CPT

## 2024-03-03 PROCEDURE — 25010000002 VANCOMYCIN 5 G RECONSTITUTED SOLUTION: Performed by: EMERGENCY MEDICINE

## 2024-03-03 PROCEDURE — 36415 COLL VENOUS BLD VENIPUNCTURE: CPT

## 2024-03-03 PROCEDURE — 87154 CUL TYP ID BLD PTHGN 6+ TRGT: CPT | Performed by: EMERGENCY MEDICINE

## 2024-03-03 PROCEDURE — 94799 UNLISTED PULMONARY SVC/PX: CPT

## 2024-03-03 PROCEDURE — 87186 SC STD MICRODIL/AGAR DIL: CPT | Performed by: EMERGENCY MEDICINE

## 2024-03-03 PROCEDURE — 99222 1ST HOSP IP/OBS MODERATE 55: CPT | Performed by: STUDENT IN AN ORGANIZED HEALTH CARE EDUCATION/TRAINING PROGRAM

## 2024-03-03 PROCEDURE — 85025 COMPLETE CBC W/AUTO DIFF WBC: CPT

## 2024-03-03 PROCEDURE — 81001 URINALYSIS AUTO W/SCOPE: CPT | Performed by: EMERGENCY MEDICINE

## 2024-03-03 PROCEDURE — 36600 WITHDRAWAL OF ARTERIAL BLOOD: CPT | Performed by: EMERGENCY MEDICINE

## 2024-03-03 PROCEDURE — 82805 BLOOD GASES W/O2 SATURATION: CPT | Performed by: EMERGENCY MEDICINE

## 2024-03-03 PROCEDURE — 87086 URINE CULTURE/COLONY COUNT: CPT | Performed by: EMERGENCY MEDICINE

## 2024-03-03 PROCEDURE — 84145 PROCALCITONIN (PCT): CPT | Performed by: STUDENT IN AN ORGANIZED HEALTH CARE EDUCATION/TRAINING PROGRAM

## 2024-03-03 PROCEDURE — 82375 ASSAY CARBOXYHB QUANT: CPT | Performed by: EMERGENCY MEDICINE

## 2024-03-03 PROCEDURE — 88312 SPECIAL STAINS GROUP 1: CPT | Performed by: EMERGENCY MEDICINE

## 2024-03-03 PROCEDURE — P9612 CATHETERIZE FOR URINE SPEC: HCPCS

## 2024-03-03 PROCEDURE — 87147 CULTURE TYPE IMMUNOLOGIC: CPT | Performed by: EMERGENCY MEDICINE

## 2024-03-03 PROCEDURE — 87106 FUNGI IDENTIFICATION YEAST: CPT | Performed by: EMERGENCY MEDICINE

## 2024-03-03 PROCEDURE — 83735 ASSAY OF MAGNESIUM: CPT | Performed by: STUDENT IN AN ORGANIZED HEALTH CARE EDUCATION/TRAINING PROGRAM

## 2024-03-03 PROCEDURE — 25010000002 DIAZEPAM PER 5 MG: Performed by: EMERGENCY MEDICINE

## 2024-03-03 PROCEDURE — 93005 ELECTROCARDIOGRAM TRACING: CPT

## 2024-03-03 PROCEDURE — 87637 SARSCOV2&INF A&B&RSV AMP PRB: CPT | Performed by: EMERGENCY MEDICINE

## 2024-03-03 PROCEDURE — 25810000003 SODIUM CHLORIDE 0.9 % SOLUTION: Performed by: STUDENT IN AN ORGANIZED HEALTH CARE EDUCATION/TRAINING PROGRAM

## 2024-03-03 PROCEDURE — 83605 ASSAY OF LACTIC ACID: CPT | Performed by: EMERGENCY MEDICINE

## 2024-03-03 PROCEDURE — 94640 AIRWAY INHALATION TREATMENT: CPT

## 2024-03-03 PROCEDURE — 71045 X-RAY EXAM CHEST 1 VIEW: CPT

## 2024-03-03 RX ORDER — IPRATROPIUM BROMIDE AND ALBUTEROL SULFATE 2.5; .5 MG/3ML; MG/3ML
3 SOLUTION RESPIRATORY (INHALATION)
Status: DISCONTINUED | OUTPATIENT
Start: 2024-03-03 | End: 2024-03-06

## 2024-03-03 RX ORDER — SODIUM CHLORIDE 9 MG/ML
100 INJECTION, SOLUTION INTRAVENOUS CONTINUOUS
Status: DISCONTINUED | OUTPATIENT
Start: 2024-03-03 | End: 2024-03-04

## 2024-03-03 RX ORDER — SODIUM CHLORIDE 0.9 % (FLUSH) 0.9 %
10 SYRINGE (ML) INJECTION EVERY 12 HOURS SCHEDULED
Status: DISCONTINUED | OUTPATIENT
Start: 2024-03-03 | End: 2024-03-08 | Stop reason: HOSPADM

## 2024-03-03 RX ORDER — CHOLECALCIFEROL (VITAMIN D3) 125 MCG
5 CAPSULE ORAL NIGHTLY PRN
Status: DISCONTINUED | OUTPATIENT
Start: 2024-03-03 | End: 2024-03-03

## 2024-03-03 RX ORDER — POLYETHYLENE GLYCOL 3350 17 G/17G
17 POWDER, FOR SOLUTION ORAL DAILY PRN
Status: DISCONTINUED | OUTPATIENT
Start: 2024-03-03 | End: 2024-03-03

## 2024-03-03 RX ORDER — DIAZEPAM 5 MG/ML
5 INJECTION, SOLUTION INTRAMUSCULAR; INTRAVENOUS ONCE
Status: COMPLETED | OUTPATIENT
Start: 2024-03-03 | End: 2024-03-03

## 2024-03-03 RX ORDER — CHOLECALCIFEROL (VITAMIN D3) 125 MCG
5 CAPSULE ORAL NIGHTLY PRN
Status: DISCONTINUED | OUTPATIENT
Start: 2024-03-03 | End: 2024-03-08 | Stop reason: HOSPADM

## 2024-03-03 RX ORDER — NITROGLYCERIN 0.4 MG/1
0.4 TABLET SUBLINGUAL
Status: DISCONTINUED | OUTPATIENT
Start: 2024-03-03 | End: 2024-03-08 | Stop reason: HOSPADM

## 2024-03-03 RX ORDER — LORAZEPAM 1 MG/1
1 TABLET ORAL EVERY 8 HOURS PRN
Status: ON HOLD | COMMUNITY
Start: 2024-02-23 | End: 2024-03-08

## 2024-03-03 RX ORDER — AMOXICILLIN 250 MG
2 CAPSULE ORAL 2 TIMES DAILY PRN
Status: DISCONTINUED | OUTPATIENT
Start: 2024-03-03 | End: 2024-03-03

## 2024-03-03 RX ORDER — DIVALPROEX SODIUM 125 MG/1
250 CAPSULE, COATED PELLETS ORAL 2 TIMES DAILY
COMMUNITY
Start: 2024-02-23

## 2024-03-03 RX ORDER — ACETAMINOPHEN 325 MG/1
650 TABLET ORAL EVERY 6 HOURS PRN
Status: DISCONTINUED | OUTPATIENT
Start: 2024-03-03 | End: 2024-03-03

## 2024-03-03 RX ORDER — BISACODYL 5 MG/1
5 TABLET, DELAYED RELEASE ORAL DAILY PRN
Status: DISCONTINUED | OUTPATIENT
Start: 2024-03-03 | End: 2024-03-06

## 2024-03-03 RX ORDER — ALUMINA, MAGNESIA, AND SIMETHICONE 2400; 2400; 240 MG/30ML; MG/30ML; MG/30ML
15 SUSPENSION ORAL EVERY 6 HOURS PRN
Status: DISCONTINUED | OUTPATIENT
Start: 2024-03-03 | End: 2024-03-03

## 2024-03-03 RX ORDER — ALUMINA, MAGNESIA, AND SIMETHICONE 2400; 2400; 240 MG/30ML; MG/30ML; MG/30ML
15 SUSPENSION ORAL EVERY 6 HOURS PRN
Status: DISCONTINUED | OUTPATIENT
Start: 2024-03-03 | End: 2024-03-08 | Stop reason: HOSPADM

## 2024-03-03 RX ORDER — SODIUM CHLORIDE 0.9 % (FLUSH) 0.9 %
10 SYRINGE (ML) INJECTION AS NEEDED
Status: DISCONTINUED | OUTPATIENT
Start: 2024-03-03 | End: 2024-03-08 | Stop reason: HOSPADM

## 2024-03-03 RX ORDER — BISACODYL 10 MG
10 SUPPOSITORY, RECTAL RECTAL DAILY PRN
Status: DISCONTINUED | OUTPATIENT
Start: 2024-03-03 | End: 2024-03-08 | Stop reason: HOSPADM

## 2024-03-03 RX ORDER — AMOXICILLIN 250 MG
2 CAPSULE ORAL 2 TIMES DAILY PRN
Status: DISCONTINUED | OUTPATIENT
Start: 2024-03-03 | End: 2024-03-08 | Stop reason: HOSPADM

## 2024-03-03 RX ORDER — VANCOMYCIN/0.9 % SOD CHLORIDE 1.5G/250ML
20 PLASTIC BAG, INJECTION (ML) INTRAVENOUS ONCE
Status: COMPLETED | OUTPATIENT
Start: 2024-03-03 | End: 2024-03-03

## 2024-03-03 RX ORDER — BISACODYL 10 MG
10 SUPPOSITORY, RECTAL RECTAL DAILY PRN
Status: DISCONTINUED | OUTPATIENT
Start: 2024-03-03 | End: 2024-03-03

## 2024-03-03 RX ORDER — BISACODYL 5 MG/1
5 TABLET, DELAYED RELEASE ORAL DAILY PRN
Status: DISCONTINUED | OUTPATIENT
Start: 2024-03-03 | End: 2024-03-03

## 2024-03-03 RX ORDER — ACETAMINOPHEN 325 MG/1
650 TABLET ORAL EVERY 6 HOURS PRN
Status: DISCONTINUED | OUTPATIENT
Start: 2024-03-03 | End: 2024-03-08 | Stop reason: HOSPADM

## 2024-03-03 RX ORDER — POLYETHYLENE GLYCOL 3350 17 G/17G
17 POWDER, FOR SOLUTION ORAL DAILY PRN
Status: DISCONTINUED | OUTPATIENT
Start: 2024-03-03 | End: 2024-03-08 | Stop reason: HOSPADM

## 2024-03-03 RX ORDER — SODIUM CHLORIDE 9 MG/ML
40 INJECTION, SOLUTION INTRAVENOUS AS NEEDED
Status: DISCONTINUED | OUTPATIENT
Start: 2024-03-03 | End: 2024-03-08 | Stop reason: HOSPADM

## 2024-03-03 RX ORDER — SENNOSIDES 8.6 MG
650 CAPSULE ORAL EVERY 8 HOURS PRN
COMMUNITY

## 2024-03-03 RX ADMIN — IPRATROPIUM BROMIDE AND ALBUTEROL SULFATE 3 ML: .5; 3 SOLUTION RESPIRATORY (INHALATION) at 23:19

## 2024-03-03 RX ADMIN — SODIUM CHLORIDE 100 ML/HR: 9 INJECTION, SOLUTION INTRAVENOUS at 22:20

## 2024-03-03 RX ADMIN — CEFEPIME 2000 MG: 2 INJECTION, POWDER, FOR SOLUTION INTRAVENOUS at 17:45

## 2024-03-03 RX ADMIN — DIAZEPAM 5 MG: 10 INJECTION, SOLUTION INTRAMUSCULAR; INTRAVENOUS at 17:27

## 2024-03-03 RX ADMIN — Medication 10 ML: at 22:20

## 2024-03-03 RX ADMIN — VANCOMYCIN HYDROCHLORIDE 1500 MG: 5 INJECTION, POWDER, LYOPHILIZED, FOR SOLUTION INTRAVENOUS at 19:58

## 2024-03-03 NOTE — ED PROVIDER NOTES
Time: 3:01 PM EST  Date of encounter:  3/3/2024  Independent Historian/Clinical History and Information was obtained by:   Family and Nursing Staff    History is limited by: N/A    Chief Complaint: Altered mental status, vomiting, weakness, fever      History of Present Illness:  Patient is a 84 y.o. year old male who presents to the emergency department for evaluation of altered mental status, vomiting, weakness and fever.  Patient presents to the emergency department from [facility] and rehab.  The patient has a history of dementia amongst many other medical issues.  The patient presented today because he had a fever and altered mental status.  The staff there states that he was not himself and has been more lethargic than normal.  In addition the patient is tube fed only and developed some vomiting today.  He denies any pain at this time and currently is alert and disoriented.    HPI    Patient Care Team  Primary Care Provider: Frank Llanes MD    Past Medical History:     No Known Allergies  Past Medical History:   Diagnosis Date    Acute renal failure with pathological lesion in kidney     Alzheimer's dementia with behavioral disturbance     Anemia, vitamin B12 deficiency     Anxiety     Anxiety disorder due to known physiological condition     Arthritis     Benign neoplasm of prostate     Cannot walk     CHF (congestive heart failure)     Constipation     Coronary atherosclerosis of native coronary artery     CVA (cerebral vascular accident)     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 (CVA)     Edema     Essential hypertension, benign     Gastroesophageal reflux disease without esophagitis     GERD (gastroesophageal reflux disease)     High blood pressure     Hirsutism     HTN (hypertension)     Hypercalcemia     Hyperkalemia     Hyperlipemia     Hyperlipidemia     Hypokalemia     Ingrowing toenail 09/21/2018    Left foot pain 09/21/2018    Nutritional deficiency     Osteoarthritis of  right knee 08/22/2016    Pneumonia     Presence of right artificial knee joint     Primary localized osteoarthrosis     PVD (peripheral vascular disease)     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     Right foot pain 09/21/2018    Senile dementia, uncomplicated     Severe sinus bradycardia     Shock, septic     Sleep apnea     Thrombosis of left femoral vein     Tinea unguium 09/21/2018    Vitamin B12 deficiency anemia      Past Surgical History:   Procedure Laterality Date    ENDOSCOPY W/ PEG TUBE PLACEMENT N/A 6/8/2022    Procedure: ESOPHAGOGASTRODUODENOSCOPY WITH PERCUTANEOUS ENDOSCOPIC GASTROSTOMY TUBE INSERTION WITH ANESTHESIA;  Surgeon: Yanelis Mistry MD;  Location: [facility] ENDOSCOPY;  Service: Gastroenterology;  Laterality: N/A;  PEG TUBE PLACEMENT    ENDOSCOPY W/ PEG TUBE PLACEMENT N/A 7/3/2022    Procedure: ESOPHAGOGASTRODUODENOSCOPY WITH PERCUTANEOUS ENDOSCOPIC GASTROSTOMY TUBE INSERTION WITH ANESTHESIA;  Surgeon: Bam Melo MD;  Location: [facility] ENDOSCOPY;  Service: Gastroenterology;  Laterality: N/A;  PEG TUBE PLACEMENT    ENDOSCOPY W/ PEG TUBE PLACEMENT N/A 10/26/2022    Procedure: ESOPHAGOGASTRODUODENOSCOPY WITH PERCUTANEOUS ENDOSCOPIC GASTROSTOMY TUBE INSERTION WITH ANESTHESIA;  Surgeon: Yanelis Mistry MD;  Location: [facility] ENDOSCOPY;  Service: Gastroenterology;  Laterality: N/A;  PERCUTANEOUS ENDOSCOPIC GASTROSTOMY TUBE PLACEMENT     Family History   Family history unknown: Yes       Home Medications:  Prior to Admission medications    Medication Sig Start Date End Date Taking? Authorizing Provider   acetaminophen (Tylenol 8 Hour Arthritis Pain) 650 MG 8 hr tablet Take 1 tablet by mouth Every 4 (Four) Hours As Needed for Mild Pain (Max of 3 Gms per 24 hours).    Provider, MD Nanci   aluminum-magnesium hydroxide-simethicone (MAALOX/MYLANTA) 200-200-20 MG/5ML suspension Administer 30 mL per G tube Every 6 (Six) Hours As Needed for Indigestion  or Heartburn.    Nanci Shafer MD   apixaban (ELIQUIS) 5 MG tablet tablet Administer 1 tablet per G tube 2 (Two) Times a Day. Indications: DVT/PE (active thrombosis) 10/27/22   Thiago Soares MD   aspirin 81 MG chewable tablet Chew 1 tablet Daily.  Patient taking differently: Administer 1 tablet per G tube Daily. 4/19/22   Aranza Herzog MD   busPIRone (BUSPAR) 10 MG tablet Administer 1 tablet per G tube 3 (Three) Times a Day.    Nanci Shafer MD   cyanocobalamin 1000 MCG/ML injection Inject 1 mL into the appropriate muscle as directed by prescriber Every 30 (Thirty) Days. On the 15th of the month    Nanci Shafer MD   docusate sodium (COLACE) 50 mg/5 mL liquid Administer 25 mL per G tube Daily.    Nanci Shafer MD   famotidine (PEPCID) 40 MG tablet Take 1 tablet by mouth Every Night.    Nanci Shafer MD   furosemide (Lasix) 20 MG tablet Take 1 tablet by mouth Daily. 3/3/22   Eric Rivas MD   hydrOXYzine (ATARAX) 25 MG tablet Administer 1 tablet per G tube Every 6 (Six) Hours As Needed for Itching. 11/9/23   Nanci Shafer MD   ipratropium-albuterol (DUO-NEB) 0.5-2.5 mg/3 ml nebulizer Take 3 mL by nebulization Every 4 (Four) Hours As Needed for Wheezing.    Nanci Shafer MD   melatonin 5 MG tablet tablet Administer 1 tablet per G tube every night at bedtime.    Nanci Shafer MD   metoprolol tartrate (LOPRESSOR) 25 MG tablet Administer 0.5 tablets per G tube Every 12 (Twelve) Hours. 10/27/22   Thiago Soares MD   polyethylene glycol (MIRALAX) 17 GM/SCOOP powder Administer 17 g per G tube Daily.    Nanci Shafer MD        Social History:   Social History     Tobacco Use    Smoking status: Never    Smokeless tobacco: Never   Vaping Use    Vaping status: Never Used   Substance Use Topics    Alcohol use: No    Drug use: Never         Review of Systems:  Review of Systems   Constitutional:  Positive for activity change and appetite  "change. Negative for chills and fever.   HENT:  Negative for congestion, ear pain and sore throat.    Eyes:  Negative for pain.   Respiratory:  Negative for cough, chest tightness and shortness of breath.    Cardiovascular:  Negative for chest pain.   Gastrointestinal:  Positive for vomiting. Negative for abdominal pain, diarrhea and nausea.   Genitourinary:  Negative for flank pain and hematuria.   Musculoskeletal:  Negative for joint swelling.   Skin:  Negative for pallor.   Neurological:  Positive for weakness. Negative for seizures and headaches.   Psychiatric/Behavioral:  Positive for confusion.    All other systems reviewed and are negative.       Physical Exam:  /54 (BP Location: Right arm, Patient Position: Lying)   Pulse 89   Temp 98.3 °F (36.8 °C) (Oral)   Resp 18   Ht 162.6 cm (64.02\")   Wt 76.1 kg (167 lb 12.3 oz)   SpO2 96%   BMI 28.78 kg/m²     Physical Exam  Vitals and nursing note reviewed.   Constitutional:       General: He is not in acute distress.     Appearance: Normal appearance. He is not toxic-appearing.   HENT:      Head: Normocephalic and atraumatic.      Mouth/Throat:      Mouth: Mucous membranes are moist.   Eyes:      General: No scleral icterus.  Cardiovascular:      Rate and Rhythm: Normal rate and regular rhythm.      Pulses: Normal pulses.      Heart sounds: Normal heart sounds.   Pulmonary:      Effort: Pulmonary effort is normal. No respiratory distress.      Breath sounds: Normal breath sounds.   Abdominal:      General: Abdomen is flat.      Palpations: Abdomen is soft.      Tenderness: There is no abdominal tenderness.   Musculoskeletal:         General: Normal range of motion.      Cervical back: Normal range of motion and neck supple.   Skin:     General: Skin is warm and dry.   Neurological:      Mental Status: He is lethargic and disoriented.      Comments: Generalized weakness                  Procedures:  Procedures      Medical Decision " Making:      Comorbidities that affect care:    Dementia    External Notes reviewed:    Previous Admission Note: Prior admission for sepsis in November 2023.      The following orders were placed and all results were independently analyzed by me:  Orders Placed This Encounter   Procedures    COVID-19, FLU A/B, RSV PCR 1 HR TAT - Swab, Nasopharynx    Blood Culture - Blood,    Blood Culture - Blood,    Urine Culture - Urine,    MRSA Screen, PCR (Inpatient) - Swab, Nares    Blood Culture - Blood,    Blood Culture - Blood,    XR Chest 1 View    CT Head Without Contrast    CT Chest Without Contrast Diagnostic    CT Abdomen Pelvis Without Contrast    [place] Draw    Comprehensive Metabolic Panel    Single High Sensitivity Troponin T    Magnesium    CBC Auto Differential    ABG with Co-Ox and Electrolytes    Urinalysis With Culture If Indicated - Straight Cath    Lactic Acid, Plasma    Urinalysis, Microscopic Only - Urine, Clean Catch    Basic Metabolic Panel    Procalcitonin    CBC Auto Differential    Vancomycin, Random    Basic Metabolic Panel    Magnesium    Phosphorus    Hepatic Function Panel    NPO Diet NPO Type: Strict NPO    Undress & Gown    Continuous Pulse Oximetry    Vital Signs    Orthostatic Blood Pressure    Vital Signs    Intake & Output    Weigh Patient    Oral Care    Telemetry - Maintain IV Access    Telemetry - Place Orders & Notify Provider of Results When Patient Experiences Acute Chest Pain, Dysrhythmia or Respiratory Distress    Richardson and Document Tube Depth (in cm)    Verify Tube Placement Upon Insertion & As Needed    Elevate Head Of Bed 30-45 Degrees    Oral Care    Notify Provider - Abdominal Discomfort, Pain or Distention, No Bowel Movement in 3 Days    Residual Volume Assessment - Gastric Feedings    Administer Tube Feeding Via Feeding Tube Already in Place    Code Status and Medical Interventions:    Hospitalist (on-call MD unless specified)    Inpatient Nutrition Consult    Inpatient Case  Management  Consult    Inpatient Nutrition Consult    Inpatient Urology Consult    Diet, Tube Feeding Tube Feeding Formula: Isosource 1.5; Tube Feeding Type: Continuous; Continuous Tube Feeding Start Rate (mL/hr): 25; Then Advance Rate By (mL/hr): 25; Every __ Hours: 4; To Goal Rate of (mL/hr): 60    OT Consult: Eval & Treat ADL Performance Below Baseline    PT Consult: Eval & Treat Functional Mobility Below Baseline    Oxygen Therapy- Nasal Cannula; Titrate 1-6 LPM Per SpO2; 90 - 95%    SLP Consult: Eval & Treat Swallow Disorder    SLP Consult: Eval & Treat Swallow Disorder    POC Glucose Once    POC Glucose Once    POC Glucose Q6H    POC Glucose Once    ECG 12 Lead ED Triage Standing Order; Weak / Dizzy / AMS    Wound Ostomy Eval & Treat    Insert Peripheral IV    Insert Peripheral IV    Inpatient Admission    Fall Precautions    Aspiration Precautions    Fall Precautions    CBC & Differential    Green Top (Gel)    Lavender Top    Gold Top - SST    Light Blue Top    CBC & Differential       Medications Given in the Emergency Department:  Medications   sodium chloride 0.9 % flush 10 mL (has no administration in time range)   sodium chloride 0.9 % flush 10 mL (10 mL Intravenous Not Given 3/4/24 0802)   sodium chloride 0.9 % flush 10 mL (has no administration in time range)   sodium chloride 0.9 % infusion 40 mL (has no administration in time range)   nitroglycerin (NITROSTAT) SL tablet 0.4 mg (has no administration in time range)   ipratropium-albuterol (DUO-NEB) nebulizer solution 3 mL (3 mL Nebulization Given 3/4/24 1133)   Pharmacy to Dose Cefepime (has no administration in time range)   cefepime (MAXIPIME) 2000 mg/100 mL 0.9% NS (mbp) (2,000 mg Intravenous New Bag 3/4/24 9734)   acetaminophen (TYLENOL) tablet 650 mg (has no administration in time range)   aluminum-magnesium hydroxide-simethicone (MAALOX MAX) 400-400-40 MG/5ML suspension 15 mL (has no administration in time range)    sennosides-docusate (PERICOLACE) 8.6-50 MG per tablet 2 tablet (has no administration in time range)     And   polyethylene glycol (MIRALAX) packet 17 g (has no administration in time range)     And   bisacodyl (DULCOLAX) EC tablet 5 mg (has no administration in time range)     And   bisacodyl (DULCOLAX) suppository 10 mg (has no administration in time range)   melatonin tablet 5 mg (has no administration in time range)   sodium chloride 0.9 % infusion (75 mL/hr Intravenous New Bag 3/4/24 0811)   Pharmacy to dose vancomycin (has no administration in time range)   vancomycin 1250 mg/250 mL 0.9% NS IVPB ([facility]) (has no administration in time range)   cefepime (MAXIPIME) 2000 mg/100 mL 0.9% NS (mbp) (0 mg Intravenous Stopped 3/3/24 1815)   vancomycin IVPB 1500 mg in 0.9% NaCl (Premix) 500 mL (1,500 mg Intravenous New Bag 3/3/24 1958)   diazePAM (VALIUM) injection 5 mg (5 mg Intravenous Given 3/3/24 1727)        ED Course:         Labs:    Lab Results (last 24 hours)       Procedure Component Value Units Date/Time    POC Glucose Once [[id number]]  (Normal) Collected: 03/03/24 1412    Specimen: Blood Updated: 03/03/24 1632     Glucose 81 mg/dL      Comment: Serial Number: [id number]:  [id number]       ABG with Co-Ox and Electrolytes [[id number]]  (Abnormal) Collected: 03/03/24 1520    Specimen: Arterial Blood from Arm, Left Updated: 03/03/24 1523     pH, Arterial 7.437 pH units      pCO2, Arterial 40.6 mm Hg      pO2, Arterial 69.5 mm Hg      HCO3, Arterial 26.8 mmol/L      Base Excess, Arterial 2.4 mmol/L      O2 Saturation, Arterial 93.2 %      Hemoglobin, Blood Gas 13.1 g/dL      Carboxyhemoglobin 0.7 %      Methemoglobin 0.10 %      Oxyhemoglobin 92.5 %      FHHB 6.7 %      Mitchel's Test Positive     Site Arterial: left radial     Modality Room Air     FIO2 21 %      Sodium, Arterial 127.0 mmol/L      Potassium, Arterial 5.55 mmol/L      Ionized Calcium, Arterial 1.20 mmol/L      Chloride, Arterial 93 mmol/L       Glucose, Arterial 84 mg/dL      Lactate, Arterial 0.91 mmol/L      PO2/FIO2 331    COVID-19, FLU A/B, RSV PCR 1 HR TAT - Swab, Nasopharynx [[id number]]  (Normal) Collected: 03/03/24 1548    Specimen: Swab from Nasopharynx Updated: 03/03/24 1649     COVID19 Not Detected     Influenza A PCR Not Detected     Influenza B PCR Not Detected     RSV, PCR Not Detected    Narrative:      Fact sheet for providers: https://www.fda.gov/media/830563/download    Fact sheet for patients: https://www.fda.gov/media/136828/download    Test performed by PCR.    Urinalysis With Culture If Indicated - Straight Cath [[id number]]  (Abnormal) Collected: 03/03/24 1601    Specimen: Urine from Straight Cath Updated: 03/03/24 1614     Color, UA Yellow     Appearance, UA Clear     pH, UA 8.0     Specific Gravity, UA 1.012     Glucose, UA Negative     Ketones, UA Negative     Bilirubin, UA Negative     Blood, UA Moderate (2+)     Protein, UA Negative     Leuk Esterase, UA Large (3+)     Nitrite, UA Negative     Urobilinogen, UA 1.0 E.U./dL    Narrative:      In absence of clinical symptoms, the presence of pyuria, bacteria, and/or nitrites on the urinalysis result does not correlate with infection.    Urinalysis, Microscopic Only - Straight Cath [[id number]]  (Abnormal) Collected: 03/03/24 1601    Specimen: Urine from Straight Cath Updated: 03/03/24 1625     RBC, UA 21-50 /HPF      WBC, UA 21-50 /HPF      Bacteria, UA None Seen /HPF      Squamous Epithelial Cells, UA 0-2 /HPF      Yeast, UA Small/1+ Yeast /HPF      Hyaline Casts, UA 0-2 /LPF      Methodology Manual Light Microscopy    Urine Culture - Urine, Straight Cath [[id number]]  (Normal) Collected: 03/03/24 1601    Specimen: Urine from Straight Cath Updated: 03/04/24 1148     Urine Culture No growth    Blood Culture - Blood, Arm, Left [[id number]]  (Abnormal) Collected: 03/03/24 1652    Specimen: Blood from Arm, Left Updated: 03/04/24 1309     Blood Culture Abnormal Stain     Gram Stain Aerobic  "Bottle Gram positive cocci in clusters    Blood Culture - Blood, Arm, Left [[id number]] Collected: 03/03/24 1743    Specimen: Blood from Arm, Left Updated: 03/03/24 1750    Lactic Acid, Plasma [[id number]]  (Normal) Collected: 03/03/24 1743    Specimen: Blood Updated: 03/03/24 1809     Lactate 1.2 mmol/L     Basic Metabolic Panel [[id number]]  (Abnormal) Collected: 03/03/24 2000    Specimen: Blood Updated: 03/03/24 2041     Glucose 84 mg/dL      BUN 26 mg/dL      Creatinine 0.89 mg/dL      Sodium 129 mmol/L      Potassium 5.4 mmol/L      Chloride 93 mmol/L      CO2 26.3 mmol/L      Calcium 9.7 mg/dL      BUN/Creatinine Ratio 29.2     Anion Gap 9.7 mmol/L      eGFR 85.0 mL/min/1.73     Narrative:      GFR Normal >60  Chronic Kidney Disease <60  Kidney Failure <15    The GFR formula is only valid for adults with stable renal function between ages 18 and 70.    Procalcitonin [[id number]]  (Normal) Collected: 03/03/24 2000    Specimen: Blood Updated: 03/03/24 2225     Procalcitonin 0.07 ng/mL     Narrative:      As a Marker for Sepsis (Non-Neonates):    1. <0.5 ng/mL represents a low risk of severe sepsis and/or septic shock.  2. >2 ng/mL represents a high risk of severe sepsis and/or septic shock.    As a Marker for Lower Respiratory Tract Infections that require antibiotic therapy:    PCT on Admission    Antibiotic Therapy       6-12 Hrs later    >0.5                Strongly Recommended  >0.25 - <0.5        Recommended  0.1 - 0.25          Discouraged              Remeasure/reassess PCT  <0.1                Strongly Discouraged     Remeasure/reassess PCT    As 28 day mortality risk marker: \"Change in Procalcitonin Result\" (>80% or <=80%) if Day 0 (or Day 1) and Day 4 values are available. Refer to http://www.[facility]-pct-calculator.com    Change in PCT <=80%  A decrease of PCT levels below or equal to 80% defines a positive change in PCT test result representing a higher risk for 28-day all-cause mortality of patients " diagnosed with severe sepsis for septic shock.    Change in PCT >80%  A decrease of PCT levels of more than 80% defines a negative change in PCT result representing a lower risk for 28-day all-cause mortality of patients diagnosed with severe sepsis or septic shock.    This test is Prognostic not Diagnostic, if elevated correlate with clinical findings before administering antibiotic treatment.        Basic Metabolic Panel [[id number]]  (Abnormal) Collected: 03/03/24 2225    Specimen: Blood from Arm, Left Updated: 03/03/24 2315     Glucose 80 mg/dL      BUN 24 mg/dL      Creatinine 0.90 mg/dL      Sodium 128 mmol/L      Potassium 5.4 mmol/L      Comment: Slight hemolysis detected by analyzer. Result may be falsely elevated.        Chloride 94 mmol/L      CO2 22.9 mmol/L      Calcium 9.5 mg/dL      BUN/Creatinine Ratio 26.7     Anion Gap 11.1 mmol/L      eGFR 84.7 mL/min/1.73     Narrative:      GFR Normal >60  Chronic Kidney Disease <60  Kidney Failure <15    The GFR formula is only valid for adults with stable renal function between ages 18 and 70.    Magnesium [[id number]]  (Normal) Collected: 03/03/24 2225    Specimen: Blood from Arm, Left Updated: 03/03/24 2315     Magnesium 1.8 mg/dL     MRSA Screen, PCR (Inpatient) - Swab, Nares [[id number]]  (Normal) Collected: 03/04/24 0328    Specimen: Swab from Nares Updated: 03/04/24 0457     MRSA PCR No MRSA Detected    Narrative:      The negative predictive value of this diagnostic test is high and should only be used to consider de-escalating anti-MRSA therapy. A positive result may indicate colonization with MRSA and must be correlated clinically.    CBC & Differential [[id number]]  (Abnormal) Collected: 03/04/24 0522    Specimen: Blood from Arm, Left Updated: 03/04/24 0559    Narrative:      The following orders were created for panel order CBC & Differential.  Procedure                               Abnormality         Status                     ---------                                -----------         ------                     CBC Auto Differential[[id number]]        Abnormal            Final result                 Please view results for these tests on the individual orders.    CBC Auto Differential [[id number]]  (Abnormal) Collected: 03/04/24 0522    Specimen: Blood from Arm, Left Updated: 03/04/24 0559     WBC 8.98 10*3/mm3      RBC 3.94 10*6/mm3      Hemoglobin 11.4 g/dL      Hematocrit 32.0 %      MCV 81.2 fL      MCH 28.9 pg      MCHC 35.6 g/dL      RDW 12.2 %      RDW-SD 35.6 fl      MPV 10.7 fL      Platelets 371 10*3/mm3      Neutrophil % 64.6 %      Lymphocyte % 18.6 %      Monocyte % 16.0 %      Eosinophil % 0.3 %      Basophil % 0.3 %      Immature Grans % 0.2 %      Neutrophils, Absolute 5.79 10*3/mm3      Lymphocytes, Absolute 1.67 10*3/mm3      Monocytes, Absolute 1.44 10*3/mm3      Eosinophils, Absolute 0.03 10*3/mm3      Basophils, Absolute 0.03 10*3/mm3      Immature Grans, Absolute 0.02 10*3/mm3      nRBC 0.0 /100 WBC     Vancomycin, Random [[id number]]  (Normal) Collected: 03/04/24 0522    Specimen: Blood from Arm, Left Updated: 03/04/24 0710     Vancomycin Random 17.33 mcg/mL     Narrative:      Therapeutic Ranges for Vancomycin    Vancomycin Random   5.0-40.0 mcg/mL  Vancomycin Trough   5.0-20.0 mcg/mL  Vancomycin Peak     20.0-40.0 mcg/mL    POC Glucose Once [[id number]]  (Normal) Collected: 03/04/24 1042    Specimen: Blood Updated: 03/04/24 1043     Glucose 74 mg/dL      Comment: Serial Number: [id number]:  [id number]       POC Glucose Q6H [[id number]]  (Normal) Collected: 03/04/24 1332    Specimen: Blood Updated: 03/04/24 1333     Glucose 81 mg/dL      Comment: Serial Number: [id number]:  [id number]                Imaging:    CT Abdomen Pelvis Without Contrast    Result Date: 3/4/2024  PROCEDURE: CT ABDOMEN PELVIS WO CONTRAST  COMPARISON: [facility], CT, CT CHEST WO CONTRAST DIAGNOSTIC, 11/10/2023, 4:37.  [facility],  CT, CT ABDOMEN PELVIS WO CONTRAST, 7/02/2022, 9:09.  [facility], CT, CT CHEST WO CONTRAST DIAGNOSTIC, 3/03/2024, 17:11.  INDICATIONS: Gross hematuria  TECHNIQUE: CT images were created without intravenous contrast.   PROTOCOL:   Standard imaging protocol performed    RADIATION:   DLP: 669mGy*cm   Automated exposure control was utilized to minimize radiation dose.  FINDINGS:  The heart size is normal.  There is no pericardial effusion.  There is bandlike atelectasis within the bilateral lower lobes.  The gallbladder is present with multiple gallstones.  There is no intrahepatic or extrahepatic biliary ductal dilatation.  The spleen is normal in size.  The adrenal glands appear within normal limits.  Normal appearance of the pancreas by noncontrast technique.  The kidneys are symmetric in size.  There is a 6 mm stone within the lower pole of the left kidney.  There is an intermediate density exophytic lesion measuring 1 cm arising from the lateral aspect of the upper pole of the left kidney.  This has Hounsfield unit density of 38. This appears stable in size when compared to 7/2/2022.  The urinary bladder is fluid filled without focal wall thickening.  There are small diverticula arising from the anterior aspect of the urinary bladder.  There is prostatomegaly.  The stomach and duodenum are normal in caliber and configuration.  There is a G-tube present within the lumen of the stomach.  There are closely approximated loops of small bowel and colon to the tubing.  There is no evidence of bowel obstruction or inflammation.  The appendix is visualized and normal within the right lower quadrant.  There is a moderate to large colonic stool burden.  There is no free fluid or free air.  The aorta is normal in caliber without evidence of aneurysm formation.  There is no abdominal or pelvic lymphadenopathy.  There are degenerative changes of the thoracolumbar spine.        1. Nonobstructing 6 mm stone within the  lower pole of the left kidney.  No associated hydronephrosis. 2. No discrete bladder wall thickening.  Multiple small bladder diverticula are present.  Prostatomegaly. 3. Small 1 cm exophytic lesion arising from the superior lateral aspect of the left kidney with intermediate Hounsfield unit density.  This is likely a hemorrhagic or proteinaceous cyst and is unchanged from prior CT dated 7/2/2022.      ISAIAH LOZANO MD       Electronically Signed and Approved By: ISAIAH LOZANO MD on 3/04/2024 at 8:20             CT Chest Without Contrast Diagnostic    Result Date: 3/3/2024  PROCEDURE: CT CHEST WO CONTRAST DIAGNOSTIC  COMPARISON: [facility], CT, CT CHEST WO CONTRAST DIAGNOSTIC, 11/10/2023, 4:37.  INDICATIONS: EVAR, recent aspiration possibility, cough  TECHNIQUE: CT images were created without the administration of contrast material.   PROTOCOL:   Standard imaging protocol performed    RADIATION:   DLP: 548mGy*cm   Automated exposure control was utilized to minimize radiation dose.  FINDINGS:  Iraida/mediastinum:  No adenopathy.  Thoracic aorta normal in caliber.  Coronary artery calcification.  No pericardial effusion  Lungs/pleura:  Distortion of the lungs due to patient motion.  No infiltrate demonstrated.  No pleural effusion  Upper abdomen:  Numerous stones in the gallbladder.  6 mm left renal calculus  Bones/soft tissues:  No acute bony abnormality.  Scoliosis with prominent degenerative disease in the lower cervical and lower thoracic spine         1. Motion limited exam demonstrating no evidence of pneumonia or other acute pulmonary abnormality  2. Coronary artery atherosclerosis  3. Cholelithiasis and left nephrolithiasis     JAMES IBANEZ MD       Electronically Signed and Approved By: JAMES IBANEZ MD on 3/03/2024 at 17:50             CT Head Without Contrast    Result Date: 3/3/2024  PROCEDURE: CT HEAD WO CONTRAST  COMPARISON:  [facility], CT, CT HEAD WO CONTRAST,  5/30/2022, 16:35. INDICATIONS: Altered mental status  PROTOCOL:   Standard imaging protocol performed    RADIATION:   DLP: 1081.2mGy*cm   MA and/or KV was adjusted to minimize radiation dose.     TECHNIQUE: After obtaining the patient's consent, CT images were obtained without non-ionic intravenous contrast material.  FINDINGS:  There has been frontal craniotomy.  There are several chronic metallic bodies in the anterior interhemispheric fissure.  There is no intracranial hemorrhage, edema, or mass effect.  There is bilateral inferior frontal lobe encephalomalacia.  There is stable generalized atrophy.  There is no abnormal extra-axial fluid collection.  There is fluid and mucosal thickening in the left maxillary, left sphenoid, and ethmoid sinuses, with mucous in the right frontal, ethmoid, and sphenoid sinuses         1. No acute intracranial process  2. Sinusitis     JAMES IBANEZ MD       Electronically Signed and Approved By: JAMES IBANEZ MD on 3/03/2024 at 17:33             XR Chest 1 View    Result Date: 3/3/2024  PROCEDURE: XR CHEST 1 VW  COMPARISON: [facility], CT, CT CHEST WO CONTRAST DIAGNOSTIC, 11/10/2023, 4:37.  INDICATIONS: Weak/Dizzy/AMS triage protocol  FINDINGS:  There is incomplete inspiration.  Heart size is indeterminate.  Pulmonary vasculature is likely normal when accounting for lung volumes.  There is strandy airspace opacities in the lung bases.  There is no asymmetric infiltrate or definite pulmonary edema.  Costophrenic angles are sharp       Low lung volumes with bibasilar atelectasis       JAMES IBANEZ MD       Electronically Signed and Approved By: JAMES IBANEZ MD on 3/03/2024 at 14:27                Differential Diagnosis and Discussion:    Altered Mental Status: Based on the patient's signs and symptoms, differential diagnosis includes but is not limited to meningitis, stroke, sepsis, subarachnoid hemorrhage, intracranial bleeding, encephalitis, and metabolic  encephalopathy.    All labs were reviewed and interpreted by me.    MDM     Amount and/or Complexity of Data Reviewed  Clinical lab tests: reviewed  Tests in the radiology section of CPT®: reviewed  Tests in the medicine section of CPT®: reviewed  Decide to obtain previous medical records or to obtain history from someone other than the patient: yes                 Patient Care Considerations:    SEPSIS was considered but is NOT present in the emergency department as SIRS criteria is not present.      Consultants/Shared Management Plan:    Hospitalist: I have discussed the case with hospitalist who agrees to accept the patient for admission.    Social Determinants of Health:    Patient is unable to carry out activities of daily life. Escalation of care is necessary.       Disposition and Care Coordination:    Admit:   Through independent evaluation of the patient's history, physical, and imperical data, the patient meets criteria for inpatient admission to the hospital.        Final diagnoses:   Altered mental status, unspecified altered mental status type   Acute UTI   Sepsis, due to unspecified organism, unspecified whether acute organ dysfunction present        ED Disposition       ED Disposition   Decision to Admit    Condition   --    Comment   Level of Care: Telemetry [5]   Diagnosis: Acute metabolic encephalopathy [[id number]]   Admitting Physician: MILES VAZQUEZ [[id number]]   Attending Physician: MILES VAZQUEZ [[id number]]   Certification: I Certify That Inpatient Hospital Services Are Medically Necessary For Greater Than 2 Midnights                 This medical record created using voice recognition software.             Saran Spears,   03/04/24 1639

## 2024-03-03 NOTE — H&P
[facility]   HOSPITALIST HISTORY AND PHYSICAL  Date: 3/3/2024   Patient Name: Jordin Menon  : 1940  MRN: [id number]  Primary Care Physician:  Frank Llanes MD  Date of admission: 3/3/2024    Subjective   Subjective     Chief Complaint: Altered mental status    HPI:    Jordin Menon is a 84 y.o. 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  Patient unable to provide history and history supplemented by discussion with ER staff.  There is no family available at the bedside to supplement.  Appears per discussion with ER staff patient was at nursing home and had increased lethargy and minimal activity today which is unlike his baseline and they referred him to the ER for evaluation    Upon arrival here he was found to be intermittently tachycardic but otherwise stable and afebrile.  Lab workup was revealing of hyperkalemia of 6.1 and urinalysis showed some pyuria and hematuria.  Troponin was mildly elevated at 46.  Chest x-ray showed bibasilar atelectasis.  Flu COVID and RSV were negative.  CT of the head was negative.  CT of the chest was done due to concern of patient having potential pneumonia and rhonchi.  This did not show any clear consolidation however there was significant motion artifact.  Patient was given broad-spectrum antibiotics for suspected pneumonia as well as Valium for unclear reason.  Hospitalist was then contacted for admission of encephalopathy of unclear etiology.  Some exam patient is verbalizing incomprehensible words.      Personal History     Past Medical History:  Past Medical History:   Diagnosis Date    Acute renal failure with pathological lesion in kidney     Alzheimer's dementia with behavioral disturbance     Anemia, vitamin  B12 deficiency     Anxiety     Anxiety disorder due to known physiological condition     Arthritis     Benign neoplasm of prostate     Cannot walk     CHF (congestive heart failure)     Constipation     Coronary atherosclerosis of native coronary artery     CVA (cerebral vascular accident)     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 (CVA)     Edema     Essential hypertension, benign     Gastroesophageal reflux disease without esophagitis     GERD (gastroesophageal reflux disease)     High blood pressure     Hirsutism     HTN (hypertension)     Hypercalcemia     Hyperkalemia     Hyperlipemia     Hyperlipidemia     Hypokalemia     Ingrowing toenail 09/21/2018    Left foot pain 09/21/2018    Nutritional deficiency     Osteoarthritis of right knee 08/22/2016    Pneumonia     Presence of right artificial knee joint     Primary localized osteoarthrosis     PVD (peripheral vascular disease)     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     Right foot pain 09/21/2018    Senile dementia, uncomplicated     Severe sinus bradycardia     Shock, septic     Sleep apnea     Thrombosis of left femoral vein     Tinea unguium 09/21/2018    Vitamin B12 deficiency anemia          Past Surgical History:  Past Surgical History:   Procedure Laterality Date    ENDOSCOPY W/ PEG TUBE PLACEMENT N/A 6/8/2022    Procedure: ESOPHAGOGASTRODUODENOSCOPY WITH PERCUTANEOUS ENDOSCOPIC GASTROSTOMY TUBE INSERTION WITH ANESTHESIA;  Surgeon: Yanelis Mistry MD;  Location: [facility] ENDOSCOPY;  Service: Gastroenterology;  Laterality: N/A;  PEG TUBE PLACEMENT    ENDOSCOPY W/ PEG TUBE PLACEMENT N/A 7/3/2022    Procedure: ESOPHAGOGASTRODUODENOSCOPY WITH PERCUTANEOUS ENDOSCOPIC GASTROSTOMY TUBE INSERTION WITH ANESTHESIA;  Surgeon: Bam Melo MD;  Location: [facility] ENDOSCOPY;  Service: Gastroenterology;  Laterality: N/A;  PEG TUBE PLACEMENT    ENDOSCOPY W/ PEG TUBE PLACEMENT N/A 10/26/2022    Procedure:  ESOPHAGOGASTRODUODENOSCOPY WITH PERCUTANEOUS ENDOSCOPIC GASTROSTOMY TUBE INSERTION WITH ANESTHESIA;  Surgeon: Yanelis Mistry MD;  Location: [facility] ENDOSCOPY;  Service: Gastroenterology;  Laterality: N/A;  PERCUTANEOUS ENDOSCOPIC GASTROSTOMY TUBE PLACEMENT         Family History:   Reviewed and noncontributory except as mentioned in HPI    Social History:   Social Determinants of Health     Tobacco Use: Low Risk  (3/3/2024)    Patient History     Smoking Tobacco Use: Never     Smokeless Tobacco Use: Never     Passive Exposure: Not on file   Alcohol Use: Not At Risk (11/10/2023)    AUDIT-C     Frequency of Alcohol Consumption: Never     Average Number of Drinks: Patient does not drink     Frequency of Binge Drinking: Never   Financial Resource Strain: Not on file   Food Insecurity: No Food Insecurity (11/11/2023)    Hunger Vital Sign     Worried About Running Out of Food in the Last Year: Never true     Ran Out of Food in the Last Year: Never true   Transportation Needs: Not on file   Physical Activity: Not on file   Stress: Not on file   Social Connections: Unknown (10/13/2023)    Family and Community Support     Help with Day-to-Day Activities: Not on file     Lonely or Isolated: Not on file   Interpersonal Safety: Not At Risk (3/3/2024)    Abuse Screen     Unsafe at Home or Work/School: no     Feels Threatened by Someone?: no     Does Anyone Keep You from Contacting Others or Doint Things Outside the Home?: no     Physical Sign of Abuse Present: no   Depression: Not on file   Housing Stability: Not At Risk (11/13/2023)    Housing Stability     Current Living Arrangements: extended care facility     Potentially Unsafe Housing Conditions: none   Utilities: Not At Risk (11/11/2023)    [facility] Utilities     Threatened with loss of utilities: No   Health Literacy: Unknown (11/11/2023)    Education     Help with school or training?: Not on file     Preferred Language: English   Employment: Unknown (10/13/2023)     Employment     Do you want help finding or keeping work or a job?: Not on file   Disabilities: At Risk (11/10/2023)    Disabilities     Concentrating, Remembering, or Making Decisions Difficulty: yes     Doing Errands Independently Difficulty: yes         Home Medications:  acetaminophen, aluminum-magnesium hydroxide-simethicone, apixaban, aspirin, busPIRone, cyanocobalamin, docusate sodium, famotidine, furosemide, hydrOXYzine, ipratropium-albuterol, melatonin, metoprolol tartrate, and polyethylene glycol    Allergies:  No Known Allergies    Review of Systems   Unable to obtain due to patient's chronic dementia and altered mental status    Objective   Objective     Vitals:   Temp:  [100.1 °F (37.8 °C)] 100.1 °F (37.8 °C)  Heart Rate:  [] 113  Resp:  [17-22] 17  BP: (117-119)/(54-96) 117/96    Physical Exam    Constitutional: Awake, confused   Eyes: Pupils equal, sclerae anicteric, no conjunctival injection   HENT: NCAT, mucous membranes dry   Neck: Supple, no thyromegaly, no lymphadenopathy, trachea midline   Respiratory: Rhonchi at the bases bilaterally, nonlabored respirations    Cardiovascular: Tachycardic, no murmurs, rubs, or gallops, palpable pedal pulses bilaterally   Gastrointestinal: Positive bowel sounds, soft, PEG tube in place, nondistended   Musculoskeletal: No bilateral ankle edema, no clubbing or cyanosis to extremities   Psychiatric: Appropriate affect, cooperative   Neurologic: Contracted to the left and the left lower extremities.  Moves upper extremities spontaneously and appears symmetric.     Skin: No rashes     Result Review    Result Review:  I have personally reviewed the results from the time of this admission to 3/3/2024 19:00 EST and agree with these findings:  [x]  Laboratory  [x]  Microbiology  [x]  Radiology  [x]  EKG/Telemetry   [x]  Cardiology/Vascular   []  Pathology  [x]  Old records  []  Other:      Assessment & Plan   Assessment / Plan     Assessment/Plan:   Acute metabolic  encephalopathy, possibly secondary to ammonia versus UTI versus sedating medications versus hyponatremia  Hypovolemic hyponatremia  Hyperkalemia  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  Chronic debility with bedbound status  Hypertension  Hyperlipidemia  History of CHF  Chronic dysphagia with PEG tube placement and feeds      Plan  - Admit to hospitalist service  - Etiology of encephalopathy seems to be multifactorial.  Need to also consider possible medication etiology as the external prescription history suggest he takes Ativan which could be playing a role and a delirium like presentation.  Follow-up urine cultures.  Follow-up blood cultures.  Will continue broad-spectrum antibiotics with vancomycin and cefepime for now.  MRSA nares ordered.  Bronchodilators were given rhonchi on exam.  De-escalate antibiotics when appropriate.  Check procalcitonin, not entirely convincing of pneumonia however patient does appear dry and has some coarse breath sounds  - Hyponatremia seems to be related to poor p.o. intake and possible episode of emesis based on dry oral mucosa and vomitus apparent in oropharynx on exam.  Continue hydration and hold medication causes  - Recheck potassium and correct if true potassium is elevated, hemolysis was noted on initial blood draw, ABG did not show significant hyperkalemia  - Continue supportive care for CVA, nutrition consult for PEG tube feeding, fall precautions  - Clarify chronic home meds and resume as appropriate.  Medication list from nursing home seem to suggest he has recently been started on Eliquis however I do not see any mention of atrial fibrillation or DVT in the chart.  Will need to clarify with nursing home and resume as appropriate.  Clarify all other home meds      Discussed with ER Physician and Nurse    All labs/imaging studies were personally reviewed and findings are as noted above      DVT Prophylaxis: SCDs, restart Eliquis if  confirmed    CODE STATUS:    Code Status (Patient has no pulse and is not breathing): CPR (Attempt to Resuscitate)  Medical Interventions (Patient has pulse or is breathing): Full Support      Admission Status:  I believe this patient meets inpatient status.    Electronically signed by Bam Chang MD, 03/03/24, 7:00 PM EST.

## 2024-03-04 ENCOUNTER — APPOINTMENT (OUTPATIENT)
Dept: CT IMAGING | Facility: HOSPITAL | Age: 84
DRG: 689 | End: 2024-03-04
Payer: MEDICARE

## 2024-03-04 LAB
BACTERIA BLD CULT: ABNORMAL
BASOPHILS # BLD AUTO: 0.03 10*3/MM3 (ref 0–0.2)
BASOPHILS NFR BLD AUTO: 0.3 % (ref 0–1.5)
BOTTLE TYPE: ABNORMAL
DEPRECATED RDW RBC AUTO: 35.6 FL (ref 37–54)
EOSINOPHIL # BLD AUTO: 0.03 10*3/MM3 (ref 0–0.4)
EOSINOPHIL NFR BLD AUTO: 0.3 % (ref 0.3–6.2)
ERYTHROCYTE [DISTWIDTH] IN BLOOD BY AUTOMATED COUNT: 12.2 % (ref 12.3–15.4)
GLUCOSE BLDC GLUCOMTR-MCNC: 111 MG/DL (ref 70–99)
GLUCOSE BLDC GLUCOMTR-MCNC: 74 MG/DL (ref 70–99)
GLUCOSE BLDC GLUCOMTR-MCNC: 81 MG/DL (ref 70–99)
HCT VFR BLD AUTO: 32 % (ref 37.5–51)
HGB BLD-MCNC: 11.4 G/DL (ref 13–17.7)
IMM GRANULOCYTES # BLD AUTO: 0.02 10*3/MM3 (ref 0–0.05)
IMM GRANULOCYTES NFR BLD AUTO: 0.2 % (ref 0–0.5)
LYMPHOCYTES # BLD AUTO: 1.67 10*3/MM3 (ref 0.7–3.1)
LYMPHOCYTES NFR BLD AUTO: 18.6 % (ref 19.6–45.3)
MCH RBC QN AUTO: 28.9 PG (ref 26.6–33)
MCHC RBC AUTO-ENTMCNC: 35.6 G/DL (ref 31.5–35.7)
MCV RBC AUTO: 81.2 FL (ref 79–97)
MONOCYTES # BLD AUTO: 1.44 10*3/MM3 (ref 0.1–0.9)
MONOCYTES NFR BLD AUTO: 16 % (ref 5–12)
MRSA DNA SPEC QL NAA+PROBE: NORMAL
NEUTROPHILS NFR BLD AUTO: 5.79 10*3/MM3 (ref 1.7–7)
NEUTROPHILS NFR BLD AUTO: 64.6 % (ref 42.7–76)
NRBC BLD AUTO-RTO: 0 /100 WBC (ref 0–0.2)
PLATELET # BLD AUTO: 371 10*3/MM3 (ref 140–450)
PMV BLD AUTO: 10.7 FL (ref 6–12)
RBC # BLD AUTO: 3.94 10*6/MM3 (ref 4.14–5.8)
VANCOMYCIN SERPL-MCNC: 17.33 MCG/ML (ref 5–40)
WBC NRBC COR # BLD AUTO: 8.98 10*3/MM3 (ref 3.4–10.8)

## 2024-03-04 PROCEDURE — 25010000002 VANCOMYCIN 5 G RECONSTITUTED SOLUTION: Performed by: FAMILY MEDICINE

## 2024-03-04 PROCEDURE — 82948 REAGENT STRIP/BLOOD GLUCOSE: CPT | Performed by: FAMILY MEDICINE

## 2024-03-04 PROCEDURE — 25010000002 CEFEPIME PER 500 MG: Performed by: STUDENT IN AN ORGANIZED HEALTH CARE EDUCATION/TRAINING PROGRAM

## 2024-03-04 PROCEDURE — 94664 DEMO&/EVAL PT USE INHALER: CPT

## 2024-03-04 PROCEDURE — 97161 PT EVAL LOW COMPLEX 20 MIN: CPT

## 2024-03-04 PROCEDURE — 85025 COMPLETE CBC W/AUTO DIFF WBC: CPT | Performed by: STUDENT IN AN ORGANIZED HEALTH CARE EDUCATION/TRAINING PROGRAM

## 2024-03-04 PROCEDURE — 99233 SBSQ HOSP IP/OBS HIGH 50: CPT | Performed by: FAMILY MEDICINE

## 2024-03-04 PROCEDURE — 92610 EVALUATE SWALLOWING FUNCTION: CPT

## 2024-03-04 PROCEDURE — 3E0G76Z INTRODUCTION OF NUTRITIONAL SUBSTANCE INTO UPPER GI, VIA NATURAL OR ARTIFICIAL OPENING: ICD-10-PCS | Performed by: STUDENT IN AN ORGANIZED HEALTH CARE EDUCATION/TRAINING PROGRAM

## 2024-03-04 PROCEDURE — 74176 CT ABD & PELVIS W/O CONTRAST: CPT

## 2024-03-04 PROCEDURE — 80202 ASSAY OF VANCOMYCIN: CPT | Performed by: STUDENT IN AN ORGANIZED HEALTH CARE EDUCATION/TRAINING PROGRAM

## 2024-03-04 PROCEDURE — 87641 MR-STAPH DNA AMP PROBE: CPT | Performed by: STUDENT IN AN ORGANIZED HEALTH CARE EDUCATION/TRAINING PROGRAM

## 2024-03-04 PROCEDURE — 25810000003 SODIUM CHLORIDE 0.9 % SOLUTION: Performed by: FAMILY MEDICINE

## 2024-03-04 PROCEDURE — 94799 UNLISTED PULMONARY SVC/PX: CPT

## 2024-03-04 PROCEDURE — 87040 BLOOD CULTURE FOR BACTERIA: CPT | Performed by: FAMILY MEDICINE

## 2024-03-04 PROCEDURE — 82948 REAGENT STRIP/BLOOD GLUCOSE: CPT

## 2024-03-04 PROCEDURE — 97165 OT EVAL LOW COMPLEX 30 MIN: CPT

## 2024-03-04 RX ORDER — SODIUM CHLORIDE 9 MG/ML
75 INJECTION, SOLUTION INTRAVENOUS CONTINUOUS
Status: DISCONTINUED | OUTPATIENT
Start: 2024-03-04 | End: 2024-03-05

## 2024-03-04 RX ORDER — VANCOMYCIN/0.9 % SOD CHLORIDE 1.5G/250ML
1500 PLASTIC BAG, INJECTION (ML) INTRAVENOUS EVERY 24 HOURS
Qty: 3000 ML | Refills: 0 | Status: DISCONTINUED | OUTPATIENT
Start: 2024-03-04 | End: 2024-03-04

## 2024-03-04 RX ADMIN — IPRATROPIUM BROMIDE AND ALBUTEROL SULFATE 3 ML: .5; 3 SOLUTION RESPIRATORY (INHALATION) at 15:22

## 2024-03-04 RX ADMIN — SODIUM CHLORIDE 75 ML/HR: 9 INJECTION, SOLUTION INTRAVENOUS at 21:13

## 2024-03-04 RX ADMIN — CEFEPIME 2000 MG: 2 INJECTION, POWDER, FOR SOLUTION INTRAVENOUS at 05:34

## 2024-03-04 RX ADMIN — IPRATROPIUM BROMIDE AND ALBUTEROL SULFATE 3 ML: .5; 3 SOLUTION RESPIRATORY (INHALATION) at 06:37

## 2024-03-04 RX ADMIN — Medication 10 ML: at 20:53

## 2024-03-04 RX ADMIN — IPRATROPIUM BROMIDE AND ALBUTEROL SULFATE 3 ML: .5; 3 SOLUTION RESPIRATORY (INHALATION) at 18:43

## 2024-03-04 RX ADMIN — CEFEPIME 2000 MG: 2 INJECTION, POWDER, FOR SOLUTION INTRAVENOUS at 17:47

## 2024-03-04 RX ADMIN — IPRATROPIUM BROMIDE AND ALBUTEROL SULFATE 3 ML: .5; 3 SOLUTION RESPIRATORY (INHALATION) at 11:33

## 2024-03-04 RX ADMIN — IPRATROPIUM BROMIDE AND ALBUTEROL SULFATE 3 ML: .5; 3 SOLUTION RESPIRATORY (INHALATION) at 02:58

## 2024-03-04 RX ADMIN — SODIUM CHLORIDE 75 ML/HR: 9 INJECTION, SOLUTION INTRAVENOUS at 08:11

## 2024-03-04 RX ADMIN — VANCOMYCIN HYDROCHLORIDE 1250 MG: 5 INJECTION, POWDER, LYOPHILIZED, FOR SOLUTION INTRAVENOUS at 15:03

## 2024-03-04 NOTE — PLAN OF CARE
Goal Outcome Evaluation:  Plan of Care Reviewed With: patient        Progress: no change  Outcome Evaluation: Patient came to ER from [facility] with complaints of increased confusion. Patient is bed bound due to hx of CVA. Patient was admitted with UTI showing signs pyuria, and hematuria. Patient passed 2 significant blood clots from penis. Provider notified. Urology consult ordered. Patient is alert to self, garbled speech but coherent. IV fluids and antibiots. Purwick in place, urin output monitored. Skin tear noted on anus, excoriation in folds. Patient was bathed with soap and water. Barrier cream applied. Patient SATs in high 90s on 2 L NC. SR to ST on monitor. Patient is strict NPO with peg tube in place for tube feedings, nutritian consult ordered. Peg flushed with 30 mls O2. Will continue to monitor.

## 2024-03-04 NOTE — PROGRESS NOTES
"[facility] Clinical Pharmacy Services: Vancomycin Pharmacokinetic Initial Consult Note    Jordin Menon is a 84 y.o. male who is on day 1 of pharmacy to dose vancomycin for Pneumonia.    Consult Information  Consulting Provider: Bernardo  Planned Duration of Therapy: 7 days  Was Patient Receiving Prior to Admission/Consult?: No  Loading Dose Ordered or Given: 1500 mg on 3/3 at 14975  PK/PD Target: -600 mg/L.hr   Relevant ID History: No admissions or antibiotics past 90 days  Other Antimicrobials: Cefepime    Imaging Reviewed?: Yes    Microbiology Data  MRSA PCR performed: 24; Result: Pending  Culture/Source:   3/3 BCx2 IP  3/3 Urine IP    Vitals/Labs  Ht: 162.6 cm (64.02\"); Wt: 76.1 kg (167 lb 12.3 oz)  Temp (24hrs), Av.6 °F (37.6 °C), Min:99 °F (37.2 °C), Max:100.1 °F (37.8 °C)   Estimated Creatinine Clearance: 58.1 mL/min (by C-G formula based on SCr of 0.9 mg/dL).  Renal:  no issues at time of note     Results from last 7 days   Lab Units 24  2225 24  1341   CREATININE mg/dL 0.90 0.89 0.85   WBC 10*3/mm3  --   --  9.31     Assessment/Plan:    Vancomycin Dose: 1500 mg IV every 24 hours; which provides the following predicted parameters:  AUC24,ss: 517 mg/L.hr  PAUC*: 78 %  Ctrough,ss: 14.7 mg/L  Pconc*: 24 %  Tox.: 10 %  Vanc Random ordered for 3/5 at 1300  Patient has order for Basic Metabolic Panel x 3d    Pharmacy will follow patient's kidney function and will adjust doses and obtain levels as necessary. Thank you for involving pharmacy in this patient's care. Please contact pharmacy with any questions or concerns.                           Pb Gill, PharmD  Clinical Pharmacist    "

## 2024-03-04 NOTE — PROGRESS NOTES
"[facility] Clinical Pharmacy Services: Vancomycin Monitoring Note    Jordin Menon is a 84 y.o. male who is on day  of pharmacy to dose vancomycin for Bacteremia.    Previous Vancomycin Dose:   1250 mg IV every  12  hours  Imaging Reviewed?: Yes  Updated Cultures and Sensitivities:   3/3 Blood Cx: Gram positive cocci   3/4 MRSA PCR: Negative    Vitals/Labs  Ht: 162.6 cm (64.02\"); Wt: 76.1 kg (167 lb 12.3 oz)   Temp (24hrs), Av.9 °F (37.2 °C), Min:98.3 °F (36.8 °C), Max:100.1 °F (37.8 °C)   Estimated Creatinine Clearance: 58.1 mL/min (by C-G formula based on SCr of 0.9 mg/dL).       Results from last 7 days   Lab Units 24  0522 24  2225 24  1341   VANCOMYCIN RM mcg/mL 17.33  --   --   --    CREATININE mg/dL  --  0.90 0.89 0.85   WBC 10*3/mm3 8.98  --   --  9.31     Assessment/Plan    Current Vancomycin Dose:  1250 mg IV every 24 hours; which provides the following predicted parameters:  Exposure target: AUC24 (range)400-600 mg/L.hr   AUC24,ss: 526 mg/L.hr  PAUC*: 92 %  Ctrough,ss: 13.7 mg/L  Pconc*: 11 %  Tox.: 9 %  Next Vanc Random ordered for 3/6 at 0600  We will continue to monitor patient changes and renal function     Thank you for involving pharmacy in this patient's care. Please contact pharmacy with any questions or concerns.    Jose Frias [facility]  Clinical Pharmacist    "

## 2024-03-04 NOTE — THERAPY EVALUATION
Patient Name: Jordin Menon  : 1940    MRN: [id number]                              Today's Date: 3/4/2024       Admit Date: 3/3/2024    Visit Dx:     ICD-10-CM ICD-9-CM   1. Altered mental status, unspecified altered mental status type  R41.82 780.97   2. Acute UTI  N39.0 599.0   3. Sepsis, due to unspecified organism, unspecified whether acute organ dysfunction present  A41.9 038.9     995.91     Patient Active Problem List   Diagnosis    Primary osteoarthritis of right knee    Status post total right knee replacement    Essential hypertension    Shortness of breath    BRYANT (acute kidney injury)    Hypokalemia    Hypernatremia    Symptomatic bradycardia    Hypothermia, initial encounter    Acute CVA (cerebrovascular accident)    Severe sepsis    Severe malnutrition    Hyperkalemia    Anemia    Dislodged gastrostomy tube    Dysphagia, oropharyngeal    Sepsis    Acute metabolic encephalopathy     Past Medical History:   Diagnosis Date    Acute renal failure with pathological lesion in kidney     Alzheimer's dementia with behavioral disturbance     Anemia, vitamin B12 deficiency     Anxiety     Anxiety disorder due to known physiological condition     Arthritis     Benign neoplasm of prostate     Cannot walk     CHF (congestive heart failure)     Constipation     Coronary atherosclerosis of native coronary artery     CVA (cerebral vascular accident)     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 (CVA)     Edema     Essential hypertension, benign     Gastroesophageal reflux disease without esophagitis     GERD (gastroesophageal reflux disease)     High blood pressure     Hirsutism     HTN (hypertension)     Hypercalcemia     Hyperkalemia     Hyperlipemia     Hyperlipidemia     Hypokalemia     Ingrowing toenail 2018    Left foot pain 2018    Nutritional deficiency     Osteoarthritis of right knee 2016    Pneumonia     Presence of right artificial knee joint     Primary localized  osteoarthrosis     PVD (peripheral vascular disease)     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     Right foot pain 09/21/2018    Senile dementia, uncomplicated     Severe sinus bradycardia     Shock, septic     Sleep apnea     Thrombosis of left femoral vein     Tinea unguium 09/21/2018    Vitamin B12 deficiency anemia      Past Surgical History:   Procedure Laterality Date    ENDOSCOPY W/ PEG TUBE PLACEMENT N/A 6/8/2022    Procedure: ESOPHAGOGASTRODUODENOSCOPY WITH PERCUTANEOUS ENDOSCOPIC GASTROSTOMY TUBE INSERTION WITH ANESTHESIA;  Surgeon: Yanelis Mistry MD;  Location: [facility] ENDOSCOPY;  Service: Gastroenterology;  Laterality: N/A;  PEG TUBE PLACEMENT    ENDOSCOPY W/ PEG TUBE PLACEMENT N/A 7/3/2022    Procedure: ESOPHAGOGASTRODUODENOSCOPY WITH PERCUTANEOUS ENDOSCOPIC GASTROSTOMY TUBE INSERTION WITH ANESTHESIA;  Surgeon: Bam Melo MD;  Location: [facility] ENDOSCOPY;  Service: Gastroenterology;  Laterality: N/A;  PEG TUBE PLACEMENT    ENDOSCOPY W/ PEG TUBE PLACEMENT N/A 10/26/2022    Procedure: ESOPHAGOGASTRODUODENOSCOPY WITH PERCUTANEOUS ENDOSCOPIC GASTROSTOMY TUBE INSERTION WITH ANESTHESIA;  Surgeon: Yanelis Mistry MD;  Location: [facility] ENDOSCOPY;  Service: Gastroenterology;  Laterality: N/A;  PERCUTANEOUS ENDOSCOPIC GASTROSTOMY TUBE PLACEMENT      General Information       Row Name 03/04/24 0854          OT Time and Intention    Document Type evaluation  -PG     Mode of Treatment individual therapy;occupational therapy  -PG       Row Name 03/04/24 0854          General Information    Patient Profile Reviewed yes  Patient is a resident of the [facility] rehab and nursing.  Currently bedbound with Katherine lift needed for transfers.  Dependent with all self-care activities.  No additional OT services recommended at this time  -PG     Prior Level of Function dependent:;ADL's  -PG     Existing Precautions/Restrictions no known precautions/restrictions  -PG     Barriers to  Rehab none identified  -       Row Name 03/04/24 0854          Occupational Profile    Reason for Services/Referral (Occupational Profile) Patient is a 84-year-old male admitted for acute UTI and altered mental status.  Patient being evaluated by Occupational Therapy due to recent decline in ADL function.  No previous OT services identified.  -PG       Row Name 03/04/24 0854          Cognition    Orientation Status (Cognition) oriented to;person  -PG               User Key  (r) = Recorded By, (t) = Taken By, (c) = Cosigned By      Initials Name Provider Type    PG Lalito Ray, OT Occupational Therapist                     Mobility/ADL's       Row Name 03/04/24 0856          Bed Mobility    Bed Mobility bed mobility (all) activities  -     All Activities, Summers (Bed Mobility) dependent (less than 25% patient effort)  -       Row Name 03/04/24 0856          Activities of Daily Living    BADL Assessment/Intervention bathing;upper body dressing;lower body dressing;grooming;toileting  -       Row Name 03/04/24 0856          Bathing Assessment/Intervention    Summers Level (Bathing) bathing skills;dependent (less than 25% patient effort)  -       Row Name 03/04/24 0856          Upper Body Dressing Assessment/Training    Summers Level (Upper Body Dressing) upper body dressing skills;dependent (less than 25% patient effort)  -       Row Name 03/04/24 0856          Lower Body Dressing Assessment/Training    Summers Level (Lower Body Dressing) lower body dressing skills;dependent (less than 25% patient effort)  -       Row Name 03/04/24 0856          Grooming Assessment/Training    Summers Level (Grooming) grooming skills;dependent (less than 25% patient effort)  -       Row Name 03/04/24 0856          Toileting Assessment/Training    Summers Level (Toileting) toileting skills;dependent (less than 25% patient effort)  -PG               User Key  (r) = Recorded By, (t) = Taken  By, (c) = Cosigned By      Initials Name Provider Type    PG Lalito Ray OT Occupational Therapist                   Obj/Interventions       Row Name 03/04/24 0857          Sensory Assessment (Somatosensory)    Sensory Assessment (Somatosensory) unable/difficult to assess  -PG       [facility] Name 03/04/24 0857          Vision Assessment/Intervention    Visual Impairment/Limitations unable/difficult to assess  -PG       Row Name 03/04/24 0857          Range of Motion Comprehensive    General Range of Motion upper extremity range of motion deficits identified  -PG       [facility] Name 03/04/24 0857          Strength Comprehensive (MMT)    Comment, General Manual Muscle Testing (MMT) Assessment Deferred  -PG       [facility] Name 03/04/24 0857          Motor Skills    Motor Skills coordination;functional endurance  -PG     Coordination fine motor deficit;gross motor deficit;moderate impairment  -PG     Functional Endurance Poor  -PG               User Key  (r) = Recorded By, (t) = Taken By, (c) = Cosigned By      Initials Name Provider Type    PG Lalito Ray OT Occupational Therapist                   Goals/Plan    No documentation.                  Clinical Impression       Row Name 03/04/24 0857          Pain Assessment    Pretreatment Pain Rating 0/10 - no pain  -PG     Posttreatment Pain Rating 0/10 - no pain  -PG       [facility] Name 03/04/24 0859 03/04/24 0857       Plan of Care Review    Plan of Care Reviewed With -- patient  -PG    Progress -- no change  -PG    Outcome Evaluation Patient currently bedbound and dependent with all self-care activities.  Katherine lift is used at his nursing home for transfers.  No additional OT services recommended at this time  -PG --      [facility] Name 03/04/24 0859          Therapy Assessment/Plan (OT)    Criteria for Skilled Therapeutic Interventions Met (OT) no;does not meet criteria for skilled intervention  -PG     Therapy Frequency (OT) evaluation only  -PG       [facility] Name 03/04/24 0859           Therapy Plan Review/Discharge Plan (OT)    Anticipated Discharge Disposition (OT) sub acute care setting  -PG               User Key  (r) = Recorded By, (t) = Taken By, (c) = Cosigned By      Initials Name Provider Type    PG Lalito Ray, ANANYA Occupational Therapist                   Outcome Measures       Row Name 03/04/24 0900          How much help from another is currently needed...    Putting on and taking off regular lower body clothing? 1  -PG     Bathing (including washing, rinsing, and drying) 1  -PG     Toileting (which includes using toilet bed pan or urinal) 1  -PG     Putting on and taking off regular upper body clothing 1  -PG     Taking care of personal grooming (such as brushing teeth) 1  -PG     Eating meals 1  -PG     AM-PAC 6 Clicks Score (OT) 6  -PG       Row Name 03/03/24 1800          How much help from another person do you currently need...    Turning from your back to your side while in flat bed without using bedrails? 1  -GP     Moving from lying on back to sitting on the side of a flat bed without bedrails? 1  -GP     Moving to and from a bed to a chair (including a wheelchair)? 1  -GP     Standing up from a chair using your arms (e.g., wheelchair, bedside chair)? 1  -GP     Climbing 3-5 steps with a railing? 1  -GP     To walk in hospital room? 1  -GP     AM-PAC 6 Clicks Score (PT) 6  -GP     Highest Level of Mobility Goal 2 --> Bed activities/dependent transfer  -GP       Row Name 03/04/24 0900          Functional Assessment    Outcome Measure Options AM-PAC 6 Clicks Daily Activity (OT);Optimal Instrument  -PG       Row Name 03/04/24 0900          Optimal Instrument    Optimal Instrument Optimal - 3  -PG     Bending/Stooping 5  -PG     Standing 5  -PG     Reaching 3  -PG     From the list, choose the 3 activities you would most like to be able to do without any difficulty Bending/stooping;Standing;Reaching  -PG     Total Score Optimal - 3 13  -PG               User Key  (r) = Recorded  By, (t) = Taken By, (c) = Cosigned By      Initials Name Provider Type    PG Lalito Ray OT Occupational Therapist    GP Ruslan Valdez, RN Registered Nurse                      OT Recommendation and Plan  Therapy Frequency (OT): evaluation only  Plan of Care Review  Plan of Care Reviewed With: patient  Progress: no change  Outcome Evaluation: Patient currently bedbound and dependent with all self-care activities.  Katherine lift is used at his nursing home for transfers.  No additional OT services recommended at this time     Time Calculation:   Evaluation Complexity (OT)  Review Occupational Profile/Medical/Therapy History Complexity: brief/low complexity  Assessment, Occupational Performance/Identification of Deficit Complexity: 1-3 performance deficits  Clinical Decision Making Complexity (OT): problem focused assessment/low complexity  Overall Complexity of Evaluation (OT): low complexity     Time Calculation- OT       Row Name 03/04/24 0901             Time Calculation- OT    OT Received On 03/04/24  -PG         Untimed Charges    OT Eval/Re-eval Minutes 30  -PG         Total Minutes    Untimed Charges Total Minutes 30  -PG       Total Minutes 30  -PG                User Key  (r) = Recorded By, (t) = Taken By, (c) = Cosigned By      Initials Name Provider Type    Lalito Guy OT Occupational Therapist                  Therapy Charges for Today       Code Description Service Date Service Provider Modifiers Qty    01161189455 HC OT EVAL LOW COMPLEXITY 2 3/4/2024 Lalito Rya OT GO 1                 Lalito Ray OT  3/4/2024

## 2024-03-04 NOTE — PROGRESS NOTES
Psychiatric   Hospitalist Progress Note  Date: 3/4/2024  Patient Name: Jordin Menon  : 1940  MRN: [id number]  Date of admission: 3/3/2024      Subjective   Subjective   Chief complaint: Altered mental status    Summary:  84-year-old male with history of CVA with residual lower extremity weakness and dysphagia with PEG tube for primary means of nutrition, hypertension, dyslipidemia, heart failure with preserved ejection fraction, PAD, GERD without esophagitis, chronic debility, severe Alzheimer's dementia, hospitalized on 3/3/2024 with chief complaint of altered mental status, acute metabolic encephalopathy likely secondary to u UTI with urinalysis suggestive of UTI with concerns for gram-positive bacteremia, 1 out of 2 blood cultures positive.  Hematuria on UTI, likely related to catheterization, following for resolution.    Interval follow-up: Seen and examined this morning, no acute distress, no acute major night events, hematuria noted overnight after catheterization of bladder, hemoglobin stable at 11.4 this morning.  White blood cell count 8000.  1 out of 2 blood cultures positive for gram-positive cocci, remains on vancomycin.  Abdomen and pelvis shows nonobstructing 6 mm stone in the left kidney lower pole with no hydronephrosis.  Mentation is baseline.    Review of systems:  Unable to obtain review of systems due to altered mental status and underlying dementia      Objective   Objective     Vitals:   Temp:  [98.3 °F (36.8 °C)-99 °F (37.2 °C)] 98.3 °F (36.8 °C)  Heart Rate:  [] 89  Resp:  [17-20] 18  BP: (117-128)/(54-96) 127/54  Flow (L/min):  [1-2] 1  Physical Exam               Constitutional: Awake, confused              Eyes: Pupils equal, sclerae anicteric, no conjunctival injection              HENT: NCAT, mucous membranes dry              Neck: Supple, no thyromegaly, no lymphadenopathy, trachea midline              Respiratory: Rhonchi at the bases bilaterally, nonlabored  respirations               Cardiovascular: Regular rate and rhythm, no murmurs, rubs, or gallops, palpable pedal pulses bilaterally              Gastrointestinal: Positive bowel sounds, soft, PEG tube in place, nondistended              Musculoskeletal: No bilateral ankle edema, no clubbing or cyanosis to extremities              Psychiatric: Appropriate affect, cooperative              Neurologic: Contracted to the left and the left lower extremities.  Moves upper extremities spontaneously and appears symmetric.                Skin: No rashes    Result Review    Result Review:  I have personally reviewed the pertinent results from the past 24 hours to 3/4/2024 16:09 EST and agree with these findings:  [x]  Laboratory   CBC          11/14/2023    06:28 3/3/2024    13:41 3/4/2024    05:22   CBC   WBC 12.39  9.31  8.98    RBC 3.76  4.40  3.94    Hemoglobin 11.0  12.7  11.4    Hematocrit 32.1  35.6  32.0    MCV 85.4  80.9  81.2    MCH 29.3  28.9  28.9    MCHC 34.3  35.7  35.6    RDW 13.4  12.1  12.2    Platelets 209  411  371      BMP          11/13/2023    13:36 11/14/2023    06:28 3/3/2024    13:41 3/3/2024    15:20 3/3/2024    20:00 3/3/2024    22:25   BMP   BUN 24  21  23   26  24    Creatinine 0.74  0.64  0.85   0.89  0.90    Sodium 137  140  127  127.0  129  128    Potassium 4.2  4.3  6.1   5.4  5.4    Chloride 105  105  92   93  94    CO2 24.8  27.0  25.8   26.3  22.9    Calcium 9.1  9.1  9.7   9.7  9.5      LIVER FUNCTION TESTS:      Lab 03/03/24  1341   TOTAL PROTEIN 8.7*   ALBUMIN 3.8   GLOBULIN 4.9   ALT (SGPT) 30   AST (SGOT) 29   BILIRUBIN 0.5   ALK PHOS 132*       [x]  Microbiology   Microbiology Results (last 10 days)       Procedure Component Value - Date/Time    MRSA Screen, PCR (Inpatient) - Swab, Nares [[id number]]  (Normal) Collected: 03/04/24 0328    Lab Status: Final result Specimen: Swab from Nares Updated: 03/04/24 0454     MRSA PCR No MRSA Detected    Narrative:      The negative predictive  value of this diagnostic test is high and should only be used to consider de-escalating anti-MRSA therapy. A positive result may indicate colonization with MRSA and must be correlated clinically.    Blood Culture - Blood, Arm, Left [[id number]]  (Abnormal) Collected: 03/03/24 1652    Lab Status: Preliminary result Specimen: Blood from Arm, Left Updated: 03/04/24 1309     Blood Culture Abnormal Stain     Gram Stain Aerobic Bottle Gram positive cocci in clusters    Urine Culture - Urine, Straight Cath [[id number]]  (Normal) Collected: 03/03/24 1601    Lab Status: Preliminary result Specimen: Urine from Straight Cath Updated: 03/04/24 1148     Urine Culture No growth    COVID-19, FLU A/B, RSV PCR 1 HR TAT - Swab, Nasopharynx [[id number]]  (Normal) Collected: 03/03/24 1548    Lab Status: Final result Specimen: Swab from Nasopharynx Updated: 03/03/24 1649     COVID19 Not Detected     Influenza A PCR Not Detected     Influenza B PCR Not Detected     RSV, PCR Not Detected    Narrative:      Fact sheet for providers: https://www.fda.gov/media/145059/download    Fact sheet for patients: https://www.fda.gov/media/230663/download    Test performed by PCR.              [x]  Radiology CT Abdomen Pelvis Without Contrast    Result Date: 3/4/2024    1. Nonobstructing 6 mm stone within the lower pole of the left kidney.  No associated hydronephrosis. 2. No discrete bladder wall thickening.  Multiple small bladder diverticula are present.  Prostatomegaly. 3. Small 1 cm exophytic lesion arising from the superior lateral aspect of the left kidney with intermediate Hounsfield unit density.  This is likely a hemorrhagic or proteinaceous cyst and is unchanged from prior CT dated 7/2/2022.      ISAIAH LOZANO MD       Electronically Signed and Approved By: ISAIAH LOZANO MD on 3/04/2024 at 8:20             CT Chest Without Contrast Diagnostic    Result Date: 3/3/2024     1. Motion limited exam demonstrating no evidence of pneumonia or  other acute pulmonary abnormality  2. Coronary artery atherosclerosis  3. Cholelithiasis and left nephrolithiasis     JAMES IBANEZ MD       Electronically Signed and Approved By: JAMES IBANEZ MD on 3/03/2024 at 17:50             CT Head Without Contrast    Result Date: 3/3/2024     1. No acute intracranial process  2. Sinusitis     JAMES IBANEZ MD       Electronically Signed and Approved By: JAMES IBANEZ MD on 3/03/2024 at 17:33             XR Chest 1 View    Result Date: 3/3/2024   Low lung volumes with bibasilar atelectasis       JAMES IBANEZ MD       Electronically Signed and Approved By: JAMES IBANEZ MD on 3/03/2024 at 14:27                [x]  EKG/Telemetry   ECG 12 Lead ED Triage Standing Order; Weak / Dizzy / AMS   Preliminary Result   HEART RATE= 86  bpm   RR Interval= 700  ms   [place] Interval= 173  ms   P Horizontal Axis= 5  deg   P Front Axis= 118  deg   QRSD Interval= 76  ms   QT Interval= 321  ms   QTcB= 384  ms   QRS Axis= -10  deg   T Wave Axis= 50  deg   - ABNORMAL ECG -   Sinus rhythm   Inferior infarct, old   Electronically Signed By:    Date and Time of Study: 2024-03-03 13:59:29          [x]  Cardiology/Vascular   []  Pathology  [x]  Old records  []  Other:    Assessment & Plan   Assessment / Plan     Assessment/Plan:    Assessment:  Acute metabolic encephalopathy, due to infection urine versus blood  Positive bacteremia  Hematuria in the setting of catheterization  Concern for gram-positive UTI  Hypovolemic hyponatremia  Hyperkalemia  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  Chronic debility with bedbound status  Hypertension  Hyperlipidemia  Heart failure with preserved ejection fraction  Chronic dysphagia with PEG tube placement and feeds    Plan:  Labs and imaging reviewed  Dietitian consultation to restart tube feeds  Continue cefepime  Restart vancomycin  Repeat blood cultures  Monitor dose vancomycin by AUC method  Continue normal saline IV fluids at 75  cc/h  Will monitor serum potassium which was slightly elevated on initial presentation  A.m. labs have been ordered to trend electrolytes  Discussed with Dr. Greene, no urological intervention warranted at this time, will hold off on formal consultation, will reconsult if necessary  Bladder scan every 6 hours; if urine is retained greater than 350 mL, Place Irby catheter to decompress bladder  Reconcile home medications, will resume accordingly  A.m. labs full code  DVT prophylaxis with SCDs in the setting of gross hematuria  Clinical course dictate further management  Discussed with nurse at the bedside    DVT prophylaxis:  No DVT prophylaxis order currently exists.        CODE STATUS:   Code Status (Patient has no pulse and is not breathing): CPR (Attempt to Resuscitate)  Medical Interventions (Patient has pulse or is breathing): Full Support        Electronically signed by Aranza Herzog MD, 03/04/24, 4:09 PM EST.    Portions of this documentation were transcribed electronically from a voice recognition software.  I confirm all data accurately represents the service(s) I performed at today's visit.

## 2024-03-04 NOTE — CONSULTS
Nutrition Services    Patient Name: Jordin Menon  YOB: 1940  MRN: [id number]  Admission date: 3/3/2024      CLINICAL NUTRITION ASSESSMENT      Reason for Assessment  Physician Consult and Tube Feeding Assessment   H&P:  Past Medical History:   Diagnosis Date    Acute renal failure with pathological lesion in kidney     Alzheimer's dementia with behavioral disturbance     Anemia, vitamin B12 deficiency     Anxiety     Anxiety disorder due to known physiological condition     Arthritis     Benign neoplasm of prostate     Cannot walk     CHF (congestive heart failure)     Constipation     Coronary atherosclerosis of native coronary artery     CVA (cerebral vascular accident)     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 (CVA)     Edema     Essential hypertension, benign     Gastroesophageal reflux disease without esophagitis     GERD (gastroesophageal reflux disease)     High blood pressure     Hirsutism     HTN (hypertension)     Hypercalcemia     Hyperkalemia     Hyperlipemia     Hyperlipidemia     Hypokalemia     Ingrowing toenail 09/21/2018    Left foot pain 09/21/2018    Nutritional deficiency     Osteoarthritis of right knee 08/22/2016    Pneumonia     Presence of right artificial knee joint     Primary localized osteoarthrosis     PVD (peripheral vascular disease)     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 RD familiar with patient from previous admission. Consulted to provide EN. Patient has a PEG tube. Will provide minimal free water  "at this time due to hyponatremia. Adjust as needed.     RD visited patient mid-morning. Patient wake and able to answer some questions. Denies recent weight loss. NFPE performed, no pertinent findings.      Anthropometrics        Current Height, Weight Height: 162.6 cm (64.02\")  Weight: 76.1 kg (167 lb 12.3 oz)   Current BMI Body mass index is 28.78 kg/m².   BMI Classification Overweight   % %   Adjusted Body Weight (ABW)    Weight Hx  Wt Readings from Last 30 Encounters:   03/03/24 2100 76.1 kg (167 lb 12.3 oz)   03/03/24 1311 76 kg (167 lb 8.8 oz)   11/10/23 0053 90.1 kg (198 lb 10.2 oz)   09/04/23 1503 70.9 kg (156 lb 4.9 oz)   10/27/22 0933 58.2 kg (128 lb 4.9 oz)   10/25/22 1100 60.1 kg (132 lb 7.9 oz)   10/22/22 2021 55.1 kg (121 lb 7.6 oz)   10/13/22 0815 66.1 kg (145 lb 11.6 oz)   08/25/22 1051 60.6 kg (133 lb 9.6 oz)   08/23/22 1805 63.3 kg (139 lb 8.8 oz)   08/23/22 1301 63.3 kg (139 lb 8.8 oz)   08/06/22 1017 64.2 kg (141 lb 8.6 oz)   07/02/22 1353 72.9 kg (160 lb 11.5 oz)   07/02/22 0307 72.9 kg (160 lb 11.5 oz)   06/23/22 0923 74.2 kg (163 lb 9.3 oz)   06/02/22 0415 78.4 kg (172 lb 13.5 oz)   06/01/22 0600 74.2 kg (163 lb 9.3 oz)   05/29/22 2100 67.4 kg (148 lb 9.4 oz)   05/29/22 1148 76.2 kg (167 lb 15.9 oz)   05/21/22 0500 76.1 kg (167 lb 12.3 oz)   05/19/22 0600 55.3 kg (121 lb 14.6 oz)   05/18/22 0500 56.5 kg (124 lb 9 oz)   05/17/22 1834 54.1 kg (119 lb 4.3 oz)   05/05/22 1642 60.2 kg (132 lb 11.5 oz)   05/01/22 1534 62.7 kg (138 lb 3.7 oz)   04/14/22 1751 66.5 kg (146 lb 9.7 oz)   04/14/22 1307 66.5 kg (146 lb 9.7 oz)   03/07/22 0900 69.9 kg (154 lb 1.6 oz)   03/06/22 2348 69.2 kg (152 lb 8.9 oz)   03/04/22 0353 66 kg (145 lb 8.1 oz)   02/28/22 0608 69 kg (152 lb 1.9 oz)   02/27/22 0400 68.6 kg (151 lb 3.8 oz)   02/25/22 1540 73.6 kg (162 lb 4.1 oz)   02/04/22 1854 73.6 kg (162 lb 4.1 oz)   07/28/21 1307 84.4 kg (186 lb)   03/05/19 1516 78.5 kg (173 lb)   09/27/18 0000 88.5 kg (195 lb) "   09/21/18 0000 90.7 kg (200 lb)   03/22/18 0000 82.6 kg (182 lb)          Wt Change Observation Trending up x 6 months     Estimated/Assessed Needs  Estimated Needs based on: Current Body Weight 76 kg       Energy Requirements 25-30 kcal/kg   EST Needs (kcal/day) 5357-9616 kcal       Protein Requirements 0.8-1.0 g/kg   EST Daily Needs (g/day) 61-76 g       Fluid Requirements 25 ml/kg    Estimated Needs (mL/day) 1900 ml     Labs/Medications         Pertinent Labs Reviewed.   Results from last 7 days   Lab Units 03/03/24 2225 03/03/24 2000 03/03/24  1520 03/03/24  1341   SODIUM mmol/L 128* 129*  --  127*   SODIUM, ARTERIAL mmol/L  --   --  127.0*  --    POTASSIUM mmol/L 5.4* 5.4*  --  6.1*   CHLORIDE mmol/L 94* 93*  --  92*   CO2 mmol/L 22.9 26.3  --  25.8   BUN mg/dL 24* 26*  --  23   CREATININE mg/dL 0.90 0.89  --  0.85   CALCIUM mg/dL 9.5 9.7  --  9.7   BILIRUBIN mg/dL  --   --   --  0.5   ALK PHOS U/L  --   --   --  132*   ALT (SGPT) U/L  --   --   --  30   AST (SGOT) U/L  --   --   --  29   GLUCOSE mg/dL 80 84  --  86   GLUCOSE, ARTERIAL mg/dL  --   --  84  --      Results from last 7 days   Lab Units 03/04/24 0522 03/03/24 2225 03/03/24  1341   MAGNESIUM mg/dL  --  1.8 2.0   HEMOGLOBIN g/dL 11.4*  --  12.7*   HEMATOCRIT % 32.0*  --  35.6*     COVID19   Date Value Ref Range Status   03/03/2024 Not Detected Not Detected - Ref. Range Final     Lab Results   Component Value Date    HGBA1C 4.90 05/30/2022         Pertinent Medications Reviewed.     Malnutrition Severity Assessment              Nutrition Diagnosis         Nutrition Dx Problem 1 Inadequate oral Intake related to decreased ability to consume sufficient energy as evidenced by NPO.     Nutrition Intervention           Current Nutrition Orders & Evaluation of Intake       Current PO Diet NPO Diet NPO Type: Strict NPO   Supplement Orders Placed This Encounter      Diet, Tube Feeding Tube Feeding Formula: Isosource 1.5; Tube Feeding Type: Continuous;  Continuous Tube Feeding Start Rate (mL/hr): 25; Then Advance Rate By (mL/hr): 25; Every __ Hours: 4; To Goal Rate of (mL/hr): 60           Nutrition Intervention/Prescription        Isosource 1.5 @ 60 ml/hr x 22 hours  Provides: 1980 kcal, 90 g pro, 1003 ml  Flush with 60 ml q3h to provide additional 480 ml free water    Total provided: 1980 kcal, 90 g pro, 1483 ml free water        Medical Nutrition Therapy/Nutrition Education          Learner     Readiness Patient  Acceptance     Method     Response Explanation  Verbalizes understanding     Monitor/Evaluation        Monitor Per protocol, Pertinent labs, EN delivery/tolerance, POC/GOC     Nutrition Discharge Plan         Resume home TF regimen on discharge.     Electronically signed by:  Arin Hinds RD  03/04/24 10:05 EST

## 2024-03-04 NOTE — THERAPY EVALUATION
Acute Care - Speech Language Pathology   Swallow Initial Evaluation KASSI Davis     Patient Name: Jordin Menon  : 1940  MRN: [id number]  Today's Date: 3/4/2024               Admit Date: 3/3/2024    Visit Dx:     ICD-10-CM ICD-9-CM   1. Altered mental status, unspecified altered mental status type  R41.82 780.97   2. Acute UTI  N39.0 599.0   3. Sepsis, due to unspecified organism, unspecified whether acute organ dysfunction present  A41.9 038.9     995.91   4. Oropharyngeal dysphagia  R13.12 787.22     Patient Active Problem List   Diagnosis    Primary osteoarthritis of right knee    Status post total right knee replacement    Essential hypertension    Shortness of breath    BRYANT (acute kidney injury)    Hypokalemia    Hypernatremia    Symptomatic bradycardia    Hypothermia, initial encounter    Acute CVA (cerebrovascular accident)    Severe sepsis    Severe malnutrition    Hyperkalemia    Anemia    Dislodged gastrostomy tube    Dysphagia, oropharyngeal    Sepsis    Acute metabolic encephalopathy     Past Medical History:   Diagnosis Date    Acute renal failure with pathological lesion in kidney     Alzheimer's dementia with behavioral disturbance     Anemia, vitamin B12 deficiency     Anxiety     Anxiety disorder due to known physiological condition     Arthritis     Benign neoplasm of prostate     Cannot walk     CHF (congestive heart failure)     Constipation     Coronary atherosclerosis of native coronary artery     CVA (cerebral vascular accident)     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 (CVA)     Edema     Essential hypertension, benign     Gastroesophageal reflux disease without esophagitis     GERD (gastroesophageal reflux disease)     High blood pressure     Hirsutism     HTN (hypertension)     Hypercalcemia     Hyperkalemia     Hyperlipemia     Hyperlipidemia     Hypokalemia     Ingrowing toenail 2018    Left foot pain 2018    Nutritional deficiency     Osteoarthritis of right  knee 08/22/2016    Pneumonia     Presence of right artificial knee joint     Primary localized osteoarthrosis     PVD (peripheral vascular disease)     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 END[facility];  Service: Gastroenterology;  Laterality: N/A;  PEG TUBE PLACEMENT    ENDOSCOPY W/ PEG TUBE PLACEMENT N/A 7/3/2022    Procedure: ESOPHAGOGASTRODUODENOSCOPY WITH PERCUTANEOUS ENDOSCOPIC GASTROSTOMY TUBE INSERTION WITH ANESTHESIA;  Surgeon: Bam Melo MD;  Location: [facility] ENDOSCOPY;  Service: Gastroenterology;  Laterality: N/A;  PEG TUBE PLACEMENT    ENDOSCOPY W/ PEG TUBE PLACEMENT N/A 10/26/2022    Procedure: ESOPHAGOGASTRODUODENOSCOPY WITH PERCUTANEOUS ENDOSCOPIC GASTROSTOMY TUBE INSERTION WITH ANESTHESIA;  Surgeon: Yanelis Mistry MD;  Location: [facility] ENDOSCOPY;  Service: Gastroenterology;  Laterality: N/A;  PERCUTANEOUS ENDOSCOPIC GASTROSTOMY TUBE PLACEMENT       SLP Recommendation and Plan             Inpatient Speech Pathology Dysphagia Evaluation        PAIN SCALE: none indicated    PRECAUTIONS/CONTRAINDICATIONS:  standard, fall, aspiration    SUSPECTED ABUSE/NEGLECT/EXPLOITATION:  none identified    SOCIAL/PSYCHOLOGICAL NEEDS/BARRIERS:  none identified    PAST SOCIAL HISTORY:  84 year old male, nursing home resident    PRIOR FUNCTION:  PEG as primary nutrition. Not known at this time is patient was on any po diet.     PATIENT GOALS/EXPECTATIONS:  none verbalized    HISTORY:   84 year old male with acute metabolic encephalopathy.  History of CVA, PEG as primary nutrition.    CURRENT DIET LEVEL: Tube feeding      OBJECTIVE:    TEST ADMINISTERED: Clinical dysphagia evaluation    COGNITION/SAFETY AWARENESS: Likely impaired, not thoroughly evaluated    BEHAVIORAL OBSERVATIONS: Awake, dysarthric speech    ORAL MOTOR EXAM:  facial symmetry    VOICE QUALITY: Garbled    REFLEX EXAM: Deferred    POSTURE: Total assist    FEEDING/SWALLOWING FUNCTION: Assessed with ice chips, nectar thick liquids, purées    CLINICAL OBSERVATIONS: Single ice chips with patient initially spitting in oral cavity.  Second trial with swallow completed without overt signs or symptoms of aspiration.  Spoonfed nectar thick liquids with anterior loss, swallow delay greater than 30 seconds.  Wet vocal quality, wet throat clearing.  Purée solids with patient initially refusing.  Agreeable x 1.  Delayed initiation of swallow greater than 30 seconds.  Double swallow, laryngeal wetness observed to auscultation.    DYSPHAGIA CRITERIA: Risk of aspiration, history of dysphagia/aspiration    FUNCTIONAL ASSESSMENT INSTRUMENT: Patient currently scored a level 1 of 7 on Functional Communication Measures for swallowing indicating a 100% limitation in function.    ASSESSMENT/ PLAN OF CARE:  Patient is not a candidate for further skilled speech pathology services.  Patient appears at his prior/baseline level.    RECOMMENDATIONS:   1.   DIET: PEG as primary nutrition, continue n.p.o. status.      Pt/responsible party agrees with plan of care and has been informed of all alternatives, risks and benefits.                 Anticipated Discharge Disposition (SLP): No further SLP services warranted (03/04/24 1049)                                                               EDUCATION  The patient has been educated in the following areas:   Dysphagia (Swallowing Impairment).              Time Calculation:    Time Calculation- SLP       Row Name  03/04/24 1049             Time Calculation- SLP    SLP Start Time 0730  -SN      SLP Stop Time 0830  -SN      SLP Time Calculation (min) 60 min  -SN      SLP Received On 03/04/24  -SN         Untimed Charges    61189-JO Eval Oral Pharyng Swallow Minutes 60  -SN         Total Minutes    Untimed Charges Total Minutes 60  -SN       Total Minutes 60  -SN                User Key  (r) = Recorded By, (t) = Taken By, (c) = Cosigned By      Initials Name Provider Type    Marleny Gonzales MS-CCC/SLP, RUTHY Speech and Language Pathologist                    Therapy Charges for Today       Code Description Service Date Service Provider Modifiers Qty    [id number] HC ST EVAL ORAL PHARYNG SWALLOW 4 3/4/2024 Marleny Wolfe MS-CCC/SLP, RUTHY GN 1                 RYAN Webber/SLP, RUTHY  3/4/2024

## 2024-03-04 NOTE — PLAN OF CARE
Goal Outcome Evaluation:  Plan of Care Reviewed With: patient           Outcome Evaluation: Patient is a long-term care resident at a local nursing home.  He is nonambulatory at baseline and dependent on staff for all bed mobilities, transfers, and ADLs.  He is not appropriate for inpatient PT services.  He will benefit from a PT consult upon return to the nursing home to maximize independence with functional mobility.      Anticipated Discharge Disposition (PT): extended care facility

## 2024-03-04 NOTE — PLAN OF CARE
Problem: Adult Inpatient Plan of Care  Goal: Plan of Care Review  Outcome: Ongoing, Progressing  Flowsheets  Taken 3/4/2024 1604 by Sofya Valdez RN  Progress: no change  Outcome Evaluation: VSS. continous tube feeds started during shift. wound care consulted. no new concerns at this time.  Taken 3/4/2024 1400 by Ciera Fox PT  Plan of Care Reviewed With: patient     Problem: Adult Inpatient Plan of Care  Goal: Absence of Hospital-Acquired Illness or Injury  Intervention: Identify and Manage Fall Risk  Recent Flowsheet Documentation  Taken 3/4/2024 1451 by Sofya Valdez RN  Safety Promotion/Fall Prevention: safety round/check completed  Taken 3/4/2024 1310 by Sofya Valdez RN  Safety Promotion/Fall Prevention:   safety round/check completed   clutter free environment maintained   assistive device/personal items within reach   fall prevention program maintained  Taken 3/4/2024 1154 by Sofya Valdez RN  Safety Promotion/Fall Prevention:   safety round/check completed   clutter free environment maintained   assistive device/personal items within reach   fall prevention program maintained  Taken 3/4/2024 0909 by Sofya Valdez RN  Safety Promotion/Fall Prevention:   safety round/check completed   assistive device/personal items within reach   clutter free environment maintained   fall prevention program maintained  Taken 3/4/2024 0736 by Sofya Valdez RN  Safety Promotion/Fall Prevention:   safety round/check completed   assistive device/personal items within reach   clutter free environment maintained   fall prevention program maintained  Intervention: Prevent Infection  Recent Flowsheet Documentation  Taken 3/4/2024 1515 by Sofya Valdez RN  Infection Prevention:   rest/sleep promoted   personal protective equipment utilized   equipment surfaces disinfected   hand hygiene promoted  Taken 3/4/2024 1310 by Sofya Valdez RN  Infection Prevention:   rest/sleep  promoted   hand hygiene promoted   equipment surfaces disinfected   personal protective equipment utilized  Taken 3/4/2024 1154 by Sofya Valdez RN  Infection Prevention:   rest/sleep promoted   personal protective equipment utilized   hand hygiene promoted   equipment surfaces disinfected  Taken 3/4/2024 0909 by Sofya Valdez RN  Infection Prevention:   rest/sleep promoted   personal protective equipment utilized   equipment surfaces disinfected   hand hygiene promoted  Taken 3/4/2024 0736 by Sofya Valdez RN  Infection Prevention:   rest/sleep promoted   personal protective equipment utilized   equipment surfaces disinfected   hand hygiene promoted     Problem: Fall Injury Risk  Goal: Absence of Fall and Fall-Related Injury  Intervention: Promote Injury-Free Environment  Recent Flowsheet Documentation  Taken 3/4/2024 1451 by Sofya Valdez RN  Safety Promotion/Fall Prevention: safety round/check completed  Taken 3/4/2024 1310 by Sofya Valdez RN  Safety Promotion/Fall Prevention:   safety round/check completed   clutter free environment maintained   assistive device/personal items within reach   fall prevention program maintained  Taken 3/4/2024 1154 by Sofya Valdez RN  Safety Promotion/Fall Prevention:   safety round/check completed   clutter free environment maintained   assistive device/personal items within reach   fall prevention program maintained  Taken 3/4/2024 0909 by Sofya Valdez RN  Safety Promotion/Fall Prevention:   safety round/check completed   assistive device/personal items within reach   clutter free environment maintained   fall prevention program maintained  Taken 3/4/2024 0736 by Sofya Valdez RN  Safety Promotion/Fall Prevention:   safety round/check completed   assistive device/personal items within reach   clutter free environment maintained   fall prevention program maintained   Goal Outcome Evaluation:           Progress: no change  Outcome  Evaluation: VSS. continous tube feeds started during shift. wound care consulted. no new concerns at this time.

## 2024-03-04 NOTE — SIGNIFICANT NOTE
Wound Eval / Progress Noted    KASSI Davis     Patient Name: Jordin Menon  : 1940  MRN: [id number]  Today's Date: 3/4/2024                 Admit Date: 3/3/2024    Visit Dx:    ICD-10-CM ICD-9-CM   1. Altered mental status, unspecified altered mental status type  R41.82 780.97   2. Acute UTI  N39.0 599.0   3. Sepsis, due to unspecified organism, unspecified whether acute organ dysfunction present  A41.9 038.9     995.91   4. Oropharyngeal dysphagia  R13.12 787.22   5. Difficulty walking  R26.2 719.7         Acute metabolic encephalopathy        Past Medical History:   Diagnosis Date    Acute renal failure with pathological lesion in kidney     Alzheimer's dementia with behavioral disturbance     Anemia, vitamin B12 deficiency     Anxiety     Anxiety disorder due to known physiological condition     Arthritis     Benign neoplasm of prostate     Cannot walk     CHF (congestive heart failure)     Constipation     Coronary atherosclerosis of native coronary artery     CVA (cerebral vascular accident)     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 (CVA)     Edema     Essential hypertension, benign     Gastroesophageal reflux disease without esophagitis     GERD (gastroesophageal reflux disease)     High blood pressure     Hirsutism     HTN (hypertension)     Hypercalcemia     Hyperkalemia     Hyperlipemia     Hyperlipidemia     Hypokalemia     Ingrowing toenail 2018    Left foot pain 2018    Nutritional deficiency     Osteoarthritis of right knee 2016    Pneumonia     Presence of right artificial knee joint     Primary localized osteoarthrosis     PVD (peripheral vascular disease)     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     Right foot pain 2018    Senile dementia, uncomplicated     Severe sinus bradycardia     Shock, septic     Sleep apnea     Thrombosis of left femoral vein     Tinea unguium 2018    Vitamin B12 deficiency anemia         Past  Surgical History:   Procedure Laterality Date    ENDOSCOPY W/ PEG TUBE PLACEMENT N/A 6/8/2022    Procedure: ESOPHAGOGASTRODUODENOSCOPY WITH PERCUTANEOUS ENDOSCOPIC GASTROSTOMY TUBE INSERTION WITH ANESTHESIA;  Surgeon: Yanelis Mistry MD;  Location: [facility] ENDOSCOPY;  Service: Gastroenterology;  Laterality: N/A;  PEG TUBE PLACEMENT    ENDOSCOPY W/ PEG TUBE PLACEMENT N/A 7/3/2022    Procedure: ESOPHAGOGASTRODUODENOSCOPY WITH PERCUTANEOUS ENDOSCOPIC GASTROSTOMY TUBE INSERTION WITH ANESTHESIA;  Surgeon: Bam Melo MD;  Location: [facility] ENDOSCOPY;  Service: Gastroenterology;  Laterality: N/A;  PEG TUBE PLACEMENT    ENDOSCOPY W/ PEG TUBE PLACEMENT N/A 10/26/2022    Procedure: ESOPHAGOGASTRODUODENOSCOPY WITH PERCUTANEOUS ENDOSCOPIC GASTROSTOMY TUBE INSERTION WITH ANESTHESIA;  Surgeon: Yanelis Mistry MD;  Location: [facility] ENDOSCOPY;  Service: Gastroenterology;  Laterality: N/A;  PERCUTANEOUS ENDOSCOPIC GASTROSTOMY TUBE PLACEMENT         Physical Assessment:  Wound 03/04/24 1207 anterior hip (Active)   Wound Image   03/04/24 1209   Dressing Appearance open to air 03/04/24 1642   Closure None 03/04/24 1642   Base moist;pink;red 03/04/24 1642   Periwound dry;intact 03/04/24 1642   Periwound Temperature warm 03/04/24 1642   Periwound Skin Turgor soft 03/04/24 1642   Edges open 03/04/24 1642   Drainage Characteristics/Odor serosanguineous 03/04/24 1642   Drainage Amount scant 03/04/24 1642   Care, Wound cleansed with;sterile normal saline;barrier applied 03/04/24 1642   Dressing Care open to air 03/04/24 1642   Periwound Care barrier ointment applied 03/04/24 1642      Wound Check / Follow-up: Patient seen today for wound consult. Patient with PEG tube in place and continuous tube feeds running. Linear tears noted within lower abdominal fold. Moist red and pink tissue noted. Cleansed with normal saline and gauze. Recommending skin care with application of barrier cream. Bilateral heels with  blanchable redness. Some moisture noted within gluteal crease. Recommending skin care / skin protection with application of barrier cream to these areas. Recommending to implement every two hour turns, offload heels, keep patient free from moisture / moisture managed.      Impression: MASD. Blanchable redness.       Short term goals: Regain skin integrity, skin protection, moisture prevention / management, pressure reduction, skin care.      Lor Gan RN    3/4/2024    16:50 EST

## 2024-03-04 NOTE — THERAPY EVALUATION
Acute Care - Physical Therapy Initial Evaluation  KASSI Davis     Patient Name: Jordin Menon  : 1940  MRN: [id number]  Today's Date: 3/4/2024      Visit Dx:     ICD-10-CM ICD-9-CM   1. Altered mental status, unspecified altered mental status type  R41.82 780.97   2. Acute UTI  N39.0 599.0   3. Sepsis, due to unspecified organism, unspecified whether acute organ dysfunction present  A41.9 038.9     995.91   4. Oropharyngeal dysphagia  R13.12 787.22   5. Difficulty walking  R26.2 719.7     Patient Active Problem List   Diagnosis    Primary osteoarthritis of right knee    Status post total right knee replacement    Essential hypertension    Shortness of breath    BRYANT (acute kidney injury)    Hypokalemia    Hypernatremia    Symptomatic bradycardia    Hypothermia, initial encounter    Acute CVA (cerebrovascular accident)    Severe sepsis    Severe malnutrition    Hyperkalemia    Anemia    Dislodged gastrostomy tube    Dysphagia, oropharyngeal    Sepsis    Acute metabolic encephalopathy     Past Medical History:   Diagnosis Date    Acute renal failure with pathological lesion in kidney     Alzheimer's dementia with behavioral disturbance     Anemia, vitamin B12 deficiency     Anxiety     Anxiety disorder due to known physiological condition     Arthritis     Benign neoplasm of prostate     Cannot walk     CHF (congestive heart failure)     Constipation     Coronary atherosclerosis of native coronary artery     CVA (cerebral vascular accident)     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 (CVA)     Edema     Essential hypertension, benign     Gastroesophageal reflux disease without esophagitis     GERD (gastroesophageal reflux disease)     High blood pressure     Hirsutism     HTN (hypertension)     Hypercalcemia     Hyperkalemia     Hyperlipemia     Hyperlipidemia     Hypokalemia     Ingrowing toenail 2018    Left foot pain 2018    Nutritional deficiency     Osteoarthritis of right knee  08/22/2016    Pneumonia     Presence of right artificial knee joint     Primary localized osteoarthrosis     PVD (peripheral vascular disease)     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     Right foot pain 09/21/2018    Senile dementia, uncomplicated     Severe sinus bradycardia     Shock, septic     Sleep apnea     Thrombosis of left femoral vein     Tinea unguium 09/21/2018    Vitamin B12 deficiency anemia      Past Surgical History:   Procedure Laterality Date    ENDOSCOPY W/ PEG TUBE PLACEMENT N/A 6/8/2022    Procedure: ESOPHAGOGASTRODUODENOSCOPY WITH PERCUTANEOUS ENDOSCOPIC GASTROSTOMY TUBE INSERTION WITH ANESTHESIA;  Surgeon: Yanelis Mistry MD;  Location: [facility] ENDOSCOPY;  Service: Gastroenterology;  Laterality: N/A;  PEG TUBE PLACEMENT    ENDOSCOPY W/ PEG TUBE PLACEMENT N/A 7/3/2022    Procedure: ESOPHAGOGASTRODUODENOSCOPY WITH PERCUTANEOUS ENDOSCOPIC GASTROSTOMY TUBE INSERTION WITH ANESTHESIA;  Surgeon: Bam Melo MD;  Location: [facility] ENDOSCOPY;  Service: Gastroenterology;  Laterality: N/A;  PEG TUBE PLACEMENT    ENDOSCOPY W/ PEG TUBE PLACEMENT N/A 10/26/2022    Procedure: ESOPHAGOGASTRODUODENOSCOPY WITH PERCUTANEOUS ENDOSCOPIC GASTROSTOMY TUBE INSERTION WITH ANESTHESIA;  Surgeon: Yanelis Mistry MD;  Location: [facility] ENDOSCOPY;  Service: Gastroenterology;  Laterality: N/A;  PERCUTANEOUS ENDOSCOPIC GASTROSTOMY TUBE PLACEMENT     PT Assessment (Last 12 Hours)       PT Evaluation and Treatment       Row Name 03/04/24 1400          Physical Therapy Time and Intention    Subjective Information no complaints  -DP     Document Type evaluation  -DP     Mode of Treatment individual therapy;physical therapy  -DP     Patient Effort poor  -DP       Row Name 03/04/24 1400          General Information    Patient Profile Reviewed yes  -DP     Prior Level of Function max assist:;bed mobility;ADL's;transfer  -DP     Barriers to Rehab previous functional deficit  -DP        Row Name 03/04/24 1400          Living Environment    Current Living Arrangements extended care facility  -DP     People in Home facility resident  -DP       Row Name 03/04/24 1400          Range of Motion (ROM)    Range of Motion bilateral lower extremities;ROM is WFL  except B knee extension limited to -30 degrees  -DP       Row Name 03/04/24 1400          Strength (Manual Muscle Testing)    Strength (Manual Muscle Testing) other (see comments)  not tested  -DP       Row Name 03/04/24 1400          Bed Mobility    Bed Mobility supine-sit-supine  -DP     Supine-Sit-Supine [place] (Bed Mobility) maximum assist (25% patient effort);dependent (less than 25% patient effort)  -DP       Row Name 03/04/24 1400          Transfers    Comment, (Transfers) deferred  -DP       Row Name 03/04/24 1400          Gait/Stairs (Locomotion)    Comment, (Gait/Stairs) Pt is non ambulatory at baseline  -DP       Row Name             [REMOVED] Wound 07/02/22 1120 Right posterior hand Skin Tear    Wound - Properties Group Placement Date: 07/02/22  -KT Placement Time: 1120  -KT Present on Original Admission: Y  -KT Side: Right  -KT Orientation: posterior  -KT Location: hand  -KT Primary Wound Type: Skin tear  -KT Removal Date: 03/04/24  -GP Removal Time: 0500  -GP    Retired Wound - Properties Group Placement Date: 07/02/22  -KT Placement Time: 1120  -KT Present on Original Admission: Y  -KT Side: Right  -KT Orientation: posterior  -KT Location: hand  -KT Primary Wound Type: Skin tear  -KT Removal Date: 03/04/24  -GP Removal Time: 0500  -GP    Retired Wound - Properties Group Date first assessed: 07/02/22  -KT Time first assessed: 1120  -KT Present on Original Admission: Y  -KT Side: Right  -KT Location: hand  -KT Primary Wound Type: Skin tear  -KT Resolution Date: 03/04/24  -GP Resolution Time: 0500  -GP      Row Name             [REMOVED] Wound 09/09/23 1231 Right lateral ankle Blisters    Wound - Properties Group Placement  Date: 09/09/23  -EP Placement Time: 1231  -EP Side: Right  -EP Orientation: lateral  -EP Location: ankle  -EP Primary Wound Type: Blisters  -EP Removal Date: 03/04/24  -GP Removal Time: 0500  -GP    Retired Wound - Properties Group Placement Date: 09/09/23  -EP Placement Time: 1231  -EP Side: Right  -EP Orientation: lateral  -EP Location: ankle  -EP Primary Wound Type: Blisters  -EP Removal Date: 03/04/24  -GP Removal Time: 0500  -GP    Retired Wound - Properties Group Date first assessed: 09/09/23  -EP Time first assessed: 1231  -EP Side: Right  -EP Location: ankle  -EP Primary Wound Type: Blisters  -EP Resolution Date: 03/04/24  -GP Resolution Time: 0500  -GP      Row Name             [REMOVED] Wound 09/04/23 2337 Left posterior heel    Wound - Properties Group Placement Date: 09/04/23  -SC Placement Time: 2337  -SC Side: Left  -SC Orientation: posterior  -SC Location: heel  -SC Removal Date: 03/04/24  -GP Removal Time: 0501  -GP    Retired Wound - Properties Group Placement Date: 09/04/23  -SC Placement Time: 2337  -SC Side: Left  -SC Orientation: posterior  -SC Location: heel  -SC Removal Date: 03/04/24  -GP Removal Time: 0501  -GP    Retired Wound - Properties Group Date first assessed: 09/04/23  -SC Time first assessed: 2337  -SC Side: Left  -SC Location: heel  -SC Resolution Date: 03/04/24  -GP Resolution Time: 0501  -GP      Row Name             [REMOVED] Wound 09/04/23 2339 Right posterior heel    Wound - Properties Group Placement Date: 09/04/23  -SC Placement Time: 2339  -SC Side: Right  -SC Orientation: posterior  -SC Location: heel  -SC Removal Date: 03/04/24  -GP Removal Time: 0501  -GP    Retired Wound - Properties Group Placement Date: 09/04/23  -SC Placement Time: 2339  -SC Side: Right  -SC Orientation: posterior  -SC Location: heel  -SC Removal Date: 03/04/24  -GP Removal Time: 0501  -GP    Retired Wound - Properties Group Date first assessed: 09/04/23  -SC Time first assessed: 2339  -SC Side:  Right  -SC Location: heel  -SC Resolution Date: 03/04/24  -GP Resolution Time: 0501  -GP      Row Name             Wound 03/04/24 1207 anterior hip    Wound - Properties Group Placement Date: 03/04/24  -KS Placement Time: 1207 -KS Orientation: anterior  -KS Location: hip  -KS    Retired Wound - Properties Group Placement Date: 03/04/24  -KS Placement Time: 1207  -KS Orientation: anterior  -KS Location: hip  -KS    Retired Wound - Properties Group Date first assessed: 03/04/24  -KS Time first assessed: 1207 -KS Location: hip  -KS      Row Name 03/04/24 1400          Plan of Care Review    Plan of Care Reviewed With patient  -DP     Outcome Evaluation Patient is a long-term care resident at a local nursing home.  He is nonambulatory at baseline and dependent on staff for all bed mobilities, transfers, and ADLs.  He is not appropriate for inpatient PT services.  He will benefit from a PT consult upon return to the nursing home to maximize independence with functional mobility.  -DP       Row Name 03/04/24 1400          Positioning and Restraints    Pre-Treatment Position in bed  -DP     Post Treatment Position bed  -DP     In Bed call light within reach;encouraged to call for assist;exit alarm on  -DP       Row Name 03/04/24 1400          Therapy Assessment/Plan (PT)    Criteria for Skilled Interventions Met (PT) does not meet criteria for skilled intervention  -DP     Therapy Frequency (PT) evaluation only  -DP       Row Name 03/04/24 1400          PT Evaluation Complexity    History, PT Evaluation Complexity no personal factors and/or comorbidities  -DP     Examination of Body Systems (PT Eval Complexity) total of 4 or more elements  -DP     Clinical Presentation (PT Evaluation Complexity) stable  -DP     Clinical Decision Making (PT Evaluation Complexity) low complexity  -DP     Overall Complexity (PT Evaluation Complexity) low complexity  -DP       Row Name 03/04/24 1400          Physical Therapy Goals    Problem  Specific Goal Selection (PT) problem specific goal 1, PT  -DP       Row Name 03/04/24 1400          Problem Specific Goal 1 (PT)    Problem Specific Goal 1 (PT) Complete PT evaluation  -DP     Time Frame (Problem Specific Goal 1, PT) 1 day  -DP     Progress/Outcome (Problem Specific Goal 1, PT) goal met  -DP               User Key  (r) = Recorded By, (t) = Taken By, (c) = Cosigned By      Initials Name Provider Type    Sofya Dalton, RN Registered Nurse    Ciera Uriarte, PT Physical Therapist    Esperanza Oliveira, RN Registered Nurse    GP Sp Valdez, RN Registered Nurse    GP Ruslan Valdez, RN Registered Nurse    Lachelle Castillo RN Registered Nurse    Lisa Levin, RN Registered Nurse                      PT Recommendation and Plan  Anticipated Discharge Disposition (PT): extended care facility  Therapy Frequency (PT): evaluation only  Plan of Care Reviewed With: patient  Outcome Evaluation: Patient is a long-term care resident at a local nursing home.  He is nonambulatory at baseline and dependent on staff for all bed mobilities, transfers, and ADLs.  He is not appropriate for inpatient PT services.  He will benefit from a PT consult upon return to the nursing home to maximize independence with functional mobility.   Outcome Measures       Row Name 03/04/24 1400             How much help from another person do you currently need...    Turning from your back to your side while in flat bed without using bedrails? 1  -DP      Moving from lying on back to sitting on the side of a flat bed without bedrails? 1  -DP      Moving to and from a bed to a chair (including a wheelchair)? 1  -DP      Standing up from a chair using your arms (e.g., wheelchair, bedside chair)? 1  -DP      Climbing 3-5 steps with a railing? 1  -DP      To walk in hospital room? 1  -DP      AM-PAC 6 Clicks Score (PT) 6  -DP      Highest Level of Mobility Goal 2 --> Bed activities/dependent transfer  -DP         Functional  Assessment    Outcome Measure Options AM-PAC 6 Clicks Basic Mobility (PT)  -DP                User Key  (r) = Recorded By, (t) = Taken By, (c) = Cosigned By      Initials Name Provider Type    Ciera Uriarte, YESSENIA Physical Therapist                     Time Calculation:    PT Charges       Row Name 03/04/24 1440             Time Calculation    PT Received On 03/04/24  -DP         Untimed Charges    PT Eval/Re-eval Minutes 30  -DP         Total Minutes    Untimed Charges Total Minutes 30  -DP       Total Minutes 30  -DP                User Key  (r) = Recorded By, (t) = Taken By, (c) = Cosigned By      Initials Name Provider Type    Ciera Uriarte, PT Physical Therapist                      PT G-Codes  Outcome Measure Options: AM-PAC 6 Clicks Basic Mobility (PT)  AM-PAC 6 Clicks Score (PT): 6  AM-PAC 6 Clicks Score (OT): 6    Ciera Fox, PT  3/4/2024

## 2024-03-04 NOTE — PLAN OF CARE
Goal Outcome Evaluation:  Plan of Care Reviewed With: patient        Progress: no change  Outcome Evaluation: Patient currently bedbound and dependent with all self-care activities.  Katherine lift is used at his nursing home for transfers.  No additional OT services recommended at this time      Anticipated Discharge Disposition (OT): sub acute care setting

## 2024-03-05 ENCOUNTER — APPOINTMENT (OUTPATIENT)
Dept: CARDIOLOGY | Facility: HOSPITAL | Age: 84
DRG: 689 | End: 2024-03-05
Payer: MEDICARE

## 2024-03-05 LAB
ALBUMIN SERPL-MCNC: 3.3 G/DL (ref 3.5–5.2)
ALP SERPL-CCNC: 110 U/L (ref 39–117)
ALT SERPL W P-5'-P-CCNC: 30 U/L (ref 1–41)
ANION GAP SERPL CALCULATED.3IONS-SCNC: 8.6 MMOL/L (ref 5–15)
AST SERPL-CCNC: 21 U/L (ref 1–40)
BACTERIA BLD CULT: NORMAL
BACTERIA SPEC AEROBE CULT: ABNORMAL
BASOPHILS # BLD AUTO: 0.03 10*3/MM3 (ref 0–0.2)
BASOPHILS NFR BLD AUTO: 0.4 % (ref 0–1.5)
BILIRUB CONJ SERPL-MCNC: 0.2 MG/DL (ref 0–0.3)
BILIRUB INDIRECT SERPL-MCNC: 0.3 MG/DL
BILIRUB SERPL-MCNC: 0.5 MG/DL (ref 0–1.2)
BOTTLE TYPE: NORMAL
BUN SERPL-MCNC: 17 MG/DL (ref 8–23)
BUN/CREAT SERPL: 23.3 (ref 7–25)
CALCIUM SPEC-SCNC: 8.9 MG/DL (ref 8.6–10.5)
CHLORIDE SERPL-SCNC: 101 MMOL/L (ref 98–107)
CO2 SERPL-SCNC: 22.4 MMOL/L (ref 22–29)
CREAT SERPL-MCNC: 0.73 MG/DL (ref 0.76–1.27)
DEPRECATED RDW RBC AUTO: 35.6 FL (ref 37–54)
EGFRCR SERPLBLD CKD-EPI 2021: 90.3 ML/MIN/1.73
EOSINOPHIL # BLD AUTO: 0.05 10*3/MM3 (ref 0–0.4)
EOSINOPHIL NFR BLD AUTO: 0.7 % (ref 0.3–6.2)
ERYTHROCYTE [DISTWIDTH] IN BLOOD BY AUTOMATED COUNT: 12.1 % (ref 12.3–15.4)
GLUCOSE BLDC GLUCOMTR-MCNC: 108 MG/DL (ref 70–99)
GLUCOSE BLDC GLUCOMTR-MCNC: 109 MG/DL (ref 70–99)
GLUCOSE BLDC GLUCOMTR-MCNC: 112 MG/DL (ref 70–99)
GLUCOSE BLDC GLUCOMTR-MCNC: 116 MG/DL (ref 70–99)
GLUCOSE SERPL-MCNC: 129 MG/DL (ref 65–99)
GRAM STN SPEC: ABNORMAL
HCT VFR BLD AUTO: 29.9 % (ref 37.5–51)
HGB BLD-MCNC: 10.5 G/DL (ref 13–17.7)
IMM GRANULOCYTES # BLD AUTO: 0.02 10*3/MM3 (ref 0–0.05)
IMM GRANULOCYTES NFR BLD AUTO: 0.3 % (ref 0–0.5)
ISOLATED FROM: ABNORMAL
LYMPHOCYTES # BLD AUTO: 1.03 10*3/MM3 (ref 0.7–3.1)
LYMPHOCYTES NFR BLD AUTO: 14.7 % (ref 19.6–45.3)
MAGNESIUM SERPL-MCNC: 1.8 MG/DL (ref 1.6–2.4)
MCH RBC QN AUTO: 28.5 PG (ref 26.6–33)
MCHC RBC AUTO-ENTMCNC: 35.1 G/DL (ref 31.5–35.7)
MCV RBC AUTO: 81.3 FL (ref 79–97)
MONOCYTES # BLD AUTO: 1.28 10*3/MM3 (ref 0.1–0.9)
MONOCYTES NFR BLD AUTO: 18.3 % (ref 5–12)
NEUTROPHILS NFR BLD AUTO: 4.58 10*3/MM3 (ref 1.7–7)
NEUTROPHILS NFR BLD AUTO: 65.6 % (ref 42.7–76)
NRBC BLD AUTO-RTO: 0 /100 WBC (ref 0–0.2)
PHOSPHATE SERPL-MCNC: 2.2 MG/DL (ref 2.5–4.5)
PLATELET # BLD AUTO: 348 10*3/MM3 (ref 140–450)
PMV BLD AUTO: 10.2 FL (ref 6–12)
POTASSIUM SERPL-SCNC: 4.6 MMOL/L (ref 3.5–5.2)
PROT SERPL-MCNC: 7 G/DL (ref 6–8.5)
RBC # BLD AUTO: 3.68 10*6/MM3 (ref 4.14–5.8)
SODIUM SERPL-SCNC: 132 MMOL/L (ref 136–145)
WBC NRBC COR # BLD AUTO: 6.99 10*3/MM3 (ref 3.4–10.8)

## 2024-03-05 PROCEDURE — 25010000002 AMPICILLIN-SULBACTAM PER 1.5 G: Performed by: FAMILY MEDICINE

## 2024-03-05 PROCEDURE — 80076 HEPATIC FUNCTION PANEL: CPT | Performed by: FAMILY MEDICINE

## 2024-03-05 PROCEDURE — 25010000002 SULFUR HEXAFLUORIDE MICROSPH 60.7-25 MG RECONSTITUTED SUSPENSION: Performed by: FAMILY MEDICINE

## 2024-03-05 PROCEDURE — 82948 REAGENT STRIP/BLOOD GLUCOSE: CPT

## 2024-03-05 PROCEDURE — 80048 BASIC METABOLIC PNL TOTAL CA: CPT | Performed by: FAMILY MEDICINE

## 2024-03-05 PROCEDURE — 85025 COMPLETE CBC W/AUTO DIFF WBC: CPT | Performed by: FAMILY MEDICINE

## 2024-03-05 PROCEDURE — 25010000002 CEFEPIME PER 500 MG: Performed by: STUDENT IN AN ORGANIZED HEALTH CARE EDUCATION/TRAINING PROGRAM

## 2024-03-05 PROCEDURE — 94664 DEMO&/EVAL PT USE INHALER: CPT

## 2024-03-05 PROCEDURE — 87103 BLOOD FUNGUS CULTURE: CPT | Performed by: FAMILY MEDICINE

## 2024-03-05 PROCEDURE — 83735 ASSAY OF MAGNESIUM: CPT | Performed by: FAMILY MEDICINE

## 2024-03-05 PROCEDURE — 25010000002 MICAFUNGIN SODIUM 100 MG RECONSTITUTED SOLUTION: Performed by: FAMILY MEDICINE

## 2024-03-05 PROCEDURE — 99233 SBSQ HOSP IP/OBS HIGH 50: CPT | Performed by: FAMILY MEDICINE

## 2024-03-05 PROCEDURE — 94799 UNLISTED PULMONARY SVC/PX: CPT

## 2024-03-05 PROCEDURE — 93306 TTE W/DOPPLER COMPLETE: CPT

## 2024-03-05 PROCEDURE — 93306 TTE W/DOPPLER COMPLETE: CPT | Performed by: SPECIALIST

## 2024-03-05 PROCEDURE — 84100 ASSAY OF PHOSPHORUS: CPT | Performed by: FAMILY MEDICINE

## 2024-03-05 PROCEDURE — 25810000003 SODIUM CHLORIDE 0.9 % SOLUTION: Performed by: FAMILY MEDICINE

## 2024-03-05 RX ORDER — ZIPRASIDONE MESYLATE 20 MG/ML
10 INJECTION, POWDER, LYOPHILIZED, FOR SOLUTION INTRAMUSCULAR ONCE AS NEEDED
Status: COMPLETED | OUTPATIENT
Start: 2024-03-05 | End: 2024-03-05

## 2024-03-05 RX ORDER — FENTANYL/ROPIVACAINE/NS/PF 2-625MCG/1
15 PLASTIC BAG, INJECTION (ML) EPIDURAL ONCE
Status: COMPLETED | OUTPATIENT
Start: 2024-03-05 | End: 2024-03-05

## 2024-03-05 RX ADMIN — Medication 10 ML: at 09:34

## 2024-03-05 RX ADMIN — AMPICILLIN SODIUM, SULBACTAM SODIUM 3 G: 2; 1 INJECTION, POWDER, FOR SOLUTION INTRAMUSCULAR; INTRAVENOUS at 21:49

## 2024-03-05 RX ADMIN — ZIPRASIDONE MESYLATE 10 MG: 20 INJECTION, POWDER, LYOPHILIZED, FOR SOLUTION INTRAMUSCULAR at 23:34

## 2024-03-05 RX ADMIN — CEFEPIME 2000 MG: 2 INJECTION, POWDER, FOR SOLUTION INTRAVENOUS at 05:00

## 2024-03-05 RX ADMIN — SULFUR HEXAFLUORIDE 5 ML: KIT at 20:19

## 2024-03-05 RX ADMIN — IPRATROPIUM BROMIDE AND ALBUTEROL SULFATE 3 ML: .5; 3 SOLUTION RESPIRATORY (INHALATION) at 18:34

## 2024-03-05 RX ADMIN — POTASSIUM PHOSPHATE, MONOBASIC POTASSIUM PHOSPHATE, DIBASIC 15 MMOL: 224; 236 INJECTION, SOLUTION, CONCENTRATE INTRAVENOUS at 10:30

## 2024-03-05 RX ADMIN — MICAFUNGIN SODIUM 100 MG: 100 INJECTION, POWDER, LYOPHILIZED, FOR SOLUTION INTRAVENOUS at 13:47

## 2024-03-05 RX ADMIN — SODIUM CHLORIDE 75 ML/HR: 9 INJECTION, SOLUTION INTRAVENOUS at 10:02

## 2024-03-05 RX ADMIN — IPRATROPIUM BROMIDE AND ALBUTEROL SULFATE 3 ML: .5; 3 SOLUTION RESPIRATORY (INHALATION) at 03:16

## 2024-03-05 RX ADMIN — IPRATROPIUM BROMIDE AND ALBUTEROL SULFATE 3 ML: .5; 3 SOLUTION RESPIRATORY (INHALATION) at 06:26

## 2024-03-05 RX ADMIN — Medication 5 MG: at 21:50

## 2024-03-05 RX ADMIN — IPRATROPIUM BROMIDE AND ALBUTEROL SULFATE 3 ML: .5; 3 SOLUTION RESPIRATORY (INHALATION) at 23:13

## 2024-03-05 RX ADMIN — IPRATROPIUM BROMIDE AND ALBUTEROL SULFATE 3 ML: .5; 3 SOLUTION RESPIRATORY (INHALATION) at 00:00

## 2024-03-05 RX ADMIN — IPRATROPIUM BROMIDE AND ALBUTEROL SULFATE 3 ML: .5; 3 SOLUTION RESPIRATORY (INHALATION) at 11:36

## 2024-03-05 RX ADMIN — AMPICILLIN SODIUM, SULBACTAM SODIUM 3 G: 2; 1 INJECTION, POWDER, FOR SOLUTION INTRAMUSCULAR; INTRAVENOUS at 09:35

## 2024-03-05 RX ADMIN — Medication 10 ML: at 21:49

## 2024-03-05 RX ADMIN — AMPICILLIN SODIUM, SULBACTAM SODIUM 3 G: 2; 1 INJECTION, POWDER, FOR SOLUTION INTRAMUSCULAR; INTRAVENOUS at 15:23

## 2024-03-05 RX ADMIN — IPRATROPIUM BROMIDE AND ALBUTEROL SULFATE 3 ML: .5; 3 SOLUTION RESPIRATORY (INHALATION) at 15:54

## 2024-03-05 NOTE — CONSULTS
03/05/24 1032   Coping/Psychosocial   Additional Documentation Spiritual Care (Group)   Spiritual Care   Spiritual Care Source  initiative   Spiritual Care Interventions other (see comments)  ( updated by bedside nurse on POC)   Spiritual Care Visit Type initial   At time of visit pt is on 4MTU

## 2024-03-05 NOTE — PLAN OF CARE
Goal Outcome Evaluation:           Progress: no change  Outcome Evaluation: Pt remains A&O x 1 which is baseline. Pt becomes agitated during care but otherwise is resting comfortably. Attempted purewick but was unsuccessful secondary to pt's anatomy. Tube feeds continued. 3/3/24 blood cultures resulted budding yeast. Fungal blood cultures drawn and ABX started, see MAR. Continuing plan of care at this time.

## 2024-03-05 NOTE — PLAN OF CARE
Goal Outcome Evaluation:   Tolerating tube feeds. Room air.

## 2024-03-05 NOTE — PROGRESS NOTES
[facility]   Hospitalist Progress Note  Date: 3/5/2024  Patient Name: Jordin Menon  : 1940  MRN: [id number]  Date of admission: 3/3/2024      Subjective   Subjective   Chief complaint: Altered mental status    Summary:  84-year-old male with history of CVA with residual lower extremity weakness and dysphagia with PEG tube for primary means of nutrition, hypertension, dyslipidemia, heart failure with preserved ejection fraction, PAD, GERD without esophagitis, chronic debility, severe Alzheimer's dementia, hospitalized on 3/3/2024 with chief complaint of altered mental status, acute metabolic encephalopathy likely secondary to u UTI with urinalysis suggestive of UTI with concerns for gram-positive bacteremia, 1 out of 2 blood cultures positive.  Hematuria on UTI, likely related to catheterization, following for resolution.  Blood culture 1 out of 2 positive for staph coag negative, likely contamination, the second blood culture came back aerobic for budding yeast, urine culture came back positive for yeast isolated, being worked up with further isolation and speciation, micafungin started empirically    Interval follow-up: Seen and examined this morning, no acute distress, no acute major night events, hematuria resolved, patient mentation is baseline, 1 out of 2 blood cultures positive for budding yeast, urine culture came back positive for yeast, further worked up after discussing with ID pharmacist, started micafungin for treatment, not sure if any of this is true or not, repeat blood cultures are negative thus far and those were taken prior to even starting micafungin, will follow to see if they do grow budding yeast, dedicated fungal blood culture.  Telemetry reviewed, heart rate in the 70s, sinus, blood pressures in the 140s, potassium 4.6, sodium 132, hemoglobin 10.5, creatinine 0.73.    Review of systems:  Unable to obtain review of systems due to altered mental status and underlying  dementia      Objective   Objective     Vitals:   Temp:  [98.1 °F (36.7 °C)-99 °F (37.2 °C)] 99 °F (37.2 °C)  Heart Rate:  [71-93] 73  Resp:  [18-20] 18  BP: (109-143)/(55-79) 143/79  Physical Exam               Constitutional: Awake, confused asking to be shaved              Eyes: Pupils equal, sclerae anicteric, no conjunctival injection              HENT: NCAT, mucous membranes dry              Neck: Supple, no thyromegaly, no lymphadenopathy, trachea midline              Respiratory: Diminished breath sounds at the bases bilaterally, nonlabored respirations               Cardiovascular: Regular rate and rhythm, no murmurs, rubs, or gallops, palpable pedal pulses bilaterally              Gastrointestinal: Positive bowel sounds, soft, PEG tube in place, nondistended              Musculoskeletal: No bilateral ankle edema, no clubbing or cyanosis to extremities              Psychiatric: Appropriate affect, cooperative              Neurologic: Contracted to the left and the left lower extremities.  Moves upper extremities spontaneously and appears symmetric.                Skin: No rashes    Result Review    Result Review:  I have personally reviewed the pertinent results from the past 24 hours to 3/5/2024 17:50 EST and agree with these findings:  [x]  Laboratory   CBC          3/3/2024    13:41 3/4/2024    05:22 3/5/2024    06:13   CBC   WBC 9.31  8.98  6.99    RBC 4.40  3.94  3.68    Hemoglobin 12.7  11.4  10.5    Hematocrit 35.6  32.0  29.9    MCV 80.9  81.2  81.3    MCH 28.9  28.9  28.5    MCHC 35.7  35.6  35.1    RDW 12.1  12.2  12.1    Platelets 411  371  348      BMP          11/14/2023    06:28 3/3/2024    13:41 3/3/2024    15:20 3/3/2024    20:00 3/3/2024    22:25 3/5/2024    06:13   BMP   BUN 21  23   26  24  17    Creatinine 0.64  0.85   0.89  0.90  0.73    Sodium 140  127  127.0  129  128  132    Potassium 4.3  6.1   5.4  5.4  4.6    Chloride 105  92   93  94  101    CO2 27.0  25.8   26.3  22.9  22.4     Calcium 9.1  9.7   9.7  9.5  8.9      LIVER FUNCTION TESTS:      Lab 03/05/24  0613 03/03/24  1341   TOTAL PROTEIN 7.0 8.7*   ALBUMIN 3.3* 3.8   GLOBULIN  --  4.9   ALT (SGPT) 30 30   AST (SGOT) 21 29   BILIRUBIN 0.5 0.5   INDIRECT BILIRUBIN 0.3  --    BILIRUBIN DIRECT 0.2  --    ALK PHOS 110 132*       [x]  Microbiology   Microbiology Results (last 10 days)       Procedure Component Value - Date/Time    Blood Culture - Blood, Hand, Right [[id number]]  (Normal) Collected: 03/04/24 1350    Lab Status: Preliminary result Specimen: Blood from Hand, Right Updated: 03/05/24 1430     Blood Culture No growth at 24 hours    Blood Culture - Blood, Arm, Left [[id number]]  (Normal) Collected: 03/04/24 1346    Lab Status: Preliminary result Specimen: Blood from Arm, Left Updated: 03/05/24 1430     Blood Culture No growth at 24 hours    MRSA Screen, PCR (Inpatient) - Swab, Nares [[id number]]  (Normal) Collected: 03/04/24 0328    Lab Status: Final result Specimen: Swab from Nares Updated: 03/04/24 0457     MRSA PCR No MRSA Detected    Narrative:      The negative predictive value of this diagnostic test is high and should only be used to consider de-escalating anti-MRSA therapy. A positive result may indicate colonization with MRSA and must be correlated clinically.    Blood Culture - Blood, Arm, Left [[id number]]  (Abnormal) Collected: 03/03/24 1743    Lab Status: Preliminary result Specimen: Blood from Arm, Left Updated: 03/05/24 1016     Blood Culture Abnormal Stain     Gram Stain Aerobic Bottle Budding yeast    Blood Culture ID, PCR - Blood, Arm, Left [[id number]] Collected: 03/03/24 1743    Lab Status: Final result Specimen: Blood from Arm, Left Updated: 03/05/24 1144     BCID, PCR Negative by BCID PCR. Culture to Follow.     BOTTLE TYPE Aerobic Bottle    Blood Culture - Blood, Arm, Left [[id number]]  (Abnormal) Collected: 03/03/24 1652    Lab Status: Final result Specimen: Blood from Arm, Left Updated: 03/05/24 0643     Blood  Culture Staphylococcus, coagulase negative     Isolated from Aerobic Bottle     Gram Stain Aerobic Bottle Gram positive cocci in clusters    Narrative:      Probable contaminant requires clinical correlation, susceptibility not performed unless requested by physician.      Blood Culture ID, PCR - Blood, Arm, Left [[id number]]  (Abnormal) Collected: 03/03/24 1652    Lab Status: Final result Specimen: Blood from Arm, Left Updated: 03/04/24 1638     BCID, PCR Staph spp, not aureus or lugdunensis. Identification by BCID2 PCR.     BOTTLE TYPE Aerobic Bottle    Urine Culture - Urine, Straight Cath [[id number]]  (Abnormal) Collected: 03/03/24 1601    Lab Status: Preliminary result Specimen: Urine from Straight Cath Updated: 03/05/24 1204     Urine Culture 50,000 CFU/mL Yeast isolated    Narrative:      Pharmacist Brice CHANEL requested work up 03/05/24    Colonization of the urinary tract without infection is common. Treatment is discouraged unless the patient is symptomatic, pregnant, or undergoing an invasive urologic procedure.    COVID-19, FLU A/B, RSV PCR 1 HR TAT - Swab, Nasopharynx [[id number]]  (Normal) Collected: 03/03/24 1548    Lab Status: Final result Specimen: Swab from Nasopharynx Updated: 03/03/24 1649     COVID19 Not Detected     Influenza A PCR Not Detected     Influenza B PCR Not Detected     RSV, PCR Not Detected    Narrative:      Fact sheet for providers: https://www.fda.gov/media/382054/download    Fact sheet for patients: https://www.fda.gov/media/925266/download    Test performed by PCR.              [x]  Radiology CT Abdomen Pelvis Without Contrast    Result Date: 3/4/2024    1. Nonobstructing 6 mm stone within the lower pole of the left kidney.  No associated hydronephrosis. 2. No discrete bladder wall thickening.  Multiple small bladder diverticula are present.  Prostatomegaly. 3. Small 1 cm exophytic lesion arising from the superior lateral aspect of the left kidney with intermediate Hounsfield unit  density.  This is likely a hemorrhagic or proteinaceous cyst and is unchanged from prior CT dated 7/2/2022.      ISAIAH LOZANO MD       Electronically Signed and Approved By: ISAIAH LOZANO MD on 3/04/2024 at 8:20             CT Chest Without Contrast Diagnostic    Result Date: 3/3/2024     1. Motion limited exam demonstrating no evidence of pneumonia or other acute pulmonary abnormality  2. Coronary artery atherosclerosis  3. Cholelithiasis and left nephrolithiasis     JAMES IBANEZ MD       Electronically Signed and Approved By: JAMES IBNAEZ MD on 3/03/2024 at 17:50             CT Head Without Contrast    Result Date: 3/3/2024     1. No acute intracranial process  2. Sinusitis     JAMES IBANEZ MD       Electronically Signed and Approved By: JAMES IBANEZ MD on 3/03/2024 at 17:33             XR Chest 1 View    Result Date: 3/3/2024   Low lung volumes with bibasilar atelectasis       JAMES IBANEZ MD       Electronically Signed and Approved By: JAMES IBANEZ MD on 3/03/2024 at 14:27                [x]  EKG/Telemetry   ECG 12 Lead ED Triage Standing Order; Weak / Dizzy / AMS   Preliminary Result   HEART RATE= 86  bpm   RR Interval= 700  ms   NE Interval= 173  ms   P Horizontal Axis= 5  deg   P Front Axis= 118  deg   QRSD Interval= 76  ms   QT Interval= 321  ms   QTcB= 384  ms   QRS Axis= -10  deg   T Wave Axis= 50  deg   - ABNORMAL ECG -   Sinus rhythm   Inferior infarct, old   Electronically Signed By:    Date and Time of Study: 2024-03-03 13:59:29          [x]  Cardiology/Vascular   []  Pathology  [x]  Old records  []  Other:    Assessment & Plan   Assessment / Plan     Assessment/Plan:      Assessment:  Acute metabolic encephalopathy, due to infection urine versus blood  Concern for fungemia  Hematuria in the setting of catheterization  Concern for gram-positive UTI  Hypovolemic hyponatremia  Hyperkalemia  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  Chronic debility with  bedbound status  Hypertension  Hyperlipidemia  Heart failure with preserved ejection fraction  Chronic dysphagia with PEG tube placement and feeds    Plan:  Labs and imaging reviewed  Major concern for Fungemia, follow  up echo  Repeat blood cultures  Start micafungin  Further speciate blood cultures which came back for budding yeast  Dietitian consultation to restart tube feeds  Stop vancomycin  De-escalate cefepime to Unasyn  Repeat blood cultures  Stop IV fluids  A.m. labs have been ordered to trend electrolytes  Reconcile home medications, resumed accordingly accordingly  A.m. labs full code  DVT prophylaxis with SCDs in the setting of gross hematuria  Clinical course dictate further management  Discussed with nurse at the bedside    DVT prophylaxis:  No DVT prophylaxis order currently exists.        CODE STATUS:   Code Status (Patient has no pulse and is not breathing): CPR (Attempt to Resuscitate)  Medical Interventions (Patient has pulse or is breathing): Full Support    Electronically signed by Aranza Herzog MD, 03/05/24, 5:55 PM EST.  Portions of this documentation were transcribed electronically from a voice recognition software.  I confirm all data accurately represents the service(s) I performed at today's visit.

## 2024-03-05 NOTE — PROGRESS NOTES
Nutrition Services    Patient Name: Jordin Menon  YOB: 1940  MRN: [id number]  Admission date: 3/3/2024    PROGRESS NOTE      Encounter Information: EN Follow Up       PO Diet: NPO Diet NPO Type: Strict NPO   PO Supplements: NPO   PO Intake:  NPO       Current nutrition support: Isosource 1.5 @ 60 ml/hr x 22 hours  Provides 1980 kcal, 90 g protein, 1003 ml fluid  Flush with 60 ml H2O q3h to provide additional 480 ml fluid    Total provided: 1980 kcal, 90 g pro, 1483 ml free water       Estimated Needs: 5070-8962 kcal, 61-76 g pro, 1900 ml fluid       Nutrition support review: EN running at goal rate. No intolerances noted.        Labs (reviewed below): Phos low, receiving potassium phosphate repletion.        GI Function:  Last BM noted 3/04.        Nutrition Intervention Updates: Continue with current nutrition plan of care.      Results from last 7 days   Lab Units 03/05/24  0613 03/03/24 2225 03/03/24 2000 03/03/24  1520 03/03/24  1341   SODIUM mmol/L 132* 128* 129*  --  127*   SODIUM, ARTERIAL   --   --   --    < >  --    POTASSIUM mmol/L 4.6 5.4* 5.4*  --  6.1*   CHLORIDE mmol/L 101 94* 93*  --  92*   CO2 mmol/L 22.4 22.9 26.3  --  25.8   BUN mg/dL 17 24* 26*  --  23   CREATININE mg/dL 0.73* 0.90 0.89  --  0.85   CALCIUM mg/dL 8.9 9.5 9.7  --  9.7   BILIRUBIN mg/dL 0.5  --   --   --  0.5   ALK PHOS U/L 110  --   --   --  132*   ALT (SGPT) U/L 30  --   --   --  30   AST (SGOT) U/L 21  --   --   --  29   GLUCOSE mg/dL 129* 80 84  --  86   GLUCOSE, ARTERIAL   --   --   --    < >  --     < > = values in this interval not displayed.     Results from last 7 days   Lab Units 03/05/24  0613 03/04/24  0522 03/03/24 2225 03/03/24  1341   MAGNESIUM mg/dL 1.8  --  1.8 2.0   PHOSPHORUS mg/dL 2.2*  --   --   --    HEMOGLOBIN g/dL 10.5*   < >  --  12.7*   HEMATOCRIT % 29.9*   < >  --  35.6*    < > = values in this interval not displayed.     COVID19   Date Value Ref Range Status   03/03/2024 Not Detected Not  Detected - Ref. Range Final     Lab Results   Component Value Date    HGBA1C 4.90 05/30/2022       RD to follow up per protocol.    Electronically signed by:  Arin Hinds RD  03/05/24 10:42 EST

## 2024-03-06 LAB
ALBUMIN SERPL-MCNC: 3.2 G/DL (ref 3.5–5.2)
ALP SERPL-CCNC: 105 U/L (ref 39–117)
ALT SERPL W P-5'-P-CCNC: 31 U/L (ref 1–41)
ANION GAP SERPL CALCULATED.3IONS-SCNC: 9.6 MMOL/L (ref 5–15)
AST SERPL-CCNC: 29 U/L (ref 1–40)
BACTERIA SPEC AEROBE CULT: ABNORMAL
BASOPHILS # BLD AUTO: 0.03 10*3/MM3 (ref 0–0.2)
BASOPHILS NFR BLD AUTO: 0.4 % (ref 0–1.5)
BH CV ECHO MEAS - AO ROOT DIAM: 3 CM
BH CV ECHO MEAS - EDV(CUBED): 63 ML
BH CV ECHO MEAS - EDV(MOD-SP4): 57.8 ML
BH CV ECHO MEAS - EF(MOD-SP4): 58.1 %
BH CV ECHO MEAS - ESV(CUBED): 24.4 ML
BH CV ECHO MEAS - ESV(MOD-SP4): 24.2 ML
BH CV ECHO MEAS - FS: 27.1 %
BH CV ECHO MEAS - IVS/LVPW: 1.12 CM
BH CV ECHO MEAS - IVSD: 1.19 CM
BH CV ECHO MEAS - LA DIMENSION: 3.4 CM
BH CV ECHO MEAS - LAT PEAK E' VEL: 8.4 CM/SEC
BH CV ECHO MEAS - LV DIASTOLIC VOL/BSA (35-75): 31.9 CM2
BH CV ECHO MEAS - LV MASS(C)D: 149.3 GRAMS
BH CV ECHO MEAS - LV SYSTOLIC VOL/BSA (12-30): 13.4 CM2
BH CV ECHO MEAS - LVIDD: 4 CM
BH CV ECHO MEAS - LVIDS: 2.9 CM
BH CV ECHO MEAS - LVOT AREA: 3.1 CM2
BH CV ECHO MEAS - LVOT DIAM: 2 CM
BH CV ECHO MEAS - LVPWD: 1.06 CM
BH CV ECHO MEAS - MED PEAK E' VEL: 7 CM/SEC
BH CV ECHO MEAS - MV A MAX VEL: 96.2 CM/SEC
BH CV ECHO MEAS - MV DEC SLOPE: 562 CM/SEC2
BH CV ECHO MEAS - MV DEC TIME: 0.15 SEC
BH CV ECHO MEAS - MV E MAX VEL: 85.7 CM/SEC
BH CV ECHO MEAS - MV E/A: 0.89
BH CV ECHO MEAS - RVDD: 3.4 CM
BH CV ECHO MEAS - SI(MOD-SP4): 18.5 ML/M2
BH CV ECHO MEAS - SV(MOD-SP4): 33.6 ML
BH CV ECHO MEASUREMENTS AVERAGE E/E' RATIO: 11.13
BILIRUB CONJ SERPL-MCNC: 0.4 MG/DL (ref 0–0.3)
BILIRUB INDIRECT SERPL-MCNC: 0.5 MG/DL
BILIRUB SERPL-MCNC: 0.9 MG/DL (ref 0–1.2)
BUN SERPL-MCNC: 13 MG/DL (ref 8–23)
BUN/CREAT SERPL: 16.9 (ref 7–25)
CALCIUM SPEC-SCNC: 9.1 MG/DL (ref 8.6–10.5)
CHLORIDE SERPL-SCNC: 103 MMOL/L (ref 98–107)
CO2 SERPL-SCNC: 22.4 MMOL/L (ref 22–29)
CREAT SERPL-MCNC: 0.77 MG/DL (ref 0.76–1.27)
DEPRECATED RDW RBC AUTO: 35.7 FL (ref 37–54)
EGFRCR SERPLBLD CKD-EPI 2021: 88.8 ML/MIN/1.73
EOSINOPHIL # BLD AUTO: 0.1 10*3/MM3 (ref 0–0.4)
EOSINOPHIL NFR BLD AUTO: 1.2 % (ref 0.3–6.2)
ERYTHROCYTE [DISTWIDTH] IN BLOOD BY AUTOMATED COUNT: 12.2 % (ref 12.3–15.4)
GLUCOSE BLDC GLUCOMTR-MCNC: 106 MG/DL (ref 70–99)
GLUCOSE BLDC GLUCOMTR-MCNC: 113 MG/DL (ref 70–99)
GLUCOSE BLDC GLUCOMTR-MCNC: 115 MG/DL (ref 70–99)
GLUCOSE BLDC GLUCOMTR-MCNC: 117 MG/DL (ref 70–99)
GLUCOSE BLDC GLUCOMTR-MCNC: 136 MG/DL (ref 70–99)
GLUCOSE SERPL-MCNC: 106 MG/DL (ref 65–99)
HCT VFR BLD AUTO: 31.5 % (ref 37.5–51)
HGB BLD-MCNC: 11.1 G/DL (ref 13–17.7)
IMM GRANULOCYTES # BLD AUTO: 0.02 10*3/MM3 (ref 0–0.05)
IMM GRANULOCYTES NFR BLD AUTO: 0.2 % (ref 0–0.5)
LYMPHOCYTES # BLD AUTO: 1.43 10*3/MM3 (ref 0.7–3.1)
LYMPHOCYTES NFR BLD AUTO: 17.2 % (ref 19.6–45.3)
MAGNESIUM SERPL-MCNC: 1.7 MG/DL (ref 1.6–2.4)
MCH RBC QN AUTO: 28.8 PG (ref 26.6–33)
MCHC RBC AUTO-ENTMCNC: 35.2 G/DL (ref 31.5–35.7)
MCV RBC AUTO: 81.6 FL (ref 79–97)
MONOCYTES # BLD AUTO: 1.25 10*3/MM3 (ref 0.1–0.9)
MONOCYTES NFR BLD AUTO: 15.1 % (ref 5–12)
NEUTROPHILS NFR BLD AUTO: 5.47 10*3/MM3 (ref 1.7–7)
NEUTROPHILS NFR BLD AUTO: 65.9 % (ref 42.7–76)
NRBC BLD AUTO-RTO: 0 /100 WBC (ref 0–0.2)
PHOSPHATE SERPL-MCNC: 2.6 MG/DL (ref 2.5–4.5)
PLATELET # BLD AUTO: 307 10*3/MM3 (ref 140–450)
PMV BLD AUTO: 10.5 FL (ref 6–12)
POTASSIUM SERPL-SCNC: 4.4 MMOL/L (ref 3.5–5.2)
PROT SERPL-MCNC: 6.9 G/DL (ref 6–8.5)
RBC # BLD AUTO: 3.86 10*6/MM3 (ref 4.14–5.8)
SODIUM SERPL-SCNC: 135 MMOL/L (ref 136–145)
WBC NRBC COR # BLD AUTO: 8.3 10*3/MM3 (ref 3.4–10.8)

## 2024-03-06 PROCEDURE — 36410 VNPNXR 3YR/> PHY/QHP DX/THER: CPT

## 2024-03-06 PROCEDURE — 25010000002 MICAFUNGIN SODIUM 100 MG RECONSTITUTED SOLUTION: Performed by: FAMILY MEDICINE

## 2024-03-06 PROCEDURE — 94799 UNLISTED PULMONARY SVC/PX: CPT

## 2024-03-06 PROCEDURE — 25010000002 AMPICILLIN-SULBACTAM PER 1.5 G: Performed by: FAMILY MEDICINE

## 2024-03-06 PROCEDURE — 85025 COMPLETE CBC W/AUTO DIFF WBC: CPT | Performed by: FAMILY MEDICINE

## 2024-03-06 PROCEDURE — 82948 REAGENT STRIP/BLOOD GLUCOSE: CPT

## 2024-03-06 PROCEDURE — C1751 CATH, INF, PER/CENT/MIDLINE: HCPCS

## 2024-03-06 PROCEDURE — 84100 ASSAY OF PHOSPHORUS: CPT | Performed by: FAMILY MEDICINE

## 2024-03-06 PROCEDURE — 80076 HEPATIC FUNCTION PANEL: CPT | Performed by: FAMILY MEDICINE

## 2024-03-06 PROCEDURE — 94664 DEMO&/EVAL PT USE INHALER: CPT

## 2024-03-06 PROCEDURE — 99232 SBSQ HOSP IP/OBS MODERATE 35: CPT | Performed by: FAMILY MEDICINE

## 2024-03-06 PROCEDURE — 82948 REAGENT STRIP/BLOOD GLUCOSE: CPT | Performed by: FAMILY MEDICINE

## 2024-03-06 PROCEDURE — 80048 BASIC METABOLIC PNL TOTAL CA: CPT | Performed by: FAMILY MEDICINE

## 2024-03-06 PROCEDURE — 83735 ASSAY OF MAGNESIUM: CPT | Performed by: FAMILY MEDICINE

## 2024-03-06 RX ORDER — ASPIRIN 81 MG/1
81 TABLET, CHEWABLE ORAL DAILY
Status: DISCONTINUED | OUTPATIENT
Start: 2024-03-06 | End: 2024-03-08 | Stop reason: HOSPADM

## 2024-03-06 RX ORDER — SODIUM CHLORIDE 0.9 % (FLUSH) 0.9 %
10 SYRINGE (ML) INJECTION AS NEEDED
Status: DISCONTINUED | OUTPATIENT
Start: 2024-03-06 | End: 2024-03-08 | Stop reason: HOSPADM

## 2024-03-06 RX ORDER — SODIUM CHLORIDE 9 MG/ML
40 INJECTION, SOLUTION INTRAVENOUS AS NEEDED
Status: DISCONTINUED | OUTPATIENT
Start: 2024-03-06 | End: 2024-03-08 | Stop reason: HOSPADM

## 2024-03-06 RX ORDER — SODIUM CHLORIDE 0.9 % (FLUSH) 0.9 %
10 SYRINGE (ML) INJECTION EVERY 12 HOURS SCHEDULED
Status: DISCONTINUED | OUTPATIENT
Start: 2024-03-06 | End: 2024-03-08 | Stop reason: HOSPADM

## 2024-03-06 RX ORDER — BUSPIRONE HYDROCHLORIDE 10 MG/1
10 TABLET ORAL 3 TIMES DAILY
Status: DISCONTINUED | OUTPATIENT
Start: 2024-03-06 | End: 2024-03-08 | Stop reason: HOSPADM

## 2024-03-06 RX ORDER — IPRATROPIUM BROMIDE AND ALBUTEROL SULFATE 2.5; .5 MG/3ML; MG/3ML
3 SOLUTION RESPIRATORY (INHALATION) EVERY 6 HOURS PRN
Status: DISCONTINUED | OUTPATIENT
Start: 2024-03-06 | End: 2024-03-08 | Stop reason: HOSPADM

## 2024-03-06 RX ORDER — DIVALPROEX SODIUM 125 MG/1
250 CAPSULE, COATED PELLETS ORAL 2 TIMES DAILY
Status: DISCONTINUED | OUTPATIENT
Start: 2024-03-06 | End: 2024-03-08 | Stop reason: HOSPADM

## 2024-03-06 RX ADMIN — BUSPIRONE HYDROCHLORIDE 10 MG: 10 TABLET ORAL at 08:17

## 2024-03-06 RX ADMIN — MICAFUNGIN SODIUM 100 MG: 100 INJECTION, POWDER, LYOPHILIZED, FOR SOLUTION INTRAVENOUS at 11:31

## 2024-03-06 RX ADMIN — ASPIRIN 81 MG: 81 TABLET, CHEWABLE ORAL at 08:17

## 2024-03-06 RX ADMIN — Medication 10 ML: at 08:17

## 2024-03-06 RX ADMIN — IPRATROPIUM BROMIDE AND ALBUTEROL SULFATE 3 ML: .5; 3 SOLUTION RESPIRATORY (INHALATION) at 03:07

## 2024-03-06 RX ADMIN — DIVALPROEX SODIUM 250 MG: 125 CAPSULE, COATED PELLETS ORAL at 22:49

## 2024-03-06 RX ADMIN — METOPROLOL TARTRATE 12.5 MG: 25 TABLET, FILM COATED ORAL at 22:48

## 2024-03-06 RX ADMIN — BUSPIRONE HYDROCHLORIDE 10 MG: 10 TABLET ORAL at 16:07

## 2024-03-06 RX ADMIN — Medication 10 ML: at 22:50

## 2024-03-06 RX ADMIN — AMPICILLIN SODIUM, SULBACTAM SODIUM 3 G: 2; 1 INJECTION, POWDER, FOR SOLUTION INTRAMUSCULAR; INTRAVENOUS at 22:48

## 2024-03-06 RX ADMIN — DIVALPROEX SODIUM 250 MG: 125 CAPSULE, COATED PELLETS ORAL at 08:26

## 2024-03-06 RX ADMIN — BUSPIRONE HYDROCHLORIDE 10 MG: 10 TABLET ORAL at 22:48

## 2024-03-06 RX ADMIN — AMPICILLIN SODIUM, SULBACTAM SODIUM 3 G: 2; 1 INJECTION, POWDER, FOR SOLUTION INTRAMUSCULAR; INTRAVENOUS at 07:55

## 2024-03-06 RX ADMIN — IPRATROPIUM BROMIDE AND ALBUTEROL SULFATE 3 ML: .5; 3 SOLUTION RESPIRATORY (INHALATION) at 06:27

## 2024-03-06 RX ADMIN — Medication 10 ML: at 11:05

## 2024-03-06 RX ADMIN — AMPICILLIN SODIUM, SULBACTAM SODIUM 3 G: 2; 1 INJECTION, POWDER, FOR SOLUTION INTRAMUSCULAR; INTRAVENOUS at 14:23

## 2024-03-06 RX ADMIN — APIXABAN 5 MG: 5 TABLET, FILM COATED ORAL at 22:49

## 2024-03-06 RX ADMIN — AMPICILLIN SODIUM, SULBACTAM SODIUM 3 G: 2; 1 INJECTION, POWDER, FOR SOLUTION INTRAMUSCULAR; INTRAVENOUS at 02:14

## 2024-03-06 RX ADMIN — APIXABAN 5 MG: 5 TABLET, FILM COATED ORAL at 08:17

## 2024-03-06 RX ADMIN — Medication 5 MG: at 22:49

## 2024-03-06 RX ADMIN — METOPROLOL TARTRATE 12.5 MG: 25 TABLET, FILM COATED ORAL at 08:17

## 2024-03-06 NOTE — PLAN OF CARE
Goal Outcome Evaluation:  Plan of Care Reviewed With: patient        Progress: no change     Patient combative this shift. Resistive to care. Turned and repostioned. No s/s of pain this shift. Vitals stable.

## 2024-03-06 NOTE — PLAN OF CARE
Goal Outcome Evaluation:                 Problem: Adult Inpatient Plan of Care  Goal: Plan of Care Review  Outcome: Ongoing, Progressing          Pt doing very well today; no c/o pain; calm and cooperative throughout shift without any PRNs; midline placed today for IV ABX; tube feed free water changed due to sodium level; méndez cath placed for retention; continue to monitor and support.

## 2024-03-06 NOTE — PROGRESS NOTES
Nutrition Services    Patient Name: Jordin Menon  YOB: 1940  MRN: [id number]  Admission date: 3/3/2024    PROGRESS NOTE      Encounter Information: EN Follow Up       PO Diet: NPO Diet NPO Type: Strict NPO   PO Supplements: NPO   PO Intake:  NPO       Current nutrition support: Isosource 1.5 @ 60 ml/hr x 22 hours  Provides 1980 kcal, 90 g protein, 1003 ml fluid  Flush with 60 ml H2O q3h to provide additional 480 ml fluid    Total provided: 1980 kcal, 90 g pro, 1483 ml free water       Estimated Needs: 6298-6759 kcal, 61-76 g pro, 1900 ml fluid       Nutrition support review: EN running at goal rate. No intolerances noted.        Labs (reviewed below): Hyponatremic with sodium of 135. All other electrolytes WDL.       GI Function:  Last BM noted 3/06; large.        Nutrition Intervention Updates: Decrease free water flushes to 50 ml q6h to provide additional 200 ml free water. Adjust FW as sodium improves.      Results from last 7 days   Lab Units 03/06/24  0525 03/05/24  0613 03/03/24  2225 03/03/24  1520 03/03/24  1341   SODIUM mmol/L 135* 132* 128*   < > 127*   SODIUM, ARTERIAL   --   --   --    < >  --    POTASSIUM mmol/L 4.4 4.6 5.4*   < > 6.1*   CHLORIDE mmol/L 103 101 94*   < > 92*   CO2 mmol/L 22.4 22.4 22.9   < > 25.8   BUN mg/dL 13 17 24*   < > 23   CREATININE mg/dL 0.77 0.73* 0.90   < > 0.85   CALCIUM mg/dL 9.1 8.9 9.5   < > 9.7   BILIRUBIN mg/dL 0.9 0.5  --   --  0.5   ALK PHOS U/L 105 110  --   --  132*   ALT (SGPT) U/L 31 30  --   --  30   AST (SGOT) U/L 29 21  --   --  29   GLUCOSE mg/dL 106* 129* 80   < > 86   GLUCOSE, ARTERIAL   --   --   --    < >  --     < > = values in this interval not displayed.     Results from last 7 days   Lab Units 03/06/24  0525 03/05/24  0613 03/04/24  0522 03/03/24  2225   MAGNESIUM mg/dL 1.7 1.8  --  1.8   PHOSPHORUS mg/dL 2.6 2.2*   < >  --    HEMOGLOBIN g/dL 11.1* 10.5*   < >  --    HEMATOCRIT % 31.5* 29.9*   < >  --     < > = values in this interval not  displayed.     COVID19   Date Value Ref Range Status   03/03/2024 Not Detected Not Detected - Ref. Range Final     Lab Results   Component Value Date    HGBA1C 4.90 05/30/2022       RD to follow up per protocol.    Electronically signed by:  Arin Hinds RD  03/06/24 12:48 EST

## 2024-03-06 NOTE — PROGRESS NOTES
[facility]   Hospitalist Progress Note  Date: 3/6/2024  Patient Name: Jordin Menon  : 1940  MRN: [id number]  Date of admission: 3/3/2024      Subjective   Subjective     Chief complaint: Altered mental status    Summary:  84-year-old male with history of CVA with residual lower extremity weakness and dysphagia with PEG tube for primary means of nutrition, hypertension, dyslipidemia, heart failure with preserved ejection fraction, PAD, GERD without esophagitis, chronic debility, severe Alzheimer's dementia, hospitalized on 3/3/2024 with chief complaint of altered mental status, acute metabolic encephalopathy likely secondary to u UTI with urinalysis suggestive of UTI with concerns for gram-positive bacteremia, 1 out of 2 blood cultures positive.  Hematuria on UTI, likely related to catheterization, following for resolution.  Blood culture 1 out of 2 positive for staph coag negative, likely contamination, the second blood culture came back aerobic for budding yeast, urine culture came back positive for yeast isolated, being worked up with further isolation and speciation, micafungin started empirically.  Urine culture growing Candida parapsilosis     Interval follow-up: Seen and examined this morning, no acute distress, no acute major night events, no new hematuria, did have persistent urinary retention, Irby catheter placed accordingly, poor IV access, midline ordered.  Urine culture growing out Candida parapsilosis, suspect this is the same organism to be found in the bloodstream, has been on micafungin for several days, repeat blood cultures have been negative so far.  Blood pressure and other vital signs have been relatively stable.  No new complaints from him.  Creatinine 0.77, BUN 13, potassium 4.4, white blood cell count is 8000, hemoglobin 11.1.    Review of systems:  Unable to obtain review of systems due to altered mental status and underlying dementia    Objective   Objective     Vitals:   Temp:   [97.6 °F (36.4 °C)-99.2 °F (37.3 °C)] 97.6 °F (36.4 °C)  Heart Rate:  [] 72  Resp:  [18-20] 18  BP: (100-152)/(45-68) 152/63  Physical Exam                 Constitutional: Awake, confused, trying to talk              Eyes: Pupils equal, sclerae anicteric, no conjunctival injection              HENT: NCAT, mucous membranes moist              Neck: Supple, no thyromegaly, no lymphadenopathy, trachea midline              Respiratory: Diminished breath sounds at the bases bilaterally, nonlabored respirations               Cardiovascular: Regular rate and rhythm, no murmurs, rubs, or gallops, palpable pedal pulses bilaterally              Gastrointestinal: Positive bowel sounds, soft, PEG tube in place, nondistended              Musculoskeletal: No bilateral ankle edema, no clubbing or cyanosis to extremities              Psychiatric: Appropriate affect, cooperative              Neurologic: Contracted to the left and the left lower extremities.  Moves upper extremities spontaneously and appears symmetric.                Skin: No rashes      Result Review    Result Review:  I have personally reviewed the pertinent results from the past 24 hours to 3/6/2024 16:17 EST and agree with these findings:  [x]  Laboratory   CBC          3/4/2024    05:22 3/5/2024    06:13 3/6/2024    05:25   CBC   WBC 8.98  6.99  8.30    RBC 3.94  3.68  3.86    Hemoglobin 11.4  10.5  11.1    Hematocrit 32.0  29.9  31.5    MCV 81.2  81.3  81.6    MCH 28.9  28.5  28.8    MCHC 35.6  35.1  35.2    RDW 12.2  12.1  12.2    Platelets 371  348  307      BMP          3/3/2024    13:41 3/3/2024    15:20 3/3/2024    20:00 3/3/2024    22:25 3/5/2024    06:13 3/6/2024    05:25   BMP   BUN 23   26  24  17  13    Creatinine 0.85   0.89  0.90  0.73  0.77    Sodium 127  127.0  129  128  132  135    Potassium 6.1   5.4  5.4  4.6  4.4    Chloride 92   93  94  101  103    CO2 25.8   26.3  22.9  22.4  22.4    Calcium 9.7   9.7  9.5  8.9  9.1      LIVER FUNCTION  TESTS:      Lab 03/06/24  0525 03/05/24  0613 03/03/24  1341   TOTAL PROTEIN 6.9 7.0 8.7*   ALBUMIN 3.2* 3.3* 3.8   GLOBULIN  --   --  4.9   ALT (SGPT) 31 30 30   AST (SGOT) 29 21 29   BILIRUBIN 0.9 0.5 0.5   INDIRECT BILIRUBIN 0.5 0.3  --    BILIRUBIN DIRECT 0.4* 0.2  --    ALK PHOS 105 110 132*       [x]  Microbiology   Microbiology Results (last 10 days)       Procedure Component Value - Date/Time    Blood Culture - Blood, Hand, Right [[id number]]  (Normal) Collected: 03/04/24 1350    Lab Status: Preliminary result Specimen: Blood from Hand, Right Updated: 03/06/24 1430     Blood Culture No growth at 2 days    Blood Culture - Blood, Arm, Left [[id number]]  (Normal) Collected: 03/04/24 1346    Lab Status: Preliminary result Specimen: Blood from Arm, Left Updated: 03/06/24 1430     Blood Culture No growth at 2 days    MRSA Screen, PCR (Inpatient) - Swab, Nares [[id number]]  (Normal) Collected: 03/04/24 0328    Lab Status: Final result Specimen: Swab from Nares Updated: 03/04/24 0457     MRSA PCR No MRSA Detected    Narrative:      The negative predictive value of this diagnostic test is high and should only be used to consider de-escalating anti-MRSA therapy. A positive result may indicate colonization with MRSA and must be correlated clinically.    Blood Culture - Blood, Arm, Left [[id number]]  (Abnormal) Collected: 03/03/24 1743    Lab Status: Preliminary result Specimen: Blood from Arm, Left Updated: 03/06/24 0712     Blood Culture Yeast isolated     Comment:   Infectious disease consultation is highly recommended to rule out distant foci of infection.        Isolated from Aerobic Bottle     Gram Stain Aerobic Bottle Budding yeast    Blood Culture ID, PCR - Blood, Arm, Left [[id number]] Collected: 03/03/24 1743    Lab Status: Final result Specimen: Blood from Arm, Left Updated: 03/05/24 1144     BCID, PCR Negative by BCID PCR. Culture to Follow.     BOTTLE TYPE Aerobic Bottle    Blood Culture - Blood, Arm, Left  [[id number]]  (Abnormal) Collected: 03/03/24 1652    Lab Status: Final result Specimen: Blood from Arm, Left Updated: 03/05/24 0643     Blood Culture Staphylococcus, coagulase negative     Isolated from Aerobic Bottle     Gram Stain Aerobic Bottle Gram positive cocci in clusters    Narrative:      Probable contaminant requires clinical correlation, susceptibility not performed unless requested by physician.      Blood Culture ID, PCR - Blood, Arm, Left [[id number]]  (Abnormal) Collected: 03/03/24 1652    Lab Status: Final result Specimen: Blood from Arm, Left Updated: 03/04/24 1638     BCID, PCR Staph spp, not aureus or lugdunensis. Identification by BCID2 PCR.     BOTTLE TYPE Aerobic Bottle    Urine Culture - Urine, Straight Cath [[id number]]  (Abnormal)  (Susceptibility) Collected: 03/03/24 1601    Lab Status: Edited Result - FINAL Specimen: Urine from Straight Cath Updated: 03/06/24 1158     Urine Culture 50,000 CFU/mL Candida parapsilosis    Narrative:      Pharmacist Brice CHANEL requested work up 03/05/24    Colonization of the urinary tract without infection is common. Treatment is discouraged unless the patient is symptomatic, pregnant, or undergoing an invasive urologic procedure.    Susceptibility        Candida parapsilosis      BRITT      Fluconazole Susceptible                           COVID-19, FLU A/B, RSV PCR 1 HR TAT - Swab, Nasopharynx [[id number]]  (Normal) Collected: 03/03/24 1548    Lab Status: Final result Specimen: Swab from Nasopharynx Updated: 03/03/24 1649     COVID19 Not Detected     Influenza A PCR Not Detected     Influenza B PCR Not Detected     RSV, PCR Not Detected    Narrative:      Fact sheet for providers: https://www.fda.gov/media/933179/download    Fact sheet for patients: https://www.fda.gov/media/985262/download    Test performed by PCR.              [x]  Radiology CT Abdomen Pelvis Without Contrast    Result Date: 3/4/2024    1. Nonobstructing 6 mm stone within the lower pole of the  left kidney.  No associated hydronephrosis. 2. No discrete bladder wall thickening.  Multiple small bladder diverticula are present.  Prostatomegaly. 3. Small 1 cm exophytic lesion arising from the superior lateral aspect of the left kidney with intermediate Hounsfield unit density.  This is likely a hemorrhagic or proteinaceous cyst and is unchanged from prior CT dated 7/2/2022.      ISAIAH LOZANO MD       Electronically Signed and Approved By: ISAIAH LOZANO MD on 3/04/2024 at 8:20             CT Chest Without Contrast Diagnostic    Result Date: 3/3/2024     1. Motion limited exam demonstrating no evidence of pneumonia or other acute pulmonary abnormality  2. Coronary artery atherosclerosis  3. Cholelithiasis and left nephrolithiasis     JAMES IBANEZ MD       Electronically Signed and Approved By: JAMES IBANEZ MD on 3/03/2024 at 17:50             CT Head Without Contrast    Result Date: 3/3/2024     1. No acute intracranial process  2. Sinusitis     JAMES IBANEZ MD       Electronically Signed and Approved By: JAMES IBANEZ MD on 3/03/2024 at 17:33             XR Chest 1 View    Result Date: 3/3/2024   Low lung volumes with bibasilar atelectasis       JAMES IBANEZ MD       Electronically Signed and Approved By: JAMES IBANEZ MD on 3/03/2024 at 14:27                [x]  EKG/Telemetry   ECG 12 Lead ED Triage Standing Order; Weak / Dizzy / AMS   Preliminary Result   HEART RATE= 86  bpm   RR Interval= 700  ms   FL Interval= 173  ms   P Horizontal Axis= 5  deg   P Front Axis= 118  deg   QRSD Interval= 76  ms   QT Interval= 321  ms   QTcB= 384  ms   QRS Axis= -10  deg   T Wave Axis= 50  deg   - ABNORMAL ECG -   Sinus rhythm   Inferior infarct, old   Electronically Signed By:    Date and Time of Study: 2024-03-03 13:59:29          [x]  Cardiology/Vascular   []  Pathology  [x]  Old records  []  Other:    Assessment & Plan   Assessment / Plan     Assessment/Plan:    Assessment:  Acute metabolic  encephalopathy, due to infection urine versus blood  Concern for fungemia  Hematuria in the setting of catheterization  Concern for gram-positive UTI  Hypovolemic hyponatremia  Hyperkalemia  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  Chronic debility with bedbound status  Hypertension  Hyperlipidemia  Heart failure with preserved ejection fraction  Chronic dysphagia with PEG tube placement and feeds    Plan:  Labs and imaging reviewed  Continue micafungin to have further sensitivities  Echo reviewed, no concern for cardiac involvement at this time  Repeat blood cultures  Maintain Irby catheter at this time  Dietitian consultation to continue tube feeds  Midline placement  Continue Unasyn  A.m. labs have been ordered to trend electrolytes  Reconcile home medications, resumed accordingly accordingly  A.m. labs full code  DVT prophylaxis with SCDs in the setting of gross hematuria  Clinical course dictate further management  Discussed with nurse at the bedside      DVT prophylaxis:  Medical DVT prophylaxis orders are present.        CODE STATUS:   Code Status (Patient has no pulse and is not breathing): CPR (Attempt to Resuscitate)  Medical Interventions (Patient has pulse or is breathing): Full Support    Electronically signed by Aranza Herzog MD, 03/06/24, 4:23 PM EST.  Portions of this documentation were transcribed electronically from a voice recognition software.  I confirm all data accurately represents the service(s) I performed at today's visit.

## 2024-03-06 NOTE — CONSULTS
Midline Line Insertion Procedure Note    Procedure: Insertion of Midline Catheter    Indications:  Long Term IV therapy    Procedure Details   Sterile technique was used including antiseptics, cap, gloves, hand hygiene, mask, and sheet.    #20G/10CM PowerGlide inserted to the R Cephalic vein per hospital protocol.   Blood return:  yes    Findings:  Catheter inserted to 10 cm, with 0 cm. Exposed.   Catheter was flushed with 20 cc NS. Patient did tolerate procedure well.      Recommendations:  Midline Brochure given to patient with teaching instruction. However, patient was not receptive to teaching or explanation.    Mitul Higgins, SHAE

## 2024-03-07 LAB
ALBUMIN SERPL-MCNC: 3 G/DL (ref 3.5–5.2)
ALP SERPL-CCNC: 123 U/L (ref 39–117)
ALT SERPL W P-5'-P-CCNC: 49 U/L (ref 1–41)
ANION GAP SERPL CALCULATED.3IONS-SCNC: 8.5 MMOL/L (ref 5–15)
AST SERPL-CCNC: 39 U/L (ref 1–40)
BACTERIA SPEC AEROBE CULT: ABNORMAL
BASOPHILS # BLD AUTO: 0.06 10*3/MM3 (ref 0–0.2)
BASOPHILS NFR BLD AUTO: 0.9 % (ref 0–1.5)
BILIRUB CONJ SERPL-MCNC: <0.2 MG/DL (ref 0–0.3)
BILIRUB INDIRECT SERPL-MCNC: ABNORMAL MG/DL
BILIRUB SERPL-MCNC: 0.5 MG/DL (ref 0–1.2)
BUN SERPL-MCNC: 18 MG/DL (ref 8–23)
BUN/CREAT SERPL: 21.2 (ref 7–25)
CALCIUM SPEC-SCNC: 9.1 MG/DL (ref 8.6–10.5)
CHLORIDE SERPL-SCNC: 104 MMOL/L (ref 98–107)
CO2 SERPL-SCNC: 22.5 MMOL/L (ref 22–29)
CREAT SERPL-MCNC: 0.85 MG/DL (ref 0.76–1.27)
DEPRECATED RDW RBC AUTO: 37.3 FL (ref 37–54)
EGFRCR SERPLBLD CKD-EPI 2021: 86.2 ML/MIN/1.73
EOSINOPHIL # BLD AUTO: 0.16 10*3/MM3 (ref 0–0.4)
EOSINOPHIL NFR BLD AUTO: 2.5 % (ref 0.3–6.2)
ERYTHROCYTE [DISTWIDTH] IN BLOOD BY AUTOMATED COUNT: 12.4 % (ref 12.3–15.4)
GLUCOSE BLDC GLUCOMTR-MCNC: 112 MG/DL (ref 70–99)
GLUCOSE BLDC GLUCOMTR-MCNC: 126 MG/DL (ref 70–99)
GLUCOSE SERPL-MCNC: 106 MG/DL (ref 65–99)
GRAM STN SPEC: ABNORMAL
HCT VFR BLD AUTO: 30.4 % (ref 37.5–51)
HGB BLD-MCNC: 10.6 G/DL (ref 13–17.7)
IMM GRANULOCYTES # BLD AUTO: 0.04 10*3/MM3 (ref 0–0.05)
IMM GRANULOCYTES NFR BLD AUTO: 0.6 % (ref 0–0.5)
ISOLATED FROM: ABNORMAL
LARGE PLATELETS: NORMAL
LYMPHOCYTES # BLD AUTO: 1.16 10*3/MM3 (ref 0.7–3.1)
LYMPHOCYTES NFR BLD AUTO: 18.2 % (ref 19.6–45.3)
MAGNESIUM SERPL-MCNC: 1.8 MG/DL (ref 1.6–2.4)
MCH RBC QN AUTO: 29 PG (ref 26.6–33)
MCHC RBC AUTO-ENTMCNC: 34.9 G/DL (ref 31.5–35.7)
MCV RBC AUTO: 83.3 FL (ref 79–97)
MONOCYTES # BLD AUTO: 0.92 10*3/MM3 (ref 0.1–0.9)
MONOCYTES NFR BLD AUTO: 14.4 % (ref 5–12)
NEUTROPHILS NFR BLD AUTO: 4.03 10*3/MM3 (ref 1.7–7)
NEUTROPHILS NFR BLD AUTO: 63.4 % (ref 42.7–76)
NRBC BLD AUTO-RTO: 0 /100 WBC (ref 0–0.2)
PHOSPHATE SERPL-MCNC: 2.4 MG/DL (ref 2.5–4.5)
PLATELET # BLD AUTO: 325 10*3/MM3 (ref 140–450)
PMV BLD AUTO: 11.4 FL (ref 6–12)
POTASSIUM SERPL-SCNC: 5.3 MMOL/L (ref 3.5–5.2)
PROT SERPL-MCNC: 6.9 G/DL (ref 6–8.5)
RBC # BLD AUTO: 3.65 10*6/MM3 (ref 4.14–5.8)
RBC MORPH BLD: NORMAL
SMALL PLATELETS BLD QL SMEAR: ADEQUATE
SODIUM SERPL-SCNC: 135 MMOL/L (ref 136–145)
WBC MORPH BLD: NORMAL
WBC NRBC COR # BLD AUTO: 6.37 10*3/MM3 (ref 3.4–10.8)

## 2024-03-07 PROCEDURE — 99232 SBSQ HOSP IP/OBS MODERATE 35: CPT | Performed by: FAMILY MEDICINE

## 2024-03-07 PROCEDURE — 82948 REAGENT STRIP/BLOOD GLUCOSE: CPT

## 2024-03-07 PROCEDURE — 80076 HEPATIC FUNCTION PANEL: CPT | Performed by: FAMILY MEDICINE

## 2024-03-07 PROCEDURE — 25010000002 AMPICILLIN-SULBACTAM PER 1.5 G: Performed by: FAMILY MEDICINE

## 2024-03-07 PROCEDURE — 84100 ASSAY OF PHOSPHORUS: CPT | Performed by: FAMILY MEDICINE

## 2024-03-07 PROCEDURE — 25010000002 MICAFUNGIN SODIUM 100 MG RECONSTITUTED SOLUTION: Performed by: FAMILY MEDICINE

## 2024-03-07 PROCEDURE — 83735 ASSAY OF MAGNESIUM: CPT | Performed by: FAMILY MEDICINE

## 2024-03-07 PROCEDURE — 85007 BL SMEAR W/DIFF WBC COUNT: CPT | Performed by: FAMILY MEDICINE

## 2024-03-07 PROCEDURE — 85025 COMPLETE CBC W/AUTO DIFF WBC: CPT | Performed by: FAMILY MEDICINE

## 2024-03-07 PROCEDURE — 80048 BASIC METABOLIC PNL TOTAL CA: CPT | Performed by: FAMILY MEDICINE

## 2024-03-07 RX ORDER — FLUCONAZOLE 200 MG/1
400 TABLET ORAL EVERY 24 HOURS
Status: DISCONTINUED | OUTPATIENT
Start: 2024-03-08 | End: 2024-03-07

## 2024-03-07 RX ORDER — FUROSEMIDE 40 MG/1
40 TABLET ORAL DAILY
Status: DISCONTINUED | OUTPATIENT
Start: 2024-03-07 | End: 2024-03-08 | Stop reason: HOSPADM

## 2024-03-07 RX ORDER — OFLOXACIN 3 MG/ML
1 SOLUTION/ DROPS OPHTHALMIC 4 TIMES DAILY
Status: DISCONTINUED | OUTPATIENT
Start: 2024-03-07 | End: 2024-03-08 | Stop reason: HOSPADM

## 2024-03-07 RX ORDER — FLUCONAZOLE 200 MG/1
800 TABLET ORAL ONCE
Status: DISCONTINUED | OUTPATIENT
Start: 2024-03-08 | End: 2024-03-08

## 2024-03-07 RX ORDER — FLUCONAZOLE 200 MG/1
400 TABLET ORAL EVERY 24 HOURS
Status: DISCONTINUED | OUTPATIENT
Start: 2024-03-09 | End: 2024-03-08

## 2024-03-07 RX ADMIN — SODIUM ZIRCONIUM CYCLOSILICATE 10 G: 10 POWDER, FOR SUSPENSION ORAL at 09:27

## 2024-03-07 RX ADMIN — Medication 10 ML: at 20:06

## 2024-03-07 RX ADMIN — MICAFUNGIN SODIUM 100 MG: 100 INJECTION, POWDER, LYOPHILIZED, FOR SOLUTION INTRAVENOUS at 12:37

## 2024-03-07 RX ADMIN — OFLOXACIN 1 DROP: 3 SOLUTION OPHTHALMIC at 20:06

## 2024-03-07 RX ADMIN — Medication 10 ML: at 09:28

## 2024-03-07 RX ADMIN — AMPICILLIN SODIUM, SULBACTAM SODIUM 3 G: 2; 1 INJECTION, POWDER, FOR SOLUTION INTRAMUSCULAR; INTRAVENOUS at 02:52

## 2024-03-07 RX ADMIN — BUSPIRONE HYDROCHLORIDE 10 MG: 10 TABLET ORAL at 15:44

## 2024-03-07 RX ADMIN — BUSPIRONE HYDROCHLORIDE 10 MG: 10 TABLET ORAL at 20:07

## 2024-03-07 RX ADMIN — FUROSEMIDE 40 MG: 40 TABLET ORAL at 09:27

## 2024-03-07 RX ADMIN — APIXABAN 5 MG: 5 TABLET, FILM COATED ORAL at 20:06

## 2024-03-07 RX ADMIN — METOPROLOL TARTRATE 12.5 MG: 25 TABLET, FILM COATED ORAL at 09:26

## 2024-03-07 RX ADMIN — METOPROLOL TARTRATE 12.5 MG: 25 TABLET, FILM COATED ORAL at 20:06

## 2024-03-07 RX ADMIN — ASPIRIN 81 MG: 81 TABLET, CHEWABLE ORAL at 09:26

## 2024-03-07 RX ADMIN — APIXABAN 5 MG: 5 TABLET, FILM COATED ORAL at 09:27

## 2024-03-07 RX ADMIN — DIVALPROEX SODIUM 250 MG: 125 CAPSULE, COATED PELLETS ORAL at 09:27

## 2024-03-07 RX ADMIN — AMPICILLIN SODIUM, SULBACTAM SODIUM 3 G: 2; 1 INJECTION, POWDER, FOR SOLUTION INTRAMUSCULAR; INTRAVENOUS at 13:45

## 2024-03-07 RX ADMIN — AMPICILLIN SODIUM, SULBACTAM SODIUM 3 G: 2; 1 INJECTION, POWDER, FOR SOLUTION INTRAMUSCULAR; INTRAVENOUS at 19:58

## 2024-03-07 RX ADMIN — BUSPIRONE HYDROCHLORIDE 10 MG: 10 TABLET ORAL at 09:27

## 2024-03-07 RX ADMIN — AMPICILLIN SODIUM, SULBACTAM SODIUM 3 G: 2; 1 INJECTION, POWDER, FOR SOLUTION INTRAMUSCULAR; INTRAVENOUS at 09:40

## 2024-03-07 RX ADMIN — DIVALPROEX SODIUM 250 MG: 125 CAPSULE, COATED PELLETS ORAL at 20:06

## 2024-03-07 NOTE — PLAN OF CARE
Goal Outcome Evaluation:           Problem: Adult Inpatient Plan of Care  Goal: Plan of Care Review  Outcome: Ongoing, Progressing             Pt doing okay throughout the day; mild agitation this afternoon; urine is now blood tinged; MD aware; continue to monitor;

## 2024-03-07 NOTE — PLAN OF CARE
Goal Outcome Evaluation:  Plan of Care Reviewed With: patient        Progress: improving       Very pleasant this shift. No behaviors to report. Slept well throughout the night. No s/s of pain or distress noted and vitals stable. No overnight concerns or events to report

## 2024-03-07 NOTE — PROGRESS NOTES
Nutrition Services    Patient Name: Jordin Menon  YOB: 1940  MRN: [id number]  Admission date: 3/3/2024    PROGRESS NOTE      Encounter Information: EN Follow Up       PO Diet: NPO Diet NPO Type: Strict NPO   PO Supplements: NPO   PO Intake:  NPO       Current nutrition support: Isosource 1.5 @ 60 ml/hr x 22 hours  Provides 1980 kcal, 90 g protein, 1003 ml fluid  Flush with 50 ml H2O q6h to provide additional 200 ml fluid    Total provided: 1980 kcal, 90 g pro, 1203 ml free water       Estimated Needs: 5536-3491 kcal, 61-76 g pro, 1900 ml fluid       Nutrition support review: EN running at goal rate. No intolerances noted.        Labs (reviewed below): Continues to be hyponatremic, sodium of 135. Phos low, recommend repletion.       GI Function:  Last BM noted 3/06; large.        Nutrition Intervention Updates: Continue current nutrition plan of care. Adjust FW as sodium improves.      Results from last 7 days   Lab Units 03/07/24 0433 03/06/24 0525 03/05/24 0613   SODIUM mmol/L 135* 135* 132*   POTASSIUM mmol/L 5.3* 4.4 4.6   CHLORIDE mmol/L 104 103 101   CO2 mmol/L 22.5 22.4 22.4   BUN mg/dL 18 13 17   CREATININE mg/dL 0.85 0.77 0.73*   CALCIUM mg/dL 9.1 9.1 8.9   BILIRUBIN mg/dL 0.5 0.9 0.5   ALK PHOS U/L 123* 105 110   ALT (SGPT) U/L 49* 31 30   AST (SGOT) U/L 39 29 21   GLUCOSE mg/dL 106* 106* 129*     Results from last 7 days   Lab Units 03/07/24  0433 03/06/24  0525 03/05/24  0613   MAGNESIUM mg/dL 1.8 1.7 1.8   PHOSPHORUS mg/dL 2.4* 2.6 2.2*   HEMOGLOBIN g/dL 10.6* 11.1* 10.5*   HEMATOCRIT % 30.4* 31.5* 29.9*     COVID19   Date Value Ref Range Status   03/03/2024 Not Detected Not Detected - Ref. Range Final     Lab Results   Component Value Date    HGBA1C 4.90 05/30/2022       RD to follow up per protocol.    Electronically signed by:  Arin Hinds RD  03/07/24 07:16 EST

## 2024-03-07 NOTE — SIGNIFICANT NOTE
Wound Eval / Progress Noted    KASSI Davis     Patient Name: Jordin Menon  : 1940  MRN: [id number]  Today's Date: 3/7/2024                 Admit Date: 3/3/2024    Visit Dx:    ICD-10-CM ICD-9-CM   1. Altered mental status, unspecified altered mental status type  R41.82 780.97   2. Acute UTI  N39.0 599.0   3. Sepsis, due to unspecified organism, unspecified whether acute organ dysfunction present  A41.9 038.9     995.91   4. Oropharyngeal dysphagia  R13.12 787.22   5. Difficulty walking  R26.2 719.7         Acute metabolic encephalopathy        Past Medical History:   Diagnosis Date    Acute renal failure with pathological lesion in kidney     Alzheimer's dementia with behavioral disturbance     Anemia, vitamin B12 deficiency     Anxiety     Anxiety disorder due to known physiological condition     Arthritis     Benign neoplasm of prostate     Cannot walk     CHF (congestive heart failure)     Constipation     Coronary atherosclerosis of native coronary artery     CVA (cerebral vascular accident)     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 (CVA)     Edema     Essential hypertension, benign     Gastroesophageal reflux disease without esophagitis     GERD (gastroesophageal reflux disease)     High blood pressure     Hirsutism     HTN (hypertension)     Hypercalcemia     Hyperkalemia     Hyperlipemia     Hyperlipidemia     Hypokalemia     Ingrowing toenail 2018    Left foot pain 2018    Nutritional deficiency     Osteoarthritis of right knee 2016    Pneumonia     Presence of right artificial knee joint     Primary localized osteoarthrosis     PVD (peripheral vascular disease)     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 ENDOSCOPY;  Service: Gastroenterology;  Laterality: N/A;  PEG TUBE PLACEMENT    ENDOSCOPY W/ PEG TUBE PLACEMENT N/A 7/3/2022    Procedure: ESOPHAGOGASTRODUODENOSCOPY WITH PERCUTANEOUS ENDOSCOPIC GASTROSTOMY TUBE INSERTION WITH ANESTHESIA;  Surgeon: Bam Melo MD;  Location: [facility] ENDOSCOPY;  Service: Gastroenterology;  Laterality: N/A;  PEG TUBE PLACEMENT    ENDOSCOPY W/ PEG TUBE PLACEMENT N/A 10/26/2022    Procedure: ESOPHAGOGASTRODUODENOSCOPY WITH PERCUTANEOUS ENDOSCOPIC GASTROSTOMY TUBE INSERTION WITH ANESTHESIA;  Surgeon: Yanelis Mistry MD;  Location: [facility] ENDOSCOPY;  Service: Gastroenterology;  Laterality: N/A;  PERCUTANEOUS ENDOSCOPIC GASTROSTOMY TUBE PLACEMENT         Physical Assessment:  Wound 03/04/24 1207 anterior hip (Active)   Wound Image   03/07/24 0805   Dressing Appearance open to air 03/07/24 0826   Closure None 03/07/24 0826   Base moist;pink 03/07/24 0826   Periwound intact;blanchable 03/07/24 0826   Periwound Temperature warm 03/07/24 0826   Periwound Skin Turgor soft 03/07/24 0826   Edges open 03/07/24 0826   Drainage Characteristics/Odor serosanguineous 03/07/24 0805   Drainage Amount none 03/07/24 0826   Care, Wound cleansed with;sterile normal saline 03/07/24 0826   Dressing Care open to air 03/07/24 0826   Periwound Care barrier film applied;dry periwound area maintained 03/07/24 0826        Wound Check / Follow-up: Patient seen today for a wound check. Patient is awake alert and oriented. Patient currently receiving tube feeds via PEG tube.     Linear superficial tears remain present to the abdominal folds with red moist tissue present. No fungal presentation noted at this time. Cleansed with bathing wipes  and patted dry. Applied a light dusting of cornstarch powder to the folds and left open to air. Recommending BID cleansing with soap and water and then application of the cornstarch powder.    Buttocks intact without discoloration. Recommending to continue Q2H turns and offload at all times; continue application of the blue top moisture barrier and leave open to air    Impression: MASD to the abdominal folds    Short term goals:  regain skin integrity, skin protection, moisture prevention / management, pressure reduction, skin care, topical treatment    Sussy Avila RN    3/7/2024    14:46 EST

## 2024-03-08 VITALS
DIASTOLIC BLOOD PRESSURE: 79 MMHG | OXYGEN SATURATION: 93 % | WEIGHT: 167.77 LBS | TEMPERATURE: 98.8 F | RESPIRATION RATE: 18 BRPM | HEART RATE: 75 BPM | BODY MASS INDEX: 28.64 KG/M2 | HEIGHT: 64 IN | SYSTOLIC BLOOD PRESSURE: 123 MMHG

## 2024-03-08 PROBLEM — N17.9 ACUTE RENAL FAILURE SYNDROME: Status: ACTIVE | Noted: 2022-02-04

## 2024-03-08 PROBLEM — G93.41 ACUTE METABOLIC ENCEPHALOPATHY: Status: RESOLVED | Noted: 2024-03-03 | Resolved: 2024-03-08

## 2024-03-08 PROBLEM — B49 FUNGEMIA: Status: ACTIVE | Noted: 2024-03-08

## 2024-03-08 LAB
ALBUMIN SERPL-MCNC: 3.5 G/DL (ref 3.5–5.2)
ALP SERPL-CCNC: 113 U/L (ref 39–117)
ALT SERPL W P-5'-P-CCNC: 36 U/L (ref 1–41)
ANION GAP SERPL CALCULATED.3IONS-SCNC: 10.3 MMOL/L (ref 5–15)
AST SERPL-CCNC: 21 U/L (ref 1–40)
BASOPHILS # BLD AUTO: 0.09 10*3/MM3 (ref 0–0.2)
BASOPHILS NFR BLD AUTO: 1.1 % (ref 0–1.5)
BILIRUB CONJ SERPL-MCNC: 0.2 MG/DL (ref 0–0.3)
BILIRUB INDIRECT SERPL-MCNC: 0.4 MG/DL
BILIRUB SERPL-MCNC: 0.6 MG/DL (ref 0–1.2)
BUN SERPL-MCNC: 24 MG/DL (ref 8–23)
BUN/CREAT SERPL: 28.9 (ref 7–25)
CALCIUM SPEC-SCNC: 9.4 MG/DL (ref 8.6–10.5)
CHLORIDE SERPL-SCNC: 103 MMOL/L (ref 98–107)
CO2 SERPL-SCNC: 25.7 MMOL/L (ref 22–29)
CREAT SERPL-MCNC: 0.83 MG/DL (ref 0.76–1.27)
DEPRECATED RDW RBC AUTO: 37.5 FL (ref 37–54)
EGFRCR SERPLBLD CKD-EPI 2021: 86.8 ML/MIN/1.73
EOSINOPHIL # BLD AUTO: 0.2 10*3/MM3 (ref 0–0.4)
EOSINOPHIL NFR BLD AUTO: 2.4 % (ref 0.3–6.2)
ERYTHROCYTE [DISTWIDTH] IN BLOOD BY AUTOMATED COUNT: 12.5 % (ref 12.3–15.4)
GLUCOSE BLDC GLUCOMTR-MCNC: 106 MG/DL (ref 70–99)
GLUCOSE BLDC GLUCOMTR-MCNC: 115 MG/DL (ref 70–99)
GLUCOSE BLDC GLUCOMTR-MCNC: 127 MG/DL (ref 70–99)
GLUCOSE SERPL-MCNC: 100 MG/DL (ref 65–99)
HCT VFR BLD AUTO: 34.2 % (ref 37.5–51)
HGB BLD-MCNC: 11.3 G/DL (ref 13–17.7)
IMM GRANULOCYTES # BLD AUTO: 0.02 10*3/MM3 (ref 0–0.05)
IMM GRANULOCYTES NFR BLD AUTO: 0.2 % (ref 0–0.5)
LYMPHOCYTES # BLD AUTO: 1.68 10*3/MM3 (ref 0.7–3.1)
LYMPHOCYTES NFR BLD AUTO: 20.5 % (ref 19.6–45.3)
MAGNESIUM SERPL-MCNC: 1.8 MG/DL (ref 1.6–2.4)
MCH RBC QN AUTO: 28.4 PG (ref 26.6–33)
MCHC RBC AUTO-ENTMCNC: 33 G/DL (ref 31.5–35.7)
MCV RBC AUTO: 85.9 FL (ref 79–97)
MONOCYTES # BLD AUTO: 1.22 10*3/MM3 (ref 0.1–0.9)
MONOCYTES NFR BLD AUTO: 14.9 % (ref 5–12)
NEUTROPHILS NFR BLD AUTO: 5 10*3/MM3 (ref 1.7–7)
NEUTROPHILS NFR BLD AUTO: 60.9 % (ref 42.7–76)
NRBC BLD AUTO-RTO: 0 /100 WBC (ref 0–0.2)
PHOSPHATE SERPL-MCNC: 2.9 MG/DL (ref 2.5–4.5)
PLATELET # BLD AUTO: 285 10*3/MM3 (ref 140–450)
PMV BLD AUTO: 10.7 FL (ref 6–12)
POTASSIUM SERPL-SCNC: 4.5 MMOL/L (ref 3.5–5.2)
PROT SERPL-MCNC: 7.5 G/DL (ref 6–8.5)
RBC # BLD AUTO: 3.98 10*6/MM3 (ref 4.14–5.8)
SODIUM SERPL-SCNC: 139 MMOL/L (ref 136–145)
WBC NRBC COR # BLD AUTO: 8.21 10*3/MM3 (ref 3.4–10.8)
WHOLE BLOOD HOLD SPECIMEN: NORMAL

## 2024-03-08 PROCEDURE — 84100 ASSAY OF PHOSPHORUS: CPT | Performed by: FAMILY MEDICINE

## 2024-03-08 PROCEDURE — 25010000002 AMPICILLIN-SULBACTAM PER 1.5 G: Performed by: FAMILY MEDICINE

## 2024-03-08 PROCEDURE — 83735 ASSAY OF MAGNESIUM: CPT | Performed by: FAMILY MEDICINE

## 2024-03-08 PROCEDURE — 80048 BASIC METABOLIC PNL TOTAL CA: CPT | Performed by: FAMILY MEDICINE

## 2024-03-08 PROCEDURE — 85025 COMPLETE CBC W/AUTO DIFF WBC: CPT | Performed by: FAMILY MEDICINE

## 2024-03-08 PROCEDURE — 99239 HOSP IP/OBS DSCHRG MGMT >30: CPT | Performed by: FAMILY MEDICINE

## 2024-03-08 PROCEDURE — 80076 HEPATIC FUNCTION PANEL: CPT | Performed by: FAMILY MEDICINE

## 2024-03-08 PROCEDURE — 82948 REAGENT STRIP/BLOOD GLUCOSE: CPT

## 2024-03-08 RX ORDER — OFLOXACIN 3 MG/ML
1 SOLUTION/ DROPS OPHTHALMIC 4 TIMES DAILY
Qty: 5 ML | Refills: 0 | Status: SHIPPED | OUTPATIENT
Start: 2024-03-08 | End: 2024-03-15

## 2024-03-08 RX ORDER — FLUCONAZOLE 200 MG/1
400 TABLET ORAL EVERY 24 HOURS
Qty: 20 TABLET | Refills: 0 | Status: SHIPPED | OUTPATIENT
Start: 2024-03-09 | End: 2024-03-19

## 2024-03-08 RX ORDER — LORAZEPAM 1 MG/1
1 TABLET ORAL EVERY 8 HOURS PRN
Qty: 6 TABLET | Refills: 0 | Status: SHIPPED | OUTPATIENT
Start: 2024-03-08 | End: 2024-03-08

## 2024-03-08 RX ORDER — LORAZEPAM 1 MG/1
1 TABLET ORAL EVERY 8 HOURS PRN
Qty: 6 TABLET | Refills: 0 | Status: SHIPPED | OUTPATIENT
Start: 2024-03-08 | End: 2024-03-10

## 2024-03-08 RX ORDER — FLUCONAZOLE 200 MG/1
400 TABLET ORAL EVERY 24 HOURS
Qty: 20 TABLET | Refills: 0 | Status: SHIPPED | OUTPATIENT
Start: 2024-03-09 | End: 2024-03-08

## 2024-03-08 RX ORDER — FLUCONAZOLE 200 MG/1
400 TABLET ORAL EVERY 24 HOURS
Status: DISCONTINUED | OUTPATIENT
Start: 2024-03-09 | End: 2024-03-08 | Stop reason: HOSPADM

## 2024-03-08 RX ORDER — OFLOXACIN 3 MG/ML
1 SOLUTION/ DROPS OPHTHALMIC 4 TIMES DAILY
Qty: 5 ML | Refills: 0 | Status: SHIPPED | OUTPATIENT
Start: 2024-03-08 | End: 2024-03-08

## 2024-03-08 RX ORDER — FLUCONAZOLE 200 MG/1
800 TABLET ORAL ONCE
Status: COMPLETED | OUTPATIENT
Start: 2024-03-08 | End: 2024-03-08

## 2024-03-08 RX ADMIN — Medication 10 ML: at 09:09

## 2024-03-08 RX ADMIN — ASPIRIN 81 MG: 81 TABLET, CHEWABLE ORAL at 09:08

## 2024-03-08 RX ADMIN — METOPROLOL TARTRATE 12.5 MG: 25 TABLET, FILM COATED ORAL at 09:08

## 2024-03-08 RX ADMIN — AMPICILLIN SODIUM, SULBACTAM SODIUM 3 G: 2; 1 INJECTION, POWDER, FOR SOLUTION INTRAMUSCULAR; INTRAVENOUS at 01:36

## 2024-03-08 RX ADMIN — BUSPIRONE HYDROCHLORIDE 10 MG: 10 TABLET ORAL at 09:08

## 2024-03-08 RX ADMIN — OFLOXACIN 1 DROP: 3 SOLUTION OPHTHALMIC at 11:25

## 2024-03-08 RX ADMIN — FLUCONAZOLE 800 MG: 200 TABLET ORAL at 09:08

## 2024-03-08 RX ADMIN — DIVALPROEX SODIUM 250 MG: 125 CAPSULE, COATED PELLETS ORAL at 10:29

## 2024-03-08 RX ADMIN — FUROSEMIDE 40 MG: 40 TABLET ORAL at 09:08

## 2024-03-08 RX ADMIN — OFLOXACIN 1 DROP: 3 SOLUTION OPHTHALMIC at 09:10

## 2024-03-08 RX ADMIN — APIXABAN 5 MG: 5 TABLET, FILM COATED ORAL at 09:08

## 2024-03-08 NOTE — NURSING NOTE
Transferring to 3NT, report given. Called daughter to make aware of room change, cardiac monitoring d/c per MD orders. Patient alert to person, and place. Was able to make conversation about family. No complaints at this time.

## 2024-03-08 NOTE — DISCHARGE SUMMARY
[facility]         HOSPITALIST  DISCHARGE SUMMARY    Patient Name: Jordin Menon  : 1940  MRN: [id number]    Date of Admission: 3/3/2024  Date of Discharge:  3/8/2024    Primary Care Physician: Frank Llanes MD    Consults       Date and Time Order Name Status Description    3/3/2024  6:12 PM Hospitalist (on-call MD unless specified)              Active and Resolved Hospital Problems:    Acute metabolic encephalopathy, due to infection urine versus blood  Concern for fungemia  Hematuria in the setting of catheterization  Concern for gram-positive UTI  Hypovolemic hyponatremia  Hyperkalemia  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  Chronic debility with bedbound status  Hypertension  Hyperlipidemia  Heart failure with preserved ejection fraction  Chronic dysphagia with PEG tube placement and feeds    Active Hospital Problems    Diagnosis POA    Fungemia [B49] Yes      Resolved Hospital Problems    Diagnosis POA    **Acute metabolic encephalopathy [G93.41] Yes       Hospital Course     Hospital Course:  84-year-old male with history of CVA with residual lower extremity weakness and dysphagia with PEG tube for primary means of nutrition, hypertension, dyslipidemia, heart failure with preserved ejection fraction, PAD, GERD without esophagitis, chronic debility, severe Alzheimer's dementia, hospitalized on 3/3/2024 with chief complaint of altered mental status, acute metabolic encephalopathy likely secondary to UTI with urinalysis suggestive of UTI with concerns for gram-positive bacteremia, 1 out of 2 blood cultures positive.  Hematuria on UTI, likely related to catheterization, following for resolution.  Blood culture 1 out of 2 positive for staph coag negative, likely contamination, the second blood culture came back aerobic for budding yeast, urine culture came back positive for yeast isolated, being worked up with further isolation and speciation,  micafungin started empirically.  Urine culture growing Candida parapsilosis, based on sensitivities, de-escalated antifungals to fluconazole.  To keep Irby catheter in place and complete 2 weeks of antifungal therapy.  Discharged in hemodynamically stable condition on a 3/8/2024 to follow-up with urology as outpatient, to follow-up with PCP within 1 week.  Of note the patient will need to see ophthalmology as outpatient for dilated eye exam.      Day of Discharge     Vital Signs:  Temp:  [98.4 °F (36.9 °C)-99.3 °F (37.4 °C)] 98.8 °F (37.1 °C)  Heart Rate:  [67-79] 75  Resp:  [18] 18  BP: (110-123)/(49-79) 123/79  Review of systems:  Unable to obtain review of systems due to altered mental status and underlying dementia    Physical Exam:                        Constitutional: Awake, confused, talkative gibberish              Eyes: Pupils equal, sclerae anicteric, no conjunctival injection              HENT: NCAT, mucous membranes moist              Neck: Supple, no thyromegaly, no lymphadenopathy, trachea midline              Respiratory: Diminished breath sounds at the bases bilaterally, nonlabored respirations               Cardiovascular: Regular rate and rhythm, no murmurs, rubs, or gallops, palpable pedal pulses bilaterally              Gastrointestinal: Positive bowel sounds, soft, PEG tube in place, nondistended              Musculoskeletal: No bilateral ankle edema, no clubbing or cyanosis to extremities              Psychiatric: Appropriate affect, cooperative              Neurologic: Contracted to the left and the left lower extremities.  Moves upper extremities spontaneously and appears symmetric.                Skin: No rashes      Discharge Details        Discharge Medications        New Medications        Instructions Start Date   fluconazole 200 MG tablet  Commonly known as: DIFLUCAN   400 mg, Oral, Every 24 Hours   Start Date: March 9, 2024     ofloxacin 0.3 % ophthalmic solution  Commonly known as:  OCUFLOX   1 drop, Both Eyes, 4 Times Daily             Changes to Medications        Instructions Start Date   aspirin 81 MG chewable tablet  What changed: how to take this   81 mg, Oral, Daily      furosemide 20 MG tablet  Commonly known as: Lasix  What changed: how to take this   20 mg, Oral, Daily      LORazepam 1 MG tablet  Commonly known as: ATIVAN  What changed: reasons to take this   1 mg, Oral, Every 8 Hours PRN             Continue These Medications        Instructions Start Date   acetaminophen 650 MG 8 hr tablet  Commonly known as: TYLENOL   650 mg, Oral, Every 8 Hours PRN      aluminum-magnesium hydroxide-simethicone 200-200-20 MG/5ML suspension  Commonly known as: MAALOX/MYLANTA   30 mL, Per G Tube, Every 6 Hours PRN      apixaban 5 MG tablet tablet  Commonly known as: ELIQUIS   5 mg, Per G Tube, 2 Times Daily      busPIRone 10 MG tablet  Commonly known as: BUSPAR   10 mg, Per G Tube, 3 Times Daily      cyanocobalamin 1000 MCG/ML injection   1,000 mcg, Intramuscular, Every 30 Days, On the 15th of the month      Divalproex Sodium 125 MG capsule  Commonly known as: DEPAKOTE SPRINKLE   250 mg, Per G Tube, 2 Times Daily      docusate sodium 50 mg/5 mL liquid  Commonly known as: COLACE   250 mg, Per G Tube, Daily      famotidine 40 MG tablet  Commonly known as: PEPCID   40 mg, Oral, Nightly      hydrOXYzine 25 MG tablet  Commonly known as: ATARAX   25 mg, Per G Tube, Every 6 Hours PRN      ipratropium-albuterol 0.5-2.5 mg/3 ml nebulizer  Commonly known as: DUO-NEB   3 mL, Nebulization, Every 4 Hours PRN      melatonin 5 MG tablet tablet   5 mg, Per G Tube, Every Night at Bedtime      metoprolol tartrate 25 MG tablet  Commonly known as: LOPRESSOR   12.5 mg, Per G Tube, Every 12 Hours Scheduled      polyethylene glycol 17 GM/SCOOP powder  Commonly known as: MIRALAX   17 g, Per G Tube, Daily               No Known Allergies    Discharge Disposition:  Skilled Nursing Facility ([place] -  External)    Diet:  Hospital:  Diet Order   Procedures    NPO Diet NPO Type: Strict NPO       Discharge Activity: as tolerates      CODE STATUS:  Code Status and Medical Interventions:   Ordered at: 03/03/24 1900     Code Status (Patient has no pulse and is not breathing):    CPR (Attempt to Resuscitate)     Medical Interventions (Patient has pulse or is breathing):    Full Support         No future appointments.        Pertinent  and/or Most Recent Results     PROCEDURES:   Adult Transthoracic Echo Complete W/ Cont if Necessary Per Protocol    Result Date: 3/6/2024  Technically limited study. Normal left ventricular systolic function. No significant valve abnormalities noted.     CT Abdomen Pelvis Without Contrast    Result Date: 3/4/2024  PROCEDURE: CT ABDOMEN PELVIS WO CONTRAST  COMPARISON: [facility], CT, CT CHEST WO CONTRAST DIAGNOSTIC, 11/10/2023, 4:37.  [facility], CT, CT ABDOMEN PELVIS WO CONTRAST, 7/02/2022, 9:09.  [facility], CT, CT CHEST WO CONTRAST DIAGNOSTIC, 3/03/2024, 17:11.  INDICATIONS: Gross hematuria  TECHNIQUE: CT images were created without intravenous contrast.   PROTOCOL:   Standard imaging protocol performed    RADIATION:   DLP: 669mGy*cm   Automated exposure control was utilized to minimize radiation dose.  FINDINGS:  The heart size is normal.  There is no pericardial effusion.  There is bandlike atelectasis within the bilateral lower lobes.  The gallbladder is present with multiple gallstones.  There is no intrahepatic or extrahepatic biliary ductal dilatation.  The spleen is normal in size.  The adrenal glands appear within normal limits.  Normal appearance of the pancreas by noncontrast technique.  The kidneys are symmetric in size.  There is a 6 mm stone within the lower pole of the left kidney.  There is an intermediate density exophytic lesion measuring 1 cm arising from the lateral aspect of the upper pole of the left kidney.  This has  Hounsfield unit density of 38. This appears stable in size when compared to 7/2/2022.  The urinary bladder is fluid filled without focal wall thickening.  There are small diverticula arising from the anterior aspect of the urinary bladder.  There is prostatomegaly.  The stomach and duodenum are normal in caliber and configuration.  There is a G-tube present within the lumen of the stomach.  There are closely approximated loops of small bowel and colon to the tubing.  There is no evidence of bowel obstruction or inflammation.  The appendix is visualized and normal within the right lower quadrant.  There is a moderate to large colonic stool burden.  There is no free fluid or free air.  The aorta is normal in caliber without evidence of aneurysm formation.  There is no abdominal or pelvic lymphadenopathy.  There are degenerative changes of the thoracolumbar spine.        1. Nonobstructing 6 mm stone within the lower pole of the left kidney.  No associated hydronephrosis. 2. No discrete bladder wall thickening.  Multiple small bladder diverticula are present.  Prostatomegaly. 3. Small 1 cm exophytic lesion arising from the superior lateral aspect of the left kidney with intermediate Hounsfield unit density.  This is likely a hemorrhagic or proteinaceous cyst and is unchanged from prior CT dated 7/2/2022.      ISAIAH LOZANO MD       Electronically Signed and Approved By: ISAIAH LOZANO MD on 3/04/2024 at 8:20             CT Chest Without Contrast Diagnostic    Result Date: 3/3/2024  PROCEDURE: CT CHEST WO CONTRAST DIAGNOSTIC  COMPARISON: [facility], CT, CT CHEST WO CONTRAST DIAGNOSTIC, 11/10/2023, 4:37.  INDICATIONS: EVAR, recent aspiration possibility, cough  TECHNIQUE: CT images were created without the administration of contrast material.   PROTOCOL:   Standard imaging protocol performed    RADIATION:   DLP: 548mGy*cm   Automated exposure control was utilized to minimize radiation dose.  FINDINGS:   Iraida/mediastinum:  No adenopathy.  Thoracic aorta normal in caliber.  Coronary artery calcification.  No pericardial effusion  Lungs/pleura:  Distortion of the lungs due to patient motion.  No infiltrate demonstrated.  No pleural effusion  Upper abdomen:  Numerous stones in the gallbladder.  6 mm left renal calculus  Bones/soft tissues:  No acute bony abnormality.  Scoliosis with prominent degenerative disease in the lower cervical and lower thoracic spine         1. Motion limited exam demonstrating no evidence of pneumonia or other acute pulmonary abnormality  2. Coronary artery atherosclerosis  3. Cholelithiasis and left nephrolithiasis     JAMES IBANEZ MD       Electronically Signed and Approved By: JAMES IBANEZ MD on 3/03/2024 at 17:50             CT Head Without Contrast    Result Date: 3/3/2024  PROCEDURE: CT HEAD WO CONTRAST  COMPARISON:  [facility], CT, CT HEAD WO CONTRAST, 5/30/2022, 16:35. INDICATIONS: Altered mental status  PROTOCOL:   Standard imaging protocol performed    RADIATION:   DLP: 1081.2mGy*cm   MA and/or KV was adjusted to minimize radiation dose.     TECHNIQUE: After obtaining the patient's consent, CT images were obtained without non-ionic intravenous contrast material.  FINDINGS:  There has been frontal craniotomy.  There are several chronic metallic bodies in the anterior interhemispheric fissure.  There is no intracranial hemorrhage, edema, or mass effect.  There is bilateral inferior frontal lobe encephalomalacia.  There is stable generalized atrophy.  There is no abnormal extra-axial fluid collection.  There is fluid and mucosal thickening in the left maxillary, left sphenoid, and ethmoid sinuses, with mucous in the right frontal, ethmoid, and sphenoid sinuses         1. No acute intracranial process  2. Sinusitis     JAMES IBANEZ MD       Electronically Signed and Approved By: JAMES IBANEZ MD on 3/03/2024 at 17:33             XR Chest 1 View    Result Date:  3/3/2024  PROCEDURE: XR CHEST 1 VW  COMPARISON: [facility], CT, CT CHEST WO CONTRAST DIAGNOSTIC, 11/10/2023, 4:37.  INDICATIONS: Weak/Dizzy/AMS triage protocol  FINDINGS:  There is incomplete inspiration.  Heart size is indeterminate.  Pulmonary vasculature is likely normal when accounting for lung volumes.  There is strandy airspace opacities in the lung bases.  There is no asymmetric infiltrate or definite pulmonary edema.  Costophrenic angles are sharp       Low lung volumes with bibasilar atelectasis       JAMES IBANEZ MD       Electronically Signed and Approved By: JAMES IBANEZ MD on 3/03/2024 at 14:27                LAB RESULTS:      Lab 03/08/24  0458 03/07/24  0433 03/06/24  0525 03/05/24  0613 03/04/24  0522 03/03/24 2000 03/03/24  1743 03/03/24  1520   WBC 8.21 6.37 8.30 6.99 8.98  --   --   --    HEMOGLOBIN 11.3* 10.6* 11.1* 10.5* 11.4*  --   --   --    HEMATOCRIT 34.2* 30.4* 31.5* 29.9* 32.0*  --   --   --    PLATELETS 285 325 307 348 371  --   --   --    NEUTROS ABS 5.00 4.03 5.47 4.58 5.79  --   --   --    IMMATURE GRANS (ABS) 0.02 0.04 0.02 0.02 0.02  --   --   --    LYMPHS ABS 1.68 1.16 1.43 1.03 1.67  --   --   --    MONOS ABS 1.22* 0.92* 1.25* 1.28* 1.44*  --   --   --    EOS ABS 0.20 0.16 0.10 0.05 0.03  --   --   --    MCV 85.9 83.3 81.6 81.3 81.2  --   --   --    PROCALCITONIN  --   --   --   --   --  0.07  --   --    LACTATE  --   --   --   --   --   --  1.2  --    LACTATE, ARTERIAL  --   --   --   --   --   --   --  0.91         Lab 03/08/24  0458 03/07/24  0433 03/06/24  0525 03/05/24  0613 03/03/24 2225 03/03/24 2000 03/03/24  1520   SODIUM 139 135* 135* 132* 128*   < >  --    SODIUM, ARTERIAL  --   --   --   --   --   --  127.0*   POTASSIUM 4.5 5.3* 4.4 4.6 5.4*   < >  --    CHLORIDE 103 104 103 101 94*   < >  --    CO2 25.7 22.5 22.4 22.4 22.9   < >  --    ANION GAP 10.3 8.5 9.6 8.6 11.1   < >  --    BUN 24* 18 13 17 24*   < >  --    CREATININE 0.83 0.85 0.77 0.73*  0.90   < >  --    EGFR 86.8 86.2 88.8 90.3 84.7   < >  --    GLUCOSE 100* 106* 106* 129* 80   < >  --    GLUCOSE, ARTERIAL  --   --   --   --   --   --  84   CALCIUM 9.4 9.1 9.1 8.9 9.5   < >  --    IONIZED CALCIUM  --   --   --   --   --   --  1.20   MAGNESIUM 1.8 1.8 1.7 1.8 1.8  --   --    PHOSPHORUS 2.9 2.4* 2.6 2.2*  --   --   --     < > = values in this interval not displayed.         Lab 03/08/24  0458 03/07/24  0433 03/06/24  0525 03/05/24  0613 03/03/24  1341   TOTAL PROTEIN 7.5 6.9 6.9 7.0 8.7*   ALBUMIN 3.5 3.0* 3.2* 3.3* 3.8   GLOBULIN  --   --   --   --  4.9   ALT (SGPT) 36 49* 31 30 30   AST (SGOT) 21 39 29 21 29   BILIRUBIN 0.6 0.5 0.9 0.5 0.5   INDIRECT BILIRUBIN 0.4  --  0.5 0.3  --    BILIRUBIN DIRECT 0.2 <0.2 0.4* 0.2  --    ALK PHOS 113 123* 105 110 132*         Lab 03/03/24  1341   HSTROP T 46*                 Lab 03/03/24  1520   PH, ARTERIAL 7.437   PCO2, ARTERIAL 40.6   PO2 ART 69.5*   O2 SATURATION ART 93.2*   FIO2 21   HCO3 ART 26.8*   BASE EXCESS ART 2.4*   CARBOXYHEMOGLOBIN 0.7     Brief Urine Lab Results  (Last result in the past 365 days)        Color   Clarity   Blood   Leuk Est   Nitrite   Protein   CREAT   Urine HCG        03/03/24 1601 Yellow   Clear   Moderate (2+)   Large (3+)   Negative   Negative                 Microbiology Results (last 10 days)       Procedure Component Value - Date/Time    Blood Culture - Blood, Hand, Right [[id number]]  (Normal) Collected: 03/04/24 1350    Lab Status: Preliminary result Specimen: Blood from Hand, Right Updated: 03/07/24 1430     Blood Culture No growth at 3 days    Blood Culture - Blood, Arm, Left [[id number]]  (Normal) Collected: 03/04/24 1346    Lab Status: Preliminary result Specimen: Blood from Arm, Left Updated: 03/07/24 1430     Blood Culture No growth at 3 days    MRSA Screen, PCR (Inpatient) - Swab, Nares [[id number]]  (Normal) Collected: 03/04/24 0328    Lab Status: Final result Specimen: Swab from Nares Updated: 03/04/24 0228      MRSA PCR No MRSA Detected    Narrative:      The negative predictive value of this diagnostic test is high and should only be used to consider de-escalating anti-MRSA therapy. A positive result may indicate colonization with MRSA and must be correlated clinically.    Blood Culture - Blood, Arm, Left [[id number]]  (Abnormal)  (Susceptibility) Collected: 03/03/24 1743    Lab Status: Final result Specimen: Blood from Arm, Left Updated: 03/07/24 0640     Blood Culture Candida parapsilosis     Comment:   Infectious disease consultation is highly recommended to rule out distant foci of infection.        Isolated from Aerobic Bottle     Gram Stain Aerobic Bottle Budding yeast    Susceptibility        Candida parapsilosis      BRITT      Fluconazole Susceptible      Micafungin Susceptible                           Blood Culture ID, PCR - Blood, Arm, Left [[id number]] Collected: 03/03/24 1743    Lab Status: Final result Specimen: Blood from Arm, Left Updated: 03/05/24 1144     BCID, PCR Negative by BCID PCR. Culture to Follow.     BOTTLE TYPE Aerobic Bottle    Blood Culture - Blood, Arm, Left [[id number]]  (Abnormal) Collected: 03/03/24 1652    Lab Status: Final result Specimen: Blood from Arm, Left Updated: 03/05/24 0643     Blood Culture Staphylococcus, coagulase negative     Isolated from Aerobic Bottle     Gram Stain Aerobic Bottle Gram positive cocci in clusters    Narrative:      Probable contaminant requires clinical correlation, susceptibility not performed unless requested by physician.      Blood Culture ID, PCR - Blood, Arm, Left [[id number]]  (Abnormal) Collected: 03/03/24 1652    Lab Status: Final result Specimen: Blood from Arm, Left Updated: 03/04/24 1638     BCID, PCR Staph spp, not aureus or lugdunensis. Identification by BCID2 PCR.     BOTTLE TYPE Aerobic Bottle    Urine Culture - Urine, Straight Cath [[id number]]  (Abnormal)  (Susceptibility) Collected: 03/03/24 1601    Lab Status: Edited Result - FINAL Specimen:  Urine from Straight Cath Updated: 03/06/24 1158     Urine Culture 50,000 CFU/mL Candida parapsilosis    Narrative:      Pharmacist Brice CHANEL requested work up 03/05/24    Colonization of the urinary tract without infection is common. Treatment is discouraged unless the patient is symptomatic, pregnant, or undergoing an invasive urologic procedure.    Susceptibility        Candida parapsilosis      BRITT      Fluconazole Susceptible                           COVID-19, FLU A/B, RSV PCR 1 HR TAT - Swab, Nasopharynx [[id number]]  (Normal) Collected: 03/03/24 1548    Lab Status: Final result Specimen: Swab from Nasopharynx Updated: 03/03/24 1649     COVID19 Not Detected     Influenza A PCR Not Detected     Influenza B PCR Not Detected     RSV, PCR Not Detected    Narrative:      Fact sheet for providers: https://www.fda.gov/media/045569/download    Fact sheet for patients: https://www.fda.gov/media/033731/download    Test performed by PCR.            CT Abdomen Pelvis Without Contrast    Result Date: 3/4/2024  Impression:   1. Nonobstructing 6 mm stone within the lower pole of the left kidney.  No associated hydronephrosis. 2. No discrete bladder wall thickening.  Multiple small bladder diverticula are present.  Prostatomegaly. 3. Small 1 cm exophytic lesion arising from the superior lateral aspect of the left kidney with intermediate Hounsfield unit density.  This is likely a hemorrhagic or proteinaceous cyst and is unchanged from prior CT dated 7/2/2022.      ISAIAH LOZANO MD       Electronically Signed and Approved By: ISAIAH LOZANO MD on 3/04/2024 at 8:20             CT Chest Without Contrast Diagnostic    Result Date: 3/3/2024  Impression:    1. Motion limited exam demonstrating no evidence of pneumonia or other acute pulmonary abnormality  2. Coronary artery atherosclerosis  3. Cholelithiasis and left nephrolithiasis     JAMES IBANEZ MD       Electronically Signed and Approved By: JAMES IBANEZ MD on 3/03/2024  at 17:50             CT Head Without Contrast    Result Date: 3/3/2024  Impression:    1. No acute intracranial process  2. Sinusitis     JAMES IBANEZ MD       Electronically Signed and Approved By: JAMES IBANEZ MD on 3/03/2024 at 17:33             XR Chest 1 View    Result Date: 3/3/2024  Impression:  Low lung volumes with bibasilar atelectasis       JAMES IBANEZ MD       Electronically Signed and Approved By: JAMES IBANEZ MD on 3/03/2024 at 14:27              Results for orders placed during the hospital encounter of 02/25/22    Duplex Venous Lower Extremity - Bilateral CV-READ    Interpretation Summary  · Acute left lower extremity deep vein thrombosis noted in the common femoral, proximal femoral, mid femoral, distal femoral, popliteal and peroneal.  · All other veins appeared normal bilaterally.      Results for orders placed during the hospital encounter of 02/25/22    Duplex Venous Lower Extremity - Bilateral CV-READ    Interpretation Summary  · Acute left lower extremity deep vein thrombosis noted in the common femoral, proximal femoral, mid femoral, distal femoral, popliteal and peroneal.  · All other veins appeared normal bilaterally.      Results for orders placed during the hospital encounter of 03/03/24    Adult Transthoracic Echo Complete W/ Cont if Necessary Per Protocol    Interpretation Summary  Technically limited study.  Normal left ventricular systolic function.  No significant valve abnormalities noted.      Labs Pending at Discharge:  Pending Labs       Order Current Status    Fungus Culture, Blood - Blood, Arm, Left In process    Blood Culture - Blood, Arm, Left Preliminary result    Blood Culture - Blood, Hand, Right Preliminary result              Time spent on Discharge including face to face service:  35 minutes    Electronically signed by Aranza Herzog MD, 03/08/24, 1:49 PM EST.    Portions of this documentation were transcribed electronically from a voice recognition  software.  I confirm all data accurately represents the service(s) I performed at today's visit.

## 2024-03-08 NOTE — PROGRESS NOTES
[facility]   Hospitalist Progress Note  Date: 3/7/2024  Patient Name: Jordin Menon  : 1940  MRN: [id number]  Date of admission: 3/3/2024      Subjective   Subjective     Chief complaint: Altered mental status    Summary:  84-year-old male with history of CVA with residual lower extremity weakness and dysphagia with PEG tube for primary means of nutrition, hypertension, dyslipidemia, heart failure with preserved ejection fraction, PAD, GERD without esophagitis, chronic debility, severe Alzheimer's dementia, hospitalized on 3/3/2024 with chief complaint of altered mental status, acute metabolic encephalopathy likely secondary to u UTI with urinalysis suggestive of UTI with concerns for gram-positive bacteremia, 1 out of 2 blood cultures positive.  Hematuria on UTI, likely related to catheterization, following for resolution.  Blood culture 1 out of 2 positive for staph coag negative, likely contamination, the second blood culture came back aerobic for budding yeast, urine culture came back positive for yeast isolated, being worked up with further isolation and speciation, micafungin started empirically.  Urine culture growing Candida parapsilosis, based on sensitivities, de-escalated antifungals to fluconazole    Interval follow-up: Seen and examined this morning, no acute distress, no acute major night events, Irby catheter in place, no new hematuria symptoms.  More alert and awake.  Had some crusting in his eyes, started drops for that.  Potassium 5.3, sodium 135, blood sugars in the 100 range.  White blood cell count 6000, hemoglobin 10.6.  Reviewed data from blood cultures, susceptible to fluconazole, de-escalated antibiotics to fluconazole which should cover both urine and blood, again repeat blood cultures have been negative.  Fungal culture pending from 3/5/2024.  Telemetry reviewed, no acute major rhythmic events.  Tolerating tube feeds without issue    Review of systems:  Unable to obtain review  of systems due to altered mental status and underlying dementia    Objective   Objective     Vitals:   Temp:  [98.6 °F (37 °C)-99.8 °F (37.7 °C)] 99.3 °F (37.4 °C)  Heart Rate:  [75-84] 78  Resp:  [16-20] 18  BP: (113-136)/(58-71) 122/60  Physical Exam                 Constitutional: Awake, confused, talkative gibberish              Eyes: Pupils equal, sclerae anicteric, no conjunctival injection              HENT: NCAT, mucous membranes moist              Neck: Supple, no thyromegaly, no lymphadenopathy, trachea midline              Respiratory: Diminished breath sounds at the bases bilaterally, nonlabored respirations               Cardiovascular: Regular rate and rhythm, no murmurs, rubs, or gallops, palpable pedal pulses bilaterally              Gastrointestinal: Positive bowel sounds, soft, PEG tube in place, nondistended              Musculoskeletal: No bilateral ankle edema, no clubbing or cyanosis to extremities              Psychiatric: Appropriate affect, cooperative              Neurologic: Contracted to the left and the left lower extremities.  Moves upper extremities spontaneously and appears symmetric.                Skin: No rashes      Result Review    Result Review:  I have personally reviewed the pertinent results from the past 24 hours to 3/7/2024 19:56 EST and agree with these findings:  [x]  Laboratory   CBC          3/5/2024    06:13 3/6/2024    05:25 3/7/2024    04:33   CBC   WBC 6.99  8.30  6.37    RBC 3.68  3.86  3.65    Hemoglobin 10.5  11.1  10.6    Hematocrit 29.9  31.5  30.4    MCV 81.3  81.6  83.3    MCH 28.5  28.8  29.0    MCHC 35.1  35.2  34.9    RDW 12.1  12.2  12.4    Platelets 348  307  325      BMP          3/5/2024    06:13 3/6/2024    05:25 3/7/2024    04:33   BMP   BUN 17  13  18    Creatinine 0.73  0.77  0.85    Sodium 132  135  135    Potassium 4.6  4.4  5.3    Chloride 101  103  104    CO2 22.4  22.4  22.5    Calcium 8.9  9.1  9.1      LIVER FUNCTION TESTS:      Lab  03/07/24  0433 03/06/24  0525 03/05/24  0613 03/03/24  1341   TOTAL PROTEIN 6.9 6.9 7.0 8.7*   ALBUMIN 3.0* 3.2* 3.3* 3.8   GLOBULIN  --   --   --  4.9   ALT (SGPT) 49* 31 30 30   AST (SGOT) 39 29 21 29   BILIRUBIN 0.5 0.9 0.5 0.5   INDIRECT BILIRUBIN  --  0.5 0.3  --    BILIRUBIN DIRECT <0.2 0.4* 0.2  --    ALK PHOS 123* 105 110 132*       [x]  Microbiology   Microbiology Results (last 10 days)       Procedure Component Value - Date/Time    Blood Culture - Blood, Hand, Right [[id number]]  (Normal) Collected: 03/04/24 1350    Lab Status: Preliminary result Specimen: Blood from Hand, Right Updated: 03/07/24 1430     Blood Culture No growth at 3 days    Blood Culture - Blood, Arm, Left [[id number]]  (Normal) Collected: 03/04/24 1346    Lab Status: Preliminary result Specimen: Blood from Arm, Left Updated: 03/07/24 1430     Blood Culture No growth at 3 days    MRSA Screen, PCR (Inpatient) - Swab, Nares [[id number]]  (Normal) Collected: 03/04/24 0328    Lab Status: Final result Specimen: Swab from Nares Updated: 03/04/24 0457     MRSA PCR No MRSA Detected    Narrative:      The negative predictive value of this diagnostic test is high and should only be used to consider de-escalating anti-MRSA therapy. A positive result may indicate colonization with MRSA and must be correlated clinically.    Blood Culture - Blood, Arm, Left [[id number]]  (Abnormal)  (Susceptibility) Collected: 03/03/24 1743    Lab Status: Final result Specimen: Blood from Arm, Left Updated: 03/07/24 0640     Blood Culture Candida parapsilosis     Comment:   Infectious disease consultation is highly recommended to rule out distant foci of infection.        Isolated from Aerobic Bottle     Gram Stain Aerobic Bottle Budding yeast    Susceptibility        Candida parapsilosis      BRITT      Fluconazole Susceptible      Micafungin Susceptible                           Blood Culture ID, PCR - Blood, Arm, Left [[id number]] Collected: 03/03/24 1743    Lab Status:  Final result Specimen: Blood from Arm, Left Updated: 03/05/24 1144     BCID, PCR Negative by BCID PCR. Culture to Follow.     BOTTLE TYPE Aerobic Bottle    Blood Culture - Blood, Arm, Left [[id number]]  (Abnormal) Collected: 03/03/24 1652    Lab Status: Final result Specimen: Blood from Arm, Left Updated: 03/05/24 0643     Blood Culture Staphylococcus, coagulase negative     Isolated from Aerobic Bottle     Gram Stain Aerobic Bottle Gram positive cocci in clusters    Narrative:      Probable contaminant requires clinical correlation, susceptibility not performed unless requested by physician.      Blood Culture ID, PCR - Blood, Arm, Left [[id number]]  (Abnormal) Collected: 03/03/24 1652    Lab Status: Final result Specimen: Blood from Arm, Left Updated: 03/04/24 1638     BCID, PCR Staph spp, not aureus or lugdunensis. Identification by BCID2 PCR.     BOTTLE TYPE Aerobic Bottle    Urine Culture - Urine, Straight Cath [[id number]]  (Abnormal)  (Susceptibility) Collected: 03/03/24 1601    Lab Status: Edited Result - FINAL Specimen: Urine from Straight Cath Updated: 03/06/24 1158     Urine Culture 50,000 CFU/mL Candida parapsilosis    Narrative:      Pharmacist Brice CHANEL requested work up 03/05/24    Colonization of the urinary tract without infection is common. Treatment is discouraged unless the patient is symptomatic, pregnant, or undergoing an invasive urologic procedure.    Susceptibility        Candida parapsilosis      BRITT      Fluconazole Susceptible                           COVID-19, FLU A/B, RSV PCR 1 HR TAT - Swab, Nasopharynx [[id number]]  (Normal) Collected: 03/03/24 1548    Lab Status: Final result Specimen: Swab from Nasopharynx Updated: 03/03/24 1649     COVID19 Not Detected     Influenza A PCR Not Detected     Influenza B PCR Not Detected     RSV, PCR Not Detected    Narrative:      Fact sheet for providers: https://www.fda.gov/media/476810/download    Fact sheet for patients:  https://www.fda.gov/media/507091/download    Test performed by PCR.              [x]  Radiology CT Abdomen Pelvis Without Contrast    Result Date: 3/4/2024    1. Nonobstructing 6 mm stone within the lower pole of the left kidney.  No associated hydronephrosis. 2. No discrete bladder wall thickening.  Multiple small bladder diverticula are present.  Prostatomegaly. 3. Small 1 cm exophytic lesion arising from the superior lateral aspect of the left kidney with intermediate Hounsfield unit density.  This is likely a hemorrhagic or proteinaceous cyst and is unchanged from prior CT dated 7/2/2022.      ISAIAH LOZANO MD       Electronically Signed and Approved By: ISAIAH LOZANO MD on 3/04/2024 at 8:20             CT Chest Without Contrast Diagnostic    Result Date: 3/3/2024     1. Motion limited exam demonstrating no evidence of pneumonia or other acute pulmonary abnormality  2. Coronary artery atherosclerosis  3. Cholelithiasis and left nephrolithiasis     JAMES IBANEZ MD       Electronically Signed and Approved By: JAMES IBANEZ MD on 3/03/2024 at 17:50             CT Head Without Contrast    Result Date: 3/3/2024     1. No acute intracranial process  2. Sinusitis     JAMES IBANEZ MD       Electronically Signed and Approved By: JAMES IBANEZ MD on 3/03/2024 at 17:33             XR Chest 1 View    Result Date: 3/3/2024   Low lung volumes with bibasilar atelectasis       JAMES IBANEZ MD       Electronically Signed and Approved By: JAMES IBANEZ MD on 3/03/2024 at 14:27                [x]  EKG/Telemetry   ECG 12 Lead ED Triage Standing Order; Weak / Dizzy / AMS   Preliminary Result   HEART RATE= 86  bpm   RR Interval= 700  ms   MN Interval= 173  ms   P Horizontal Axis= 5  deg   P Front Axis= 118  deg   QRSD Interval= 76  ms   QT Interval= 321  ms   QTcB= 384  ms   QRS Axis= -10  deg   T Wave Axis= 50  deg   - ABNORMAL ECG -   Sinus rhythm   Inferior infarct, old   Electronically Signed By:    Date and Time  of Study: 2024-03-03 13:59:29          [x]  Cardiology/Vascular   []  Pathology  [x]  Old records  []  Other:    Assessment & Plan   Assessment / Plan     Assessment/Plan:    Assessment:  Acute metabolic encephalopathy, due to infection urine versus blood  Concern for fungemia  Hematuria in the setting of catheterization  Concern for gram-positive UTI  Hypovolemic hyponatremia  Hyperkalemia  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  Chronic debility with bedbound status  Hypertension  Hyperlipidemia  Heart failure with preserved ejection fraction  Chronic dysphagia with PEG tube placement and feeds    Plan:  Labs and imaging reviewed  Discontinue micafungin  Start fluconazole 800 mg today, 400 mg every day after for 2 weeks  Follow-up fungal culture  Maintain Irby catheter at this time  Dietitian consultation to continue tube feeds  Midline placement and maintenance  Finishing Unasyn today  A.m. labs have been ordered to trend electrolytes  Reconcile home medications, resumed accordingly accordingly  A.m. labs full code  DVT prophylaxis with Eliquis  Clinical course dictate further management  Discussed with nurse at the bedside    DVT prophylaxis:  Medical DVT prophylaxis orders are present.        CODE STATUS:   Code Status (Patient has no pulse and is not breathing): CPR (Attempt to Resuscitate)  Medical Interventions (Patient has pulse or is breathing): Full Support    Electronically signed by Aranza Herzog MD, 03/07/24, 8:04 PM EST.  Portions of this documentation were transcribed electronically from a voice recognition software.  I confirm all data accurately represents the service(s) I performed at today's visit.

## 2024-03-08 NOTE — PLAN OF CARE
"-- DO NOT REPLY / DO NOT REPLY ALL --  -- Message is from the [facility]--    COVID-19 [place] Screening: Negative    Patient is requesting a medication refill - the medication was not listed on the patient's active medication list.    Prescribing Provider: vy   in dermatology     RX Name:  Sulfur ointment 4%oin  Dose: uknown  Quantity:  uknown  Instruction(s):  1x daily    Duration: 120 days  days    Pharmacy  [facility] [address] [place] [address] [facility]   Patient confirmed the above pharmacy as correct? Yes    Caller Information       Type Contact Phone    06/19/2020 09:51 AM Phone (Incoming) [facility] (Self) [phone] (H)          Alternative phone number: none    Turnaround time given to caller: ""This message will be sent to [facility] Provider's name]. The clinical team will fulfill your request as soon as they review your message. \""  " Goal Outcome Evaluation:  Plan of Care Reviewed With: patient         Pt agitated in the beginning of the shift but then calmed. Urine is still blood tinged and provider was made aware. Provider said it was okay to still give eliquis and to monitor urine. Will continue to plan of care.

## 2024-03-08 NOTE — PROGRESS NOTES
Nutrition Services    Patient Name: Jordin Menon  YOB: 1940  MRN: [id number]  Admission date: 3/3/2024    PROGRESS NOTE      Encounter Information: EN Follow Up       PO Diet: NPO Diet NPO Type: Strict NPO   PO Supplements: NPO   PO Intake:  NPO       Current nutrition support: Isosource 1.5 @ 60 ml/hr x 22 hours  Provides 1980 kcal, 90 g protein, 1003 ml fluid  Flush with 50 ml H2O q6h to provide additional 200 ml fluid    Total provided: 1980 kcal, 90 g pro, 1203 ml free water       Estimated Needs: 7725-3595 kcal, 61-76 g pro, 1900 ml fluid       Nutrition support review: No intolerances noted.        Labs (reviewed below): Hyponatremia corrected. Electrolytes WDL.       GI Function:  Last BM noted 3/08; smear. Dulcolax and Miralax ordered prn.       Nutrition Intervention Updates: Continue current nutrition plan of care. Adjust FW as needed. Patient receiving Lasix 40 mg daily.     Results from last 7 days   Lab Units 03/08/24 0458 03/07/24  0433 03/06/24  0525   SODIUM mmol/L 139 135* 135*   POTASSIUM mmol/L 4.5 5.3* 4.4   CHLORIDE mmol/L 103 104 103   CO2 mmol/L 25.7 22.5 22.4   BUN mg/dL 24* 18 13   CREATININE mg/dL 0.83 0.85 0.77   CALCIUM mg/dL 9.4 9.1 9.1   BILIRUBIN mg/dL 0.6 0.5 0.9   ALK PHOS U/L 113 123* 105   ALT (SGPT) U/L 36 49* 31   AST (SGOT) U/L 21 39 29   GLUCOSE mg/dL 100* 106* 106*     Results from last 7 days   Lab Units 03/08/24 0458 03/07/24  0433 03/06/24  0525   MAGNESIUM mg/dL 1.8 1.8 1.7   PHOSPHORUS mg/dL 2.9 2.4* 2.6   HEMOGLOBIN g/dL 11.3* 10.6* 11.1*   HEMATOCRIT % 34.2* 30.4* 31.5*     COVID19   Date Value Ref Range Status   03/03/2024 Not Detected Not Detected - Ref. Range Final     Lab Results   Component Value Date    HGBA1C 4.90 05/30/2022       RD to follow up per protocol.    Electronically signed by:  Arin Hinds RD  03/08/24 08:52 EST

## 2024-03-08 NOTE — PLAN OF CARE
Goal Outcome Evaluation:      Patient discharging to [facility]. Irby and midline in place. Going via EMS.      Progress: no change

## 2024-03-09 LAB
BACTERIA SPEC AEROBE CULT: NORMAL
BACTERIA SPEC AEROBE CULT: NORMAL

## 2024-03-10 LAB — FUNGUS WND CULT: NORMAL

## 2024-03-11 LAB
QT INTERVAL: 321 MS
QTC INTERVAL: 384 MS

## 2024-03-12 LAB — FUNGUS WND CULT: NORMAL

## 2024-03-17 ENCOUNTER — APPOINTMENT (OUTPATIENT)
Dept: GENERAL RADIOLOGY | Facility: HOSPITAL | Age: 84
End: 2024-03-17
Payer: MEDICARE

## 2024-03-17 ENCOUNTER — HOSPITAL ENCOUNTER (EMERGENCY)
Facility: HOSPITAL | Age: 84
Discharge: NURSING, REHAB OR LONG-TERM CARE | End: 2024-03-18
Attending: EMERGENCY MEDICINE | Admitting: EMERGENCY MEDICINE
Payer: MEDICARE

## 2024-03-17 DIAGNOSIS — E87.5 HYPERKALEMIA: ICD-10-CM

## 2024-03-17 DIAGNOSIS — R07.89 NON-CARDIAC CHEST PAIN: Primary | ICD-10-CM

## 2024-03-17 PROCEDURE — 99284 EMERGENCY DEPT VISIT MOD MDM: CPT

## 2024-03-17 PROCEDURE — 93005 ELECTROCARDIOGRAM TRACING: CPT | Performed by: EMERGENCY MEDICINE

## 2024-03-17 PROCEDURE — 71045 X-RAY EXAM CHEST 1 VIEW: CPT

## 2024-03-17 RX ORDER — SODIUM CHLORIDE 0.9 % (FLUSH) 0.9 %
10 SYRINGE (ML) INJECTION AS NEEDED
Status: DISCONTINUED | OUTPATIENT
Start: 2024-03-17 | End: 2024-03-18 | Stop reason: HOSPADM

## 2024-03-18 VITALS
HEIGHT: 64 IN | BODY MASS INDEX: 29.77 KG/M2 | RESPIRATION RATE: 21 BRPM | SYSTOLIC BLOOD PRESSURE: 110 MMHG | WEIGHT: 174.38 LBS | TEMPERATURE: 98.5 F | HEART RATE: 94 BPM | DIASTOLIC BLOOD PRESSURE: 93 MMHG | OXYGEN SATURATION: 94 %

## 2024-03-18 LAB
ALBUMIN SERPL-MCNC: 3.9 G/DL (ref 3.5–5.2)
ALBUMIN/GLOB SERPL: 0.8 G/DL
ALP SERPL-CCNC: 135 U/L (ref 39–117)
ALT SERPL W P-5'-P-CCNC: 17 U/L (ref 1–41)
ANION GAP SERPL CALCULATED.3IONS-SCNC: 10.5 MMOL/L (ref 5–15)
AST SERPL-CCNC: 19 U/L (ref 1–40)
BASOPHILS # BLD AUTO: 0.05 10*3/MM3 (ref 0–0.2)
BASOPHILS NFR BLD AUTO: 0.5 % (ref 0–1.5)
BILIRUB SERPL-MCNC: 0.4 MG/DL (ref 0–1.2)
BUN SERPL-MCNC: 27 MG/DL (ref 8–23)
BUN/CREAT SERPL: 29.7 (ref 7–25)
CALCIUM SPEC-SCNC: 9.8 MG/DL (ref 8.6–10.5)
CHLORIDE SERPL-SCNC: 94 MMOL/L (ref 98–107)
CO2 SERPL-SCNC: 27.5 MMOL/L (ref 22–29)
CREAT SERPL-MCNC: 0.91 MG/DL (ref 0.76–1.27)
DEPRECATED RDW RBC AUTO: 37.9 FL (ref 37–54)
EGFRCR SERPLBLD CKD-EPI 2021: 83.6 ML/MIN/1.73
EOSINOPHIL # BLD AUTO: 0.28 10*3/MM3 (ref 0–0.4)
EOSINOPHIL NFR BLD AUTO: 2.9 % (ref 0.3–6.2)
ERYTHROCYTE [DISTWIDTH] IN BLOOD BY AUTOMATED COUNT: 12.7 % (ref 12.3–15.4)
GLOBULIN UR ELPH-MCNC: 4.9 GM/DL
GLUCOSE SERPL-MCNC: 81 MG/DL (ref 65–99)
HCT VFR BLD AUTO: 35.8 % (ref 37.5–51)
HGB BLD-MCNC: 12.2 G/DL (ref 13–17.7)
HOLD SPECIMEN: NORMAL
HOLD SPECIMEN: NORMAL
IMM GRANULOCYTES # BLD AUTO: 0.03 10*3/MM3 (ref 0–0.05)
IMM GRANULOCYTES NFR BLD AUTO: 0.3 % (ref 0–0.5)
LYMPHOCYTES # BLD AUTO: 1.99 10*3/MM3 (ref 0.7–3.1)
LYMPHOCYTES NFR BLD AUTO: 20.9 % (ref 19.6–45.3)
MAGNESIUM SERPL-MCNC: 2 MG/DL (ref 1.6–2.4)
MCH RBC QN AUTO: 28.6 PG (ref 26.6–33)
MCHC RBC AUTO-ENTMCNC: 34.1 G/DL (ref 31.5–35.7)
MCV RBC AUTO: 83.8 FL (ref 79–97)
MONOCYTES # BLD AUTO: 1.19 10*3/MM3 (ref 0.1–0.9)
MONOCYTES NFR BLD AUTO: 12.5 % (ref 5–12)
NEUTROPHILS NFR BLD AUTO: 6 10*3/MM3 (ref 1.7–7)
NEUTROPHILS NFR BLD AUTO: 62.9 % (ref 42.7–76)
NRBC BLD AUTO-RTO: 0 /100 WBC (ref 0–0.2)
PLATELET # BLD AUTO: 277 10*3/MM3 (ref 140–450)
PMV BLD AUTO: 10.9 FL (ref 6–12)
POTASSIUM SERPL-SCNC: 5.6 MMOL/L (ref 3.5–5.2)
PROT SERPL-MCNC: 8.8 G/DL (ref 6–8.5)
QT INTERVAL: 377 MS
QTC INTERVAL: 412 MS
RBC # BLD AUTO: 4.27 10*6/MM3 (ref 4.14–5.8)
SODIUM SERPL-SCNC: 132 MMOL/L (ref 136–145)
TROPONIN T SERPL HS-MCNC: 42 NG/L
TSH SERPL DL<=0.05 MIU/L-ACNC: 2.68 UIU/ML (ref 0.27–4.2)
WBC NRBC COR # BLD AUTO: 9.54 10*3/MM3 (ref 3.4–10.8)
WHOLE BLOOD HOLD COAG: NORMAL
WHOLE BLOOD HOLD SPECIMEN: NORMAL

## 2024-03-18 PROCEDURE — 85025 COMPLETE CBC W/AUTO DIFF WBC: CPT | Performed by: EMERGENCY MEDICINE

## 2024-03-18 PROCEDURE — 36415 COLL VENOUS BLD VENIPUNCTURE: CPT | Performed by: EMERGENCY MEDICINE

## 2024-03-18 PROCEDURE — 80053 COMPREHEN METABOLIC PANEL: CPT | Performed by: EMERGENCY MEDICINE

## 2024-03-18 PROCEDURE — 84443 ASSAY THYROID STIM HORMONE: CPT | Performed by: EMERGENCY MEDICINE

## 2024-03-18 PROCEDURE — 83735 ASSAY OF MAGNESIUM: CPT | Performed by: EMERGENCY MEDICINE

## 2024-03-18 PROCEDURE — 84484 ASSAY OF TROPONIN QUANT: CPT | Performed by: EMERGENCY MEDICINE

## 2024-03-18 RX ORDER — LOPERAMIDE HYDROCHLORIDE 2 MG/1
CAPSULE ORAL
COMMUNITY
Start: 2024-03-08 | End: 2024-03-18

## 2024-03-18 RX ADMIN — SODIUM ZIRCONIUM CYCLOSILICATE 10 G: 10 POWDER, FOR SUSPENSION ORAL at 02:30

## 2024-03-18 RX ADMIN — APIXABAN 5 MG: 5 TABLET, FILM COATED ORAL at 02:30

## 2024-03-18 NOTE — ED NOTES
This nurse called [facility]. LPN states the patient was taken off their eliquis and states he has chest pain. Upon arrival patient denies pain. Per last discharge documentation patient is to continue his eliquis.

## 2024-03-18 NOTE — DISCHARGE INSTRUCTIONS
Resume your hospital discharge instruction/treatment plan.  Ensure that the patient is taking his apixaban as it was reported to us that this has been continued.  Continue with Lokelma will twice a day for 2 days.  Then have potassium level rechecked in 1 week.  Follow-up with primary care provider as needed.  Return to the ER for any new complaints or any other concerns that may arise.

## 2024-03-18 NOTE — ED PROVIDER NOTES
Time: 2:14 AM EDT  Date of encounter:  3/17/2024  Independent Historian/Clinical History and Information was obtained by:   Patient, Nursing Staff, and EMS  Chief Complaint: Possible chest pain    History is limited by:  Chronic disorientation    History of Present Illness:  Patient is a 84 y.o. year old male who presents to the emergency department for evaluation of possible chest pain.  Patient presents to the ER via EMS from [facility] nursing and rehab.  Patient is normally disoriented and unable to communicate.  ED nurse called [facility] nursing and rehab and spoke to the LPN for the patient.  LPN reports that the patient was taken off her Eliquis and they sent reported that he had chest pain.  Thus, they sent him to the ER.  Upon arrival here the patient does not appear to have any chest pain and as best as I can interpret him he denies chest pain.    HPI    Patient Care Team  Primary Care Provider: Frank Llanes MD    Past Medical History:     No Known Allergies  Past Medical History:   Diagnosis Date    Acute renal failure with pathological lesion in kidney     Alzheimer's dementia with behavioral disturbance     Anemia, vitamin B12 deficiency     Anxiety     Anxiety disorder due to known physiological condition     Arthritis     Benign neoplasm of prostate     Cannot walk     CHF (congestive heart failure)     Constipation     Coronary atherosclerosis of native coronary artery     CVA (cerebral vascular accident)     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 (CVA)     Edema     Essential hypertension, benign     Gastroesophageal reflux disease without esophagitis     GERD (gastroesophageal reflux disease)     High blood pressure     Hirsutism     HTN (hypertension)     Hypercalcemia     Hyperkalemia     Hyperlipemia     Hyperlipidemia     Hypokalemia     Ingrowing toenail 09/21/2018    Left foot pain 09/21/2018    Nutritional deficiency     Osteoarthritis of right knee 08/22/2016    Pneumonia      Presence of right artificial knee joint     Primary localized osteoarthrosis     PVD (peripheral vascular disease)     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     Right foot pain 09/21/2018    Senile dementia, uncomplicated     Severe sinus bradycardia     Shock, septic     Sleep apnea     Thrombosis of left femoral vein     Tinea unguium 09/21/2018    Vitamin B12 deficiency anemia      Past Surgical History:   Procedure Laterality Date    ENDOSCOPY W/ PEG TUBE PLACEMENT N/A 6/8/2022    Procedure: ESOPHAGOGASTRODUODENOSCOPY WITH PERCUTANEOUS ENDOSCOPIC GASTROSTOMY TUBE INSERTION WITH ANESTHESIA;  Surgeon: Yanelis Mistry MD;  Location: [facility] ENDOSCOPY;  Service: Gastroenterology;  Laterality: N/A;  PEG TUBE PLACEMENT    ENDOSCOPY W/ PEG TUBE PLACEMENT N/A 7/3/2022    Procedure: ESOPHAGOGASTRODUODENOSCOPY WITH PERCUTANEOUS ENDOSCOPIC GASTROSTOMY TUBE INSERTION WITH ANESTHESIA;  Surgeon: Bam Melo MD;  Location: [facility] ENDOSCOPY;  Service: Gastroenterology;  Laterality: N/A;  PEG TUBE PLACEMENT    ENDOSCOPY W/ PEG TUBE PLACEMENT N/A 10/26/2022    Procedure: ESOPHAGOGASTRODUODENOSCOPY WITH PERCUTANEOUS ENDOSCOPIC GASTROSTOMY TUBE INSERTION WITH ANESTHESIA;  Surgeon: Yanelis Mistry MD;  Location: [facility] ENDOSCOPY;  Service: Gastroenterology;  Laterality: N/A;  PERCUTANEOUS ENDOSCOPIC GASTROSTOMY TUBE PLACEMENT     Family History   Family history unknown: Yes       Home Medications:  Prior to Admission medications    Medication Sig Start Date End Date Taking? Authorizing Provider   acetaminophen (TYLENOL) 650 MG 8 hr tablet Take 1 tablet by mouth Every 8 (Eight) Hours As Needed for Mild Pain.    Provider, MD Nanci   aluminum-magnesium hydroxide-simethicone (MAALOX/MYLANTA) 200-200-20 MG/5ML suspension Administer 30 mL per G tube Every 6 (Six) Hours As Needed for Indigestion or Heartburn.    ProviderNanci MD   apixaban (ELIQUIS) 5 MG tablet tablet  Administer 1 tablet per G tube 2 (Two) Times a Day. Indications: DVT/PE (active thrombosis) 10/27/22   Thiago Soares MD   aspirin 81 MG chewable tablet Chew 1 tablet Daily.  Patient taking differently: Administer 1 tablet per G tube Daily. 4/19/22   Aranza Herzog MD   busPIRone (BUSPAR) 10 MG tablet Administer 1 tablet per G tube 3 (Three) Times a Day.    Nanci Shafer MD   cyanocobalamin 1000 MCG/ML injection Inject 1 mL into the appropriate muscle as directed by prescriber Every 30 (Thirty) Days. On the 15th of the month    Nanci Shafer MD   Divalproex Sodium (DEPAKOTE SPRINKLE) 125 MG capsule Administer 2 capsules per G tube 2 (Two) Times a Day. 2/23/24   Nanci Shafer MD   docusate sodium (COLACE) 50 mg/5 mL liquid Administer 25 mL per G tube Daily.    Nanci Shafer MD   famotidine (PEPCID) 40 MG tablet Take 1 tablet by mouth Every Night.    Nanci Shafer MD   fluconazole (DIFLUCAN) 200 MG tablet Take 2 tablets by mouth Daily for 10 doses. Indications: BLOODSTREAM INFECTION, Urinary Tract Infection 3/9/24 3/19/24  Aranza Herzog MD   furosemide (Lasix) 20 MG tablet Take 1 tablet by mouth Daily.  Patient taking differently: Administer 1 tablet per G tube Daily. 3/3/22   Eric Rivas MD   hydrOXYzine (ATARAX) 25 MG tablet Administer 1 tablet per G tube Every 6 (Six) Hours As Needed for Itching. 11/9/23   Nanci Shafer MD   ipratropium-albuterol (DUO-NEB) 0.5-2.5 mg/3 ml nebulizer Take 3 mL by nebulization Every 4 (Four) Hours As Needed for Wheezing.    Nanci Shafer MD   melatonin 5 MG tablet tablet Administer 1 tablet per G tube every night at bedtime.    Nanci Shafer MD   metoprolol tartrate (LOPRESSOR) 25 MG tablet Administer 0.5 tablets per G tube Every 12 (Twelve) Hours. 10/27/22   Thiago Soares MD   polyethylene glycol (MIRALAX) 17 GM/SCOOP powder Administer 17 g per G tube Daily.    Nanci Shafer MD     "    Social History:   Social History     Tobacco Use    Smoking status: Never    Smokeless tobacco: Never   Vaping Use    Vaping status: Never Used   Substance Use Topics    Alcohol use: No    Drug use: Never         Review of Systems:  Review of Systems   Unable to perform ROS: Patient nonverbal        Physical Exam:  /93   Pulse 94   Temp 98.5 °F (36.9 °C) (Oral)   Resp 21   Ht 162.6 cm (64\")   Wt 79.1 kg (174 lb 6.1 oz)   SpO2 94%   BMI 29.93 kg/m²     Physical Exam    Vital signs were reviewed under triage note.  General appearance - Patient appears well-developed and well-nourished.  Patient is in no acute distress.  Head - Normocephalic, atraumatic.  Pupils - Equal, round, reactive to light.  Extraocular muscles are intact.  Conjunctiva is clear.  Nasal - Normal inspection.  No evidence of trauma or epistaxis.  Tympanic membranes - Gray, intact without erythema or retractions.  Oral mucosa - Pink and moist without lesions or erythema.  Uvula is midline.  Chest wall - Atraumatic.  Chest wall is nontender.  There are no vesicular rashes noted.  Neck - Supple.  Trachea was midline.  There is no palpable lymphadenopathy or thyromegaly.  There are no meningeal signs  Lungs - Clear to auscultation and percussion bilaterally.  Heart - Regular rate and rhythm without any murmurs, clicks, or gallops.  Abdomen - Soft.  Bowel sounds are present.  There is no palpable tenderness.  There is no rebound, guarding, or rigidity.  There are no palpable masses.  There are no pulsatile masses.  Back - Spine is straight and midline.  There is no CVA tenderness.  Extremities - Intact x4 with full range of motion.  There is no palpable edema.  Pulses are intact x4 and equal.  Neurologic - Patient is awake an alert.  Cranial nerves II through XII are grossly intact.  Motor function intact.  Integument - There are no rashes.  There are no petechia or purpura lesions noted.  There are no vesicular lesions noted.       "     Procedures:  Procedures      Medical Decision Making:      Comorbidities that affect care:    B12 deficiency, hyperlipidemia, electrolyte abnormality, dementia, anxiety, hypertension, coronary artery disease, CHF, CVA, dysphagia, peripheral vascular disease, DVT, GERD, kidney disease, nonambulatory, Alzheimer's    External Notes reviewed:    EMS Run sheet: EMS run sheet was reviewed by me.  Vital signs are stable.  No treatment was administered in route.      The following orders were placed and all results were independently analyzed by me:  Orders Placed This Encounter   Procedures    XR Chest 1 View    [place] Draw    Comprehensive Metabolic Panel    Magnesium    Single High Sensitivity Troponin T    TSH    CBC Auto Differential    Undress & Gown    Continuous Pulse Oximetry    ECG 12 Lead ED Triage Standing Order; Dysrhythmia    CBC & Differential    Green Top (Gel)    Lavender Top    Gold Top - SST    Light Blue Top       Medications Given in the Emergency Department:  Medications   sodium zirconium cyclosilicate (LOKELMA) packet 10 g (10 g Oral Given 3/18/24 0230)   apixaban (ELIQUIS) tablet 5 mg (5 mg Per PEG Tube Given 3/18/24 0230)        ED Course:    ED Course as of 03/19/24 1420   Mon Mar 18, 2024   0212 EKG performed at 2312 was interpreted by me to show a normal sinus rhythm with a ventricular of 72 bpm.  The CT interval is 195 ms.  P waves are normal.  QRS interval is normal.  Axis was at -22 degrees.  There is no acute ischemic ST or T wave change identified.  QT corrected was 412 ms. [TB]      ED Course User Index  [TB] Venu Oconnor DO       The patient was seen and evaluated in the ED by me.  The above history and physical examination was performed as documented.  Diagnostic data was obtained.  Results reviewed.  Patient's cardiac workup was negative.  Patient was given a dose of his Eliquis since [facility] rehab reports they have not been giving it to him.  Patient's cardiac workup was  negative.  Patient stable to return to ED on nursing and rehab.  I have instructed him to resume all the medications per the patient's discharge instructions from 3/18/2024.    Labs:    Lab Results (last 24 hours)       ** No results found for the last 24 hours. **             Imaging:    No Radiology Exams Resulted Within Past 24 Hours      Differential Diagnosis and Discussion:    Chest Pain:  Based on the patient's signs and symptoms, I considered aortic dissection, myocardial infaction, pulmonary embolism, cardiac tamponade, pericarditis, pneumothorax, musculoskeletal chest pain and other differential diagnosis as an etiology of the patient's chest pain.     All labs were reviewed and interpreted by me.  All X-rays impressions were independently interpreted by me.  EKG was interpreted by me.    MDM     Amount and/or Complexity of Data Reviewed  Clinical lab tests: reviewed  Tests in the radiology section of CPT®: reviewed  Tests in the medicine section of CPT®: reviewed  Decide to obtain previous medical records or to obtain history from someone other than the patient: yes             Patient Care Considerations:    CONSULT: I considered consulting hospitalist, however the patient is in no acute distress.  Additionally patient's troponin is lower than his recent troponins thus suggesting no acute coronary syndrome.      Consultants/Shared Management Plan:    None    Social Determinants of Health:    Patient is a nursing home/assisted living resident and has reliable access to care.      Disposition and Care Coordination:    Discharged: The patient is suitable and stable for discharge with no need for consideration of admission.    I have explained the patient´s condition, diagnoses and treatment plan based on the information available to me at this time. I have answered questions and addressed any concerns. The patient has a good  understanding of the patient´s diagnosis, condition, and treatment plan as can be  expected at this point. The vital signs have been stable. The patient´s condition is stable and appropriate for discharge from the emergency department.      The patient will pursue further outpatient evaluation with the primary care physician or other designated or consulting physician as outlined in the discharge instructions. They are agreeable to this plan of care and follow-up instructions have been explained in detail. The patient has received these instructions in written format and has expressed an understanding of the discharge instructions. The patient is aware that any significant change in condition or worsening of symptoms should prompt an immediate return to this or the closest emergency department or call to 911.    Final diagnoses:   Non-cardiac chest pain   Hyperkalemia        ED Disposition       ED Disposition   Discharge    Condition   Stable    Comment   --               This medical record created using voice recognition software.             Venu Oconnor DO  03/19/24 0189

## 2024-03-19 LAB — FUNGUS WND CULT: NORMAL

## 2024-03-26 LAB — FUNGUS WND CULT: NORMAL

## 2024-04-02 LAB — FUNGUS WND CULT: NORMAL

## 2024-04-23 PROBLEM — N20.0 NEPHROLITHIASIS: Status: ACTIVE | Noted: 2024-04-23

## 2024-04-23 PROBLEM — N32.3 BLADDER DIVERTICULUM: Status: ACTIVE | Noted: 2024-04-23

## 2024-04-23 PROBLEM — N28.1 RENAL CYST: Status: ACTIVE | Noted: 2024-04-23

## 2024-04-23 PROBLEM — R31.0 GROSS HEMATURIA: Status: ACTIVE | Noted: 2024-04-23

## 2024-04-23 NOTE — PROGRESS NOTES
Chief Complaint: Urologic complaint    Subjective         History of Present Illness  Jordin Menon is a 84 y.o. male      Gross hematuria  Renal cyst  Bladder diverticulum  Nephrolithiasis    Patient comes in on a stretcher today brought by EMS    dementia, congestive heart failure, history of CVA, history of dysphagia status post PEG placement on tube feeds, GERD, hypertension, hyperlipidemia, peripheral vascular disease, osteoarthritis    Nursing home  Baseline disoriented and unable to communicate    History taken through daughter    Patient leaks into the diaper.    Has had some gross hematuria few times    Lower extremities are contracted, cannot ambulate.    3/3/2024 CT abdomen/pelvis without - 6 mm stone lower pole left kidney nonobstructing.  Multiple small bladder diverticulum's.  Prostatomegaly.  1 cm exophytic lesion arising from the superior lateral aspect left kidney likely hemorrhagic cyst.  Unchanged since 7/22    3/24   0.9, GFR  83    Daughter lives in [place] and is  POA.       Urine culture    3/24    Candida  10/22   Proteus  8/23     yeast    4/24 cath  mL          Objective     Past Medical History:   Diagnosis Date    Acute renal failure with pathological lesion in kidney     Alzheimer's dementia with behavioral disturbance     Anemia, vitamin B12 deficiency     Anxiety     Anxiety disorder due to known physiological condition     Arthritis     Benign neoplasm of prostate     Cannot walk     CHF (congestive heart failure)     Constipation     Coronary atherosclerosis of native coronary artery     CVA (cerebral vascular accident)     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 (CVA)     Edema     Essential hypertension, benign     Gastroesophageal reflux disease without esophagitis     GERD (gastroesophageal reflux disease)     High blood pressure     Hirsutism     HTN (hypertension)     Hypercalcemia     Hyperkalemia     Hyperlipemia     Hyperlipidemia     Hypokalemia      Ingrowing toenail 09/21/2018    Left foot pain 09/21/2018    Nutritional deficiency     Osteoarthritis of right knee 08/22/2016    Pneumonia     Presence of right artificial knee joint     Primary localized osteoarthrosis     PVD (peripheral vascular disease)     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     Right foot pain 09/21/2018    Senile dementia, uncomplicated     Severe sinus bradycardia     Shock, septic     Sleep apnea     Thrombosis of left femoral vein     Tinea unguium 09/21/2018    Vitamin B12 deficiency anemia            No Known Allergies     Family History   Family history unknown: Yes       Social History     Socioeconomic History    Marital status:    Tobacco Use    Smoking status: Never    Smokeless tobacco: Never   Vaping Use    Vaping status: Never Used   Substance and Sexual Activity    Alcohol use: No    Drug use: Never    Sexual activity: Defer       Vital Signs:   There were no vitals taken for this visit.     Physical exam    Alert and orient x3  Well appearing, well developed, in no acute distress   Unlabored respirations  Nontender/nondistended  Patient's lower extremities are contracted, both legs are contracted to the left    Grossly oriented to person, place and time, judgment is intact, normal mood and affect              Assessment and Plan    Diagnoses and all orders for this visit:    1. Gross hematuria (Primary)    2. Renal cyst    3. Nephrolithiasis    4. Bladder diverticulum         Bladder diverticulum / urinary retention        Hard to tell if patient is voiding well or not, he does have a lot of bladder diverticulum's which lean toward high-pressure bladder.  Catheter placed in the office today under sterile technique without issue.  300 mL return.  Urine culture sent    I do think indwelling catheter would be better for patient change monthly    Discussed with power of /daughter        Nephrolithiasis/renal cyst      Monitor  conservatively secondary to his overall clinical situation        Gross hematuria    After discussion with daughter  - do want him to have cystoscopy to rule out underlying bladder pathology.  Risk and benefits discussed    Follow-up for next available cystoscopy will need antibiotics before    Urine culture today

## 2024-04-26 ENCOUNTER — OFFICE VISIT (OUTPATIENT)
Dept: UROLOGY | Facility: CLINIC | Age: 84
End: 2024-04-26
Payer: MEDICARE

## 2024-04-26 DIAGNOSIS — N32.3 BLADDER DIVERTICULUM: ICD-10-CM

## 2024-04-26 DIAGNOSIS — N20.0 NEPHROLITHIASIS: ICD-10-CM

## 2024-04-26 DIAGNOSIS — R31.0 GROSS HEMATURIA: Primary | ICD-10-CM

## 2024-04-26 DIAGNOSIS — N28.1 RENAL CYST: ICD-10-CM

## 2024-04-26 PROCEDURE — 87086 URINE CULTURE/COLONY COUNT: CPT | Performed by: UROLOGY

## 2024-04-26 RX ORDER — LORAZEPAM 1 MG/1
TABLET ORAL
COMMUNITY
Start: 2024-04-15

## 2024-04-27 LAB — BACTERIA SPEC AEROBE CULT: NO GROWTH

## 2024-06-12 PROBLEM — R33.9 URINARY RETENTION: Status: ACTIVE | Noted: 2024-06-12

## 2024-06-12 NOTE — PROGRESS NOTES
Procedures       Urinalysis was checked today and was negative for signs of infection      Cytoscopy Procedure:     Procedure: Flexible cytoscope was passed per urethra into the bladder without difficulty after proper consent. The bladder was inspected in a systematic meridian fashion.     2.5 cm prostate, no median lobe    Catheter reaction in the bladder, no papillary tissue no pathology.  The flexible cytoscope was removed.    16 French coudé catheter placed under sterile technique with 10 cc sterile water in the balloon       The patient tolerated the procedure well.                 Bladder diverticulum / urinary retention/gross hematuria           Cystoscopy negative    Continue indwelling Irby catheter changed monthly by nursing home.    Follow-up as needed             Nephrolithiasis/renal cyst        Monitor conservatively secondary to his overall clinical situation                          This document has been electronically signed by Manuel Hill MD  June 12, 2024 14:44 EDT

## 2024-06-14 ENCOUNTER — TELEPHONE (OUTPATIENT)
Dept: UROLOGY | Facility: CLINIC | Age: 84
End: 2024-06-14

## 2024-06-14 ENCOUNTER — PROCEDURE VISIT (OUTPATIENT)
Dept: UROLOGY | Facility: CLINIC | Age: 84
End: 2024-06-14
Payer: MEDICARE

## 2024-06-14 DIAGNOSIS — N20.0 NEPHROLITHIASIS: ICD-10-CM

## 2024-06-14 DIAGNOSIS — R33.9 URINARY RETENTION: ICD-10-CM

## 2024-06-14 DIAGNOSIS — R31.0 GROSS HEMATURIA: Primary | ICD-10-CM

## 2024-06-14 NOTE — TELEPHONE ENCOUNTER
Hub staff attempted to follow warm transfer process and was unsuccessful     Caller: KIERA SILVEIRA    Relationship to patient: Emergency Contact    Best call back number: [phone]    Patient is needing: PT DAUGHTER CALLED TO GIVE CONSENT FOR PROCEDURE. PLEASE CALL HER AT YOUR EARLIEST CONVENIENCE. THANK YOU

## 2024-06-23 ENCOUNTER — HOSPITAL ENCOUNTER (INPATIENT)
Facility: HOSPITAL | Age: 84
LOS: 2 days | Discharge: NURSING, REHAB OR LONG-TERM CARE | End: 2024-06-25
Attending: EMERGENCY MEDICINE | Admitting: FAMILY MEDICINE
Payer: MEDICARE

## 2024-06-23 ENCOUNTER — PREP FOR SURGERY (OUTPATIENT)
Dept: OTHER | Facility: HOSPITAL | Age: 84
End: 2024-06-23
Payer: MEDICAID

## 2024-06-23 DIAGNOSIS — R26.2 DIFFICULTY WALKING: ICD-10-CM

## 2024-06-23 DIAGNOSIS — T85.528A DISLODGED GASTROSTOMY TUBE: Primary | ICD-10-CM

## 2024-06-23 DIAGNOSIS — Z93.1 FEEDING BY G-TUBE: Primary | ICD-10-CM

## 2024-06-23 PROBLEM — K94.23 PEG TUBE MALFUNCTION: Status: ACTIVE | Noted: 2024-06-23

## 2024-06-23 PROBLEM — Z43.1 ATTENTION TO G-TUBE: Status: ACTIVE | Noted: 2024-06-23

## 2024-06-23 LAB
ANION GAP SERPL CALCULATED.3IONS-SCNC: 11.3 MMOL/L (ref 5–15)
APTT PPP: 33.5 SECONDS (ref 24.2–34.2)
BUN SERPL-MCNC: 25 MG/DL (ref 8–23)
BUN/CREAT SERPL: 25.8 (ref 7–25)
CALCIUM SPEC-SCNC: 9.5 MG/DL (ref 8.6–10.5)
CHLORIDE SERPL-SCNC: 97 MMOL/L (ref 98–107)
CO2 SERPL-SCNC: 27.7 MMOL/L (ref 22–29)
CREAT SERPL-MCNC: 0.97 MG/DL (ref 0.76–1.27)
DEPRECATED RDW RBC AUTO: 40.6 FL (ref 37–54)
EGFRCR SERPLBLD CKD-EPI 2021: 77.5 ML/MIN/1.73
ERYTHROCYTE [DISTWIDTH] IN BLOOD BY AUTOMATED COUNT: 13.9 % (ref 12.3–15.4)
GLUCOSE SERPL-MCNC: 91 MG/DL (ref 65–99)
HCT VFR BLD AUTO: 34.8 % (ref 37.5–51)
HGB BLD-MCNC: 12.1 G/DL (ref 13–17.7)
HOLD SPECIMEN: NORMAL
INR PPP: 1.27 (ref 0.86–1.15)
MCH RBC QN AUTO: 28.4 PG (ref 26.6–33)
MCHC RBC AUTO-ENTMCNC: 34.8 G/DL (ref 31.5–35.7)
MCV RBC AUTO: 81.7 FL (ref 79–97)
PLATELET # BLD AUTO: 281 10*3/MM3 (ref 140–450)
PMV BLD AUTO: 9.8 FL (ref 6–12)
POTASSIUM SERPL-SCNC: 4.4 MMOL/L (ref 3.5–5.2)
PROTHROMBIN TIME: 16.2 SECONDS (ref 11.8–14.9)
RBC # BLD AUTO: 4.26 10*6/MM3 (ref 4.14–5.8)
SODIUM SERPL-SCNC: 136 MMOL/L (ref 136–145)
WBC NRBC COR # BLD AUTO: 7.48 10*3/MM3 (ref 3.4–10.8)
WHOLE BLOOD HOLD COAG: NORMAL

## 2024-06-23 PROCEDURE — 85027 COMPLETE CBC AUTOMATED: CPT | Performed by: FAMILY MEDICINE

## 2024-06-23 PROCEDURE — 25010000002 HEPARIN (PORCINE) PER 1000 UNITS: Performed by: FAMILY MEDICINE

## 2024-06-23 PROCEDURE — 80048 BASIC METABOLIC PNL TOTAL CA: CPT | Performed by: FAMILY MEDICINE

## 2024-06-23 PROCEDURE — 25810000003 LACTATED RINGERS PER 1000 ML: Performed by: INTERNAL MEDICINE

## 2024-06-23 PROCEDURE — 85610 PROTHROMBIN TIME: CPT | Performed by: INTERNAL MEDICINE

## 2024-06-23 PROCEDURE — 85730 THROMBOPLASTIN TIME PARTIAL: CPT | Performed by: INTERNAL MEDICINE

## 2024-06-23 PROCEDURE — 99222 1ST HOSP IP/OBS MODERATE 55: CPT | Performed by: FAMILY MEDICINE

## 2024-06-23 PROCEDURE — 99222 1ST HOSP IP/OBS MODERATE 55: CPT | Performed by: INTERNAL MEDICINE

## 2024-06-23 PROCEDURE — 99285 EMERGENCY DEPT VISIT HI MDM: CPT

## 2024-06-23 RX ORDER — AMOXICILLIN 250 MG
2 CAPSULE ORAL 2 TIMES DAILY PRN
Status: DISCONTINUED | OUTPATIENT
Start: 2024-06-23 | End: 2024-06-24

## 2024-06-23 RX ORDER — DIVALPROEX SODIUM 125 MG/1
250 CAPSULE, COATED PELLETS ORAL 2 TIMES DAILY
Status: DISCONTINUED | OUTPATIENT
Start: 2024-06-23 | End: 2024-06-24

## 2024-06-23 RX ORDER — LOPERAMIDE HYDROCHLORIDE 2 MG/1
2 CAPSULE ORAL AS NEEDED
COMMUNITY

## 2024-06-23 RX ORDER — IPRATROPIUM BROMIDE AND ALBUTEROL SULFATE 2.5; .5 MG/3ML; MG/3ML
3 SOLUTION RESPIRATORY (INHALATION) EVERY 4 HOURS PRN
Status: DISCONTINUED | OUTPATIENT
Start: 2024-06-23 | End: 2024-06-25 | Stop reason: HOSPADM

## 2024-06-23 RX ORDER — ACETAMINOPHEN 160 MG/5ML
500 SOLUTION ORAL EVERY 6 HOURS PRN
Status: DISCONTINUED | OUTPATIENT
Start: 2024-06-23 | End: 2024-06-24

## 2024-06-23 RX ORDER — SODIUM CHLORIDE 0.9 % (FLUSH) 0.9 %
10 SYRINGE (ML) INJECTION EVERY 12 HOURS SCHEDULED
Status: DISCONTINUED | OUTPATIENT
Start: 2024-06-23 | End: 2024-06-25 | Stop reason: HOSPADM

## 2024-06-23 RX ORDER — BISACODYL 5 MG/1
5 TABLET, DELAYED RELEASE ORAL DAILY PRN
Status: DISCONTINUED | OUTPATIENT
Start: 2024-06-23 | End: 2024-06-24

## 2024-06-23 RX ORDER — SODIUM CHLORIDE 9 MG/ML
40 INJECTION, SOLUTION INTRAVENOUS AS NEEDED
Status: DISCONTINUED | OUTPATIENT
Start: 2024-06-23 | End: 2024-06-25 | Stop reason: HOSPADM

## 2024-06-23 RX ORDER — BISACODYL 10 MG
10 SUPPOSITORY, RECTAL RECTAL DAILY PRN
Status: DISCONTINUED | OUTPATIENT
Start: 2024-06-23 | End: 2024-06-24

## 2024-06-23 RX ORDER — HEPARIN SODIUM 5000 [USP'U]/ML
5000 INJECTION, SOLUTION INTRAVENOUS; SUBCUTANEOUS EVERY 12 HOURS SCHEDULED
Status: DISCONTINUED | OUTPATIENT
Start: 2024-06-23 | End: 2024-06-24

## 2024-06-23 RX ORDER — ALUMINA, MAGNESIA, AND SIMETHICONE 2400; 2400; 240 MG/30ML; MG/30ML; MG/30ML
15 SUSPENSION ORAL EVERY 4 HOURS PRN
Status: DISCONTINUED | OUTPATIENT
Start: 2024-06-23 | End: 2024-06-24

## 2024-06-23 RX ORDER — SODIUM CHLORIDE 0.9 % (FLUSH) 0.9 %
10 SYRINGE (ML) INJECTION AS NEEDED
Status: DISCONTINUED | OUTPATIENT
Start: 2024-06-23 | End: 2024-06-25 | Stop reason: HOSPADM

## 2024-06-23 RX ORDER — ONDANSETRON 2 MG/ML
4 INJECTION INTRAMUSCULAR; INTRAVENOUS EVERY 6 HOURS PRN
Status: DISCONTINUED | OUTPATIENT
Start: 2024-06-23 | End: 2024-06-25 | Stop reason: HOSPADM

## 2024-06-23 RX ORDER — SODIUM CHLORIDE, SODIUM LACTATE, POTASSIUM CHLORIDE, CALCIUM CHLORIDE 600; 310; 30; 20 MG/100ML; MG/100ML; MG/100ML; MG/100ML
75 INJECTION, SOLUTION INTRAVENOUS CONTINUOUS
Status: DISCONTINUED | OUTPATIENT
Start: 2024-06-23 | End: 2024-06-24

## 2024-06-23 RX ORDER — POLYETHYLENE GLYCOL 3350 17 G/17G
17 POWDER, FOR SOLUTION ORAL DAILY PRN
Status: DISCONTINUED | OUTPATIENT
Start: 2024-06-23 | End: 2024-06-24

## 2024-06-23 RX ORDER — FAMOTIDINE 10 MG/ML
20 INJECTION, SOLUTION INTRAVENOUS DAILY
Status: DISCONTINUED | OUTPATIENT
Start: 2024-06-23 | End: 2024-06-25

## 2024-06-23 RX ADMIN — SODIUM CHLORIDE, POTASSIUM CHLORIDE, SODIUM LACTATE AND CALCIUM CHLORIDE 75 ML/HR: 600; 310; 30; 20 INJECTION, SOLUTION INTRAVENOUS at 09:04

## 2024-06-23 RX ADMIN — DIVALPROEX SODIUM 250 MG: 125 CAPSULE, COATED PELLETS ORAL at 22:31

## 2024-06-23 RX ADMIN — HEPARIN SODIUM 5000 UNITS: 5000 INJECTION INTRAVENOUS; SUBCUTANEOUS at 22:32

## 2024-06-23 RX ADMIN — Medication 10 ML: at 09:06

## 2024-06-23 RX ADMIN — METOPROLOL TARTRATE 12.5 MG: 25 TABLET, FILM COATED ORAL at 22:32

## 2024-06-23 RX ADMIN — FAMOTIDINE 20 MG: 10 INJECTION INTRAVENOUS at 09:05

## 2024-06-23 RX ADMIN — Medication 10 ML: at 22:32

## 2024-06-23 RX ADMIN — ACETAMINOPHEN 500 MG: 160 SOLUTION ORAL at 23:01

## 2024-06-23 RX ADMIN — METOPROLOL TARTRATE 12.5 MG: 25 TABLET, FILM COATED ORAL at 09:05

## 2024-06-23 RX ADMIN — SODIUM CHLORIDE, POTASSIUM CHLORIDE, SODIUM LACTATE AND CALCIUM CHLORIDE 75 ML/HR: 600; 310; 30; 20 INJECTION, SOLUTION INTRAVENOUS at 22:32

## 2024-06-23 RX ADMIN — DIVALPROEX SODIUM 250 MG: 125 CAPSULE, COATED PELLETS ORAL at 09:05

## 2024-06-23 NOTE — CASE MANAGEMENT/SOCIAL WORK
Discharge Planning Assessment  KASSI Davsi     Patient Name: Jordin Menon  MRN: [id number]  Today's Date: 6/23/2024    Admit Date: 6/23/2024    Plan: Pt is LTC at [facility]. SW will follow up for bedhold status.   Discharge Needs Assessment       Row Name 06/23/24 0907       Living Environment    People in Home facility resident    Unique Family Situation [facility]    Current Living Arrangements extended care facility    Potentially Unsafe Housing Conditions none    In the past 12 months has the electric, gas, oil, or water company threatened to shut off services in your home? No    Primary Care Provided by other (see comments)    Provides Primary Care For no one, unable/limited ability to care for self    Family Caregiver if Needed child(elizabeth), adult;other (see comments)    Quality of Family Relationships helpful;involved;supportive    Able to Return to Prior Arrangements yes    Living Arrangement Comments [facility]       Resource/Environmental Concerns    Resource/Environmental Concerns none    Transportation Concerns none       Transportation Needs    In the past 12 months, has lack of transportation kept you from medical appointments or from getting medications? no    In the past 12 months, has lack of transportation kept you from meetings, work, or from getting things needed for daily living? No       Food Insecurity    Within the past 12 months, you worried that your food would run out before you got the money to buy more. Never true    Within the past 12 months, the food you bought just didn't last and you didn't have money to get more. Never true       Transition Planning    Patient/Family Anticipates Transition to long-term care facility    Patient/Family Anticipated Services at Transition     Transportation Anticipated health plan transportation       Discharge Needs Assessment    Readmission Within the Last 30 Days no previous admission in last  30 days    Concerns Comments Per last admission pt is bedfast and total care at facility.    Anticipated Changes Related to Illness none    Equipment Needed After Discharge none    Discharge Facility/Level of Care Needs nursing facility, intermediate    Discharge Coordination/Progress Pt is LTC at [facility] and rehab. SW will follow up for bedhold status.                   Discharge Plan       Row Name 06/23/24 0917       Plan    Plan Pt is LTC at [facility] and rehab. SW will follow up for bedhold status.                  Continued Care and Services - Admitted Since 6/23/2024    No active coordination exists for this encounter.          Demographic Summary       Row Name 06/23/24 0906       General Information    Admission Type observation    Arrived From emergency department;long-term care    Referral Source admission list    Reason for Consult discharge planning    Preferred Language English       Contact Information    Permission Granted to Share Info With power of  for healthcare    Contact Information Obtained for power of  for healthcare       Healthcare Power of  Information    Name, Healthcare Power of  Shandra Menon    Phone, Healthcare Power of  [phone]                   Functional Status       Row Name 06/23/24 0907       Employment/    Employment Status disabled      Row Name 06/23/24 0906       Functional Status    Usual Activity Tolerance poor    Current Activity Tolerance poor       Physical Activity    On average, how many days per week do you engage in moderate to strenuous exercise (like a brisk walk)? 0 days    On average, how many minutes do you engage in exercise at this level? 0 min    Number of minutes of exercise per week 0       Assessment of Health Literacy    How often do you have someone help you read hospital materials? Always    How often do you have problems learning about your medical condition because of difficulty  understanding written information? Always    How often do you have a problem understanding what is told to you about your medical condition? Always    How confident are you filling out medical forms by yourself? Not at all    Health Literacy Low       Functional Status, IADL    Medications assistive person    Meal Preparation assistive person    Housekeeping assistive person    Laundry assistive person    Shopping assistive person       Mental Status    General Appearance WDL WDL       Mental Status Summary    Recent Changes in Mental Status/Cognitive Functioning no changes                   Psychosocial    No documentation.                  Abuse/Neglect    No documentation.                  Legal       Row Name 06/23/24 0907       Financial Resource Strain    How hard is it for you to pay for the very basics like food, housing, medical care, and heating? Not hard       Financial/Legal    Source of Income disability    Application for Public Assistance not applied       Legal    Criminal Activity/Legal Involvement none                   Substance Abuse    No documentation.                  Patient Forms    No documentation.                     Fabiola Griffin

## 2024-06-23 NOTE — PLAN OF CARE
Goal Outcome Evaluation:      Patient is an admit from the ED this shift. VSS. Patient is confused, oriented to self and place only. No c/o pain. Patient is strict NPO, waiting for evaluation of G-tube today.

## 2024-06-23 NOTE — PLAN OF CARE
Goal Outcome Evaluation:  Plan of Care Reviewed With: patient        Progress: no change  Outcome Evaluation: No complaints from patient this shift. He has had thick, serosanguinous discharge from G-tube site. Gauze dsg put there. Patient has been attempted to be fed for all 3 meals today but has had minimal input. VSS, will continue POC.

## 2024-06-23 NOTE — ED PROVIDER NOTES
Time: 2:10 AM EDT  Date of encounter:  6/23/2024  Independent Historian/Clinical History and Information was obtained by:   Nursing Staff  Chief Complaint: Fracture G-tube    History is limited by: Dementia    History of Present Illness:  Patient is a 84 y.o. year old male who presents to the emergency department for evaluation of broken G-tube.  According to the nursing home notes the patient's G-tube was put in in 2022 the G-tube essentially just cracked and broke off.  He was sent in to the emergency room for replacement  HPI    Patient Care Team  Primary Care Provider: Frank Llanes MD    Past Medical History:     No Known Allergies  Past Medical History:   Diagnosis Date    Acute renal failure with pathological lesion in kidney     Alzheimer's dementia with behavioral disturbance     Anemia, vitamin B12 deficiency     Anxiety     Anxiety disorder due to known physiological condition     Arthritis     Benign neoplasm of prostate     Cannot walk     CHF (congestive heart failure)     Constipation     Coronary atherosclerosis of native coronary artery     CVA (cerebral vascular accident)     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 (CVA)     Edema     Essential hypertension, benign     Gastroesophageal reflux disease without esophagitis     GERD (gastroesophageal reflux disease)     High blood pressure     Hirsutism     HTN (hypertension)     Hypercalcemia     Hyperkalemia     Hyperlipemia     Hyperlipidemia     Hypokalemia     Ingrowing toenail 09/21/2018    Left foot pain 09/21/2018    Nutritional deficiency     Osteoarthritis of right knee 08/22/2016    Pneumonia     Presence of right artificial knee joint     Primary localized osteoarthrosis     PVD (peripheral vascular disease)     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     Right foot pain 09/21/2018    Senile dementia, uncomplicated     Severe sinus bradycardia     Shock, septic     Sleep apnea     Thrombosis of  left femoral vein     Tinea unguium 09/21/2018    Vitamin B12 deficiency anemia      Past Surgical History:   Procedure Laterality Date    ENDOSCOPY W/ PEG TUBE PLACEMENT N/A 6/8/2022    Procedure: ESOPHAGOGASTRODUODENOSCOPY WITH PERCUTANEOUS ENDOSCOPIC GASTROSTOMY TUBE INSERTION WITH ANESTHESIA;  Surgeon: Yanelis Mistry MD;  Location: [facility] ENDOSCOPY;  Service: Gastroenterology;  Laterality: N/A;  PEG TUBE PLACEMENT    ENDOSCOPY W/ PEG TUBE PLACEMENT N/A 7/3/2022    Procedure: ESOPHAGOGASTRODUODENOSCOPY WITH PERCUTANEOUS ENDOSCOPIC GASTROSTOMY TUBE INSERTION WITH ANESTHESIA;  Surgeon: Bam Melo MD;  Location: [facility] ENDOSCOPY;  Service: Gastroenterology;  Laterality: N/A;  PEG TUBE PLACEMENT    ENDOSCOPY W/ PEG TUBE PLACEMENT N/A 10/26/2022    Procedure: ESOPHAGOGASTRODUODENOSCOPY WITH PERCUTANEOUS ENDOSCOPIC GASTROSTOMY TUBE INSERTION WITH ANESTHESIA;  Surgeon: Yanelis Mistry MD;  Location: [facility] ENDOSCOPY;  Service: Gastroenterology;  Laterality: N/A;  PERCUTANEOUS ENDOSCOPIC GASTROSTOMY TUBE PLACEMENT     Family History   Family history unknown: Yes       Home Medications:  Prior to Admission medications    Medication Sig Start Date End Date Taking? Authorizing Provider   acetaminophen (TYLENOL) 650 MG 8 hr tablet Take 1 tablet by mouth Every 8 (Eight) Hours As Needed for Mild Pain.    Provider, MD Nanci   aluminum-magnesium hydroxide-simethicone (MAALOX/MYLANTA) 200-200-20 MG/5ML suspension Administer 30 mL per G tube Every 6 (Six) Hours As Needed for Indigestion or Heartburn.    ProviderNanci MD   apixaban (ELIQUIS) 5 MG tablet tablet Administer 1 tablet per G tube 2 (Two) Times a Day. Indications: DVT/PE (active thrombosis) 10/27/22   Thiago Soares MD   aspirin 81 MG chewable tablet Chew 1 tablet Daily.  Patient taking differently: Administer 1 tablet per G tube Daily. 4/19/22   Aranza Herzog MD   busPIRone (BUSPAR) 10 MG tablet Administer 1 tablet per  "G tube 3 (Three) Times a Day.    Nanci Shafer MD   cyanocobalamin 1000 MCG/ML injection Inject 1 mL into the appropriate muscle as directed by prescriber Every 30 (Thirty) Days. On the 15th of the month    Nanci Shafer MD   Divalproex Sodium (DEPAKOTE SPRINKLE) 125 MG capsule Administer 2 capsules per G tube 2 (Two) Times a Day. 2/23/24   Nanci Shafer MD   docusate sodium (COLACE) 50 mg/5 mL liquid Administer 25 mL per G tube Daily.    Nanci Shafer MD   famotidine (PEPCID) 40 MG tablet Take 1 tablet by mouth Every Night.    Nanci Shafer MD   furosemide (Lasix) 20 MG tablet Take 1 tablet by mouth Daily.  Patient taking differently: Administer 1 tablet per G tube Daily. 3/3/22   Eric Rivas MD   hydrOXYzine (ATARAX) 25 MG tablet Administer 1 tablet per G tube Every 6 (Six) Hours As Needed for Itching. 11/9/23   Nanci Shafer MD   ipratropium-albuterol (DUO-NEB) 0.5-2.5 mg/3 ml nebulizer Take 3 mL by nebulization Every 4 (Four) Hours As Needed for Wheezing.    Nanci Shafer MD   LORazepam (ATIVAN) 1 MG tablet  4/15/24   Nanci Shafer MD   melatonin 5 MG tablet tablet Administer 1 tablet per G tube every night at bedtime.    Nanci Shafer MD   metoprolol tartrate (LOPRESSOR) 25 MG tablet Administer 0.5 tablets per G tube Every 12 (Twelve) Hours. 10/27/22   Thiago Soares MD   polyethylene glycol (MIRALAX) 17 GM/SCOOP powder Administer 17 g per G tube Daily.    Nanci Shafer MD        Social History:   Social History     Tobacco Use    Smoking status: Never    Smokeless tobacco: Never   Vaping Use    Vaping status: Never Used   Substance Use Topics    Alcohol use: No    Drug use: Never         Review of Systems:  Review of Systems   Unable to perform ROS: Dementia        Physical Exam:  /87 (BP Location: Left arm, Patient Position: Lying)   Pulse 68   Temp 99 °F (37.2 °C) (Oral)   Resp 16   Ht 162.6 cm (64.02\")   Wt 74 " kg (163 lb 2.3 oz)   SpO2 97%   BMI 27.99 kg/m²     Physical Exam  Vitals and nursing note reviewed.   Constitutional:       General: He is not in acute distress.     Appearance: Normal appearance. He is not ill-appearing, toxic-appearing or diaphoretic.   HENT:      Head: Normocephalic and atraumatic.      Mouth/Throat:      Mouth: Mucous membranes are moist.   Eyes:      Pupils: Pupils are equal, round, and reactive to light.   Cardiovascular:      Rate and Rhythm: Normal rate and regular rhythm.      Pulses: Normal pulses.           Carotid pulses are 2+ on the right side and 2+ on the left side.       Radial pulses are 2+ on the right side and 2+ on the left side.        Femoral pulses are 2+ on the right side and 2+ on the left side.       Popliteal pulses are 2+ on the right side and 2+ on the left side.        Dorsalis pedis pulses are 2+ on the right side and 2+ on the left side.        Posterior tibial pulses are 2+ on the right side and 2+ on the left side.      Heart sounds: Normal heart sounds. No murmur heard.  Pulmonary:      Effort: Pulmonary effort is normal. No accessory muscle usage, respiratory distress or retractions.      Breath sounds: Examination of the right-upper field reveals decreased breath sounds. Examination of the left-upper field reveals decreased breath sounds. Examination of the right-middle field reveals decreased breath sounds. Examination of the left-middle field reveals decreased breath sounds. Examination of the right-lower field reveals decreased breath sounds. Examination of the left-lower field reveals decreased breath sounds. Decreased breath sounds present. No wheezing, rhonchi or rales.   Abdominal:      General: Abdomen is flat. There is no distension.      Palpations: Abdomen is soft. There is no mass or pulsatile mass.      Tenderness: There is no abdominal tenderness. There is no right CVA tenderness, left CVA tenderness, guarding or rebound.      Comments: No  rigidity   Musculoskeletal:         General: No swelling, tenderness or deformity.      Cervical back: Neck supple. No tenderness.      Right lower leg: No edema.      Left lower leg: No edema.      Comments: Patient has severe muscle atrophy in the legs.  the patient has contracture of the leg   Skin:     General: Skin is warm and dry.      Capillary Refill: Capillary refill takes less than 2 seconds.      Coloration: Skin is not jaundiced or pale.      Findings: No erythema.   Neurological:      General: No focal deficit present.      Mental Status: He is alert. Mental status is at baseline. He is disoriented.   Psychiatric:         Cognition and Memory: Cognition is impaired. Memory is impaired.                  Procedures:  Procedures      Medical Decision Making:      Comorbidities that affect care:    Vitamin B12 deficiency, hyperlipidemia, hypercalcemia, anxiety, coronary disease, congestive heart failure, CVA, peripheral vascular disease, GERD, rheumatoid, anxiety, GERD, hypertension, hyperlipidemia, dementia    External Notes reviewed:    None      The following orders were placed and all results were independently analyzed by me:  Orders Placed This Encounter   Procedures    IR J or G Tube Contrast Eval    CBC (No Diff)    Basic Metabolic Panel    Protime-INR    aPTT    Diet: Regular/House; Texture: Mechanical Ground (NDD 2); Fluid Consistency: Thin (IDDSI 0)    Vital Signs    Intake & Output    Weigh Patient    Oral Care    Saline Lock & Maintain IV Access    Insert Indwelling Urinary Catheter    Assess Need for Indwelling Urinary Catheter - Follow Removal Protocol    Urinary Catheter Care    Richardson & Document Feeding Tube Depth (in cm)    Verify Feeding Tube Placement Upon Insertion & As Needed    Elevate Head Of Bed 30-45 Degrees    Daily Weights    Tube Feeding Assessment and I/O    Write Hang Time on Enteral Feeding Container    Flush Feeding Tube With 30-50mL Water As Needed    Notify Provider -  Abdominal Discomfort, Pain or Distention, No Bowel Movement in 3 Days    Administer Tube Feeding Via Feeding Tube Already in Place    Elevate Heels Off of Bed    Turn Patient    Use Seat Cushion When Up In Chair    Wound Care    Skin Care    Wound Care    Tube Site Care    Code Status and Medical Interventions:    General MD Inpatient Consult    Inpatient Hospitalist Consult    Inpatient Gastroenterology Consult    Inpatient Nutrition Consult    Inpatient Nutrition Consult    Tube Feeding: Formula: Isosource 1.5; Feeding Type: Continuous; Start at: 25 mL/hr; Then Advance By: 25 mL/hr; Every: 8 hours; To Goal Rate of: 60 mL/hr; Water Flush: Other; Other: 65; Every: 3 hours; Water Bolus: None    PT Consult: Eval & Treat Functional Mobility Below Baseline    POC Glucose Q6H    POC Glucose Once    Wound Ostomy Eval & Treat    Insert Peripheral IV    Initiate Observation Status    Inpatient Admission    Extra Tubes    Gold Top - SST    Light Blue Top       Medications Given in the Emergency Department:  Medications   sodium chloride 0.9 % flush 10 mL (10 mL Intravenous Not Given 6/24/24 7616)   sodium chloride 0.9 % flush 10 mL (has no administration in time range)   sodium chloride 0.9 % infusion 40 mL (has no administration in time range)   ondansetron (ZOFRAN) injection 4 mg (has no administration in time range)   ipratropium-albuterol (DUO-NEB) nebulizer solution 3 mL (has no administration in time range)   metoprolol tartrate (LOPRESSOR) injection 5 mg (has no administration in time range)   famotidine (PEPCID) injection 20 mg (20 mg Intravenous Given 6/24/24 0111)   sennosides-docusate (PERICOLACE) 8.6-50 MG per tablet 2 tablet (has no administration in time range)     And   polyethylene glycol (MIRALAX) packet 17 g (has no administration in time range)     And   bisacodyl (DULCOLAX) suppository 10 mg (has no administration in time range)   metoprolol tartrate (LOPRESSOR) tablet 12.5 mg (12.5 mg Per PEG Tube Given  6/24/24 2159)   Divalproex Sodium (DEPAKOTE SPRINKLE) capsule 250 mg (250 mg Per PEG Tube Given 6/24/24 2159)   aluminum-magnesium hydroxide-simethicone (MAALOX MAX) 400-400-40 MG/5ML suspension 15 mL (has no administration in time range)   acetaminophen (TYLENOL) 160 MG/5ML oral solution 500 mg (has no administration in time range)   Enoxaparin Sodium (LOVENOX) syringe 40 mg (40 mg Subcutaneous Not Given 6/24/24 1724)   bacitracin 500 UNIT/GM ointment 0.9 g (0.9 g Topical Given 6/24/24 2259)   diatrizoate meglumine-sodium (GASTROGRAFIN) 66-10 % oral solution 28 mL (28 mL Per G Tube Given 6/24/24 1048)        ED Course:         Labs:    Lab Results (last 24 hours)       Procedure Component Value Units Date/Time    POC Glucose Once [[id number]]  (Normal) Collected: 06/24/24 1756    Specimen: Blood Updated: 06/24/24 1759     Glucose 75 mg/dL      Comment: Serial Number: [id number]:  [id number]                Imaging:    IR J or G Tube Contrast Eval    Result Date: 6/24/2024  IR J OR G TUBE CONTRAST EVAL Date of Exam: 6/24/2024 10:30 AM EDT Indication: gtube placement, confirm placement of replacement tube before usage. Comparison: None available. Fluoroscopic Time: 0.4 minutes Contrast Media: 28 mL water-soluble Gastrografin Technique/Findings: A percutaneous feeding tube projects over the abdominal left upper quadrant. Upon injection of Gastrografin into the tube, there is opacification of a small bowel loop presumably representing the duodenum and proximal jejunum. There is no evidence of contrast leak.     Impression: No evidence of contrast leak following placement of a percutaneous feeding tube, as above. Report dictated by: Cynthia Puente  I have personally reviewed this case and agree with the findings above: Electronically Signed: Chandra Blackmon MD  6/24/2024 1:10 PM EDT  Workstation ID: [id number]       Differential Diagnosis and Discussion:      MDM  Number of Diagnoses or Management Options  Feeding by  G-tube  Diagnosis management comments: The patient's CBC was reviewed and shows no abnormalities of critical concern.  Of note, there is no anemia requiring a blood transfusion and the platelet count is acceptable     The patient's CMP was reviewed and shows no abnormalities of critical concern.  Of note, the patient's sodium and potassium are acceptable.  The patient's liver enzymes are unremarkable.  The patient's renal function including creatinine is preserved.  The patient has a normal anion gap.       Amount and/or Complexity of Data Reviewed  Discuss the patient with other providers: yes (02:14 EDT  I discussed case with Dr. Melo.  He request the patient be admitted to the hospital so he can replace the G-tube    02:23 EDT  I discussed the case with the hospitalist, Dr. Corrigan.  We have discussed the patient's presenting symptoms, laboratory values, imaging and condition at the time of admission.  They will evaluate the patient in the emergency room and admit the patient to the hospital      )           Social Determinants of Health:    Patient is a nursing home/assisted living resident and has reliable access to care.      Disposition and Care Coordination:    Admit:   Through independent evaluation of the patient's history, physical, and imperical data, the patient meets criteria for inpatient admission to the hospital.        Final diagnoses:   Feeding by G-tube        ED Disposition       ED Disposition   Decision to Admit    Condition   --    Comment   Level of Care: Remote Telemetry [26]   Diagnosis: Attention to G-tube [[id number]]   Admitting Physician: LAI CORRIGAN [[id number]]                 This medical record created using voice recognition software.             Ivan Brothers DO  06/25/24 0203

## 2024-06-23 NOTE — CONSULTS
[facility]  Initial Inpatient Consult Note    Referring Provider: Consult    Reason for Consultation: Dysfunctional G-tube    Subjective     History of present illness:    84 y.o. male with a history of dementia, hypertension, hyperlipidemia on Eliquis who had a G-tube placed in October 2022 per Dr. Mistry.  He was transferred from the nursing home last evening after complications of G-tube.  Reportedly G-tube was torn off about care home down the tube..  He is unable to give any further history and history comes from review of the chart and records.    His last dose of Eliquis was yesterday     past Medical History:  Past Medical History:   Diagnosis Date    Acute renal failure with pathological lesion in kidney     Alzheimer's dementia with behavioral disturbance     Anemia, vitamin B12 deficiency     Anxiety     Anxiety disorder due to known physiological condition     Arthritis     Benign neoplasm of prostate     Cannot walk     CHF (congestive heart failure)     Constipation     Coronary atherosclerosis of native coronary artery     CVA (cerebral vascular accident)     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 (CVA)     Edema     Essential hypertension, benign     Gastroesophageal reflux disease without esophagitis     GERD (gastroesophageal reflux disease)     High blood pressure     Hirsutism     HTN (hypertension)     Hypercalcemia     Hyperkalemia     Hyperlipemia     Hyperlipidemia     Hypokalemia     Ingrowing toenail 09/21/2018    Left foot pain 09/21/2018    Nutritional deficiency     Osteoarthritis of right knee 08/22/2016    Pneumonia     Presence of right artificial knee joint     Primary localized osteoarthrosis     PVD (peripheral vascular disease)     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     Right foot pain 09/21/2018    Senile dementia, uncomplicated     Severe sinus bradycardia     Shock, septic     Sleep apnea     Thrombosis of left  femoral vein     Tinea unguium 09/21/2018    Vitamin B12 deficiency anemia      Past Surgical History:  Past Surgical History:   Procedure Laterality Date    ENDOSCOPY W/ PEG TUBE PLACEMENT N/A 6/8/2022    Procedure: ESOPHAGOGASTRODUODENOSCOPY WITH PERCUTANEOUS ENDOSCOPIC GASTROSTOMY TUBE INSERTION WITH ANESTHESIA;  Surgeon: Yanelis Mistry MD;  Location: [facility] ENDOSCOPY;  Service: Gastroenterology;  Laterality: N/A;  PEG TUBE PLACEMENT    ENDOSCOPY W/ PEG TUBE PLACEMENT N/A 7/3/2022    Procedure: ESOPHAGOGASTRODUODENOSCOPY WITH PERCUTANEOUS ENDOSCOPIC GASTROSTOMY TUBE INSERTION WITH ANESTHESIA;  Surgeon: Bam Melo MD;  Location: [facility] ENDOSCOPY;  Service: Gastroenterology;  Laterality: N/A;  PEG TUBE PLACEMENT    ENDOSCOPY W/ PEG TUBE PLACEMENT N/A 10/26/2022    Procedure: ESOPHAGOGASTRODUODENOSCOPY WITH PERCUTANEOUS ENDOSCOPIC GASTROSTOMY TUBE INSERTION WITH ANESTHESIA;  Surgeon: Yanelis Mistry MD;  Location: [facility] ENDOSCOPY;  Service: Gastroenterology;  Laterality: N/A;  PERCUTANEOUS ENDOSCOPIC GASTROSTOMY TUBE PLACEMENT      Social History:   Social History     Tobacco Use    Smoking status: Never    Smokeless tobacco: Never   Substance Use Topics    Alcohol use: No      Family History:  Family History   Family history unknown: Yes       Home Meds:  Medications Prior to Admission   Medication Sig Dispense Refill Last Dose    acetaminophen (TYLENOL) 650 MG 8 hr tablet Take 1 tablet by mouth Every 8 (Eight) Hours As Needed for Mild Pain.       aluminum-magnesium hydroxide-simethicone (MAALOX/MYLANTA) 200-200-20 MG/5ML suspension Administer 30 mL per G tube Every 6 (Six) Hours As Needed for Indigestion or Heartburn.       apixaban (ELIQUIS) 5 MG tablet tablet Administer 1 tablet per G tube 2 (Two) Times a Day. Indications: DVT/PE (active thrombosis)       aspirin 81 MG chewable tablet Chew 1 tablet Daily. (Patient taking differently: Administer 1 tablet per G tube Daily.)        busPIRone (BUSPAR) 10 MG tablet Administer 1 tablet per G tube 3 (Three) Times a Day.       cyanocobalamin 1000 MCG/ML injection Inject 1 mL into the appropriate muscle as directed by prescriber Every 30 (Thirty) Days. On the 15th of the month       Divalproex Sodium (DEPAKOTE SPRINKLE) 125 MG capsule Administer 2 capsules per G tube 2 (Two) Times a Day.       docusate sodium (COLACE) 50 mg/5 mL liquid Administer 25 mL per G tube Daily.       famotidine (PEPCID) 40 MG tablet Take 1 tablet by mouth Every Night.       furosemide (Lasix) 20 MG tablet Take 1 tablet by mouth Daily. (Patient taking differently: Administer 1 tablet per G tube Daily.)       hydrOXYzine (ATARAX) 25 MG tablet Administer 1 tablet per G tube Every 6 (Six) Hours As Needed for Itching.       ipratropium-albuterol (DUO-NEB) 0.5-2.5 mg/3 ml nebulizer Take 3 mL by nebulization Every 4 (Four) Hours As Needed for Wheezing.       LORazepam (ATIVAN) 1 MG tablet        melatonin 5 MG tablet tablet Administer 1 tablet per G tube every night at bedtime.       metoprolol tartrate (LOPRESSOR) 25 MG tablet Administer 0.5 tablets per G tube Every 12 (Twelve) Hours.       polyethylene glycol (MIRALAX) 17 GM/SCOOP powder Administer 17 g per G tube Daily.        Current Meds:   Divalproex Sodium, 250 mg, Oral, BID  famotidine, 20 mg, Intravenous, Daily  heparin (porcine), 5,000 Units, Subcutaneous, Q12H  metoprolol tartrate, 12.5 mg, Oral, Q12H  sodium chloride, 10 mL, Intravenous, Q12H      Allergies:  No Known Allergies  Review of Systems  Pertinent items are noted in HPI, all other systems reviewed and negative         Vital Signs  Temp:  [97.5 °F (36.4 °C)-98.5 °F (36.9 °C)] 97.6 °F (36.4 °C)  Heart Rate:  [66-73] 70  Resp:  [18] 18  BP: (116-133)/() 125/102  Physical Exam:  General Appearance:    Alert, cooperative, in no acute distress, confused   Head:    Normocephalic, without obvious abnormality, atraumatic   Eyes:          conjunctivae and sclerae  normal, no   icterus   Throat:   no thrush, oral mucosa moist   Neck:   Supple, no adenopathy   Lungs:     Clear to auscultation bilaterally    Heart:    Regular rhythm and normal rate    Chest Wall:    No abnormalities observed   Abdomen:     Soft, nondistended, nontender; normal bowel sounds, G-tube is in place and wound is clean dry and intact however there is only 6 inches of 2 remaining with external clamp also still remaining.  There is no connection devices remaining.  External bumper is in good condition without skin ulceration.   Extremities:   no edema, no redness   Skin:   No bruising or rash   Psychiatric:  normal mood and insight     Results Review:  [x]  Laboratory   [x]  Radiology  []  Pathology      I reviewed the patient's new clinical results.    Results from last 7 days   Lab Units 06/23/24  0252   WBC 10*3/mm3 7.48   HEMOGLOBIN g/dL 12.1*   HEMATOCRIT % 34.8*   PLATELETS 10*3/mm3 281     Results from last 7 days   Lab Units 06/23/24  0252   SODIUM mmol/L 136   POTASSIUM mmol/L 4.4   CHLORIDE mmol/L 97*   CO2 mmol/L 27.7   BUN mg/dL 25*   CREATININE mg/dL 0.97   CALCIUM mg/dL 9.5   GLUCOSE mg/dL 91         Lab Results   Lab Value Date/Time    LIPASE 17 07/06/2019 1926       Radiology:  No radiology results for the last day    Assessment & Plan       Attention to G-tube    Essential hypertension    Feeding by G-tube       Plan:  Patient has had G-tube tubing which was torn or injured at some point with only 6 inches or so remaining outside of the outer skin bumper.  We will therefore hold his Eliquis today and plan for water balloon replacement tube tomorrow and removal of this current tube.  We will need consent from his POA.      I discussed the patients findings and my recommendations with patient.    Bam Melo MD

## 2024-06-23 NOTE — H&P
[facility]   HISTORY AND PHYSICAL    Patient Name: Jordin Menon  : 1940  MRN: [id number]  Primary Care Physician:  Frank Llanes MD  Date of admission: 2024    Subjective   Subjective     Chief Complaint: Malfunctioning G-tube    HPI:    Jordin Menon is a 84 y.o. male with past medical history of advanced Alzheimer's, hypertension, hyperlipidemia, CHF, PAD, chronic debility, and GERD presented to the ED from nursing home with a malfunctioning G-tube.  Due to patient advanced Alzheimer's history was taken via chart reviewing physician handoff.  Patient had G-tube placed back in  and nursing staff noted that it had been broken so they brought him to the ED for replacement.  In the ED patient's vitals were all within normal limits on arrival.  Labs are unremarkable.  His chronic conditions.  ED physician attempted to remove the G-tube manually but was unable to do so so surgery was consulted.  Patient was admitted for further management    Review of Systems   Patient with advanced Alzheimer's.    Personal History     Past Medical History:   Diagnosis Date    Acute renal failure with pathological lesion in kidney     Alzheimer's dementia with behavioral disturbance     Anemia, vitamin B12 deficiency     Anxiety     Anxiety disorder due to known physiological condition     Arthritis     Benign neoplasm of prostate     Cannot walk     CHF (congestive heart failure)     Constipation     Coronary atherosclerosis of native coronary artery     CVA (cerebral vascular accident)     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 (CVA)     Edema     Essential hypertension, benign     Gastroesophageal reflux disease without esophagitis     GERD (gastroesophageal reflux disease)     High blood pressure     Hirsutism     HTN (hypertension)     Hypercalcemia     Hyperkalemia     Hyperlipemia     Hyperlipidemia     Hypokalemia     Ingrowing toenail 2018    Left foot pain 2018    Nutritional  deficiency     Osteoarthritis of right knee 08/22/2016    Pneumonia     Presence of right artificial knee joint     Primary localized osteoarthrosis     PVD (peripheral vascular disease)     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     Right foot pain 09/21/2018    Senile dementia, uncomplicated     Severe sinus bradycardia     Shock, septic     Sleep apnea     Thrombosis of left femoral vein     Tinea unguium 09/21/2018    Vitamin B12 deficiency anemia        Past Surgical History:   Procedure Laterality Date    ENDOSCOPY W/ PEG TUBE PLACEMENT N/A 6/8/2022    Procedure: ESOPHAGOGASTRODUODENOSCOPY WITH PERCUTANEOUS ENDOSCOPIC GASTROSTOMY TUBE INSERTION WITH ANESTHESIA;  Surgeon: Yanelis Mistry MD;  Location: [facility] ENDOSCOPY;  Service: Gastroenterology;  Laterality: N/A;  PEG TUBE PLACEMENT    ENDOSCOPY W/ PEG TUBE PLACEMENT N/A 7/3/2022    Procedure: ESOPHAGOGASTRODUODENOSCOPY WITH PERCUTANEOUS ENDOSCOPIC GASTROSTOMY TUBE INSERTION WITH ANESTHESIA;  Surgeon: Bam Melo MD;  Location: [facility] ENDOSCOPY;  Service: Gastroenterology;  Laterality: N/A;  PEG TUBE PLACEMENT    ENDOSCOPY W/ PEG TUBE PLACEMENT N/A 10/26/2022    Procedure: ESOPHAGOGASTRODUODENOSCOPY WITH PERCUTANEOUS ENDOSCOPIC GASTROSTOMY TUBE INSERTION WITH ANESTHESIA;  Surgeon: Yanelis Mistry MD;  Location: [facility] ENDOSCOPY;  Service: Gastroenterology;  Laterality: N/A;  PERCUTANEOUS ENDOSCOPIC GASTROSTOMY TUBE PLACEMENT       Family History: Family history is unknown by patient. Otherwise pertinent FHx was reviewed and not pertinent to current issue.    Social History:  reports that he has never smoked. He has never used smokeless tobacco. He reports that he does not drink alcohol and does not use drugs.    Home Medications:  Divalproex Sodium, LORazepam, acetaminophen, aluminum-magnesium hydroxide-simethicone, apixaban, aspirin, busPIRone, cyanocobalamin, docusate sodium, famotidine, furosemide,  hydrOXYzine, ipratropium-albuterol, melatonin, metoprolol tartrate, and polyethylene glycol      Allergies:  No Known Allergies    Objective   Objective     Vitals:   Temp:  [97.5 °F (36.4 °C)-98.5 °F (36.9 °C)] 97.5 °F (36.4 °C)  Heart Rate:  [66-73] 70  Resp:  [18] 18  BP: (116-133)/(74-78) 133/78  Physical Exam    Constitutional: Patient with advanced dementia   Eyes: PERRLA, sclerae anicteric, no conjunctival injection   HENT: NCAT, mucous membranes moist   Neck: Supple, no thyromegaly, no lymphadenopathy, trachea midline   Respiratory: Clear to auscultation bilaterally, nonlabored respirations    Cardiovascular: RRR, systolic murmurs, no gallops, palpable pedal pulses bilaterally   Gastrointestinal: Positive bowel sounds, soft, nontender, nondistended   Musculoskeletal: No bilateral ankle edema, no clubbing or cyanosis to extremities   Psychiatric: Advanced dementia   Neurologic: Oriented x 0, strength symmetric in all extremities, Cranial Nerves grossly intact to confrontation, speech clear   Skin: No rashes     Result Review    Result Review:  I have personally reviewed the results from the time of this admission to 6/23/2024 05:20 EDT and agree with these findings:  [x]  Laboratory list / accordion  []  Microbiology  [x]  Radiology  [x]  EKG/Telemetry   []  Cardiology/Vascular   []  Pathology  []  Old records  []  Other:  Most notable findings include: Labs unremarkable      Assessment & Plan   Assessment / Plan     Brief Patient Summary:  Jordin Menon is a 84 y.o. male with past medical history of advanced Alzheimer's, hypertension, hyperlipidemia, CHF, PAD, chronic debility, and GERD presented to the ED from nursing home with a malfunctioning G-tube    Active Hospital Problems:  Active Hospital Problems    Diagnosis     **Attention to G-tube     Feeding by G-tube     Essential hypertension      Plan:     Broken G-tube  -Admit to medical floor  -General surgery consulted  -Supportive care    HTN  -Currently  well controlled  -PRN BP meds  -Resume home meds when available  -Titrate if needed    Advanced dementia  CHF  PAD  Debility   GERD    GI ppx  DVT ppx      VTE Prophylaxis:  Pharmacologic VTE prophylaxis orders are present.        CODE STATUS:    Level Of Support Discussed With: Health Care Surrogate  Code Status (Patient has no pulse and is not breathing): CPR (Attempt to Resuscitate)  Medical Interventions (Patient has pulse or is breathing): Full Support    Admission Status:  I believe this patient meets duration status.      Electronically signed by David Spivey MD, 06/23/24, 5:20 AM EDT.

## 2024-06-24 ENCOUNTER — APPOINTMENT (OUTPATIENT)
Dept: GENERAL RADIOLOGY | Facility: HOSPITAL | Age: 84
End: 2024-06-24
Payer: MEDICARE

## 2024-06-24 LAB — GLUCOSE BLDC GLUCOMTR-MCNC: 75 MG/DL (ref 70–99)

## 2024-06-24 PROCEDURE — 0DP6XUZ REMOVAL OF FEEDING DEVICE FROM STOMACH, EXTERNAL APPROACH: ICD-10-PCS | Performed by: INTERNAL MEDICINE

## 2024-06-24 PROCEDURE — 0DH63UZ INSERTION OF FEEDING DEVICE INTO STOMACH, PERCUTANEOUS APPROACH: ICD-10-PCS | Performed by: INTERNAL MEDICINE

## 2024-06-24 PROCEDURE — 97161 PT EVAL LOW COMPLEX 20 MIN: CPT

## 2024-06-24 PROCEDURE — 99233 SBSQ HOSP IP/OBS HIGH 50: CPT | Performed by: INTERNAL MEDICINE

## 2024-06-24 PROCEDURE — 49465 FLUORO EXAM OF G/COLON TUBE: CPT

## 2024-06-24 PROCEDURE — 82948 REAGENT STRIP/BLOOD GLUCOSE: CPT

## 2024-06-24 PROCEDURE — 0 DIATRIZOATE MEGLUMINE & SODIUM PER 1 ML: Performed by: INTERNAL MEDICINE

## 2024-06-24 PROCEDURE — 3E0G76Z INTRODUCTION OF NUTRITIONAL SUBSTANCE INTO UPPER GI, VIA NATURAL OR ARTIFICIAL OPENING: ICD-10-PCS | Performed by: INTERNAL MEDICINE

## 2024-06-24 RX ORDER — ACETAMINOPHEN 160 MG/5ML
500 SOLUTION ORAL EVERY 6 HOURS PRN
Status: DISCONTINUED | OUTPATIENT
Start: 2024-06-24 | End: 2024-06-25 | Stop reason: HOSPADM

## 2024-06-24 RX ORDER — POLYETHYLENE GLYCOL 3350 17 G/17G
17 POWDER, FOR SOLUTION ORAL DAILY PRN
Status: DISCONTINUED | OUTPATIENT
Start: 2024-06-24 | End: 2024-06-25 | Stop reason: HOSPADM

## 2024-06-24 RX ORDER — AMOXICILLIN 250 MG
2 CAPSULE ORAL 2 TIMES DAILY PRN
Status: DISCONTINUED | OUTPATIENT
Start: 2024-06-24 | End: 2024-06-25 | Stop reason: HOSPADM

## 2024-06-24 RX ORDER — DIVALPROEX SODIUM 125 MG/1
250 CAPSULE, COATED PELLETS ORAL 2 TIMES DAILY
Status: DISCONTINUED | OUTPATIENT
Start: 2024-06-24 | End: 2024-06-25 | Stop reason: HOSPADM

## 2024-06-24 RX ORDER — BISACODYL 5 MG/1
5 TABLET, DELAYED RELEASE ORAL DAILY PRN
Status: DISCONTINUED | OUTPATIENT
Start: 2024-06-24 | End: 2024-06-24

## 2024-06-24 RX ORDER — ALUMINA, MAGNESIA, AND SIMETHICONE 2400; 2400; 240 MG/30ML; MG/30ML; MG/30ML
15 SUSPENSION ORAL EVERY 4 HOURS PRN
Status: DISCONTINUED | OUTPATIENT
Start: 2024-06-24 | End: 2024-06-25 | Stop reason: HOSPADM

## 2024-06-24 RX ORDER — BISACODYL 10 MG
10 SUPPOSITORY, RECTAL RECTAL DAILY PRN
Status: DISCONTINUED | OUTPATIENT
Start: 2024-06-24 | End: 2024-06-25 | Stop reason: HOSPADM

## 2024-06-24 RX ORDER — ENOXAPARIN SODIUM 100 MG/ML
40 INJECTION SUBCUTANEOUS EVERY 24 HOURS
Status: DISCONTINUED | OUTPATIENT
Start: 2024-06-24 | End: 2024-06-25

## 2024-06-24 RX ORDER — GINSENG 100 MG
1 CAPSULE ORAL
Status: DISCONTINUED | OUTPATIENT
Start: 2024-06-24 | End: 2024-06-25 | Stop reason: HOSPADM

## 2024-06-24 RX ADMIN — BACITRACIN 0.9 G: 500 OINTMENT TOPICAL at 22:59

## 2024-06-24 RX ADMIN — DIATRIZOATE MEGLUMINE AND DIATRIZOATE SODIUM 28 ML: 660; 100 LIQUID ORAL; RECTAL at 10:48

## 2024-06-24 RX ADMIN — DIVALPROEX SODIUM 250 MG: 125 CAPSULE, COATED PELLETS ORAL at 13:51

## 2024-06-24 RX ADMIN — METOPROLOL TARTRATE 12.5 MG: 25 TABLET, FILM COATED ORAL at 21:59

## 2024-06-24 RX ADMIN — METOPROLOL TARTRATE 12.5 MG: 25 TABLET, FILM COATED ORAL at 13:52

## 2024-06-24 RX ADMIN — DIVALPROEX SODIUM 250 MG: 125 CAPSULE, COATED PELLETS ORAL at 21:59

## 2024-06-24 RX ADMIN — FAMOTIDINE 20 MG: 10 INJECTION INTRAVENOUS at 13:51

## 2024-06-24 RX ADMIN — APIXABAN 5 MG: 5 TABLET, FILM COATED ORAL at 13:52

## 2024-06-24 NOTE — PLAN OF CARE
Goal Outcome Evaluation:  Plan of Care Reviewed With: patient        Progress: no change  Outcome Evaluation: Patient alert to self only. VSS. Medications administered per MAR. Patient G-tube was replaced this morning by Bam Melo MD. Patient denies any pain at this time. No other significant changes during this shift.

## 2024-06-24 NOTE — CONSULTS
Nutrition Services    Patient Name: Jordin Menon  YOB: 1940  MRN: [id number]  Admission date: 6/23/2024      CLINICAL NUTRITION ASSESSMENT      Reason for Assessment  Physician Consult, Tube Feeding Assessment, and MST Score 2+   H&P:  Past Medical History:   Diagnosis Date    Acute renal failure with pathological lesion in kidney     Alzheimer's dementia with behavioral disturbance     Anemia, vitamin B12 deficiency     Anxiety     Anxiety disorder due to known physiological condition     Arthritis     Benign neoplasm of prostate     Cannot walk     CHF (congestive heart failure)     Constipation     Coronary atherosclerosis of native coronary artery     CVA (cerebral vascular accident)     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 (CVA)     Edema     Essential hypertension, benign     Gastroesophageal reflux disease without esophagitis     GERD (gastroesophageal reflux disease)     High blood pressure     Hirsutism     HTN (hypertension)     Hypercalcemia     Hyperkalemia     Hyperlipemia     Hyperlipidemia     Hypokalemia     Ingrowing toenail 09/21/2018    Left foot pain 09/21/2018    Nutritional deficiency     Osteoarthritis of right knee 08/22/2016    Pneumonia     Presence of right artificial knee joint     Primary localized osteoarthrosis     PVD (peripheral vascular disease)     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 Easy to wake. RD had difficult time understanding patient. Unsure if patient was understanding RD. Spoke to primary nurse who said patient has been in and out of it. RD consulted to provide TF assessment. Patient does have a mechanical ground diet with thin liquids ordered. RD is familiar with patient from prior admissions, will order previously tolerated formula.      Anthropometrics        Current Height, Weight Height: 162.6 cm (64.02\")  Weight: 74 kg (163 lb 2.3 oz)   Current BMI Body mass index is 27.99 kg/m².   BMI Classification Overweight   % %   Adjusted Body Weight (ABW) N/A   Weight Hx  Wt Readings from Last 30 Encounters:   06/23/24 0425 74 kg (163 lb 2.3 oz)   06/23/24 0058 79.6 kg (175 lb 7.8 oz)   03/17/24 2226 79.1 kg (174 lb 6.1 oz)   03/03/24 2100 76.1 kg (167 lb 12.3 oz)   03/03/24 1311 76 kg (167 lb 8.8 oz)   11/10/23 0053 90.1 kg (198 lb 10.2 oz)   09/04/23 1503 70.9 kg (156 lb 4.9 oz)   10/27/22 0933 58.2 kg (128 lb 4.9 oz)   10/25/22 1100 60.1 kg (132 lb 7.9 oz)   10/22/22 2021 55.1 kg (121 lb 7.6 oz)   10/13/22 0815 66.1 kg (145 lb 11.6 oz)   08/25/22 1051 60.6 kg (133 lb 9.6 oz)   08/23/22 1805 63.3 kg (139 lb 8.8 oz)   08/23/22 1301 63.3 kg (139 lb 8.8 oz)   08/06/22 1017 64.2 kg (141 lb 8.6 oz)   07/02/22 1353 72.9 kg (160 lb 11.5 oz)   07/02/22 0307 72.9 kg (160 lb 11.5 oz)   06/23/22 0923 74.2 kg (163 lb 9.3 oz)   06/02/22 0415 78.4 kg (172 lb 13.5 oz)   06/01/22 0600 74.2 kg (163 lb 9.3 oz)   05/29/22 2100 67.4 kg (148 lb 9.4 oz)   05/29/22 1148 76.2 kg (167 lb 15.9 oz)   05/21/22 0500 76.1 kg (167 lb 12.3 oz)   05/19/22 0600 55.3 kg (121 lb 14.6 oz)   05/18/22 0500 56.5 kg (124 lb 9 oz)   05/17/22 1834 54.1 kg (119 lb 4.3 oz)   05/05/22 1642 60.2 kg (132 lb 11.5 oz)   05/01/22 1534 62.7 kg (138 lb 3.7 oz)   04/14/22 1751 66.5 kg (146 lb 9.7 oz)   04/14/22 1307 66.5 kg (146 lb 9.7 oz)   03/07/22 0900 69.9 kg (154 lb 1.6 oz)   03/06/22 2348 69.2 kg (152 lb 8.9 oz) "   03/04/22 0353 66 kg (145 lb 8.1 oz)   02/28/22 0608 69 kg (152 lb 1.9 oz)   02/27/22 0400 68.6 kg (151 lb 3.8 oz)   02/25/22 1540 73.6 kg (162 lb 4.1 oz)   02/04/22 1854 73.6 kg (162 lb 4.1 oz)   07/28/21 1307 84.4 kg (186 lb)   03/05/19 1516 78.5 kg (173 lb)   09/27/18 0000 88.5 kg (195 lb)   09/21/18 0000 90.7 kg (200 lb)   03/22/18 0000 82.6 kg (182 lb)          Wt Change Observation Stable x 3 months     Estimated/Assessed Needs  Estimated Needs based on: Current Body Weight       Energy Requirements 25-30 kcal/kg   EST Needs (kcal/day) 0998-2926 kcal       Protein Requirements 0.8-1.0 g/kg   EST Daily Needs (g/day) 59-74 g       Fluid Requirements 1 ml/kcal    Estimated Needs (mL/day) 4793-3778 ml     Labs/Medications         Pertinent Labs Reviewed.   Results from last 7 days   Lab Units 06/23/24  0252   SODIUM mmol/L 136   POTASSIUM mmol/L 4.4   CHLORIDE mmol/L 97*   CO2 mmol/L 27.7   BUN mg/dL 25*   CREATININE mg/dL 0.97   CALCIUM mg/dL 9.5   GLUCOSE mg/dL 91     Results from last 7 days   Lab Units 06/23/24  0252   HEMOGLOBIN g/dL 12.1*   HEMATOCRIT % 34.8*     COVID19   Date Value Ref Range Status   03/03/2024 Not Detected Not Detected - Ref. Range Final     Lab Results   Component Value Date    HGBA1C 4.90 05/30/2022         Pertinent Medications Reviewed.     Malnutrition Severity Assessment              Nutrition Diagnosis         Nutrition Dx Problem 1 Inadequate oral Intake related to Inability to consume sufficient energy as evidenced by inadequate energy intake. and G-tube dependent.     Nutrition Intervention           Current Nutrition Orders & Evaluation of Intake       Current PO Diet Diet: Regular/House; Texture: Mechanical Ground (NDD 2); Fluid Consistency: Thin (IDDSI 0)   Supplement Orders Placed This Encounter      Tube Feeding: Formula: Isosource 1.5; Feeding Type: Continuous; Start at: 25 mL/hr; Then Advance By: 25 mL/hr; Every: 8 hours; To Goal Rate of: 60 mL/hr; Water Flush: Other;  Other: 65; Every: 3 hours; Water Bolus: None           Nutrition Intervention/Prescription        Isosource 1.5 @ 60 ml/hr x 22 hrs via G-tube  FWF 65 ml q3h (520 ml)  Provides: 1980 kcal, 90 g pro, 1003 ml (1523 ml total)    Recommend phos and mag labs in morning. Replete as needed.        Medical Nutrition Therapy/Nutrition Education          Learner     Readiness Patient  N/A     Method     Response Explanation  No evidence of learning     Monitor/Evaluation        Monitor Per protocol, EN delivery/tolerance, POC/GOC     Nutrition Discharge Plan         Resume home TF regimen upon discharge.     Electronically signed by:  Arin Hinds RD  06/24/24 13:21 EDT

## 2024-06-24 NOTE — SIGNIFICANT NOTE
Wound Eval / Progress Noted    KASSI Davis     Patient Name: Jordin Menon  : 1940  MRN: [id number]  Today's Date: 2024                 Admit Date: 2024    Visit Dx:    ICD-10-CM ICD-9-CM   1. Feeding by G-tube  Z93.1 V44.1   2. Difficulty walking  R26.2 719.7         Attention to G-tube    Essential hypertension    Dislodged gastrostomy tube    Feeding by G-tube    PEG tube malfunction        Past Medical History:   Diagnosis Date    Acute renal failure with pathological lesion in kidney     Alzheimer's dementia with behavioral disturbance     Anemia, vitamin B12 deficiency     Anxiety     Anxiety disorder due to known physiological condition     Arthritis     Benign neoplasm of prostate     Cannot walk     CHF (congestive heart failure)     Constipation     Coronary atherosclerosis of native coronary artery     CVA (cerebral vascular accident)     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 (CVA)     Edema     Essential hypertension, benign     Gastroesophageal reflux disease without esophagitis     GERD (gastroesophageal reflux disease)     High blood pressure     Hirsutism     HTN (hypertension)     Hypercalcemia     Hyperkalemia     Hyperlipemia     Hyperlipidemia     Hypokalemia     Ingrowing toenail 2018    Left foot pain 2018    Nutritional deficiency     Osteoarthritis of right knee 2016    Pneumonia     Presence of right artificial knee joint     Primary localized osteoarthrosis     PVD (peripheral vascular disease)     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     Right foot pain 2018    Senile dementia, uncomplicated     Severe sinus bradycardia     Shock, septic     Sleep apnea     Thrombosis of left femoral vein     Tinea unguium 2018    Vitamin B12 deficiency anemia         Past Surgical History:   Procedure Laterality Date    ENDOSCOPY W/ PEG TUBE PLACEMENT N/A 2022    Procedure: ESOPHAGOGASTRODUODENOSCOPY WITH  PERCUTANEOUS ENDOSCOPIC GASTROSTOMY TUBE INSERTION WITH ANESTHESIA;  Surgeon: Yanelis Mistry MD;  Location: [facility] ENDOSCOPY;  Service: Gastroenterology;  Laterality: N/A;  PEG TUBE PLACEMENT    ENDOSCOPY W/ PEG TUBE PLACEMENT N/A 7/3/2022    Procedure: ESOPHAGOGASTRODUODENOSCOPY WITH PERCUTANEOUS ENDOSCOPIC GASTROSTOMY TUBE INSERTION WITH ANESTHESIA;  Surgeon: Bam Melo MD;  Location: [facility] ENDOSCOPY;  Service: Gastroenterology;  Laterality: N/A;  PEG TUBE PLACEMENT    ENDOSCOPY W/ PEG TUBE PLACEMENT N/A 10/26/2022    Procedure: ESOPHAGOGASTRODUODENOSCOPY WITH PERCUTANEOUS ENDOSCOPIC GASTROSTOMY TUBE INSERTION WITH ANESTHESIA;  Surgeon: Yanelis Mistry MD;  Location: [facility] ENDOSCOPY;  Service: Gastroenterology;  Laterality: N/A;  PERCUTANEOUS ENDOSCOPIC GASTROSTOMY TUBE PLACEMENT         Physical Assessment:     06/24/24 1522   Wound 06/23/24 0600 penis   Placement Date/Time: 06/23/24 0600   Present on Original Admission: Yes  Location: penis   Wound Image    Dressing Appearance open to air   Closure None   Base dry;red;yellow   Periwound dry;intact   Periwound Temperature warm   Periwound Skin Turgor soft   Edges open   Drainage Amount none   Care, Wound cleansed with;sterile normal saline   Dressing Care open to air   Gastrostomy/Enterostomy Percutaneous endoscopic gastrostomy (PEG) 20 Fr.    Placement Date/Time: 10/26/22 1625   Inserted by: DR. MISTRY  Hand Hygiene Completed: Yes  Type: Percutaneous endoscopic gastrostomy (PEG)  Tube Size (Fr.): 20 Fr.  Location: (c)    Surrounding Skin Intact   Drain Status Other (Comment)  (continuous tube feeds running)   Drainage Appearance Bile;Green   Site Description Healing;Leaking at site   Gastrostomy/Enterostomy Site Interventions Site assessed;Site care performed   Dressing Status New drainage;Old drainage;Removed   Dressing Type Dry dressing   Tube Feeding Frequency Continuous     Wound Check / Follow-up: Patient seen today for  wound consult.  Patient is awake and alert at time of visit and agreeable to assessment.  Patient status post replacement of G-tube today.  Continues tube feeds are running at present time.  Large amount of drainage noted to be leaking from around G-tube site.  Drainage is green and appears as bile.  Does not appear to be leaking tube feed solution.  Cleansed site with normal saline and gauze.  Then applied new fenestrated gauze around G-tube site.  Primary RN notified of findings and is to notify attending physician.    Patient with injury to distal penis.  Injury likely caused from Irby catheter.  Dry, red and yellow crusted tissue is present.  Cleansed with normal saline and gauze.  Recommending topical treatment with application of bacitracin ointment.    Bilateral gluteal aspects intact with pink scarring noted from prior injuries.  Recommending skin care and skin protection with application of barrier cream.    Bilateral heels intact without discoloration.    Recommending to implement every 2 hour turns, offload heels, keep patient free from moisture.      Impression: G-tube to abdomen with leaking present.  Injury to penis.      Short term goals: Regain skin integrity, skin protection, moisture prevention, pressure reduction, G-tube site care, topical treatment, skin care.    Lor Gan RN    6/24/2024    17:49 EDT

## 2024-06-24 NOTE — PLAN OF CARE
Goal Outcome Evaluation:  Plan of Care Reviewed With: patient        Progress: no change  Outcome Evaluation: Patient presents with no significant changes in functional mobility and is safe to return to extended-care facility once medically appropriate.      Anticipated Discharge Disposition (PT): extended care facility

## 2024-06-24 NOTE — SIGNIFICANT NOTE
06/24/24 1414   Plan   Plan Patient to discharge back to [facility] today.   Final Discharge Disposition Code 04 - intermediate care facility

## 2024-06-24 NOTE — PROGRESS NOTES
[facility]   Hospitalist Progress Note  Date: 2024  Patient Name: Jordin Menon  : 1940  MRN: [id number]  Date of admission: 2024      Subjective   Subjective     Chief Complaint: Torn G-tube    Summary:   Jordin Menon is a 84 y.o. male with past medical history of advanced Alzheimer's, hypertension, hyperlipidemia, CHF, PAD, chronic debility, and GERD presented to the ED from nursing home with a malfunctioning G-tube.  Due to patient advanced Alzheimer's history was taken via chart reviewing physician handoff.  Patient had G-tube placed back in  and nursing staff noted that it had been broken so they brought him to the ED for replacement.  In the ED patient's vitals were all within normal limits on arrival.  Labs are unremarkable.  His chronic conditions.  ED physician attempted to remove the G-tube manually but was unable to do so so surgery was consulted.  Patient was admitted for further management .  Patient Eliquis was held.  Patient had G-tube replaced on .  G-tube placement confirmed by radiology via contrast study showing no leak..  Interval Followup:   Patient is awake alert but pleasantly confused.  Vital signs are stable.  Seen after G-tube placement but there is some leakage at the insertion site  Review of Systems   All systems were reviewed and negative except for: Limited as patient has dementia.    Objective   Objective     Vitals:   Temp:  [98.2 °F (36.8 °C)-99.1 °F (37.3 °C)] 98.4 °F (36.9 °C)  Heart Rate:  [67-83] 67  Resp:  [16-20] 18  BP: (120-132)/(55-96) 125/55  Physical Exam         Constitutional: Patient with advanced dementia              Eyes: PERRLA, sclerae anicteric, no conjunctival injection              HENT: NCAT, mucous membranes moist              Neck: Supple, no thyromegaly, no lymphadenopathy, trachea midline              Respiratory: Clear to auscultation bilaterally, nonlabored respirations               Cardiovascular: RRR, systolic murmurs, no  gallops, palpable pedal pulses bilaterally              Gastrointestinal: G-tube in position with leakage of tube feed, positive bowel sounds, soft, nontender, nondistended              Musculoskeletal: No bilateral ankle edema, no clubbing or cyanosis to extremities              Psychiatric: Advanced dementia              Neurologic: Oriented x 0, strength symmetric in all extremities, Cranial Nerves grossly intact to confrontation, speech clear              Skin: No rashes             Result Review    Result Review:  I have personally reviewed the results for the past 24 hours and agree with these findings:  [x]  Laboratory  []  Microbiology  [x]  Radiology  []  EKG/Telemetry   []  Cardiology/Vascular   []  Pathology  [x]  Old records  [x]  Other: Medications    Assessment & Plan   Assessment / Plan     Assessment:  Torn G-tube.  S/p new G-tube placement June 24  Leakage at insertion site of new G-tube  Moderately advanced dementia  Hypertension  CHF  Debility  Peripheral vascular disease  Long-term nursing home resident.  Obstructive sleep apnea.  DVT.  On Eliquis  Chronic Irby catheter.  CVA.    Plan:  Trickle tube feed as recommended by GI due to leakage around G-tube insertion site.  Keep holding Eliquis.  Resume appropriate home medications.  DC IV fluid.  As needed IV Lopressor.  PT OT.    Discussed plan with RN and .  Discharge to [facility] canceled because of leakage around G-tube insertion site will watch overnight in the hospital    VTE Prophylaxis:  Pharmacologic VTE prophylaxis orders are present.        CODE STATUS:   Level Of Support Discussed With: Health Care Surrogate  Code Status (Patient has no pulse and is not breathing): CPR (Attempt to Resuscitate)  Medical Interventions (Patient has pulse or is breathing): Full Support      Part of this note may be an electronic transcription/translation of spoken language to printed text using the Dragon Dictation System.      Electronically signed by Flako Orantes MD, 06/24/24, 4:57 PM EDT.

## 2024-06-24 NOTE — THERAPY EVALUATION
Acute Care - Physical Therapy Initial Evaluation  KASSI Davis     Patient Name: Jordin Menon  : 1940  MRN: [id number]  Today's Date: 2024      Visit Dx:     ICD-10-CM ICD-9-CM   1. Feeding by G-tube  Z93.1 V44.1   2. Difficulty walking  R26.2 719.7     Patient Active Problem List   Diagnosis    Primary osteoarthritis of right knee    Status post total right knee replacement    Essential hypertension    Shortness of breath    BRYANT (acute kidney injury)    Hypokalemia    Hypernatremia    Symptomatic bradycardia    Hypothermia, initial encounter    Acute CVA (cerebrovascular accident)    Severe sepsis    Severe malnutrition    Hyperkalemia    Anemia    Dislodged gastrostomy tube    Dysphagia, oropharyngeal    Sepsis    Fungemia    Acute renal failure syndrome    Gross hematuria    Renal cyst    Nephrolithiasis    Bladder diverticulum    Urinary retention    Attention to G-tube    Feeding by G-tube    PEG tube malfunction     Past Medical History:   Diagnosis Date    Acute renal failure with pathological lesion in kidney     Alzheimer's dementia with behavioral disturbance     Anemia, vitamin B12 deficiency     Anxiety     Anxiety disorder due to known physiological condition     Arthritis     Benign neoplasm of prostate     Cannot walk     CHF (congestive heart failure)     Constipation     Coronary atherosclerosis of native coronary artery     CVA (cerebral vascular accident)     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 (CVA)     Edema     Essential hypertension, benign     Gastroesophageal reflux disease without esophagitis     GERD (gastroesophageal reflux disease)     High blood pressure     Hirsutism     HTN (hypertension)     Hypercalcemia     Hyperkalemia     Hyperlipemia     Hyperlipidemia     Hypokalemia     Ingrowing toenail 2018    Left foot pain 2018    Nutritional deficiency     Osteoarthritis of right knee 2016    Pneumonia     Presence of right artificial knee joint      Primary localized osteoarthrosis     PVD (peripheral vascular disease)     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     Right foot pain 09/21/2018    Senile dementia, uncomplicated     Severe sinus bradycardia     Shock, septic     Sleep apnea     Thrombosis of left femoral vein     Tinea unguium 09/21/2018    Vitamin B12 deficiency anemia      Past Surgical History:   Procedure Laterality Date    ENDOSCOPY W/ PEG TUBE PLACEMENT N/A 6/8/2022    Procedure: ESOPHAGOGASTRODUODENOSCOPY WITH PERCUTANEOUS ENDOSCOPIC GASTROSTOMY TUBE INSERTION WITH ANESTHESIA;  Surgeon: Yanelis Mistry MD;  Location: [facility] ENDOSCOPY;  Service: Gastroenterology;  Laterality: N/A;  PEG TUBE PLACEMENT    ENDOSCOPY W/ PEG TUBE PLACEMENT N/A 7/3/2022    Procedure: ESOPHAGOGASTRODUODENOSCOPY WITH PERCUTANEOUS ENDOSCOPIC GASTROSTOMY TUBE INSERTION WITH ANESTHESIA;  Surgeon: Bam Melo MD;  Location: [facility] ENDOSCOPY;  Service: Gastroenterology;  Laterality: N/A;  PEG TUBE PLACEMENT    ENDOSCOPY W/ PEG TUBE PLACEMENT N/A 10/26/2022    Procedure: ESOPHAGOGASTRODUODENOSCOPY WITH PERCUTANEOUS ENDOSCOPIC GASTROSTOMY TUBE INSERTION WITH ANESTHESIA;  Surgeon: Yanelis Mistry MD;  Location: [facility] ENDOSCOPY;  Service: Gastroenterology;  Laterality: N/A;  PERCUTANEOUS ENDOSCOPIC GASTROSTOMY TUBE PLACEMENT     PT Assessment (Last 12 Hours)       PT Evaluation and Treatment       Row Name 06/24/24 1500          Physical Therapy Time and Intention    Subjective Information no complaints  -CS     Document Type evaluation  -CS     Mode of Treatment individual therapy;physical therapy  -CS     Patient Effort poor  -CS     Symptoms Noted During/After Treatment none  -CS       Row Name 06/24/24 1500          General Information    Patient Profile Reviewed yes  -CS     Patient Observations alert  -CS     Prior Level of Function dependent:;transfer;bed mobility;ADL's  -CS     Equipment Currently Used at Home  wheelchair;feeding device;hospital bed;lift device  Patient limited in ability to describe prior level of function.  Per previous notes in EMR he was dependent on assist with all care and transfers with the use of a Katherine lift at extended-care facility.  -CS     Existing Precautions/Restrictions fall  -CS     Limitations/Impairments safety/cognitive  -CS     Barriers to Rehab previous functional deficit  -CS       Row Name 06/24/24 1500          Living Environment    Current Living Arrangements extended care facility  -CS     People in Home facility resident  -CS     Primary Care Provided by other (see comments)  Staff at facility  -CS       Row Name 06/24/24 1500          Pain    Additional Documentation Pain Scale: FACES Pre/Post-Treatment (Group)  -CS       Row Name 06/24/24 1500          Pain Scale: FACES Pre/Post-Treatment    Pain: FACES Scale, Pretreatment 0-->no hurt  -CS     Posttreatment Pain Rating 0-->no hurt  -CS       Row Name 06/24/24 1500          Cognition    Affect/Mental Status (Cognition) confused  -CS     Orientation Status (Cognition) oriented to;person  -CS       Row Name 06/24/24 1500          Range of Motion Comprehensive    General Range of Motion lower extremity range of motion deficits identified  -CS     Comment, General Range of Motion Patient has bilateral knee flexion contractures, right about 40 degrees and left about 50 to 60 degrees  -CS       Row Name 06/24/24 1500          Strength Comprehensive (MMT)    General Manual Muscle Testing (MMT) Assessment lower extremity strength deficits identified  -CS     Comment, General Manual Muscle Testing (MMT) Assessment Unable to be formally assessed as patient unable to follow directions.  -CS       Row Name 06/24/24 1500          Bed Mobility    Bed Mobility bed mobility (all) activities  -CS     All Activities, [place] (Bed Mobility) dependent (less than 25% patient effort)  -CS     Bed Mobility, Safety Issues decreased use of legs  for bridging/pushing;decreased use of arms for pushing/pulling;cognitive deficits limit understanding  -CS     Assistive Device (Bed Mobility) draw sheet  -CS     Comment, (Bed Mobility) No further transfers completed due to patient's inability to assist with mobility and patient nonambulatory at baseline.  -CS       Row Name 06/24/24 1500          Gait/Stairs (Locomotion)    Patient was able to Ambulate no, other medical factors prevent ambulation  -CS     Reason Patient was unable to Ambulate Non-Ambulatory at Baseline  -CS       Row Name 06/24/24 1500          Safety Issues, Functional Mobility    Safety Issues Affecting Function (Mobility) ability to follow commands;insight into deficits/self-awareness;problem-solving;sequencing abilities  -CS     Impairments Affecting Function (Mobility) balance;cognition;endurance/activity tolerance;strength;pain;range of motion (ROM)  All present at baseline  -CS       Row Name 06/24/24 1500          Balance    Comment, Balance Unable to be tested but likely impaired  -       Row Name             Wound 06/23/24 0600 Right lower leg    Wound - Properties Group Placement Date: 06/23/24  -DG Placement Time: 0600  -DG Present on Original Admission: Y  -DG Side: Right  -DG Orientation: lower  -DG Location: leg  -DG    Retired Wound - Properties Group Placement Date: 06/23/24  -DG Placement Time: 0600  -DG Present on Original Admission: Y  -DG Side: Right  -DG Orientation: lower  -DG Location: leg  -DG    Retired Wound - Properties Group Date first assessed: 06/23/24  -DG Time first assessed: 0600  -DG Present on Original Admission: Y  -DG Side: Right  -DG Location: leg  -DG      Row Name             Wound 06/23/24 0600 Right anterior ankle    Wound - Properties Group Placement Date: 06/23/24  -DG Placement Time: 0600  -DG Present on Original Admission: Y  -DG Side: Right  -DG Orientation: anterior  -DG Location: ankle  -DG    Retired Wound - Properties Group Placement Date:  06/23/24  -DG Placement Time: 0600  -DG Present on Original Admission: Y  -DG Side: Right  -DG Orientation: anterior  -DG Location: ankle  -DG    Retired Wound - Properties Group Date first assessed: 06/23/24  -DG Time first assessed: 0600  -DG Present on Original Admission: Y  -DG Side: Right  -DG Location: ankle  -DG      Row Name             Wound 06/23/24 0600 penis    Wound - Properties Group Placement Date: 06/23/24  -DG Placement Time: 0600  -DG Present on Original Admission: Y  -DG Location: penis  -DG    Retired Wound - Properties Group Placement Date: 06/23/24  -DG Placement Time: 0600  -DG Present on Original Admission: Y  -DG Location: penis  -DG    Retired Wound - Properties Group Date first assessed: 06/23/24  -DG Time first assessed: 0600  -DG Present on Original Admission: Y  -DG Location: penis  -DG      Row Name             Wound 06/23/24 0600 scrotum    Wound - Properties Group Placement Date: 06/23/24  -DG Placement Time: 0600  -DG Present on Original Admission: Y  -DG Location: scrotum  -DG    Retired Wound - Properties Group Placement Date: 06/23/24  -DG Placement Time: 0600  -DG Present on Original Admission: Y  -DG Location: scrotum  -DG    Retired Wound - Properties Group Date first assessed: 06/23/24  -DG Time first assessed: 0600  -DG Present on Original Admission: Y  -DG Location: scrotum  -DG      Row Name 06/24/24 1500          Plan of Care Review    Plan of Care Reviewed With patient  -CS     Progress no change  -CS     Outcome Evaluation Patient presents with no significant changes in functional mobility and is safe to return to extended-care facility once medically appropriate.  -CS       Row Name 06/24/24 1500          Positioning and Restraints    Pre-Treatment Position in bed  -CS     Post Treatment Position bed  -CS     In Bed supine;call light within reach;encouraged to call for assist;exit alarm on  -CS       Row Name 06/24/24 1500          Therapy Assessment/Plan (PT)     Criteria for Skilled Interventions Met (PT) does not meet criteria for skilled intervention  -CS     Therapy Frequency (PT) evaluation only  -CS       Row Name 06/24/24 1500          PT Evaluation Complexity    History, PT Evaluation Complexity 3 or more personal factors and/or comorbidities  -CS     Examination of Body Systems (PT Eval Complexity) total of 4 or more elements  -CS     Clinical Presentation (PT Evaluation Complexity) stable  -CS     Clinical Decision Making (PT Evaluation Complexity) low complexity  -CS     Overall Complexity (PT Evaluation Complexity) low complexity  -CS       Row Name 06/24/24 1500          Therapy Plan Review/Discharge Plan (PT)    Therapy Plan Review (PT) patient;evaluation/treatment results reviewed  -CS       Row Name 06/24/24 1500          Physical Therapy Goals    Problem Specific Goal Selection (PT) problem specific goal 1, PT  -CS       Row Name 06/24/24 1500          Problem Specific Goal 1 (PT)    Problem Specific Goal 1 (PT) Complete physical therapy evaluation  -CS     Time Frame (Problem Specific Goal 1, PT) by discharge  -CS     Progress/Outcome (Problem Specific Goal 1, PT) goal met  -CS               User Key  (r) = Recorded By, (t) = Taken By, (c) = Cosigned By      Initials Name Provider Type    CS Daphne Wise, PT Physical Therapist    Adriana Cerda RN Registered Nurse                      PT Recommendation and Plan  Anticipated Discharge Disposition (PT): extended care facility  Therapy Frequency (PT): evaluation only  Plan of Care Reviewed With: patient  Progress: no change  Outcome Evaluation: Patient presents with no significant changes in functional mobility and is safe to return to extended-care facility once medically appropriate.   Outcome Measures       Row Name 06/24/24 1500             How much help from another person do you currently need...    Turning from your back to your side while in flat bed without using bedrails? 1  -CS      Moving  from lying on back to sitting on the side of a flat bed without bedrails? 1  -CS      Moving to and from a bed to a chair (including a wheelchair)? 1  -CS      Standing up from a chair using your arms (e.g., wheelchair, bedside chair)? 1  -CS      Climbing 3-5 steps with a railing? 1  -CS      To walk in hospital room? 1  -CS      AM-PAC 6 Clicks Score (PT) 6  -CS      Highest Level of Mobility Goal 2 --> Bed activities/dependent transfer  -CS         Functional Assessment    Outcome Measure Options AM-PAC 6 Clicks Basic Mobility (PT)  -CS                User Key  (r) = Recorded By, (t) = Taken By, (c) = Cosigned By      Initials Name Provider Type    Daphne Mo PT Physical Therapist                     Time Calculation:    PT Charges       Row Name 06/24/24 1525             Time Calculation    PT Received On 06/24/24  -CS         Untimed Charges    PT Eval/Re-eval Minutes 25  -CS         Total Minutes    Untimed Charges Total Minutes 25  -CS       Total Minutes 25  -CS                User Key  (r) = Recorded By, (t) = Taken By, (c) = Cosigned By      Initials Name Provider Type    Daphne Mo PT Physical Therapist                  Therapy Charges for Today       Code Description Service Date Service Provider Modifiers Qty    29829239428 HC PT EVAL LOW COMPLEXITY 2 6/24/2024 Daphne Wise PT GP 1            PT G-Codes  Outcome Measure Options: AM-PAC 6 Clicks Basic Mobility (PT)  AM-PAC 6 Clicks Score (PT): 6    Daphne Wise PT  6/24/2024

## 2024-06-25 VITALS
DIASTOLIC BLOOD PRESSURE: 63 MMHG | OXYGEN SATURATION: 93 % | RESPIRATION RATE: 18 BRPM | HEIGHT: 64 IN | SYSTOLIC BLOOD PRESSURE: 136 MMHG | BODY MASS INDEX: 27.85 KG/M2 | TEMPERATURE: 98.8 F | WEIGHT: 163.14 LBS | HEART RATE: 73 BPM

## 2024-06-25 PROBLEM — Z43.1 ATTENTION TO G-TUBE: Status: RESOLVED | Noted: 2024-06-23 | Resolved: 2024-06-25

## 2024-06-25 PROBLEM — K94.23 PEG TUBE MALFUNCTION: Status: RESOLVED | Noted: 2024-06-23 | Resolved: 2024-06-25

## 2024-06-25 PROBLEM — T85.528A DISLODGED GASTROSTOMY TUBE: Status: RESOLVED | Noted: 2022-07-02 | Resolved: 2024-06-25

## 2024-06-25 LAB
GLUCOSE BLDC GLUCOMTR-MCNC: 110 MG/DL (ref 70–99)
GLUCOSE BLDC GLUCOMTR-MCNC: 92 MG/DL (ref 70–99)

## 2024-06-25 PROCEDURE — 99239 HOSP IP/OBS DSCHRG MGMT >30: CPT | Performed by: INTERNAL MEDICINE

## 2024-06-25 PROCEDURE — 82948 REAGENT STRIP/BLOOD GLUCOSE: CPT

## 2024-06-25 PROCEDURE — 82948 REAGENT STRIP/BLOOD GLUCOSE: CPT | Performed by: INTERNAL MEDICINE

## 2024-06-25 RX ORDER — FUROSEMIDE 20 MG/1
20 TABLET ORAL DAILY
Status: DISCONTINUED | OUTPATIENT
Start: 2024-06-25 | End: 2024-06-25 | Stop reason: HOSPADM

## 2024-06-25 RX ADMIN — METOPROLOL TARTRATE 12.5 MG: 25 TABLET, FILM COATED ORAL at 08:47

## 2024-06-25 RX ADMIN — DIVALPROEX SODIUM 250 MG: 125 CAPSULE, COATED PELLETS ORAL at 08:46

## 2024-06-25 RX ADMIN — APIXABAN 5 MG: 5 TABLET, FILM COATED ORAL at 09:28

## 2024-06-25 RX ADMIN — Medication 10 ML: at 08:47

## 2024-06-25 RX ADMIN — FUROSEMIDE 20 MG: 20 TABLET ORAL at 09:28

## 2024-06-25 RX ADMIN — BACITRACIN 0.9 G: 500 OINTMENT TOPICAL at 09:15

## 2024-06-25 RX ADMIN — FAMOTIDINE 20 MG: 10 INJECTION INTRAVENOUS at 08:47

## 2024-06-25 NOTE — PLAN OF CARE
Goal Outcome Evaluation:  Plan of Care Reviewed With: patient        Progress: improving  Outcome Evaluation: Patient alert to self only. VSS. G-tube patent, minimal drainage noted. Tube feeds at goal rate of 60mL/hr. Patient tolerating tube feeds well. Medications administered per MAR. Glucose monitored per orders. Discharge orders placed and discussed with patient. Patient to return to [facility], called report. Waiting for EMS to transfer patient.

## 2024-06-25 NOTE — PLAN OF CARE
Goal Outcome Evaluation:              Outcome Evaluation: Patient VSS this shift, patient remains alert to self only. Difficult to communicate with patient due to garbled speech. Medications administered per MAR. G-tube monitered throughtout shift, minimal drainage noted. Tube feeds titrated per order. No discernable complaints from patient at this time. Plan of care to continue.

## 2024-06-25 NOTE — DISCHARGE SUMMARY
[facility]        HOSPITALIST  DISCHARGE SUMMARY    Patient Name: Jordin Menon  : 1940  MRN: [id number]    Date of Admission: 2024  Date of Discharge:  2024  Primary Care Physician: Frank Llanes MD    Consults       Date and Time Order Name Status Description    2024  7:50 AM Inpatient Gastroenterology Consult      2024  2:15 AM Inpatient Hospitalist Consult      2024  1:39 AM General MD Inpatient Consult              Active and Resolved Hospital Problems:  Active Hospital Problems    Diagnosis POA    Feeding by G-tube [Z93.1] Not Applicable    Essential hypertension [I10] Yes      Resolved Hospital Problems    Diagnosis POA    **Attention to G-tube [Z43.1] Not Applicable    PEG tube malfunction [K94.23] Yes    Dislodged gastrostomy tube [T85.528A] Yes   Torn G-tube.  S/p new G-tube placement   Leakage at insertion site of new G-tube  Moderately advanced dementia  Hypertension  CHF  Debility  Peripheral vascular disease  Long-term nursing home resident.  Obstructive sleep apnea.  DVT.  On Eliquis  Chronic Irby catheter.  CVA.    Hospital Course     Hospital Course:  Jordin Menon is a 84 y.o. male with past medical history of advanced Alzheimer's, hypertension, hyperlipidemia, CHF, PAD, chronic debility, and GERD presented to the ED from nursing home with a malfunctioning G-tube.  Due to patient advanced Alzheimer's history was taken via chart reviewing physician handoff.  Patient had G-tube placed back in  and nursing staff noted that it had been broken so they brought him to the ED for replacement.  In the ED patient's vitals were all within normal limits on arrival.  Labs are unremarkable.  His chronic conditions.  ED physician attempted to remove the G-tube manually but was unable to do so so surgery was consulted.  Patient was admitted for further management .  Patient Eliquis was held.  Patient had G-tube replaced on .  G-tube  placement confirmed by radiology via contrast study showing no leak..  Interval Followup:   Patient is awake alert but pleasantly confused.  Vital signs are stable.  Patient discharged was canceled yesterday because G-tube site was leaking.  Patient started on trickle feed and now at goal at 60 mL/h without any leakage from G-tube site.  Tolerating tube feed well.  Patient has been cleared by GI to be released back to nursing home.  Patient has been started back on his home Eliquis.    DISCHARGE Follow Up Recommendations for labs and diagnostics: PCP.  Continue Irby catheter.  G-tube care as per GI      Day of Discharge     Vital Signs:  Temp:  [97.9 °F (36.6 °C)-99 °F (37.2 °C)] 98.6 °F (37 °C)  Heart Rate:  [63-78] 69  Resp:  [16-20] 17  BP: (113-140)/(61-94) 129/66    Physical Exam:   Constitutional: Patient with advanced dementia              Eyes: PERRLA, sclerae anicteric, no conjunctival injection              HENT: NCAT, mucous membranes moist              Neck: Supple, no thyromegaly, no lymphadenopathy, trachea midline              Respiratory: Clear to auscultation bilaterally, nonlabored respirations               Cardiovascular: RRR, systolic murmurs, no gallops, palpable pedal pulses bilaterally              Gastrointestinal: G-tube in position with no leakage of tube feed, positive bowel sounds, soft, nontender, nondistended              Musculoskeletal: No bilateral ankle edema, no clubbing or cyanosis to extremities              Psychiatric: Advanced dementia              Neurologic: Oriented x 0, strength symmetric in all extremities, Cranial Nerves grossly intact to confrontation, speech clear              Skin: No rashes     Discharge Details        Discharge Medications        Continue These Medications        Instructions Start Date   acetaminophen 650 MG 8 hr tablet  Commonly known as: TYLENOL   650 mg, Oral, Every 8 Hours PRN      aluminum-magnesium hydroxide-simethicone 200-200-20 MG/5ML  suspension  Commonly known as: MAALOX/MYLANTA   30 mL, Per G Tube, Every 6 Hours PRN      apixaban 5 MG tablet tablet  Commonly known as: ELIQUIS   5 mg, Per G Tube, 2 Times Daily      aspirin 81 MG chewable tablet   81 mg, Oral, Daily      busPIRone 10 MG tablet  Commonly known as: BUSPAR   10 mg, Per G Tube, 3 Times Daily      cyanocobalamin 1000 MCG/ML injection   1,000 mcg, Intramuscular, Every 30 Days, On the 15th of the month      Divalproex Sodium 125 MG capsule  Commonly known as: DEPAKOTE SPRINKLE   250 mg, Per G Tube, 2 Times Daily      docusate sodium 50 mg/5 mL liquid  Commonly known as: COLACE   250 mg, Per G Tube, Daily PRN      famotidine 40 MG tablet  Commonly known as: PEPCID   40 mg, Oral, Nightly      furosemide 20 MG tablet  Commonly known as: Lasix   20 mg, Oral, Daily      hydrOXYzine 25 MG tablet  Commonly known as: ATARAX   25 mg, Per G Tube, Every 6 Hours PRN      ipratropium-albuterol 0.5-2.5 mg/3 ml nebulizer  Commonly known as: DUO-NEB   3 mL, Nebulization, Every 4 Hours PRN      loperamide 2 MG capsule  Commonly known as: IMODIUM   2 mg, Oral, As Needed      LORazepam 1 MG tablet  Commonly known as: ATIVAN   Administer 1 tablet per G tube Every 8 (Eight) Hours.      melatonin 5 MG tablet tablet   5 mg, Per G Tube, Every Night at Bedtime      metoprolol tartrate 25 MG tablet  Commonly known as: LOPRESSOR   12.5 mg, Per G Tube, Every 12 Hours Scheduled      polyethylene glycol 17 GM/SCOOP powder  Commonly known as: MIRALAX   17 g, Per G Tube, Daily               No Known Allergies    Discharge Disposition:  Skilled Nursing Facility (DC - External).  Via EMS to [facility]    Diet:  Diet Instructions       Diet: Tube Feeding, Regular/House Diet; Continuous; As per dietitian.; Mechanical Ground (NDD 2); Thin (IDDSI 0)      Discharge Diet:  Tube Feeding  Regular/House Diet       Feeding Type: Continuous    Formula & Rate: As per dietitian.    Texture: Mechanical Ground  (NDD 2)    Fluid Consistency: Thin (IDDSI 0)            Discharge Activity:   Activity Instructions       Activity as Tolerated              CODE STATUS:  Code Status and Medical Interventions:   Ordered at: 06/23/24 0225     Level Of Support Discussed With:    Health Care Surrogate     Code Status (Patient has no pulse and is not breathing):    CPR (Attempt to Resuscitate)     Medical Interventions (Patient has pulse or is breathing):    Full Support         No future appointments.    Additional Instructions for the Follow-ups that You Need to Schedule       Discharge Follow-up with PCP   As directed       Currently Documented PCP:    Frank Llanes MD    PCP Phone Number:    [phone]     Follow Up Details: 1 week                Pertinent  and/or Most Recent Results     PROCEDURES:   * No procedures documented on diagnosis form *     LAB RESULTS:      Lab 06/23/24  1150 06/23/24  0252   WBC  --  7.48   HEMOGLOBIN  --  12.1*   HEMATOCRIT  --  34.8*   PLATELETS  --  281   MCV  --  81.7   PROTIME 16.2*  --    APTT 33.5  --          Lab 06/23/24  0252   SODIUM 136   POTASSIUM 4.4   CHLORIDE 97*   CO2 27.7   ANION GAP 11.3   BUN 25*   CREATININE 0.97   EGFR 77.5   GLUCOSE 91   CALCIUM 9.5             Lab 06/23/24  1150   PROTIME 16.2*   INR 1.27*                 Brief Urine Lab Results  (Last result in the past 365 days)        Color   Clarity   Blood   Leuk Est   Nitrite   Protein   CREAT   Urine HCG        03/03/24 1601 Yellow   Clear   Moderate (2+)   Large (3+)   Negative   Negative                 Microbiology Results (last 10 days)       ** No results found for the last 240 hours. **            IR J or G Tube Contrast Eval    Result Date: 6/24/2024  Impression: Impression: No evidence of contrast leak following placement of a percutaneous feeding tube, as above. Report dictated by: Cynthia Puente  I have personally reviewed this case and agree with the findings above: Electronically Signed: Chandra  MD Neymar  6/24/2024 1:10 PM EDT  Workstation ID: [id number]     Results for orders placed during the hospital encounter of 02/25/22    Duplex Venous Lower Extremity - Bilateral CV-READ    Interpretation Summary  · Acute left lower extremity deep vein thrombosis noted in the common femoral, proximal femoral, mid femoral, distal femoral, popliteal and peroneal.  · All other veins appeared normal bilaterally.      Results for orders placed during the hospital encounter of 02/25/22    Duplex Venous Lower Extremity - Bilateral CV-READ    Interpretation Summary  · Acute left lower extremity deep vein thrombosis noted in the common femoral, proximal femoral, mid femoral, distal femoral, popliteal and peroneal.  · All other veins appeared normal bilaterally.      Results for orders placed during the hospital encounter of 03/03/24    Adult Transthoracic Echo Complete W/ Cont if Necessary Per Protocol    Interpretation Summary  Technically limited study.  Normal left ventricular systolic function.  No significant valve abnormalities noted.      Imaging Results (All)       Procedure Component Value Units Date/Time    IR J or G Tube Contrast Eval [[id number]] Collected: 06/24/24 1110     Updated: 06/24/24 1312    Narrative:      IR J OR G TUBE CONTRAST EVAL    Date of Exam: 6/24/2024 10:30 AM EDT    Indication: gtube placement, confirm placement of replacement tube before usage.    Comparison: None available.    Fluoroscopic Time: 0.4 minutes    Contrast Media: 28 mL water-soluble Gastrografin    Technique/Findings:  A percutaneous feeding tube projects over the abdominal left upper quadrant. Upon injection of Gastrografin into the tube, there is opacification of a small bowel loop presumably representing the duodenum and proximal jejunum. There is no evidence of   contrast leak.      Impression:      Impression:  No evidence of contrast leak following placement of a percutaneous feeding tube, as above.        Report dictated by:  Cynthia Puente      I have personally reviewed this case and agree with the findings above:    Electronically Signed: Chandra Blackmon MD    6/24/2024 1:10 PM EDT    Workstation ID: [id number]             Labs Pending at Discharge:          Time spent on Discharge including face to face service: 31 minutes  Part of this note may be an electronic transcription/translation of spoken language to printed text using the Dragon Dictation System.     TElectronically signed by Flako Orantes MD, 06/25/24, 3:30 PM EDT.

## 2024-06-25 NOTE — PROGRESS NOTES
"Nutrition Services    Patient Name: Jordin Menon  YOB: 1940  MRN: [id number]  Admission date: 6/23/2024    PROGRESS NOTE      Encounter Information: EN Follow Up       PO Diet: Diet: Regular/House; Texture: Mechanical Ground (NDD 2); Fluid Consistency: Thin (IDDSI 0)   PO Supplements: NPO   PO Intake:  NPO       Current nutrition support: Isosource 1.5 @ 60 mL/hr   65 mL q3h free water flush        Estimated Needs: 5535-4834 kcal, 59-74 g PRO        Nutrition support review: Pt alaert at time of visit w/ no complaints at this time. Pt asking for a \"face shave\" needs discussed w/ pt nurse        Labs (reviewed below): Labs WNL        GI Function:  No GI distress indicated at this time        Nutrition Intervention Updates: Will continue current regimen at this time and adj as needed     Results from last 7 days   Lab Units 06/23/24  0252   SODIUM mmol/L 136   POTASSIUM mmol/L 4.4   CHLORIDE mmol/L 97*   CO2 mmol/L 27.7   BUN mg/dL 25*   CREATININE mg/dL 0.97   CALCIUM mg/dL 9.5   GLUCOSE mg/dL 91     Results from last 7 days   Lab Units 06/23/24  0252   HEMOGLOBIN g/dL 12.1*   HEMATOCRIT % 34.8*     COVID19   Date Value Ref Range Status   03/03/2024 Not Detected Not Detected - Ref. Range Final     Lab Results   Component Value Date    HGBA1C 4.90 05/30/2022       RD to follow up per protocol.    Electronically signed by:  Laureen Byrne RD  06/25/24 13:25 EDT   "

## 2024-06-26 ENCOUNTER — APPOINTMENT (OUTPATIENT)
Dept: CT IMAGING | Facility: HOSPITAL | Age: 84
End: 2024-06-26
Payer: MEDICARE

## 2024-06-26 ENCOUNTER — HOSPITAL ENCOUNTER (EMERGENCY)
Facility: HOSPITAL | Age: 84
Discharge: HOME | End: 2024-06-26
Attending: EMERGENCY MEDICINE
Payer: MEDICARE

## 2024-06-26 VITALS
DIASTOLIC BLOOD PRESSURE: 71 MMHG | SYSTOLIC BLOOD PRESSURE: 112 MMHG | BODY MASS INDEX: 29.92 KG/M2 | WEIGHT: 175.27 LBS | HEIGHT: 64 IN | HEART RATE: 74 BPM | OXYGEN SATURATION: 99 % | TEMPERATURE: 98.7 F | RESPIRATION RATE: 17 BRPM

## 2024-06-26 DIAGNOSIS — Z43.1 ATTENTION TO G-TUBE: Primary | ICD-10-CM

## 2024-06-26 LAB
ALBUMIN SERPL-MCNC: 3.6 G/DL (ref 3.5–5.2)
ALBUMIN/GLOB SERPL: 0.8 G/DL
ALP SERPL-CCNC: 83 U/L (ref 39–117)
ALT SERPL W P-5'-P-CCNC: 10 U/L (ref 1–41)
ANION GAP SERPL CALCULATED.3IONS-SCNC: 8.3 MMOL/L (ref 5–15)
AST SERPL-CCNC: 18 U/L (ref 1–40)
BASOPHILS # BLD AUTO: 0.03 10*3/MM3 (ref 0–0.2)
BASOPHILS NFR BLD AUTO: 0.3 % (ref 0–1.5)
BILIRUB SERPL-MCNC: 0.6 MG/DL (ref 0–1.2)
BUN SERPL-MCNC: 17 MG/DL (ref 8–23)
BUN/CREAT SERPL: 21.5 (ref 7–25)
CALCIUM SPEC-SCNC: 9.3 MG/DL (ref 8.6–10.5)
CHLORIDE SERPL-SCNC: 107 MMOL/L (ref 98–107)
CLUMPED PLATELETS: PRESENT
CO2 SERPL-SCNC: 24.7 MMOL/L (ref 22–29)
CREAT SERPL-MCNC: 0.79 MG/DL (ref 0.76–1.27)
DEPRECATED RDW RBC AUTO: 45.2 FL (ref 37–54)
EGFRCR SERPLBLD CKD-EPI 2021: 88.1 ML/MIN/1.73
EOSINOPHIL # BLD AUTO: 0.2 10*3/MM3 (ref 0–0.4)
EOSINOPHIL NFR BLD AUTO: 2.1 % (ref 0.3–6.2)
ERYTHROCYTE [DISTWIDTH] IN BLOOD BY AUTOMATED COUNT: 14.6 % (ref 12.3–15.4)
GLOBULIN UR ELPH-MCNC: 4.6 GM/DL
GLUCOSE SERPL-MCNC: 94 MG/DL (ref 65–99)
HCT VFR BLD AUTO: 35.1 % (ref 37.5–51)
HGB BLD-MCNC: 11.9 G/DL (ref 13–17.7)
HOLD SPECIMEN: NORMAL
HOLD SPECIMEN: NORMAL
IMM GRANULOCYTES # BLD AUTO: 0.04 10*3/MM3 (ref 0–0.05)
IMM GRANULOCYTES NFR BLD AUTO: 0.4 % (ref 0–0.5)
LYMPHOCYTES # BLD AUTO: 2.09 10*3/MM3 (ref 0.7–3.1)
LYMPHOCYTES NFR BLD AUTO: 21.7 % (ref 19.6–45.3)
MCH RBC QN AUTO: 29.1 PG (ref 26.6–33)
MCHC RBC AUTO-ENTMCNC: 33.9 G/DL (ref 31.5–35.7)
MCV RBC AUTO: 85.8 FL (ref 79–97)
MONOCYTES # BLD AUTO: 1.02 10*3/MM3 (ref 0.1–0.9)
MONOCYTES NFR BLD AUTO: 10.6 % (ref 5–12)
NEUTROPHILS NFR BLD AUTO: 6.25 10*3/MM3 (ref 1.7–7)
NEUTROPHILS NFR BLD AUTO: 64.9 % (ref 42.7–76)
NRBC BLD AUTO-RTO: 0 /100 WBC (ref 0–0.2)
PLATELET # BLD AUTO: 226 10*3/MM3 (ref 140–450)
PMV BLD AUTO: 11.5 FL (ref 6–12)
POTASSIUM SERPL-SCNC: 4.5 MMOL/L (ref 3.5–5.2)
PROT SERPL-MCNC: 8.2 G/DL (ref 6–8.5)
RBC # BLD AUTO: 4.09 10*6/MM3 (ref 4.14–5.8)
RBC MORPH BLD: NORMAL
SMALL PLATELETS BLD QL SMEAR: ADEQUATE
SODIUM SERPL-SCNC: 140 MMOL/L (ref 136–145)
WBC MORPH BLD: NORMAL
WBC NRBC COR # BLD AUTO: 9.63 10*3/MM3 (ref 3.4–10.8)
WHOLE BLOOD HOLD COAG: NORMAL
WHOLE BLOOD HOLD SPECIMEN: NORMAL

## 2024-06-26 PROCEDURE — 36415 COLL VENOUS BLD VENIPUNCTURE: CPT

## 2024-06-26 PROCEDURE — 85025 COMPLETE CBC W/AUTO DIFF WBC: CPT | Performed by: EMERGENCY MEDICINE

## 2024-06-26 PROCEDURE — 85007 BL SMEAR W/DIFF WBC COUNT: CPT | Performed by: EMERGENCY MEDICINE

## 2024-06-26 PROCEDURE — 80053 COMPREHEN METABOLIC PANEL: CPT | Performed by: EMERGENCY MEDICINE

## 2024-06-26 PROCEDURE — 74176 CT ABD & PELVIS W/O CONTRAST: CPT

## 2024-06-26 PROCEDURE — 99284 EMERGENCY DEPT VISIT MOD MDM: CPT

## 2024-06-27 ENCOUNTER — HOSPITAL ENCOUNTER (EMERGENCY)
Facility: HOSPITAL | Age: 84
Discharge: HOME | End: 2024-06-27
Attending: EMERGENCY MEDICINE
Payer: MEDICARE

## 2024-06-27 VITALS
TEMPERATURE: 97.6 F | BODY MASS INDEX: 29.92 KG/M2 | HEART RATE: 70 BPM | SYSTOLIC BLOOD PRESSURE: 106 MMHG | WEIGHT: 175.27 LBS | OXYGEN SATURATION: 97 % | HEIGHT: 64 IN | DIASTOLIC BLOOD PRESSURE: 57 MMHG | RESPIRATION RATE: 17 BRPM

## 2024-06-27 DIAGNOSIS — K94.20 COMPLICATION OF FEEDING TUBE: Primary | ICD-10-CM

## 2024-06-27 PROCEDURE — 99283 EMERGENCY DEPT VISIT LOW MDM: CPT

## 2024-06-27 NOTE — ED PROVIDER NOTES
Time: 9:25 PM EDT  Date of encounter:  6/26/2024  Independent Historian/Clinical History and Information was obtained by:   Nursing Staff    History is limited by: N/A    Chief Complaint: Nursing staff      History of Present Illness:  Patient is a 84 y.o. year old male who presents to the emergency department for evaluation of leakage from G-tube.    HPI    Patient Care Team  Primary Care Provider: Frank Llanes MD    Past Medical History:     No Known Allergies  Past Medical History:   Diagnosis Date    Acute renal failure with pathological lesion in kidney     Alzheimer's dementia with behavioral disturbance     Anemia, vitamin B12 deficiency     Anxiety     Anxiety disorder due to known physiological condition     Arthritis     Benign neoplasm of prostate     Cannot walk     CHF (congestive heart failure)     Constipation     Coronary atherosclerosis of native coronary artery     CVA (cerebral vascular accident)     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 (CVA)     Edema     Essential hypertension, benign     Gastroesophageal reflux disease without esophagitis     GERD (gastroesophageal reflux disease)     High blood pressure     Hirsutism     HTN (hypertension)     Hypercalcemia     Hyperkalemia     Hyperlipemia     Hyperlipidemia     Hypokalemia     Ingrowing toenail 09/21/2018    Left foot pain 09/21/2018    Nutritional deficiency     Osteoarthritis of right knee 08/22/2016    Pneumonia     Presence of right artificial knee joint     Primary localized osteoarthrosis     PVD (peripheral vascular disease)     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     Right foot pain 09/21/2018    Senile dementia, uncomplicated     Severe sinus bradycardia     Shock, septic     Sleep apnea     Thrombosis of left femoral vein     Tinea unguium 09/21/2018    Vitamin B12 deficiency anemia      Past Surgical History:   Procedure Laterality Date    ENDOSCOPY W/ PEG TUBE PLACEMENT N/A  6/8/2022    Procedure: ESOPHAGOGASTRODUODENOSCOPY WITH PERCUTANEOUS ENDOSCOPIC GASTROSTOMY TUBE INSERTION WITH ANESTHESIA;  Surgeon: Yanelis Mistry MD;  Location: [facility] ENDOSCOPY;  Service: Gastroenterology;  Laterality: N/A;  PEG TUBE PLACEMENT    ENDOSCOPY W/ PEG TUBE PLACEMENT N/A 7/3/2022    Procedure: ESOPHAGOGASTRODUODENOSCOPY WITH PERCUTANEOUS ENDOSCOPIC GASTROSTOMY TUBE INSERTION WITH ANESTHESIA;  Surgeon: Bam Melo MD;  Location: [facility] ENDOSCOPY;  Service: Gastroenterology;  Laterality: N/A;  PEG TUBE PLACEMENT    ENDOSCOPY W/ PEG TUBE PLACEMENT N/A 10/26/2022    Procedure: ESOPHAGOGASTRODUODENOSCOPY WITH PERCUTANEOUS ENDOSCOPIC GASTROSTOMY TUBE INSERTION WITH ANESTHESIA;  Surgeon: Yanelis Mistry MD;  Location: [facility] ENDOSCOPY;  Service: Gastroenterology;  Laterality: N/A;  PERCUTANEOUS ENDOSCOPIC GASTROSTOMY TUBE PLACEMENT     Family History   Family history unknown: Yes       Home Medications:  Prior to Admission medications    Medication Sig Start Date End Date Taking? Authorizing Provider   aluminum-magnesium hydroxide-simethicone (MAALOX/MYLANTA) 200-200-20 MG/5ML suspension Administer 30 mL per G tube Every 6 (Six) Hours As Needed for Indigestion or Heartburn.   Yes Nanci Shafer MD   apixaban (ELIQUIS) 5 MG tablet tablet Administer 1 tablet per G tube 2 (Two) Times a Day. Indications: DVT/PE (active thrombosis) 10/27/22  Yes Thiago Soares MD   aspirin 81 MG chewable tablet Chew 1 tablet Daily.  Patient taking differently: Administer 1 tablet per G tube Daily. 4/19/22  Yes Aranza Herzog MD   busPIRone (BUSPAR) 10 MG tablet Administer 1 tablet per G tube 3 (Three) Times a Day.   Yes Nanci Shafer MD   Divalproex Sodium (DEPAKOTE SPRINKLE) 125 MG capsule Administer 2 capsules per G tube 2 (Two) Times a Day. 2/23/24  Yes Nanci Shafer MD   famotidine (PEPCID) 40 MG tablet Take 1 tablet by mouth Every Night.   Yes Nanci Shafer  MD   furosemide (Lasix) 20 MG tablet Take 1 tablet by mouth Daily.  Patient taking differently: Administer 1 tablet per G tube Daily. 3/3/22  Yes Eric Rivas MD   hydrOXYzine (ATARAX) 25 MG tablet Administer 1 tablet per G tube Every 6 (Six) Hours As Needed for Itching. 11/9/23  Yes Nanci Shafer MD   LORazepam (ATIVAN) 1 MG tablet Administer 1 tablet per G tube Every 8 (Eight) Hours. 4/15/24  Yes Nanci Shafer MD   melatonin 5 MG tablet tablet Administer 1 tablet per G tube every night at bedtime.   Yes Nanci Shafer MD   metoprolol tartrate (LOPRESSOR) 25 MG tablet Administer 0.5 tablets per G tube Every 12 (Twelve) Hours. 10/27/22  Yes Thiago Soares MD   polyethylene glycol (MIRALAX) 17 GM/SCOOP powder Administer 17 g per G tube Daily.   Yes Nanci Shafer MD   acetaminophen (TYLENOL) 650 MG 8 hr tablet Take 1 tablet by mouth Every 8 (Eight) Hours As Needed for Mild Pain.    Nanci Shafer MD   cyanocobalamin 1000 MCG/ML injection Inject 1 mL into the appropriate muscle as directed by prescriber Every 30 (Thirty) Days. On the 15th of the month    Nanci Shafer MD   docusate sodium (COLACE) 50 mg/5 mL liquid Administer 25 mL per G tube Daily As Needed.    Nanci Shafer MD   ipratropium-albuterol (DUO-NEB) 0.5-2.5 mg/3 ml nebulizer Take 3 mL by nebulization Every 4 (Four) Hours As Needed for Wheezing.    Nanci Shafer MD   loperamide (IMODIUM) 2 MG capsule Take 1 capsule by mouth As Needed for Diarrhea (max of 4 caps per 24 hours).    Nanci Shafer MD        Social History:   Social History     Tobacco Use    Smoking status: Never    Smokeless tobacco: Never   Vaping Use    Vaping status: Never Used   Substance Use Topics    Alcohol use: No    Drug use: Never         Review of Systems:  Review of Systems   Unable to perform ROS: Dementia        Physical Exam:  /68   Pulse 70   Temp 98.7 °F (37.1 °C) (Oral)   Resp 17   Ht 162.6  "cm (64.02\")   Wt 79.5 kg (175 lb 4.3 oz)   SpO2 99%   BMI 30.07 kg/m²     Physical Exam  Vitals and nursing note reviewed.   Constitutional:       General: He is not in acute distress.     Appearance: Normal appearance. He is not toxic-appearing.   HENT:      Head: Normocephalic and atraumatic.      Jaw: There is normal jaw occlusion.   Eyes:      General: Lids are normal.      Extraocular Movements: Extraocular movements intact.      Conjunctiva/sclera: Conjunctivae normal.      Pupils: Pupils are equal, round, and reactive to light.   Cardiovascular:      Rate and Rhythm: Normal rate and regular rhythm.      Pulses: Normal pulses.      Heart sounds: Normal heart sounds.   Pulmonary:      Effort: Pulmonary effort is normal. No respiratory distress.      Breath sounds: Normal breath sounds. No wheezing or rhonchi.   Abdominal:      General: Abdomen is flat.      Palpations: Abdomen is soft.      Tenderness: There is no abdominal tenderness. There is no guarding or rebound.      Comments: (+) G-tube is in place with bilious content surrounding it   Musculoskeletal:         General: Normal range of motion.      Cervical back: Normal range of motion and neck supple.      Right lower leg: No edema.      Left lower leg: No edema.   Skin:     General: Skin is warm and dry.   Neurological:      Mental Status: He is alert. Mental status is at baseline.   Psychiatric:         Mood and Affect: Mood normal.                  Procedures:  Procedures      Medical Decision Making:      Comorbidities that affect care:    Dementia    External Notes reviewed:    Hospital Discharge Summary: Patient was discharged after having his G-tube replaced      The following orders were placed and all results were independently analyzed by me:  Orders Placed This Encounter   Procedures    CT Abdomen Pelvis Without Contrast    Comprehensive Metabolic Panel    CBC Auto Differential    Scan Slide    CBC & Differential    Extra Tubes    Lavender " Top    Gold Top - SST    Green Top (Gel)    Light Blue Top       Medications Given in the Emergency Department:  Medications - No data to display     ED Course:         Labs:    Lab Results (last 24 hours)       Procedure Component Value Units Date/Time    CBC & Differential [[id number]]  (Abnormal) Collected: 06/26/24 1953    Specimen: Blood Updated: 06/26/24 2026    Narrative:      The following orders were created for panel order CBC & Differential.  Procedure                               Abnormality         Status                     ---------                               -----------         ------                     CBC Auto Differential[[id number]]        Abnormal            Final result               Scan Slide[[id number]]                                       Final result                 Please view results for these tests on the individual orders.    CBC Auto Differential [[id number]]  (Abnormal) Collected: 06/26/24 1953    Specimen: Blood Updated: 06/26/24 2026     WBC 9.63 10*3/mm3      RBC 4.09 10*6/mm3      Hemoglobin 11.9 g/dL      Hematocrit 35.1 %      MCV 85.8 fL      MCH 29.1 pg      MCHC 33.9 g/dL      RDW 14.6 %      RDW-SD 45.2 fl      MPV 11.5 fL      Platelets 226 10*3/mm3      Neutrophil % 64.9 %      Lymphocyte % 21.7 %      Monocyte % 10.6 %      Eosinophil % 2.1 %      Basophil % 0.3 %      Immature Grans % 0.4 %      Neutrophils, Absolute 6.25 10*3/mm3      Lymphocytes, Absolute 2.09 10*3/mm3      Monocytes, Absolute 1.02 10*3/mm3      Eosinophils, Absolute 0.20 10*3/mm3      Basophils, Absolute 0.03 10*3/mm3      Immature Grans, Absolute 0.04 10*3/mm3      nRBC 0.0 /100 WBC     Narrative:      Appended report. These results have been appended to a previously verified report.    Scan Slide [[id number]] Collected: 06/26/24 1953    Specimen: Blood Updated: 06/26/24 2026     RBC Morphology Normal     WBC Morphology Normal     Platelet Estimate Adequate     Clumped Platelets Present      Comment: Few platelet clumps observed on peripheral smear.        Comprehensive Metabolic Panel [[id number]] Collected: 06/26/24 2048    Specimen: Blood Updated: 06/26/24 2115     Glucose 94 mg/dL      BUN 17 mg/dL      Creatinine 0.79 mg/dL      Sodium 140 mmol/L      Potassium 4.5 mmol/L      Comment: Slight hemolysis detected by analyzer. Result may be falsely elevated.        Chloride 107 mmol/L      CO2 24.7 mmol/L      Calcium 9.3 mg/dL      Total Protein 8.2 g/dL      Albumin 3.6 g/dL      ALT (SGPT) 10 U/L      AST (SGOT) 18 U/L      Alkaline Phosphatase 83 U/L      Total Bilirubin 0.6 mg/dL      Globulin 4.6 gm/dL      A/G Ratio 0.8 g/dL      BUN/Creatinine Ratio 21.5     Anion Gap 8.3 mmol/L      eGFR 88.1 mL/min/1.73     Narrative:      GFR Normal >60  Chronic Kidney Disease <60  Kidney Failure <15    The GFR formula is only valid for adults with stable renal function between ages 18 and 70.             Imaging:    CT Abdomen Pelvis Without Contrast    Result Date: 6/26/2024  CT ABDOMEN PELVIS WO CONTRAST Date of Exam: 6/26/2024 8:29 PM EDT Indication: Flank pain, kidney stone suspected Abdominal pain flank pain. Comparison: 3/4/2024 Technique: Axial CT images were obtained of the abdomen and pelvis without the administration of contrast. Reconstructed coronal and sagittal images were also obtained. Automated exposure control and iterative construction methods were used. Findings: LUNG BASES: Minimal basal atelectasis, similar prior examination. LIVER:  Unremarkable parenchyma without focal lesion. BILIARY/GALLBLADDER: There are numerous calcified gallstones. There are no gallbladder inflammatory changes. SPLEEN:  Unremarkable PANCREAS:  Unremarkable ADRENAL:  Unremarkable KIDNEYS:  Unremarkable parenchyma with no solid mass identified. No obstruction.  6 mm calculus in the lower pole of the left kidney. There are adjacent phleboliths adjacent to the distal right ureter, stable dating back to 7/2/2022.  GASTROINTESTINAL/MESENTERY: There is a percutaneous intestinal feeding tube. The retention balloon is not within the direct body nor antrum of the stomach although there is a section of stomach that extends toward the retention balloon this may be within  the stomach or adjacent small intestine. Recent contrast injection exam demonstrated presence of enteric feeding tube within the intestinal tract. No evidence of obstruction nor inflammation. AORTA/IVC:  Normal caliber. RETROPERITONEUM/LYMPH NODES:  Unremarkable REPRODUCTIVE:  Unremarkable BLADDER: Decompressed by Irby catheter. OSSEUS STRUCTURES:  Typical for age with no acute process identified.     Impression: 1.No definite acute process is identified. There is an enteric feeding tube exact location uncertain but was within the intestinal tract on recent tube injection study. 2.Incidental nonemergent findings as detailed above. Electronically Signed: Buzz Clarke MD  6/26/2024 8:50 PM EDT  Workstation ID: [id number]       Differential Diagnosis and Discussion:    Abdominal Pain: Based on the patient's signs and symptoms, I considered abdominal aortic aneurysm, small bowel obstruction, pancreatitis, acute cholecystitis, acute appendecitis, peptic ulcer disease, gastritis, colitis, endocrine disorders, irritable bowel syndrome and other differential diagnosis an etiology of the patient's abdominal pain.    All labs were reviewed and interpreted by me.  CT scan radiology impression was interpreted by me.    MDM     The patient´s CBC that was reviewed and interpreted by me shows no abnormalities of critical concern. Of note, there is no anemia requiring a blood transfusion and the platelet count is acceptable.  The patient´s CMP that was reviewed and interpretted by me shows no abnormalities of critical concern. Of note, the patient´s sodium and potassium are acceptable. The patient´s liver enzymes are unremarkable. The patient´s renal function (creatinine) is  preserved. The patient has a normal anion gap.  CT scan shows the G-tube in the intestinal tract with no distal obstruction.  Patient was cleaned in the emergency department and had no return of leakage.          Patient Care Considerations:    ANTIBIOTICS: I considered prescribing antibiotics as an outpatient however no bacterial focus of infection was found.      Consultants/Shared Management Plan:    None    Social Determinants of Health:    Patient is a nursing home/assisted living resident and has reliable access to care.      Disposition and Care Coordination:    Discharged: I considered escalation of care by admitting this patient to the hospital, however patient stable and suitable for discharge    I have explained the patient´s condition, diagnoses and treatment plan based on the information available to me at this time. I have answered questions and addressed any concerns. The patient has a good  understanding of the patient´s diagnosis, condition, and treatment plan as can be expected at this point. The vital signs have been stable. The patient´s condition is stable and appropriate for discharge from the emergency department.      The patient will pursue further outpatient evaluation with the primary care physician or other designated or consulting physician as outlined in the discharge instructions. They are agreeable to this plan of care and follow-up instructions have been explained in detail. The patient has received these instructions in written format and has expressed an understanding of the discharge instructions. The patient is aware that any significant change in condition or worsening of symptoms should prompt an immediate return to this or the closest emergency department or call to 911.  I have explained discharge medications and the need for follow up with the patient/caretakers. This was also printed in the discharge instructions. Patient was discharged with the following medications and  follow up:      Medication List        Changed      aspirin 81 MG chewable tablet  Chew 1 tablet Daily.  What changed: how to take this     furosemide 20 MG tablet  Commonly known as: Lasix  Take 1 tablet by mouth Daily.  What changed: how to take this           Frank Llanes MD  [id number] [facility] [id number] [phone]             Final diagnoses:   Attention to G-tube        ED Disposition       ED Disposition   Discharge    Condition   Stable    Comment   --               This medical record created using voice recognition software.             Carey Aburto MD  06/26/24 [id number]

## 2024-06-27 NOTE — ED PROVIDER NOTES
Time: 6:02 AM EDT  Date of encounter:  6/27/2024  Independent Historian/Clinical History and Information was obtained by:   Nursing Staff    History is limited by: Dementia    Chief Complaint: g tube issues      History of Present Illness:  Patient is a 84 y.o. year old male who presents to the emergency department for evaluation of G-tube issues.  Per report patient has been leaking around his G-tube.  Patient seen in the emergency department earlier had CT as well as x-ray that did not show acute issues.  Patient has dementia and history obtained from staff      HPI    Patient Care Team  Primary Care Provider: Frank Llanes MD    Past Medical History:     No Known Allergies  Past Medical History:   Diagnosis Date    Acute renal failure with pathological lesion in kidney     Alzheimer's dementia with behavioral disturbance     Anemia, vitamin B12 deficiency     Anxiety     Anxiety disorder due to known physiological condition     Arthritis     Benign neoplasm of prostate     Cannot walk     CHF (congestive heart failure)     Constipation     Coronary atherosclerosis of native coronary artery     CVA (cerebral vascular accident)     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 (CVA)     Edema     Essential hypertension, benign     Gastroesophageal reflux disease without esophagitis     GERD (gastroesophageal reflux disease)     High blood pressure     Hirsutism     HTN (hypertension)     Hypercalcemia     Hyperkalemia     Hyperlipemia     Hyperlipidemia     Hypokalemia     Ingrowing toenail 09/21/2018    Left foot pain 09/21/2018    Nutritional deficiency     Osteoarthritis of right knee 08/22/2016    Pneumonia     Presence of right artificial knee joint     Primary localized osteoarthrosis     PVD (peripheral vascular disease)     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     Right foot pain 09/21/2018    Senile dementia, uncomplicated     Severe sinus bradycardia     Shock,  septic     Sleep apnea     Thrombosis of left femoral vein     Tinea unguium 09/21/2018    Vitamin B12 deficiency anemia      Past Surgical History:   Procedure Laterality Date    ENDOSCOPY W/ PEG TUBE PLACEMENT N/A 6/8/2022    Procedure: ESOPHAGOGASTRODUODENOSCOPY WITH PERCUTANEOUS ENDOSCOPIC GASTROSTOMY TUBE INSERTION WITH ANESTHESIA;  Surgeon: Yanelis Mistry MD;  Location: [facility] ENDOSCOPY;  Service: Gastroenterology;  Laterality: N/A;  PEG TUBE PLACEMENT    ENDOSCOPY W/ PEG TUBE PLACEMENT N/A 7/3/2022    Procedure: ESOPHAGOGASTRODUODENOSCOPY WITH PERCUTANEOUS ENDOSCOPIC GASTROSTOMY TUBE INSERTION WITH ANESTHESIA;  Surgeon: Bam Melo MD;  Location: [facility] ENDOSCOPY;  Service: Gastroenterology;  Laterality: N/A;  PEG TUBE PLACEMENT    ENDOSCOPY W/ PEG TUBE PLACEMENT N/A 10/26/2022    Procedure: ESOPHAGOGASTRODUODENOSCOPY WITH PERCUTANEOUS ENDOSCOPIC GASTROSTOMY TUBE INSERTION WITH ANESTHESIA;  Surgeon: Yanelis Mistry MD;  Location: [facility] ENDOSCOPY;  Service: Gastroenterology;  Laterality: N/A;  PERCUTANEOUS ENDOSCOPIC GASTROSTOMY TUBE PLACEMENT     Family History   Family history unknown: Yes       Home Medications:  Prior to Admission medications    Medication Sig Start Date End Date Taking? Authorizing Provider   acetaminophen (TYLENOL) 650 MG 8 hr tablet Take 1 tablet by mouth Every 8 (Eight) Hours As Needed for Mild Pain.    Provider, MD Nanci   aluminum-magnesium hydroxide-simethicone (MAALOX/MYLANTA) 200-200-20 MG/5ML suspension Administer 30 mL per G tube Every 6 (Six) Hours As Needed for Indigestion or Heartburn.    ProviderNanci MD   apixaban (ELIQUIS) 5 MG tablet tablet Administer 1 tablet per G tube 2 (Two) Times a Day. Indications: DVT/PE (active thrombosis) 10/27/22   Thiago Soares MD   aspirin 81 MG chewable tablet Chew 1 tablet Daily.  Patient taking differently: Administer 1 tablet per G tube Daily. 4/19/22   Aranza Herzog MD busPIRone  (BUSPAR) 10 MG tablet Administer 1 tablet per G tube 3 (Three) Times a Day.    Nanci Shafer MD   cyanocobalamin 1000 MCG/ML injection Inject 1 mL into the appropriate muscle as directed by prescriber Every 30 (Thirty) Days. On the 15th of the month    Nanci Shafer MD   Divalproex Sodium (DEPAKOTE SPRINKLE) 125 MG capsule Administer 2 capsules per G tube 2 (Two) Times a Day. 2/23/24   Nanci Shafer MD   docusate sodium (COLACE) 50 mg/5 mL liquid Administer 25 mL per G tube Daily As Needed.    Nanci Shafer MD   famotidine (PEPCID) 40 MG tablet Take 1 tablet by mouth Every Night.    Nanci Shafer MD   furosemide (Lasix) 20 MG tablet Take 1 tablet by mouth Daily.  Patient taking differently: Administer 1 tablet per G tube Daily. 3/3/22   Eric Rivas MD   hydrOXYzine (ATARAX) 25 MG tablet Administer 1 tablet per G tube Every 6 (Six) Hours As Needed for Itching. 11/9/23   Nanci Shafer MD   ipratropium-albuterol (DUO-NEB) 0.5-2.5 mg/3 ml nebulizer Take 3 mL by nebulization Every 4 (Four) Hours As Needed for Wheezing.    Nanci Shafer MD   loperamide (IMODIUM) 2 MG capsule Take 1 capsule by mouth As Needed for Diarrhea (max of 4 caps per 24 hours).    Nanci Shafer MD   LORazepam (ATIVAN) 1 MG tablet Administer 1 tablet per G tube Every 8 (Eight) Hours. 4/15/24   Nanci Shafer MD   melatonin 5 MG tablet tablet Administer 1 tablet per G tube every night at bedtime.    Nanci Shafer MD   metoprolol tartrate (LOPRESSOR) 25 MG tablet Administer 0.5 tablets per G tube Every 12 (Twelve) Hours. 10/27/22   Thiago Soares MD   polyethylene glycol (MIRALAX) 17 GM/SCOOP powder Administer 17 g per G tube Daily.    Nanci Shafer MD        Social History:   Social History     Tobacco Use    Smoking status: Never    Smokeless tobacco: Never   Vaping Use    Vaping status: Never Used   Substance Use Topics    Alcohol use: No    Drug use:  "Never         Review of Systems:  Review of Systems   Unable to perform ROS: Dementia        Physical Exam:  /57   Pulse 70   Temp 97.6 °F (36.4 °C) (Axillary) Comment: Pt refused oral temp.  Resp 17   Ht 162.6 cm (64\")   Wt 79.5 kg (175 lb 4.3 oz)   SpO2 97%   BMI 30.08 kg/m²     Physical Exam  Constitutional:       Appearance: Normal appearance.   HENT:      Head: Normocephalic and atraumatic.      Right Ear: External ear normal.      Left Ear: External ear normal.      Mouth/Throat:      Mouth: Mucous membranes are moist.   Eyes:      Conjunctiva/sclera: Conjunctivae normal.   Cardiovascular:      Rate and Rhythm: Normal rate and regular rhythm.      Pulses: Normal pulses.   Pulmonary:      Effort: Pulmonary effort is normal.   Abdominal:      Palpations: Abdomen is soft.      Tenderness: There is no abdominal tenderness.      Comments: G-tube in place with minimal surrounding erythema.  No significant drainage around site.   Musculoskeletal:         General: Normal range of motion.      Cervical back: Normal range of motion.   Skin:     General: Skin is warm.                  Procedures:  Procedures      Medical Decision Making:      Comorbidities that affect care:    Dementia, Congestive Heart Failure, Hypertension    External Notes reviewed:    Previous Operation Note: Patient had a gastrostomy tube insertion by GI on 6/24/2024      The following orders were placed and all results were independently analyzed by me:  Orders Placed This Encounter   Procedures    Ambulatory Referral to Wound Clinic       Medications Given in the Emergency Department:  Medications - No data to display     ED Course:         Labs:    Lab Results (last 24 hours)       Procedure Component Value Units Date/Time    CBC & Differential [[id number]]  (Abnormal) Collected: 06/26/24 1953    Specimen: Blood Updated: 06/26/24 2026    Narrative:      The following orders were created for panel order CBC & Differential.  Procedure   "                             Abnormality         Status                     ---------                               -----------         ------                     CBC Auto Differential[[id number]]        Abnormal            Final result               Scan Slide[[id number]]                                       Final result                 Please view results for these tests on the individual orders.    CBC Auto Differential [[id number]]  (Abnormal) Collected: 06/26/24 1953    Specimen: Blood Updated: 06/26/24 2026     WBC 9.63 10*3/mm3      RBC 4.09 10*6/mm3      Hemoglobin 11.9 g/dL      Hematocrit 35.1 %      MCV 85.8 fL      MCH 29.1 pg      MCHC 33.9 g/dL      RDW 14.6 %      RDW-SD 45.2 fl      MPV 11.5 fL      Platelets 226 10*3/mm3      Neutrophil % 64.9 %      Lymphocyte % 21.7 %      Monocyte % 10.6 %      Eosinophil % 2.1 %      Basophil % 0.3 %      Immature Grans % 0.4 %      Neutrophils, Absolute 6.25 10*3/mm3      Lymphocytes, Absolute 2.09 10*3/mm3      Monocytes, Absolute 1.02 10*3/mm3      Eosinophils, Absolute 0.20 10*3/mm3      Basophils, Absolute 0.03 10*3/mm3      Immature Grans, Absolute 0.04 10*3/mm3      nRBC 0.0 /100 WBC     Narrative:      Appended report. These results have been appended to a previously verified report.    Scan Slide [[id number]] Collected: 06/26/24 1953    Specimen: Blood Updated: 06/26/24 2026     RBC Morphology Normal     WBC Morphology Normal     Platelet Estimate Adequate     Clumped Platelets Present     Comment: Few platelet clumps observed on peripheral smear.        Comprehensive Metabolic Panel [[id number]] Collected: 06/26/24 2048    Specimen: Blood Updated: 06/26/24 2115     Glucose 94 mg/dL      BUN 17 mg/dL      Creatinine 0.79 mg/dL      Sodium 140 mmol/L      Potassium 4.5 mmol/L      Comment: Slight hemolysis detected by analyzer. Result may be falsely elevated.        Chloride 107 mmol/L      CO2 24.7 mmol/L      Calcium 9.3 mg/dL      Total Protein 8.2 g/dL       Albumin 3.6 g/dL      ALT (SGPT) 10 U/L      AST (SGOT) 18 U/L      Alkaline Phosphatase 83 U/L      Total Bilirubin 0.6 mg/dL      Globulin 4.6 gm/dL      A/G Ratio 0.8 g/dL      BUN/Creatinine Ratio 21.5     Anion Gap 8.3 mmol/L      eGFR 88.1 mL/min/1.73     Narrative:      GFR Normal >60  Chronic Kidney Disease <60  Kidney Failure <15    The GFR formula is only valid for adults with stable renal function between ages 18 and 70.             Imaging:    CT Abdomen Pelvis Without Contrast    Result Date: 6/26/2024  CT ABDOMEN PELVIS WO CONTRAST Date of Exam: 6/26/2024 8:29 PM EDT Indication: Flank pain, kidney stone suspected Abdominal pain flank pain. Comparison: 3/4/2024 Technique: Axial CT images were obtained of the abdomen and pelvis without the administration of contrast. Reconstructed coronal and sagittal images were also obtained. Automated exposure control and iterative construction methods were used. Findings: LUNG BASES: Minimal basal atelectasis, similar prior examination. LIVER:  Unremarkable parenchyma without focal lesion. BILIARY/GALLBLADDER: There are numerous calcified gallstones. There are no gallbladder inflammatory changes. SPLEEN:  Unremarkable PANCREAS:  Unremarkable ADRENAL:  Unremarkable KIDNEYS:  Unremarkable parenchyma with no solid mass identified. No obstruction.  6 mm calculus in the lower pole of the left kidney. There are adjacent phleboliths adjacent to the distal right ureter, stable dating back to 7/2/2022. GASTROINTESTINAL/MESENTERY: There is a percutaneous intestinal feeding tube. The retention balloon is not within the direct body nor antrum of the stomach although there is a section of stomach that extends toward the retention balloon this may be within  the stomach or adjacent small intestine. Recent contrast injection exam demonstrated presence of enteric feeding tube within the intestinal tract. No evidence of obstruction nor inflammation. AORTA/IVC:  Normal caliber.  RETROPERITONEUM/LYMPH NODES:  Unremarkable REPRODUCTIVE:  Unremarkable BLADDER: Decompressed by Irby catheter. OSSEUS STRUCTURES:  Typical for age with no acute process identified.     Impression: 1.No definite acute process is identified. There is an enteric feeding tube exact location uncertain but was within the intestinal tract on recent tube injection study. 2.Incidental nonemergent findings as detailed above. Electronically Signed: Buzz Clarke MD  6/26/2024 8:50 PM EDT  Workstation ID: [id number]       Differential Diagnosis and Discussion:    Differential clued but not limited to misplacement of G-tube and correct size of G-tube, abdominal pathology obstruction.    All X-rays impressions were independently interpreted by me.  CT scan radiology impression was interpreted by me.    MDM  Number of Diagnoses or Management Options  Complication of feeding tube  Diagnosis management comments: Patient is afebrile nontoxic-appearing.  Vital signs stable.  At time of evaluation patient is comfortably sleeping.  He does not have any abdominal tenderness on exam.  No significant drainage on exam around G-tube site.  No erythema or signs of infection.  Patient just recently had imaging done that did not show acute findings.  Will refer patient to wound care for evaluation of reported leaking around G-tube site.  Recommend close follow-up with his PCP       Amount and/or Complexity of Data Reviewed  Review and summarize past medical records: yes  Independent visualization of images, tracings, or specimens: yes    Risk of Complications, Morbidity, and/or Mortality  Presenting problems: low  Management options: low                 Patient Care Considerations:    SEPSIS was considered but is NOT present in the emergency department as SIRS criteria is not present.      Consultants/Shared Management Plan:    None    Social Determinants of Health:    Patient is a nursing home/assisted living resident and has reliable access to  care.      Disposition and Care Coordination:    Discharged: The patient is suitable and stable for discharge with no need for consideration of admission.    I have explained the patient´s condition, diagnoses and treatment plan based on the information available to me at this time. I have answered questions and addressed any concerns. The patient has a good  understanding of the patient´s diagnosis, condition, and treatment plan as can be expected at this point. The vital signs have been stable. The patient´s condition is stable and appropriate for discharge from the emergency department.      The patient will pursue further outpatient evaluation with the primary care physician or other designated or consulting physician as outlined in the discharge instructions. They are agreeable to this plan of care and follow-up instructions have been explained in detail. The patient has received these instructions in written format and has expressed an understanding of the discharge instructions. The patient is aware that any significant change in condition or worsening of symptoms should prompt an immediate return to this or the closest emergency department or call to 911.  I have explained discharge medications and the need for follow up with the patient/caretakers. This was also printed in the discharge instructions. Patient was discharged with the following medications and follow up:      Medication List        Changed      aspirin 81 MG chewable tablet  Chew 1 tablet Daily.  What changed: how to take this     furosemide 20 MG tablet  Commonly known as: Lasix  Take 1 tablet by mouth Daily.  What changed: how to take this           [facility] WOUND [place] [id number] [place] [address] [phone]           Final diagnoses:   Complication of feeding tube        ED Disposition       ED Disposition   Discharge    Condition   Stable    Comment   --               This medical record created  using voice recognition software.             Anna Caldera MD  06/27/24 [id number]

## 2024-08-24 ENCOUNTER — HOSPITAL ENCOUNTER (EMERGENCY)
Facility: HOSPITAL | Age: 84
Discharge: HOME | End: 2024-08-24
Attending: EMERGENCY MEDICINE
Payer: MEDICARE

## 2024-08-24 ENCOUNTER — APPOINTMENT (OUTPATIENT)
Dept: GENERAL RADIOLOGY | Facility: HOSPITAL | Age: 84
End: 2024-08-24
Payer: MEDICAID

## 2024-08-24 ENCOUNTER — APPOINTMENT (OUTPATIENT)
Dept: CT IMAGING | Facility: HOSPITAL | Age: 84
End: 2024-08-24
Payer: MEDICARE

## 2024-08-24 VITALS
BODY MASS INDEX: 31.35 KG/M2 | RESPIRATION RATE: 17 BRPM | HEIGHT: 64 IN | SYSTOLIC BLOOD PRESSURE: 133 MMHG | HEART RATE: 100 BPM | OXYGEN SATURATION: 95 % | WEIGHT: 183.64 LBS | DIASTOLIC BLOOD PRESSURE: 93 MMHG | TEMPERATURE: 98.1 F
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
## 2024-08-24 DIAGNOSIS — S12.101A CLOSED NONDISPLACED FRACTURE OF SECOND CERVICAL VERTEBRA, UNSPECIFIED FRACTURE MORPHOLOGY, INITIAL ENCOUNTER: Primary | ICD-10-CM

## 2024-08-24 PROCEDURE — 70450 CT HEAD/BRAIN W/O DYE: CPT

## 2024-08-24 PROCEDURE — 72125 CT NECK SPINE W/O DYE: CPT

## 2024-08-24 PROCEDURE — 73130 X-RAY EXAM OF HAND: CPT

## 2024-08-24 PROCEDURE — 99284 EMERGENCY DEPT VISIT MOD MDM: CPT

## 2024-08-24 NOTE — ED NOTES
Pt to ed from [facility] nursing rehab with [facility] with c/o fall out of bed. Pt alert to self. Pt on eliquis. No LOC. C/o right hand pain. Hx dementia.

## 2024-08-24 NOTE — ED PROVIDER NOTES
Time: 4:05 PM EDT  Date of encounter:  8/24/2024  Independent Historian/Clinical History and Information was obtained by:   Patient and Nursing Staff    History is limited by: Dementia    Chief Complaint: Fall      History of Present Illness:  Patient is a 84 y.o. year old male who presents to the emergency department for evaluation of fall.  Patient is from the emergency department today via EMS from [facility] and rehab.  Patient was found in the floor after apparently rolling from his bed.  Unsure if there is any head injury.  Patient does have baseline dementia and is oriented to self.  Patient is complaining of right hand pain.  There are no other complaints.      Patient Care Team  Primary Care Provider: Frank Llanes MD    Past Medical History:     No Known Allergies  Past Medical History:   Diagnosis Date    Acute renal failure with pathological lesion in kidney     Alzheimer's dementia with behavioral disturbance     Anemia, vitamin B12 deficiency     Anxiety     Anxiety disorder due to known physiological condition     Arthritis     Benign neoplasm of prostate     Cannot walk     CHF (congestive heart failure)     Constipation     Coronary atherosclerosis of native coronary artery     CVA (cerebral vascular accident)     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 (CVA)     Edema     Essential hypertension, benign     Gastroesophageal reflux disease without esophagitis     GERD (gastroesophageal reflux disease)     High blood pressure     Hirsutism     HTN (hypertension)     Hypercalcemia     Hyperkalemia     Hyperlipemia     Hyperlipidemia     Hypokalemia     Ingrowing toenail 09/21/2018    Left foot pain 09/21/2018    Nutritional deficiency     Osteoarthritis of right knee 08/22/2016    Pneumonia     Presence of right artificial knee joint     Primary localized osteoarthrosis     PVD (peripheral vascular disease)     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      Right foot pain 09/21/2018    Senile dementia, uncomplicated     Severe sinus bradycardia     Shock, septic     Sleep apnea     Thrombosis of left femoral vein     Tinea unguium 09/21/2018    Vitamin B12 deficiency anemia      Past Surgical History:   Procedure Laterality Date    ENDOSCOPY W/ PEG TUBE PLACEMENT N/A 6/8/2022    Procedure: ESOPHAGOGASTRODUODENOSCOPY WITH PERCUTANEOUS ENDOSCOPIC GASTROSTOMY TUBE INSERTION WITH ANESTHESIA;  Surgeon: Yanelis Mistry MD;  Location: [facility];  Service: Gastroenterology;  Laterality: N/A;  PEG TUBE PLACEMENT    ENDOSCOPY W/ PEG TUBE PLACEMENT N/A 7/3/2022    Procedure: ESOPHAGOGASTRODUODENOSCOPY WITH PERCUTANEOUS ENDOSCOPIC GASTROSTOMY TUBE INSERTION WITH ANESTHESIA;  Surgeon: Bam Melo MD;  Location: [facility] ENDOSCOPY;  Service: Gastroenterology;  Laterality: N/A;  PEG TUBE PLACEMENT    ENDOSCOPY W/ PEG TUBE PLACEMENT N/A 10/26/2022    Procedure: ESOPHAGOGASTRODUODENOSCOPY WITH PERCUTANEOUS ENDOSCOPIC GASTROSTOMY TUBE INSERTION WITH ANESTHESIA;  Surgeon: Yanelis Mistry MD;  Location: [facility] ENDOSCOPY;  Service: Gastroenterology;  Laterality: N/A;  PERCUTANEOUS ENDOSCOPIC GASTROSTOMY TUBE PLACEMENT     Family History   Family history unknown: Yes       Home Medications:  Prior to Admission medications    Medication Sig Start Date End Date Taking? Authorizing Provider   acetaminophen (TYLENOL) 650 MG 8 hr tablet Take 1 tablet by mouth Every 8 (Eight) Hours As Needed for Mild Pain.    ProviderNanci MD   aluminum-magnesium hydroxide-simethicone (MAALOX/MYLANTA) 200-200-20 MG/5ML suspension Administer 30 mL per G tube Every 6 (Six) Hours As Needed for Indigestion or Heartburn.    ProviderNanci MD   apixaban (ELIQUIS) 5 MG tablet tablet Administer 1 tablet per G tube 2 (Two) Times a Day. Indications: DVT/PE (active thrombosis) 10/27/22   Thiago Soares MD   aspirin 81 MG chewable tablet Chew 1 tablet Daily.  Patient  taking differently: Administer 1 tablet per G tube Daily. 4/19/22   Aranza Herzog MD   busPIRone (BUSPAR) 10 MG tablet Administer 1 tablet per G tube 3 (Three) Times a Day.    Nanci Shafer MD   cyanocobalamin 1000 MCG/ML injection Inject 1 mL into the appropriate muscle as directed by prescriber Every 30 (Thirty) Days. On the 15th of the month    Nanci Shafer MD   Divalproex Sodium (DEPAKOTE SPRINKLE) 125 MG capsule Administer 2 capsules per G tube 2 (Two) Times a Day. 2/23/24   Nanci Shafer MD   docusate sodium (COLACE) 50 mg/5 mL liquid Administer 25 mL per G tube Daily As Needed.    Nanci Shafer MD   famotidine (PEPCID) 40 MG tablet Take 1 tablet by mouth Every Night.    Nanci Shafer MD   furosemide (Lasix) 20 MG tablet Take 1 tablet by mouth Daily.  Patient taking differently: Administer 1 tablet per G tube Daily. 3/3/22   Eric Rivas MD   hydrOXYzine (ATARAX) 25 MG tablet Administer 1 tablet per G tube Every 6 (Six) Hours As Needed for Itching. 11/9/23   Nanci Shafer MD   ipratropium-albuterol (DUO-NEB) 0.5-2.5 mg/3 ml nebulizer Take 3 mL by nebulization Every 4 (Four) Hours As Needed for Wheezing.    Nanci Shafer MD   loperamide (IMODIUM) 2 MG capsule Take 1 capsule by mouth As Needed for Diarrhea (max of 4 caps per 24 hours).    Nanci Shafer MD   LORazepam (ATIVAN) 1 MG tablet Administer 1 tablet per G tube Every 8 (Eight) Hours. 4/15/24   Nanci Shafer MD   melatonin 5 MG tablet tablet Administer 1 tablet per G tube every night at bedtime.    Nanci Shafer MD   metoprolol tartrate (LOPRESSOR) 25 MG tablet Administer 0.5 tablets per G tube Every 12 (Twelve) Hours. 10/27/22   Thiago Soares MD   polyethylene glycol (MIRALAX) 17 GM/SCOOP powder Administer 17 g per G tube Daily.    Nanci Shafer MD        Social History:   Social History     Tobacco Use    Smoking status: Never    Smokeless tobacco: Never  "  Vaping Use    Vaping status: Never Used   Substance Use Topics    Alcohol use: No    Drug use: Never         Review of Systems:  Review of Systems   Unable to perform ROS: Dementia        Physical Exam:  /93 (BP Location: Right arm, Patient Position: Lying)   Pulse 100   Temp 98.1 °F (36.7 °C) (Oral)   Resp 17   Ht 162.6 cm (64\")   Wt 83.3 kg (183 lb 10.3 oz)   SpO2 95%   BMI 31.52 kg/m²     Physical Exam  Vitals and nursing note reviewed.   Constitutional:       Appearance: Normal appearance. He is normal weight.   HENT:      Head: Normocephalic and atraumatic.      Nose: Nose normal.   Eyes:      Conjunctiva/sclera: Conjunctivae normal.      Pupils: Pupils are equal, round, and reactive to light.   Cardiovascular:      Rate and Rhythm: Normal rate and regular rhythm.      Heart sounds: Normal heart sounds.   Pulmonary:      Effort: Pulmonary effort is normal.      Breath sounds: Normal breath sounds.   Abdominal:      General: Abdomen is flat. There is no distension.      Palpations: Abdomen is soft.      Tenderness: There is no abdominal tenderness.      Comments: G-tube in place   Musculoskeletal:         General: Normal range of motion.      Right hand: No swelling, tenderness or bony tenderness.      Cervical back: Normal range of motion and neck supple.   Skin:     General: Skin is warm and dry.   Neurological:      General: No focal deficit present.      Mental Status: He is alert. Mental status is at baseline.   Psychiatric:         Mood and Affect: Mood normal.         Behavior: Behavior normal.         Thought Content: Thought content normal.         Judgment: Judgment normal.                Procedures:  Procedures      Medical Decision Making:    Comorbidities that affect care:    Hypertension, CVA, PVD, dementia, GERD, CHF, Cancer    External Notes reviewed:    Previous ED Note: Patient last seen in the emergency department on 6-27-24 for G-tube complication      The following orders were " placed and all results were independently analyzed by me:  Orders Placed This Encounter   Procedures    [facility] 01.  Cervical Collar    CT Cervical Spine Without Contrast    CT Head Without Contrast    XR Hand 3+ View Right    IP General Consult (Use specialty-specific consult if known)       Medications Given in the Emergency Department:  Medications - No data to display     ED Course:    ED Course as of 08/24/24 1944   Sat Aug 24, 2024   1937 Discussed patient with Dr. Reeder.  He stated to place patient in hard collar.  Patient is immobile and unable to walk he is likely not tolerate c-collar.  He does state patient fracture is low risk.  He would recommend bracing for 1 month and follow-up for x-rays at that time. [MD]      ED Course User Index  [MD] Doug Nichole PA-C       Labs:    Lab Results (last 24 hours)       ** No results found for the last 24 hours. **             Imaging:    CT Cervical Spine Without Contrast    Result Date: 8/24/2024  CT HEAD WO CONTRAST, CT CERVICAL SPINE WO CONTRAST (COMBINED REPORT) Date of exam: 8/24/2024, 6:47 P.M., EDT. Indications: fall; the pt. is on blood thinners, nos Comparisons: 3/3/2024; 4/16/2022. TECHNIQUE: Axial CT images were obtained of the head and cervical spine without contrast administration. Reconstructed 2D coronal and sagittal images were also obtained. Automated exposure control and iterative construction methods were used.   EXAM FINDINGS:   HEAD CT: A routine nonenhanced head CT was performed. No acute brain abnormality is identified. No acute intracranial hemorrhage. No acute infarction. No acute skull fracture. No midline shift or acute intracranial mass effect is seen. Prominence of the extra-axial spaces and the ventricular system are noted, as with global atrophy. Encephalomalacia is seen in the bilateral frontal lobes, greater on the left than the right, possibly related to old infarcts or old cerebral contusions. The study is  motion-limited. Moderate-to-severe chronic small vessel disease is suggested. Age-indeterminate but possibly acute superimposed upon chronic sinusitis is seen. Bifrontal craniotomy is noted with surgical clips identified. Please correlate clinically. Benign external auditory canal debris is present.  CONCLUSION: No acute brain abnormality is seen. No acute intracranial hemorrhage. No acute skull fracture.   CERVICAL SPINE CT: A routine nonenhanced cervical spine CT was performed. Again, sagittal and coronal two-dimensional (2D) reformations are provided for review. There is an acute comminuted minimally displaced intra-articular fracture of the right lateral mass of C2, which  extends to the right transverse foramen. The right vertebral artery would be at risk for acute traumatic injury. Please correlate clinically. No other definite acute fractures are seen. However, considering the extent of the cervical degenerative change, subtle acute nondisplaced fractures may be difficult to appreciate. No definite fractures are seen involving the C2 body or the dens. Again, severe degenerative changes are seen throughout the cervical spine with focal kyphosis involving the lower cervical spine. Anterolisthesis is seen at C4-5 and C5-6, estimated at 4 mm at each level. There may be minimal chronic anterolisthesis at C6-7, C7-T1, and T1-2. Minimal retrolisthesis, likely chronic, is seen at C3-4, C6-7, and C7-T1. There may be  partial congenital fusion at C2-3. Chronic loss of vertebral body heights is seen, especially at C5, C6, and C7. Prior surgical fusion from C5 to C7 is possible. There is generalized osteopenia. Again, motion artifact obscures detail. There may be dextroscoliosis of the cervicothoracic spine. Please see the prior exam report from 4/16/2022 for further detail regarding additional findings.  CONCLUSION: The study is ABNORMAL. There is an acute fracture of the right lateral mass of C2, which involves the  articular surface of the right facet joint with C1. Fracture lines also extend into the right transverse foramen. No other definite acute fractures are appreciated. Extensive degenerative changes are seen with chronic malalignment. No acute subluxation abnormality.     Please note that portions of this note were completed with a voice recognition program.     Electronically Signed: Jez Neal MD  8/24/2024 7:23 PM EDT  Workstation ID: [id number]    CT Head Without Contrast    Result Date: 8/24/2024  CT HEAD WO CONTRAST, CT CERVICAL SPINE WO CONTRAST (COMBINED REPORT) Date of exam: 8/24/2024, 6:47 P.M., EDT. Indications: fall; the pt. is on blood thinners, nos Comparisons: 3/3/2024; 4/16/2022. TECHNIQUE: Axial CT images were obtained of the head and cervical spine without contrast administration. Reconstructed 2D coronal and sagittal images were also obtained. Automated exposure control and iterative construction methods were used.   EXAM FINDINGS:   HEAD CT: A routine nonenhanced head CT was performed. No acute brain abnormality is identified. No acute intracranial hemorrhage. No acute infarction. No acute skull fracture. No midline shift or acute intracranial mass effect is seen. Prominence of the extra-axial spaces and the ventricular system are noted, as with global atrophy. Encephalomalacia is seen in the bilateral frontal lobes, greater on the left than the right, possibly related to old infarcts or old cerebral contusions. The study is motion-limited. Moderate-to-severe chronic small vessel disease is suggested. Age-indeterminate but possibly acute superimposed upon chronic sinusitis is seen. Bifrontal craniotomy is noted with surgical clips identified. Please correlate clinically. Benign external auditory canal debris is present.  CONCLUSION: No acute brain abnormality is seen. No acute intracranial hemorrhage. No acute skull fracture.   CERVICAL SPINE CT: A routine nonenhanced cervical spine CT was  performed. Again, sagittal and coronal two-dimensional (2D) reformations are provided for review. There is an acute comminuted minimally displaced intra-articular fracture of the right lateral mass of C2, which  extends to the right transverse foramen. The right vertebral artery would be at risk for acute traumatic injury. Please correlate clinically. No other definite acute fractures are seen. However, considering the extent of the cervical degenerative change, subtle acute nondisplaced fractures may be difficult to appreciate. No definite fractures are seen involving the C2 body or the dens. Again, severe degenerative changes are seen throughout the cervical spine with focal kyphosis involving the lower cervical spine. Anterolisthesis is seen at C4-5 and C5-6, estimated at 4 mm at each level. There may be minimal chronic anterolisthesis at C6-7, C7-T1, and T1-2. Minimal retrolisthesis, likely chronic, is seen at C3-4, C6-7, and C7-T1. There may be  partial congenital fusion at C2-3. Chronic loss of vertebral body heights is seen, especially at C5, C6, and C7. Prior surgical fusion from C5 to C7 is possible. There is generalized osteopenia. Again, motion artifact obscures detail. There may be dextroscoliosis of the cervicothoracic spine. Please see the prior exam report from 4/16/2022 for further detail regarding additional findings.  CONCLUSION: The study is ABNORMAL. There is an acute fracture of the right lateral mass of C2, which involves the articular surface of the right facet joint with C1. Fracture lines also extend into the right transverse foramen. No other definite acute fractures are appreciated. Extensive degenerative changes are seen with chronic malalignment. No acute subluxation abnormality.     Please note that portions of this note were completed with a voice recognition program.     Electronically Signed: Jez Neal MD  8/24/2024 7:23 PM EDT  Workstation ID: [id number]    XR Hand 3+ View  Right    Result Date: 8/24/2024  XR HAND 3+ VW RIGHT Date of Exam: 8/24/2024 4:05 PM EDT Indication: poss injury Comparison: None available. Findings: The distal radius and ulna are intact. Cranial migration of the distal carpal bones with dysmorphic appearance of the lunate. Evaluation is suboptimal due to positioning however there appears to be ulnar deviation of the digits. The phalanges appear intact.     No acute fracture is demonstrated. Suboptimal positioning. Possible SLAC wrist. Electronically Signed: Jez Blas MD  8/24/2024 5:03 PM EDT  Workstation ID: [id number]       Differential Diagnosis and Discussion:    Extremity Pain: Differential diagnosis includes but is not limited to soft tissue sprain, tendonitis, tendon injury, dislocation, fracture, deep vein thrombosis, arterial insufficiency, osteoarthritis, bursitis, and ligamentous damage.    All X-rays impressions were independently interpreted by me.  CT scan radiology impression was interpreted by me.    MDM  Number of Diagnoses or Management Options  Diagnosis management comments: Patient is brought to the emergency department via EMS after fall.  X-ray of right hand was obtained and negative for any acute fractures.  CT head had no acute findings.  CT of the cervical spine is a C2 fracture.  Patient was discussed with Dr. Reeder who recommended hard collar.  Will have patient brace for 1 month and follow-up for x-rays in 1 month.  Patient will be transferred back to nursing and rehab facility.       Amount and/or Complexity of Data Reviewed  Tests in the radiology section of CPT®: reviewed and ordered    Risk of Complications, Morbidity, and/or Mortality  Presenting problems: moderate  Diagnostic procedures: moderate  Management options: low    Patient Progress  Patient progress: stable       Patient Care Considerations:          Consultants/Shared Management Plan:    Consultant: I have discussed the case with Dr. Reeder who states place patient  in hard collar and have repeat x-rays in 1 month    Social Determinants of Health:    Patient is a nursing home/assisted living resident and has reliable access to care.      Disposition and Care Coordination:    Discharged: The patient is suitable and stable for discharge with no need for consideration of admission.    I have explained the patient´s condition, diagnoses and treatment plan based on the information available to me at this time. I have answered questions and addressed any concerns. The patient has a good  understanding of the patient´s diagnosis, condition, and treatment plan as can be expected at this point. The vital signs have been stable. The patient´s condition is stable and appropriate for discharge from the emergency department.      The patient will pursue further outpatient evaluation with the primary care physician or other designated or consulting physician as outlined in the discharge instructions. They are agreeable to this plan of care and follow-up instructions have been explained in detail. The patient has received these instructions in written format and has expressed an understanding of the discharge instructions. The patient is aware that any significant change in condition or worsening of symptoms should prompt an immediate return to this or the closest emergency department or call to 911.  I have explained discharge medications and the need for follow up with the patient/caretakers. This was also printed in the discharge instructions. Patient was discharged with the following medications and follow up:      Medication List        Changed      aspirin 81 MG chewable tablet  Chew 1 tablet Daily.  What changed: how to take this     furosemide 20 MG tablet  Commonly known as: Lasix  Take 1 tablet by mouth Daily.  What changed: how to take this           Frank Llanes MD  [address] [facility] [address]  [phone]          Abdirizak Reeder MD  [address]  [facility] [address]  [phone]             Final diagnoses:   Closed nondisplaced fracture of second cervical vertebra, unspecified fracture morphology, initial encounter   Fall, initial encounter        ED Disposition       ED Disposition   Discharge    Condition   Stable    Comment   --               This medical record created using voice recognition software.             Doug Nichole PA-C  08/24/24 1944

## 2024-09-06 ENCOUNTER — HOSPITAL ENCOUNTER (EMERGENCY)
Facility: HOSPITAL | Age: 84
Discharge: HOME | End: 2024-09-07
Attending: EMERGENCY MEDICINE
Payer: MEDICARE

## 2024-09-06 ENCOUNTER — APPOINTMENT (OUTPATIENT)
Dept: GENERAL RADIOLOGY | Facility: HOSPITAL | Age: 84
End: 2024-09-06
Payer: MEDICARE

## 2024-09-06 ENCOUNTER — APPOINTMENT (OUTPATIENT)
Dept: CT IMAGING | Facility: HOSPITAL | Age: 84
End: 2024-09-06
Payer: MEDICARE

## 2024-09-06 DIAGNOSIS — T85.598A FEEDING TUBE DYSFUNCTION, INITIAL ENCOUNTER: Primary | ICD-10-CM

## 2024-09-06 LAB
BACTERIA UR QL AUTO: ABNORMAL /HPF
BILIRUB UR QL STRIP: NEGATIVE
CLARITY UR: ABNORMAL
COLOR UR: YELLOW
GLUCOSE UR STRIP-MCNC: NEGATIVE MG/DL
HGB UR QL STRIP.AUTO: ABNORMAL
HYALINE CASTS UR QL AUTO: ABNORMAL /LPF
KETONES UR QL STRIP: NEGATIVE
LEUKOCYTE ESTERASE UR QL STRIP.AUTO: ABNORMAL
NITRITE UR QL STRIP: NEGATIVE
PH UR STRIP.AUTO: 8.5 [PH] (ref 5–8)
PROT UR QL STRIP: ABNORMAL
RBC # UR STRIP: ABNORMAL /HPF
REF LAB TEST METHOD: ABNORMAL
SP GR UR STRIP: 1.01 (ref 1–1.03)
SQUAMOUS #/AREA URNS HPF: ABNORMAL /HPF
UROBILINOGEN UR QL STRIP: ABNORMAL
WBC # UR STRIP: ABNORMAL /HPF
WBC CASTS #/AREA URNS LPF: ABNORMAL /LPF

## 2024-09-06 PROCEDURE — 87186 SC STD MICRODIL/AGAR DIL: CPT | Performed by: EMERGENCY MEDICINE

## 2024-09-06 PROCEDURE — 49465 FLUORO EXAM OF G/COLON TUBE: CPT

## 2024-09-06 PROCEDURE — 74176 CT ABD & PELVIS W/O CONTRAST: CPT

## 2024-09-06 PROCEDURE — 87077 CULTURE AEROBIC IDENTIFY: CPT | Performed by: EMERGENCY MEDICINE

## 2024-09-06 PROCEDURE — 81001 URINALYSIS AUTO W/SCOPE: CPT | Performed by: EMERGENCY MEDICINE

## 2024-09-06 PROCEDURE — 99284 EMERGENCY DEPT VISIT MOD MDM: CPT

## 2024-09-06 PROCEDURE — 74019 RADEX ABDOMEN 2 VIEWS: CPT

## 2024-09-06 PROCEDURE — 87086 URINE CULTURE/COLONY COUNT: CPT | Performed by: EMERGENCY MEDICINE

## 2024-09-06 NOTE — ED NOTES
ATTEMPTED TO CALL [facility] 3 TIMES. FACILITY DID NOT ANSWER. CHARGE NURSE NIMESH MONTIEL NOTIFIED. SENT AVS/DISCHARGE INSTRUCTIONS WITH THE PATIENT. PT BEING TRANSFERRED BACK TO THE FACILITY VIA EMS. SHAE DICKERSON

## 2024-09-06 NOTE — ED PROVIDER NOTES
Time: 4:53 PM EDT  Date of encounter:  9/6/2024  Independent Historian/Clinical History and Information was obtained by:   EMS  Chief Complaint: Complication with feeding tube    History is limited by:  CVA, Dementia    History of Present Illness:  Patient is a 84 y.o. year old male who presents to the emergency department for evaluation of complication with feeding tube.  Patient is a resident at [facility] and rehab.  Patient was sent to the ER via EMS with reports of the feeding tube being clogged.  They also reported that there may be stool coming out from around the feeding tube.  Of note the patient is in a cervical collar due to a prior fracture of the neck.  There is no other complaints reported by EMS from the nursing home staff.    HPI    Patient Care Team  Primary Care Provider: Frank Llanes MD    Past Medical History:     No Known Allergies  Past Medical History:   Diagnosis Date    Acute renal failure with pathological lesion in kidney     Alzheimer's dementia with behavioral disturbance     Anemia, vitamin B12 deficiency     Anxiety     Anxiety disorder due to known physiological condition     Arthritis     Benign neoplasm of prostate     Cannot walk     CHF (congestive heart failure)     Constipation     Coronary atherosclerosis of native coronary artery     CVA (cerebral vascular accident)     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 (CVA)     Edema     Essential hypertension, benign     Gastroesophageal reflux disease without esophagitis     GERD (gastroesophageal reflux disease)     High blood pressure     Hirsutism     HTN (hypertension)     Hypercalcemia     Hyperkalemia     Hyperlipemia     Hyperlipidemia     Hypokalemia     Ingrowing toenail 09/21/2018    Left foot pain 09/21/2018    Nutritional deficiency     Osteoarthritis of right knee 08/22/2016    Pneumonia     Presence of right artificial knee joint     Primary localized osteoarthrosis     PVD (peripheral vascular  disease)     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     Right foot pain 09/21/2018    Senile dementia, uncomplicated     Severe sinus bradycardia     Shock, septic     Sleep apnea     Thrombosis of left femoral vein     Tinea unguium 09/21/2018    Vitamin B12 deficiency anemia      Past Surgical History:   Procedure Laterality Date    ENDOSCOPY W/ PEG TUBE PLACEMENT N/A 6/8/2022    Procedure: ESOPHAGOGASTRODUODENOSCOPY WITH PERCUTANEOUS ENDOSCOPIC GASTROSTOMY TUBE INSERTION WITH ANESTHESIA;  Surgeon: Yanelis Mistry MD;  Location: [facility] ENDOSCOPY;  Service: Gastroenterology;  Laterality: N/A;  PEG TUBE PLACEMENT    ENDOSCOPY W/ PEG TUBE PLACEMENT N/A 7/3/2022    Procedure: ESOPHAGOGASTRODUODENOSCOPY WITH PERCUTANEOUS ENDOSCOPIC GASTROSTOMY TUBE INSERTION WITH ANESTHESIA;  Surgeon: Bam Melo MD;  Location: [facility] ENDOSCOPY;  Service: Gastroenterology;  Laterality: N/A;  PEG TUBE PLACEMENT    ENDOSCOPY W/ PEG TUBE PLACEMENT N/A 10/26/2022    Procedure: ESOPHAGOGASTRODUODENOSCOPY WITH PERCUTANEOUS ENDOSCOPIC GASTROSTOMY TUBE INSERTION WITH ANESTHESIA;  Surgeon: Yanelis Mistry MD;  Location: [facility] ENDOSCOPY;  Service: Gastroenterology;  Laterality: N/A;  PERCUTANEOUS ENDOSCOPIC GASTROSTOMY TUBE PLACEMENT     Family History   Family history unknown: Yes       Home Medications:  Prior to Admission medications    Medication Sig Start Date End Date Taking? Authorizing Provider   acetaminophen (TYLENOL) 650 MG 8 hr tablet Take 1 tablet by mouth Every 8 (Eight) Hours As Needed for Mild Pain.    Provider, MD Nanci   aluminum-magnesium hydroxide-simethicone (MAALOX/MYLANTA) 200-200-20 MG/5ML suspension Administer 30 mL per G tube Every 6 (Six) Hours As Needed for Indigestion or Heartburn.    ProviderNanci MD   apixaban (ELIQUIS) 5 MG tablet tablet Administer 1 tablet per G tube 2 (Two) Times a Day. Indications: DVT/PE (active thrombosis) 10/27/22   Fany  MD Thiago   aspirin 81 MG chewable tablet Chew 1 tablet Daily.  Patient taking differently: Administer 1 tablet per G tube Daily. 4/19/22   Aranza Herzog MD   busPIRone (BUSPAR) 10 MG tablet Administer 1 tablet per G tube 3 (Three) Times a Day.    Nanci Shafer MD   cyanocobalamin 1000 MCG/ML injection Inject 1 mL into the appropriate muscle as directed by prescriber Every 30 (Thirty) Days. On the 15th of the month    Nanci Shafer MD   Divalproex Sodium (DEPAKOTE SPRINKLE) 125 MG capsule Administer 2 capsules per G tube 2 (Two) Times a Day. 2/23/24   Nanci Shafer MD   docusate sodium (COLACE) 50 mg/5 mL liquid Administer 25 mL per G tube Daily As Needed.    Nanci Shafer MD   famotidine (PEPCID) 40 MG tablet Take 1 tablet by mouth Every Night.    Nanci Shafer MD   furosemide (Lasix) 20 MG tablet Take 1 tablet by mouth Daily.  Patient taking differently: Administer 1 tablet per G tube Daily. 3/3/22   Eric Rivas MD   hydrOXYzine (ATARAX) 25 MG tablet Administer 1 tablet per G tube Every 6 (Six) Hours As Needed for Itching. 11/9/23   Nanci Shafer MD   ipratropium-albuterol (DUO-NEB) 0.5-2.5 mg/3 ml nebulizer Take 3 mL by nebulization Every 4 (Four) Hours As Needed for Wheezing.    Nanci Shafer MD   loperamide (IMODIUM) 2 MG capsule Take 1 capsule by mouth As Needed for Diarrhea (max of 4 caps per 24 hours).    Nanci Shafer MD   LORazepam (ATIVAN) 1 MG tablet Administer 1 tablet per G tube Every 8 (Eight) Hours. 4/15/24   Nanci Shafer MD   melatonin 5 MG tablet tablet Administer 1 tablet per G tube every night at bedtime.    Nanci Shafer MD   metoprolol tartrate (LOPRESSOR) 25 MG tablet Administer 0.5 tablets per G tube Every 12 (Twelve) Hours. 10/27/22   Thiago Soares MD   polyethylene glycol (MIRALAX) 17 GM/SCOOP powder Administer 17 g per G tube Daily.    Nanci Shafer MD        Social History:   Social  "History     Tobacco Use    Smoking status: Never    Smokeless tobacco: Never   Vaping Use    Vaping status: Never Used   Substance Use Topics    Alcohol use: Defer    Drug use: Never         Review of Systems:  Review of Systems   Unable to perform ROS: Dementia        Physical Exam:  /71 (BP Location: Right arm, Patient Position: Lying)   Pulse 92   Temp 98.5 °F (36.9 °C) (Oral)   Resp 18   Ht 162.6 cm (64\")   Wt 76.5 kg (168 lb 10.4 oz)   SpO2 98%   BMI 28.95 kg/m²     Physical Exam    Vital signs were reviewed under triage note.  General appearance - Patient appears well-developed and well-nourished.  Patient is in no acute distress.  Head - Normocephalic, atraumatic.  Pupils - Equal, round, reactive to light.  Extraocular muscles are intact.  Conjunctiva is clear.  Nasal - Normal inspection.  No evidence of trauma or epistaxis.  Tympanic membranes - Gray, intact without erythema or retractions.  Oral mucosa - Pink and moist without lesions or erythema.  Uvula is midline.  Chest wall - Atraumatic.  Chest wall is nontender.  There are no vesicular rashes noted.  Neck - Supple.  Trachea was midline.  There is no palpable lymphadenopathy or thyromegaly.  There are no meningeal signs.  Cervical collar in place.  Lungs - Clear to auscultation and percussion bilaterally.  Heart - Regular rate and rhythm without any murmurs, clicks, or gallops.  Abdomen - Soft.  Bowel sounds are present.  There is no palpable tenderness.  There is a gastric feeding tube present.  Patient has significant amount of drainage around the feeding tube.  There is some erythema due to inflammation of the skin from gastric contents laying on the skin.  The feeding tube itself was noted to be completely inserted all the way to the hub.  There is no rebound, guarding, or rigidity.  There are no palpable masses.  There are no pulsatile masses.  Back - Spine is straight and midline.  There is no CVA tenderness.  Extremities - Intact x4. "  Patient has some contractures.  There is no palpable edema.  Pulses are intact x4 and equal.  Neurologic - Patient is awake an alert.  Cranial nerves II through XII are grossly intact.   Integument - There are no rashes.  There are no petechia or purpura lesions noted.  There are no vesicular lesions noted.           Procedures:  Procedures  Feeding tube replacement -the current feeding tube appeared to be a poor condition.  Subsequently this was removed.  The balloon was deflated.  Approximately 16 mL of water was taken out of the balloon.  The tube was then easily withdrawn without difficulty.  A new Kangaroo feeding tube of the same size placed by me.  Standard technique was utilized.  Balloon was inflated and bumper guard was placed.  Post insertion films show proper placement.    Medical Decision Making:      Comorbidities that affect care:    BPH, vitamin B12 deficiency, hyperlipidemia, hypercalcemia, hyperkalemia, anxiety, hypertension, CHF, CVA, PVD, GERD    External Notes reviewed:    Hospital Discharge Summary: Hospital discharge summary on 6/25/2024 was reviewed by me.      The following orders were placed and all results were independently analyzed by me:  Orders Placed This Encounter   Procedures    XR Abdomen Flat & Upright    CT Abdomen Pelvis Without Contrast    IR J or G Tube Contrast Eval    Urinalysis With Culture If Indicated - Urine, Catheter    Urinalysis, Microscopic Only - Urine, Clean Catch       Medications Given in the Emergency Department:  Medications - No data to display     ED Course:     The patient was seen and evaluated in the ED by me.  The above history and physical examination was performed as documented.  The patient was evaluated there is no signs of any bowel obstruction.  The initial feeding tube was replaced with a new feeding tube.  Post procedure films show proper placement.  Patient is thus stable for discharge back to nursing facility.  A urine sample was sent because  of the cloudiness of the urine.  Patient does have a chronic indwelling catheter.  This could be colonization.  Cultures pending.  Will wait for treatment till culture results arrive.    Labs:    Lab Results (last 24 hours)       Procedure Component Value Units Date/Time    Urinalysis With Culture If Indicated - Urine, Catheter [[id number]]  (Abnormal) Collected: 09/06/24 1633    Specimen: Urine, Catheter Updated: 09/06/24 1715     Color, UA Yellow     Appearance, UA Turbid     pH, UA 8.5     Specific Gravity, UA 1.015     Glucose, UA Negative     Ketones, UA Negative     Bilirubin, UA Negative     Blood, UA Large (3+)     Protein, UA >=300 mg/dL (3+)     Leuk Esterase, UA Large (3+)     Nitrite, UA Negative     Urobilinogen, UA 0.2 E.U./dL    Narrative:      In absence of clinical symptoms, the presence of pyuria, bacteria, and/or nitrites on the urinalysis result does not correlate with infection.    Urinalysis, Microscopic Only - Urine, Catheter [[id number]] Collected: 09/06/24 1633    Specimen: Urine, Catheter Updated: 09/06/24 1712             Imaging:    IR J or G Tube Contrast Eval    Result Date: 9/6/2024  IR J OR G TUBE CONTRAST EVAL Date of Exam: 9/6/2024 4:50 PM EDT Indication: G-tube insertion. Comparison: None available. Technique: Feeding tube injected with water-soluble contrast Fluoroscopic Time: None Findings: Injection of feeding tube results in opacification of loops of jejunum in the left side of the abdomen. There is no extravasation of contrast     Impression: Exam confirms intraluminal positioning of percutaneous jejunostomy tube Electronically Signed: Aaron Barba  9/6/2024 5:14 PM EDT  Workstation ID: [id number]    CT Abdomen Pelvis Without Contrast    Result Date: 9/6/2024  CT ABDOMEN PELVIS WO CONTRAST Date of Exam: 9/6/2024 2:43 PM EDT Indication: Abdominal pain. Comparison: None available. Technique: Axial CT images were obtained of the abdomen and pelvis without the administration of  contrast. Reconstructed coronal and sagittal images were also obtained. Automated exposure control and iterative construction methods were used. Findings: Limited views of the lung bases are unremarkable. The liver is unremarkable. There is cholelithiasis. There is mild gallbladder distention, but no pericholecystic fluid or gallbladder wall thickening is identified. There is mild indistinctness and stranding surrounding the pancreas, which can be seen with mild pancreatitis. The spleen is unremarkable. Bilateral adrenal glands are normal. There is moderate right-sided hydronephrosis, which is new compared to prior exam. There is mild left-sided hydronephrosis and hydroureter. There is marked bladder wall thickening, and the bladder is not decompressed.  The Irby balloon catheter appears to be inflated within the prostatic urethra. There is a J-tube present. The balloon and catheter have migrated through the small bowel. The balloon may be super inflated and could be causing a partial small bowel obstruction. No definite dilated loops of small bowel are identified. Colon is unremarkable. No aggressive appearing lytic or sclerotic bone lesions are identified.     Impression: 1. Irby balloon catheter is inflated within the prostatic urethra. Recommend advancing and reinflating. Additionally, there is diffuse bladder wall thickening as well as moderate bilateral hydronephrosis. This is likely secondary to malposition of the Irby catheter. 2. The J-tube has migrated 15 cm into the more distal small bowel. Recommend retracting the J-tube and cinching it back down to the abdomen. Additionally, the balloon seems to be slightly overinflated and may be resulting in a partial small bowel obstruction, and the underlying cause of the J-tube migrating as it is getting pulled by small bowel peristalsis. Electronically Signed: Ivan Car MD  9/6/2024 3:07 PM EDT  Workstation ID: [id number]    XR Abdomen Flat &  Upright    Result Date: 9/6/2024  XR ABDOMEN FLAT AND UPRIGHT Date of Exam: 9/6/2024 12:40 PM EDT Indication: Abdominal pain Comparison: CT abdomen pelvis June 26, 2024 Findings: There is a enteric feeding tube in the left upper quadrant. The exact location of the tube was not clear on the prior CT. There is a scattered amount of gas within bowel in a nonspecific fashion. There is a curvature to the thoracolumbar spine. Suggested  gallstones within the gallbladder on the prior CT not well imaged on this exam. On that exam it does look like gallstones were within the neck of the gallbladder     Impression: 1.Nonspecific bowel gas pattern. 2.Enteric tube left upper quadrant 3.Cholelithiasis noted on the more recent CT not well imaged on this exam. Electronically Signed: Rohan Velasco MD  9/6/2024 12:51 PM EDT  Workstation ID: [id number]       Differential Diagnosis and Discussion:    Feeding tube complication.    All labs were reviewed and interpreted by me.  All X-rays impressions were independently interpreted by me.  CT scan radiology impression was interpreted by me.    MDM     Amount and/or Complexity of Data Reviewed  Tests in the radiology section of CPT®: reviewed             Patient Care Considerations:    SEPSIS was considered but is NOT present in the emergency department as SIRS criteria is not present.      Consultants/Shared Management Plan:    None    Social Determinants of Health:    Patient is a nursing home/assisted living resident and has reliable access to care.      Disposition and Care Coordination:    Discharged: The patient is suitable and stable for discharge with no need for consideration of admission.    I have explained the patient´s condition, diagnoses and treatment plan based on the information available to me at this time. I have answered questions and addressed any concerns. The patient has a good  understanding of the patient´s diagnosis, condition, and treatment plan as can be expected  at this point. The vital signs have been stable. The patient´s condition is stable and appropriate for discharge from the emergency department.      The patient will pursue further outpatient evaluation with the primary care physician or other designated or consulting physician as outlined in the discharge instructions. They are agreeable to this plan of care and follow-up instructions have been explained in detail. The patient has received these instructions in written format and has expressed an understanding of the discharge instructions. The patient is aware that any significant change in condition or worsening of symptoms should prompt an immediate return to this or the closest emergency department or call to 911.    Final diagnoses:   Feeding tube dysfunction, initial encounter        ED Disposition       ED Disposition   Discharge    Condition   Stable    Comment   --               This medical record created using voice recognition software.             Venu Oconnor DO  09/09/24 1029

## 2024-09-06 NOTE — DISCHARGE INSTRUCTIONS
Resume prior medications.  Resume prior activities.  Resume prior tube feeds.  Resume prior nursing home care.  Follow-up with your primary care provider in 1 week.  Return to the ER for any new complaints or any other concerns that may arise.

## 2024-09-07 VITALS
SYSTOLIC BLOOD PRESSURE: 127 MMHG | TEMPERATURE: 97.8 F | BODY MASS INDEX: 28.79 KG/M2 | DIASTOLIC BLOOD PRESSURE: 99 MMHG | RESPIRATION RATE: 16 BRPM | HEART RATE: 92 BPM | WEIGHT: 168.65 LBS | OXYGEN SATURATION: 100 % | HEIGHT: 64 IN

## 2024-09-08 LAB — BACTERIA SPEC AEROBE CULT: ABNORMAL

## 2024-09-19 ENCOUNTER — TELEPHONE (OUTPATIENT)
Dept: NEUROSURGERY | Facility: CLINIC | Age: 84
End: 2024-09-19
Payer: MEDICAID

## 2024-09-19 DIAGNOSIS — S12.191D OTHER CLOSED NONDISPLACED FRACTURE OF SECOND CERVICAL VERTEBRA WITH ROUTINE HEALING, SUBSEQUENT ENCOUNTER: Primary | ICD-10-CM

## 2024-10-06 NOTE — ED PROVIDER NOTES
Time: 5:36 AM EDT  Date of encounter:  10/6/2024  Independent Historian/Clinical History and Information was obtained by:   EMS    History is limited by:  Unresponsive/intubated    Chief Complaint: Unresponsiveness      History of Present Illness:  Patient is a 84 y.o. year old male who presents to the emergency department for evaluation of unresponsiveness    Patient was brought in from nursing home after he was found unresponsive by staff.  They report that he was seen approximately 2-1/2 hours ago and was responsive at that time.    EMS reports that the patient had agonal breathing on their arrival and they intubated and continued bag ventilations and route to the hospital.  Patient maintained a pulse although he was hypotensive en route to the hospital.  He received IV fluids and route.      Patient Care Team  Primary Care Provider: Frank Llanes MD    Past Medical History:     No Known Allergies  Past Medical History:   Diagnosis Date    Acute renal failure with pathological lesion in kidney     Alzheimer's dementia with behavioral disturbance     Anemia, vitamin B12 deficiency     Anxiety     Anxiety disorder due to known physiological condition     Arthritis     Benign neoplasm of prostate     Cannot walk     CHF (congestive heart failure)     Constipation     Coronary atherosclerosis of native coronary artery     CVA (cerebral vascular accident)     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 (CVA)     Edema     Essential hypertension, benign     Gastroesophageal reflux disease without esophagitis     GERD (gastroesophageal reflux disease)     High blood pressure     Hirsutism     HTN (hypertension)     Hypercalcemia     Hyperkalemia     Hyperlipemia     Hyperlipidemia     Hypokalemia     Ingrowing toenail 09/21/2018    Left foot pain 09/21/2018    Nutritional deficiency     Osteoarthritis of right knee 08/22/2016    Pneumonia     Presence of right artificial knee joint     Primary localized  osteoarthrosis     PVD (peripheral vascular disease)     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     Right foot pain 09/21/2018    Senile dementia, uncomplicated     Severe sinus bradycardia     Shock, septic     Sleep apnea     Thrombosis of left femoral vein     Tinea unguium 09/21/2018    Vitamin B12 deficiency anemia      Past Surgical History:   Procedure Laterality Date    ENDOSCOPY W/ PEG TUBE PLACEMENT N/A 6/8/2022    Procedure: ESOPHAGOGASTRODUODENOSCOPY WITH PERCUTANEOUS ENDOSCOPIC GASTROSTOMY TUBE INSERTION WITH ANESTHESIA;  Surgeon: Yanelis Mistry MD;  Location: [facility] ENDOSCOPY;  Service: Gastroenterology;  Laterality: N/A;  PEG TUBE PLACEMENT    ENDOSCOPY W/ PEG TUBE PLACEMENT N/A 7/3/2022    Procedure: ESOPHAGOGASTRODUODENOSCOPY WITH PERCUTANEOUS ENDOSCOPIC GASTROSTOMY TUBE INSERTION WITH ANESTHESIA;  Surgeon: Bam Melo MD;  Location: [facility] ENDOSCOPY;  Service: Gastroenterology;  Laterality: N/A;  PEG TUBE PLACEMENT    ENDOSCOPY W/ PEG TUBE PLACEMENT N/A 10/26/2022    Procedure: ESOPHAGOGASTRODUODENOSCOPY WITH PERCUTANEOUS ENDOSCOPIC GASTROSTOMY TUBE INSERTION WITH ANESTHESIA;  Surgeon: Yanelis Mistry MD;  Location: [facility] ENDOSCOPY;  Service: Gastroenterology;  Laterality: N/A;  PERCUTANEOUS ENDOSCOPIC GASTROSTOMY TUBE PLACEMENT     Family History   Family history unknown: Yes       Home Medications:  Prior to Admission medications    Medication Sig Start Date End Date Taking? Authorizing Provider   acetaminophen (TYLENOL) 650 MG 8 hr tablet Take 1 tablet by mouth Every 8 (Eight) Hours As Needed for Mild Pain.    Provider, MD Nanci   aluminum-magnesium hydroxide-simethicone (MAALOX/MYLANTA) 200-200-20 MG/5ML suspension Administer 30 mL per G tube Every 6 (Six) Hours As Needed for Indigestion or Heartburn.    Provider, MD Nanci   apixaban (ELIQUIS) 5 MG tablet tablet Administer 1 tablet per G tube 2 (Two) Times a Day. Indications:  DVT/PE (active thrombosis) 10/27/22   Thiago Soares MD   aspirin 81 MG chewable tablet Chew 1 tablet Daily.  Patient taking differently: Administer 1 tablet per G tube Daily. 4/19/22   Aranza Herzog MD   busPIRone (BUSPAR) 10 MG tablet Administer 1 tablet per G tube 3 (Three) Times a Day.    Nanci Shafer MD   cyanocobalamin 1000 MCG/ML injection Inject 1 mL into the appropriate muscle as directed by prescriber Every 30 (Thirty) Days. On the 15th of the month    Nanci Shafer MD   Divalproex Sodium (DEPAKOTE SPRINKLE) 125 MG capsule Administer 2 capsules per G tube 2 (Two) Times a Day. 2/23/24   Nanci Shafer MD   docusate sodium (COLACE) 50 mg/5 mL liquid Administer 25 mL per G tube Daily As Needed.    Nanci Shafer MD   famotidine (PEPCID) 40 MG tablet Take 1 tablet by mouth Every Night.    Nanci Shafer MD   furosemide (Lasix) 20 MG tablet Take 1 tablet by mouth Daily.  Patient taking differently: Administer 1 tablet per G tube Daily. 3/3/22   Eric Rivas MD   hydrOXYzine (ATARAX) 25 MG tablet Administer 1 tablet per G tube Every 6 (Six) Hours As Needed for Itching. 11/9/23   Nanci Shafer MD   ipratropium-albuterol (DUO-NEB) 0.5-2.5 mg/3 ml nebulizer Take 3 mL by nebulization Every 4 (Four) Hours As Needed for Wheezing.    Nanci Shafer MD   loperamide (IMODIUM) 2 MG capsule Take 1 capsule by mouth As Needed for Diarrhea (max of 4 caps per 24 hours).    Nanci Shafer MD   LORazepam (ATIVAN) 1 MG tablet Administer 1 tablet per G tube Every 8 (Eight) Hours. 4/15/24   Nanci Shafer MD   melatonin 5 MG tablet tablet Administer 1 tablet per G tube every night at bedtime.    Nanci Shafer MD   metoprolol tartrate (LOPRESSOR) 25 MG tablet Administer 0.5 tablets per G tube Every 12 (Twelve) Hours. 10/27/22   Thiago Soares MD   polyethylene glycol (MIRALAX) 17 GM/SCOOP powder Administer 17 g per G tube Daily.    Hollis  "Nanci, MD        Social History:   Social History     Tobacco Use    Smoking status: Never    Smokeless tobacco: Never   Vaping Use    Vaping status: Never Used   Substance Use Topics    Alcohol use: Defer    Drug use: Never         Review of Systems:  Review of Systems   Unable to perform ROS: Patient unresponsive        Physical Exam:  BP (!) 45/33   Pulse 72   Temp (!) 90.5 °F (32.5 °C) (Rectal)   Resp 26   Ht 162.6 cm (64\")   Wt 65.3 kg (143 lb 15.4 oz)   SpO2 (!) 87%   BMI 24.71 kg/m²     Physical Exam  Vitals and nursing note reviewed.   Constitutional:       General: He is not in acute distress.     Appearance: He is ill-appearing. He is not toxic-appearing.      Comments: Patient is ill and frail appealing.   HENT:      Head: Normocephalic.      Jaw: There is normal jaw occlusion.   Eyes:      General: Lids are normal.      Conjunctiva/sclera: Conjunctivae normal.      Comments: Pupils are midsize and nonreactive   Neck:      Comments: EMS reports patient was in soft cervical collar which was removed for intubation and transport by EMS  Cardiovascular:      Rate and Rhythm: Normal rate and regular rhythm.      Pulses: Normal pulses.      Heart sounds: Normal heart sounds.   Pulmonary:      Effort: Pulmonary effort is normal. No respiratory distress.      Breath sounds: Normal breath sounds. No wheezing or rhonchi.      Comments: The patient has no spontaneous ventilations and is receiving bagged ventilations.  On arrival patient had decreased breath sounds over the left lung fields and the endotracheal tube was pulled back 3 cm.    After repositioning of the endotracheal tube the patient has symmetric air entry over bilateral lung fields.  He has coarse breath sounds bilaterally.  Abdominal:      General: Abdomen is flat.      Palpations: Abdomen is soft.      Tenderness: There is no abdominal tenderness. There is no guarding or rebound.      Comments: Left upper quadrant G-tube is present " however there appears to be fecal matter at the G-tube site.   Genitourinary:     Comments: Indwelling urinary catheters present with cloudy thick urine in the tubing.  Musculoskeletal:         General: Normal range of motion.      Right lower leg: No edema.      Left lower leg: No edema.   Skin:     General: Skin is warm and dry.   Neurological:      GCS: GCS eye subscore is 1. GCS verbal subscore is 1. GCS motor subscore is 1.      Comments: GCS = 3 T               Procedures:  Procedures      Medical Decision Making:      Comorbidities that affect care:    Hyperlipidemia, dementia, hypertension, coronary artery disease, CHF, cyst CVA, peripheral vascular disease, femoral vein thrombosis, rheumatoid arthritis, history of renal failure, Alzheimer's    External Notes reviewed:    Previous Clinic Note: Outpatient urology visit for gross hematuria April 2024 and Previous ED Note: Emergency department for feeding tube dysfunction September 2024      The following orders were placed and all results were independently analyzed by me:  Orders Placed This Encounter   Procedures    Blood Culture - Blood,    Blood Culture - Blood,    XR Chest 1 View    Sutton Draw    Comprehensive Metabolic Panel    Single High Sensitivity Troponin T    Magnesium    Urinalysis With Microscopic If Indicated (No Culture) - Urine, Clean Catch    Lactic Acid, Plasma    CBC Auto Differential    Arterial Blood Gas + Electrolytes    Manual Differential    Blood Gas, Arterial -    STAT Lactic Acid, Reflex    NPO Diet NPO Type: Strict NPO    Undress & Gown    Continuous Pulse Oximetry    Vital Signs    Orthostatic Blood Pressure    Replace Irby Catheter    Assess Need for Indwelling Urinary Catheter - Follow Removal Protocol    Urinary Catheter Care    Please secure C-spine with towel rolls  Misc Nursing Order (Specify)    Code Status and Medical Interventions: No CPR (Do Not Attempt to Resuscitate); Comfort Measures; discussed with JANIE Devi  Ford    Oxygen Therapy- Nasal Cannula; Titrate 1-6 LPM Per SpO2; 90 - 95%    POC Glucose Once    POC Chem Panel    POC Lactate    ECG 12 Lead ED Triage Standing Order; Weak / Dizzy / AMS    Insert Peripheral IV    Fall Precautions    CBC & Differential    Green Top (Gel)    Lavender Top    Gold Top - SST    Light Blue Top       Medications Given in the Emergency Department:  Medications   sodium chloride 0.9 % flush 10 mL (has no administration in time range)   cefepime 2000 mg IVPB in 100 mL NS (VTB) (2,000 mg Intravenous Not Given 10/6/24 0523)   vancomycin IVPB 1500 mg in 0.9% NaCl (Premix) 500 mL (1,500 mg Intravenous Not Given 10/6/24 0658)   sodium chloride 0.9 % bolus 2,000 mL (0 mL Intravenous Stopped 10/6/24 0537)   EPINEPHrine (ADRENALIN) injection (1 mg Intravenous Given 10/6/24 0512)   sodium bicarbonate injection 8.4% (50 mEq Intravenous Given 10/6/24 0513)   calcium chloride injection (1 g Intravenous Given 10/6/24 0516)        ED Course:         Labs:    Lab Results (last 24 hours)       Procedure Component Value Units Date/Time    CBC & Differential [[id number]]  (Abnormal) Collected: 10/06/24 0436    Specimen: Blood Updated: 10/06/24 0531    Narrative:      The following orders were created for panel order CBC & Differential.  Procedure                               Abnormality         Status                     ---------                               -----------         ------                     CBC Auto Differential[[id number]]        Abnormal            Final result                 Please view results for these tests on the individual orders.    Comprehensive Metabolic Panel [[id number]]  (Abnormal) Collected: 10/06/24 0436    Specimen: Blood Updated: 10/06/24 0509     Glucose 158 mg/dL      BUN 32 mg/dL      Creatinine 1.84 mg/dL      Sodium 149 mmol/L      Potassium 4.2 mmol/L      Chloride 110 mmol/L      CO2 22.9 mmol/L      Calcium 9.2 mg/dL      Total Protein 6.8 g/dL      Albumin 2.6 g/dL       ALT (SGPT) 11 U/L      AST (SGOT) 15 U/L      Alkaline Phosphatase 115 U/L      Total Bilirubin <0.2 mg/dL      Globulin 4.2 gm/dL      A/G Ratio 0.6 g/dL      BUN/Creatinine Ratio 17.4     Anion Gap 16.1 mmol/L      eGFR 35.9 mL/min/1.73     Narrative:      GFR Normal >60  Chronic Kidney Disease <60  Kidney Failure <15    The GFR formula is only valid for adults with stable renal function between ages 18 and 70.    Single High Sensitivity Troponin T [[id number]]  (Abnormal) Collected: 10/06/24 0436    Specimen: Blood Updated: 10/06/24 0515     HS Troponin T 52 ng/L     Narrative:      High Sensitive Troponin T Reference Range:  <14.0 ng/L- Negative Female for AMI  <22.0 ng/L- Negative Male for AMI  >=14 - Abnormal Female indicating possible myocardial injury.  >=22 - Abnormal Male indicating possible myocardial injury.   Clinicians would have to utilize clinical acumen, EKG, Troponin, and serial changes to determine if it is an Acute Myocardial Infarction or myocardial injury due to an underlying chronic condition.         Magnesium [[id number]]  (Abnormal) Collected: 10/06/24 0436    Specimen: Blood Updated: 10/06/24 0509     Magnesium 2.6 mg/dL     Blood Culture - Blood, Arm, Right [[id number]] Collected: 10/06/24 0436    Specimen: Blood from Arm, Right Updated: 10/06/24 0444    Lactic Acid, Plasma [[id number]]  (Abnormal) Collected: 10/06/24 0436    Specimen: Blood Updated: 10/06/24 0518     Lactate 8.1 mmol/L     CBC Auto Differential [[id number]]  (Abnormal) Collected: 10/06/24 0436    Specimen: Blood Updated: 10/06/24 0531     WBC 5.39 10*3/mm3      RBC 4.25 10*6/mm3      Hemoglobin 11.4 g/dL      Hematocrit 36.5 %      MCV 85.9 fL      MCH 26.8 pg      MCHC 31.2 g/dL      RDW 17.2 %      RDW-SD 52.6 fl      MPV 11.4 fL      Platelets 144 10*3/mm3     Manual Differential [[id number]]  (Abnormal) Collected: 10/06/24 0436    Specimen: Blood Updated: 10/06/24 0531     Neutrophil % 67.0 %      Lymphocyte % 18.0 %       Monocyte % 3.0 %      Eosinophil % 2.0 %      Bands %  10.0 %      Neutrophils Absolute 4.15 10*3/mm3      Lymphocytes Absolute 0.97 10*3/mm3      Monocytes Absolute 0.16 10*3/mm3      Eosinophils Absolute 0.11 10*3/mm3      nRBC 27.0 /100 WBC      Anisocytosis Slight/1+     Hypochromia Slight/1+     WBC Morphology Normal     Platelet Morphology Normal    POC Glucose Once [[id number]]  (Abnormal) Collected: 10/06/24 0454    Specimen: Blood Updated: 10/06/24 0456     Glucose 119 mg/dL      Comment: Serial Number: [id number]:  [id number]       POC Chem Panel [[id number]]  (Abnormal) Collected: 10/06/24 0456    Specimen: Arterial Blood Updated: 10/06/24 0500     Glucose 147 mg/dL      Comment: Serial Number: [id number]:  [id number]        Sodium 155 mmol/L      POC Potassium 3.7 mmol/L      Ionized Calcium 1.22 mmol/L      Chloride 116 mmol/L      Creatinine 1.44 mg/dL      BUN 30 mg/dL      CO2 Content 24.1 mmol/L      Notified Who Dr Correa     Read Back Yes    Blood Gas, Arterial - [[id number]]  (Abnormal) Collected: 10/06/24 0456    Specimen: Arterial Blood Updated: 10/06/24 0500     Site Right Radial     Mitchel's Test Positive     pH, Arterial 7.098 pH units      pCO2, Arterial 74.5 mm Hg      pO2, Arterial 249.0 mm Hg      HCO3, Arterial 23.0 mmol/L      Base Excess, Arterial -7.6 mmol/L      Comment: Serial Number: [id number]:  [id number]        O2 Saturation, Arterial 99.6 %      Hemoglobin, Blood Gas 11.1 g/dL      Hematocrit, Blood Gas 33.0 %      Barometric Pressure for Blood Gas 744.4000 mmHg      Modality Adult Vent     FIO2 100 %      Ventilator Mode AC     Set Tidal Volume 400     PEEP 5     Notified Who Dr Correa     Read Back Yes     Notified Time --     Hemodilution No     Respiratory Rate 16     PO2/FIO2 249    POC Lactate [[id number]]  (Abnormal) Collected: 10/06/24 0456    Specimen: Arterial Blood Updated: 10/06/24 0500     Lactate 6.8 mmol/L      Comment: Serial Number: [id number]:  [id number]         Notified Time --     Notified Who Dr Correa     Read Back Yes             Imaging:    XR Chest 1 View    Result Date: 10/6/2024  XR CHEST 1 VW-  Date of exam: 10/6/2024 4:35 AM  Indication: Weak/Dizzy/AMS triage protocol.  Comparison: 3/17/2024.  FINDINGS: A single AP (or PA) upright portable chest radiograph was performed. The distal tip of the endotracheal (ET) tube is projected about 2.2 cm above the lillian. It is thought to be in satisfactory position. There is a right PICC line in place with its distal tip in the expected mid superior vena cava (SVC). External artifacts obscure detail. There may be borderline cardiac enlargement. Atelectasis and/or infiltrate(s) is (are) suspected in in the left lung base. Pneumonia in the left lung base is possible. Probably no acute infiltrate on the right. There are low lung volumes. Probably no significant pleural effusion. Mild gaseous distention involves the stomach and bowel in the partially visualized upper abdomen. The thoracic aorta is atherosclerotic and ectatic. Degenerative changes involve the shoulders and spine. There is dextroscoliosis of the thoracolumbar spine. No pneumothorax is seen.       The endotracheal (ET) tube is in satisfactory position. There may be new left basilar pneumonia. Please see above comments for further detail.  Portions of this note were completed with a voice recognition program.  Electronically Signed By-Jez Neal MD On:10/6/2024 4:50 AM         Differential Diagnosis and Discussion:    Cardiac Arrest: Differential diagnosis includes but is not limited to ventricular tachycardia, ventricular fibrillation, asystole, PEA, respiratory arrest due to hypoxia or metabolic abnormalities.    All labs were reviewed and interpreted by me.  All X-rays impressions were independently interpreted by me.  EKG was interpreted by me.    MDM  Number of Diagnoses or Management Options  Acute respiratory failure with hypoxia  Cardiac  arrest  Diagnosis management comments: I was called to the bedside as patient became pulseless and had no cardiac activity seen on cardiac monitoring/asystole.  CPR was initiated and the patient received 1 dose of epinephrine and sodium bicarb.  After 2 minutes of chest compressions patient had return of spontaneous circulation.    I discussed the patient's workup and presentation thus far with the patient's POA/daughter and son.  We discussed the patient's overall fragile health state along with his episode of asystole requiring CPR and the likelihood that he would again suffer cardiac arrest while in the emergency department.  We discussed focusing his care on comfort measures only and no longer pursuing any aggressive life-saving measures including additional diagnostic workup.  I have entered a DNR status in the patient's medical record at this time.  I have informed emergency department nursing staff of the goals of care to only focus on comfort at this time.    The patient remained on mechanical ventilation and continued to have cardiac activity.  I discussed the patient's continued status with his daughter/POA who agrees with plan for terminal extubation.  After extubation the patient's time of death was 0717       Amount and/or Complexity of Data Reviewed  Decide to obtain previous medical records or to obtain history from someone other than the patient: yes         Patient was placed on the cardiac monitor after being given IV epinephrine.  They were monitored for ventricular ectopy, arrhythmia, tachycardia, hypoxia, and changes in blood pressure.  Patient was rechecked several times throughout their stay for mental status decline and for reassessment of worsening changes in vital signs.     Total Critical Care time of 35 minutes. Total critical care time documented does not include time spent on separately billed procedures for services of nurses or physician assistants. I personally saw and examined the  patient. I have reviewed all diagnostic interpretations and treatment plans as written. I was present for the key portions of any procedures performed and the inclusive time noted in any critical care statement. Critical care time includes patient management by me, time spent at the patients bedside,  time to review lab and imaging results, discussing patient care, documentation in the medical record, and time spent with family or caregiver.        Patient Care Considerations:          Consultants/Shared Management Plan:    None    Social Determinants of Health:    The patient  with a family friend at bedside.      Disposition and Care Coordination:    : The patient  in the emergency department despite resuscitative efforts. Time of Death: 717        Final diagnoses:   Acute respiratory failure with hypoxia   Cardiac arrest        ED Disposition       ED Disposition       Condition   --    Comment   --               This medical record created using voice recognition software.             Russell Correa MD  10/06/24 0870

## 2024-10-08 LAB
BACTERIA SPEC AEROBE CULT: ABNORMAL
GRAM STN SPEC: ABNORMAL
GRAM STN SPEC: ABNORMAL
ISOLATED FROM: ABNORMAL

## 2024-10-27 LAB — QTC INTERVAL: NORMAL MS

## 2025-01-14 NOTE — ANESTHESIA POSTPROCEDURE EVALUATION
----- Message from Valente Johnston MD sent at 1/14/2025 12:12 PM CST -----  Ov LDL   Patient: Jordin Menon    Procedure Summary     Date: 06/08/22 Room / Location: [facility] ENDOSCOPY 2 / [facility] ENDOSCOPY    Anesthesia Start: 1513 Anesthesia Stop: 1530    Procedure: ESOPHAGOGASTRODUODENOSCOPY WITH PERCUTANEOUS ENDOSCOPIC GASTROSTOMY TUBE INSERTION WITH ANESTHESIA (N/A ) Diagnosis:       Severe malnutrition (HCC)      (Severe malnutrition (HCC) [E43])    Surgeons: Yanelis Mistry MD Provider: Adrienne Bello DO    Anesthesia Type: general ASA Status: 4          Anesthesia Type: general    Vitals  Vitals Value Taken Time   /75 06/08/22 1550   Temp     Pulse 89 06/08/22 1554   Resp 18 06/08/22 1550   SpO2 98 % 06/08/22 1550   Vitals shown include unvalidated device data.        Post Anesthesia Care and Evaluation    Patient location during evaluation: bedside  Patient participation: complete - patient participated  Level of consciousness: awake  Pain management: adequate    Airway patency: patent  Anesthetic complications: No anesthetic complications  PONV Status: none  Cardiovascular status: acceptable and stable  Respiratory status: acceptable  Hydration status: acceptable    Comments: An Anesthesiologist personally participated in the most demanding procedures (including induction and emergence if applicable) in the anesthesia plan, monitored the course of anesthesia administration at frequent intervals and remained physically present and available for immediate diagnosis and treatment of emergencies.

## (undated) DEVICE — CONN JET HYDRA H20 AUXILIARY DISP

## (undated) DEVICE — SOLIDIFIER LIQLOC PLS 1500CC BT

## (undated) DEVICE — KT PEG ENDOVIVE ENFIT SFTY PULL 20F 1P/U

## (undated) DEVICE — Device: Brand: DEFENDO AIR/WATER/SUCTION AND BIOPSY VALVE

## (undated) DEVICE — EGD OR ERCP KIT: Brand: MEDLINE INDUSTRIES, INC.

## (undated) DEVICE — LINER SURG CANSTR SXN S/RIGD 1500CC

## (undated) DEVICE — SOL IRRG H2O PL/BG 1000ML STRL

## (undated) DEVICE — Device

## (undated) DEVICE — BLCK/BITE BLOX WO/DENTL/RIM W/STRAP 54F